# Patient Record
Sex: MALE | Race: WHITE | Employment: FULL TIME | URBAN - METROPOLITAN AREA
[De-identification: names, ages, dates, MRNs, and addresses within clinical notes are randomized per-mention and may not be internally consistent; named-entity substitution may affect disease eponyms.]

---

## 2020-01-01 ENCOUNTER — ONCOLOGY VISIT (OUTPATIENT)
Dept: TRANSPLANT | Facility: CLINIC | Age: 55
End: 2020-01-01
Attending: INTERNAL MEDICINE
Payer: COMMERCIAL

## 2020-01-01 ENCOUNTER — ANESTHESIA (OUTPATIENT)
Dept: SURGERY | Facility: AMBULATORY SURGERY CENTER | Age: 55
End: 2020-01-01

## 2020-01-01 ENCOUNTER — ONCOLOGY VISIT (OUTPATIENT)
Dept: TRANSPLANT | Facility: CLINIC | Age: 55
End: 2020-01-01
Attending: PHYSICIAN ASSISTANT
Payer: COMMERCIAL

## 2020-01-01 ENCOUNTER — ONCOLOGY VISIT (OUTPATIENT)
Dept: TRANSPLANT | Facility: CLINIC | Age: 55
End: 2020-01-01
Attending: NURSE PRACTITIONER
Payer: COMMERCIAL

## 2020-01-01 ENCOUNTER — ANCILLARY PROCEDURE (OUTPATIENT)
Dept: CT IMAGING | Facility: CLINIC | Age: 55
End: 2020-01-01
Attending: PHYSICIAN ASSISTANT
Payer: COMMERCIAL

## 2020-01-01 ENCOUNTER — HOSPITAL ENCOUNTER (OUTPATIENT)
Dept: LAB | Facility: CLINIC | Age: 55
End: 2020-10-27
Payer: COMMERCIAL

## 2020-01-01 ENCOUNTER — HOSPITAL ENCOUNTER (OUTPATIENT)
Dept: LAB | Facility: CLINIC | Age: 55
End: 2020-10-28
Payer: COMMERCIAL

## 2020-01-01 ENCOUNTER — ANESTHESIA EVENT (OUTPATIENT)
Dept: SURGERY | Facility: AMBULATORY SURGERY CENTER | Age: 55
End: 2020-01-01

## 2020-01-01 ENCOUNTER — NURSE TRIAGE (OUTPATIENT)
Dept: NURSING | Facility: CLINIC | Age: 55
End: 2020-01-01

## 2020-01-01 ENCOUNTER — ANCILLARY PROCEDURE (OUTPATIENT)
Dept: ULTRASOUND IMAGING | Facility: CLINIC | Age: 55
End: 2020-01-01
Attending: PHYSICIAN ASSISTANT
Payer: COMMERCIAL

## 2020-01-01 ENCOUNTER — APPOINTMENT (OUTPATIENT)
Dept: OCCUPATIONAL THERAPY | Facility: CLINIC | Age: 55
DRG: 809 | End: 2020-01-01
Payer: COMMERCIAL

## 2020-01-01 ENCOUNTER — DOCUMENTATION ONLY (OUTPATIENT)
Dept: TRANSPLANT | Facility: CLINIC | Age: 55
End: 2020-01-01

## 2020-01-01 ENCOUNTER — ANCILLARY PROCEDURE (OUTPATIENT)
Dept: RADIOLOGY | Facility: AMBULATORY SURGERY CENTER | Age: 55
End: 2020-01-01
Attending: INTERNAL MEDICINE
Payer: COMMERCIAL

## 2020-01-01 ENCOUNTER — HOME INFUSION (PRE-WILLOW HOME INFUSION) (OUTPATIENT)
Dept: PHARMACY | Facility: CLINIC | Age: 55
End: 2020-01-01

## 2020-01-01 ENCOUNTER — APPOINTMENT (OUTPATIENT)
Dept: LAB | Facility: CLINIC | Age: 55
End: 2020-01-01
Attending: INTERNAL MEDICINE
Payer: COMMERCIAL

## 2020-01-01 ENCOUNTER — CARE COORDINATION (OUTPATIENT)
Dept: ONCOLOGY | Facility: CLINIC | Age: 55
End: 2020-01-01

## 2020-01-01 ENCOUNTER — COMMUNICATION - HEALTHEAST (OUTPATIENT)
Dept: SCHEDULING | Facility: CLINIC | Age: 55
End: 2020-01-01

## 2020-01-01 ENCOUNTER — VIRTUAL VISIT (OUTPATIENT)
Dept: ENDOCRINOLOGY | Facility: CLINIC | Age: 55
End: 2020-01-01
Payer: COMMERCIAL

## 2020-01-01 ENCOUNTER — DOCUMENTATION ONLY (OUTPATIENT)
Dept: ONCOLOGY | Facility: CLINIC | Age: 55
End: 2020-01-01

## 2020-01-01 ENCOUNTER — TELEPHONE (OUTPATIENT)
Dept: TRANSPLANT | Facility: CLINIC | Age: 55
End: 2020-01-01

## 2020-01-01 ENCOUNTER — AMBULATORY - HEALTHEAST (OUTPATIENT)
Dept: FAMILY MEDICINE | Facility: CLINIC | Age: 55
End: 2020-01-01

## 2020-01-01 ENCOUNTER — APPOINTMENT (OUTPATIENT)
Dept: OCCUPATIONAL THERAPY | Facility: CLINIC | Age: 55
DRG: 016 | End: 2020-01-01
Attending: INTERNAL MEDICINE
Payer: COMMERCIAL

## 2020-01-01 ENCOUNTER — INFUSION THERAPY VISIT (OUTPATIENT)
Dept: TRANSPLANT | Facility: CLINIC | Age: 55
End: 2020-01-01
Payer: COMMERCIAL

## 2020-01-01 ENCOUNTER — APPOINTMENT (OUTPATIENT)
Dept: LAB | Facility: CLINIC | Age: 55
End: 2020-01-01
Attending: PHYSICIAN ASSISTANT
Payer: COMMERCIAL

## 2020-01-01 ENCOUNTER — INFUSION THERAPY VISIT (OUTPATIENT)
Dept: TRANSPLANT | Facility: CLINIC | Age: 55
DRG: 809 | End: 2020-01-01
Payer: COMMERCIAL

## 2020-01-01 ENCOUNTER — HOSPITAL ENCOUNTER (OUTPATIENT)
Dept: LAB | Facility: CLINIC | Age: 55
End: 2020-10-29
Payer: COMMERCIAL

## 2020-01-01 ENCOUNTER — APPOINTMENT (OUTPATIENT)
Dept: OCCUPATIONAL THERAPY | Facility: CLINIC | Age: 55
DRG: 809 | End: 2020-01-01
Attending: PHYSICIAN ASSISTANT
Payer: COMMERCIAL

## 2020-01-01 ENCOUNTER — PATIENT OUTREACH (OUTPATIENT)
Dept: CARE COORDINATION | Facility: CLINIC | Age: 55
End: 2020-01-01

## 2020-01-01 ENCOUNTER — HOSPITAL ENCOUNTER (INPATIENT)
Facility: CLINIC | Age: 55
LOS: 6 days | Discharge: HOME | DRG: 809 | End: 2020-11-22
Admitting: INTERNAL MEDICINE
Payer: COMMERCIAL

## 2020-01-01 ENCOUNTER — ONCOLOGY VISIT (OUTPATIENT)
Dept: TRANSPLANT | Facility: CLINIC | Age: 55
DRG: 809 | End: 2020-01-01
Attending: INTERNAL MEDICINE
Payer: COMMERCIAL

## 2020-01-01 ENCOUNTER — ONCOLOGY VISIT (OUTPATIENT)
Dept: TRANSPLANT | Facility: CLINIC | Age: 55
End: 2020-01-01
Attending: FAMILY MEDICINE
Payer: COMMERCIAL

## 2020-01-01 ENCOUNTER — APPOINTMENT (OUTPATIENT)
Dept: GENERAL RADIOLOGY | Facility: CLINIC | Age: 55
DRG: 809 | End: 2020-01-01
Attending: PHYSICIAN ASSISTANT
Payer: COMMERCIAL

## 2020-01-01 ENCOUNTER — HOSPITAL ENCOUNTER (OUTPATIENT)
Facility: AMBULATORY SURGERY CENTER | Age: 55
Discharge: HOME | End: 2020-10-26
Attending: RADIOLOGY | Admitting: RADIOLOGY
Payer: COMMERCIAL

## 2020-01-01 ENCOUNTER — TELEPHONE (OUTPATIENT)
Dept: ENDOCRINOLOGY | Facility: CLINIC | Age: 55
End: 2020-01-01

## 2020-01-01 ENCOUNTER — APPOINTMENT (OUTPATIENT)
Dept: OCCUPATIONAL THERAPY | Facility: CLINIC | Age: 55
DRG: 016 | End: 2020-01-01
Attending: PHYSICIAN ASSISTANT
Payer: COMMERCIAL

## 2020-01-01 ENCOUNTER — HOSPITAL ENCOUNTER (INPATIENT)
Facility: CLINIC | Age: 55
LOS: 7 days | Discharge: HOME | DRG: 016 | End: 2020-11-11
Attending: INTERNAL MEDICINE | Admitting: INTERNAL MEDICINE
Payer: COMMERCIAL

## 2020-01-01 ENCOUNTER — APPOINTMENT (OUTPATIENT)
Dept: LAB | Facility: CLINIC | Age: 55
DRG: 809 | End: 2020-01-01
Attending: INTERNAL MEDICINE
Payer: COMMERCIAL

## 2020-01-01 VITALS
TEMPERATURE: 98.6 F | SYSTOLIC BLOOD PRESSURE: 163 MMHG | HEART RATE: 89 BPM | DIASTOLIC BLOOD PRESSURE: 79 MMHG | DIASTOLIC BLOOD PRESSURE: 94 MMHG | HEART RATE: 86 BPM | RESPIRATION RATE: 16 BRPM | WEIGHT: 248.02 LBS | BODY MASS INDEX: 33.64 KG/M2 | TEMPERATURE: 98.1 F | RESPIRATION RATE: 16 BRPM | SYSTOLIC BLOOD PRESSURE: 177 MMHG

## 2020-01-01 VITALS
BODY MASS INDEX: 33.05 KG/M2 | TEMPERATURE: 97.3 F | HEART RATE: 88 BPM | HEIGHT: 72 IN | RESPIRATION RATE: 18 BRPM | SYSTOLIC BLOOD PRESSURE: 131 MMHG | OXYGEN SATURATION: 94 % | WEIGHT: 244 LBS | DIASTOLIC BLOOD PRESSURE: 84 MMHG

## 2020-01-01 VITALS
SYSTOLIC BLOOD PRESSURE: 110 MMHG | OXYGEN SATURATION: 99 % | WEIGHT: 235.1 LBS | TEMPERATURE: 97.5 F | DIASTOLIC BLOOD PRESSURE: 71 MMHG | HEART RATE: 112 BPM | BODY MASS INDEX: 31.89 KG/M2 | RESPIRATION RATE: 18 BRPM

## 2020-01-01 VITALS
OXYGEN SATURATION: 100 % | HEART RATE: 98 BPM | HEIGHT: 73 IN | SYSTOLIC BLOOD PRESSURE: 115 MMHG | BODY MASS INDEX: 33.8 KG/M2 | TEMPERATURE: 98.1 F | WEIGHT: 255 LBS | RESPIRATION RATE: 18 BRPM | DIASTOLIC BLOOD PRESSURE: 68 MMHG

## 2020-01-01 VITALS
DIASTOLIC BLOOD PRESSURE: 90 MMHG | BODY MASS INDEX: 33.81 KG/M2 | OXYGEN SATURATION: 99 % | WEIGHT: 249.6 LBS | SYSTOLIC BLOOD PRESSURE: 137 MMHG | TEMPERATURE: 98.4 F | RESPIRATION RATE: 18 BRPM | HEART RATE: 72 BPM

## 2020-01-01 VITALS
DIASTOLIC BLOOD PRESSURE: 90 MMHG | BODY MASS INDEX: 36.45 KG/M2 | TEMPERATURE: 98.4 F | SYSTOLIC BLOOD PRESSURE: 147 MMHG | OXYGEN SATURATION: 100 % | HEART RATE: 64 BPM | RESPIRATION RATE: 18 BRPM | HEIGHT: 70 IN | WEIGHT: 254.6 LBS

## 2020-01-01 VITALS
DIASTOLIC BLOOD PRESSURE: 66 MMHG | SYSTOLIC BLOOD PRESSURE: 112 MMHG | WEIGHT: 254 LBS | HEART RATE: 110 BPM | OXYGEN SATURATION: 100 % | TEMPERATURE: 98 F | BODY MASS INDEX: 33.85 KG/M2 | RESPIRATION RATE: 18 BRPM

## 2020-01-01 VITALS
TEMPERATURE: 98.8 F | OXYGEN SATURATION: 100 % | SYSTOLIC BLOOD PRESSURE: 110 MMHG | HEART RATE: 123 BPM | RESPIRATION RATE: 18 BRPM | WEIGHT: 250 LBS | BODY MASS INDEX: 33.31 KG/M2 | DIASTOLIC BLOOD PRESSURE: 73 MMHG

## 2020-01-01 VITALS
SYSTOLIC BLOOD PRESSURE: 136 MMHG | TEMPERATURE: 98.1 F | WEIGHT: 248.02 LBS | BODY MASS INDEX: 33.59 KG/M2 | DIASTOLIC BLOOD PRESSURE: 73 MMHG | HEART RATE: 90 BPM | RESPIRATION RATE: 18 BRPM | HEIGHT: 72 IN

## 2020-01-01 VITALS
BODY MASS INDEX: 32.64 KG/M2 | TEMPERATURE: 98.1 F | RESPIRATION RATE: 18 BRPM | HEIGHT: 72 IN | SYSTOLIC BLOOD PRESSURE: 128 MMHG | WEIGHT: 241 LBS | DIASTOLIC BLOOD PRESSURE: 82 MMHG | OXYGEN SATURATION: 98 % | HEART RATE: 102 BPM

## 2020-01-01 VITALS
DIASTOLIC BLOOD PRESSURE: 92 MMHG | HEART RATE: 67 BPM | TEMPERATURE: 98 F | SYSTOLIC BLOOD PRESSURE: 157 MMHG | WEIGHT: 251.6 LBS | RESPIRATION RATE: 20 BRPM | OXYGEN SATURATION: 98 % | BODY MASS INDEX: 34.08 KG/M2

## 2020-01-01 VITALS
TEMPERATURE: 96.8 F | RESPIRATION RATE: 16 BRPM | SYSTOLIC BLOOD PRESSURE: 124 MMHG | HEIGHT: 71 IN | BODY MASS INDEX: 34.51 KG/M2 | OXYGEN SATURATION: 98 % | HEART RATE: 85 BPM | WEIGHT: 246.5 LBS | DIASTOLIC BLOOD PRESSURE: 76 MMHG

## 2020-01-01 VITALS
BODY MASS INDEX: 33.89 KG/M2 | OXYGEN SATURATION: 98 % | SYSTOLIC BLOOD PRESSURE: 146 MMHG | DIASTOLIC BLOOD PRESSURE: 85 MMHG | WEIGHT: 250.2 LBS | RESPIRATION RATE: 18 BRPM | HEIGHT: 72 IN | HEART RATE: 97 BPM | TEMPERATURE: 98.2 F

## 2020-01-01 VITALS
RESPIRATION RATE: 18 BRPM | DIASTOLIC BLOOD PRESSURE: 65 MMHG | TEMPERATURE: 101.4 F | OXYGEN SATURATION: 100 % | HEART RATE: 123 BPM | SYSTOLIC BLOOD PRESSURE: 100 MMHG | BODY MASS INDEX: 33.05 KG/M2 | WEIGHT: 248 LBS

## 2020-01-01 VITALS
SYSTOLIC BLOOD PRESSURE: 143 MMHG | HEART RATE: 79 BPM | DIASTOLIC BLOOD PRESSURE: 88 MMHG | WEIGHT: 244.4 LBS | OXYGEN SATURATION: 99 % | RESPIRATION RATE: 16 BRPM | BODY MASS INDEX: 34.22 KG/M2 | HEIGHT: 71 IN

## 2020-01-01 VITALS
HEART RATE: 89 BPM | OXYGEN SATURATION: 95 % | SYSTOLIC BLOOD PRESSURE: 137 MMHG | WEIGHT: 246.3 LBS | TEMPERATURE: 98.7 F | HEIGHT: 72 IN | RESPIRATION RATE: 16 BRPM | DIASTOLIC BLOOD PRESSURE: 81 MMHG | BODY MASS INDEX: 33.36 KG/M2

## 2020-01-01 VITALS
BODY MASS INDEX: 32.22 KG/M2 | DIASTOLIC BLOOD PRESSURE: 86 MMHG | SYSTOLIC BLOOD PRESSURE: 128 MMHG | HEART RATE: 83 BPM | TEMPERATURE: 98.3 F | OXYGEN SATURATION: 99 % | RESPIRATION RATE: 16 BRPM | WEIGHT: 237.6 LBS

## 2020-01-01 VITALS
OXYGEN SATURATION: 99 % | HEART RATE: 83 BPM | WEIGHT: 237.66 LBS | TEMPERATURE: 98.3 F | RESPIRATION RATE: 16 BRPM | DIASTOLIC BLOOD PRESSURE: 86 MMHG | SYSTOLIC BLOOD PRESSURE: 128 MMHG | BODY MASS INDEX: 32.23 KG/M2

## 2020-01-01 VITALS
DIASTOLIC BLOOD PRESSURE: 74 MMHG | WEIGHT: 237.5 LBS | RESPIRATION RATE: 18 BRPM | TEMPERATURE: 97.9 F | HEIGHT: 72 IN | OXYGEN SATURATION: 99 % | HEART RATE: 99 BPM | SYSTOLIC BLOOD PRESSURE: 108 MMHG | BODY MASS INDEX: 32.17 KG/M2

## 2020-01-01 VITALS
BODY MASS INDEX: 36.79 KG/M2 | TEMPERATURE: 97.5 F | SYSTOLIC BLOOD PRESSURE: 132 MMHG | WEIGHT: 257 LBS | RESPIRATION RATE: 18 BRPM | DIASTOLIC BLOOD PRESSURE: 79 MMHG | HEART RATE: 101 BPM | OXYGEN SATURATION: 100 %

## 2020-01-01 VITALS
SYSTOLIC BLOOD PRESSURE: 152 MMHG | RESPIRATION RATE: 18 BRPM | TEMPERATURE: 98.6 F | DIASTOLIC BLOOD PRESSURE: 84 MMHG | HEART RATE: 77 BPM

## 2020-01-01 DIAGNOSIS — C83.18 MANTLE CELL LYMPHOMA OF LYMPH NODES OF MULTIPLE SITES (H): ICD-10-CM

## 2020-01-01 DIAGNOSIS — C83.18 MANTLE CELL LYMPHOMA OF LYMPH NODES OF MULTIPLE SITES (H): Primary | ICD-10-CM

## 2020-01-01 DIAGNOSIS — Z94.81 S/P BONE MARROW TRANSPLANT (H): ICD-10-CM

## 2020-01-01 DIAGNOSIS — Z94.81 STATUS POST BONE MARROW TRANSPLANT (H): Primary | ICD-10-CM

## 2020-01-01 DIAGNOSIS — C83.18 MANTLE CELL LYMPHOMA OF LYMPH NODES OF MULTIPLE REGIONS (H): ICD-10-CM

## 2020-01-01 DIAGNOSIS — Z94.81 S/P BONE MARROW TRANSPLANT (H): Primary | ICD-10-CM

## 2020-01-01 DIAGNOSIS — Z11.59 ENCOUNTER FOR SCREENING FOR OTHER VIRAL DISEASES: ICD-10-CM

## 2020-01-01 DIAGNOSIS — E11.69 TYPE 2 DIABETES MELLITUS WITH OTHER SPECIFIED COMPLICATION, WITHOUT LONG-TERM CURRENT USE OF INSULIN (H): ICD-10-CM

## 2020-01-01 DIAGNOSIS — C83.18 MANTLE CELL LYMPHOMA OF LYMPH NODES OF MULTIPLE REGIONS (H): Primary | ICD-10-CM

## 2020-01-01 DIAGNOSIS — I82.622 ACUTE DEEP VEIN THROMBOSIS (DVT) OF LEFT UPPER EXTREMITY, UNSPECIFIED VEIN (H): ICD-10-CM

## 2020-01-01 DIAGNOSIS — I82.622 ACUTE DEEP VEIN THROMBOSIS (DVT) OF LEFT UPPER EXTREMITY, UNSPECIFIED VEIN (H): Primary | ICD-10-CM

## 2020-01-01 DIAGNOSIS — Z11.59 ENCOUNTER FOR SCREENING FOR OTHER VIRAL DISEASES: Primary | ICD-10-CM

## 2020-01-01 LAB
ABO + RH BLD: NORMAL
ALBUMIN SERPL-MCNC: 2.6 G/DL (ref 3.4–5)
ALBUMIN SERPL-MCNC: 2.8 G/DL (ref 3.4–5)
ALBUMIN SERPL-MCNC: 2.9 G/DL (ref 3.4–5)
ALBUMIN SERPL-MCNC: 3 G/DL (ref 3.4–5)
ALBUMIN SERPL-MCNC: 3.1 G/DL (ref 3.4–5)
ALBUMIN SERPL-MCNC: 3.2 G/DL (ref 3.4–5)
ALBUMIN UR-MCNC: 10 MG/DL
ALBUMIN UR-MCNC: 30 MG/DL
ALP SERPL-CCNC: 112 U/L (ref 40–150)
ALP SERPL-CCNC: 131 U/L (ref 40–150)
ALP SERPL-CCNC: 79 U/L (ref 40–150)
ALP SERPL-CCNC: 82 U/L (ref 40–150)
ALP SERPL-CCNC: 85 U/L (ref 40–150)
ALP SERPL-CCNC: 92 U/L (ref 40–150)
ALT SERPL W P-5'-P-CCNC: 18 U/L (ref 0–70)
ALT SERPL W P-5'-P-CCNC: 19 U/L (ref 0–70)
ALT SERPL W P-5'-P-CCNC: 26 U/L (ref 0–70)
ALT SERPL W P-5'-P-CCNC: 33 U/L (ref 0–70)
ALT SERPL W P-5'-P-CCNC: 35 U/L (ref 0–70)
ALT SERPL W P-5'-P-CCNC: 58 U/L (ref 0–70)
AMORPH CRY #/AREA URNS HPF: ABNORMAL /HPF
ANION GAP SERPL CALCULATED.3IONS-SCNC: 3 MMOL/L (ref 3–14)
ANION GAP SERPL CALCULATED.3IONS-SCNC: 4 MMOL/L (ref 3–14)
ANION GAP SERPL CALCULATED.3IONS-SCNC: 5 MMOL/L (ref 3–14)
ANION GAP SERPL CALCULATED.3IONS-SCNC: 6 MMOL/L (ref 3–14)
ANION GAP SERPL CALCULATED.3IONS-SCNC: 7 MMOL/L (ref 3–14)
ANION GAP SERPL CALCULATED.3IONS-SCNC: 8 MMOL/L (ref 3–14)
ANION GAP SERPL CALCULATED.3IONS-SCNC: 9 MMOL/L (ref 3–14)
ANION GAP SERPL CALCULATED.3IONS-SCNC: 9 MMOL/L (ref 3–14)
APPEARANCE UR: ABNORMAL
APPEARANCE UR: CLEAR
APTT PPP: 55 SEC (ref 22–37)
AST SERPL W P-5'-P-CCNC: 12 U/L (ref 0–45)
AST SERPL W P-5'-P-CCNC: 20 U/L (ref 0–45)
AST SERPL W P-5'-P-CCNC: 21 U/L (ref 0–45)
AST SERPL W P-5'-P-CCNC: 47 U/L (ref 0–45)
BACTERIA #/AREA URNS HPF: ABNORMAL /HPF
BACTERIA SPEC CULT: NO GROWTH
BACTERIA SPEC CULT: NORMAL
BASOPHILS # BLD AUTO: 0 10E9/L (ref 0–0.2)
BASOPHILS # BLD AUTO: 0.1 10E9/L (ref 0–0.2)
BASOPHILS # BLD AUTO: 0.1 10E9/L (ref 0–0.2)
BASOPHILS # BLD AUTO: 0.2 10E9/L (ref 0–0.2)
BASOPHILS # BLD AUTO: 0.2 10E9/L (ref 0–0.2)
BASOPHILS NFR BLD AUTO: 0 %
BASOPHILS NFR BLD AUTO: 0.1 %
BASOPHILS NFR BLD AUTO: 0.1 %
BASOPHILS NFR BLD AUTO: 0.2 %
BASOPHILS NFR BLD AUTO: 0.5 %
BASOPHILS NFR BLD AUTO: 0.6 %
BASOPHILS NFR BLD AUTO: 0.9 %
BASOPHILS NFR BLD AUTO: 1.1 %
BASOPHILS NFR BLD AUTO: 3.5 %
BILIRUB DIRECT SERPL-MCNC: 0.1 MG/DL (ref 0–0.2)
BILIRUB DIRECT SERPL-MCNC: 0.2 MG/DL (ref 0–0.2)
BILIRUB SERPL-MCNC: 0.2 MG/DL (ref 0.2–1.3)
BILIRUB SERPL-MCNC: 0.3 MG/DL (ref 0.2–1.3)
BILIRUB SERPL-MCNC: 0.4 MG/DL (ref 0.2–1.3)
BILIRUB SERPL-MCNC: 0.6 MG/DL (ref 0.2–1.3)
BILIRUB SERPL-MCNC: 0.7 MG/DL (ref 0.2–1.3)
BILIRUB SERPL-MCNC: 0.7 MG/DL (ref 0.2–1.3)
BILIRUB UR QL STRIP: NEGATIVE
BILIRUB UR QL STRIP: NEGATIVE
BLD GP AB SCN SERPL QL: NORMAL
BLD PROD TYP BPU: NORMAL
BLD UNIT ID BPU: 0
BLOOD BANK CMNT PATIENT-IMP: NORMAL
BLOOD PRODUCT CODE: NORMAL
BPU ID: NORMAL
BUN SERPL-MCNC: 10 MG/DL (ref 7–30)
BUN SERPL-MCNC: 10 MG/DL (ref 7–30)
BUN SERPL-MCNC: 12 MG/DL (ref 7–30)
BUN SERPL-MCNC: 15 MG/DL (ref 7–30)
BUN SERPL-MCNC: 15 MG/DL (ref 7–30)
BUN SERPL-MCNC: 16 MG/DL (ref 7–30)
BUN SERPL-MCNC: 18 MG/DL (ref 7–30)
BUN SERPL-MCNC: 19 MG/DL (ref 7–30)
BUN SERPL-MCNC: 19 MG/DL (ref 7–30)
BUN SERPL-MCNC: 20 MG/DL (ref 7–30)
BUN SERPL-MCNC: 23 MG/DL (ref 7–30)
BUN SERPL-MCNC: 24 MG/DL (ref 7–30)
BUN SERPL-MCNC: 25 MG/DL (ref 7–30)
BUN SERPL-MCNC: 26 MG/DL (ref 7–30)
BUN SERPL-MCNC: 27 MG/DL (ref 7–30)
BUN SERPL-MCNC: 28 MG/DL (ref 7–30)
BUN SERPL-MCNC: 28 MG/DL (ref 7–30)
BUN SERPL-MCNC: 31 MG/DL (ref 7–30)
BUN SERPL-MCNC: 32 MG/DL (ref 7–30)
C COLI+JEJUNI+LARI FUSA STL QL NAA+PROBE: NOT DETECTED
C DIFF TOX B STL QL: NEGATIVE
CALCIUM SERPL-MCNC: 7.8 MG/DL (ref 8.5–10.1)
CALCIUM SERPL-MCNC: 7.9 MG/DL (ref 8.5–10.1)
CALCIUM SERPL-MCNC: 8 MG/DL (ref 8.5–10.1)
CALCIUM SERPL-MCNC: 8 MG/DL (ref 8.5–10.1)
CALCIUM SERPL-MCNC: 8.1 MG/DL (ref 8.5–10.1)
CALCIUM SERPL-MCNC: 8.1 MG/DL (ref 8.5–10.1)
CALCIUM SERPL-MCNC: 8.2 MG/DL (ref 8.5–10.1)
CALCIUM SERPL-MCNC: 8.3 MG/DL (ref 8.5–10.1)
CALCIUM SERPL-MCNC: 8.4 MG/DL (ref 8.5–10.1)
CALCIUM SERPL-MCNC: 8.5 MG/DL (ref 8.5–10.1)
CALCIUM SERPL-MCNC: 8.6 MG/DL (ref 8.5–10.1)
CALCIUM SERPL-MCNC: 8.6 MG/DL (ref 8.5–10.1)
CALCIUM SERPL-MCNC: 8.7 MG/DL (ref 8.5–10.1)
CALCIUM SERPL-MCNC: 8.7 MG/DL (ref 8.5–10.1)
CALCIUM SERPL-MCNC: 8.8 MG/DL (ref 8.5–10.1)
CALCIUM SERPL-MCNC: 8.9 MG/DL (ref 8.5–10.1)
CALCIUM SERPL-MCNC: 9 MG/DL (ref 8.5–10.1)
CALCIUM SERPL-MCNC: 9.1 MG/DL (ref 8.5–10.1)
CALCIUM SERPL-MCNC: 9.1 MG/DL (ref 8.5–10.1)
CD34 CELLS # SPEC: 13 /UL
CD34 CELLS # SPEC: 17 /UL
CD34 CELLS # SPEC: 19 /UL
CD34 CELLS # SPEC: 25 /UL
CD34 CELLS # SPEC: NORMAL /UL
CD34 CELLS NFR SPEC: 0.05 %
CD34 CELLS NFR SPEC: 0.05 %
CD34 CELLS NFR SPEC: 0.09 %
CD34 CELLS NFR SPEC: 0.12 %
CD34 CELLS NFR SPEC: NORMAL %
CELL THERAPY PRODUCT NUMBER: NORMAL
CHLORIDE SERPL-SCNC: 100 MMOL/L (ref 94–109)
CHLORIDE SERPL-SCNC: 101 MMOL/L (ref 94–109)
CHLORIDE SERPL-SCNC: 102 MMOL/L (ref 94–109)
CHLORIDE SERPL-SCNC: 103 MMOL/L (ref 94–109)
CHLORIDE SERPL-SCNC: 103 MMOL/L (ref 94–109)
CHLORIDE SERPL-SCNC: 104 MMOL/L (ref 94–109)
CHLORIDE SERPL-SCNC: 105 MMOL/L (ref 94–109)
CHLORIDE SERPL-SCNC: 106 MMOL/L (ref 94–109)
CHLORIDE SERPL-SCNC: 107 MMOL/L (ref 94–109)
CHLORIDE SERPL-SCNC: 108 MMOL/L (ref 94–109)
CHLORIDE SERPL-SCNC: 110 MMOL/L (ref 94–109)
CHLORIDE SERPL-SCNC: 110 MMOL/L (ref 94–109)
CHLORIDE SERPL-SCNC: 111 MMOL/L (ref 94–109)
CHLORIDE SERPL-SCNC: 97 MMOL/L (ref 94–109)
CMV DNA SPEC NAA+PROBE-ACNC: NORMAL [IU]/ML
CMV DNA SPEC NAA+PROBE-LOG#: NORMAL {LOG_IU}/ML
CO2 SERPL-SCNC: 22 MMOL/L (ref 20–32)
CO2 SERPL-SCNC: 23 MMOL/L (ref 20–32)
CO2 SERPL-SCNC: 24 MMOL/L (ref 20–32)
CO2 SERPL-SCNC: 25 MMOL/L (ref 20–32)
CO2 SERPL-SCNC: 26 MMOL/L (ref 20–32)
CO2 SERPL-SCNC: 27 MMOL/L (ref 20–32)
CO2 SERPL-SCNC: 28 MMOL/L (ref 20–32)
CO2 SERPL-SCNC: 29 MMOL/L (ref 20–32)
CO2 SERPL-SCNC: 29 MMOL/L (ref 20–32)
CO2 SERPL-SCNC: 30 MMOL/L (ref 20–32)
CO2 SERPL-SCNC: 30 MMOL/L (ref 20–32)
COLOR UR AUTO: YELLOW
COLOR UR AUTO: YELLOW
COPATH REPORT: NORMAL
CREAT SERPL-MCNC: 0.84 MG/DL (ref 0.66–1.25)
CREAT SERPL-MCNC: 0.88 MG/DL (ref 0.66–1.25)
CREAT SERPL-MCNC: 0.89 MG/DL (ref 0.66–1.25)
CREAT SERPL-MCNC: 0.89 MG/DL (ref 0.66–1.25)
CREAT SERPL-MCNC: 0.9 MG/DL (ref 0.66–1.25)
CREAT SERPL-MCNC: 0.91 MG/DL (ref 0.66–1.25)
CREAT SERPL-MCNC: 0.92 MG/DL (ref 0.66–1.25)
CREAT SERPL-MCNC: 0.96 MG/DL (ref 0.66–1.25)
CREAT SERPL-MCNC: 0.97 MG/DL (ref 0.66–1.25)
CREAT SERPL-MCNC: 0.97 MG/DL (ref 0.66–1.25)
CREAT SERPL-MCNC: 1 MG/DL (ref 0.66–1.25)
CREAT SERPL-MCNC: 1.01 MG/DL (ref 0.66–1.25)
CREAT SERPL-MCNC: 1.02 MG/DL (ref 0.66–1.25)
CREAT SERPL-MCNC: 1.03 MG/DL (ref 0.66–1.25)
CREAT SERPL-MCNC: 1.05 MG/DL (ref 0.66–1.25)
CREAT SERPL-MCNC: 1.06 MG/DL (ref 0.66–1.25)
CREAT SERPL-MCNC: 1.07 MG/DL (ref 0.66–1.25)
CREAT SERPL-MCNC: 1.08 MG/DL (ref 0.66–1.25)
CREAT SERPL-MCNC: 1.09 MG/DL (ref 0.66–1.25)
CREAT SERPL-MCNC: 1.12 MG/DL (ref 0.66–1.25)
CREAT SERPL-MCNC: 1.12 MG/DL (ref 0.66–1.25)
CREAT SERPL-MCNC: 1.13 MG/DL (ref 0.66–1.25)
CREAT SERPL-MCNC: 1.17 MG/DL (ref 0.66–1.25)
CREAT SERPL-MCNC: 1.23 MG/DL (ref 0.66–1.25)
CREAT SERPL-MCNC: 1.33 MG/DL (ref 0.66–1.25)
DACRYOCYTES BLD QL SMEAR: SLIGHT
DIFFERENTIAL METHOD BLD: ABNORMAL
DONOR CYTOMEGALOVIRUS ABY: POSITIVE
DONOR HEP B CORE ABY: NONREACTIVE
DONOR HEP B SURF AGN: NONREACTIVE
DONOR HEPATITIS C ABY: ABNORMAL
DONOR HTLV 1&2 ANTIBODY: NONREACTIVE
DONOR TREPONEMA PAL ABY: NONREACTIVE
EC STX1 GENE STL QL NAA+PROBE: NOT DETECTED
EC STX2 GENE STL QL NAA+PROBE: NOT DETECTED
ENTERIC PATHOGEN COMMENT: NORMAL
EOSINOPHIL # BLD AUTO: 0 10E9/L (ref 0–0.7)
EOSINOPHIL # BLD AUTO: 0.1 10E9/L (ref 0–0.7)
EOSINOPHIL # BLD AUTO: 0.2 10E9/L (ref 0–0.7)
EOSINOPHIL # BLD AUTO: 0.3 10E9/L (ref 0–0.7)
EOSINOPHIL # BLD AUTO: 0.4 10E9/L (ref 0–0.7)
EOSINOPHIL # BLD AUTO: 0.4 10E9/L (ref 0–0.7)
EOSINOPHIL # BLD AUTO: 0.7 10E9/L (ref 0–0.7)
EOSINOPHIL # BLD AUTO: 0.8 10E9/L (ref 0–0.7)
EOSINOPHIL NFR BLD AUTO: 0 %
EOSINOPHIL NFR BLD AUTO: 0.6 %
EOSINOPHIL NFR BLD AUTO: 0.8 %
EOSINOPHIL NFR BLD AUTO: 1 %
EOSINOPHIL NFR BLD AUTO: 1 %
EOSINOPHIL NFR BLD AUTO: 1.4 %
EOSINOPHIL NFR BLD AUTO: 1.6 %
EOSINOPHIL NFR BLD AUTO: 1.7 %
EOSINOPHIL NFR BLD AUTO: 1.8 %
EOSINOPHIL NFR BLD AUTO: 2.6 %
EOSINOPHIL NFR BLD AUTO: 3.5 %
EOSINOPHIL NFR BLD AUTO: 4.7 %
EOSINOPHIL NFR BLD AUTO: 5.1 %
EOSINOPHIL NFR BLD AUTO: 6.7 %
ERYTHROCYTE [DISTWIDTH] IN BLOOD BY AUTOMATED COUNT: 11.9 % (ref 10–15)
ERYTHROCYTE [DISTWIDTH] IN BLOOD BY AUTOMATED COUNT: 12.6 % (ref 10–15)
ERYTHROCYTE [DISTWIDTH] IN BLOOD BY AUTOMATED COUNT: 12.7 % (ref 10–15)
ERYTHROCYTE [DISTWIDTH] IN BLOOD BY AUTOMATED COUNT: 12.8 % (ref 10–15)
ERYTHROCYTE [DISTWIDTH] IN BLOOD BY AUTOMATED COUNT: 12.9 % (ref 10–15)
ERYTHROCYTE [DISTWIDTH] IN BLOOD BY AUTOMATED COUNT: 12.9 % (ref 10–15)
ERYTHROCYTE [DISTWIDTH] IN BLOOD BY AUTOMATED COUNT: 13 % (ref 10–15)
ERYTHROCYTE [DISTWIDTH] IN BLOOD BY AUTOMATED COUNT: 13.1 % (ref 10–15)
ERYTHROCYTE [DISTWIDTH] IN BLOOD BY AUTOMATED COUNT: 13.1 % (ref 10–15)
ERYTHROCYTE [DISTWIDTH] IN BLOOD BY AUTOMATED COUNT: 13.2 % (ref 10–15)
ERYTHROCYTE [DISTWIDTH] IN BLOOD BY AUTOMATED COUNT: 13.3 % (ref 10–15)
ERYTHROCYTE [DISTWIDTH] IN BLOOD BY AUTOMATED COUNT: 13.4 % (ref 10–15)
ERYTHROCYTE [DISTWIDTH] IN BLOOD BY AUTOMATED COUNT: 13.4 % (ref 10–15)
ERYTHROCYTE [DISTWIDTH] IN BLOOD BY AUTOMATED COUNT: 13.5 % (ref 10–15)
ERYTHROCYTE [DISTWIDTH] IN BLOOD BY AUTOMATED COUNT: 13.6 % (ref 10–15)
ERYTHROCYTE [DISTWIDTH] IN BLOOD BY AUTOMATED COUNT: 13.9 % (ref 10–15)
ERYTHROCYTE [DISTWIDTH] IN BLOOD BY AUTOMATED COUNT: 14.5 % (ref 10–15)
ERYTHROCYTE [DISTWIDTH] IN BLOOD BY AUTOMATED COUNT: 15.9 % (ref 10–15)
ERYTHROCYTE [DISTWIDTH] IN BLOOD BY AUTOMATED COUNT: 15.9 % (ref 10–15)
GFR SERPL CREATININE-BSD FRML MDRD: 59 ML/MIN/{1.73_M2}
GFR SERPL CREATININE-BSD FRML MDRD: 65 ML/MIN/{1.73_M2}
GFR SERPL CREATININE-BSD FRML MDRD: 69 ML/MIN/{1.73_M2}
GFR SERPL CREATININE-BSD FRML MDRD: 72 ML/MIN/{1.73_M2}
GFR SERPL CREATININE-BSD FRML MDRD: 73 ML/MIN/{1.73_M2}
GFR SERPL CREATININE-BSD FRML MDRD: 73 ML/MIN/{1.73_M2}
GFR SERPL CREATININE-BSD FRML MDRD: 75 ML/MIN/{1.73_M2}
GFR SERPL CREATININE-BSD FRML MDRD: 76 ML/MIN/{1.73_M2}
GFR SERPL CREATININE-BSD FRML MDRD: 77 ML/MIN/{1.73_M2}
GFR SERPL CREATININE-BSD FRML MDRD: 78 ML/MIN/{1.73_M2}
GFR SERPL CREATININE-BSD FRML MDRD: 79 ML/MIN/{1.73_M2}
GFR SERPL CREATININE-BSD FRML MDRD: 80 ML/MIN/{1.73_M2}
GFR SERPL CREATININE-BSD FRML MDRD: 81 ML/MIN/{1.73_M2}
GFR SERPL CREATININE-BSD FRML MDRD: 82 ML/MIN/{1.73_M2}
GFR SERPL CREATININE-BSD FRML MDRD: 83 ML/MIN/{1.73_M2}
GFR SERPL CREATININE-BSD FRML MDRD: 84 ML/MIN/{1.73_M2}
GFR SERPL CREATININE-BSD FRML MDRD: 84 ML/MIN/{1.73_M2}
GFR SERPL CREATININE-BSD FRML MDRD: 86 ML/MIN/{1.73_M2}
GFR SERPL CREATININE-BSD FRML MDRD: 87 ML/MIN/{1.73_M2}
GFR SERPL CREATININE-BSD FRML MDRD: 88 ML/MIN/{1.73_M2}
GFR SERPL CREATININE-BSD FRML MDRD: >90 ML/MIN/{1.73_M2}
GLUCOSE BLDC GLUCOMTR-MCNC: 101 MG/DL (ref 70–99)
GLUCOSE BLDC GLUCOMTR-MCNC: 105 MG/DL (ref 70–99)
GLUCOSE BLDC GLUCOMTR-MCNC: 107 MG/DL (ref 70–99)
GLUCOSE BLDC GLUCOMTR-MCNC: 111 MG/DL (ref 70–99)
GLUCOSE BLDC GLUCOMTR-MCNC: 118 MG/DL (ref 70–99)
GLUCOSE BLDC GLUCOMTR-MCNC: 121 MG/DL (ref 70–99)
GLUCOSE BLDC GLUCOMTR-MCNC: 125 MG/DL (ref 70–99)
GLUCOSE BLDC GLUCOMTR-MCNC: 126 MG/DL (ref 70–99)
GLUCOSE BLDC GLUCOMTR-MCNC: 129 MG/DL (ref 70–99)
GLUCOSE BLDC GLUCOMTR-MCNC: 137 MG/DL (ref 70–99)
GLUCOSE BLDC GLUCOMTR-MCNC: 143 MG/DL (ref 70–99)
GLUCOSE BLDC GLUCOMTR-MCNC: 144 MG/DL (ref 70–99)
GLUCOSE BLDC GLUCOMTR-MCNC: 146 MG/DL (ref 70–99)
GLUCOSE BLDC GLUCOMTR-MCNC: 149 MG/DL (ref 70–99)
GLUCOSE BLDC GLUCOMTR-MCNC: 151 MG/DL (ref 70–99)
GLUCOSE BLDC GLUCOMTR-MCNC: 153 MG/DL (ref 70–99)
GLUCOSE BLDC GLUCOMTR-MCNC: 154 MG/DL (ref 70–99)
GLUCOSE BLDC GLUCOMTR-MCNC: 160 MG/DL (ref 70–99)
GLUCOSE BLDC GLUCOMTR-MCNC: 163 MG/DL (ref 70–99)
GLUCOSE BLDC GLUCOMTR-MCNC: 166 MG/DL (ref 70–99)
GLUCOSE BLDC GLUCOMTR-MCNC: 167 MG/DL (ref 70–99)
GLUCOSE BLDC GLUCOMTR-MCNC: 170 MG/DL (ref 70–99)
GLUCOSE BLDC GLUCOMTR-MCNC: 174 MG/DL (ref 70–99)
GLUCOSE BLDC GLUCOMTR-MCNC: 176 MG/DL (ref 70–99)
GLUCOSE BLDC GLUCOMTR-MCNC: 177 MG/DL (ref 70–99)
GLUCOSE BLDC GLUCOMTR-MCNC: 186 MG/DL (ref 70–99)
GLUCOSE BLDC GLUCOMTR-MCNC: 189 MG/DL (ref 70–99)
GLUCOSE BLDC GLUCOMTR-MCNC: 190 MG/DL (ref 70–99)
GLUCOSE BLDC GLUCOMTR-MCNC: 190 MG/DL (ref 70–99)
GLUCOSE BLDC GLUCOMTR-MCNC: 194 MG/DL (ref 70–99)
GLUCOSE BLDC GLUCOMTR-MCNC: 196 MG/DL (ref 70–99)
GLUCOSE BLDC GLUCOMTR-MCNC: 199 MG/DL (ref 70–99)
GLUCOSE BLDC GLUCOMTR-MCNC: 202 MG/DL (ref 70–99)
GLUCOSE BLDC GLUCOMTR-MCNC: 203 MG/DL (ref 70–99)
GLUCOSE BLDC GLUCOMTR-MCNC: 206 MG/DL (ref 70–99)
GLUCOSE BLDC GLUCOMTR-MCNC: 208 MG/DL (ref 70–99)
GLUCOSE BLDC GLUCOMTR-MCNC: 212 MG/DL (ref 70–99)
GLUCOSE BLDC GLUCOMTR-MCNC: 220 MG/DL (ref 70–99)
GLUCOSE BLDC GLUCOMTR-MCNC: 220 MG/DL (ref 70–99)
GLUCOSE BLDC GLUCOMTR-MCNC: 221 MG/DL (ref 70–99)
GLUCOSE BLDC GLUCOMTR-MCNC: 233 MG/DL (ref 70–99)
GLUCOSE BLDC GLUCOMTR-MCNC: 239 MG/DL (ref 70–99)
GLUCOSE BLDC GLUCOMTR-MCNC: 247 MG/DL (ref 70–99)
GLUCOSE BLDC GLUCOMTR-MCNC: 254 MG/DL (ref 70–99)
GLUCOSE BLDC GLUCOMTR-MCNC: 276 MG/DL (ref 70–99)
GLUCOSE BLDC GLUCOMTR-MCNC: 292 MG/DL (ref 70–99)
GLUCOSE BLDC GLUCOMTR-MCNC: 65 MG/DL (ref 70–99)
GLUCOSE BLDC GLUCOMTR-MCNC: 69 MG/DL (ref 70–99)
GLUCOSE BLDC GLUCOMTR-MCNC: 93 MG/DL (ref 70–99)
GLUCOSE SERPL-MCNC: 106 MG/DL (ref 70–99)
GLUCOSE SERPL-MCNC: 108 MG/DL (ref 70–99)
GLUCOSE SERPL-MCNC: 108 MG/DL (ref 70–99)
GLUCOSE SERPL-MCNC: 114 MG/DL (ref 70–99)
GLUCOSE SERPL-MCNC: 116 MG/DL (ref 70–99)
GLUCOSE SERPL-MCNC: 118 MG/DL (ref 70–99)
GLUCOSE SERPL-MCNC: 118 MG/DL (ref 70–99)
GLUCOSE SERPL-MCNC: 124 MG/DL (ref 70–99)
GLUCOSE SERPL-MCNC: 127 MG/DL (ref 70–99)
GLUCOSE SERPL-MCNC: 130 MG/DL (ref 70–99)
GLUCOSE SERPL-MCNC: 130 MG/DL (ref 70–99)
GLUCOSE SERPL-MCNC: 131 MG/DL (ref 70–99)
GLUCOSE SERPL-MCNC: 133 MG/DL (ref 70–99)
GLUCOSE SERPL-MCNC: 141 MG/DL (ref 70–99)
GLUCOSE SERPL-MCNC: 148 MG/DL (ref 70–99)
GLUCOSE SERPL-MCNC: 150 MG/DL (ref 70–99)
GLUCOSE SERPL-MCNC: 161 MG/DL (ref 70–99)
GLUCOSE SERPL-MCNC: 194 MG/DL (ref 70–99)
GLUCOSE SERPL-MCNC: 208 MG/DL (ref 70–99)
GLUCOSE SERPL-MCNC: 215 MG/DL (ref 70–99)
GLUCOSE SERPL-MCNC: 78 MG/DL (ref 70–99)
GLUCOSE SERPL-MCNC: 80 MG/DL (ref 70–99)
GLUCOSE SERPL-MCNC: 83 MG/DL (ref 70–99)
GLUCOSE SERPL-MCNC: 93 MG/DL (ref 70–99)
GLUCOSE SERPL-MCNC: 93 MG/DL (ref 70–99)
GLUCOSE SERPL-MCNC: 94 MG/DL (ref 70–99)
GLUCOSE SERPL-MCNC: 97 MG/DL (ref 70–99)
GLUCOSE UR STRIP-MCNC: 300 MG/DL
GLUCOSE UR STRIP-MCNC: 300 MG/DL
HCT VFR BLD AUTO: 16.7 % (ref 40–53)
HCT VFR BLD AUTO: 19.3 % (ref 40–53)
HCT VFR BLD AUTO: 20.2 % (ref 40–53)
HCT VFR BLD AUTO: 21.5 % (ref 40–53)
HCT VFR BLD AUTO: 22.6 % (ref 40–53)
HCT VFR BLD AUTO: 23.6 % (ref 40–53)
HCT VFR BLD AUTO: 23.7 % (ref 40–53)
HCT VFR BLD AUTO: 24 % (ref 40–53)
HCT VFR BLD AUTO: 25.1 % (ref 40–53)
HCT VFR BLD AUTO: 25.1 % (ref 40–53)
HCT VFR BLD AUTO: 25.3 % (ref 40–53)
HCT VFR BLD AUTO: 26.2 % (ref 40–53)
HCT VFR BLD AUTO: 26.3 % (ref 40–53)
HCT VFR BLD AUTO: 26.3 % (ref 40–53)
HCT VFR BLD AUTO: 26.5 % (ref 40–53)
HCT VFR BLD AUTO: 26.7 % (ref 40–53)
HCT VFR BLD AUTO: 27.7 % (ref 40–53)
HCT VFR BLD AUTO: 27.7 % (ref 40–53)
HCT VFR BLD AUTO: 28.1 % (ref 40–53)
HCT VFR BLD AUTO: 29.6 % (ref 40–53)
HCT VFR BLD AUTO: 30.1 % (ref 40–53)
HCT VFR BLD AUTO: 31.3 % (ref 40–53)
HCT VFR BLD AUTO: 33.5 % (ref 40–53)
HCT VFR BLD AUTO: 33.6 % (ref 40–53)
HCT VFR BLD AUTO: 34.2 % (ref 40–53)
HCT VFR BLD AUTO: 35 % (ref 40–53)
HCT VFR BLD AUTO: 35.5 % (ref 40–53)
HCT VFR BLD AUTO: 36.7 % (ref 40–53)
HCT VFR BLD AUTO: 37.5 % (ref 40–53)
HCT VFR BLD AUTO: 39.6 % (ref 40–53)
HGB BLD-MCNC: 10.2 G/DL (ref 13.3–17.7)
HGB BLD-MCNC: 10.9 G/DL (ref 13.3–17.7)
HGB BLD-MCNC: 11.1 G/DL (ref 13.3–17.7)
HGB BLD-MCNC: 11.5 G/DL (ref 13.3–17.7)
HGB BLD-MCNC: 11.5 G/DL (ref 13.3–17.7)
HGB BLD-MCNC: 11.8 G/DL (ref 13.3–17.7)
HGB BLD-MCNC: 12.1 G/DL (ref 13.3–17.7)
HGB BLD-MCNC: 12.4 G/DL (ref 13.3–17.7)
HGB BLD-MCNC: 13 G/DL (ref 13.3–17.7)
HGB BLD-MCNC: 5.1 G/DL (ref 13.3–17.7)
HGB BLD-MCNC: 6.2 G/DL (ref 13.3–17.7)
HGB BLD-MCNC: 6.7 G/DL (ref 13.3–17.7)
HGB BLD-MCNC: 7.3 G/DL (ref 13.3–17.7)
HGB BLD-MCNC: 7.7 G/DL (ref 13.3–17.7)
HGB BLD-MCNC: 7.9 G/DL (ref 13.3–17.7)
HGB BLD-MCNC: 8 G/DL (ref 13.3–17.7)
HGB BLD-MCNC: 8 G/DL (ref 13.3–17.7)
HGB BLD-MCNC: 8.1 G/DL (ref 13.3–17.7)
HGB BLD-MCNC: 8.3 G/DL (ref 13.3–17.7)
HGB BLD-MCNC: 8.5 G/DL (ref 13.3–17.7)
HGB BLD-MCNC: 8.6 G/DL (ref 13.3–17.7)
HGB BLD-MCNC: 8.6 G/DL (ref 13.3–17.7)
HGB BLD-MCNC: 8.7 G/DL (ref 13.3–17.7)
HGB BLD-MCNC: 8.8 G/DL (ref 13.3–17.7)
HGB BLD-MCNC: 9 G/DL (ref 13.3–17.7)
HGB BLD-MCNC: 9.1 G/DL (ref 13.3–17.7)
HGB BLD-MCNC: 9.2 G/DL (ref 13.3–17.7)
HGB BLD-MCNC: 9.2 G/DL (ref 13.3–17.7)
HGB BLD-MCNC: 9.4 G/DL (ref 13.3–17.7)
HGB BLD-MCNC: 9.8 G/DL (ref 13.3–17.7)
HGB UR QL STRIP: ABNORMAL
HGB UR QL STRIP: ABNORMAL
HIV1+2 AB SERPL QL IA: NONREACTIVE
IFC SPECIMEN: NORMAL
IMM GRANULOCYTES # BLD: 0 10E9/L (ref 0–0.4)
IMM GRANULOCYTES # BLD: 0.1 10E9/L (ref 0–0.4)
IMM GRANULOCYTES # BLD: 0.2 10E9/L (ref 0–0.4)
IMM GRANULOCYTES NFR BLD: 0.5 %
IMM GRANULOCYTES NFR BLD: 1 %
IMM GRANULOCYTES NFR BLD: 1.1 %
IMM GRANULOCYTES NFR BLD: 1.2 %
IMM GRANULOCYTES NFR BLD: 1.2 %
IMM GRANULOCYTES NFR BLD: 1.4 %
IMM GRANULOCYTES NFR BLD: 3.5 %
INR PPP: 1.04 (ref 0.86–1.14)
INR PPP: 1.04 (ref 0.86–1.14)
INTERPRETATION ECG - MUSE: NORMAL
KETONES UR STRIP-MCNC: NEGATIVE MG/DL
KETONES UR STRIP-MCNC: NEGATIVE MG/DL
LACTATE BLD-SCNC: 1.2 MMOL/L (ref 0.7–2)
LACTATE BLD-SCNC: 1.3 MMOL/L (ref 0.7–2)
LACTATE BLD-SCNC: 1.6 MMOL/L (ref 0.7–2)
LACTATE BLD-SCNC: 1.6 MMOL/L (ref 0.7–2)
LDH SERPL L TO P-CCNC: 169 U/L (ref 85–227)
LEUKOCYTE ESTERASE UR QL STRIP: NEGATIVE
LEUKOCYTE ESTERASE UR QL STRIP: NEGATIVE
LYMPHOCYTES # BLD AUTO: 0.1 10E9/L (ref 0.8–5.3)
LYMPHOCYTES # BLD AUTO: 0.2 10E9/L (ref 0.8–5.3)
LYMPHOCYTES # BLD AUTO: 0.3 10E9/L (ref 0.8–5.3)
LYMPHOCYTES # BLD AUTO: 0.4 10E9/L (ref 0.8–5.3)
LYMPHOCYTES # BLD AUTO: 0.4 10E9/L (ref 0.8–5.3)
LYMPHOCYTES # BLD AUTO: 0.5 10E9/L (ref 0.8–5.3)
LYMPHOCYTES # BLD AUTO: 0.6 10E9/L (ref 0.8–5.3)
LYMPHOCYTES # BLD AUTO: 0.7 10E9/L (ref 0.8–5.3)
LYMPHOCYTES # BLD AUTO: 0.8 10E9/L (ref 0.8–5.3)
LYMPHOCYTES # BLD AUTO: 0.8 10E9/L (ref 0.8–5.3)
LYMPHOCYTES # BLD AUTO: 1 10E9/L (ref 0.8–5.3)
LYMPHOCYTES # BLD AUTO: 1.3 10E9/L (ref 0.8–5.3)
LYMPHOCYTES # BLD AUTO: 1.3 10E9/L (ref 0.8–5.3)
LYMPHOCYTES # BLD AUTO: 1.6 10E9/L (ref 0.8–5.3)
LYMPHOCYTES NFR BLD AUTO: 1.8 %
LYMPHOCYTES NFR BLD AUTO: 12.2 %
LYMPHOCYTES NFR BLD AUTO: 13.9 %
LYMPHOCYTES NFR BLD AUTO: 15.4 %
LYMPHOCYTES NFR BLD AUTO: 16.9 %
LYMPHOCYTES NFR BLD AUTO: 19.3 %
LYMPHOCYTES NFR BLD AUTO: 2.1 %
LYMPHOCYTES NFR BLD AUTO: 2.2 %
LYMPHOCYTES NFR BLD AUTO: 2.6 %
LYMPHOCYTES NFR BLD AUTO: 2.9 %
LYMPHOCYTES NFR BLD AUTO: 23.2 %
LYMPHOCYTES NFR BLD AUTO: 25 %
LYMPHOCYTES NFR BLD AUTO: 3 %
LYMPHOCYTES NFR BLD AUTO: 3.5 %
LYMPHOCYTES NFR BLD AUTO: 3.5 %
LYMPHOCYTES NFR BLD AUTO: 3.6 %
LYMPHOCYTES NFR BLD AUTO: 4.2 %
LYMPHOCYTES NFR BLD AUTO: 7.2 %
LYMPHOCYTES NFR BLD AUTO: 7.8 %
Lab: NORMAL
MAGNESIUM SERPL-MCNC: 1.2 MG/DL (ref 1.6–2.3)
MAGNESIUM SERPL-MCNC: 1.4 MG/DL (ref 1.6–2.3)
MAGNESIUM SERPL-MCNC: 1.4 MG/DL (ref 1.6–2.3)
MAGNESIUM SERPL-MCNC: 1.5 MG/DL (ref 1.6–2.3)
MAGNESIUM SERPL-MCNC: 1.5 MG/DL (ref 1.6–2.3)
MAGNESIUM SERPL-MCNC: 1.6 MG/DL (ref 1.6–2.3)
MAGNESIUM SERPL-MCNC: 1.7 MG/DL (ref 1.6–2.3)
MAGNESIUM SERPL-MCNC: 1.7 MG/DL (ref 1.6–2.3)
MAGNESIUM SERPL-MCNC: 1.8 MG/DL (ref 1.6–2.3)
MAGNESIUM SERPL-MCNC: 2 MG/DL (ref 1.6–2.3)
MAGNESIUM SERPL-MCNC: 2 MG/DL (ref 1.6–2.3)
MAGNESIUM SERPL-MCNC: 2.1 MG/DL (ref 1.6–2.3)
MAGNESIUM SERPL-MCNC: 2.2 MG/DL (ref 1.6–2.3)
MAGNESIUM SERPL-MCNC: 2.3 MG/DL (ref 1.6–2.3)
MAGNESIUM SERPL-MCNC: 2.5 MG/DL (ref 1.6–2.3)
MCH RBC QN AUTO: 29.7 PG (ref 26.5–33)
MCH RBC QN AUTO: 29.8 PG (ref 26.5–33)
MCH RBC QN AUTO: 30.1 PG (ref 26.5–33)
MCH RBC QN AUTO: 30.2 PG (ref 26.5–33)
MCH RBC QN AUTO: 30.2 PG (ref 26.5–33)
MCH RBC QN AUTO: 30.3 PG (ref 26.5–33)
MCH RBC QN AUTO: 30.3 PG (ref 26.5–33)
MCH RBC QN AUTO: 30.4 PG (ref 26.5–33)
MCH RBC QN AUTO: 30.5 PG (ref 26.5–33)
MCH RBC QN AUTO: 30.5 PG (ref 26.5–33)
MCH RBC QN AUTO: 30.6 PG (ref 26.5–33)
MCH RBC QN AUTO: 30.7 PG (ref 26.5–33)
MCH RBC QN AUTO: 30.8 PG (ref 26.5–33)
MCH RBC QN AUTO: 31 PG (ref 26.5–33)
MCH RBC QN AUTO: 31.1 PG (ref 26.5–33)
MCH RBC QN AUTO: 31.2 PG (ref 26.5–33)
MCH RBC QN AUTO: 31.3 PG (ref 26.5–33)
MCH RBC QN AUTO: 31.3 PG (ref 26.5–33)
MCH RBC QN AUTO: 31.4 PG (ref 26.5–33)
MCH RBC QN AUTO: 31.8 PG (ref 26.5–33)
MCHC RBC AUTO-ENTMCNC: 30.5 G/DL (ref 31.5–36.5)
MCHC RBC AUTO-ENTMCNC: 31.8 G/DL (ref 31.5–36.5)
MCHC RBC AUTO-ENTMCNC: 31.9 G/DL (ref 31.5–36.5)
MCHC RBC AUTO-ENTMCNC: 32.1 G/DL (ref 31.5–36.5)
MCHC RBC AUTO-ENTMCNC: 32.3 G/DL (ref 31.5–36.5)
MCHC RBC AUTO-ENTMCNC: 32.4 G/DL (ref 31.5–36.5)
MCHC RBC AUTO-ENTMCNC: 32.6 G/DL (ref 31.5–36.5)
MCHC RBC AUTO-ENTMCNC: 32.6 G/DL (ref 31.5–36.5)
MCHC RBC AUTO-ENTMCNC: 32.7 G/DL (ref 31.5–36.5)
MCHC RBC AUTO-ENTMCNC: 32.7 G/DL (ref 31.5–36.5)
MCHC RBC AUTO-ENTMCNC: 32.8 G/DL (ref 31.5–36.5)
MCHC RBC AUTO-ENTMCNC: 32.8 G/DL (ref 31.5–36.5)
MCHC RBC AUTO-ENTMCNC: 32.9 G/DL (ref 31.5–36.5)
MCHC RBC AUTO-ENTMCNC: 32.9 G/DL (ref 31.5–36.5)
MCHC RBC AUTO-ENTMCNC: 33 G/DL (ref 31.5–36.5)
MCHC RBC AUTO-ENTMCNC: 33 G/DL (ref 31.5–36.5)
MCHC RBC AUTO-ENTMCNC: 33.1 G/DL (ref 31.5–36.5)
MCHC RBC AUTO-ENTMCNC: 33.2 G/DL (ref 31.5–36.5)
MCHC RBC AUTO-ENTMCNC: 33.5 G/DL (ref 31.5–36.5)
MCHC RBC AUTO-ENTMCNC: 33.6 G/DL (ref 31.5–36.5)
MCHC RBC AUTO-ENTMCNC: 33.8 G/DL (ref 31.5–36.5)
MCHC RBC AUTO-ENTMCNC: 33.8 G/DL (ref 31.5–36.5)
MCHC RBC AUTO-ENTMCNC: 34 G/DL (ref 31.5–36.5)
MCHC RBC AUTO-ENTMCNC: 34 G/DL (ref 31.5–36.5)
MCHC RBC AUTO-ENTMCNC: 34.1 G/DL (ref 31.5–36.5)
MCHC RBC AUTO-ENTMCNC: 34.4 G/DL (ref 31.5–36.5)
MCV RBC AUTO: 90 FL (ref 78–100)
MCV RBC AUTO: 91 FL (ref 78–100)
MCV RBC AUTO: 92 FL (ref 78–100)
MCV RBC AUTO: 93 FL (ref 78–100)
MCV RBC AUTO: 94 FL (ref 78–100)
MCV RBC AUTO: 95 FL (ref 78–100)
MCV RBC AUTO: 96 FL (ref 78–100)
MCV RBC AUTO: 99 FL (ref 78–100)
METAMYELOCYTES # BLD: 0 10E9/L
METAMYELOCYTES # BLD: 0.2 10E9/L
METAMYELOCYTES # BLD: 0.2 10E9/L
METAMYELOCYTES # BLD: 0.3 10E9/L
METAMYELOCYTES # BLD: 0.4 10E9/L
METAMYELOCYTES # BLD: 0.8 10E9/L
METAMYELOCYTES NFR BLD MANUAL: 0.9 %
METAMYELOCYTES NFR BLD MANUAL: 1.7 %
METAMYELOCYTES NFR BLD MANUAL: 1.8 %
METAMYELOCYTES NFR BLD MANUAL: 1.8 %
METAMYELOCYTES NFR BLD MANUAL: 2.6 %
METAMYELOCYTES NFR BLD MANUAL: 3.4 %
MONOCYTES # BLD AUTO: 0 10E9/L (ref 0–1.3)
MONOCYTES # BLD AUTO: 0.1 10E9/L (ref 0–1.3)
MONOCYTES # BLD AUTO: 0.2 10E9/L (ref 0–1.3)
MONOCYTES # BLD AUTO: 0.2 10E9/L (ref 0–1.3)
MONOCYTES # BLD AUTO: 0.3 10E9/L (ref 0–1.3)
MONOCYTES # BLD AUTO: 0.3 10E9/L (ref 0–1.3)
MONOCYTES # BLD AUTO: 0.5 10E9/L (ref 0–1.3)
MONOCYTES # BLD AUTO: 0.7 10E9/L (ref 0–1.3)
MONOCYTES # BLD AUTO: 0.8 10E9/L (ref 0–1.3)
MONOCYTES # BLD AUTO: 0.9 10E9/L (ref 0–1.3)
MONOCYTES # BLD AUTO: 1 10E9/L (ref 0–1.3)
MONOCYTES # BLD AUTO: 1 10E9/L (ref 0–1.3)
MONOCYTES # BLD AUTO: 1.2 10E9/L (ref 0–1.3)
MONOCYTES # BLD AUTO: 1.4 10E9/L (ref 0–1.3)
MONOCYTES # BLD AUTO: 1.7 10E9/L (ref 0–1.3)
MONOCYTES # BLD AUTO: 2.4 10E9/L (ref 0–1.3)
MONOCYTES # BLD AUTO: 2.4 10E9/L (ref 0–1.3)
MONOCYTES # BLD AUTO: 5.4 10E9/L (ref 0–1.3)
MONOCYTES NFR BLD AUTO: 0 %
MONOCYTES NFR BLD AUTO: 0.2 %
MONOCYTES NFR BLD AUTO: 0.5 %
MONOCYTES NFR BLD AUTO: 1.4 %
MONOCYTES NFR BLD AUTO: 11.3 %
MONOCYTES NFR BLD AUTO: 12.1 %
MONOCYTES NFR BLD AUTO: 12.4 %
MONOCYTES NFR BLD AUTO: 13.2 %
MONOCYTES NFR BLD AUTO: 13.9 %
MONOCYTES NFR BLD AUTO: 14.8 %
MONOCYTES NFR BLD AUTO: 14.8 %
MONOCYTES NFR BLD AUTO: 16.4 %
MONOCYTES NFR BLD AUTO: 18 %
MONOCYTES NFR BLD AUTO: 18.1 %
MONOCYTES NFR BLD AUTO: 2.3 %
MONOCYTES NFR BLD AUTO: 2.5 %
MONOCYTES NFR BLD AUTO: 3.5 %
MONOCYTES NFR BLD AUTO: 36.9 %
MONOCYTES NFR BLD AUTO: 4 %
MONOCYTES NFR BLD AUTO: 6.1 %
MONOCYTES NFR BLD AUTO: 6.6 %
MONOCYTES NFR BLD AUTO: 8.5 %
MONOCYTES NFR BLD AUTO: 8.7 %
MPX SERIES: NONREACTIVE
MUCOUS THREADS #/AREA URNS LPF: PRESENT /LPF
MUCOUS THREADS #/AREA URNS LPF: PRESENT /LPF
MYELOCYTES # BLD: 0.1 10E9/L
MYELOCYTES # BLD: 0.1 10E9/L
MYELOCYTES # BLD: 0.2 10E9/L
MYELOCYTES # BLD: 0.2 10E9/L
MYELOCYTES # BLD: 0.3 10E9/L
MYELOCYTES # BLD: 0.4 10E9/L
MYELOCYTES # BLD: 0.4 10E9/L
MYELOCYTES NFR BLD MANUAL: 0.9 %
MYELOCYTES NFR BLD MANUAL: 1.7 %
MYELOCYTES NFR BLD MANUAL: 1.8 %
MYELOCYTES NFR BLD MANUAL: 1.8 %
NEUTROPHILS # BLD AUTO: 0.3 10E9/L (ref 1.6–8.3)
NEUTROPHILS # BLD AUTO: 0.5 10E9/L (ref 1.6–8.3)
NEUTROPHILS # BLD AUTO: 10.2 10E9/L (ref 1.6–8.3)
NEUTROPHILS # BLD AUTO: 12.5 10E9/L (ref 1.6–8.3)
NEUTROPHILS # BLD AUTO: 16.1 10E9/L (ref 1.6–8.3)
NEUTROPHILS # BLD AUTO: 2.3 10E9/L (ref 1.6–8.3)
NEUTROPHILS # BLD AUTO: 2.7 10E9/L (ref 1.6–8.3)
NEUTROPHILS # BLD AUTO: 2.8 10E9/L (ref 1.6–8.3)
NEUTROPHILS # BLD AUTO: 23.5 10E9/L (ref 1.6–8.3)
NEUTROPHILS # BLD AUTO: 3.3 10E9/L (ref 1.6–8.3)
NEUTROPHILS # BLD AUTO: 3.9 10E9/L (ref 1.6–8.3)
NEUTROPHILS # BLD AUTO: 38.9 10E9/L (ref 1.6–8.3)
NEUTROPHILS # BLD AUTO: 4 10E9/L (ref 1.6–8.3)
NEUTROPHILS # BLD AUTO: 4.7 10E9/L (ref 1.6–8.3)
NEUTROPHILS # BLD AUTO: 5.3 10E9/L (ref 1.6–8.3)
NEUTROPHILS # BLD AUTO: 5.4 10E9/L (ref 1.6–8.3)
NEUTROPHILS # BLD AUTO: 5.5 10E9/L (ref 1.6–8.3)
NEUTROPHILS # BLD AUTO: 5.5 10E9/L (ref 1.6–8.3)
NEUTROPHILS # BLD AUTO: 6 10E9/L (ref 1.6–8.3)
NEUTROPHILS # BLD AUTO: 6.4 10E9/L (ref 1.6–8.3)
NEUTROPHILS # BLD AUTO: 7.1 10E9/L (ref 1.6–8.3)
NEUTROPHILS # BLD AUTO: 7.6 10E9/L (ref 1.6–8.3)
NEUTROPHILS # BLD AUTO: 8.4 10E9/L (ref 1.6–8.3)
NEUTROPHILS NFR BLD AUTO: 33.5 %
NEUTROPHILS NFR BLD AUTO: 51.4 %
NEUTROPHILS NFR BLD AUTO: 62.3 %
NEUTROPHILS NFR BLD AUTO: 64.3 %
NEUTROPHILS NFR BLD AUTO: 65.9 %
NEUTROPHILS NFR BLD AUTO: 66.9 %
NEUTROPHILS NFR BLD AUTO: 73.9 %
NEUTROPHILS NFR BLD AUTO: 74.8 %
NEUTROPHILS NFR BLD AUTO: 77.4 %
NEUTROPHILS NFR BLD AUTO: 79.6 %
NEUTROPHILS NFR BLD AUTO: 80.6 %
NEUTROPHILS NFR BLD AUTO: 80.9 %
NEUTROPHILS NFR BLD AUTO: 81.5 %
NEUTROPHILS NFR BLD AUTO: 85 %
NEUTROPHILS NFR BLD AUTO: 86.1 %
NEUTROPHILS NFR BLD AUTO: 89.3 %
NEUTROPHILS NFR BLD AUTO: 92.2 %
NEUTROPHILS NFR BLD AUTO: 92.3 %
NEUTROPHILS NFR BLD AUTO: 92.7 %
NEUTROPHILS NFR BLD AUTO: 94.5 %
NEUTROPHILS NFR BLD AUTO: 94.7 %
NEUTROPHILS NFR BLD AUTO: 95 %
NEUTROPHILS NFR BLD AUTO: 96.4 %
NITRATE UR QL: NEGATIVE
NITRATE UR QL: NEGATIVE
NOROV GI+II ORF1-ORF2 JNC STL QL NAA+PR: NOT DETECTED
NRBC # BLD AUTO: 0 10*3/UL
NRBC BLD AUTO-RTO: 0 /100
NRBC BLD AUTO-RTO: 1 /100
NUM BPU REQUESTED: 1
NUM BPU REQUESTED: 2
PH UR STRIP: 5.5 PH (ref 5–7)
PH UR STRIP: 5.5 PH (ref 5–7)
PHOSPHATE SERPL-MCNC: 2 MG/DL (ref 2.5–4.5)
PHOSPHATE SERPL-MCNC: 2.4 MG/DL (ref 2.5–4.5)
PHOSPHATE SERPL-MCNC: 2.6 MG/DL (ref 2.5–4.5)
PHOSPHATE SERPL-MCNC: 2.7 MG/DL (ref 2.5–4.5)
PHOSPHATE SERPL-MCNC: 2.9 MG/DL (ref 2.5–4.5)
PHOSPHATE SERPL-MCNC: 3 MG/DL (ref 2.5–4.5)
PHOSPHATE SERPL-MCNC: 3.2 MG/DL (ref 2.5–4.5)
PHOSPHATE SERPL-MCNC: 3.5 MG/DL (ref 2.5–4.5)
PHOSPHATE SERPL-MCNC: 3.9 MG/DL (ref 2.5–4.5)
PHOSPHATE SERPL-MCNC: 4 MG/DL (ref 2.5–4.5)
PHOSPHATE SERPL-MCNC: 4.1 MG/DL (ref 2.5–4.5)
PHOSPHATE SERPL-MCNC: 4.3 MG/DL (ref 2.5–4.5)
PHOSPHATE SERPL-MCNC: 4.5 MG/DL (ref 2.5–4.5)
PLATELET # BLD AUTO: 118 10E9/L (ref 150–450)
PLATELET # BLD AUTO: 12 10E9/L (ref 150–450)
PLATELET # BLD AUTO: 120 10E9/L (ref 150–450)
PLATELET # BLD AUTO: 13 10E9/L (ref 150–450)
PLATELET # BLD AUTO: 137 10E9/L (ref 150–450)
PLATELET # BLD AUTO: 14 10E9/L (ref 150–450)
PLATELET # BLD AUTO: 146 10E9/L (ref 150–450)
PLATELET # BLD AUTO: 15 10E9/L (ref 150–450)
PLATELET # BLD AUTO: 151 10E9/L (ref 150–450)
PLATELET # BLD AUTO: 160 10E9/L (ref 150–450)
PLATELET # BLD AUTO: 170 10E9/L (ref 150–450)
PLATELET # BLD AUTO: 177 10E9/L (ref 150–450)
PLATELET # BLD AUTO: 179 10E9/L (ref 150–450)
PLATELET # BLD AUTO: 185 10E9/L (ref 150–450)
PLATELET # BLD AUTO: 20 10E9/L (ref 150–450)
PLATELET # BLD AUTO: 201 10E9/L (ref 150–450)
PLATELET # BLD AUTO: 206 10E9/L (ref 150–450)
PLATELET # BLD AUTO: 206 10E9/L (ref 150–450)
PLATELET # BLD AUTO: 215 10E9/L (ref 150–450)
PLATELET # BLD AUTO: 228 10E9/L (ref 150–450)
PLATELET # BLD AUTO: 230 10E9/L (ref 150–450)
PLATELET # BLD AUTO: 234 10E9/L (ref 150–450)
PLATELET # BLD AUTO: 241 10E9/L (ref 150–450)
PLATELET # BLD AUTO: 31 10E9/L (ref 150–450)
PLATELET # BLD AUTO: 54 10E9/L (ref 150–450)
PLATELET # BLD AUTO: 57 10E9/L (ref 150–450)
PLATELET # BLD AUTO: 59 10E9/L (ref 150–450)
PLATELET # BLD AUTO: 79 10E9/L (ref 150–450)
PLATELET # BLD AUTO: 8 10E9/L (ref 150–450)
PLATELET # BLD AUTO: 90 10E9/L (ref 150–450)
PLATELET # BLD EST: ABNORMAL 10*3/UL
POIKILOCYTOSIS BLD QL SMEAR: SLIGHT
POLYCHROMASIA BLD QL SMEAR: SLIGHT
POTASSIUM SERPL-SCNC: 3 MMOL/L (ref 3.4–5.3)
POTASSIUM SERPL-SCNC: 3.2 MMOL/L (ref 3.4–5.3)
POTASSIUM SERPL-SCNC: 3.3 MMOL/L (ref 3.4–5.3)
POTASSIUM SERPL-SCNC: 3.4 MMOL/L (ref 3.4–5.3)
POTASSIUM SERPL-SCNC: 3.4 MMOL/L (ref 3.4–5.3)
POTASSIUM SERPL-SCNC: 3.5 MMOL/L (ref 3.4–5.3)
POTASSIUM SERPL-SCNC: 3.5 MMOL/L (ref 3.4–5.3)
POTASSIUM SERPL-SCNC: 3.6 MMOL/L (ref 3.4–5.3)
POTASSIUM SERPL-SCNC: 3.6 MMOL/L (ref 3.4–5.3)
POTASSIUM SERPL-SCNC: 3.7 MMOL/L (ref 3.4–5.3)
POTASSIUM SERPL-SCNC: 3.8 MMOL/L (ref 3.4–5.3)
POTASSIUM SERPL-SCNC: 3.9 MMOL/L (ref 3.4–5.3)
POTASSIUM SERPL-SCNC: 3.9 MMOL/L (ref 3.4–5.3)
POTASSIUM SERPL-SCNC: 4 MMOL/L (ref 3.4–5.3)
POTASSIUM SERPL-SCNC: 4.1 MMOL/L (ref 3.4–5.3)
POTASSIUM SERPL-SCNC: 4.2 MMOL/L (ref 3.4–5.3)
POTASSIUM SERPL-SCNC: 4.3 MMOL/L (ref 3.4–5.3)
POTASSIUM SERPL-SCNC: 4.4 MMOL/L (ref 3.4–5.3)
POTASSIUM SERPL-SCNC: 4.5 MMOL/L (ref 3.4–5.3)
POTASSIUM SERPL-SCNC: 4.5 MMOL/L (ref 3.4–5.3)
PROMYELOCYTES # BLD MANUAL: 0.4 10E9/L
PROMYELOCYTES # BLD MANUAL: 0.5 10E9/L
PROMYELOCYTES NFR BLD MANUAL: 0.9 %
PROMYELOCYTES NFR BLD MANUAL: 2.6 %
PROT SERPL-MCNC: 5.3 G/DL (ref 6.8–8.8)
PROT SERPL-MCNC: 5.6 G/DL (ref 6.8–8.8)
PROT SERPL-MCNC: 5.6 G/DL (ref 6.8–8.8)
PROT SERPL-MCNC: 6.1 G/DL (ref 6.8–8.8)
PROT SERPL-MCNC: 6.4 G/DL (ref 6.8–8.8)
PROT SERPL-MCNC: 6.7 G/DL (ref 6.8–8.8)
RADIOLOGIST FLAGS: ABNORMAL
RADIOLOGIST FLAGS: ABNORMAL
RBC # BLD AUTO: 1.69 10E12/L (ref 4.4–5.9)
RBC # BLD AUTO: 2.06 10E12/L (ref 4.4–5.9)
RBC # BLD AUTO: 2.22 10E12/L (ref 4.4–5.9)
RBC # BLD AUTO: 2.38 10E12/L (ref 4.4–5.9)
RBC # BLD AUTO: 2.48 10E12/L (ref 4.4–5.9)
RBC # BLD AUTO: 2.56 10E12/L (ref 4.4–5.9)
RBC # BLD AUTO: 2.59 10E12/L (ref 4.4–5.9)
RBC # BLD AUTO: 2.61 10E12/L (ref 4.4–5.9)
RBC # BLD AUTO: 2.61 10E12/L (ref 4.4–5.9)
RBC # BLD AUTO: 2.67 10E12/L (ref 4.4–5.9)
RBC # BLD AUTO: 2.76 10E12/L (ref 4.4–5.9)
RBC # BLD AUTO: 2.79 10E12/L (ref 4.4–5.9)
RBC # BLD AUTO: 2.81 10E12/L (ref 4.4–5.9)
RBC # BLD AUTO: 2.81 10E12/L (ref 4.4–5.9)
RBC # BLD AUTO: 2.87 10E12/L (ref 4.4–5.9)
RBC # BLD AUTO: 2.9 10E12/L (ref 4.4–5.9)
RBC # BLD AUTO: 2.94 10E12/L (ref 4.4–5.9)
RBC # BLD AUTO: 2.97 10E12/L (ref 4.4–5.9)
RBC # BLD AUTO: 3 10E12/L (ref 4.4–5.9)
RBC # BLD AUTO: 3.17 10E12/L (ref 4.4–5.9)
RBC # BLD AUTO: 3.23 10E12/L (ref 4.4–5.9)
RBC # BLD AUTO: 3.36 10E12/L (ref 4.4–5.9)
RBC # BLD AUTO: 3.56 10E12/L (ref 4.4–5.9)
RBC # BLD AUTO: 3.58 10E12/L (ref 4.4–5.9)
RBC # BLD AUTO: 3.62 10E12/L (ref 4.4–5.9)
RBC # BLD AUTO: 3.78 10E12/L (ref 4.4–5.9)
RBC # BLD AUTO: 3.84 10E12/L (ref 4.4–5.9)
RBC # BLD AUTO: 3.98 10E12/L (ref 4.4–5.9)
RBC # BLD AUTO: 4.03 10E12/L (ref 4.4–5.9)
RBC # BLD AUTO: 4.36 10E12/L (ref 4.4–5.9)
RBC #/AREA URNS AUTO: 1 /HPF (ref 0–2)
RBC #/AREA URNS AUTO: 2 /HPF (ref 0–2)
RBC MORPH BLD: NORMAL
RVA NSP5 STL QL NAA+PROBE: NOT DETECTED
SALMONELLA SP RPOD STL QL NAA+PROBE: NOT DETECTED
SARS-COV-2 RNA SPEC QL NAA+PROBE: NOT DETECTED
SHIGELLA SP+EIEC IPAH STL QL NAA+PROBE: NOT DETECTED
SODIUM SERPL-SCNC: 129 MMOL/L (ref 133–144)
SODIUM SERPL-SCNC: 133 MMOL/L (ref 133–144)
SODIUM SERPL-SCNC: 134 MMOL/L (ref 133–144)
SODIUM SERPL-SCNC: 135 MMOL/L (ref 133–144)
SODIUM SERPL-SCNC: 136 MMOL/L (ref 133–144)
SODIUM SERPL-SCNC: 137 MMOL/L (ref 133–144)
SODIUM SERPL-SCNC: 137 MMOL/L (ref 133–144)
SODIUM SERPL-SCNC: 138 MMOL/L (ref 133–144)
SODIUM SERPL-SCNC: 139 MMOL/L (ref 133–144)
SODIUM SERPL-SCNC: 140 MMOL/L (ref 133–144)
SODIUM SERPL-SCNC: 141 MMOL/L (ref 133–144)
SODIUM SERPL-SCNC: 141 MMOL/L (ref 133–144)
SODIUM SERPL-SCNC: 142 MMOL/L (ref 133–144)
SOURCE: ABNORMAL
SOURCE: ABNORMAL
SP GR UR STRIP: 1.01 (ref 1–1.03)
SP GR UR STRIP: 1.02 (ref 1–1.03)
SPECIMEN EXP DATE BLD: NORMAL
SPECIMEN SOURCE: NORMAL
T CRUZI AB SER DONR QL: NONREACTIVE
TRANS CELLS #/AREA URNS HPF: <1 /HPF (ref 0–1)
TRANSFUSION STATUS PATIENT QL: NORMAL
URATE SERPL-MCNC: 5.8 MG/DL (ref 3.5–7.2)
UROBILINOGEN UR STRIP-MCNC: NORMAL MG/DL (ref 0–2)
UROBILINOGEN UR STRIP-MCNC: NORMAL MG/DL (ref 0–2)
V CHOL+PARA RFBL+TRKH+TNAA STL QL NAA+PR: NOT DETECTED
VIABLE CD34 CELLS NFR FLD: 96.34 %
VIABLE CD34 CELLS NFR FLD: 97.65 %
VIABLE CD34 CELLS NFR FLD: 97.74 %
VIABLE CD34 CELLS NFR FLD: 98.39 %
VIABLE CD34 CELLS NFR FLD: NORMAL %
WBC # BLD AUTO: 0 10E9/L (ref 4–11)
WBC # BLD AUTO: 0 10E9/L (ref 4–11)
WBC # BLD AUTO: 0.1 10E9/L (ref 4–11)
WBC # BLD AUTO: 0.2 10E9/L (ref 4–11)
WBC # BLD AUTO: 0.2 10E9/L (ref 4–11)
WBC # BLD AUTO: 0.6 10E9/L (ref 4–11)
WBC # BLD AUTO: 0.9 10E9/L (ref 4–11)
WBC # BLD AUTO: 10.2 10E9/L (ref 4–11)
WBC # BLD AUTO: 12.6 10E9/L (ref 4–11)
WBC # BLD AUTO: 16.1 10E9/L (ref 4–11)
WBC # BLD AUTO: 19.8 10E9/L (ref 4–11)
WBC # BLD AUTO: 2.4 10E9/L (ref 4–11)
WBC # BLD AUTO: 27.3 10E9/L (ref 4–11)
WBC # BLD AUTO: 4.3 10E9/L (ref 4–11)
WBC # BLD AUTO: 4.5 10E9/L (ref 4–11)
WBC # BLD AUTO: 48.1 10E9/L (ref 4–11)
WBC # BLD AUTO: 5 10E9/L (ref 4–11)
WBC # BLD AUTO: 5.5 10E9/L (ref 4–11)
WBC # BLD AUTO: 5.7 10E9/L (ref 4–11)
WBC # BLD AUTO: 5.7 10E9/L (ref 4–11)
WBC # BLD AUTO: 5.8 10E9/L (ref 4–11)
WBC # BLD AUTO: 6 10E9/L (ref 4–11)
WBC # BLD AUTO: 6.3 10E9/L (ref 4–11)
WBC # BLD AUTO: 6.5 10E9/L (ref 4–11)
WBC # BLD AUTO: 6.9 10E9/L (ref 4–11)
WBC # BLD AUTO: 8 10E9/L (ref 4–11)
WBC # BLD AUTO: 8.1 10E9/L (ref 4–11)
WBC # BLD AUTO: 9.9 10E9/L (ref 4–11)
WBC #/AREA URNS AUTO: 1 /HPF (ref 0–5)
WBC #/AREA URNS AUTO: 1 /HPF (ref 0–5)
WNV RNA SPEC QL NAA+PROBE: NONREACTIVE
Y ENTERO RECN STL QL NAA+PROBE: NOT DETECTED
YEAST SPEC QL CULT: NO GROWTH

## 2020-01-01 PROCEDURE — 83735 ASSAY OF MAGNESIUM: CPT | Performed by: PHYSICIAN ASSISTANT

## 2020-01-01 PROCEDURE — 80048 BASIC METABOLIC PNL TOTAL CA: CPT | Performed by: PHYSICIAN ASSISTANT

## 2020-01-01 PROCEDURE — 96372 THER/PROPH/DIAG INJ SC/IM: CPT | Performed by: INTERNAL MEDICINE

## 2020-01-01 PROCEDURE — G0463 HOSPITAL OUTPT CLINIC VISIT: HCPCS

## 2020-01-01 PROCEDURE — 258N000003 HC RX IP 258 OP 636: Performed by: INTERNAL MEDICINE

## 2020-01-01 PROCEDURE — 88305 TISSUE EXAM BY PATHOLOGIST: CPT | Mod: TC | Performed by: PHYSICIAN ASSISTANT

## 2020-01-01 PROCEDURE — 250N000011 HC RX IP 250 OP 636: Performed by: PHYSICIAN ASSISTANT

## 2020-01-01 PROCEDURE — 250N000011 HC RX IP 250 OP 636: Mod: JW | Performed by: PHYSICIAN ASSISTANT

## 2020-01-01 PROCEDURE — 250N000013 HC RX MED GY IP 250 OP 250 PS 637: Performed by: PHYSICIAN ASSISTANT

## 2020-01-01 PROCEDURE — 38207 CRYOPRESERVE STEM CELLS: CPT

## 2020-01-01 PROCEDURE — 250N000011 HC RX IP 250 OP 636: Performed by: INTERNAL MEDICINE

## 2020-01-01 PROCEDURE — U0003 INFECTIOUS AGENT DETECTION BY NUCLEIC ACID (DNA OR RNA); SEVERE ACUTE RESPIRATORY SYNDROME CORONAVIRUS 2 (SARS-COV-2) (CORONAVIRUS DISEASE [COVID-19]), AMPLIFIED PROBE TECHNIQUE, MAKING USE OF HIGH THROUGHPUT TECHNOLOGIES AS DESCRIBED BY CMS-2020-01-R: HCPCS | Mod: 90 | Performed by: PATHOLOGY

## 2020-01-01 PROCEDURE — 85025 COMPLETE CBC W/AUTO DIFF WBC: CPT | Performed by: PATHOLOGY

## 2020-01-01 PROCEDURE — 99213 OFFICE O/P EST LOW 20 MIN: CPT

## 2020-01-01 PROCEDURE — 84100 ASSAY OF PHOSPHORUS: CPT | Performed by: PHYSICIAN ASSISTANT

## 2020-01-01 PROCEDURE — 96365 THER/PROPH/DIAG IV INF INIT: CPT

## 2020-01-01 PROCEDURE — 85027 COMPLETE CBC AUTOMATED: CPT

## 2020-01-01 PROCEDURE — 85025 COMPLETE CBC W/AUTO DIFF WBC: CPT | Performed by: NURSE PRACTITIONER

## 2020-01-01 PROCEDURE — 99000 SPECIMEN HANDLING OFFICE-LAB: CPT | Performed by: PATHOLOGY

## 2020-01-01 PROCEDURE — 85097 BONE MARROW INTERPRETATION: CPT | Performed by: PATHOLOGY

## 2020-01-01 PROCEDURE — G0463 HOSPITAL OUTPT CLINIC VISIT: HCPCS | Mod: 25

## 2020-01-01 PROCEDURE — 250N000012 HC RX MED GY IP 250 OP 636 PS 637: Performed by: PHYSICIAN ASSISTANT

## 2020-01-01 PROCEDURE — 85610 PROTHROMBIN TIME: CPT | Performed by: PHYSICIAN ASSISTANT

## 2020-01-01 PROCEDURE — 99214 OFFICE O/P EST MOD 30 MIN: CPT | Mod: 25

## 2020-01-01 PROCEDURE — 38222 DX BONE MARROW BX & ASPIR: CPT | Mod: LT | Performed by: PHYSICIAN ASSISTANT

## 2020-01-01 PROCEDURE — 99207 PR NO CHARGE LOS: CPT

## 2020-01-01 PROCEDURE — 99233 SBSQ HOSP IP/OBS HIGH 50: CPT | Performed by: INTERNAL MEDICINE

## 2020-01-01 PROCEDURE — 250N000009 HC RX 250: Performed by: PATHOLOGY

## 2020-01-01 PROCEDURE — 30243Y0 TRANSFUSION OF AUTOLOGOUS HEMATOPOIETIC STEM CELLS INTO CENTRAL VEIN, PERCUTANEOUS APPROACH: ICD-10-PCS | Performed by: INTERNAL MEDICINE

## 2020-01-01 PROCEDURE — 84132 ASSAY OF SERUM POTASSIUM: CPT | Performed by: INTERNAL MEDICINE

## 2020-01-01 PROCEDURE — 86900 BLOOD TYPING SEROLOGIC ABO: CPT | Performed by: PHYSICIAN ASSISTANT

## 2020-01-01 PROCEDURE — 87081 CULTURE SCREEN ONLY: CPT | Performed by: PHYSICIAN ASSISTANT

## 2020-01-01 PROCEDURE — 86900 BLOOD TYPING SEROLOGIC ABO: CPT | Performed by: INTERNAL MEDICINE

## 2020-01-01 PROCEDURE — 97165 OT EVAL LOW COMPLEX 30 MIN: CPT | Mod: GO

## 2020-01-01 PROCEDURE — 85060 BLOOD SMEAR INTERPRETATION: CPT | Performed by: PATHOLOGY

## 2020-01-01 PROCEDURE — 82248 BILIRUBIN DIRECT: CPT | Performed by: PHYSICIAN ASSISTANT

## 2020-01-01 PROCEDURE — 999N001017 HC STATISTIC GLUCOSE BY METER IP

## 2020-01-01 PROCEDURE — 88185 FLOWCYTOMETRY/TC ADD-ON: CPT | Mod: TC | Performed by: PHYSICIAN ASSISTANT

## 2020-01-01 PROCEDURE — 38222 DX BONE MARROW BX & ASPIR: CPT | Performed by: PHYSICIAN ASSISTANT

## 2020-01-01 PROCEDURE — 38208 THAW PRESERVED STEM CELLS: CPT | Performed by: INTERNAL MEDICINE

## 2020-01-01 PROCEDURE — 38241 TRANSPLT AUTOL HCT/DONOR: CPT | Performed by: INTERNAL MEDICINE

## 2020-01-01 PROCEDURE — 36415 COLL VENOUS BLD VENIPUNCTURE: CPT | Performed by: PATHOLOGY

## 2020-01-01 PROCEDURE — 999N001102 HC STATISTIC DNA PROCESS AND HOLD: Performed by: PHYSICIAN ASSISTANT

## 2020-01-01 PROCEDURE — 250N000009 HC RX 250: Performed by: PHYSICIAN ASSISTANT

## 2020-01-01 PROCEDURE — 250N000012 HC RX MED GY IP 250 OP 636 PS 637: Performed by: STUDENT IN AN ORGANIZED HEALTH CARE EDUCATION/TRAINING PROGRAM

## 2020-01-01 PROCEDURE — 250N000012 HC RX MED GY IP 250 OP 636 PS 637: Performed by: NURSE PRACTITIONER

## 2020-01-01 PROCEDURE — 86850 RBC ANTIBODY SCREEN: CPT | Performed by: PHYSICIAN ASSISTANT

## 2020-01-01 PROCEDURE — 88237 TISSUE CULTURE BONE MARROW: CPT | Performed by: PHYSICIAN ASSISTANT

## 2020-01-01 PROCEDURE — 99207 PR NO CHARGE NURSE ONLY: CPT

## 2020-01-01 PROCEDURE — 36589 REMOVAL TUNNELED CV CATH: CPT | Performed by: RADIOLOGY

## 2020-01-01 PROCEDURE — 83735 ASSAY OF MAGNESIUM: CPT | Performed by: INTERNAL MEDICINE

## 2020-01-01 PROCEDURE — 87103 BLOOD FUNGUS CULTURE: CPT | Performed by: PHYSICIAN ASSISTANT

## 2020-01-01 PROCEDURE — 85025 COMPLETE CBC W/AUTO DIFF WBC: CPT | Performed by: PHYSICIAN ASSISTANT

## 2020-01-01 PROCEDURE — 87040 BLOOD CULTURE FOR BACTERIA: CPT | Performed by: PHYSICIAN ASSISTANT

## 2020-01-01 PROCEDURE — 83605 ASSAY OF LACTIC ACID: CPT | Performed by: INTERNAL MEDICINE

## 2020-01-01 PROCEDURE — 206N000001 HC R&B BMT UMMC

## 2020-01-01 PROCEDURE — 250N000013 HC RX MED GY IP 250 OP 250 PS 637: Performed by: INTERNAL MEDICINE

## 2020-01-01 PROCEDURE — 80053 COMPREHEN METABOLIC PANEL: CPT | Performed by: PHYSICIAN ASSISTANT

## 2020-01-01 PROCEDURE — 71045 X-RAY EXAM CHEST 1 VIEW: CPT

## 2020-01-01 PROCEDURE — 99214 OFFICE O/P EST MOD 30 MIN: CPT

## 2020-01-01 PROCEDURE — 97110 THERAPEUTIC EXERCISES: CPT | Mod: GO | Performed by: OCCUPATIONAL THERAPIST

## 2020-01-01 PROCEDURE — 258N000003 HC RX IP 258 OP 636: Performed by: PHYSICIAN ASSISTANT

## 2020-01-01 PROCEDURE — 86367 STEM CELLS TOTAL COUNT: CPT | Performed by: PHYSICIAN ASSISTANT

## 2020-01-01 PROCEDURE — 99213 OFFICE O/P EST LOW 20 MIN: CPT | Mod: 95 | Performed by: PHYSICIAN ASSISTANT

## 2020-01-01 PROCEDURE — 85025 COMPLETE CBC W/AUTO DIFF WBC: CPT | Performed by: INTERNAL MEDICINE

## 2020-01-01 PROCEDURE — 96374 THER/PROPH/DIAG INJ IV PUSH: CPT

## 2020-01-01 PROCEDURE — 38206 HARVEST AUTO STEM CELLS: CPT

## 2020-01-01 PROCEDURE — 86923 COMPATIBILITY TEST ELECTRIC: CPT | Performed by: INTERNAL MEDICINE

## 2020-01-01 PROCEDURE — 97110 THERAPEUTIC EXERCISES: CPT | Mod: GO

## 2020-01-01 PROCEDURE — 999N000147 HC STATISTIC PT IP EVAL DEFER: Performed by: PHYSICAL THERAPIST

## 2020-01-01 PROCEDURE — 250N000011 HC RX IP 250 OP 636: Performed by: STUDENT IN AN ORGANIZED HEALTH CARE EDUCATION/TRAINING PROGRAM

## 2020-01-01 PROCEDURE — 38222 DX BONE MARROW BX & ASPIR: CPT

## 2020-01-01 PROCEDURE — 36593 DECLOT VASCULAR DEVICE: CPT

## 2020-01-01 PROCEDURE — 88280 CHROMOSOME KARYOTYPE STUDY: CPT | Performed by: PHYSICIAN ASSISTANT

## 2020-01-01 PROCEDURE — 97530 THERAPEUTIC ACTIVITIES: CPT | Mod: GO

## 2020-01-01 PROCEDURE — 81001 URINALYSIS AUTO W/SCOPE: CPT | Performed by: INTERNAL MEDICINE

## 2020-01-01 PROCEDURE — 87086 URINE CULTURE/COLONY COUNT: CPT | Performed by: INTERNAL MEDICINE

## 2020-01-01 PROCEDURE — 84295 ASSAY OF SERUM SODIUM: CPT | Performed by: PHYSICIAN ASSISTANT

## 2020-01-01 PROCEDURE — 36592 COLLECT BLOOD FROM PICC: CPT

## 2020-01-01 PROCEDURE — 38206 HARVEST AUTO STEM CELLS: CPT | Performed by: PATHOLOGY

## 2020-01-01 PROCEDURE — 93010 ELECTROCARDIOGRAM REPORT: CPT | Performed by: INTERNAL MEDICINE

## 2020-01-01 PROCEDURE — 99223 1ST HOSP IP/OBS HIGH 75: CPT | Performed by: INTERNAL MEDICINE

## 2020-01-01 PROCEDURE — 88188 FLOWCYTOMETRY/READ 9-15: CPT | Performed by: PATHOLOGY

## 2020-01-01 PROCEDURE — 999N001022 HC STATISTIC H-SPHEME PROCESS B/S: Performed by: PHYSICIAN ASSISTANT

## 2020-01-01 PROCEDURE — 97535 SELF CARE MNGMENT TRAINING: CPT | Mod: GO

## 2020-01-01 PROCEDURE — 71045 X-RAY EXAM CHEST 1 VIEW: CPT | Mod: 26

## 2020-01-01 PROCEDURE — 80048 BASIC METABOLIC PNL TOTAL CA: CPT | Performed by: INTERNAL MEDICINE

## 2020-01-01 PROCEDURE — 83615 LACTATE (LD) (LDH) ENZYME: CPT | Performed by: PHYSICIAN ASSISTANT

## 2020-01-01 PROCEDURE — 36558 INSERT TUNNELED CV CATH: CPT | Performed by: RADIOLOGY

## 2020-01-01 PROCEDURE — 250N000011 HC RX IP 250 OP 636: Performed by: PATHOLOGY

## 2020-01-01 PROCEDURE — 86901 BLOOD TYPING SEROLOGIC RH(D): CPT | Performed by: PHYSICIAN ASSISTANT

## 2020-01-01 PROCEDURE — 258N000001 HC RX 258: Performed by: PHYSICIAN ASSISTANT

## 2020-01-01 PROCEDURE — 99214 OFFICE O/P EST MOD 30 MIN: CPT | Performed by: INTERNAL MEDICINE

## 2020-01-01 PROCEDURE — 96372 THER/PROPH/DIAG INJ SC/IM: CPT | Performed by: PHYSICIAN ASSISTANT

## 2020-01-01 PROCEDURE — 99231 SBSQ HOSP IP/OBS SF/LOW 25: CPT | Mod: GC | Performed by: INTERNAL MEDICINE

## 2020-01-01 PROCEDURE — 250N000009 HC RX 250: Performed by: INTERNAL MEDICINE

## 2020-01-01 PROCEDURE — 77001 FLUOROGUIDE FOR VEIN DEVICE: CPT | Mod: 26 | Performed by: RADIOLOGY

## 2020-01-01 PROCEDURE — 87493 C DIFF AMPLIFIED PROBE: CPT | Performed by: PHYSICIAN ASSISTANT

## 2020-01-01 PROCEDURE — 81001 URINALYSIS AUTO W/SCOPE: CPT | Performed by: PHYSICIAN ASSISTANT

## 2020-01-01 PROCEDURE — 99232 SBSQ HOSP IP/OBS MODERATE 35: CPT | Mod: 95 | Performed by: PHYSICIAN ASSISTANT

## 2020-01-01 PROCEDURE — 999N001086 HC STATISTIC MORPHOLOGY W/INTERP HEMEPATH TC 85060: Performed by: PHYSICIAN ASSISTANT

## 2020-01-01 PROCEDURE — 74177 CT ABD & PELVIS W/CONTRAST: CPT | Performed by: RADIOLOGY

## 2020-01-01 PROCEDURE — 80048 BASIC METABOLIC PNL TOTAL CA: CPT | Performed by: NURSE PRACTITIONER

## 2020-01-01 PROCEDURE — P9040 RBC LEUKOREDUCED IRRADIATED: HCPCS | Performed by: INTERNAL MEDICINE

## 2020-01-01 PROCEDURE — 80053 COMPREHEN METABOLIC PANEL: CPT | Performed by: NURSE PRACTITIONER

## 2020-01-01 PROCEDURE — 36558 INSERT TUNNELED CV CATH: CPT

## 2020-01-01 PROCEDURE — 88275 CYTOGENETICS 100-300: CPT | Performed by: PHYSICIAN ASSISTANT

## 2020-01-01 PROCEDURE — 86850 RBC ANTIBODY SCREEN: CPT | Performed by: INTERNAL MEDICINE

## 2020-01-01 PROCEDURE — 250N000013 HC RX MED GY IP 250 OP 250 PS 637: Performed by: STUDENT IN AN ORGANIZED HEALTH CARE EDUCATION/TRAINING PROGRAM

## 2020-01-01 PROCEDURE — 93005 ELECTROCARDIOGRAM TRACING: CPT

## 2020-01-01 PROCEDURE — 84132 ASSAY OF SERUM POTASSIUM: CPT | Performed by: PHYSICIAN ASSISTANT

## 2020-01-01 PROCEDURE — 3E04305 INTRODUCTION OF OTHER ANTINEOPLASTIC INTO CENTRAL VEIN, PERCUTANEOUS APPROACH: ICD-10-PCS | Performed by: INTERNAL MEDICINE

## 2020-01-01 PROCEDURE — 87086 URINE CULTURE/COLONY COUNT: CPT | Performed by: PHYSICIAN ASSISTANT

## 2020-01-01 PROCEDURE — 84550 ASSAY OF BLOOD/URIC ACID: CPT | Performed by: PHYSICIAN ASSISTANT

## 2020-01-01 PROCEDURE — 99232 SBSQ HOSP IP/OBS MODERATE 35: CPT | Performed by: CLINICAL NURSE SPECIALIST

## 2020-01-01 PROCEDURE — 86901 BLOOD TYPING SEROLOGIC RH(D): CPT | Performed by: INTERNAL MEDICINE

## 2020-01-01 PROCEDURE — 999N001126 HC STATISTIC BONE MARROW INTERP TC 85097: Performed by: PHYSICIAN ASSISTANT

## 2020-01-01 PROCEDURE — P9037 PLATE PHERES LEUKOREDU IRRAD: HCPCS | Performed by: PHYSICIAN ASSISTANT

## 2020-01-01 PROCEDURE — 88271 CYTOGENETICS DNA PROBE: CPT | Performed by: PHYSICIAN ASSISTANT

## 2020-01-01 PROCEDURE — 99238 HOSP IP/OBS DSCHRG MGMT 30/<: CPT | Performed by: INTERNAL MEDICINE

## 2020-01-01 PROCEDURE — 36591 DRAW BLOOD OFF VENOUS DEVICE: CPT

## 2020-01-01 PROCEDURE — 76937 US GUIDE VASCULAR ACCESS: CPT | Mod: 26 | Performed by: RADIOLOGY

## 2020-01-01 PROCEDURE — U0003 INFECTIOUS AGENT DETECTION BY NUCLEIC ACID (DNA OR RNA); SEVERE ACUTE RESPIRATORY SYNDROME CORONAVIRUS 2 (SARS-COV-2) (CORONAVIRUS DISEASE [COVID-19]), AMPLIFIED PROBE TECHNIQUE, MAKING USE OF HIGH THROUGHPUT TECHNOLOGIES AS DESCRIBED BY CMS-2020-01-R: HCPCS | Performed by: PHYSICIAN ASSISTANT

## 2020-01-01 PROCEDURE — 88264 CHROMOSOME ANALYSIS 20-25: CPT | Performed by: PHYSICIAN ASSISTANT

## 2020-01-01 PROCEDURE — 86367 STEM CELLS TOTAL COUNT: CPT | Performed by: INTERNAL MEDICINE

## 2020-01-01 PROCEDURE — 88161 CYTOPATH SMEAR OTHER SOURCE: CPT | Mod: TC | Performed by: PHYSICIAN ASSISTANT

## 2020-01-01 PROCEDURE — 70491 CT SOFT TISSUE NECK W/DYE: CPT | Mod: GC | Performed by: RADIOLOGY

## 2020-01-01 PROCEDURE — 80053 COMPREHEN METABOLIC PANEL: CPT | Performed by: INTERNAL MEDICINE

## 2020-01-01 PROCEDURE — 96366 THER/PROPH/DIAG IV INF ADDON: CPT

## 2020-01-01 PROCEDURE — 99223 1ST HOSP IP/OBS HIGH 75: CPT | Performed by: NURSE PRACTITIONER

## 2020-01-01 PROCEDURE — 99231 SBSQ HOSP IP/OBS SF/LOW 25: CPT | Mod: 25 | Performed by: INTERNAL MEDICINE

## 2020-01-01 PROCEDURE — 88311 DECALCIFY TISSUE: CPT | Mod: 26 | Performed by: PATHOLOGY

## 2020-01-01 PROCEDURE — 85730 THROMBOPLASTIN TIME PARTIAL: CPT | Performed by: PHYSICIAN ASSISTANT

## 2020-01-01 PROCEDURE — 250N000011 HC RX IP 250 OP 636

## 2020-01-01 PROCEDURE — 99232 SBSQ HOSP IP/OBS MODERATE 35: CPT | Performed by: NURSE PRACTITIONER

## 2020-01-01 PROCEDURE — 80076 HEPATIC FUNCTION PANEL: CPT | Performed by: PHYSICIAN ASSISTANT

## 2020-01-01 PROCEDURE — 88184 FLOWCYTOMETRY/ TC 1 MARKER: CPT | Mod: TC | Performed by: PHYSICIAN ASSISTANT

## 2020-01-01 PROCEDURE — 88311 DECALCIFY TISSUE: CPT | Mod: TC | Performed by: PHYSICIAN ASSISTANT

## 2020-01-01 PROCEDURE — 99207 PR CHGS TRANSFERRED TO HOSPITAL: CPT

## 2020-01-01 PROCEDURE — 258N000003 HC RX IP 258 OP 636

## 2020-01-01 PROCEDURE — 999N000022 HC STATISTIC AUTOLOGOUS BM-INITIAL INFUSION

## 2020-01-01 PROCEDURE — 87506 IADNA-DNA/RNA PROBE TQ 6-11: CPT | Performed by: PHYSICIAN ASSISTANT

## 2020-01-01 PROCEDURE — 99233 SBSQ HOSP IP/OBS HIGH 50: CPT | Performed by: PHYSICIAN ASSISTANT

## 2020-01-01 PROCEDURE — 71260 CT THORAX DX C+: CPT | Performed by: RADIOLOGY

## 2020-01-01 PROCEDURE — 88305 TISSUE EXAM BY PATHOLOGIST: CPT | Mod: 26 | Performed by: PATHOLOGY

## 2020-01-01 PROCEDURE — 999N001136 HC STATISTIC FLOW INT 9-15 ABY TC 88188: Performed by: PHYSICIAN ASSISTANT

## 2020-01-01 PROCEDURE — 36415 COLL VENOUS BLD VENIPUNCTURE: CPT | Performed by: PHYSICIAN ASSISTANT

## 2020-01-01 DEVICE — CATH VA PRECISION CHRONIC PALINDROME 14.5FRX28CM 8888128450P: Type: IMPLANTABLE DEVICE | Site: NECK | Status: FUNCTIONAL

## 2020-01-01 RX ORDER — POTASSIUM CHLORIDE 29.8 MG/ML
20 INJECTION INTRAVENOUS
Status: COMPLETED | OUTPATIENT
Start: 2020-01-01 | End: 2020-01-01

## 2020-01-01 RX ORDER — LIDOCAINE HYDROCHLORIDE 20 MG/ML
INJECTION, SOLUTION INFILTRATION; PERINEURAL PRN
Status: DISCONTINUED | OUTPATIENT
Start: 2020-01-01 | End: 2020-01-01

## 2020-01-01 RX ORDER — CONTAINER,EMPTY
1 EACH MISCELLANEOUS ONCE
Qty: 1 EACH | Refills: 0 | Status: SHIPPED | OUTPATIENT
Start: 2020-01-01 | End: 2020-01-01

## 2020-01-01 RX ORDER — HEPARIN SODIUM (PORCINE) LOCK FLUSH IV SOLN 100 UNIT/ML 100 UNIT/ML
3 SOLUTION INTRAVENOUS ONCE
Status: COMPLETED | OUTPATIENT
Start: 2020-01-01 | End: 2020-01-01

## 2020-01-01 RX ORDER — SODIUM CHLORIDE, SODIUM LACTATE, POTASSIUM CHLORIDE, CALCIUM CHLORIDE 600; 310; 30; 20 MG/100ML; MG/100ML; MG/100ML; MG/100ML
INJECTION, SOLUTION INTRAVENOUS CONTINUOUS
Status: DISCONTINUED | OUTPATIENT
Start: 2020-01-01 | End: 2020-01-01 | Stop reason: HOSPADM

## 2020-01-01 RX ORDER — NICOTINE POLACRILEX 4 MG
15-30 LOZENGE BUCCAL
Status: DISCONTINUED | OUTPATIENT
Start: 2020-01-01 | End: 2020-01-01

## 2020-01-01 RX ORDER — NICOTINE 21 MG/24HR
1 PATCH, TRANSDERMAL 24 HOURS TRANSDERMAL DAILY PRN
Status: DISCONTINUED | OUTPATIENT
Start: 2020-01-01 | End: 2020-01-01

## 2020-01-01 RX ORDER — HEPARIN SODIUM,PORCINE 10 UNIT/ML
5 VIAL (ML) INTRAVENOUS
Status: DISCONTINUED | OUTPATIENT
Start: 2020-01-01 | End: 2020-01-01 | Stop reason: HOSPADM

## 2020-01-01 RX ORDER — ACYCLOVIR 800 MG/1
800 TABLET ORAL 2 TIMES DAILY
Qty: 60 TABLET | Refills: 11 | Status: SHIPPED | OUTPATIENT
Start: 2020-01-01 | End: 2020-01-01

## 2020-01-01 RX ORDER — FENTANYL CITRATE 50 UG/ML
25-50 INJECTION, SOLUTION INTRAMUSCULAR; INTRAVENOUS
Status: DISCONTINUED | OUTPATIENT
Start: 2020-01-01 | End: 2020-01-01 | Stop reason: HOSPADM

## 2020-01-01 RX ORDER — HEPARIN SODIUM,PORCINE 10 UNIT/ML
5 VIAL (ML) INTRAVENOUS ONCE
Status: DISCONTINUED | OUTPATIENT
Start: 2020-01-01 | End: 2020-01-01 | Stop reason: HOSPADM

## 2020-01-01 RX ORDER — HEPARIN SODIUM,PORCINE 10 UNIT/ML
5 VIAL (ML) INTRAVENOUS
Status: CANCELLED | OUTPATIENT
Start: 2020-01-01

## 2020-01-01 RX ORDER — ACETAMINOPHEN 325 MG/1
325-650 TABLET ORAL EVERY 4 HOURS PRN
Status: DISCONTINUED | OUTPATIENT
Start: 2020-01-01 | End: 2020-01-01 | Stop reason: HOSPADM

## 2020-01-01 RX ORDER — HEPARIN SODIUM (PORCINE) LOCK FLUSH IV SOLN 100 UNIT/ML 100 UNIT/ML
5 SOLUTION INTRAVENOUS
Status: CANCELLED | OUTPATIENT
Start: 2020-01-01

## 2020-01-01 RX ORDER — POTASSIUM CHLORIDE 750 MG/1
20 TABLET, EXTENDED RELEASE ORAL ONCE
Status: COMPLETED | OUTPATIENT
Start: 2020-01-01 | End: 2020-01-01

## 2020-01-01 RX ORDER — HEPARIN SODIUM,PORCINE 10 UNIT/ML
5-10 VIAL (ML) INTRAVENOUS
Status: DISCONTINUED | OUTPATIENT
Start: 2020-01-01 | End: 2020-01-01 | Stop reason: HOSPADM

## 2020-01-01 RX ORDER — SULFAMETHOXAZOLE/TRIMETHOPRIM 800-160 MG
1 TABLET ORAL
Qty: 16 TABLET | Refills: 11 | Status: SHIPPED | OUTPATIENT
Start: 2020-01-01 | End: 2021-01-01

## 2020-01-01 RX ORDER — LORAZEPAM 2 MG/ML
1 INJECTION INTRAMUSCULAR ONCE
Status: COMPLETED | OUTPATIENT
Start: 2020-01-01 | End: 2020-01-01

## 2020-01-01 RX ORDER — HEPARIN SODIUM (PORCINE) LOCK FLUSH IV SOLN 100 UNIT/ML 100 UNIT/ML
5 SOLUTION INTRAVENOUS DAILY PRN
Status: DISCONTINUED | OUTPATIENT
Start: 2020-01-01 | End: 2020-01-01 | Stop reason: HOSPADM

## 2020-01-01 RX ORDER — ACYCLOVIR 800 MG/1
800 TABLET ORAL 2 TIMES DAILY
Qty: 60 TABLET | Refills: 11 | Status: SHIPPED | OUTPATIENT
Start: 2020-01-01

## 2020-01-01 RX ORDER — PLERIXAFOR 24 MG/1.2ML
0.24 SOLUTION SUBCUTANEOUS DAILY
Status: CANCELLED
Start: 2020-01-01

## 2020-01-01 RX ORDER — LORAZEPAM 0.5 MG/1
.5-1 TABLET ORAL EVERY 4 HOURS PRN
Status: DISCONTINUED | OUTPATIENT
Start: 2020-01-01 | End: 2020-01-01 | Stop reason: HOSPADM

## 2020-01-01 RX ORDER — PANTOPRAZOLE SODIUM 40 MG/1
40 TABLET, DELAYED RELEASE ORAL DAILY
Status: DISCONTINUED | OUTPATIENT
Start: 2020-01-01 | End: 2020-01-01 | Stop reason: HOSPADM

## 2020-01-01 RX ORDER — HEPARIN SODIUM (PORCINE) LOCK FLUSH IV SOLN 100 UNIT/ML 100 UNIT/ML
3 SOLUTION INTRAVENOUS ONCE
Status: CANCELLED | OUTPATIENT
Start: 2020-01-01 | End: 2020-01-01

## 2020-01-01 RX ORDER — ONDANSETRON 2 MG/ML
4 INJECTION INTRAMUSCULAR; INTRAVENOUS EVERY 30 MIN PRN
Status: DISCONTINUED | OUTPATIENT
Start: 2020-01-01 | End: 2020-01-01 | Stop reason: HOSPADM

## 2020-01-01 RX ORDER — LORAZEPAM 2 MG/ML
.5-1 INJECTION INTRAMUSCULAR EVERY 4 HOURS PRN
Status: DISCONTINUED | OUTPATIENT
Start: 2020-01-01 | End: 2020-01-01 | Stop reason: HOSPADM

## 2020-01-01 RX ORDER — DIPHENHYDRAMINE HCL 25 MG
25 CAPSULE ORAL EVERY 6 HOURS PRN
Status: DISCONTINUED | OUTPATIENT
Start: 2020-01-01 | End: 2020-01-01 | Stop reason: HOSPADM

## 2020-01-01 RX ORDER — MEPERIDINE HYDROCHLORIDE 25 MG/ML
12.5 INJECTION INTRAMUSCULAR; INTRAVENOUS; SUBCUTANEOUS
Status: DISCONTINUED | OUTPATIENT
Start: 2020-01-01 | End: 2020-01-01 | Stop reason: HOSPADM

## 2020-01-01 RX ORDER — METOPROLOL SUCCINATE 25 MG/1
25 TABLET, EXTENDED RELEASE ORAL 2 TIMES DAILY
Status: DISCONTINUED | OUTPATIENT
Start: 2020-01-01 | End: 2020-01-01 | Stop reason: HOSPADM

## 2020-01-01 RX ORDER — POTASSIUM CHLORIDE 750 MG/1
40 TABLET, EXTENDED RELEASE ORAL ONCE
Status: COMPLETED | OUTPATIENT
Start: 2020-01-01 | End: 2020-01-01

## 2020-01-01 RX ORDER — NICOTINE 21 MG/24HR
1 PATCH, TRANSDERMAL 24 HOURS TRANSDERMAL DAILY
Qty: 30 PATCH | Refills: 1 | Status: SHIPPED | OUTPATIENT
Start: 2020-01-01 | End: 2020-01-01

## 2020-01-01 RX ORDER — NICOTINE POLACRILEX 4 MG
15-30 LOZENGE BUCCAL
Status: DISCONTINUED | OUTPATIENT
Start: 2020-01-01 | End: 2020-01-01 | Stop reason: HOSPADM

## 2020-01-01 RX ORDER — OXYCODONE HYDROCHLORIDE 5 MG/1
5-10 TABLET ORAL EVERY 4 HOURS PRN
Status: DISCONTINUED | OUTPATIENT
Start: 2020-01-01 | End: 2020-01-01 | Stop reason: HOSPADM

## 2020-01-01 RX ORDER — PANTOPRAZOLE SODIUM 40 MG/1
40 TABLET, DELAYED RELEASE ORAL DAILY
Qty: 30 TABLET | Refills: 1 | Status: SHIPPED | OUTPATIENT
Start: 2020-01-01 | End: 2020-01-01

## 2020-01-01 RX ORDER — KETOROLAC TROMETHAMINE 30 MG/ML
30 INJECTION, SOLUTION INTRAMUSCULAR; INTRAVENOUS EVERY 6 HOURS PRN
Status: DISCONTINUED | OUTPATIENT
Start: 2020-01-01 | End: 2020-01-01 | Stop reason: HOSPADM

## 2020-01-01 RX ORDER — DIPHENHYDRAMINE HCL 25 MG
25 CAPSULE ORAL ONCE
Status: COMPLETED | OUTPATIENT
Start: 2020-01-01 | End: 2020-01-01

## 2020-01-01 RX ORDER — FUROSEMIDE 10 MG/ML
20 INJECTION INTRAMUSCULAR; INTRAVENOUS ONCE
Status: COMPLETED | OUTPATIENT
Start: 2020-01-01 | End: 2020-01-01

## 2020-01-01 RX ORDER — METOPROLOL SUCCINATE 25 MG/1
25 TABLET, EXTENDED RELEASE ORAL 2 TIMES DAILY
Qty: 60 TABLET | Refills: 1 | Status: SHIPPED | OUTPATIENT
Start: 2020-01-01 | End: 2020-01-01

## 2020-01-01 RX ORDER — ACYCLOVIR 800 MG/1
800 TABLET ORAL 2 TIMES DAILY
Qty: 60 TABLET | Refills: 1 | Status: SHIPPED | OUTPATIENT
Start: 2020-01-01 | End: 2020-01-01

## 2020-01-01 RX ORDER — PROCHLORPERAZINE MALEATE 5 MG
5-10 TABLET ORAL EVERY 6 HOURS PRN
Status: DISCONTINUED | OUTPATIENT
Start: 2020-01-01 | End: 2020-01-01 | Stop reason: HOSPADM

## 2020-01-01 RX ORDER — LOPERAMIDE HCL 2 MG
2 CAPSULE ORAL 4 TIMES DAILY PRN
Status: DISCONTINUED | OUTPATIENT
Start: 2020-01-01 | End: 2020-01-01 | Stop reason: HOSPADM

## 2020-01-01 RX ORDER — MAGNESIUM SULFATE HEPTAHYDRATE 40 MG/ML
4 INJECTION, SOLUTION INTRAVENOUS ONCE
Status: COMPLETED | OUTPATIENT
Start: 2020-01-01 | End: 2020-01-01

## 2020-01-01 RX ORDER — HEPARIN SODIUM (PORCINE) LOCK FLUSH IV SOLN 100 UNIT/ML 100 UNIT/ML
3 SOLUTION INTRAVENOUS EVERY 24 HOURS
Status: DISCONTINUED | OUTPATIENT
Start: 2020-01-01 | End: 2020-01-01 | Stop reason: HOSPADM

## 2020-01-01 RX ORDER — DEXTROSE MONOHYDRATE 50 MG/ML
10-20 INJECTION, SOLUTION INTRAVENOUS
Status: DISCONTINUED | OUTPATIENT
Start: 2020-01-01 | End: 2020-01-01 | Stop reason: HOSPADM

## 2020-01-01 RX ORDER — SODIUM CHLORIDE 9 MG/ML
INJECTION, SOLUTION INTRAVENOUS CONTINUOUS
Status: DISCONTINUED | OUTPATIENT
Start: 2020-01-01 | End: 2020-01-01

## 2020-01-01 RX ORDER — SULFAMETHOXAZOLE/TRIMETHOPRIM 800-160 MG
1 TABLET ORAL
Status: DISCONTINUED | OUTPATIENT
Start: 2020-01-01 | End: 2020-01-01 | Stop reason: HOSPADM

## 2020-01-01 RX ORDER — BLOOD-GLUCOSE METER
1 EACH MISCELLANEOUS ONCE
Qty: 1 KIT | Refills: 0 | Status: SHIPPED | OUTPATIENT
Start: 2020-01-01 | End: 2020-01-01

## 2020-01-01 RX ORDER — HEPARIN SODIUM,PORCINE 10 UNIT/ML
5-10 VIAL (ML) INTRAVENOUS EVERY 24 HOURS
Status: DISCONTINUED | OUTPATIENT
Start: 2020-01-01 | End: 2020-01-01 | Stop reason: HOSPADM

## 2020-01-01 RX ORDER — SULFAMETHOXAZOLE/TRIMETHOPRIM 800-160 MG
1 TABLET ORAL
Qty: 16 TABLET | Refills: 11 | Status: SHIPPED | OUTPATIENT
Start: 2020-01-01 | End: 2020-01-01

## 2020-01-01 RX ORDER — HEPARIN SODIUM,PORCINE 10 UNIT/ML
5 VIAL (ML) INTRAVENOUS ONCE
Status: COMPLETED | OUTPATIENT
Start: 2020-01-01 | End: 2020-01-01

## 2020-01-01 RX ORDER — NICOTINE 21 MG/24HR
1 PATCH, TRANSDERMAL 24 HOURS TRANSDERMAL DAILY
Status: DISCONTINUED | OUTPATIENT
Start: 2020-01-01 | End: 2020-01-01 | Stop reason: HOSPADM

## 2020-01-01 RX ORDER — LIDOCAINE 40 MG/G
CREAM TOPICAL
Status: DISCONTINUED | OUTPATIENT
Start: 2020-01-01 | End: 2020-01-01 | Stop reason: HOSPADM

## 2020-01-01 RX ORDER — MAGNESIUM SULFATE HEPTAHYDRATE 40 MG/ML
2 INJECTION, SOLUTION INTRAVENOUS ONCE
Status: COMPLETED | OUTPATIENT
Start: 2020-01-01 | End: 2020-01-01

## 2020-01-01 RX ORDER — HEPARIN SODIUM (PORCINE) LOCK FLUSH IV SOLN 100 UNIT/ML 100 UNIT/ML
5 SOLUTION INTRAVENOUS ONCE
Status: COMPLETED | OUTPATIENT
Start: 2020-01-01 | End: 2020-01-01

## 2020-01-01 RX ORDER — HEPARIN SODIUM (PORCINE) LOCK FLUSH IV SOLN 100 UNIT/ML 100 UNIT/ML
5 SOLUTION INTRAVENOUS
Status: DISCONTINUED | OUTPATIENT
Start: 2020-01-01 | End: 2020-01-01 | Stop reason: HOSPADM

## 2020-01-01 RX ORDER — ACYCLOVIR 800 MG/1
800 TABLET ORAL 2 TIMES DAILY
Status: DISCONTINUED | OUTPATIENT
Start: 2020-01-01 | End: 2020-01-01 | Stop reason: HOSPADM

## 2020-01-01 RX ORDER — ACETAMINOPHEN 325 MG/1
650 TABLET ORAL ONCE
Status: COMPLETED | OUTPATIENT
Start: 2020-01-01 | End: 2020-01-01

## 2020-01-01 RX ORDER — HYDRALAZINE HYDROCHLORIDE 20 MG/ML
10 INJECTION INTRAMUSCULAR; INTRAVENOUS EVERY 6 HOURS PRN
Status: DISCONTINUED | OUTPATIENT
Start: 2020-01-01 | End: 2020-01-01 | Stop reason: HOSPADM

## 2020-01-01 RX ORDER — HEPARIN SODIUM (PORCINE) LOCK FLUSH IV SOLN 100 UNIT/ML 100 UNIT/ML
500 SOLUTION INTRAVENOUS
Status: DISCONTINUED | OUTPATIENT
Start: 2020-01-01 | End: 2020-01-01 | Stop reason: HOSPADM

## 2020-01-01 RX ORDER — ONDANSETRON 8 MG/1
8 TABLET, FILM COATED ORAL EVERY 8 HOURS
Status: COMPLETED | OUTPATIENT
Start: 2020-01-01 | End: 2020-01-01

## 2020-01-01 RX ORDER — FLUCONAZOLE 200 MG/1
200 TABLET ORAL DAILY
Status: DISCONTINUED | OUTPATIENT
Start: 2020-01-01 | End: 2020-01-01 | Stop reason: HOSPADM

## 2020-01-01 RX ORDER — HYDROMORPHONE HYDROCHLORIDE 1 MG/ML
.3-.5 INJECTION, SOLUTION INTRAMUSCULAR; INTRAVENOUS; SUBCUTANEOUS EVERY 10 MIN PRN
Status: DISCONTINUED | OUTPATIENT
Start: 2020-01-01 | End: 2020-01-01 | Stop reason: HOSPADM

## 2020-01-01 RX ORDER — SULFAMETHOXAZOLE/TRIMETHOPRIM 800-160 MG
1 TABLET ORAL
Qty: 16 TABLET | Refills: 11 | COMMUNITY
Start: 2020-01-01 | End: 2020-01-01

## 2020-01-01 RX ORDER — DEXTROSE MONOHYDRATE 25 G/50ML
25-50 INJECTION, SOLUTION INTRAVENOUS
Status: DISCONTINUED | OUTPATIENT
Start: 2020-01-01 | End: 2020-01-01

## 2020-01-01 RX ORDER — HEPARIN SODIUM,PORCINE 10 UNIT/ML
VIAL (ML) INTRAVENOUS
COMMUNITY
Start: 2020-01-01 | End: 2021-01-01

## 2020-01-01 RX ORDER — POTASSIUM CHLORIDE 1500 MG/1
40 TABLET, EXTENDED RELEASE ORAL ONCE
Status: COMPLETED | OUTPATIENT
Start: 2020-01-01 | End: 2020-01-01

## 2020-01-01 RX ORDER — HEPARIN SODIUM (PORCINE) LOCK FLUSH IV SOLN 100 UNIT/ML 100 UNIT/ML
SOLUTION INTRAVENOUS PRN
Status: DISCONTINUED | OUTPATIENT
Start: 2020-01-01 | End: 2020-01-01 | Stop reason: HOSPADM

## 2020-01-01 RX ORDER — MEPERIDINE HYDROCHLORIDE 25 MG/ML
25-50 INJECTION INTRAMUSCULAR; INTRAVENOUS; SUBCUTANEOUS
Status: DISCONTINUED | OUTPATIENT
Start: 2020-01-01 | End: 2020-01-01 | Stop reason: HOSPADM

## 2020-01-01 RX ORDER — DEXAMETHASONE 4 MG/1
4 TABLET ORAL ONCE
Status: COMPLETED | OUTPATIENT
Start: 2020-01-01 | End: 2020-01-01

## 2020-01-01 RX ORDER — ALBUTEROL SULFATE 0.83 MG/ML
2.5 SOLUTION RESPIRATORY (INHALATION) EVERY 4 HOURS PRN
Status: DISCONTINUED | OUTPATIENT
Start: 2020-01-01 | End: 2020-01-01 | Stop reason: HOSPADM

## 2020-01-01 RX ORDER — HEPARIN SODIUM,PORCINE 10 UNIT/ML
5-10 VIAL (ML) INTRAVENOUS EVERY 24 HOURS
Qty: 60 VIAL | Refills: 1 | Status: SHIPPED | OUTPATIENT
Start: 2020-01-01 | End: 2020-01-01

## 2020-01-01 RX ORDER — FLUCONAZOLE 200 MG/1
200 TABLET ORAL DAILY
Qty: 30 TABLET | Refills: 1 | Status: SHIPPED | OUTPATIENT
Start: 2020-01-01 | End: 2020-01-01

## 2020-01-01 RX ORDER — NALOXONE HYDROCHLORIDE 0.4 MG/ML
.1-.4 INJECTION, SOLUTION INTRAMUSCULAR; INTRAVENOUS; SUBCUTANEOUS
Status: DISCONTINUED | OUTPATIENT
Start: 2020-01-01 | End: 2020-01-01 | Stop reason: HOSPADM

## 2020-01-01 RX ORDER — IOPAMIDOL 755 MG/ML
135 INJECTION, SOLUTION INTRAVASCULAR ONCE
Status: COMPLETED | OUTPATIENT
Start: 2020-01-01 | End: 2020-01-01

## 2020-01-01 RX ORDER — LANCETS
EACH MISCELLANEOUS
Qty: 102 EACH | Refills: 3 | Status: SHIPPED | OUTPATIENT
Start: 2020-01-01 | End: 2020-01-01

## 2020-01-01 RX ORDER — METOPROLOL SUCCINATE 25 MG/1
25 TABLET, EXTENDED RELEASE ORAL 2 TIMES DAILY
Qty: 60 TABLET | Refills: 1 | Status: SHIPPED | OUTPATIENT
Start: 2020-01-01 | End: 2021-01-01

## 2020-01-01 RX ORDER — ONDANSETRON 4 MG/1
4 TABLET, ORALLY DISINTEGRATING ORAL EVERY 30 MIN PRN
Status: DISCONTINUED | OUTPATIENT
Start: 2020-01-01 | End: 2020-01-01 | Stop reason: HOSPADM

## 2020-01-01 RX ORDER — LIDOCAINE HYDROCHLORIDE 10 MG/ML
5 INJECTION, SOLUTION EPIDURAL; INFILTRATION; INTRACAUDAL; PERINEURAL ONCE
Status: COMPLETED | OUTPATIENT
Start: 2020-01-01 | End: 2020-01-01

## 2020-01-01 RX ORDER — LEVOFLOXACIN 250 MG/1
250 TABLET, FILM COATED ORAL
Status: DISCONTINUED | OUTPATIENT
Start: 2020-01-01 | End: 2020-01-01 | Stop reason: HOSPADM

## 2020-01-01 RX ORDER — PROCHLORPERAZINE MALEATE 5 MG
5-10 TABLET ORAL EVERY 6 HOURS PRN
Qty: 20 TABLET | Refills: 0 | Status: SHIPPED | OUTPATIENT
Start: 2020-01-01 | End: 2020-01-01

## 2020-01-01 RX ORDER — SODIUM CHLORIDE AND POTASSIUM CHLORIDE 150; 900 MG/100ML; MG/100ML
INJECTION, SOLUTION INTRAVENOUS CONTINUOUS
Status: DISPENSED | OUTPATIENT
Start: 2020-01-01 | End: 2020-01-01

## 2020-01-01 RX ORDER — HEPARIN SODIUM (PORCINE) LOCK FLUSH IV SOLN 100 UNIT/ML 100 UNIT/ML
3 SOLUTION INTRAVENOUS
Status: DISCONTINUED | OUTPATIENT
Start: 2020-01-01 | End: 2020-01-01 | Stop reason: HOSPADM

## 2020-01-01 RX ORDER — CEFTRIAXONE SODIUM 2 G
2 VIAL (EA) INJECTION EVERY 24 HOURS
Status: DISCONTINUED | OUTPATIENT
Start: 2020-01-01 | End: 2020-01-01 | Stop reason: HOSPADM

## 2020-01-01 RX ORDER — POTASSIUM CHLORIDE 750 MG/1
40 TABLET, EXTENDED RELEASE ORAL ONCE
Status: DISCONTINUED | OUTPATIENT
Start: 2020-01-01 | End: 2020-01-01

## 2020-01-01 RX ORDER — PLERIXAFOR 24 MG/1.2ML
0.24 SOLUTION SUBCUTANEOUS DAILY
Status: DISCONTINUED | OUTPATIENT
Start: 2020-01-01 | End: 2020-01-01 | Stop reason: HOSPADM

## 2020-01-01 RX ORDER — ACETAMINOPHEN 325 MG/1
975 TABLET ORAL ONCE
Status: DISCONTINUED | OUTPATIENT
Start: 2020-01-01 | End: 2020-01-01 | Stop reason: HOSPADM

## 2020-01-01 RX ORDER — DEXTROSE MONOHYDRATE 25 G/50ML
25-50 INJECTION, SOLUTION INTRAVENOUS
Status: DISCONTINUED | OUTPATIENT
Start: 2020-01-01 | End: 2020-01-01 | Stop reason: HOSPADM

## 2020-01-01 RX ORDER — NICOTINE 21 MG/24HR
1 PATCH, TRANSDERMAL 24 HOURS TRANSDERMAL DAILY
Status: DISCONTINUED | OUTPATIENT
Start: 2020-01-01 | End: 2020-01-01

## 2020-01-01 RX ORDER — ONDANSETRON 8 MG/1
8 TABLET, FILM COATED ORAL EVERY 8 HOURS PRN
Qty: 30 TABLET | Refills: 0 | Status: SHIPPED | OUTPATIENT
Start: 2020-01-01 | End: 2020-01-01

## 2020-01-01 RX ORDER — DEXAMETHASONE SODIUM PHOSPHATE 4 MG/ML
4 INJECTION, SOLUTION INTRA-ARTICULAR; INTRALESIONAL; INTRAMUSCULAR; INTRAVENOUS; SOFT TISSUE EVERY 10 MIN PRN
Status: DISCONTINUED | OUTPATIENT
Start: 2020-01-01 | End: 2020-01-01 | Stop reason: HOSPADM

## 2020-01-01 RX ORDER — SODIUM CHLORIDE 9 MG/ML
INJECTION, SOLUTION INTRAVENOUS CONTINUOUS
Status: ACTIVE | OUTPATIENT
Start: 2020-01-01 | End: 2020-01-01

## 2020-01-01 RX ORDER — ALLOPURINOL 300 MG/1
300 TABLET ORAL DAILY
Status: COMPLETED | OUTPATIENT
Start: 2020-01-01 | End: 2020-01-01

## 2020-01-01 RX ORDER — LEVOFLOXACIN 250 MG/1
250 TABLET, FILM COATED ORAL DAILY
Qty: 30 TABLET | Refills: 1 | Status: SHIPPED | OUTPATIENT
Start: 2020-01-01 | End: 2020-01-01

## 2020-01-01 RX ORDER — PROPOFOL 10 MG/ML
INJECTION, EMULSION INTRAVENOUS CONTINUOUS PRN
Status: DISCONTINUED | OUTPATIENT
Start: 2020-01-01 | End: 2020-01-01

## 2020-01-01 RX ORDER — HYDRALAZINE HYDROCHLORIDE 20 MG/ML
5 INJECTION INTRAMUSCULAR; INTRAVENOUS EVERY 6 HOURS PRN
Status: DISCONTINUED | OUTPATIENT
Start: 2020-01-01 | End: 2020-01-01

## 2020-01-01 RX ORDER — DEXAMETHASONE 4 MG/1
8 TABLET ORAL ONCE
Status: COMPLETED | OUTPATIENT
Start: 2020-01-01 | End: 2020-01-01

## 2020-01-01 RX ORDER — HYDRALAZINE HYDROCHLORIDE 20 MG/ML
2 INJECTION INTRAMUSCULAR; INTRAVENOUS EVERY 6 HOURS PRN
Status: DISCONTINUED | OUTPATIENT
Start: 2020-01-01 | End: 2020-01-01

## 2020-01-01 RX ORDER — RIVAROXABAN 15 MG-20MG
KIT ORAL
Qty: 1 EACH | Refills: 0 | Status: SHIPPED | OUTPATIENT
Start: 2020-01-01 | End: 2020-01-01 | Stop reason: DRUGHIGH

## 2020-01-01 RX ORDER — HEPARIN SODIUM (PORCINE) LOCK FLUSH IV SOLN 100 UNIT/ML 100 UNIT/ML
3 SOLUTION INTRAVENOUS DAILY
Qty: 10 SYRINGE | Refills: 3 | Status: ON HOLD | OUTPATIENT
Start: 2020-01-01 | End: 2020-01-01

## 2020-01-01 RX ORDER — BLOOD PRESSURE TEST KIT
1 KIT MISCELLANEOUS
Qty: 120 EACH | Refills: 3 | Status: SHIPPED | OUTPATIENT
Start: 2020-01-01

## 2020-01-01 RX ADMIN — Medication 5 ML: at 08:55

## 2020-01-01 RX ADMIN — ALLOPURINOL 300 MG: 300 TABLET ORAL at 07:54

## 2020-01-01 RX ADMIN — INSULIN ASPART 3 UNITS: 100 INJECTION, SOLUTION INTRAVENOUS; SUBCUTANEOUS at 18:02

## 2020-01-01 RX ADMIN — Medication 5 ML: at 10:25

## 2020-01-01 RX ADMIN — DEXTROSE MONOHYDRATE 10 ML: 50 INJECTION, SOLUTION INTRAVENOUS at 20:36

## 2020-01-01 RX ADMIN — METOPROLOL SUCCINATE 25 MG: 25 TABLET, EXTENDED RELEASE ORAL at 07:55

## 2020-01-01 RX ADMIN — ANTICOAGULANT CITRATE DEXTROSE SOLUTION FORMULA A 1619 ML: 12.25; 11; 3.65 SOLUTION INTRAVENOUS at 08:30

## 2020-01-01 RX ADMIN — ACYCLOVIR 800 MG: 800 TABLET ORAL at 20:10

## 2020-01-01 RX ADMIN — CYTARABINE 240 MG: 100 INJECTION, SOLUTION INTRATHECAL; INTRAVENOUS; SUBCUTANEOUS at 22:09

## 2020-01-01 RX ADMIN — ACYCLOVIR 800 MG: 800 TABLET ORAL at 20:13

## 2020-01-01 RX ADMIN — ACETAMINOPHEN 650 MG: 325 TABLET, FILM COATED ORAL at 11:47

## 2020-01-01 RX ADMIN — CEFEPIME 2 G: 2 INJECTION, POWDER, FOR SOLUTION INTRAVENOUS at 11:31

## 2020-01-01 RX ADMIN — PALIFERMIN 6700 MCG: 6.25 INJECTION, POWDER, LYOPHILIZED, FOR SOLUTION INTRAVENOUS at 11:35

## 2020-01-01 RX ADMIN — METOPROLOL SUCCINATE 25 MG: 25 TABLET, EXTENDED RELEASE ORAL at 20:31

## 2020-01-01 RX ADMIN — FLUCONAZOLE 200 MG: 200 TABLET ORAL at 08:05

## 2020-01-01 RX ADMIN — CYTARABINE 240 MG: 100 INJECTION, SOLUTION INTRATHECAL; INTRAVENOUS; SUBCUTANEOUS at 10:14

## 2020-01-01 RX ADMIN — MAGNESIUM SULFATE HEPTAHYDRATE 3 G: 500 INJECTION, SOLUTION INTRAMUSCULAR; INTRAVENOUS at 12:02

## 2020-01-01 RX ADMIN — ONDANSETRON HYDROCHLORIDE 8 MG: 8 TABLET, FILM COATED ORAL at 23:55

## 2020-01-01 RX ADMIN — VANCOMYCIN HYDROCHLORIDE 1750 MG: 10 INJECTION, POWDER, LYOPHILIZED, FOR SOLUTION INTRAVENOUS at 08:19

## 2020-01-01 RX ADMIN — INSULIN ASPART 1 UNITS: 100 INJECTION, SOLUTION INTRAVENOUS; SUBCUTANEOUS at 17:48

## 2020-01-01 RX ADMIN — ACYCLOVIR 800 MG: 800 TABLET ORAL at 08:24

## 2020-01-01 RX ADMIN — Medication 5 ML: at 23:56

## 2020-01-01 RX ADMIN — PALIFERMIN 6700 MCG: 6.25 INJECTION, POWDER, LYOPHILIZED, FOR SOLUTION INTRAVENOUS at 10:03

## 2020-01-01 RX ADMIN — INSULIN ASPART 2 UNITS: 100 INJECTION, SOLUTION INTRAVENOUS; SUBCUTANEOUS at 17:48

## 2020-01-01 RX ADMIN — ETOPOSIDE 240 MG: 20 INJECTION, SOLUTION, CONCENTRATE INTRAVENOUS at 20:07

## 2020-01-01 RX ADMIN — FLUCONAZOLE 200 MG: 200 TABLET ORAL at 08:18

## 2020-01-01 RX ADMIN — CYTARABINE 240 MG: 100 INJECTION, SOLUTION INTRATHECAL; INTRAVENOUS; SUBCUTANEOUS at 10:02

## 2020-01-01 RX ADMIN — Medication 3 ML: at 13:34

## 2020-01-01 RX ADMIN — ALLOPURINOL 300 MG: 300 TABLET ORAL at 07:46

## 2020-01-01 RX ADMIN — ACYCLOVIR 800 MG: 800 TABLET ORAL at 20:31

## 2020-01-01 RX ADMIN — ONDANSETRON HYDROCHLORIDE 8 MG: 8 TABLET, FILM COATED ORAL at 16:27

## 2020-01-01 RX ADMIN — LOPERAMIDE HYDROCHLORIDE 2 MG: 2 CAPSULE ORAL at 08:09

## 2020-01-01 RX ADMIN — ACYCLOVIR 800 MG: 800 TABLET ORAL at 07:46

## 2020-01-01 RX ADMIN — CYTARABINE 240 MG: 100 INJECTION, SOLUTION INTRATHECAL; INTRAVENOUS; SUBCUTANEOUS at 09:48

## 2020-01-01 RX ADMIN — ONDANSETRON HYDROCHLORIDE 8 MG: 8 TABLET, FILM COATED ORAL at 08:05

## 2020-01-01 RX ADMIN — DEXAMETHASONE 10 MG: 2 TABLET ORAL at 08:03

## 2020-01-01 RX ADMIN — PANTOPRAZOLE SODIUM 40 MG: 40 TABLET, DELAYED RELEASE ORAL at 08:09

## 2020-01-01 RX ADMIN — CEFEPIME 2 G: 2 INJECTION, POWDER, FOR SOLUTION INTRAVENOUS at 12:04

## 2020-01-01 RX ADMIN — FLUCONAZOLE 200 MG: 200 TABLET ORAL at 07:56

## 2020-01-01 RX ADMIN — POTASSIUM CHLORIDE 40 MEQ: 750 TABLET, EXTENDED RELEASE ORAL at 14:39

## 2020-01-01 RX ADMIN — Medication 1 PATCH: at 17:38

## 2020-01-01 RX ADMIN — CYTARABINE 240 MG: 100 INJECTION, SOLUTION INTRATHECAL; INTRAVENOUS; SUBCUTANEOUS at 21:16

## 2020-01-01 RX ADMIN — PANTOPRAZOLE SODIUM 40 MG: 40 TABLET, DELAYED RELEASE ORAL at 08:55

## 2020-01-01 RX ADMIN — Medication 5 ML: at 11:41

## 2020-01-01 RX ADMIN — Medication 3 ML: at 13:45

## 2020-01-01 RX ADMIN — ACYCLOVIR 800 MG: 800 TABLET ORAL at 07:54

## 2020-01-01 RX ADMIN — DEXAMETHASONE 10 MG: 2 TABLET ORAL at 07:46

## 2020-01-01 RX ADMIN — Medication 500 UNITS: at 17:28

## 2020-01-01 RX ADMIN — Medication 1 PATCH: at 17:17

## 2020-01-01 RX ADMIN — ONDANSETRON HYDROCHLORIDE 8 MG: 8 TABLET, FILM COATED ORAL at 00:00

## 2020-01-01 RX ADMIN — Medication 5 ML: at 08:54

## 2020-01-01 RX ADMIN — CEFEPIME 2 G: 2 INJECTION, POWDER, FOR SOLUTION INTRAVENOUS at 19:54

## 2020-01-01 RX ADMIN — ACYCLOVIR 800 MG: 800 TABLET ORAL at 08:18

## 2020-01-01 RX ADMIN — DEXAMETHASONE 6 MG: 2 TABLET ORAL at 07:45

## 2020-01-01 RX ADMIN — SODIUM PHOSPHATE, MONOBASIC, MONOHYDRATE 15 MMOL: 276; 142 INJECTION, SOLUTION INTRAVENOUS at 08:06

## 2020-01-01 RX ADMIN — ACETAMINOPHEN 650 MG: 325 TABLET, FILM COATED ORAL at 16:59

## 2020-01-01 RX ADMIN — Medication 5 ML: at 03:42

## 2020-01-01 RX ADMIN — FILGRASTIM-SNDZ 1200 MCG: 300 INJECTION, SOLUTION INTRAVENOUS; SUBCUTANEOUS at 08:31

## 2020-01-01 RX ADMIN — FILGRASTIM 480 MCG: 480 INJECTION, SOLUTION INTRAVENOUS; SUBCUTANEOUS at 20:28

## 2020-01-01 RX ADMIN — Medication 5 ML: at 11:22

## 2020-01-01 RX ADMIN — SODIUM CHLORIDE, PRESERVATIVE FREE 5 ML: 5 INJECTION INTRAVENOUS at 08:22

## 2020-01-01 RX ADMIN — LEVOFLOXACIN 250 MG: 250 TABLET, FILM COATED ORAL at 11:09

## 2020-01-01 RX ADMIN — PALIFERMIN 6700 MCG: 6.25 INJECTION, POWDER, LYOPHILIZED, FOR SOLUTION INTRAVENOUS at 13:04

## 2020-01-01 RX ADMIN — FILGRASTIM 480 MCG: 480 INJECTION, SOLUTION INTRAVENOUS; SUBCUTANEOUS at 19:54

## 2020-01-01 RX ADMIN — VANCOMYCIN HYDROCHLORIDE 2000 MG: 1 INJECTION, POWDER, LYOPHILIZED, FOR SOLUTION INTRAVENOUS at 00:30

## 2020-01-01 RX ADMIN — METOPROLOL SUCCINATE 25 MG: 25 TABLET, EXTENDED RELEASE ORAL at 20:00

## 2020-01-01 RX ADMIN — FILGRASTIM-SNDZ 1200 MCG: 300 INJECTION, SOLUTION INTRAVENOUS; SUBCUTANEOUS at 08:56

## 2020-01-01 RX ADMIN — MELPHALAN HYDROCHLORIDE 335 MG: KIT INTRAVENOUS at 10:34

## 2020-01-01 RX ADMIN — Medication 5 ML: at 20:55

## 2020-01-01 RX ADMIN — METOPROLOL SUCCINATE 25 MG: 25 TABLET, EXTENDED RELEASE ORAL at 08:05

## 2020-01-01 RX ADMIN — ACYCLOVIR 800 MG: 800 TABLET ORAL at 19:53

## 2020-01-01 RX ADMIN — POTASSIUM CHLORIDE AND SODIUM CHLORIDE: 900; 150 INJECTION, SOLUTION INTRAVENOUS at 08:57

## 2020-01-01 RX ADMIN — ONDANSETRON HYDROCHLORIDE 8 MG: 8 TABLET, FILM COATED ORAL at 23:30

## 2020-01-01 RX ADMIN — METOPROLOL SUCCINATE 25 MG: 25 TABLET, EXTENDED RELEASE ORAL at 19:53

## 2020-01-01 RX ADMIN — FUROSEMIDE 20 MG: 10 INJECTION, SOLUTION INTRAVENOUS at 09:56

## 2020-01-01 RX ADMIN — FLUCONAZOLE 200 MG: 200 TABLET ORAL at 07:46

## 2020-01-01 RX ADMIN — INSULIN HUMAN 22 UNITS: 100 INJECTION, SUSPENSION SUBCUTANEOUS at 08:25

## 2020-01-01 RX ADMIN — ONDANSETRON HYDROCHLORIDE 8 MG: 8 TABLET, FILM COATED ORAL at 02:25

## 2020-01-01 RX ADMIN — MAGNESIUM SULFATE IN WATER 4 G: 40 INJECTION, SOLUTION INTRAVENOUS at 05:28

## 2020-01-01 RX ADMIN — PANTOPRAZOLE SODIUM 40 MG: 40 TABLET, DELAYED RELEASE ORAL at 07:57

## 2020-01-01 RX ADMIN — INSULIN HUMAN 20 UNITS: 100 INJECTION, SUSPENSION SUBCUTANEOUS at 20:13

## 2020-01-01 RX ADMIN — PANTOPRAZOLE SODIUM 40 MG: 40 TABLET, DELAYED RELEASE ORAL at 07:39

## 2020-01-01 RX ADMIN — ONDANSETRON HYDROCHLORIDE 8 MG: 8 TABLET, FILM COATED ORAL at 07:39

## 2020-01-01 RX ADMIN — FILGRASTIM-SNDZ 480 MCG: 480 INJECTION, SOLUTION INTRAVENOUS; SUBCUTANEOUS at 11:36

## 2020-01-01 RX ADMIN — INSULIN ASPART 25 UNITS: 100 INJECTION, SOLUTION INTRAVENOUS; SUBCUTANEOUS at 12:56

## 2020-01-01 RX ADMIN — METOPROLOL SUCCINATE 25 MG: 25 TABLET, EXTENDED RELEASE ORAL at 08:18

## 2020-01-01 RX ADMIN — METOPROLOL SUCCINATE 25 MG: 25 TABLET, EXTENDED RELEASE ORAL at 08:24

## 2020-01-01 RX ADMIN — IOPAMIDOL 135 ML: 755 INJECTION, SOLUTION INTRAVASCULAR at 08:02

## 2020-01-01 RX ADMIN — PANTOPRAZOLE SODIUM 40 MG: 40 TABLET, DELAYED RELEASE ORAL at 11:41

## 2020-01-01 RX ADMIN — CEFEPIME 2 G: 2 INJECTION, POWDER, FOR SOLUTION INTRAVENOUS at 04:12

## 2020-01-01 RX ADMIN — FLUCONAZOLE 200 MG: 200 TABLET ORAL at 14:38

## 2020-01-01 RX ADMIN — Medication 5 ML: at 09:57

## 2020-01-01 RX ADMIN — DEXTROSE MONOHYDRATE 10 ML: 50 INJECTION, SOLUTION INTRAVENOUS at 20:35

## 2020-01-01 RX ADMIN — ONDANSETRON HYDROCHLORIDE 8 MG: 8 TABLET, FILM COATED ORAL at 17:29

## 2020-01-01 RX ADMIN — Medication 5 ML: at 09:37

## 2020-01-01 RX ADMIN — METOPROLOL SUCCINATE 25 MG: 25 TABLET, EXTENDED RELEASE ORAL at 19:54

## 2020-01-01 RX ADMIN — INSULIN HUMAN 20 UNITS: 100 INJECTION, SUSPENSION SUBCUTANEOUS at 20:43

## 2020-01-01 RX ADMIN — ALTEPLASE 2 MG: 2.2 INJECTION, POWDER, LYOPHILIZED, FOR SOLUTION INTRAVENOUS at 08:25

## 2020-01-01 RX ADMIN — CEFEPIME 2 G: 2 INJECTION, POWDER, FOR SOLUTION INTRAVENOUS at 11:34

## 2020-01-01 RX ADMIN — Medication 1 PATCH: at 17:29

## 2020-01-01 RX ADMIN — SODIUM PHOSPHATE, MONOBASIC, MONOHYDRATE 15 MMOL: 276; 142 INJECTION, SOLUTION INTRAVENOUS at 06:26

## 2020-01-01 RX ADMIN — ONDANSETRON HYDROCHLORIDE 8 MG: 8 TABLET, FILM COATED ORAL at 16:08

## 2020-01-01 RX ADMIN — POTASSIUM CHLORIDE 20 MEQ: 29.8 INJECTION, SOLUTION INTRAVENOUS at 06:31

## 2020-01-01 RX ADMIN — METOPROLOL SUCCINATE 25 MG: 25 TABLET, EXTENDED RELEASE ORAL at 08:45

## 2020-01-01 RX ADMIN — ACYCLOVIR 800 MG: 800 TABLET ORAL at 07:55

## 2020-01-01 RX ADMIN — LEVOFLOXACIN 250 MG: 250 TABLET, FILM COATED ORAL at 10:16

## 2020-01-01 RX ADMIN — PANTOPRAZOLE SODIUM 40 MG: 40 TABLET, DELAYED RELEASE ORAL at 08:45

## 2020-01-01 RX ADMIN — Medication 10 ML: at 09:08

## 2020-01-01 RX ADMIN — SODIUM CHLORIDE, PRESERVATIVE FREE 5 ML: 5 INJECTION INTRAVENOUS at 08:07

## 2020-01-01 RX ADMIN — ALLOPURINOL 300 MG: 300 TABLET ORAL at 11:41

## 2020-01-01 RX ADMIN — POTASSIUM CHLORIDE 20 MEQ: 29.8 INJECTION, SOLUTION INTRAVENOUS at 06:52

## 2020-01-01 RX ADMIN — INSULIN ASPART 3 UNITS: 100 INJECTION, SOLUTION INTRAVENOUS; SUBCUTANEOUS at 12:10

## 2020-01-01 RX ADMIN — PANTOPRAZOLE SODIUM 40 MG: 40 TABLET, DELAYED RELEASE ORAL at 08:18

## 2020-01-01 RX ADMIN — ACYCLOVIR 800 MG: 800 TABLET ORAL at 08:06

## 2020-01-01 RX ADMIN — FUROSEMIDE 20 MG: 10 INJECTION, SOLUTION INTRAVENOUS at 10:48

## 2020-01-01 RX ADMIN — CEFTRIAXONE SODIUM 2 G: 2 INJECTION, POWDER, FOR SOLUTION INTRAMUSCULAR; INTRAVENOUS at 11:35

## 2020-01-01 RX ADMIN — ONDANSETRON HYDROCHLORIDE 8 MG: 8 TABLET, FILM COATED ORAL at 00:02

## 2020-01-01 RX ADMIN — DEXTROSE MONOHYDRATE 20 ML: 50 INJECTION, SOLUTION INTRAVENOUS at 19:54

## 2020-01-01 RX ADMIN — CEFEPIME 2 G: 2 INJECTION, POWDER, FOR SOLUTION INTRAVENOUS at 03:50

## 2020-01-01 RX ADMIN — CEFEPIME 2 G: 2 INJECTION, POWDER, FOR SOLUTION INTRAVENOUS at 20:32

## 2020-01-01 RX ADMIN — ACYCLOVIR 800 MG: 800 TABLET ORAL at 20:04

## 2020-01-01 RX ADMIN — ONDANSETRON HYDROCHLORIDE 8 MG: 8 TABLET, FILM COATED ORAL at 17:28

## 2020-01-01 RX ADMIN — PALIFERMIN 6700 MCG: 6.25 INJECTION, POWDER, LYOPHILIZED, FOR SOLUTION INTRAVENOUS at 09:42

## 2020-01-01 RX ADMIN — Medication 5 ML: at 10:09

## 2020-01-01 RX ADMIN — DEXAMETHASONE 8 MG: 4 TABLET ORAL at 07:56

## 2020-01-01 RX ADMIN — CYTARABINE 240 MG: 100 INJECTION, SOLUTION INTRATHECAL; INTRAVENOUS; SUBCUTANEOUS at 21:59

## 2020-01-01 RX ADMIN — FLUCONAZOLE 200 MG: 200 TABLET ORAL at 08:02

## 2020-01-01 RX ADMIN — INSULIN ASPART 24 UNITS: 100 INJECTION, SOLUTION INTRAVENOUS; SUBCUTANEOUS at 12:47

## 2020-01-01 RX ADMIN — Medication 10 ML: at 12:01

## 2020-01-01 RX ADMIN — ANTICOAGULANT CITRATE DEXTROSE SOLUTION FORMULA A 1444 ML: 12.25; 11; 3.65 SOLUTION INTRAVENOUS at 08:50

## 2020-01-01 RX ADMIN — PANTOPRAZOLE SODIUM 40 MG: 40 TABLET, DELAYED RELEASE ORAL at 08:03

## 2020-01-01 RX ADMIN — FILGRASTIM-SNDZ 1200 MCG: 300 INJECTION, SOLUTION INTRAVENOUS; SUBCUTANEOUS at 10:29

## 2020-01-01 RX ADMIN — PANTOPRAZOLE SODIUM 40 MG: 40 TABLET, DELAYED RELEASE ORAL at 08:02

## 2020-01-01 RX ADMIN — MAGNESIUM SULFATE IN WATER 4 G: 40 INJECTION, SOLUTION INTRAVENOUS at 17:28

## 2020-01-01 RX ADMIN — METOPROLOL SUCCINATE 25 MG: 25 TABLET, EXTENDED RELEASE ORAL at 20:13

## 2020-01-01 RX ADMIN — ACYCLOVIR 800 MG: 800 TABLET ORAL at 08:55

## 2020-01-01 RX ADMIN — CEFEPIME 2 G: 2 INJECTION, POWDER, FOR SOLUTION INTRAVENOUS at 04:25

## 2020-01-01 RX ADMIN — POTASSIUM CHLORIDE 40 MEQ: 750 TABLET, EXTENDED RELEASE ORAL at 13:37

## 2020-01-01 RX ADMIN — INSULIN ASPART 3 UNITS: 100 INJECTION, SOLUTION INTRAVENOUS; SUBCUTANEOUS at 11:42

## 2020-01-01 RX ADMIN — POTASSIUM CHLORIDE 20 MEQ: 29.8 INJECTION, SOLUTION INTRAVENOUS at 05:33

## 2020-01-01 RX ADMIN — DIPHENHYDRAMINE HYDROCHLORIDE 25 MG: 25 CAPSULE ORAL at 17:22

## 2020-01-01 RX ADMIN — Medication 5 ML: at 11:51

## 2020-01-01 RX ADMIN — INSULIN ASPART 2 UNITS: 100 INJECTION, SOLUTION INTRAVENOUS; SUBCUTANEOUS at 08:38

## 2020-01-01 RX ADMIN — SODIUM CHLORIDE 10 ML/HR: 900 INJECTION INTRAVENOUS at 09:51

## 2020-01-01 RX ADMIN — Medication 5 ML: at 03:14

## 2020-01-01 RX ADMIN — METOPROLOL SUCCINATE 25 MG: 25 TABLET, EXTENDED RELEASE ORAL at 07:39

## 2020-01-01 RX ADMIN — INSULIN HUMAN 20 UNITS: 100 INJECTION, SUSPENSION SUBCUTANEOUS at 19:54

## 2020-01-01 RX ADMIN — Medication 5 ML: at 10:11

## 2020-01-01 RX ADMIN — MAGNESIUM SULFATE IN WATER 4 G: 40 INJECTION, SOLUTION INTRAVENOUS at 11:30

## 2020-01-01 RX ADMIN — METOPROLOL SUCCINATE 25 MG: 25 TABLET, EXTENDED RELEASE ORAL at 08:02

## 2020-01-01 RX ADMIN — FLUCONAZOLE 200 MG: 200 TABLET ORAL at 08:03

## 2020-01-01 RX ADMIN — ANTICOAGULANT CITRATE DEXTROSE SOLUTION FORMULA A 1354 ML: 12.25; 11; 3.65 SOLUTION INTRAVENOUS at 08:30

## 2020-01-01 RX ADMIN — Medication 5 ML: at 10:07

## 2020-01-01 RX ADMIN — SODIUM CHLORIDE 1000 ML: 9 INJECTION, SOLUTION INTRAVENOUS at 15:01

## 2020-01-01 RX ADMIN — PLERIXAFOR 26.8 MG: 24 SOLUTION SUBCUTANEOUS at 15:55

## 2020-01-01 RX ADMIN — SODIUM CHLORIDE, SODIUM LACTATE, POTASSIUM CHLORIDE, CALCIUM CHLORIDE: 600; 310; 30; 20 INJECTION, SOLUTION INTRAVENOUS at 07:48

## 2020-01-01 RX ADMIN — SODIUM CHLORIDE: 9 INJECTION, SOLUTION INTRAVENOUS at 16:05

## 2020-01-01 RX ADMIN — CYTARABINE 240 MG: 100 INJECTION, SOLUTION INTRATHECAL; INTRAVENOUS; SUBCUTANEOUS at 09:55

## 2020-01-01 RX ADMIN — INSULIN HUMAN 22 UNITS: 100 INJECTION, SUSPENSION SUBCUTANEOUS at 08:06

## 2020-01-01 RX ADMIN — FLUCONAZOLE 200 MG: 200 TABLET ORAL at 08:24

## 2020-01-01 RX ADMIN — LOPERAMIDE HYDROCHLORIDE 2 MG: 2 CAPSULE ORAL at 13:39

## 2020-01-01 RX ADMIN — Medication 1 PATCH: at 17:23

## 2020-01-01 RX ADMIN — ETOPOSIDE 240 MG: 20 INJECTION, SOLUTION, CONCENTRATE INTRAVENOUS at 07:43

## 2020-01-01 RX ADMIN — FILGRASTIM-SNDZ 1200 MCG: 300 INJECTION, SOLUTION INTRAVENOUS; SUBCUTANEOUS at 08:34

## 2020-01-01 RX ADMIN — ONDANSETRON HYDROCHLORIDE 8 MG: 8 TABLET, FILM COATED ORAL at 07:55

## 2020-01-01 RX ADMIN — Medication 5 ML: at 10:08

## 2020-01-01 RX ADMIN — SODIUM CHLORIDE, PRESERVATIVE FREE 5 ML: 5 INJECTION INTRAVENOUS at 09:45

## 2020-01-01 RX ADMIN — ONDANSETRON HYDROCHLORIDE 8 MG: 8 TABLET, FILM COATED ORAL at 17:38

## 2020-01-01 RX ADMIN — PANTOPRAZOLE SODIUM 40 MG: 40 TABLET, DELAYED RELEASE ORAL at 07:54

## 2020-01-01 RX ADMIN — POTASSIUM CHLORIDE 40 MEQ: 750 TABLET, EXTENDED RELEASE ORAL at 08:55

## 2020-01-01 RX ADMIN — INSULIN ASPART 2 UNITS: 100 INJECTION, SOLUTION INTRAVENOUS; SUBCUTANEOUS at 07:54

## 2020-01-01 RX ADMIN — Medication 1 PATCH: at 18:31

## 2020-01-01 RX ADMIN — ACYCLOVIR 800 MG: 800 TABLET ORAL at 20:42

## 2020-01-01 RX ADMIN — SODIUM CHLORIDE, PRESERVATIVE FREE 5 ML: 5 INJECTION INTRAVENOUS at 08:08

## 2020-01-01 RX ADMIN — POTASSIUM CHLORIDE 20 MEQ: 29.8 INJECTION, SOLUTION INTRAVENOUS at 06:27

## 2020-01-01 RX ADMIN — NICOTINE 1 PATCH: 14 PATCH, EXTENDED RELEASE TRANSDERMAL at 18:03

## 2020-01-01 RX ADMIN — FLUCONAZOLE 200 MG: 200 TABLET ORAL at 08:45

## 2020-01-01 RX ADMIN — Medication 5 ML: at 11:28

## 2020-01-01 RX ADMIN — INSULIN ASPART 4 UNITS: 100 INJECTION, SOLUTION INTRAVENOUS; SUBCUTANEOUS at 12:44

## 2020-01-01 RX ADMIN — ETOPOSIDE 240 MG: 20 INJECTION, SOLUTION, CONCENTRATE INTRAVENOUS at 07:39

## 2020-01-01 RX ADMIN — ACYCLOVIR 800 MG: 800 TABLET ORAL at 08:02

## 2020-01-01 RX ADMIN — POTASSIUM CHLORIDE AND SODIUM CHLORIDE: 900; 150 INJECTION, SOLUTION INTRAVENOUS at 13:51

## 2020-01-01 RX ADMIN — Medication 5 ML: at 23:30

## 2020-01-01 RX ADMIN — METOPROLOL SUCCINATE 25 MG: 25 TABLET, EXTENDED RELEASE ORAL at 20:10

## 2020-01-01 RX ADMIN — INSULIN ASPART 1 UNITS: 100 INJECTION, SOLUTION INTRAVENOUS; SUBCUTANEOUS at 17:12

## 2020-01-01 RX ADMIN — Medication 5 ML: at 10:24

## 2020-01-01 RX ADMIN — FLUCONAZOLE 200 MG: 200 TABLET ORAL at 08:06

## 2020-01-01 RX ADMIN — FILGRASTIM-SNDZ 1200 MCG: 300 INJECTION, SOLUTION INTRAVENOUS; SUBCUTANEOUS at 08:25

## 2020-01-01 RX ADMIN — LOPERAMIDE HYDROCHLORIDE 2 MG: 2 CAPSULE ORAL at 10:27

## 2020-01-01 RX ADMIN — PALIFERMIN 6700 MCG: 6.25 INJECTION, POWDER, LYOPHILIZED, FOR SOLUTION INTRAVENOUS at 17:00

## 2020-01-01 RX ADMIN — INSULIN ASPART 7 UNITS: 100 INJECTION, SOLUTION INTRAVENOUS; SUBCUTANEOUS at 11:53

## 2020-01-01 RX ADMIN — METOPROLOL SUCCINATE 25 MG: 25 TABLET, EXTENDED RELEASE ORAL at 08:03

## 2020-01-01 RX ADMIN — LEVOFLOXACIN 250 MG: 250 TABLET, FILM COATED ORAL at 09:52

## 2020-01-01 RX ADMIN — ETOPOSIDE 240 MG: 20 INJECTION, SOLUTION, CONCENTRATE INTRAVENOUS at 07:51

## 2020-01-01 RX ADMIN — Medication 5 ML: at 11:49

## 2020-01-01 RX ADMIN — PROPOFOL 150 MCG/KG/MIN: 10 INJECTION, EMULSION INTRAVENOUS at 07:55

## 2020-01-01 RX ADMIN — METOPROLOL SUCCINATE 25 MG: 25 TABLET, EXTENDED RELEASE ORAL at 20:32

## 2020-01-01 RX ADMIN — LIDOCAINE HYDROCHLORIDE 5 ML: 10 INJECTION, SOLUTION EPIDURAL; INFILTRATION; INTRACAUDAL; PERINEURAL at 14:42

## 2020-01-01 RX ADMIN — LIDOCAINE HYDROCHLORIDE 70 MG: 20 INJECTION, SOLUTION INFILTRATION; PERINEURAL at 08:24

## 2020-01-01 RX ADMIN — INSULIN ASPART 20 UNITS: 100 INJECTION, SOLUTION INTRAVENOUS; SUBCUTANEOUS at 12:41

## 2020-01-01 RX ADMIN — INSULIN HUMAN 22 UNITS: 100 INJECTION, SUSPENSION SUBCUTANEOUS at 08:56

## 2020-01-01 RX ADMIN — ONDANSETRON HYDROCHLORIDE 8 MG: 8 TABLET, FILM COATED ORAL at 07:46

## 2020-01-01 RX ADMIN — ETOPOSIDE 240 MG: 20 INJECTION, SOLUTION, CONCENTRATE INTRAVENOUS at 19:20

## 2020-01-01 RX ADMIN — FLUCONAZOLE 200 MG: 200 TABLET ORAL at 07:39

## 2020-01-01 RX ADMIN — ACYCLOVIR 800 MG: 800 TABLET ORAL at 08:03

## 2020-01-01 RX ADMIN — FILGRASTIM-SNDZ 1200 MCG: 300 INJECTION, SOLUTION INTRAVENOUS; SUBCUTANEOUS at 08:11

## 2020-01-01 RX ADMIN — CEFEPIME 2 G: 2 INJECTION, POWDER, FOR SOLUTION INTRAVENOUS at 11:10

## 2020-01-01 RX ADMIN — FLUCONAZOLE 200 MG: 200 TABLET ORAL at 08:55

## 2020-01-01 RX ADMIN — HEPARIN SODIUM (PORCINE) LOCK FLUSH IV SOLN 100 UNIT/ML 5 ML: 100 SOLUTION at 08:45

## 2020-01-01 RX ADMIN — ONDANSETRON HYDROCHLORIDE 8 MG: 8 TABLET, FILM COATED ORAL at 17:00

## 2020-01-01 RX ADMIN — METOPROLOL SUCCINATE 25 MG: 25 TABLET, EXTENDED RELEASE ORAL at 07:46

## 2020-01-01 RX ADMIN — SODIUM CHLORIDE, PRESERVATIVE FREE 5 ML: 5 INJECTION INTRAVENOUS at 08:41

## 2020-01-01 RX ADMIN — ACYCLOVIR 800 MG: 800 TABLET ORAL at 20:06

## 2020-01-01 RX ADMIN — CEFEPIME 2 G: 2 INJECTION, POWDER, FOR SOLUTION INTRAVENOUS at 05:12

## 2020-01-01 RX ADMIN — CARMUSTINE 717 MG: KIT at 18:03

## 2020-01-01 RX ADMIN — Medication 5 ML: at 15:05

## 2020-01-01 RX ADMIN — ACYCLOVIR 800 MG: 800 TABLET ORAL at 07:39

## 2020-01-01 RX ADMIN — INSULIN ASPART 2 UNITS: 100 INJECTION, SOLUTION INTRAVENOUS; SUBCUTANEOUS at 16:51

## 2020-01-01 RX ADMIN — Medication 5 ML: at 09:52

## 2020-01-01 RX ADMIN — VANCOMYCIN HYDROCHLORIDE 1750 MG: 10 INJECTION, POWDER, LYOPHILIZED, FOR SOLUTION INTRAVENOUS at 20:33

## 2020-01-01 RX ADMIN — DEXTROSE AND SODIUM CHLORIDE: 5; 450 INJECTION, SOLUTION INTRAVENOUS at 13:08

## 2020-01-01 RX ADMIN — FLUCONAZOLE 200 MG: 200 TABLET ORAL at 07:54

## 2020-01-01 RX ADMIN — Medication 5 ML: at 22:22

## 2020-01-01 RX ADMIN — LORAZEPAM 1 MG: 2 INJECTION, SOLUTION INTRAMUSCULAR; INTRAVENOUS at 11:45

## 2020-01-01 RX ADMIN — ETOPOSIDE 240 MG: 20 INJECTION, SOLUTION, CONCENTRATE INTRAVENOUS at 07:52

## 2020-01-01 RX ADMIN — Medication 3 ML: at 14:20

## 2020-01-01 RX ADMIN — DIPHENHYDRAMINE HYDROCHLORIDE 25 MG: 25 CAPSULE ORAL at 11:45

## 2020-01-01 RX ADMIN — INSULIN HUMAN 20 UNITS: 100 INJECTION, SUSPENSION SUBCUTANEOUS at 20:08

## 2020-01-01 RX ADMIN — ACYCLOVIR 800 MG: 800 TABLET ORAL at 08:05

## 2020-01-01 RX ADMIN — DEXAMETHASONE 10 MG: 2 TABLET ORAL at 07:53

## 2020-01-01 RX ADMIN — ALLOPURINOL 300 MG: 300 TABLET ORAL at 07:57

## 2020-01-01 RX ADMIN — Medication 5 ML: at 09:33

## 2020-01-01 RX ADMIN — POTASSIUM CHLORIDE 20 MEQ: 29.8 INJECTION, SOLUTION INTRAVENOUS at 05:32

## 2020-01-01 RX ADMIN — DEXTROSE AND SODIUM CHLORIDE: 5; 450 INJECTION, SOLUTION INTRAVENOUS at 20:42

## 2020-01-01 RX ADMIN — MIDAZOLAM HYDROCHLORIDE 2 MG: 1 INJECTION, SOLUTION INTRAMUSCULAR; INTRAVENOUS at 10:21

## 2020-01-01 RX ADMIN — POTASSIUM CHLORIDE 20 MEQ: 750 TABLET, EXTENDED RELEASE ORAL at 15:39

## 2020-01-01 RX ADMIN — VANCOMYCIN HYDROCHLORIDE 1750 MG: 10 INJECTION, POWDER, LYOPHILIZED, FOR SOLUTION INTRAVENOUS at 08:06

## 2020-01-01 RX ADMIN — FILGRASTIM-SNDZ 480 MCG: 480 INJECTION, SOLUTION INTRAVENOUS; SUBCUTANEOUS at 08:25

## 2020-01-01 RX ADMIN — FILGRASTIM 480 MCG: 480 INJECTION, SOLUTION INTRAVENOUS; SUBCUTANEOUS at 20:36

## 2020-01-01 RX ADMIN — Medication 5 ML: at 03:53

## 2020-01-01 RX ADMIN — ACYCLOVIR 800 MG: 800 TABLET ORAL at 20:32

## 2020-01-01 RX ADMIN — METOPROLOL SUCCINATE 25 MG: 25 TABLET, EXTENDED RELEASE ORAL at 20:04

## 2020-01-01 RX ADMIN — POTASSIUM CHLORIDE 40 MEQ: 1500 TABLET, EXTENDED RELEASE ORAL at 11:30

## 2020-01-01 RX ADMIN — ETOPOSIDE 240 MG: 20 INJECTION, SOLUTION, CONCENTRATE INTRAVENOUS at 19:59

## 2020-01-01 RX ADMIN — INSULIN ASPART 3 UNITS: 100 INJECTION, SOLUTION INTRAVENOUS; SUBCUTANEOUS at 13:19

## 2020-01-01 RX ADMIN — Medication 3 ML: at 14:09

## 2020-01-01 RX ADMIN — METOPROLOL SUCCINATE 25 MG: 25 TABLET, EXTENDED RELEASE ORAL at 08:55

## 2020-01-01 RX ADMIN — DEXAMETHASONE 4 MG: 4 TABLET ORAL at 07:39

## 2020-01-01 RX ADMIN — ALLOPURINOL 300 MG: 300 TABLET ORAL at 08:02

## 2020-01-01 RX ADMIN — PANTOPRAZOLE SODIUM 40 MG: 40 TABLET, DELAYED RELEASE ORAL at 08:06

## 2020-01-01 RX ADMIN — Medication 1 PATCH: at 17:00

## 2020-01-01 RX ADMIN — ONDANSETRON HYDROCHLORIDE 8 MG: 8 TABLET, FILM COATED ORAL at 07:56

## 2020-01-01 RX ADMIN — SODIUM CHLORIDE, PRESERVATIVE FREE 5 ML: 5 INJECTION INTRAVENOUS at 08:42

## 2020-01-01 RX ADMIN — Medication 5 ML: at 10:12

## 2020-01-01 RX ADMIN — VANCOMYCIN HYDROCHLORIDE 2000 MG: 1 INJECTION, POWDER, LYOPHILIZED, FOR SOLUTION INTRAVENOUS at 11:35

## 2020-01-01 RX ADMIN — CALCIUM GLUCONATE 2250 MG/HR: 98 INJECTION, SOLUTION INTRAVENOUS at 08:43

## 2020-01-01 RX ADMIN — PANTOPRAZOLE SODIUM 40 MG: 40 TABLET, DELAYED RELEASE ORAL at 07:46

## 2020-01-01 RX ADMIN — INSULIN ASPART 1 UNITS: 100 INJECTION, SOLUTION INTRAVENOUS; SUBCUTANEOUS at 17:28

## 2020-01-01 RX ADMIN — ACETAMINOPHEN 650 MG: 325 TABLET, FILM COATED ORAL at 23:53

## 2020-01-01 RX ADMIN — Medication 5 ML: at 09:58

## 2020-01-01 RX ADMIN — INSULIN HUMAN 22 UNITS: 100 INJECTION, SUSPENSION SUBCUTANEOUS at 08:45

## 2020-01-01 RX ADMIN — DEXTROSE AND SODIUM CHLORIDE: 5; 450 INJECTION, SOLUTION INTRAVENOUS at 06:02

## 2020-01-01 RX ADMIN — ONDANSETRON HYDROCHLORIDE 8 MG: 8 TABLET, FILM COATED ORAL at 00:44

## 2020-01-01 RX ADMIN — Medication 5 ML: at 03:04

## 2020-01-01 RX ADMIN — Medication 5 ML: at 09:15

## 2020-01-01 RX ADMIN — ACYCLOVIR 800 MG: 800 TABLET ORAL at 19:54

## 2020-01-01 RX ADMIN — Medication 5 ML: at 11:20

## 2020-01-01 RX ADMIN — PANTOPRAZOLE SODIUM 40 MG: 40 TABLET, DELAYED RELEASE ORAL at 08:24

## 2020-01-01 RX ADMIN — METOPROLOL SUCCINATE 25 MG: 25 TABLET, EXTENDED RELEASE ORAL at 07:57

## 2020-01-01 RX ADMIN — CALCIUM GLUCONATE 2214 MG/HR: 94 INJECTION, SOLUTION INTRAVENOUS at 08:50

## 2020-01-01 RX ADMIN — POTASSIUM CHLORIDE 40 MEQ: 750 TABLET, EXTENDED RELEASE ORAL at 13:16

## 2020-01-01 RX ADMIN — INSULIN HUMAN 22 UNITS: 100 INJECTION, SUSPENSION SUBCUTANEOUS at 08:34

## 2020-01-01 RX ADMIN — ACYCLOVIR 800 MG: 800 TABLET ORAL at 19:50

## 2020-01-01 RX ADMIN — ONDANSETRON HYDROCHLORIDE 8 MG: 8 TABLET, FILM COATED ORAL at 00:16

## 2020-01-01 RX ADMIN — ACYCLOVIR 800 MG: 800 TABLET ORAL at 08:45

## 2020-01-01 RX ADMIN — METOPROLOL SUCCINATE 25 MG: 25 TABLET, EXTENDED RELEASE ORAL at 19:50

## 2020-01-01 RX ADMIN — INSULIN ASPART 4 UNITS: 100 INJECTION, SOLUTION INTRAVENOUS; SUBCUTANEOUS at 12:54

## 2020-01-01 RX ADMIN — POTASSIUM CHLORIDE 20 MEQ: 29.8 INJECTION, SOLUTION INTRAVENOUS at 05:16

## 2020-01-01 RX ADMIN — INSULIN ASPART 3 UNITS: 100 INJECTION, SOLUTION INTRAVENOUS; SUBCUTANEOUS at 17:33

## 2020-01-01 RX ADMIN — INSULIN ASPART 1 UNITS: 100 INJECTION, SOLUTION INTRAVENOUS; SUBCUTANEOUS at 08:56

## 2020-01-01 RX ADMIN — METOPROLOL SUCCINATE 25 MG: 25 TABLET, EXTENDED RELEASE ORAL at 20:42

## 2020-01-01 RX ADMIN — ONDANSETRON HYDROCHLORIDE 8 MG: 8 TABLET, FILM COATED ORAL at 08:04

## 2020-01-01 RX ADMIN — DEXAMETHASONE 10 MG: 2 TABLET ORAL at 17:28

## 2020-01-01 RX ADMIN — CYTARABINE 240 MG: 100 INJECTION, SOLUTION INTRATHECAL; INTRAVENOUS; SUBCUTANEOUS at 22:04

## 2020-01-01 RX ADMIN — VANCOMYCIN HYDROCHLORIDE 1750 MG: 10 INJECTION, POWDER, LYOPHILIZED, FOR SOLUTION INTRAVENOUS at 20:36

## 2020-01-01 RX ADMIN — INSULIN ASPART 4 UNITS: 100 INJECTION, SOLUTION INTRAVENOUS; SUBCUTANEOUS at 12:46

## 2020-01-01 RX ADMIN — SODIUM CHLORIDE, PRESERVATIVE FREE 5 ML: 5 INJECTION INTRAVENOUS at 09:44

## 2020-01-01 RX ADMIN — INSULIN HUMAN 20 UNITS: 100 INJECTION, SUSPENSION SUBCUTANEOUS at 20:12

## 2020-01-01 RX ADMIN — INSULIN ASPART 1 UNITS: 100 INJECTION, SOLUTION INTRAVENOUS; SUBCUTANEOUS at 16:27

## 2020-01-01 RX ADMIN — DEXTROSE MONOHYDRATE 10 ML: 50 INJECTION, SOLUTION INTRAVENOUS at 20:34

## 2020-01-01 RX ADMIN — CALCIUM GLUCONATE 2250 MG/HR: 94 INJECTION, SOLUTION INTRAVENOUS at 08:30

## 2020-01-01 RX ADMIN — Medication 5 ML: at 20:41

## 2020-01-01 RX ADMIN — ETOPOSIDE 240 MG: 20 INJECTION, SOLUTION, CONCENTRATE INTRAVENOUS at 19:47

## 2020-01-01 RX ADMIN — Medication 5 ML: at 11:50

## 2020-01-01 RX ADMIN — Medication 5 ML: at 19:06

## 2020-01-01 RX ADMIN — ACETAMINOPHEN 650 MG: 325 TABLET, FILM COATED ORAL at 17:22

## 2020-01-01 RX ADMIN — CEFEPIME 2 G: 2 INJECTION, POWDER, FOR SOLUTION INTRAVENOUS at 04:11

## 2020-01-01 RX ADMIN — Medication 5 ML: at 09:38

## 2020-01-01 RX ADMIN — CEFEPIME 2 G: 2 INJECTION, POWDER, FOR SOLUTION INTRAVENOUS at 20:30

## 2020-01-01 RX ADMIN — DEXTROSE MONOHYDRATE 10 ML: 50 INJECTION, SOLUTION INTRAVENOUS at 20:10

## 2020-01-01 RX ADMIN — FUROSEMIDE 20 MG: 10 INJECTION, SOLUTION INTRAVENOUS at 11:09

## 2020-01-01 RX ADMIN — INSULIN ASPART 1 UNITS: 100 INJECTION, SOLUTION INTRAVENOUS; SUBCUTANEOUS at 12:41

## 2020-01-01 RX ADMIN — Medication 5 ML: at 04:10

## 2020-01-01 RX ADMIN — SODIUM CHLORIDE 1000 ML: 9 INJECTION, SOLUTION INTRAVENOUS at 11:35

## 2020-01-01 RX ADMIN — FILGRASTIM 480 MCG: 480 INJECTION, SOLUTION INTRAVENOUS; SUBCUTANEOUS at 20:10

## 2020-01-01 RX ADMIN — FILGRASTIM-SNDZ 1200 MCG: 300 INJECTION, SOLUTION INTRAVENOUS; SUBCUTANEOUS at 08:35

## 2020-01-01 RX ADMIN — INSULIN ASPART 5 UNITS: 100 INJECTION, SOLUTION INTRAVENOUS; SUBCUTANEOUS at 17:37

## 2020-01-01 RX ADMIN — MAGNESIUM SULFATE IN WATER 2 G: 40 INJECTION, SOLUTION INTRAVENOUS at 06:22

## 2020-01-01 RX ADMIN — INSULIN ASPART 1 UNITS: 100 INJECTION, SOLUTION INTRAVENOUS; SUBCUTANEOUS at 09:45

## 2020-01-01 RX ADMIN — DEXTROSE MONOHYDRATE 10 ML: 50 INJECTION, SOLUTION INTRAVENOUS at 20:12

## 2020-01-01 RX ADMIN — LOPERAMIDE HYDROCHLORIDE 2 MG: 2 CAPSULE ORAL at 13:18

## 2020-01-01 RX ADMIN — ONDANSETRON HYDROCHLORIDE 8 MG: 8 TABLET, FILM COATED ORAL at 15:34

## 2020-01-01 RX ADMIN — CEFEPIME 2 G: 2 INJECTION, POWDER, FOR SOLUTION INTRAVENOUS at 20:10

## 2020-01-01 RX ADMIN — METOPROLOL SUCCINATE 25 MG: 25 TABLET, EXTENDED RELEASE ORAL at 19:47

## 2020-01-01 RX ADMIN — DEXTROSE MONOHYDRATE 10 ML: 50 INJECTION, SOLUTION INTRAVENOUS at 20:28

## 2020-01-01 ASSESSMENT — PAIN SCALES - GENERAL
PAINLEVEL: NO PAIN (0)
PAINLEVEL: MODERATE PAIN (4)
PAINLEVEL: NO PAIN (0)

## 2020-01-01 ASSESSMENT — ACTIVITIES OF DAILY LIVING (ADL)
ADLS_ACUITY_SCORE: 15
IADL_COMMENTS: IND AT BASELINE
ADLS_ACUITY_SCORE: 15
PREVIOUS_RESPONSIBILITIES: MEAL PREP;HOUSEKEEPING;LAUNDRY;SHOPPING;MEDICATION MANAGEMENT;FINANCES;DRIVING;WORK;CHILD CARE
ADLS_ACUITY_SCORE: 15

## 2020-01-01 ASSESSMENT — LIFESTYLE VARIABLES: TOBACCO_USE: 1

## 2020-01-01 ASSESSMENT — MIFFLIN-ST. JEOR
SCORE: 1960.4
SCORE: 1945.29
SCORE: 2011.41
SCORE: 2025
SCORE: 2002.9
SCORE: 1974.78
SCORE: 1969.93
SCORE: 2034.8
SCORE: 1995.64
SCORE: 1977.39
SCORE: 1991
SCORE: 2009.7
SCORE: 1980.11
SCORE: 1992.36
SCORE: 2014.13
SCORE: 1985.21
SCORE: 2001.54
SCORE: 2019.68
SCORE: 1993.75
SCORE: 1961.17
SCORE: 2012.77

## 2020-07-08 ENCOUNTER — TRANSCRIBE ORDERS (OUTPATIENT)
Dept: OTHER | Age: 55
End: 2020-07-08

## 2020-07-08 DIAGNOSIS — C83.10 MANTLE CELL LYMPHOMA, UNSPECIFIED BODY REGION (H): Primary | ICD-10-CM

## 2020-08-09 NOTE — PROGRESS NOTES
BMT Consult Note   08/10/2020    Patient ID:  Theo Roblero is a 55 year old male with high risk (IPI 6.5 and Ki67 of 20%), stage IV Mantle cell lymphoma in CR1 s/p 8 cycles of alternating 6 cycles of Maxi-R- CHOP alternating with high-dose LITZY-C. Last cycle was given on 6/11/2020. His therapy was complicated by a kidney stone and sepsis in 5/2020, prior to cycle 5. Upon diagnosis, a CT was performed to evaluate pain concerning for an aortic dissection and showed LAD above and below the diaphragm, with large portacaval nodes measure in excess of 5cm. No thoracoabdominal aortic dissection or aneurysm was found.The findings of LAD were confirmed by a PET/CT. Lymph node biopsy on 2/7/2020 showed MCL, as described as CD20 positive B cells with positive staining for CD5 and cyclin D1.  The Ki-67 proliferation index was 20%. A bone marrow biopsy on 2/18/2020 was positive for intramedullary MCL (<5%). Early in his disease course, he reportedly developed a sepsis syndrome with leukocytosis (17.8) a creatinine 2.1 and was admitted on 2/20/2020. Kidney function has since improved.    He went on to receive Maxi-R- CHOP alternating with high-dose LITZY-C from 2-6/2020. Follow up imaging post therapy on 7/6/2020 showed a CR by PET CT, with resolution of avid lymph nodes. A persistent pleural effusion was observed and not PET avid.       He present today for consideration of ASCT consolidation.    PMHx:  1. MCL  2. DM  3. HTN  4. Kidney stone    SHx: Currently chews tobacco, does not smoke.  Occasional ETOH.  No recreational drug use.    FMHx:  Mother, lymphoma and non-melanoma skin cancer.    Review of Systems: 10 point ROS negative except as noted above.  # Pain Assessment:   Theo s pain level was assessed and he currently denies pain.      Scheduled Medications    Current Outpatient Medications   Medication     acetaminophen (TYLENOL) 500 MG tablet     glimepiride (AMARYL) 4 MG tablet     metFORMIN (GLUCOPHAGE) 500 MG  "tablet     metoprolol succinate ER (TOPROL-XL) 25 MG 24 hr tablet     No current facility-administered medications for this visit.        PHYSICAL EXAM     Weight In/Out     Wt Readings from Last 3 Encounters:   08/10/20 108.4 kg (238 lb 14.4 oz)      [unfilled]       KPS:  90    BP (!) 145/92 (BP Location: Right arm, Patient Position: Sitting, Cuff Size: Adult Regular)   Pulse 79   Temp 98  F (36.7  C) (Oral)   Resp 16   Ht 1.812 m (5' 11.34\")   Wt 108.4 kg (238 lb 14.4 oz)   SpO2 98%   BMI 33.00 kg/m         General: NAD   Eyes: : MARYAN, sclera anicteric   Nose/Mouth/Throat: OP clear, buccal mucosa moist, no ulcerations   Lungs: CTA bilaterally  Cardiovascular: RRR, no M/R/G   Abdominal/Rectal: +BS, soft, NT, ND, No HSM   Lymphatics: no edema  Skin: no rashes or petechaie  Neuro: A&O     LABS AND IMAGING - PAST 24 HOURS   Labs and imaging reviewed in Care Everywhere    ASSESSMENT BY SYSTEMS   Theo CARCAMO Osbaldo is a 55 year old male with high risk, stage IV Mantle cell lymphoma in CR1. He received LITZY-C as part of his initial therapy. We recommend an autologous ASCT in CR1 followed by maintenance rituximab for up to 3 years post transplant, but at least 1 year (N Engl J Med 2017; 377:0669-5041). Given that he is in a CR, there is not indication for CAR T therapy at this time, but could be considered in the case of relapse in the future. All questions were answered in full. We will arrange for stem cell mobilization and collection, followed by ASCT in the near future. The [facility] BMT office will coordinate transplant work up and scheduling.      Sam Zavala"

## 2020-08-10 ENCOUNTER — MEDICAL CORRESPONDENCE (OUTPATIENT)
Dept: TRANSPLANT | Facility: CLINIC | Age: 55
End: 2020-08-10

## 2020-08-10 ENCOUNTER — VIRTUAL VISIT (OUTPATIENT)
Dept: TRANSPLANT | Facility: CLINIC | Age: 55
End: 2020-08-10
Attending: INTERNAL MEDICINE
Payer: COMMERCIAL

## 2020-08-10 VITALS
OXYGEN SATURATION: 98 % | DIASTOLIC BLOOD PRESSURE: 92 MMHG | HEIGHT: 71 IN | SYSTOLIC BLOOD PRESSURE: 145 MMHG | RESPIRATION RATE: 16 BRPM | WEIGHT: 238.9 LBS | TEMPERATURE: 98 F | HEART RATE: 79 BPM | BODY MASS INDEX: 33.44 KG/M2

## 2020-08-10 DIAGNOSIS — Z71.9 ENCOUNTER FOR COUNSELING: Primary | ICD-10-CM

## 2020-08-10 DIAGNOSIS — C83.18 MANTLE CELL LYMPHOMA OF LYMPH NODES OF MULTIPLE SITES (H): ICD-10-CM

## 2020-08-10 DIAGNOSIS — C83.18 MANTLE CELL LYMPHOMA OF LYMPH NODES OF MULTIPLE SITES (H): Primary | ICD-10-CM

## 2020-08-10 PROCEDURE — G0463 HOSPITAL OUTPT CLINIC VISIT: HCPCS | Mod: ZF

## 2020-08-10 RX ORDER — GLIMEPIRIDE 4 MG/1
4 TABLET ORAL
Status: ON HOLD | COMMUNITY
Start: 2020-06-26 | End: 2020-01-01

## 2020-08-10 RX ORDER — METOPROLOL SUCCINATE 25 MG/1
TABLET, EXTENDED RELEASE ORAL
Status: ON HOLD | COMMUNITY
Start: 2020-08-01 | End: 2020-01-01

## 2020-08-10 RX ORDER — ACETAMINOPHEN 500 MG
500-1000 TABLET ORAL
Status: ON HOLD | COMMUNITY
End: 2020-01-01

## 2020-08-10 ASSESSMENT — MIFFLIN-ST. JEOR: SCORE: 1941.15

## 2020-08-10 ASSESSMENT — PAIN SCALES - GENERAL: PAINLEVEL: NO PAIN (0)

## 2020-08-10 NOTE — PROGRESS NOTES
I met with pt in the clinic today after his consult with Dr Zavala. His fiance was present as well. I reviewed the role of the nurse coordinator and gave him contact information to the bmt office . He knows to call if any further questions come up.

## 2020-08-10 NOTE — LETTER
8/10/2020         RE: Theo Roblero  [address]  [address]  [place] [address]        Dear Colleague,    Thank you for referring your patient, Theo Roblero, to the [facility] BLOOD AND MARROW TRANSPLANT. Please see a copy of my visit note below.    BMT Consult Note   08/10/2020    Patient ID:  Theo Roblero is a 55 year old male with high risk (IPI 6.5 and Ki67 of 20%), stage IV Mantle cell lymphoma in CR1 s/p 8 cycles of alternating 6 cycles of Maxi-R- CHOP alternating with high-dose LITZY-C. Last cycle was given on 6/11/2020. His therapy was complicated by a kidney stone and sepsis in 5/2020, prior to cycle 5. Upon diagnosis, a CT was performed to evaluate pain concerning for an aortic dissection and showed LAD above and below the diaphragm, with large portacaval nodes measure in excess of 5cm. No thoracoabdominal aortic dissection or aneurysm was found.The findings of LAD were confirmed by a PET/CT. Lymph node biopsy on 2/7/2020 showed MCL, as described as CD20 positive B cells with positive staining for CD5 and cyclin D1.  The Ki-67 proliferation index was 20%. A bone marrow biopsy on 2/18/2020 was positive for intramedullary MCL (<5%). Early in his disease course, he reportedly developed a sepsis syndrome with leukocytosis (17.8) a creatinine 2.1 and was admitted on 2/20/2020. Kidney function has since improved.    He went on to receive Maxi-R- CHOP alternating with high-dose LITZY-C from 2-6/2020. Follow up imaging post therapy on 7/6/2020 showed a CR by PET CT, with resolution of avid lymph nodes. A persistent pleural effusion was observed and not PET avid.       He present today for consideration of ASCT consolidation.    PMHx:  1. MCL  2. DM  3. HTN  4. Kidney stone    SHx: Currently chews tobacco, does not smoke.  Occasional ETOH.  No recreational drug use.    FMHx:  Mother, lymphoma and non-melanoma skin cancer.    Review of Systems: 10 point ROS negative except as noted above.  # Pain  "Assessment:   Theo s pain level was assessed and he currently denies pain.      Scheduled Medications    Current Outpatient Medications   Medication     acetaminophen (TYLENOL) 500 MG tablet     glimepiride (AMARYL) 4 MG tablet     metFORMIN (GLUCOPHAGE) 500 MG tablet     metoprolol succinate ER (TOPROL-XL) 25 MG 24 hr tablet     No current facility-administered medications for this visit.        PHYSICAL EXAM     Weight In/Out     Wt Readings from Last 3 Encounters:   08/10/20 108.4 kg (238 lb 14.4 oz)      [unfilled]       KPS:  90    BP (!) 145/92 (BP Location: Right arm, Patient Position: Sitting, Cuff Size: Adult Regular)   Pulse 79   Temp 98  F (36.7  C) (Oral)   Resp 16   Ht 1.812 m (5' 11.34\")   Wt 108.4 kg (238 lb 14.4 oz)   SpO2 98%   BMI 33.00 kg/m         General: NAD   Eyes: : MARYAN, sclera anicteric   Nose/Mouth/Throat: OP clear, buccal mucosa moist, no ulcerations   Lungs: CTA bilaterally  Cardiovascular: RRR, no M/R/G   Abdominal/Rectal: +BS, soft, NT, ND, No HSM   Lymphatics: no edema  Skin: no rashes or petechaie  Neuro: A&O     LABS AND IMAGING - PAST 24 HOURS   Labs and imaging reviewed in Care Everywhere    ASSESSMENT BY SYSTEMS   Theo Roblero is a 55 year old male with high risk, stage IV Mantle cell lymphoma in CR1. He received LITZY-C as part of his initial therapy. We recommend an autologous ASCT in CR1 followed by maintenance rituximab for up to 3 years post transplant, but at least 1 year (N Engl J Med 2017; 377:3817-6616). Given that he is in a CR, there is not indication for CAR T therapy at this time, but could be considered in the case of relapse in the future. All questions were answered in full. We will arrange for stem cell mobilization and collection, followed by ASCT in the near future. The [facility] BMT office will coordinate transplant work up and scheduling.      Sam Zavala      Oncology Rooming Note    August 10, 2020 7:51 AM   Theo Roblero is a 55 year old " "male who presents for:    Chief Complaint   Patient presents with     Oncology Clinic Visit     Mantle Cell Lymphoma     Initial Vitals: BP (!) 145/92 (BP Location: Right arm, Patient Position: Sitting, Cuff Size: Adult Regular)   Pulse 79   Temp 98  F (36.7  C) (Oral)   Resp 16   Ht 1.812 m (5' 11.34\")   Wt 108.4 kg (238 lb 14.4 oz)   SpO2 98%   BMI 33.00 kg/m   Estimated body mass index is 33 kg/m  as calculated from the following:    Height as of this encounter: 1.812 m (5' 11.34\").    Weight as of this encounter: 108.4 kg (238 lb 14.4 oz). Body surface area is 2.34 meters squared.  No Pain (0) Comment: Data Unavailable   No LMP for male patient.  Allergies reviewed: Yes  Medications reviewed: Yes    Medications: Medication refills not needed today.  Pharmacy name entered into [employer]: [id number] [place], [place] - [address] AT [facility] [address]    Clinical concerns: No new concerns.        Shakira Berman CMA                  Again, thank you for allowing me to participate in the care of your patient.        Sincerely,        Sam Zavala MD    "

## 2020-08-10 NOTE — PROGRESS NOTES
"Oncology Rooming Note    August 10, 2020 7:51 AM   Theo Roblero is a 55 year old male who presents for:    Chief Complaint   Patient presents with     Oncology Clinic Visit     Mantle Cell Lymphoma     Initial Vitals: BP (!) 145/92 (BP Location: Right arm, Patient Position: Sitting, Cuff Size: Adult Regular)   Pulse 79   Temp 98  F (36.7  C) (Oral)   Resp 16   Ht 1.812 m (5' 11.34\")   Wt 108.4 kg (238 lb 14.4 oz)   SpO2 98%   BMI 33.00 kg/m   Estimated body mass index is 33 kg/m  as calculated from the following:    Height as of this encounter: 1.812 m (5' 11.34\").    Weight as of this encounter: 108.4 kg (238 lb 14.4 oz). Body surface area is 2.34 meters squared.  No Pain (0) Comment: Data Unavailable   No LMP for male patient.  Allergies reviewed: Yes  Medications reviewed: Yes    Medications: Medication refills not needed today.  Pharmacy name entered into [employer]: [facility] #[id number] - [place], [place] - [address] AT [facility] [address]    Clinical concerns: No new concerns.        Shakira Berman CMA                "

## 2020-08-10 NOTE — LETTER
8/10/2020         RE: Theo Roblero  [address] [place] [address]        Dear Colleague,    Thank you for referring your patient, Theo Roblero, to the [facility] BLOOD AND MARROW TRANSPLANT. Please see a copy of my visit note below.    I met with pt in the clinic today after his consult with Dr Zavala. His fiance was present as well. I reviewed the role of the nurse coordinator and gave him contact information to the bmt office . He knows to call if any further questions come up.     Again, thank you for allowing me to participate in the care of your patient.        Sincerely,        BMT Nurse Coordinator

## 2020-08-10 NOTE — LETTER
8/10/2020         RE: Theo Roblero  [address]  [address]  [place] [address]        Dear Colleague,    Thank you for referring your patient, Theo Roblero, to the [facility] BLOOD AND MARROW TRANSPLANT. Please see a copy of my visit note below.    Blood and Marrow Transplant   New Transplant Visit with   Clinical     Assessment completed on 8/10/2020 via phone as part of the COVID 19 protocol. Assessment of living situation, support system, financial status, functional status, coping, stressors, need for resources and social work intervention provided as needed. Information for this assessment was provided by pt's report in addition to medical chart review and consultation with medical team.     Present:  Patient: Theo Roblero  : DIMITRI Dobbins, [facility]    Medical Team   Nurse Coordinator: Marissa Whaley RN  BMT Physician: Sam Zavala MD    Presenting Information:  Pt is a 55 year old male diagnosed with Mantle Cell Lymphoma. Pt was diagnosed on 2/7/2020. Pt presents for autologous stem cell transplant discussion.    Contact Information:  Cell Phone: [phone]    Special Needs:   No needs identified at this time.     Relocation Requirement:    Pt lives in [place], [place] (28 mins[place]25[place]6 [place] [facility] which is within the required distance of the hospital. Pt does not need to relocate.     Living Situation:   Pt lives at home with his daughter (16 yrs old).     Relationship Status: Pt is .    Family Information:   Parents: Mother (Lives in [place], [place])  Siblings: 1 Sister and 1 Brother  Children: Son (26 - Lives in [place], [place]) and Daughter (16 - Lives with pt)    Education/Employment:  Currently employed: Yes; however, pt is currently on furlough.  Employer: [employer]    Insurance:   [employer]. No insurance concerns identified at this time. SW provided information regarding the insurance authorization process and the role of the BMT  Financial . ERICH provided contact info for the BMT Financial  and referred pt to them for future insurance questions.     Finances:   Unemployment due to being on furlough. No financial concerns identified at this time. However, pt said he is currently paying $211/month to keep his health insurance. ERICH mailed pt Lymphoma Leukemia Society Co-Pay assistance brochure and encouraged pt to apply as kana is open for Mantle Cell Lymphoma right now and pt could be reimbursed for his insurance premiums. ERICH discussed other kana options and asked pt to let ERICH know if they would like to apply in the future.     Caregiver:   ERICH discussed with the pt the caregiver role and expectation at length. Pt is agreeable to having a full time caregiver for the minimum of 30 days until cleared by the BMT Physician. Pt's identified caregivers will be between his family and his ex-wife, per pt. Caregiver education and information provided. No caregiver concerns identified.     Healthcare Directive:  No healthcare directive on file. Healthcare directive blank form mailed to pt.    Resources Provided:  -BMT Information Book  -BMT Resources Packet  -Healthcare Directive  -Honoring Choices - Your Rights: Making Your Own Health Care Treatment Decisions  -Caregiver Contract/Description  -Transplant Unit Description and Information   -S Co-Pay Assistance Kana Brochure    Identified Concerns:  No concerns identified at this time.     Summary:  Pt presents to [facility] regarding an autologous stem cell transplant. Pt asked good/appropriate questions regarding psychosocial factors related to BMT; all questions were addressed. Pt presented as pleasant and engaged. Pt does not need to relocate. No caregiver concerns.    Plan:   ERICH provided contact information and encouraged pt to contact ERICH with any additional questions, concerns, resources and/or for support. ERICH will continue to follow pt to  provide support and guidance with resources as needed.     DIMITRI Dobbins, [facility]  Phone: [phone]  Pager: [phone]        Again, thank you for allowing me to participate in the care of your patient.        Sincerely,        DIMITRI Dobbins

## 2020-08-13 NOTE — PROGRESS NOTES
Blood and Marrow Transplant   New Transplant Visit with   Clinical     Assessment completed on 8/10/2020 via phone as part of the COVID 19 protocol. Assessment of living situation, support system, financial status, functional status, coping, stressors, need for resources and social work intervention provided as needed. Information for this assessment was provided by pt's report in addition to medical chart review and consultation with medical team.     Present:  Patient: Theo Roblero  : DIMITRI Dobbins, [facility]    Medical Team   Nurse Coordinator: Marissa Whaley RN  BMT Physician: Sam Zavala MD    Presenting Information:  Pt is a 55 year old male diagnosed with Mantle Cell Lymphoma. Pt was diagnosed on 2/7/2020. Pt presents for autologous stem cell transplant discussion.    Contact Information:  Cell Phone: [phone]    Special Needs:   No needs identified at this time.     Relocation Requirement:    Pt lives in [place], [place] (28 mins/25[place]6 miles [place] from [facility] which is within the required distance of the hospital. Pt does not need to relocate.     Living Situation:   Pt lives at home with his daughter (16 yrs old).     Relationship Status: Pt is .    Family Information:   Parents: Mother (Lives in [place], [place])  Siblings: 1 Sister and 1 Brother  Children: Son (26 - Lives in [place], [place]) and Daughter (16 - Lives with pt)    Education/Employment:  Currently employed: Yes; however, pt is currently on furlough.  Employer: [employer]    Insurance:   [employer]. No insurance concerns identified at this time. SW provided information regarding the insurance authorization process and the role of the BMT Financial . ERICH provided contact info for the BMT Financial  and referred pt to them for future insurance questions.     Finances:   Unemployment due to being on furlough. No financial concerns identified at this time. However, pt said he is  currently paying $211/month to keep his health insurance. ERICH mailed pt Lymphoma Leukemia Society Co-Pay assistance brochure and encouraged pt to apply as kana is open for Mantle Cell Lymphoma right now and pt could be reimbursed for his insurance premiums. SW discussed other kana options and asked pt to let SW know if they would like to apply in the future.     Caregiver:   ERICH discussed with the pt the caregiver role and expectation at length. Pt is agreeable to having a full time caregiver for the minimum of 30 days until cleared by the BMT Physician. Pt's identified caregivers will be between his family and his ex-wife, per pt. Caregiver education and information provided. No caregiver concerns identified.     Healthcare Directive:  No healthcare directive on file. Healthcare directive blank form mailed to pt.    Resources Provided:  -BMT Information Book  -BMT Resources Packet  -Healthcare Directive  -Honoring Choices - Your Rights: Making Your Own Health Care Treatment Decisions  -Caregiver Contract/Description  -Transplant Unit Description and Information   -LLS Co-Pay Assistance Kana Brochure    Identified Concerns:  No concerns identified at this time.     Summary:  Pt presents to [facility] regarding an autologous stem cell transplant. Pt asked good/appropriate questions regarding psychosocial factors related to BMT; all questions were addressed. Pt presented as pleasant and engaged. Pt does not need to relocate. No caregiver concerns.    Plan:   ERICH provided contact information and encouraged pt to contact ERICH with any additional questions, concerns, resources and/or for support. ERICH will continue to follow pt to provide support and guidance with resources as needed.     DIMITRI Dobbins, [facility]  Phone: [phone]  Pager: [phone]

## 2020-08-17 ENCOUNTER — DOCUMENTATION ONLY (OUTPATIENT)
Dept: TRANSPLANT | Facility: CLINIC | Age: 55
End: 2020-08-17

## 2020-08-17 NOTE — PROGRESS NOTES
Theo Roblero's medical conditions were reviewed at the BMT Protocol Review Committee meeting on August 17, 2020    Considerations from the Committee included the following:  MCL in CR1 MAXI R-CHOP alt HiDAC    BMT Primary Protocol: Auto NHL with maintenance rituximab    BMT Ancillary Protocols:     N/A    Patient Active Problem List   Diagnosis     Mantle cell lymphoma of lymph nodes of multiple sites (H)       Patient Care Team       Relationship Specialty Notifications Start End    Mike Price PCP - General [facility]  7/8/20     Phone: [phone] Fax: [phone]         [address] [address]    Martin Mae MD Referring Physician Hematology & Oncology  7/8/20     Phone: [phone] Fax: [phone]         [address] [address]

## 2020-08-18 DIAGNOSIS — Z86.2 PERSONAL HISTORY OF DISEASES OF BLOOD AND BLOOD-FORMING ORGANS: ICD-10-CM

## 2020-08-18 DIAGNOSIS — C83.18 MANTLE CELL LYMPHOMA OF LYMPH NODES OF MULTIPLE REGIONS (H): ICD-10-CM

## 2020-08-24 DIAGNOSIS — C83.18 MANTLE CELL LYMPHOMA OF LYMPH NODES OF MULTIPLE REGIONS (H): Primary | ICD-10-CM

## 2020-08-24 NOTE — TELEPHONE ENCOUNTER
DIAGNOSIS: AUTO DOUBLE LUMEN LARGE BORE TUNNELED PHERESIS CATH TELEPHONE VISIT   DATE RECEIVED: 8.31.20     NOTES STATUS DETAILS   OFFICE NOTE from referring provider Internal 8.18.20, 8.10.20  Dr. Sam Zavala  [facility] BMT   OFFICE NOTE from other specialist CE 7.7.20, 6.25.20, 6.22.20 + more with  Dr. Martin Mae  [facility] Oncology   DISCHARGE SUMMARY from [facility] CE 6.12-6.14.20  Dr. Martin Mae  [facility]   DISCHARGE REPORT from the ER N/A    OPERATIVE REPORT N/A    MEDICATION LIST Internal    XRAYS (IMAGES & REPORTS) Pacs 8.27.20  PET    7.6.20, 4.22.20, 2.17.20  PET- requested    5.14.20, 2.20.20  CT Abd/Pelvis- requested    2.6.20  CTA Chest/Abd/Pelvis-requested   PATHOLOGY  Slides & report N/A      Action 8.24.20 JM   Action [phone].

## 2020-08-25 DIAGNOSIS — C83.18 MANTLE CELL LYMPHOMA OF LYMPH NODES OF MULTIPLE REGIONS (H): Primary | ICD-10-CM

## 2020-08-26 ENCOUNTER — OFFICE VISIT (OUTPATIENT)
Dept: TRANSPLANT | Facility: CLINIC | Age: 55
End: 2020-08-26
Attending: INTERNAL MEDICINE
Payer: COMMERCIAL

## 2020-08-26 ENCOUNTER — MEDICAL CORRESPONDENCE (OUTPATIENT)
Dept: TRANSPLANT | Facility: CLINIC | Age: 55
End: 2020-08-26

## 2020-08-26 ENCOUNTER — APPOINTMENT (OUTPATIENT)
Dept: EDUCATION SERVICES | Facility: CLINIC | Age: 55
End: 2020-08-26
Attending: INTERNAL MEDICINE
Payer: COMMERCIAL

## 2020-08-26 ENCOUNTER — ONCOLOGY VISIT (OUTPATIENT)
Dept: TRANSPLANT | Facility: CLINIC | Age: 55
End: 2020-08-26
Attending: NURSE PRACTITIONER
Payer: COMMERCIAL

## 2020-08-26 ENCOUNTER — ANCILLARY PROCEDURE (OUTPATIENT)
Dept: GENERAL RADIOLOGY | Facility: CLINIC | Age: 55
End: 2020-08-26
Attending: INTERNAL MEDICINE
Payer: COMMERCIAL

## 2020-08-26 ENCOUNTER — HOSPITAL ENCOUNTER (OUTPATIENT)
Dept: LAB | Facility: CLINIC | Age: 55
End: 2020-08-26
Attending: INTERNAL MEDICINE
Payer: COMMERCIAL

## 2020-08-26 VITALS
TEMPERATURE: 97.9 F | HEIGHT: 71 IN | RESPIRATION RATE: 18 BRPM | DIASTOLIC BLOOD PRESSURE: 85 MMHG | HEART RATE: 75 BPM | WEIGHT: 236.99 LBS | SYSTOLIC BLOOD PRESSURE: 152 MMHG | BODY MASS INDEX: 33.18 KG/M2

## 2020-08-26 VITALS
TEMPERATURE: 97.9 F | RESPIRATION RATE: 18 BRPM | OXYGEN SATURATION: 99 % | WEIGHT: 237 LBS | DIASTOLIC BLOOD PRESSURE: 85 MMHG | BODY MASS INDEX: 33.18 KG/M2 | HEIGHT: 71 IN | SYSTOLIC BLOOD PRESSURE: 152 MMHG | HEART RATE: 75 BPM

## 2020-08-26 DIAGNOSIS — C83.18 LYMPHOMA, MANTLE CELL, MULTIPLE SITES (H): Primary | ICD-10-CM

## 2020-08-26 DIAGNOSIS — Z86.2 PERSONAL HISTORY OF DISEASES OF BLOOD AND BLOOD-FORMING ORGANS: ICD-10-CM

## 2020-08-26 DIAGNOSIS — C83.18 MANTLE CELL LYMPHOMA OF LYMPH NODES OF MULTIPLE REGIONS (H): ICD-10-CM

## 2020-08-26 LAB
ABO + RH BLD: NORMAL
ABO + RH BLD: NORMAL
ALBUMIN SERPL-MCNC: 3.5 G/DL (ref 3.4–5)
ALBUMIN UR-MCNC: NEGATIVE MG/DL
ALP SERPL-CCNC: 81 U/L (ref 40–150)
ALT SERPL W P-5'-P-CCNC: 23 U/L (ref 0–70)
ANION GAP SERPL CALCULATED.3IONS-SCNC: 10 MMOL/L (ref 3–14)
APPEARANCE UR: CLEAR
APTT PPP: 39 SEC (ref 22–37)
AST SERPL W P-5'-P-CCNC: 18 U/L (ref 0–45)
B2 MICROGLOB SERPL-MCNC: 2.3 MG/L
BASOPHILS # BLD AUTO: 0 10E9/L (ref 0–0.2)
BASOPHILS NFR BLD AUTO: 0.6 %
BILIRUB SERPL-MCNC: 0.2 MG/DL (ref 0.2–1.3)
BILIRUB UR QL STRIP: NEGATIVE
BLD GP AB SCN SERPL QL: NORMAL
BLOOD BANK CMNT PATIENT-IMP: NORMAL
BUN SERPL-MCNC: 15 MG/DL (ref 7–30)
CALCIUM SERPL-MCNC: 8.7 MG/DL (ref 8.5–10.1)
CHLORIDE SERPL-SCNC: 108 MMOL/L (ref 94–109)
CO2 SERPL-SCNC: 20 MMOL/L (ref 20–32)
COLOR UR AUTO: YELLOW
CREAT SERPL-MCNC: 0.87 MG/DL (ref 0.66–1.25)
DIFFERENTIAL METHOD BLD: ABNORMAL
EBV VCA IGG SER QL IA: >8 AI (ref 0–0.8)
EOSINOPHIL # BLD AUTO: 0.2 10E9/L (ref 0–0.7)
EOSINOPHIL NFR BLD AUTO: 3.1 %
ERYTHROCYTE [DISTWIDTH] IN BLOOD BY AUTOMATED COUNT: 12.7 % (ref 10–15)
GFR SERPL CREATININE-BSD FRML MDRD: >90 ML/MIN/{1.73_M2}
GLUCOSE SERPL-MCNC: 146 MG/DL (ref 70–99)
GLUCOSE UR STRIP-MCNC: 50 MG/DL
HCT VFR BLD AUTO: 38.2 % (ref 40–53)
HGB BLD-MCNC: 12.4 G/DL (ref 13.3–17.7)
HGB UR QL STRIP: NEGATIVE
HSV1 IGG SERPL QL IA: 0.2 AI (ref 0–0.8)
HSV2 IGG SERPL QL IA: <0.2 AI (ref 0–0.8)
IGA SERPL-MCNC: 109 MG/DL (ref 84–499)
IGG SERPL-MCNC: 907 MG/DL (ref 610–1616)
IGM SERPL-MCNC: 42 MG/DL (ref 35–242)
IMM GRANULOCYTES # BLD: 0 10E9/L (ref 0–0.4)
IMM GRANULOCYTES NFR BLD: 0.6 %
INR PPP: 1.04 (ref 0.86–1.14)
KETONES UR STRIP-MCNC: NEGATIVE MG/DL
LDH SERPL L TO P-CCNC: 150 U/L (ref 85–227)
LEUKOCYTE ESTERASE UR QL STRIP: NEGATIVE
LYMPHOCYTES # BLD AUTO: 0.7 10E9/L (ref 0.8–5.3)
LYMPHOCYTES NFR BLD AUTO: 9.2 %
MAGNESIUM SERPL-MCNC: 1.8 MG/DL (ref 1.6–2.3)
MCH RBC QN AUTO: 31.9 PG (ref 26.5–33)
MCHC RBC AUTO-ENTMCNC: 32.5 G/DL (ref 31.5–36.5)
MCV RBC AUTO: 98 FL (ref 78–100)
MONOCYTES # BLD AUTO: 0.6 10E9/L (ref 0–1.3)
MONOCYTES NFR BLD AUTO: 8.6 %
MUCOUS THREADS #/AREA URNS LPF: PRESENT /LPF
NEUTROPHILS # BLD AUTO: 5.5 10E9/L (ref 1.6–8.3)
NEUTROPHILS NFR BLD AUTO: 77.9 %
NITRATE UR QL: NEGATIVE
NRBC # BLD AUTO: 0 10*3/UL
NRBC BLD AUTO-RTO: 0 /100
PH UR STRIP: 5 PH (ref 5–7)
PHOSPHATE SERPL-MCNC: 3.1 MG/DL (ref 2.5–4.5)
PLATELET # BLD AUTO: 225 10E9/L (ref 150–450)
POTASSIUM SERPL-SCNC: 4 MMOL/L (ref 3.4–5.3)
PROT SERPL-MCNC: 6.9 G/DL (ref 6.8–8.8)
RBC # BLD AUTO: 3.89 10E12/L (ref 4.4–5.9)
RBC #/AREA URNS AUTO: 2 /HPF (ref 0–2)
SODIUM SERPL-SCNC: 138 MMOL/L (ref 133–144)
SOURCE: ABNORMAL
SP GR UR STRIP: 1.02 (ref 1–1.03)
SPECIMEN EXP DATE BLD: NORMAL
T3FREE SERPL-MCNC: 3.2 PG/ML (ref 2.3–4.2)
T4 FREE SERPL-MCNC: 1.12 NG/DL (ref 0.76–1.46)
TSH SERPL DL<=0.005 MIU/L-ACNC: 2.17 MU/L (ref 0.4–4)
URATE SERPL-MCNC: 5.8 MG/DL (ref 3.5–7.2)
UROBILINOGEN UR STRIP-MCNC: 0 MG/DL (ref 0–2)
VZV IGG SER QL IA: 6 AI (ref 0–0.8)
WBC # BLD AUTO: 7.1 10E9/L (ref 4–11)
WBC #/AREA URNS AUTO: 1 /HPF (ref 0–5)

## 2020-08-26 PROCEDURE — 87535 HIV-1 PROBE&REVERSE TRNSCRPJ: CPT | Performed by: INTERNAL MEDICINE

## 2020-08-26 PROCEDURE — 86695 HERPES SIMPLEX TYPE 1 TEST: CPT | Performed by: INTERNAL MEDICINE

## 2020-08-26 PROCEDURE — 93010 ELECTROCARDIOGRAM REPORT: CPT | Mod: ZP | Performed by: INTERNAL MEDICINE

## 2020-08-26 PROCEDURE — G0463 HOSPITAL OUTPT CLINIC VISIT: HCPCS | Mod: 25,ZF

## 2020-08-26 PROCEDURE — 86687 HTLV-I ANTIBODY: CPT | Performed by: INTERNAL MEDICINE

## 2020-08-26 PROCEDURE — 83021 HEMOGLOBIN CHROMOTOGRAPHY: CPT | Performed by: INTERNAL MEDICINE

## 2020-08-26 PROCEDURE — 84481 FREE ASSAY (FT-3): CPT | Performed by: INTERNAL MEDICINE

## 2020-08-26 PROCEDURE — 86803 HEPATITIS C AB TEST: CPT | Performed by: INTERNAL MEDICINE

## 2020-08-26 PROCEDURE — 82784 ASSAY IGA/IGD/IGG/IGM EACH: CPT | Performed by: INTERNAL MEDICINE

## 2020-08-26 PROCEDURE — 86780 TREPONEMA PALLIDUM: CPT | Performed by: INTERNAL MEDICINE

## 2020-08-26 PROCEDURE — 86787 VARICELLA-ZOSTER ANTIBODY: CPT | Performed by: INTERNAL MEDICINE

## 2020-08-26 PROCEDURE — 84439 ASSAY OF FREE THYROXINE: CPT | Performed by: INTERNAL MEDICINE

## 2020-08-26 PROCEDURE — 86703 HIV-1/HIV-2 1 RESULT ANTBDY: CPT | Performed by: INTERNAL MEDICINE

## 2020-08-26 PROCEDURE — 84165 PROTEIN E-PHORESIS SERUM: CPT | Performed by: INTERNAL MEDICINE

## 2020-08-26 PROCEDURE — 87516 HEPATITIS B DNA AMP PROBE: CPT | Performed by: INTERNAL MEDICINE

## 2020-08-26 PROCEDURE — 86704 HEP B CORE ANTIBODY TOTAL: CPT | Performed by: INTERNAL MEDICINE

## 2020-08-26 PROCEDURE — 87798 DETECT AGENT NOS DNA AMP: CPT | Performed by: INTERNAL MEDICINE

## 2020-08-26 PROCEDURE — 84550 ASSAY OF BLOOD/URIC ACID: CPT | Performed by: INTERNAL MEDICINE

## 2020-08-26 PROCEDURE — 85730 THROMBOPLASTIN TIME PARTIAL: CPT | Performed by: INTERNAL MEDICINE

## 2020-08-26 PROCEDURE — 84100 ASSAY OF PHOSPHORUS: CPT | Performed by: INTERNAL MEDICINE

## 2020-08-26 PROCEDURE — 82232 ASSAY OF BETA-2 PROTEIN: CPT | Performed by: INTERNAL MEDICINE

## 2020-08-26 PROCEDURE — 86753 PROTOZOA ANTIBODY NOS: CPT | Performed by: INTERNAL MEDICINE

## 2020-08-26 PROCEDURE — 80053 COMPREHEN METABOLIC PANEL: CPT | Performed by: INTERNAL MEDICINE

## 2020-08-26 PROCEDURE — 86901 BLOOD TYPING SEROLOGIC RH(D): CPT | Performed by: INTERNAL MEDICINE

## 2020-08-26 PROCEDURE — 87340 HEPATITIS B SURFACE AG IA: CPT | Performed by: INTERNAL MEDICINE

## 2020-08-26 PROCEDURE — 00000402 ZZHCL STATISTIC TOTAL PROTEIN: Performed by: INTERNAL MEDICINE

## 2020-08-26 PROCEDURE — 86850 RBC ANTIBODY SCREEN: CPT | Performed by: INTERNAL MEDICINE

## 2020-08-26 PROCEDURE — 83615 LACTATE (LD) (LDH) ENZYME: CPT | Performed by: INTERNAL MEDICINE

## 2020-08-26 PROCEDURE — 83735 ASSAY OF MAGNESIUM: CPT | Performed by: INTERNAL MEDICINE

## 2020-08-26 PROCEDURE — 86665 EPSTEIN-BARR CAPSID VCA: CPT | Performed by: INTERNAL MEDICINE

## 2020-08-26 PROCEDURE — 84443 ASSAY THYROID STIM HORMONE: CPT | Performed by: INTERNAL MEDICINE

## 2020-08-26 PROCEDURE — 85025 COMPLETE CBC W/AUTO DIFF WBC: CPT | Performed by: INTERNAL MEDICINE

## 2020-08-26 PROCEDURE — 86900 BLOOD TYPING SEROLOGIC ABO: CPT | Performed by: INTERNAL MEDICINE

## 2020-08-26 PROCEDURE — G0463 HOSPITAL OUTPT CLINIC VISIT: HCPCS | Mod: ZF

## 2020-08-26 PROCEDURE — 87521 HEPATITIS C PROBE&RVRS TRNSC: CPT | Performed by: INTERNAL MEDICINE

## 2020-08-26 PROCEDURE — 86644 CMV ANTIBODY: CPT | Performed by: INTERNAL MEDICINE

## 2020-08-26 PROCEDURE — 86696 HERPES SIMPLEX TYPE 2 TEST: CPT | Performed by: INTERNAL MEDICINE

## 2020-08-26 PROCEDURE — 81001 URINALYSIS AUTO W/SCOPE: CPT | Performed by: INTERNAL MEDICINE

## 2020-08-26 PROCEDURE — 85610 PROTHROMBIN TIME: CPT | Performed by: INTERNAL MEDICINE

## 2020-08-26 PROCEDURE — G0463 HOSPITAL OUTPT CLINIC VISIT: HCPCS | Mod: 27

## 2020-08-26 ASSESSMENT — MIFFLIN-ST. JEOR
SCORE: 1927.15
SCORE: 1926.87

## 2020-08-26 ASSESSMENT — PAIN SCALES - GENERAL: PAINLEVEL: NO PAIN (0)

## 2020-08-26 NOTE — LETTER
8/26/2020         RE: Theo Roblero  [address] [place] [id number]        Dear Colleague,    Thank you for referring your patient, Theo Roblero, to the [facility] BLOOD AND MARROW TRANSPLANT. Please see a copy of my visit note below.    See nursing note    ANTONIA Lui      Again, thank you for allowing me to participate in the care of your patient.        Sincerely,        BMT Nurse

## 2020-08-26 NOTE — NURSING NOTE
Frailty assessment was completed today in clinic, patient tolerated well with no complications.     ANTONIA Lui

## 2020-08-26 NOTE — NURSING NOTE
"Oncology Rooming Note    August 26, 2020 10:04 AM   Theo Roblero is a 55 year old male who presents for:    Chief Complaint   Patient presents with     RECHECK     bmt work up for lymphoma      Initial Vitals: BP (!) 152/85   Pulse 75   Temp 97.9  F (36.6  C)   Resp 18   Ht 1.803 m (5' 11\")   Wt 107.5 kg (237 lb)   SpO2 99%   BMI 33.05 kg/m   Estimated body mass index is 33.05 kg/m  as calculated from the following:    Height as of this encounter: 1.803 m (5' 11\").    Weight as of this encounter: 107.5 kg (237 lb). Body surface area is 2.32 meters squared.  No Pain (0) Comment: Data Unavailable   No LMP for male patient.  Allergies reviewed: Yes  Medications reviewed: Yes    Medications: Medication refills not needed today.  Pharmacy name entered into [employer]: [facility] #[id number] - [place], [place] - [address] AT [facility] [address]    Clinical concerns: none, see work up teaching note       Mony Velásquez RN              "

## 2020-08-26 NOTE — LETTER
8/26/2020         RE: Theo Roblero  [address] [facility]  [address] [place], [place] [id number]        Dear Colleague,    Thank you for referring your patient, Theo Roblero, to the [facility] BLOOD AND MARROW TRANSPLANT. Please see a copy of my visit note below.    BMT Teaching Flowsheet    Theo Roblero is a 55 year old male  Diagnoses of Mantle cell lymphoma of lymph nodes of multiple regions (H) and Personal history of diseases of blood and blood-forming organs were pertinent to this visit.    Teaching Topic: bmt work up    Person(s) involved in teaching: Patient and Other  Motivation Level  Asks Questions: Yes  Eager to Learn: Yes  Cooperative: Yes  Receptive (willing/able to accept information): Yes  Any cultural factors/Holiness beliefs that may influence understanding or compliance? No    Patient and Family demonstrates understanding of the following:  - Reason for the appointment, diagnosis and treatment plan: Yes  - Knowledge of proper use of medications and conditions for which they are ordered (with special attention to potential side effects or drug interactions): Yes  - Which situations necessitate calling provider and whom to contact: Yes    Teaching concerns addressed: signed consents, reviewed and updated med list allerrgy assessment, smoking assessment, reviewed bmt work up calendar including discussion of all tests and procedures associated with work up,labs drawn by RN from Port, ua collected, frailty assessment completed    Proper use and care of (medical equipment, care aids, etc.) Yes  Pain management techniques: Yes  Patient instructed on hand hygiene: Yes  How and/when to access community resources: Yes    Infection Control:  Patient and Family demonstrates understanding of the following:  Surgical procedure site care taught Yes  Signs and symptoms of infection taught Yes  Wound care taught Yes  Central venous catheter care taught Yes    Instructional Materials Used/Given:   bmt work up  calendar and map    Time spent with patient: 45 minutes.    Specific Concerns: NA    Again, thank you for allowing me to participate in the care of your patient.        Sincerely,        BMT Nurse

## 2020-08-26 NOTE — PROGRESS NOTES
BMT Teaching Flowsheet    Theo Roblero is a 55 year old male  Diagnoses of Mantle cell lymphoma of lymph nodes of multiple regions (H) and Personal history of diseases of blood and blood-forming organs were pertinent to this visit.    Teaching Topic: bmt work up    Person(s) involved in teaching: Patient and Other  Motivation Level  Asks Questions: Yes  Eager to Learn: Yes  Cooperative: Yes  Receptive (willing/able to accept information): Yes  Any cultural factors/Orthodox beliefs that may influence understanding or compliance? No    Patient and Family demonstrates understanding of the following:  - Reason for the appointment, diagnosis and treatment plan: Yes  - Knowledge of proper use of medications and conditions for which they are ordered (with special attention to potential side effects or drug interactions): Yes  - Which situations necessitate calling provider and whom to contact: Yes    Teaching concerns addressed: signed consents, reviewed and updated med list allerrgy assessment, smoking assessment, reviewed bmt work up calendar including discussion of all tests and procedures associated with work up,labs drawn by RN from [facility], ua collected, frailty assessment completed    Proper use and care of (medical equipment, care aids, etc.) Yes  Pain management techniques: Yes  Patient instructed on hand hygiene: Yes  How and/when to access community resources: Yes    Infection Control:  Patient and Family demonstrates understanding of the following:  Surgical procedure site care taught Yes  Signs and symptoms of infection taught Yes  Wound care taught Yes  Central venous catheter care taught Yes    Instructional Materials Used/Given:   bmt work up calendar and map    Time spent with patient: 45 minutes.    Specific Concerns: NA

## 2020-08-26 NOTE — CONSULTS
"APHERESIS INITIAL CONSULT CHECKLIST    Current Encounter Information  Current Encounter Information: Reason for Visit, Allergies and Current Meds  Procedure Requested: MNC/PBSC Collection  History of: (Reason for Apheresis): NHL    Access Assessment  Access Assessment  Vein Assessment:  Veins are adequate: N/A  Needs a catheter placed for Apheresis?: Yes, transfusion medicine physician informed.    Vital Signs  Vital Signs  BP: (!) 152/85  Pulse: 75  Temp: 97.9  F (36.6  C)  Temp src: Oral  Resp: 18  Height: 180.3 cm (5' 10.98\")  Weight: 107.5 kg (236 lb 15.9 oz)    Reviewed   Review With Patient  Have you read the brochure Getting ready for Apheresis?: Yes  Have you had any invasive procedures, surgery, biopsy, bleeding in the last month?: No  Review medications and allergies: Yes  Have you ever been transfused?: Yes  Do you require pre-medication for blood products?: No  Patient given tour of the unit: Yes  Photophoresis: sun precautions reviewed with patient: N/A    Additional Information  Notes, needs and time spent with patient  Explain procedure, side effects or reactions, instructions: Yes  Patient has special need?: No  Time spent: 20 min face to face reviewing donor history evaluation, discussing importance of adequate hydration and low fat diet, and discussing day of collection routines.        "

## 2020-08-26 NOTE — LETTER
8/26/2020         RE: Theo Roblero  [address] [place] [address]  [address] [place] [address]        Dear Colleague,    Thank you for referring your patient, Theo Roblero, to the [facility] BLOOD AND MARROW TRANSPLANT. Please see a copy of my visit note below.    [facility]  BMTCT OPEN VISIT    August 26, 2020        Theo Roblero is a 55 year old male undergoing evaluation prior to hematopoietic cell transplant or immune effector cell therapy.    Reason for BMTCT: Mantle cell lymphoma     Recent chemotherapy: July 2020    Recent infections: no     Blood thinner use? If yes, why? No    Treatment for diabetes? Yes          Today, the patient notes the following symptoms:  Review Of Systems  Skin: negative  Eyes: received injection the other day, sees ophthalmology regularly   Ears/Nose/Throat: negative  Respiratory: No shortness of breath, dyspnea on exertion, cough, or hemoptysis  Cardiovascular: stable exertional dyspnea, otherwise negative  Gastrointestinal: negative  Genitourinary: negative  Musculoskeletal: negative  Neurologic: negative  Psychiatric: negative  Hematologic/Lymphatic/Immunologic: negative (aside from weight loss over the last 6 mo, none recently)   Endocrine: negative and diabetes      Theo Roblero's History  PMHx:  1. MCL  2. DM  3. HTN  4. Kidney stone    Pins in ankle, knee surgery x2     SHx: Currently chews tobacco, does not smoke.  Occasional ETOH. No recreational drug use.     FMHx:  Mother, lymphoma and non-melanoma skin cancer.    Social History     Tobacco Use     Smoking status: Never Smoker     Smokeless tobacco: Current User     Types: Chew     Tobacco comment: trying to quit   Substance Use Topics     Alcohol use: Not Currently           Theo Robertss Medications and Allergies    Current Outpatient Medications   Medication     acetaminophen (TYLENOL) 500 MG tablet     glimepiride (AMARYL) 4 MG tablet     metFORMIN (GLUCOPHAGE) 500 MG tablet     metoprolol  succinate ER (TOPROL-XL) 25 MG 24 hr tablet     No current facility-administered medications for this visit.         No Known Allergies        Physical Examination    VSS, mild htn to 152/85 no intervention   Exam:  Constitutional: healthy, alert and no distress  Head: Normocephalic. No masses, lesions, tenderness or abnormalities  ENT: ENT exam normal, no neck nodes or sinus tenderness  Cardiovascular: negative, PMI normal. No lifts, heaves, or thrills. RRR. No murmurs, clicks gallops or rub  Respiratory: negative, Good diaphragmatic excursion. Lungs clear  Gastrointestinal: Abdomen soft, non-tender. BS normal. No masses, organomegaly  : Deferred  Musculoskeletal: extremities normal- no gross deformities noted, gait normal and normal muscle tone  Skin: no suspicious lesions or rashes  Neurologic: Gait normal. Reflexes normal and symmetric. Sensation grossly WNL.  Psychiatric: mentation appears normal and affect normal/bright  Hematologic/Lymphatic/Immunologic: Normal cervical lymph nodes        Frailty Screening  1. Weight loss: Have you lost >10 pounds (or >5% body weight) unintentionally over the last year? Yes      2. Exhaustion: How often in the past week did you feel that:  o I feel that everything I do is an effort : .Exhaustion: 0 = rarely or none of the time (<1 day)  o I feel I cannot get going: Exhaustion: 0 = rarely or none of the time (<1 day)    3. Weakness:  Hand  strength (measured by MA; calculate average): 25.5     Male BMI Frailty Criteria for  Male  Strength Female BMI Frailty Criteria for  Female  Strength   ?24 ?29 ?23 ?17   24.1 - 26 ?30 23.1 - 26 ?17.3   26.1 - 28 ?30 26.1 - 29 ?18   >28 ?32 >29 ?21     4. Slowness:  15 foot walk time (measured by MA):  7    Height Frailty Criteria for  15 Foot Walk Time   Men   ?173 cm ? 7 seconds    >173 cm  ? 6 seconds   Women   ?159 cm ? 7 seconds   >159 cm  ? 6 seconds     5. Physical activity:     *Please complete 2 calculations for kcal  (see frailty worksheet for equations)     Energy expenditure for frailty: 535 kcal expended per week     Gender Frailty Criteria for Low Physical Activity   Male <383 kcal/week   Female <270 kcal/weel     IPAQ score:  300MET minutes per week       Theo Roblero met the following criteria for prefrailty (score 1-2) or frailty (score 3+): Frailty: Weight Loss and Weakness  Frailty Score is: 2      Additional assessments not to be used in frailty calculation:   What types of physical activity can you tolerate? Lifting with house chores, walking    Sit to stand test (time to complete 5 reps): 15 seconds    Standing balance in 10 seconds:  Record the Total number of seconds(0-30)--add a+b+c ( take best attempt for each)    First attempt: 10 seconds    Second attempt: 10 seconds       Overall Assessment    I have reviewed the diagnostic data, medications, frailty screening, and general processes prior to BMTCT.  I have notified the Primary BMT Physician/and or Attending Physician in the clinic of any issues. We also discussed in detail the database and biorepository research for which Theo Roblero is eligible. We discussed the potential risks and potential benefits of each of these protocols individually. We explained potential alternatives to the protocols discussed. We explained to the patient that participation is voluntary and that consent may be withdrawn at any time.       Consents Signed:    Blood transfusion consent form    Ethnicity form    [facility]    [facility] biorepository    [facility] BMTCT Database    Present during the discussion were Theo and his brother Roe. Copies of the signed consent forms will be provided to the patient on admission. No procedures specific to any studies were performed prior to the patient signing the consent form.    Theo had the opportunity to ask questions, and I answered all of the questions to the best of my ability.      Annabel Holm PA-C      Again, thank you for  allowing me to participate in the care of your patient.        Sincerely,        BMT Advanced Practice Provider

## 2020-08-26 NOTE — NURSING NOTE
EKG done, patient tolerated it well. Results transmitted to JONN Kitchen [facility]

## 2020-08-26 NOTE — PROGRESS NOTES
[facility]  BMTCT OPEN VISIT    August 26, 2020        Theo Roblero is a 55 year old male undergoing evaluation prior to hematopoietic cell transplant or immune effector cell therapy.    Reason for BMTCT: Mantle cell lymphoma     Recent chemotherapy: July 2020    Recent infections: no     Blood thinner use? If yes, why? No    Treatment for diabetes? Yes          Today, the patient notes the following symptoms:  Review Of Systems  Skin: negative  Eyes: received injection the other day, sees ophthalmology regularly   Ears/Nose/Throat: negative  Respiratory: No shortness of breath, dyspnea on exertion, cough, or hemoptysis  Cardiovascular: stable exertional dyspnea, otherwise negative  Gastrointestinal: negative  Genitourinary: negative  Musculoskeletal: negative  Neurologic: negative  Psychiatric: negative  Hematologic/Lymphatic/Immunologic: negative (aside from weight loss over the last 6 mo, none recently)   Endocrine: negative and diabetes      Theo Roblero's History  PMHx:  1. MCL  2. DM  3. HTN  4. Kidney stone    Pins in ankle, knee surgery x2     SHx: Currently chews tobacco, does not smoke.  Occasional ETOH. No recreational drug use.     FMHx:  Mother, lymphoma and non-melanoma skin cancer.    Social History     Tobacco Use     Smoking status: Never Smoker     Smokeless tobacco: Current User     Types: Chew     Tobacco comment: trying to quit   Substance Use Topics     Alcohol use: Not Currently           Theo Barboza Medications and Allergies    Current Outpatient Medications   Medication     acetaminophen (TYLENOL) 500 MG tablet     glimepiride (AMARYL) 4 MG tablet     metFORMIN (GLUCOPHAGE) 500 MG tablet     metoprolol succinate ER (TOPROL-XL) 25 MG 24 hr tablet     No current facility-administered medications for this visit.         No Known Allergies        Physical Examination    VSS, mild htn to 152/85 no intervention   Exam:  Constitutional: healthy, alert and no  distress  Head: Normocephalic. No masses, lesions, tenderness or abnormalities  ENT: ENT exam normal, no neck nodes or sinus tenderness  Cardiovascular: negative, PMI normal. No lifts, heaves, or thrills. RRR. No murmurs, clicks gallops or rub  Respiratory: negative, Good diaphragmatic excursion. Lungs clear  Gastrointestinal: Abdomen soft, non-tender. BS normal. No masses, organomegaly  : Deferred  Musculoskeletal: extremities normal- no gross deformities noted, gait normal and normal muscle tone  Skin: no suspicious lesions or rashes  Neurologic: Gait normal. Reflexes normal and symmetric. Sensation grossly WNL.  Psychiatric: mentation appears normal and affect normal/bright  Hematologic/Lymphatic/Immunologic: Normal cervical lymph nodes        Frailty Screening  1. Weight loss: Have you lost >10 pounds (or >5% body weight) unintentionally over the last year? Yes      2. Exhaustion: How often in the past week did you feel that:  o I feel that everything I do is an effort : .Exhaustion: 0 = rarely or none of the time (<1 day)  o I feel I cannot get going: Exhaustion: 0 = rarely or none of the time (<1 day)    3. Weakness:  Hand  strength (measured by MA; calculate average): 25.5     Male BMI Frailty Criteria for  Male  Strength Female BMI Frailty Criteria for  Female  Strength   ?24 ?29 ?23 ?17   24.1 - 26 ?30 23.1 - 26 ?17.3   26.1 - 28 ?30 26.1 - 29 ?18   >28 ?32 >29 ?21     4. Slowness:  15 foot walk time (measured by MA):  7    Height Frailty Criteria for  15 Foot Walk Time   Men   ?173 cm ? 7 seconds    >173 cm  ? 6 seconds   Women   ?159 cm ? 7 seconds   >159 cm  ? 6 seconds     5. Physical activity:     *Please complete 2 calculations for kcal (see frailty worksheet for equations)     Energy expenditure for frailty: 535 kcal expended per week     Gender Frailty Criteria for Low Physical Activity   Male <383 kcal/week   Female <270 kcal/rajiel     IPAQ score:  300MET minutes per week       Theo  DONA Roblero met the following criteria for prefrailty (score 1-2) or frailty (score 3+): Frailty: Weight Loss and Weakness  Frailty Score is: 2      Additional assessments not to be used in frailty calculation:   What types of physical activity can you tolerate? Lifting with house chores, walking    Sit to stand test (time to complete 5 reps): 15 seconds    Standing balance in 10 seconds:  Record the Total number of seconds(0-30)--add a+b+c ( take best attempt for each)    First attempt: 10 seconds    Second attempt: 10 seconds       Overall Assessment    I have reviewed the diagnostic data, medications, frailty screening, and general processes prior to BMTCT.  I have notified the Primary BMT Physician/and or Attending Physician in the clinic of any issues. We also discussed in detail the database and biorepository research for which Theo Roblero is eligible. We discussed the potential risks and potential benefits of each of these protocols individually. We explained potential alternatives to the protocols discussed. We explained to the patient that participation is voluntary and that consent may be withdrawn at any time.       Consents Signed:    Blood transfusion consent form    Ethnicity form    [facility] database    [facility] biorepository    [facility] BMTCT Database    Present during the discussion were Theo and his brother Roe. Copies of the signed consent forms will be provided to the patient on admission. No procedures specific to any studies were performed prior to the patient signing the consent form.    Theo had the opportunity to ask questions, and I answered all of the questions to the best of my ability.      Ananbel Holm PA-C

## 2020-08-27 ENCOUNTER — HOSPITAL ENCOUNTER (OUTPATIENT)
Dept: CARDIOLOGY | Facility: CLINIC | Age: 55
End: 2020-08-27
Attending: INTERNAL MEDICINE
Payer: COMMERCIAL

## 2020-08-27 ENCOUNTER — OFFICE VISIT (OUTPATIENT)
Dept: TRANSPLANT | Facility: CLINIC | Age: 55
End: 2020-08-27
Attending: NURSE PRACTITIONER
Payer: COMMERCIAL

## 2020-08-27 ENCOUNTER — APPOINTMENT (OUTPATIENT)
Dept: LAB | Facility: CLINIC | Age: 55
End: 2020-08-27
Attending: INTERNAL MEDICINE
Payer: COMMERCIAL

## 2020-08-27 ENCOUNTER — HOSPITAL ENCOUNTER (OUTPATIENT)
Dept: PET IMAGING | Facility: CLINIC | Age: 55
Discharge: HOME | End: 2020-08-27
Attending: INTERNAL MEDICINE | Admitting: INTERNAL MEDICINE
Payer: COMMERCIAL

## 2020-08-27 VITALS
DIASTOLIC BLOOD PRESSURE: 91 MMHG | BODY MASS INDEX: 33.81 KG/M2 | WEIGHT: 242.3 LBS | HEART RATE: 87 BPM | TEMPERATURE: 98.2 F | OXYGEN SATURATION: 100 % | SYSTOLIC BLOOD PRESSURE: 147 MMHG | RESPIRATION RATE: 20 BRPM

## 2020-08-27 DIAGNOSIS — C83.18 MANTLE CELL LYMPHOMA OF LYMPH NODES OF MULTIPLE REGIONS (H): Primary | ICD-10-CM

## 2020-08-27 DIAGNOSIS — C83.18 MANTLE CELL LYMPHOMA OF LYMPH NODES OF MULTIPLE REGIONS (H): ICD-10-CM

## 2020-08-27 DIAGNOSIS — Z86.2 PERSONAL HISTORY OF DISEASES OF BLOOD AND BLOOD-FORMING ORGANS: ICD-10-CM

## 2020-08-27 LAB
ALBUMIN SERPL ELPH-MCNC: 4 G/DL (ref 3.7–5.1)
ALPHA1 GLOB SERPL ELPH-MCNC: 0.3 G/DL (ref 0.2–0.4)
ALPHA2 GLOB SERPL ELPH-MCNC: 0.8 G/DL (ref 0.5–0.9)
AUTO BMR FREEZE: NORMAL
B-GLOBULIN SERPL ELPH-MCNC: 0.7 G/DL (ref 0.6–1)
BASOPHILS # BLD AUTO: 0 10E9/L (ref 0–0.2)
BASOPHILS NFR BLD AUTO: 0.3 %
DIFFERENTIAL METHOD BLD: ABNORMAL
EOSINOPHIL # BLD AUTO: 0.1 10E9/L (ref 0–0.7)
EOSINOPHIL NFR BLD AUTO: 1.3 %
ERYTHROCYTE [DISTWIDTH] IN BLOOD BY AUTOMATED COUNT: 12.7 % (ref 10–15)
GAMMA GLOB SERPL ELPH-MCNC: 0.8 G/DL (ref 0.7–1.6)
HCT VFR BLD AUTO: 35.4 % (ref 40–53)
HGB BLD-MCNC: 11.7 G/DL (ref 13.3–17.7)
IMM GRANULOCYTES # BLD: 0 10E9/L (ref 0–0.4)
IMM GRANULOCYTES NFR BLD: 0.5 %
LAB SCANNED RESULT: NORMAL
LYMPHOCYTES # BLD AUTO: 0.8 10E9/L (ref 0.8–5.3)
LYMPHOCYTES NFR BLD AUTO: 12 %
M PROTEIN SERPL ELPH-MCNC: 0 G/DL
MCH RBC QN AUTO: 31.8 PG (ref 26.5–33)
MCHC RBC AUTO-ENTMCNC: 33.1 G/DL (ref 31.5–36.5)
MCV RBC AUTO: 96 FL (ref 78–100)
MONOCYTES # BLD AUTO: 0.5 10E9/L (ref 0–1.3)
MONOCYTES NFR BLD AUTO: 7.3 %
NEUTROPHILS # BLD AUTO: 5 10E9/L (ref 1.6–8.3)
NEUTROPHILS NFR BLD AUTO: 78.6 %
NRBC # BLD AUTO: 0 10*3/UL
NRBC BLD AUTO-RTO: 0 /100
PLATELET # BLD AUTO: 211 10E9/L (ref 150–450)
PROT PATTERN SERPL ELPH-IMP: NORMAL
RBC # BLD AUTO: 3.68 10E12/L (ref 4.4–5.9)
WBC # BLD AUTO: 6.4 10E9/L (ref 4–11)

## 2020-08-27 PROCEDURE — 88271 CYTOGENETICS DNA PROBE: CPT | Performed by: INTERNAL MEDICINE

## 2020-08-27 PROCEDURE — 25000128 H RX IP 250 OP 636: Performed by: INTERNAL MEDICINE

## 2020-08-27 PROCEDURE — 40000951 ZZHCL STATISTIC BONE MARROW INTERP TC 85097: Performed by: INTERNAL MEDICINE

## 2020-08-27 PROCEDURE — 88311 DECALCIFY TISSUE: CPT | Performed by: INTERNAL MEDICINE

## 2020-08-27 PROCEDURE — A9552 F18 FDG: HCPCS | Performed by: INTERNAL MEDICINE

## 2020-08-27 PROCEDURE — 88264 CHROMOSOME ANALYSIS 20-25: CPT | Performed by: INTERNAL MEDICINE

## 2020-08-27 PROCEDURE — 88237 TISSUE CULTURE BONE MARROW: CPT | Performed by: INTERNAL MEDICINE

## 2020-08-27 PROCEDURE — 34300033 ZZH RX 343: Performed by: INTERNAL MEDICINE

## 2020-08-27 PROCEDURE — 25500064 ZZH RX 255 OP 636: Performed by: INTERNAL MEDICINE

## 2020-08-27 PROCEDURE — 25000128 H RX IP 250 OP 636: Mod: ZF | Performed by: NURSE PRACTITIONER

## 2020-08-27 PROCEDURE — 40000611 ZZHCL STATISTIC MORPHOLOGY W/INTERP HEMEPATH TC 85060: Performed by: INTERNAL MEDICINE

## 2020-08-27 PROCEDURE — 93306 TTE W/DOPPLER COMPLETE: CPT | Mod: 26 | Performed by: STUDENT IN AN ORGANIZED HEALTH CARE EDUCATION/TRAINING PROGRAM

## 2020-08-27 PROCEDURE — 85025 COMPLETE CBC W/AUTO DIFF WBC: CPT | Performed by: NURSE PRACTITIONER

## 2020-08-27 PROCEDURE — 88275 CYTOGENETICS 100-300: CPT | Performed by: INTERNAL MEDICINE

## 2020-08-27 PROCEDURE — 40000803 ZZHCL STATISTIC DNA ISOL HIGH PURITY: Performed by: INTERNAL MEDICINE

## 2020-08-27 PROCEDURE — 88305 TISSUE EXAM BY PATHOLOGIST: CPT | Performed by: INTERNAL MEDICINE

## 2020-08-27 PROCEDURE — 88184 FLOWCYTOMETRY/ TC 1 MARKER: CPT | Performed by: INTERNAL MEDICINE

## 2020-08-27 PROCEDURE — 38222 DX BONE MARROW BX & ASPIR: CPT | Mod: ZF

## 2020-08-27 PROCEDURE — 74177 CT ABD & PELVIS W/CONTRAST: CPT

## 2020-08-27 PROCEDURE — 88185 FLOWCYTOMETRY/TC ADD-ON: CPT | Performed by: INTERNAL MEDICINE

## 2020-08-27 PROCEDURE — 88161 CYTOPATH SMEAR OTHER SOURCE: CPT | Performed by: INTERNAL MEDICINE

## 2020-08-27 PROCEDURE — 40001004 ZZHCL STATISTIC FLOW INT 9-15 ABY TC 88188: Performed by: INTERNAL MEDICINE

## 2020-08-27 PROCEDURE — 88313 SPECIAL STAINS GROUP 2: CPT | Performed by: INTERNAL MEDICINE

## 2020-08-27 PROCEDURE — 00000161 ZZHCL STATISTIC H-SPHEME PROCESS B/S: Performed by: INTERNAL MEDICINE

## 2020-08-27 PROCEDURE — 81261 IGH GENE REARRANGE AMP METH: CPT | Performed by: INTERNAL MEDICINE

## 2020-08-27 PROCEDURE — 88280 CHROMOSOME KARYOTYPE STUDY: CPT | Performed by: INTERNAL MEDICINE

## 2020-08-27 RX ORDER — HEPARIN SODIUM (PORCINE) LOCK FLUSH IV SOLN 100 UNIT/ML 100 UNIT/ML
5 SOLUTION INTRAVENOUS ONCE
Status: COMPLETED | OUTPATIENT
Start: 2020-08-27 | End: 2020-08-27

## 2020-08-27 RX ORDER — HEPARIN SODIUM (PORCINE) LOCK FLUSH IV SOLN 100 UNIT/ML 100 UNIT/ML
5 SOLUTION INTRAVENOUS EVERY 8 HOURS
Status: DISCONTINUED | OUTPATIENT
Start: 2020-08-27 | End: 2020-08-27 | Stop reason: HOSPADM

## 2020-08-27 RX ORDER — IOPAMIDOL 755 MG/ML
5-140 INJECTION, SOLUTION INTRAVASCULAR ONCE
Status: COMPLETED | OUTPATIENT
Start: 2020-08-27 | End: 2020-08-27

## 2020-08-27 RX ADMIN — MIDAZOLAM 2 MG: 1 INJECTION INTRAMUSCULAR; INTRAVENOUS at 14:08

## 2020-08-27 RX ADMIN — HEPARIN SODIUM (PORCINE) LOCK FLUSH IV SOLN 100 UNIT/ML 5 ML: 100 SOLUTION at 14:09

## 2020-08-27 RX ADMIN — FLUDEOXYGLUCOSE F-18 14.17 MCI.: 500 INJECTION, SOLUTION INTRAVENOUS at 08:31

## 2020-08-27 RX ADMIN — Medication 5 ML: at 09:59

## 2020-08-27 RX ADMIN — IOPAMIDOL 135 ML: 755 INJECTION, SOLUTION INTRAVENOUS at 09:34

## 2020-08-27 RX ADMIN — HUMAN ALBUMIN MICROSPHERES AND PERFLUTREN 6 ML: 10; .22 INJECTION, SOLUTION INTRAVENOUS at 11:00

## 2020-08-27 ASSESSMENT — PAIN SCALES - GENERAL: PAINLEVEL: NO PAIN (0)

## 2020-08-27 NOTE — NURSING NOTE
BMT Teaching Flowsheet   Teaching Topic: post biopsy instructions    Person(s) involved in teaching: Patient  Motivation Level  Asks Questions: Yes  Eager to Learn: Yes  Cooperative: Yes  Receptive (willing/able to accept information): Yes    Patient demonstrates understanding of the following:   - Reason for the appointment, diagnosis and treatment plan: Yes  - Knowledge of proper use of medications and conditions for which they are ordered (with special attention to potential side effects or drug interactions): Yes  - Which situations necessitate calling provider and whom to contact: Yes    Teaching concerns addressed: reviewed activity restrictions if received premeds, potential for bleeding and actions to take if develops any of the issues below    Proper use and care of (medical equipment, care aids, etc.) Yes  Pain management techniques: Yes  Patient instructed on hand hygiene: Yes  How and/when to access community resources: Yes    Infection Control:  Patient demonstrates understanding of the following:   Surgical procedure site care taught NA  Signs and symptoms of infection taught Yes  Wound care taught Yes  Central venous catheter care taught NA    Teaching concerns addressed: Bone marrow biopsy and infection prevention.      Instructional Materials Used/Given: Pt instructed to keep bmbx site clean and dry for 24hrs. Pt educated to monitor site for signs of infection such as redness, rash, oozing, puss, bleeding, pain, and elevated temp. Pt instructed to go to ER if any signs of infection should occur. Pt educated to not operate machinery due to receiving versed. Pt and   SO verbalize understanding.     Post procedure: Pt vss, ambulating, site is c,d,i. Port d/c'd. Pt d/c'd with SO as .      Time spent with patient: 0 minutes.

## 2020-08-27 NOTE — NURSING NOTE
"Oncology Rooming Note    August 27, 2020 2:13 PM   Theo Roblero is a 55 year old male who presents for:    Chief Complaint   Patient presents with     RECHECK     Mantle cell lymphoma here for bmbx     Initial Vitals: BP (!) 147/91 (BP Location: Right arm, Patient Position: Sitting)   Pulse 87   Temp 98.2  F (36.8  C) (Oral)   Resp 20   Wt 109.9 kg (242 lb 4.8 oz)   SpO2 100%   BMI 33.81 kg/m   Estimated body mass index is 33.81 kg/m  as calculated from the following:    Height as of 8/26/20: 1.803 m (5' 10.98\").    Weight as of this encounter: 109.9 kg (242 lb 4.8 oz). Body surface area is 2.35 meters squared.  No Pain (0) Comment: Data Unavailable   No LMP for male patient.  Allergies reviewed: Yes  Medications reviewed: Yes    Medications: Medication refills not needed today.  Pharmacy name entered into [employer]: [id number] - [place], [place] - [address] [facility] [address]    Clinical concerns: None      Rafita Dimas RN              "

## 2020-08-27 NOTE — CONSULTS
Transfusion Medicine Consultation    Theo Roblero MRN# [id number]   YOB: 1965 Age: 55 year old   Date of consult: 8/26/2020     Reason for consult: Autologous peripheral blood stem cell (PBSC) collection           Assessment and Plan:   The patient is a 55 year old male with a history of NHL who presents for consultation for autologous PBSC collection.  The plan is to collect for 1 to 3 days until the target goal is met.  The patient will undergo line placement prior to the collections.     Attestation: I discussed the PBSC collection procedure, including risks and benefits, and I answered the patient's questions to the best of my ability.  He agreed to proceed with the plan, signing the consent forms.  We will proceed with the collections as per BMT.      Gigi Sanchez M.D.  Professor, Transfusion Medicine  Laboratory Medicine & Pathology  Pager: [phone]         Chief Complaint:   Transfusion medicine consultation.         History of Present Illness:   55 year old male presents for consultation for autologous PBSC collection.  He is currently well.  The patient denies any back pain that would prevent him from tolerating the procedure, and no allergies to latex or any other drug allergies.   The travel history is negative.  The patient has no identifiable risk factors for infectious disease.  The procedure, risks/benefits were discussed with the patient and his questions were answered prior to his signing the consent.             Past Medical History:   No past medical history on file.  Hypertension, DM II, nephrolithiasis          Past Surgical History:   No past surgical history on file.  Knee surgery x 2, ankle surgery (distant past)           Social History:   Single, chew tobacco, occasional EtOH          Family History:   Mother with h/o lymphoma, non-melanoma skin cancer          Immunizations:     There is no immunization history on file for this patient.         Allergies:    No  "Known Allergies         Medications:     Current Outpatient Medications   Medication     glimepiride (AMARYL) 4 MG tablet     metFORMIN (GLUCOPHAGE) 500 MG tablet     metoprolol succinate ER (TOPROL-XL) 25 MG 24 hr tablet     acetaminophen (TYLENOL) 500 MG tablet     No current facility-administered medications for this encounter.           Review of Systems:   Feels well today.  No recent infections.           Vital Signs:     Vitals:    08/26/20 1500   BP: (!) 152/85   Pulse: 75   Resp: 18   Temp: 97.9  F (36.6  C)   TempSrc: Oral   Weight: 107.5 kg (236 lb 15.9 oz)   Height: 1.803 m (5' 10.98\")                 Data:      Blood type Rh(D)   AB RH(D)   Date Value Ref Range Status   08/26/2020 Neg  Final         Last CBC:  Lab Results   Component Value Date    WBC 7.1 08/26/2020    HGB 12.4 (L) 08/26/2020    HCT 38.2 (L) 08/26/2020    MCV 98 08/26/2020     08/26/2020       Gigi Sanchez M.D.  Professor, Transfusion Medicine  Laboratory Medicine & Pathology  Pager: [phone]    "

## 2020-08-27 NOTE — PROGRESS NOTES
BMT ONC Adult Bone Marrow Biopsy Procedure Note  August 27, 2020  BP (!) 147/91 (BP Location: Right arm, Patient Position: Sitting)   Pulse 87   Temp 98.2  F (36.8  C) (Oral)   Resp 20   Wt 109.9 kg (242 lb 4.8 oz)   SpO2 100%   BMI 33.81 kg/m       Learning needs assessment complete within 12 months? YES    DIAGNOSIS: MCL     PROCEDURE: Unilateral Bone Marrow Biopsy and Unilateral Aspirate    [place] [facility] [place] [facility]    Patient s identification was positively verified by verbal identification and invasive procedure safety checklist was completed. Informed consent was obtained. Following the administration of Midazolam 2mg as pre-medication, patient was placed in the prone position and prepped and draped in a sterile manner. Approximately 10 cc of 1% Lidocaine was used over the left posterior iliac spine. Following this a 3 mm incision was made. Trephine bone marrow core(s) was (were) obtained from the [facility]. Bone marrow aspirates were obtained from the [facility]. Aspirates were sent for morphology, immunophenotyping, cytogenetics and molecular diagnostics . A total of approximately 20 ml of marrow was aspirated. Following this procedure a sterile dressing was applied to the bone marrow biopsy site(s). The patient was placed in the supine position to maintain pressure on the biopsy site. Post-procedure wound care instructions were given.     Complications: NO    Pre-procedural pain: 0 out of 10 on the numeric pain rating scale.     Procedural pain: 2 out of 10 on the numeric pain rating scale.     Post-procedural pain assessment: 0 out of 10 on the numeric pain rating scale.     Interventions: NO    Length of procedure:20 minutes or less      Procedure performed by: Erlinda Dupont

## 2020-08-27 NOTE — LETTER
8/27/2020         RE: Theo Roblero  [address] [place] [address]  [address]  [facility] [address]        Dear Colleague,    Thank you for referring your patient, Theo Roblero, to the [facility] BLOOD AND MARROW TRANSPLANT. Please see a copy of my visit note below.    BMT ONC Adult Bone Marrow Biopsy Procedure Note  August 27, 2020  BP (!) 147/91 (BP Location: Right arm, Patient Position: Sitting)   Pulse 87   Temp 98.2  F (36.8  C) (Oral)   Resp 20   Wt 109.9 kg (242 lb 4.8 oz)   SpO2 100%   BMI 33.81 kg/m       Learning needs assessment complete within 12 months? YES    DIAGNOSIS: MCL     PROCEDURE: Unilateral Bone Marrow Biopsy and Unilateral Aspirate    LOCATION: [facility] [place] 2nd [facility]    Patient s identification was positively verified by verbal identification and invasive procedure safety checklist was completed. Informed consent was obtained. Following the administration of Midazolam 2mg as pre-medication, patient was placed in the prone position and prepped and draped in a sterile manner. Approximately 10 cc of 1% Lidocaine was used over the left posterior iliac spine. Following this a 3 mm incision was made. Trephine bone marrow core(s) was (were) obtained from the [facility]. Bone marrow aspirates were obtained from the IC. Aspirates were sent for morphology, immunophenotyping, cytogenetics and molecular diagnostics . A total of approximately 20 ml of marrow was aspirated. Following this procedure a sterile dressing was applied to the bone marrow biopsy site(s). The patient was placed in the supine position to maintain pressure on the biopsy site. Post-procedure wound care instructions were given.     Complications: NO    Pre-procedural pain: 0 out of 10 on the numeric pain rating scale.     Procedural pain: 2 out of 10 on the numeric pain rating scale.     Post-procedural pain assessment: 0 out of 10 on the numeric pain rating scale.     Interventions: NO    Length of procedure:20 minutes or less      Procedure  performed by: Erlinda Dupont      Again, thank you for allowing me to participate in the care of your patient.        Sincerely,        [facility] BONE MARROW BIOPSY

## 2020-08-28 ENCOUNTER — VIRTUAL VISIT (OUTPATIENT)
Dept: TRANSPLANT | Facility: CLINIC | Age: 55
End: 2020-08-28
Attending: INTERNAL MEDICINE
Payer: COMMERCIAL

## 2020-08-28 DIAGNOSIS — C83.18 MANTLE CELL LYMPHOMA OF LYMPH NODES OF MULTIPLE REGIONS (H): Primary | ICD-10-CM

## 2020-08-28 DIAGNOSIS — Z71.9 ENCOUNTER FOR COUNSELING: Primary | ICD-10-CM

## 2020-08-28 LAB
COPATH REPORT: NORMAL
COPATH REPORT: NORMAL
INTERPRETATION ECG - MUSE: NORMAL

## 2020-08-28 ASSESSMENT — ANXIETY QUESTIONNAIRES
1. FEELING NERVOUS, ANXIOUS, OR ON EDGE: NOT AT ALL
5. BEING SO RESTLESS THAT IT IS HARD TO SIT STILL: NOT AT ALL
3. WORRYING TOO MUCH ABOUT DIFFERENT THINGS: NOT AT ALL
7. FEELING AFRAID AS IF SOMETHING AWFUL MIGHT HAPPEN: NOT AT ALL
2. NOT BEING ABLE TO STOP OR CONTROL WORRYING: NOT AT ALL
6. BECOMING EASILY ANNOYED OR IRRITABLE: NOT AT ALL
GAD7 TOTAL SCORE: 0

## 2020-08-28 ASSESSMENT — PATIENT HEALTH QUESTIONNAIRE - PHQ9
SUM OF ALL RESPONSES TO PHQ QUESTIONS 1-9: 0
5. POOR APPETITE OR OVEREATING: NOT AT ALL

## 2020-08-28 NOTE — LETTER
8/28/2020         RE: Theo Roblero  [address] [facility]  [address] [place], [place] [id number]        Dear Colleague,    Thank you for referring your patient, Theo Roblero, to the [facility] BLOOD AND MARROW TRANSPLANT. Please see a copy of my visit note below.    Pharmacy Assessment - Pre-Stem Cell Transplant    Assessments & Recommendations:  1) Palifermin 3 doses pre and post BMT  2) Endocrine consult on admission for steroid induced hyperglycemia prevention as Type 2 DM   3) Hold Metformin and glimepiride during the acute phase of transplant   4) Smokeless tobacco dependence , suggest nicotine patch if needed.   5) Ice cryotherapy 2-4 hours around melphalan     History of Present Illness:  Theo Roblero is a 55 year old year old male diagnosed with mantle cell lymphoma.  He has been treated with Max-R-CHOP x 8 cycles with alt with 6 cycles HD LITZY C .  He is now being work up for autologous Stem Cell Transplant on protocol [id number], which utilizes BEAM as a conditioning regimen.    Pertinent labs/tests:  Viral Testing:  CMV(Pending) / HSV(-/-) / EBV(+) / VZV (+)  Ejection Fraction: 50% (8/27)  QTc: 450msec (8/26)    Weights:   Wt Readings from Last 3 Encounters:   08/27/20 109.9 kg (242 lb 4.8 oz)   08/26/20 107.5 kg (236 lb 15.9 oz)   08/26/20 107.5 kg (237 lb)   Ideal body weight: 75.3 kg (166 lb 0.1 oz)  Adjusted ideal body weight: 89.1 kg (196 lb 8.4 oz)  % IBW:  143  There is no height or weight on file to calculate BMI.    Primary BMT Physician:   BMT RN Coordinator:  Jessika Marroquin    Past Medical History:  No past medical history on file.    Medication Allergies:  No Known Allergies    Current Medications (pre-admit):  Current Outpatient Medications   Medication Sig Dispense Refill     acetaminophen (TYLENOL) 500 MG tablet Take 500-1,000 mg by mouth       glimepiride (AMARYL) 4 MG tablet        metFORMIN (GLUCOPHAGE) 500 MG tablet 2 times daily (with meals)        metoprolol succinate ER  (TOPROL-XL) 25 MG 24 hr tablet States he takes metoprolol BID         Herbal Medication/Nutritional Supplements:  No issues    Smoking/Past Drug Use:  Smokeless tobacco use     Nausea/Vomiting, Pain, or other issues:  No issues     Summary:  I met with Theo Roblero via teleconference for approximately 45 minutes.  We discussed priming options, conditioning chemotherapy, antiemetics, prophylactic antibiotics and miscellaneous medications(Palifermin and vaccinations) .    Again, thank you for allowing me to participate in the care of your patient.        Sincerely,        BMT Pharm D, RP

## 2020-08-28 NOTE — PROGRESS NOTES
Blood and Marrow Transplant   Psychosocial Assessment with   Clinical     Assessment completed on 8/28/2020 via phone as part of the COVID 19 protocol. Assessment of living situation, support system, financial status, functional status, coping, stressors, need for resources and social work intervention provided as needed. Information for this assessment was provided by pt's report in addition to medical chart review and consultation with medical team.     Present at Assessment:   Patient: Theo Roblero  : DIMITRI Dobbins, TAMMY    Diagnosis: Mantle Cell Lymphoma     Date of Diagnosis: 2/7/2020    Transplant type: Autologous    Donor: Autologous     Physician: Sam Zavala MD    Nurse Coordinator: Marissa Whaley RN    Social Workers: DIMITRI Dobbins, TAMMY     Permanent Address:   [address] [place] [address]    Living Situation: Pt lives at home with his daughter (16 yrs old).     Contact Information:  Pt's Cell Phone: [phone]  Son-Juan Antonio Roblero's Phone: [phone]    Presenting Information:  Theo is a 55 year old male diagnosed with Mantle Cell Lymphoma who presents for evaluation for autologous transplant at the [facility] ([facility]).      Decision Making: Self    Health Care Directive:   No. SW provided education and forms. SW encouraged pt to have discussions with their family regarding their health care wishes. SW explained that since he does not have a healthcare directive, legally his son-Juan Antonio Roblero would make decisions on his behalf, if he did not have capacity to make his own medical decisions. Tanner is legally  and his daughter is only 16 years old; therefore, his son would be the legal decision maker. Pt understood and would like to complete a healthcare directive. Pt is agreeable to having his son listed as an emergency contact during this time.    Relationship Status: . Pt is now engaged to is significant  other-Hayde. Pt described relationship as stable and supportive.     Family/Support System: Pt endorsed a good support system including family and close friends who will be available to support pt throughout transplant process.     Mitrakarthik Lock (She does not live with pt)  Children: Son-Juan Antonio Roblero (26-Lives in [place], [place]) and Daughter-Leelee Roblero (16-Lives with pt)  Parents: Mother (Lives in [place], [place])  Siblings: 1 Sister and 1 Brother-Carroll Roblero (Both live in [place], [place])    Caregiver: ERICH discussed with pt the caregiver role and expectation at length. Pt is agreeable to having a full time caregiver for a minimum of 30 days until cleared by the BMT physician. Pt confirmed understanding of the caregiver requirement. Pt's primary caregiver will be his brother-Carroll with his Melissa as a secondary. Pt's brother will be coming to stay with pt in his home in [place], [place]. Pt reviewed and signed the caregiver contract which will be scanned into the EMR. Caregiver education and resources provided. No caregiver concerns identified.     Caregiver Contact Information:  Brother-Carroll Roblero (Primary)  Melissa (Secondary)    Transportation Mode: Private Vehicle. SW provided information on parking info and monthly parking pass options.     Insurance: Pt has [employer] health insurance. Pt is on furlough right now and has to pay his insurance premiums each month which is $211. ERICH talked with pt about LLS Co-Pay Assistance as funding is open for Mantle Cell Lymphoma. Pt is interested in applying but has not done so yet. ERICH reached out to the Clinic Pharmacy Liaison to see if they can help pt apply.     Pt denied any other specific insurance concerns at this time. ERICH reiterated information about the [employer]  should specific insurance questions arise as pt moves through transplant process. Future Insurance questions referred to [employer] -Fabiola Stewart (P:  "[phone]) and/or Paige Maldonado (P: [phone]).    Sources of Income: Pt is supported by unemployment. Pt denied anticipation of financial hardship related to BMT at this time. SW provided information on charmaine options and encouraged pt to contact this SW for support should financial situation change.     Employment: [employer]. Pt is currently on furlough.      Mental Health: Pt denied a history of mental health concerns, specific diagnoses or medications at this time. SW explained that it's not uncommon for patients going through transplant to experience symptoms of depression/anxiety.     PHQ-9:  Pt scored a 0 which indicates no sign of depression on the depression severity scale. Pt endorses this is an accurate reflection of his emotional state.    GAD7:  Pt scored a 0 which indicates no sign of anxiety on the Generalized Anxiety Disorder Questionnaire. Pt endorses this is an accurate reflection of his emotional state.    Chemical Use:   Tobacco: Pt reports he using chewing tobacco. Pt would like gum or the patch when admitted to the hospital.  Alcohol: Occasional use; haven't consumed alcohol really since he started treatment.  Marijuana: No hx  Other Drugs: No hx  Based on the information provided, there appear to be no specific risks or concerns identified at this time.     Trauma/Loss/Abuse History: Multiple losses associated with cancer diagnosis and treatment, including health, employment, changes to physical appearance, etc.     Spirituality: SW explained that there are Chaplains on the unit and pt can request to meet with a  at anytime.    Coping: Pt noted that he is currently feeling \"pretty good\". Pt shared that his main coping mechanisms are talking with family/friends and exercise. SW and pt discussed additional positive coping mechanisms that pt can utilize while in the hospital.     Education Provided: Transplant process expectations, Caregiver requirements, Caregiver " self-care, Financial issues related to transplant, Financial resources/grants available, Common psychosocial stressors pre/post transplant, Support group(s) available, Hospital resources available, Web site information, Social Work role and Resources for children.    Interventions Provided: Psychosocial Support and Education     Assessment and Recommendations for Team:  Pt is a 55 year old male diagnosed with Mantle Cell Lymphoma who is here undergoing preparation for a planned autologous transplant.     Pt is pleasant, calm and able to articulate concerns/coping mechanisms in an appropriate manner. During our meeting pt was alert, he was interactive, affect was full, he displayed appropriate memory and thought processes. Pt feels comfortable communicating with the medical team. Pt has a strong supportive network of family and friends who are involved. Pt and pt's family will benefit from ongoing psychosocial support in regards to coping with the adjustment to the BMT process.     Pt has a good support system and a well-developed caregiver plan. Pt verbalizes understanding of the transplant process and wanting to proceed. SW provided contact information and encouraged pt to contact SW with questions, concerns, resources and for support.    Per this assessment, SW did not identify any barriers to this patient moving forward with transplant.    Important Information:   -Pt reports he using chewing tobacco. Pt would like gum or the patch when admitted to the hospital.    Follow up Planned:   -Follow-up on LLS Co-Pay to see if he applied.  -Psychosocial support  -Healthcare Directive - Check in to see if pt wants to complete while inpatient.    Mesha MOSLEY, [facility]  Phone: [phone]  Pager: [phone]

## 2020-08-28 NOTE — PROGRESS NOTES
"BMT Teaching Flowsheet    Theo Roblero is a 55 year old male  Non Hodgkin's Lymphoma    Teaching Topic: MT 2016 w/ BEAM Conditioning    Person(s) involved in teaching: {BMT TEACHIN}  Motivation Level  Asks Questions: {NA YES NO, EXPLAIN:089780::\"Yes\"}  Eager to Learn: {NA YES NO, EXPLAIN:538354::\"Yes\"}  Cooperative: {NA YES NO, EXPLAIN:552476::\"Yes\"}  Receptive (willing/able to accept information): {NA YES NO, EXPLAIN:526043::\"Yes\"}  Any cultural factors/Orthodox beliefs that may influence understanding or compliance? {NA NO YES, EXPLAIN:300560::\"No\"}    {PATIENT, FAMILY, CAREGIVER:079911::\"Patient\"} demonstrates understanding of the following:  - Reason for the appointment, diagnosis and treatment plan: {NA YES NO, EXPLAIN:424439::\"Yes\"}  - Knowledge of proper use of medications and conditions for which they are ordered (with special attention to potential side effects or drug interactions): {NA YES NO, EXPLAIN:579455::\"Yes\"}  - Which situations necessitate calling provider and whom to contact: {NA YES NO, EXPLAIN:143825::\"Yes\"}    Teaching concerns addressed: ***    Proper use and care of (medical equipment, care aids, etc.) {NA YES NO, EXPLAIN:756848::\"Yes\"}  Pain management techniques: {NA YES NO, EXPLAIN:887649::\"Yes\"}  Patient instructed on hand hygiene: {NA YES NO, EXPLAIN:718992::\"Yes\"}  How and/when to access community resources: {NA YES NO, EXPLAIN:778388::\"Yes\"}    Infection Control:  {PATIENT, FAMILY, CAREGIVER:149400::\"Patient\"} demonstrates understanding of the following:  Surgical procedure site care taught {NA YES NO, EXPLAIN:185217::\"Yes\"}  Signs and symptoms of infection taught {NA YES NO, EXPLAIN:986144::\"Yes\"}  Wound care taught {NA YES NO, EXPLAIN:431860::\"Yes\"}  Central venous catheter care taught {NA YES NO, EXPLAIN:679733::\"Yes\"}    Instructional Materials Used/Given: Autologous Blood and Marrow Transplant Patient Binder with protocol specific materials    Research " "participant Theo Roblero was provided the information on the Research Participant Information Sheet by Jessika Marroquin RN on August 28, 2020. This document contains information regarding the risks of participating in a research study during the COVID-19 pandemic. Receipt of the information sheet was confirmed by study personnel prior to the visit.    Time spent with patient: {MINUTES:572041} minutes.    Specific Concerns: {NA YES NO, EXPLAIN:542134::\"Yes\"}  "

## 2020-08-28 NOTE — LETTER
8/28/2020         RE: Theo Roblero  [address]  [address]  [place] [id number]        Dear Colleague,    Thank you for referring your patient, Theo Roblero, to the [facility] BLOOD AND MARROW TRANSPLANT. Please see a copy of my visit note below.    Blood and Marrow Transplant   Psychosocial Assessment with   Clinical     Assessment completed on 8/28/2020 via phone as part of the COVID 19 protocol. Assessment of living situation, support system, financial status, functional status, coping, stressors, need for resources and social work intervention provided as needed. Information for this assessment was provided by pt's report in addition to medical chart review and consultation with medical team.     Present at Assessment:   Patient: Theo Roblero  : DIMITRI Dobbins LICSW    Diagnosis: Mantle Cell Lymphoma     Date of Diagnosis: 2/7/2020    Transplant type: Autologous    Donor: Autologous     Physician: Sam Zavala MD    Nurse Coordinator: Marissa Whaley RN    Social Workers: DIMITRI Dobbins LICSW     Permanent Address:   [address] [place] [address]    Living Situation: Pt lives at home with his daughter (16 yrs old).     Contact Information:  Pt's Cell Phone: [phone]  SonSheri Roblero's Phone: [phone]    Presenting Information:  Theo is a 55 year old male diagnosed with Mantle Cell Lymphoma who presents for evaluation for autologous transplant at the [facility] ([facility]).      Decision Making: Self    Health Care Directive:   No. SW provided education and forms. SW encouraged pt to have discussions with their family regarding their health care wishes. SW explained that since he does not have a healthcare directive, legally his son-Juan Antonio Roblero would make decisions on his behalf, if he did not have capacity to make his own medical decisions. Pt is legally  and his daughter is only 16 years old; therefore,  his son would be the legal decision maker. Pt understood and would like to complete a healthcare directive. Pt is agreeable to having his son listed as an emergency contact during this time.    Relationship Status: . Pt is now engaged to is significant other-Hayde. Pt described relationship as stable and supportive.     Family/Support System: Pt endorsed a good support system including family and close friends who will be available to support pt throughout transplant process.     Fiance: Hayde (She does not live with pt)  Children: Son-Juan Antonio Roblero (26-Lives in [place], [place]) and Daughter-Leelee Roblero (16-Lives with pt)  Parents: Mother (Lives in [place], [place])  Siblings: 1 Sister and 1 Brother-Carroll Roblero (Both live in [place], [place])    Caregiver: ERICH discussed with pt the caregiver role and expectation at length. Pt is agreeable to having a full time caregiver for a minimum of 30 days until cleared by the BMT physician. Pt confirmed understanding of the caregiver requirement. Pt's primary caregiver will be his brother-Carroll with his fiPaul as a secondary. Pt's brother will be coming to stay with pt in his home in [place], [place]. Pt reviewed and signed the caregiver contract which will be scanned into the EMR. Caregiver education and resources provided. No caregiver concerns identified.     Caregiver Contact Information:  Brother-Carroll Roblero (Primary)  Melissa (Secondary)    Transportation Mode: Private Vehicle. SW provided information on parking info and monthly parking pass options.     Insurance: Pt has [employer] health insurance. Pt is on furlough right now and has to pay his insurance premiums each month which is $211. ERICH talked with pt about LLS Co-Pay Assistance as funding is open for Mantle Cell Lymphoma. Pt is interested in applying but has not done so yet. ERICH reached out to the Clinic Pharmacy Liaison to see if they can help pt apply.     Pt denied any other specific  "insurance concerns at this time. SW reiterated information about the [employer]  should specific insurance questions arise as pt moves through transplant process. Future Insurance questions referred to [employer] -Fabiola Stewart (P: [phone]) and/or Paige Maldonado (P: [phone]).    Sources of Income: Pt is supported by unemployment. Pt denied anticipation of financial hardship related to BMT at this time. SW provided information on charmaine options and encouraged pt to contact this SW for support should financial situation change.     Employment: [employer]. Pt is currently on [employer].      Mental Health: Pt denied a history of mental health concerns, specific diagnoses or medications at this time. SW explained that it's not uncommon for patients going through transplant to experience symptoms of depression/anxiety.     PHQ-9:  Pt scored a 0 which indicates no sign of depression on the depression severity scale. Pt endorses this is an accurate reflection of his emotional state.    GAD7:  Pt scored a 0 which indicates no sign of anxiety on the Generalized Anxiety Disorder Questionnaire. Pt endorses this is an accurate reflection of his emotional state.    Chemical Use:   Tobacco: Pt reports he using chewing tobacco. Pt would like gum or the patch when admitted to the hospital.  Alcohol: Occasional use; haven't consumed alcohol really since he started treatment.  Marijuana: No hx  Other Drugs: No hx  Based on the information provided, there appear to be no specific risks or concerns identified at this time.     Trauma/Loss/Abuse History: Multiple losses associated with cancer diagnosis and treatment, including health, employment, changes to physical appearance, etc.     Spirituality: SW explained that there are Chaplains on the unit and pt can request to meet with a  at anytime.    Coping: Pt noted that he is currently feeling \"pretty good\". Pt shared that his main " coping mechanisms are talking with family/friends and exercise. SW and pt discussed additional positive coping mechanisms that pt can utilize while in the hospital.     Education Provided: Transplant process expectations, Caregiver requirements, Caregiver self-care, Financial issues related to transplant, Financial resources/grants available, Common psychosocial stressors pre/post transplant, Support group(s) available, Hospital resources available, Web site information, Social Work role and Resources for children.    Interventions Provided: Psychosocial Support and Education     Assessment and Recommendations for Team:  Pt is a 55 year old male diagnosed with Mantle Cell Lymphoma who is here undergoing preparation for a planned autologous transplant.     Pt is pleasant, calm and able to articulate concerns/coping mechanisms in an appropriate manner. During our meeting pt was alert, he was interactive, affect was full, he displayed appropriate memory and thought processes. Pt feels comfortable communicating with the medical team. Pt has a strong supportive network of family and friends who are involved. Pt and pt's family will benefit from ongoing psychosocial support in regards to coping with the adjustment to the BMT process.     Pt has a good support system and a well-developed caregiver plan. Pt verbalizes understanding of the transplant process and wanting to proceed. SW provided contact information and encouraged pt to contact SW with questions, concerns, resources and for support.    Per this assessment, SW did not identify any barriers to this patient moving forward with transplant.    Important Information:   -Pt reports he using chewing tobacco. Pt would like gum or the patch when admitted to the hospital.    Follow up Planned:   -Follow-up on LLS Co-Pay to see if he applied.  -Psychosocial support  -Healthcare Directive - Check in to see if pt wants to complete while inpatient.    Mesha Marcelino MSW,  [facility]  Phone: [phone]  Pager: [phone]    Again, thank you for allowing me to participate in the care of your patient.        Sincerely,        DIMITRI Dobbins

## 2020-08-28 NOTE — PROGRESS NOTES
Pharmacy Assessment - Pre-Stem Cell Transplant    Assessments & Recommendations:  1) Palifermin 3 doses pre and post BMT  2) Endocrine consult on admission for steroid induced hyperglycemia prevention as Type 2 DM   3) Hold Metformin and glimepiride during the acute phase of transplant   4) Smokeless tobacco dependence , suggest nicotine patch if needed.   5) Ice cryotherapy 2-4 hours around melphalan     History of Present Illness:  Theo Roblero is a 55 year old year old male diagnosed with mantle cell lymphoma.  He has been treated with Max-R-CHOP x 8 cycles with alt with 6 cycles HD LITZY C .  He is now being work up for autologous Stem Cell Transplant on protocol [id number], which utilizes BEAM as a conditioning regimen.    Pertinent labs/tests:  Viral Testing:  CMV(Pending) / HSV(-/-) / EBV(+) / VZV (+)  Ejection Fraction: 50% (8/27)  QTc: 450msec (8/26)    Weights:   Wt Readings from Last 3 Encounters:   08/27/20 109.9 kg (242 lb 4.8 oz)   08/26/20 107.5 kg (236 lb 15.9 oz)   08/26/20 107.5 kg (237 lb)   Ideal body weight: 75.3 kg (166 lb 0.1 oz)  Adjusted ideal body weight: 89.1 kg (196 lb 8.4 oz)  % IBW:  143  There is no height or weight on file to calculate BMI.    Primary BMT Physician:   BMT RN Coordinator:  Jessika Marroquin    Past Medical History:  No past medical history on file.    Medication Allergies:  No Known Allergies    Current Medications (pre-admit):  Current Outpatient Medications   Medication Sig Dispense Refill     acetaminophen (TYLENOL) 500 MG tablet Take 500-1,000 mg by mouth       glimepiride (AMARYL) 4 MG tablet        metFORMIN (GLUCOPHAGE) 500 MG tablet 2 times daily (with meals)        metoprolol succinate ER (TOPROL-XL) 25 MG 24 hr tablet States he takes metoprolol BID         Herbal Medication/Nutritional Supplements:  No issues    Smoking/Past Drug Use:  Smokeless tobacco use     Nausea/Vomiting, Pain, or other issues:  No issues     Summary:  I met with Theo CARCAMO  Osbaldo via teleconference for approximately 45 minutes.  We discussed priming options, conditioning chemotherapy, antiemetics, prophylactic antibiotics and miscellaneous medications(Palifermin and vaccinations) .

## 2020-08-29 ASSESSMENT — ANXIETY QUESTIONNAIRES: GAD7 TOTAL SCORE: 0

## 2020-08-30 NOTE — PROGRESS NOTES
BMT History & Physical     Name: Theo Roblero  Date:  8/30/2020  Service: BMT   Resuscitation Status: Full Code    Patient ID:  Theo Roblero is a 55 year old male with high risk (IPI 6.5 and Ki67 of 20%), stage IV Mantle cell lymphoma in CR1 s/p 8 cycles of alternating 6 cycles of Maxi-R- CHOP alternating with high-dose LITZY-C. Last cycle was given on 6/11/2020. His therapy was complicated by a kidney stone and sepsis in 5/2020, prior to cycle 5. Upon diagnosis, a CT was performed to evaluate pain concerning for an aortic dissection and showed LAD above and below the diaphragm, with large portacaval nodes measure in excess of 5cm. No thoracoabdominal aortic dissection or aneurysm was found.The findings of LAD were confirmed by a PET/CT. Lymph node biopsy on 2/7/2020 showed MCL, as described as CD20 positive B cells with positive staining for CD5 and cyclin D1.  The Ki-67 proliferation index was 20%. A bone marrow biopsy on 2/18/2020 was positive for intramedullary MCL (<5%). Early in his disease course, he reportedly developed a sepsis syndrome with leukocytosis (17.8) a creatinine 2.1 and was admitted on 2/20/2020. Kidney function has since improved.     He went on to receive Maxi-R- CHOP alternating with high-dose LITZY-C from 2-6/2020. Follow up imaging post therapy on 7/6/2020 showed a CR by PET CT, with resolution of avid lymph nodes. A persistent pleural effusion was observed and not PET avid.       Pre-transplant restaging PET CT confirms a stable CR post initial therapy. Bone marrow shows a normocellular marrow with trilineage hematopoiesis (crush artifact noted). Flow negative. LDH normal at 150. Mild anemia, but no other cytopenia. Intact IgG. EBV+, VZV+, HSV-, CMV PENDING. HBV HCV HIV WNV CLEMENTE PENDING.    The open visit identified some degree of frailty at a score of 2. Thus, will consider pepper-transplant PT/OT.    PFTs within standard limits. ECHO shows an EF of 50% with early diastolic  dysfunction. EKG shows NSR.    Colonoscopy in 2018, no indication to repeat at this time.    Awaiting local path review.     He present today for stem cell mobilization and ASCT planning.     PMHx:  1. MCL  2. DM  3. HTN  4. Kidney stone     SHx: Currently chews tobacco, does not smoke.  Occasional ETOH.  No recreational drug use.     FMHx:  Mother, lymphoma and non-melanoma skin cancer.      Bone Marrow Workup Results:  Blood Counts Recent Labs   Lab Test 08/27/20  1349   WBC 6.4   ANEU 5.0   ALYM 0.8   CLAUDIA 0.5   AEOS 0.1   HGB 11.7*   HCT 35.4*         Bone Marrow No evidence of disease, trilineage hematopoiesis.   Blood Type Recent Labs   Lab Test 08/26/20  0955   ABO AB      Chemistries Recent Labs   Lab Test 08/26/20  0955      POTASSIUM 4.0   CHLORIDE 108   CO2 20   BUN 15   CR 0.87      Liver Tests Recent Labs   Lab Test 08/26/20  0955   BILITOTAL 0.2   ALKPHOS 81   AST 18   ALT 23      PET/CT: IMPRESSION: In this patient with history of non-Hodgkin's lymphoma  status post chemotherapy:  1. Findings are compatible with complete metabolic response to  treatment by PET lugano criteria. No FDG avid lymph nodes are  identified.  2. Small left pleural effusion with associated compressive atelectasis  of the dependent portions of the left lung. The lungs are otherwise  clear.   Chest X-Ray: 1. Small left pleural effusion.  2. Bibasilar linear opacities, which may represent atelectasis, edema  and/or infection..     PFTs FVC%  No results for input(s): 94164 in the last 23588 hours.    FEV1%  No results for input(s): 20016 in the last 60159 hours.    DLCO%  No results for input(s): 20143 in the last 22753 hours.   ECHO or MUGA: ECHO:  50%   EKG NSR   Serologies: EBV+, VZV+, HSV-, CMV PENDING     Vitamin D No results for input(s): VITDT in the last 94627 hours.     Family History:   Family History   Problem Relation Age of Onset     Heart Disease Mother      Lymphoma Mother      Heart Disease Father       Heart Disease Sister        Social History:   Social History     Socioeconomic History     Marital status: Single     Spouse name: Not on file     Number of children: Not on file     Years of education: Not on file     Highest education level: Not on file   Occupational History     Not on file   Social Needs     Financial resource strain: Not on file     Food insecurity     Worry: Not on file     Inability: Not on file     Transportation needs     Medical: Not on file     Non-medical: Not on file   Tobacco Use     Smoking status: Never Smoker     Smokeless tobacco: Current User     Types: Chew     Tobacco comment: trying to quit   Substance and Sexual Activity     Alcohol use: Not Currently     Drug use: Never     Sexual activity: Not on file   Lifestyle     Physical activity     Days per week: Not on file     Minutes per session: Not on file     Stress: Not on file   Relationships     Social connections     Talks on phone: Not on file     Gets together: Not on file     Attends [employer]ism service: Not on file     Active member of club or organization: Not on file     Attends meetings of clubs or organizations: Not on file     Relationship status: Not on file     Intimate partner violence     Fear of current or ex partner: Not on file     Emotionally abused: Not on file     Physically abused: Not on file     Forced sexual activity: Not on file   Other Topics Concern     Not on file   Social History Narrative     Not on file     Allergies: No Known Allergies    Home Medications      Prior to Admission medications    Medication Sig Start Date End Date Taking? Authorizing Provider   acetaminophen (TYLENOL) 500 MG tablet Take 500-1,000 mg by mouth    Reported, Patient   glimepiride (AMARYL) 4 MG tablet  6/26/20   Reported, Patient   metFORMIN (GLUCOPHAGE) 500 MG tablet 2 times daily (with meals)  6/26/20   Reported, Patient   metoprolol succinate ER (TOPROL-XL) 25 MG 24 hr tablet States he takes metoprolol BID 8/1/20    Reported, Patient       Review of Systems    Review of Systems:  CONSTITUTIONAL: NEGATIVE for fever, chills, change in weight  INTEGUMENTARY/SKIN: NEGATIVE for worrisome rashes, moles or lesions  EYES: NEGATIVE for vision changes or irritation  ENT/MOUTH: NEGATIVE for ear, mouth and throat problems  RESP: NEGATIVE for significant cough or SOB  BREAST: NEGATIVE for masses, tenderness or discharge  CV: NEGATIVE for chest pain, palpitations or peripheral edema  GI: NEGATIVE for nausea, abdominal pain, heartburn, or change in bowel habits  : NEGATIVE for frequency, dysuria, or hematuria  MUSCULOSKELETAL: NEGATIVE for significant arthralgias or myalgia  NEURO: NEGATIVE for weakness, dizziness or paresthesias  ENDOCRINE: NEGATIVE for temperature intolerance, skin/hair changes  HEME/ALLERGY: NEGATIVE for bleeding problems  PSYCHIATRIC: NEGATIVE for changes in mood or affect    PHYSICAL EXAM      Weight     Wt Readings from Last 3 Encounters:   08/31/20 109.6 kg (241 lb 9.6 oz)   08/27/20 109.9 kg (242 lb 4.8 oz)   08/26/20 107.5 kg (236 lb 15.9 oz)          BP (!) 159/98   Pulse 89   Temp 98.1  F (36.7  C)   Wt 109.6 kg (241 lb 9.6 oz)   SpO2 99%   BMI 33.71 kg/m       General: NAD   Eyes: MARYAN, sclera anicteric   Nose/Mouth/Throat: OP clear, buccal mucosa moist, no ulcerations   Lungs: CTA bilaterally  Cardiovascular: RRR, no M/R/G   Abdominal/Rectal: +BS, soft, NT, ND, No HSM   Lymphatics: No edema  Skin: No rashes or petechaie  Neuro: A&O   Additional Findings: Forte site NT, no drainage.    ASSESSMENT BY SYSTEMS   Theo Roblero is a 55 year old male with high risk (IPI 6.5 and Ki67 of 20%), stage IV Mantle cell lymphoma in CR1. We recommend proceeding with GCSF stem mobilization and collection, followed by a BEAM condition autologous HCT. KGF can be used to reduce mucositis. We will likely pursue post-transplant maintenance rituximab for 1-2 years total.    1.  BMT: Prep as above. Allopurinol through day  0. Flush and pre-meds prior to transplant. GCSF starts d+5 and cont to until ANC>1500 x3 consecutive days. Re-stage per protocol.    2.  HEME: Keep Hgb>8 and plts>10K. No pre-meds.                            3.  ID: Afebrile. Cont Levo, Fluc, and ACV (CMV+, HSV-, EBV+) prophy. Bactrim or appropriate PCP therapy to start d+28.                                             4.  GI: Zofran and dexamethasone prior to chemo to prevent N/V. Ativan and compazine available PRN break-through symptoms. Protonix for GI prophy. Ursodiol for VOD prophy.    5.  FEN/Renal: Monitor creat and lytes. Replete lytes PRN per SS. Monitor weight, I+O, lytes per protocol with IVF flush.    BMT and Cell Therapy Informed Consent Discussion  In today's visit, we discussed in detail the research for which Theo Roblero is eligible. We discussed the potential risks and potential benefits of each protocol individually. We explained potential alternatives to the protocols discussed. We explained to the patient that participation is voluntary and that consent may be withdrawn at any time.     The patient completed the last round of treatment on 7/2020.    HCT-CI score: 0 We counseled the patient about the impact of this on the risk of treatment related and overall mortality. The score fit within treatment protocol eligibility criteria.    Karnofsky performance score: 90       Active infections: None, history of pleural effusion since diagnosis. Not PET avid.    Reproductive status: What methods of birth control does the patient plan to use during the treatment period beginning with conditioning and ending with the discontinuation of immune suppression (indicate with an X all that apply):  __ The patient is confirmed to be sterile or post-menopausal  __ Sexual abstinence  _X_ Condoms  __ Implants  __ Injectables  __ Oral contraceptives  __ Intrauterine devices (IUD)  __ Other (describe)    The patient received appropriate reproductive counseling  and agreed with the need for effective contraception during the treatment procedures.      Dental health suitable to proceed: He will be scheduled to see a Dentist.    After our detailed discussion above, the patient signed the following consents for treatment and protocols:    [id number]     The patient will receive a signed copy of the consents. The patient had opportunity to ask questions that were answered to the best of my ability and to the patient's apparent satisfaction.    Sam Zavala MD

## 2020-08-31 ENCOUNTER — PRE VISIT (OUTPATIENT)
Dept: INTERVENTIONAL RADIOLOGY/VASCULAR | Facility: CLINIC | Age: 55
End: 2020-08-31

## 2020-08-31 ENCOUNTER — VIRTUAL VISIT (OUTPATIENT)
Dept: INTERVENTIONAL RADIOLOGY/VASCULAR | Facility: CLINIC | Age: 55
End: 2020-08-31
Attending: INTERNAL MEDICINE
Payer: COMMERCIAL

## 2020-08-31 ENCOUNTER — OFFICE VISIT (OUTPATIENT)
Dept: TRANSPLANT | Facility: CLINIC | Age: 55
End: 2020-08-31
Attending: INTERNAL MEDICINE
Payer: COMMERCIAL

## 2020-08-31 VITALS
TEMPERATURE: 98.1 F | BODY MASS INDEX: 33.71 KG/M2 | OXYGEN SATURATION: 99 % | SYSTOLIC BLOOD PRESSURE: 159 MMHG | WEIGHT: 241.6 LBS | HEART RATE: 89 BPM | DIASTOLIC BLOOD PRESSURE: 98 MMHG

## 2020-08-31 DIAGNOSIS — C83.18 MANTLE CELL LYMPHOMA OF LYMPH NODES OF MULTIPLE REGIONS (H): Primary | ICD-10-CM

## 2020-08-31 DIAGNOSIS — C83.18 MANTLE CELL LYMPHOMA OF LYMPH NODES OF MULTIPLE REGIONS (H): ICD-10-CM

## 2020-08-31 DIAGNOSIS — Z86.2 PERSONAL HISTORY OF DISEASES OF BLOOD AND BLOOD-FORMING ORGANS: ICD-10-CM

## 2020-08-31 DIAGNOSIS — C83.18 MANTLE CELL LYMPHOMA OF LYMPH NODES OF MULTIPLE SITES (H): Primary | ICD-10-CM

## 2020-08-31 PROBLEM — K57.30 DIVERTICULOSIS OF SIGMOID COLON: Status: ACTIVE | Noted: 2020-06-14

## 2020-08-31 PROBLEM — Z72.0 TOBACCO USER: Status: ACTIVE | Noted: 2020-06-14

## 2020-08-31 PROBLEM — E78.5 HLD (HYPERLIPIDEMIA): Status: ACTIVE | Noted: 2020-02-06

## 2020-08-31 PROBLEM — E66.9 OBESITY: Status: ACTIVE | Noted: 2020-06-14

## 2020-08-31 PROBLEM — N52.9 IMPOTENCE OF ORGANIC ORIGIN: Status: ACTIVE | Noted: 2020-06-14

## 2020-08-31 PROBLEM — E11.9 TYPE 2 DIABETES MELLITUS, WITHOUT LONG-TERM CURRENT USE OF INSULIN (H): Status: ACTIVE | Noted: 2020-02-06

## 2020-08-31 PROBLEM — H25.10 NUCLEAR SCLEROSIS: Status: ACTIVE | Noted: 2020-06-14

## 2020-08-31 PROBLEM — E11.311 DIABETIC MACULAR EDEMA (H): Status: ACTIVE | Noted: 2020-06-14

## 2020-08-31 PROBLEM — I10 HTN (HYPERTENSION): Status: ACTIVE | Noted: 2020-02-06

## 2020-08-31 PROCEDURE — G0463 HOSPITAL OUTPT CLINIC VISIT: HCPCS | Mod: ZF

## 2020-08-31 ASSESSMENT — PAIN SCALES - GENERAL: PAINLEVEL: NO PAIN (0)

## 2020-08-31 NOTE — PROGRESS NOTES
Theo is a 56 yo male who is being evaluated via a billable telephone visit.       Please call patient on Home phone.      The patient has been notified of following:      This telephone visit will be conducted via a call between you and your physician/provider. We have found that certain health care needs can be provided without the need for a physical exam.  This service lets us provide the care you need with a short phone conversation.  If a prescription is necessary we can send it directly to your pharmacy.  If lab work is needed we can place an order for that and you can then stop by our lab to have the test done at a later time.     Telephone visits are billed at different rates depending on your insurance coverage. During this emergency period, for some insurers they may be billed the same as an in-person visit.  Please reach out to your insurance provider with any questions.     If during the course of the call the physician/provider feels a telephone visit is not appropriate, you will not be charged for this service.     Physician has received verbal consent for a Telephone Visit from the patient? Yes     How would you like to obtain your AVS? My chart    Call started at  9:20 AM  Call ended at 9:40 AM  ___________________________________________________  Interventional Radiology Consult    First Name: Theo  Age: 55 year old   Referring Physician: Dr. Mae   REASON FOR REFERRAL: Education and evaluation for tunneled catheter placement  Patient is undergoing w/u for autologous BMT, using his own stem cells as the donor     HPI:  This is a patient with high risk (IPI 6.5 and Ki67 of 20%), stage IV Mantle cell lymphoma in CR1 s/p 8 cycles of alternating 6 cycles of Maxi-R- CHOP alternating with high-dose LITZY-C. Last cycle was given on 6/11/2020. His therapy was complicated by a kidney stone and sepsis in 5/2020, prior to cycle 5. Upon diagnosis, a CT was performed to evaluate pain concerning for an  aortic dissection and showed LAD above and below the diaphragm, with large portacaval nodes measure in excess of 5cm. No thoracoabdominal aortic dissection or aneurysm was found.The findings of LAD were confirmed by a PET/CT. Lymph node biopsy on 2/7/2020 showed MCL, as described as CD20 positive B cells with positive staining for CD5 and cyclin D1.  The Ki-67 proliferation index was 20%. A bone marrow biopsy on 2/18/2020 was positive for intramedullary MCL (<5%). Early in his disease course, he reportedly developed a sepsis syndrome with leukocytosis (17.8) a creatinine 2.1 and was admitted on 2/20/2020. Kidney function has since improved.   Follow up imaging post therapy on 7/6/2020 showed a CR by PET CT, with resolution of avid lymph nodes. A persistent pleural effusion was observed and not PET avid.   He is now undergoing work-up for autologous BMT.     LINE HX:  1. 1. 2/18/2020:  Right internal jugular vein single lumen port a cath.  Catheter tip at the superior cavoatrial junction.  No problems with the  Functioning of the port.    PAST MEDICAL HISTORY:   Past Medical History:   Diagnosis Date     Kidney stones 06/2020    left     Sepsis without septic shock (H) 05/12/2020    seconary tp pyelonephritis from obstruction left ureteroliethasis     PAST SURGICAL HISTORY:   Past Surgical History:   Procedure Laterality Date     cystoscopy, left pyelogram, ureteral stent placement  05/12/2020    stone was blocking the ureter     cystoscopy, retrograde pyelogram, uretroscopy. laser lithotripsy Left 06/04/2020    treat kidney stones     port a cath placement Right 02/18/2020    internal jugular vein     FAMILY HISTORY:   Family History   Problem Relation Age of Onset     Heart Disease Mother      Lymphoma Mother      Heart Disease Father      Heart Disease Sister      SOCIAL HISTORY:   Social History     Tobacco Use     Smoking status: Never Smoker     Smokeless tobacco: Current User     Types: Chew     Tobacco  comment: trying to quit   Substance Use Topics     Alcohol use: Not Currently     PROBLEM LIST:   Patient Active Problem List    Diagnosis Date Noted     Mantle cell lymphoma of lymph nodes of multiple sites (H) 08/10/2020     Priority: Medium     Tobacco user 2020     Priority: Medium     Obesity 2020     Priority: Medium     Nuclear sclerosis 2020     Priority: Medium     Impotence of organic origin 2020     Priority: Medium     Diverticulosis of sigmoid colon 2020     Priority: Medium     Diabetic macular edema (H) 2020     Priority: Medium     HTN (hypertension) 2020     Priority: Medium     HLD (hyperlipidemia) 2020     Priority: Medium     Type 2 diabetes mellitus, without long-term current use of insulin (H) 2020     Priority: Medium     MEDICATIONS:   Prescription Medications as of 2020       Rx Number Disp Refills Start End Last Dispensed Date Next Fill Date Owning Pharmacy    acetaminophen (TYLENOL) 500 MG tablet            Sig: Take 500-1,000 mg by mouth    Class: Historical    Route: Oral    glimepiride (AMARYL) 4 MG tablet    2020        Sig: Take 4 mg by mouth every morning (before breakfast)     Class: Historical    Route: Oral    metFORMIN (GLUCOPHAGE) 500 MG tablet    2020        Si times daily (with meals)     Class: Historical    metoprolol succinate ER (TOPROL-XL) 25 MG 24 hr tablet    2020        Sig: States he takes metoprolol BID    Class: Historical        ALLERGIES:  No Known Allergies  VITALS: There were no vitals taken for this visit.    ROS:  Skin: negative  Musculoskeletal: negative  Neurologic: negative  Psychiatric: negative    Physical Examination: virtual visit  Pleasant, oriented      RESULTS:  BMP RESULTS:  Lab Results   Component Value Date     2020    POTASSIUM 4.0 2020    CHLORIDE 108 2020    CO2 20 2020    ANIONGAP 10 2020     (H) 2020    BUN 15  08/26/2020    CR 0.87 08/26/2020    GFRESTIMATED >90 08/26/2020    GFRESTBLACK >90 08/26/2020    BELA 8.7 08/26/2020        CBC RESULTS:  Lab Results   Component Value Date    WBC 6.4 08/27/2020    RBC 3.68 (L) 08/27/2020    HGB 11.7 (L) 08/27/2020    HCT 35.4 (L) 08/27/2020    MCV 96 08/27/2020    MCH 31.8 08/27/2020    MCHC 33.1 08/27/2020    RDW 12.7 08/27/2020     08/27/2020       INR/PTT:  Lab Results   Component Value Date    INR 1.04 08/26/2020    PTT 39 (H) 08/26/2020     NOTE:  Hold Metformin and Glimepiride the morning of the procedure    ASSESSMENT/PLAN:  Type of catheter: Large bore double lumen tunneled apheresis capable catheter     Preferred Location: Left  Internal Jugular Vein     Platelet count is   Lab Results   Component Value Date     08/27/2020    , and coagulation factors are   Lab Results   Component Value Date    INR 1.04 08/26/2020    ,  Lab Results   Component Value Date    PTT 39 08/26/2020   . The patient is at low risk for bleeding during the procedure.    PROVIDER NOTE:  The tunneled catheter placement procedure and its risks including but not limited to bleeding, infection, fibrin sheath formation and blood clots was explained to Theo.    CONSENT: Affirmation of informed written consent was not obtained.     INSTRUCTIONS/GUIDELINES:   Eating and drinking restriction guidelines were reviewed., Anti-bacterial scrub was given and instructions for its use were reviewed to decrease the risk of infection on the day of the procedure., I explained the expected length of time the tunneled catheter could remain in place., I explained that when they went [facility] they would be taught about exit site dressing care, flushing of the lumens and how to care for the catheter when bathing., I explained that when the port a cath is now in use that it will need to be flushed once a month., The role of Interventional Radiology was reviewed. and The principles of  infection control were reviewed.     Clari Pulido MS, APRN, CNS, CRN  Clinical Nurse Specialist  Interventional Radiology  [phone] (voice mail)  [phone] (pager)    CC  Patient Care Team:  Mike Price as PCP - General (Family Practice)  Martin Connell MD as Referring Physician (Hematology & Oncology)  Mesha Marcelino, MSW as  (BMT - Adult)  MARTIN CONNELL

## 2020-08-31 NOTE — LETTER
8/31/2020         RE: Theo Roblero  [address]  [address]  [place] [address]        Dear Colleague,    Thank you for referring your patient, Theo Roblero, to the [facility]. Please see a copy of my visit note below.      BMT History & Physical     Name: Theo Roblero  Date:  8/30/2020  Service: BMT   Resuscitation Status: Full Code    Patient ID:  Theo Roblero is a 55 year old male with high risk (IPI 6.5 and Ki67 of 20%), stage IV Mantle cell lymphoma in CR1 s/p 8 cycles of alternating 6 cycles of Maxi-R- CHOP alternating with high-dose LITZY-C. Last cycle was given on 6/11/2020. His therapy was complicated by a kidney stone and sepsis in 5/2020, prior to cycle 5. Upon diagnosis, a CT was performed to evaluate pain concerning for an aortic dissection and showed LAD above and below the diaphragm, with large portacaval nodes measure in excess of 5cm. No thoracoabdominal aortic dissection or aneurysm was found.The findings of LAD were confirmed by a PET/CT. Lymph node biopsy on 2/7/2020 showed MCL, as described as CD20 positive B cells with positive staining for CD5 and cyclin D1.  The Ki-67 proliferation index was 20%. A bone marrow biopsy on 2/18/2020 was positive for intramedullary MCL (<5%). Early in his disease course, he reportedly developed a sepsis syndrome with leukocytosis (17.8) a creatinine 2.1 and was admitted on 2/20/2020. Kidney function has since improved.     He went on to receive Maxi-R- CHOP alternating with high-dose LITZY-C from 2-6/2020. Follow up imaging post therapy on 7/6/2020 showed a CR by PET CT, with resolution of avid lymph nodes. A persistent pleural effusion was observed and not PET avid.       Pre-transplant restaging PET CT confirms a stable CR post initial therapy. Bone marrow shows a normocellular marrow with trilineage hematopoiesis (crush artifact noted). Flow negative. LDH normal at 150. Mild anemia, but no other cytopenia. Intact  IgG. EBV+, VZV+, HSV-, CMV PENDING. HBV HCV HIV WNV CLEMENTE PENDING.    The open visit identified some degree of frailty at a score of 2. Thus, will consider pepper-transplant PT/OT.    PFTs within standard limits. ECHO shows an EF of 50% with early diastolic dysfunction. EKG shows NSR.    Colonoscopy in 2018, no indication to repeat at this time.    Awaiting local path review.     He present today for stem cell mobilization and ASCT planning.     PMHx:  1. MCL  2. DM  3. HTN  4. Kidney stone     SHx: Currently chews tobacco, does not smoke.  Occasional ETOH.  No recreational drug use.     FMHx:  Mother, lymphoma and non-melanoma skin cancer.      Bone Marrow Workup Results:  Blood Counts Recent Labs   Lab Test 08/27/20  1349   WBC 6.4   ANEU 5.0   ALYM 0.8   CLAUDIA 0.5   AEOS 0.1   HGB 11.7*   HCT 35.4*         Bone Marrow No evidence of disease, trilineage hematopoiesis.   Blood Type Recent Labs   Lab Test 08/26/20  0955   ABO AB      Chemistries Recent Labs   Lab Test 08/26/20  0955      POTASSIUM 4.0   CHLORIDE 108   CO2 20   BUN 15   CR 0.87      Liver Tests Recent Labs   Lab Test 08/26/20  0955   BILITOTAL 0.2   ALKPHOS 81   AST 18   ALT 23      PET/CT: IMPRESSION: In this patient with history of non-Hodgkin's lymphoma  status post chemotherapy:  1. Findings are compatible with complete metabolic response to  treatment by PET lugano criteria. No FDG avid lymph nodes are  identified.  2. Small left pleural effusion with associated compressive atelectasis  of the dependent portions of the left lung. The lungs are otherwise  clear.   Chest X-Ray: 1. Small left pleural effusion.  2. Bibasilar linear opacities, which may represent atelectasis, edema  and/or infection..     PFTs FVC%  No results for input(s): 20003 in the last 03065 hours.    FEV1%  No results for input(s): 20016 in the last 98744 hours.    DLCO%  No results for input(s): 20143 in the last 60883 hours.   ECHO or MUGA: ECHO:  50%   EKG NSR    Serologies: EBV+, VZV+, HSV-, CMV PENDING     Vitamin D No results for input(s): VITDT in the last 22521 hours.     Family History:   Family History   Problem Relation Age of Onset     Heart Disease Mother      Lymphoma Mother      Heart Disease Father      Heart Disease Sister        Social History:   Social History     Socioeconomic History     Marital status: Single     Spouse name: Not on file     Number of children: Not on file     Years of education: Not on file     Highest education level: Not on file   Occupational History     Not on file   Social Needs     Financial resource strain: Not on file     Food insecurity     Worry: Not on file     Inability: Not on file     Transportation needs     Medical: Not on file     Non-medical: Not on file   Tobacco Use     Smoking status: Never Smoker     Smokeless tobacco: Current User     Types: Chew     Tobacco comment: trying to quit   Substance and Sexual Activity     Alcohol use: Not Currently     Drug use: Never     Sexual activity: Not on file   Lifestyle     Physical activity     Days per week: Not on file     Minutes per session: Not on file     Stress: Not on file   Relationships     Social connections     Talks on phone: Not on file     Gets together: Not on file     Attends Nondenominational service: Not on file     Active member of club or organization: Not on file     Attends meetings of clubs or organizations: Not on file     Relationship status: Not on file     Intimate partner violence     Fear of current or ex partner: Not on file     Emotionally abused: Not on file     Physically abused: Not on file     Forced sexual activity: Not on file   Other Topics Concern     Not on file   Social History Narrative     Not on file     Allergies: No Known Allergies    Home Medications      Prior to Admission medications    Medication Sig Start Date End Date Taking? Authorizing Provider   acetaminophen (TYLENOL) 500 MG tablet Take 500-1,000 mg by mouth    Reported,  Patient   glimepiride (AMARYL) 4 MG tablet  6/26/20   Reported, Patient   metFORMIN (GLUCOPHAGE) 500 MG tablet 2 times daily (with meals)  6/26/20   Reported, Patient   metoprolol succinate ER (TOPROL-XL) 25 MG 24 hr tablet States he takes metoprolol BID 8/1/20   Reported, Patient       Review of Systems    Review of Systems:  CONSTITUTIONAL: NEGATIVE for fever, chills, change in weight  INTEGUMENTARY/SKIN: NEGATIVE for worrisome rashes, moles or lesions  EYES: NEGATIVE for vision changes or irritation  ENT/MOUTH: NEGATIVE for ear, mouth and throat problems  RESP: NEGATIVE for significant cough or SOB  BREAST: NEGATIVE for masses, tenderness or discharge  CV: NEGATIVE for chest pain, palpitations or peripheral edema  GI: NEGATIVE for nausea, abdominal pain, heartburn, or change in bowel habits  : NEGATIVE for frequency, dysuria, or hematuria  MUSCULOSKELETAL: NEGATIVE for significant arthralgias or myalgia  NEURO: NEGATIVE for weakness, dizziness or paresthesias  ENDOCRINE: NEGATIVE for temperature intolerance, skin/hair changes  HEME/ALLERGY: NEGATIVE for bleeding problems  PSYCHIATRIC: NEGATIVE for changes in mood or affect    PHYSICAL EXAM      Weight     Wt Readings from Last 3 Encounters:   08/31/20 109.6 kg (241 lb 9.6 oz)   08/27/20 109.9 kg (242 lb 4.8 oz)   08/26/20 107.5 kg (236 lb 15.9 oz)          BP (!) 159/98   Pulse 89   Temp 98.1  F (36.7  C)   Wt 109.6 kg (241 lb 9.6 oz)   SpO2 99%   BMI 33.71 kg/m       General: NAD   Eyes: MARYAN, sclera anicteric   Nose/Mouth/Throat: OP clear, buccal mucosa moist, no ulcerations   Lungs: CTA bilaterally  Cardiovascular: RRR, no M/R/G   Abdominal/Rectal: +BS, soft, NT, ND, No HSM   Lymphatics: No edema  Skin: No rashes or petechaie  Neuro: A&O   Additional Findings: Forte site NT, no drainage.    ASSESSMENT BY SYSTEMS   Theo Roblero is a 55 year old male with high risk (IPI 6.5 and Ki67 of 20%), stage IV Mantle cell lymphoma in CR1. We recommend  proceeding with GCSF stem mobilization and collection, followed by a BEAM condition autologous HCT. KGF can be used to reduce mucositis. We will likely pursue post-transplant maintenance rituximab for 1-2 years total.    1.  BMT: Prep as above. Allopurinol through day 0. Flush and pre-meds prior to transplant. GCSF starts d+5 and cont to until ANC>1500 x3 consecutive days. Re-stage per protocol.    2.  HEME: Keep Hgb>8 and plts>10K. No pre-meds.                            3.  ID: Afebrile. Cont Levo, Fluc, and ACV (CMV+, HSV-, EBV+) prophy. Bactrim or appropriate PCP therapy to start d+28.                                             4.  GI: Zofran and dexamethasone prior to chemo to prevent N/V. Ativan and compazine available PRN break-through symptoms. Protonix for GI prophy. Ursodiol for VOD prophy.    5.  FEN/Renal: Monitor creat and lytes. Replete lytes PRN per SS. Monitor weight, I+O, lytes per protocol with IVF flush.    BMT and Cell Therapy Informed Consent Discussion  In today's visit, we discussed in detail the research for which Theo Roblero is eligible. We discussed the potential risks and potential benefits of each protocol individually. We explained potential alternatives to the protocols discussed. We explained to the patient that participation is voluntary and that consent may be withdrawn at any time.     The patient completed the last round of treatment on 7/2020.    HCT-CI score: 0 We counseled the patient about the impact of this on the risk of treatment related and overall mortality. The score fit within treatment protocol eligibility criteria.    Karnofsky performance score: 90       Active infections: None, history of pleural effusion since diagnosis. Not PET avid.    Reproductive status: What methods of birth control does the patient plan to use during the treatment period beginning with conditioning and ending with the discontinuation of immune suppression (indicate with an X all that  apply):  __ The patient is confirmed to be sterile or post-menopausal  __ Sexual abstinence  _X_ Condoms  __ Implants  __ Injectables  __ Oral contraceptives  __ Intrauterine devices (IUD)  __ Other (describe)    The patient received appropriate reproductive counseling and agreed with the need for effective contraception during the treatment procedures.      Dental health suitable to proceed: He will be scheduled to see a Dentist.    After our detailed discussion above, the patient signed the following consents for treatment and protocols:    [id number]     The patient will receive a signed copy of the consents. The patient had opportunity to ask questions that were answered to the best of my ability and to the patient's apparent satisfaction.    Sam Zavala MD

## 2020-08-31 NOTE — PATIENT INSTRUCTIONS
Tunneled Catheter Placement Instructions    1. The BMT clinic will let you know the day of the procedure, where to report to and the time to arrive.    2. No solid foods or milk products for 6 hours prior to the procedure    3. You may have clear liquids up to 2 hours prior to the procedure (water, apple juice, broth, coffee or tea without milk or sugar, jell-o, white grape juice)    4. Anti-bacterial scrub  -Two times the day before the procedure, and once the day of the  procedure  -Think of a big square:  mid chest to ear lobes, both sides of the chest and neck area  -Use a clean washcloth each time.  Wet the washcloth.  Place a capful of the scrub on the wet washcloth and rub it all in, wash the area and leave it on for 5 minutes    -After 5 minutes, rinse off the washcloth, then rinse off the skin and  pat the skin dry with a clean towel  -Or if you prefer, you may wash the scrub off in the shower  -No lotion or powders on the neck or chest area the day before or the day of the procedure.  But you may use deodorant.    5. Hold Metformin and Glimepiride the morning of the procedure    Clari Pulido, CNS  Interventional Radiology

## 2020-08-31 NOTE — NURSING NOTE
"Oncology Rooming Note    August 31, 2020 11:45 AM   Theo Roblero is a 55 year old male who presents for:    Chief Complaint   Patient presents with     Oncology Clinic Visit     Mantle cell lymphoma of lymph nodes of multiple sites (H)     Initial Vitals: BP (!) 159/98   Pulse 89   Temp 98.1  F (36.7  C)   Wt 109.6 kg (241 lb 9.6 oz)   SpO2 99%   BMI 33.71 kg/m   Estimated body mass index is 33.71 kg/m  as calculated from the following:    Height as of 8/26/20: 1.803 m (5' 10.98\").    Weight as of this encounter: 109.6 kg (241 lb 9.6 oz). Body surface area is 2.34 meters squared.  No Pain (0) Comment: Data Unavailable   No LMP for male patient.  Allergies reviewed: Yes  Medications reviewed: Yes    Medications: Medication refills not needed today.  Pharmacy name entered into [facility]: [id number] - [place], [place] - [address] AT [facility] OF [address]    Clinical concerns: No new concerns today. Dr. Zavala was notified.      Sherman Perez MA            "

## 2020-08-31 NOTE — LETTER
8/31/2020     RE: Theo Roblero  [address] [place] [address]  [address] [place] [address]     Dear Colleague,    Thank you for referring your patient, Theo Roblero, to the [facility] INTERVENTIONAL RADIOLOGY at [facility]. Please see a copy of my visit note below.    Theo is a 54 yo male who is being evaluated via a billable telephone visit.       Please call patient on Home phone.      The patient has been notified of following:      This telephone visit will be conducted via a call between you and your physician/provider. We have found that certain health care needs can be provided without the need for a physical exam.  This service lets us provide the care you need with a short phone conversation.  If a prescription is necessary we can send it directly to your pharmacy.  If lab work is needed we can place an order for that and you can then stop by our lab to have the test done at a later time.     Telephone visits are billed at different rates depending on your insurance coverage. During this emergency period, for some insurers they may be billed the same as an in-person visit.  Please reach out to your insurance provider with any questions.     If during the course of the call the physician/provider feels a telephone visit is not appropriate, you will not be charged for this service.     Physician has received verbal consent for a Telephone Visit from the patient? Yes     How would you like to obtain your AVS? My chart    Call started at  9:20 AM  Call ended at 9:40 AM  ___________________________________________________  Interventional Radiology Consult    First Name: Theo  Age: 55 year old   Referring Physician: Dr. Mae   REASON FOR REFERRAL: Education and evaluation for tunneled catheter placement  Patient is undergoing w/u for autologous BMT, using his own stem cells as the donor     HPI:  This is a patient with high risk (IPI 6.5 and Ki67 of 20%), stage IV Mantle cell  lymphoma in CR1 s/p 8 cycles of alternating 6 cycles of Maxi-R- CHOP alternating with high-dose LITZY-C. Last cycle was given on 6/11/2020. His therapy was complicated by a kidney stone and sepsis in 5/2020, prior to cycle 5. Upon diagnosis, a CT was performed to evaluate pain concerning for an aortic dissection and showed LAD above and below the diaphragm, with large portacaval nodes measure in excess of 5cm. No thoracoabdominal aortic dissection or aneurysm was found.The findings of LAD were confirmed by a PET/CT. Lymph node biopsy on 2/7/2020 showed MCL, as described as CD20 positive B cells with positive staining for CD5 and cyclin D1.  The Ki-67 proliferation index was 20%. A bone marrow biopsy on 2/18/2020 was positive for intramedullary MCL (<5%). Early in his disease course, he reportedly developed a sepsis syndrome with leukocytosis (17.8) a creatinine 2.1 and was admitted on 2/20/2020. Kidney function has since improved.   Follow up imaging post therapy on 7/6/2020 showed a CR by PET CT, with resolution of avid lymph nodes. A persistent pleural effusion was observed and not PET avid.   He is now undergoing work-up for autologous BMT.     LINE HX:  1. 1. 2/18/2020:  Right internal jugular vein single lumen port a cath.  Catheter tip at the superior cavoatrial junction.  No problems with the  Functioning of the port.    PAST MEDICAL HISTORY:   Past Medical History:   Diagnosis Date     Kidney stones 06/2020    left     Sepsis without septic shock (H) 05/12/2020    seconary tp pyelonephritis from obstruction left ureteroliethasis     PAST SURGICAL HISTORY:   Past Surgical History:   Procedure Laterality Date     cystoscopy, left pyelogram, ureteral stent placement  05/12/2020    stone was blocking the ureter     cystoscopy, retrograde pyelogram, uretroscopy. laser lithotripsy Left 06/04/2020    treat kidney stones     port a cath placement Right 02/18/2020    internal jugular vein     FAMILY HISTORY:   Family  History   Problem Relation Age of Onset     Heart Disease Mother      Lymphoma Mother      Heart Disease Father      Heart Disease Sister      SOCIAL HISTORY:   Social History     Tobacco Use     Smoking status: Never Smoker     Smokeless tobacco: Current User     Types: Chew     Tobacco comment: trying to quit   Substance Use Topics     Alcohol use: Not Currently     PROBLEM LIST:   Patient Active Problem List    Diagnosis Date Noted     Mantle cell lymphoma of lymph nodes of multiple sites (H) 08/10/2020     Priority: Medium     Tobacco user 2020     Priority: Medium     Obesity 2020     Priority: Medium     Nuclear sclerosis 2020     Priority: Medium     Impotence of organic origin 2020     Priority: Medium     Diverticulosis of sigmoid colon 2020     Priority: Medium     Diabetic macular edema (H) 2020     Priority: Medium     HTN (hypertension) 2020     Priority: Medium     HLD (hyperlipidemia) 2020     Priority: Medium     Type 2 diabetes mellitus, without long-term current use of insulin (H) 2020     Priority: Medium     MEDICATIONS:   Prescription Medications as of 2020       Rx Number Disp Refills Start End Last Dispensed Date Next Fill Date Owning Pharmacy    acetaminophen (TYLENOL) 500 MG tablet            Sig: Take 500-1,000 mg by mouth    Class: Historical    Route: Oral    glimepiride (AMARYL) 4 MG tablet    2020        Sig: Take 4 mg by mouth every morning (before breakfast)     Class: Historical    Route: Oral    metFORMIN (GLUCOPHAGE) 500 MG tablet    2020        Si times daily (with meals)     Class: Historical    metoprolol succinate ER (TOPROL-XL) 25 MG 24 hr tablet    2020        Sig: States he takes metoprolol BID    Class: Historical        ALLERGIES:  No Known Allergies  VITALS: There were no vitals taken for this visit.    ROS:  Skin: negative  Musculoskeletal: negative  Neurologic: negative  Psychiatric:  negative    Physical Examination: virtual visit  Pleasant, oriented      RESULTS:  BMP RESULTS:  Lab Results   Component Value Date     08/26/2020    POTASSIUM 4.0 08/26/2020    CHLORIDE 108 08/26/2020    CO2 20 08/26/2020    ANIONGAP 10 08/26/2020     (H) 08/26/2020    BUN 15 08/26/2020    CR 0.87 08/26/2020    GFRESTIMATED >90 08/26/2020    GFRESTBLACK >90 08/26/2020    BELA 8.7 08/26/2020        CBC RESULTS:  Lab Results   Component Value Date    WBC 6.4 08/27/2020    RBC 3.68 (L) 08/27/2020    HGB 11.7 (L) 08/27/2020    HCT 35.4 (L) 08/27/2020    MCV 96 08/27/2020    MCH 31.8 08/27/2020    MCHC 33.1 08/27/2020    RDW 12.7 08/27/2020     08/27/2020       INR/PTT:  Lab Results   Component Value Date    INR 1.04 08/26/2020    PTT 39 (H) 08/26/2020     NOTE:  Hold Metformin and Glimepiride the morning of the procedure    ASSESSMENT/PLAN:  Type of catheter: Large bore double lumen tunneled apheresis capable catheter     Preferred Location: Left  Internal Jugular Vein     Platelet count is   Lab Results   Component Value Date     08/27/2020    , and coagulation factors are   Lab Results   Component Value Date    INR 1.04 08/26/2020    ,  Lab Results   Component Value Date    PTT 39 08/26/2020   . The patient is at low risk for bleeding during the procedure.    PROVIDER NOTE:  The tunneled catheter placement procedure and its risks including but not limited to bleeding, infection, fibrin sheath formation and blood clots was explained to Theo.    CONSENT: Affirmation of informed written consent was not obtained.     INSTRUCTIONS/GUIDELINES:   Eating and drinking restriction guidelines were reviewed., Anti-bacterial scrub was given and instructions for its use were reviewed to decrease the risk of infection on the day of the procedure., I explained the expected length of time the tunneled catheter could remain in place., I explained that when they went to [facility] they would  be taught about exit site dressing care, flushing of the lumens and how to care for the catheter when bathing., I explained that when the port a cath is now in use that it will need to be flushed once a month., The role of Interventional Radiology was reviewed. and The principles of infection control were reviewed.     Clari Pulido MS, APRN, CNS, CRN  Clinical Nurse Specialist  Interventional Radiology  [phone] (voice mail)  [phone] (pager)    CC  Patient Care Team:  Mike Price as PCP - General (Family Practice)  Martin Connell MD as Referring Physician (Hematology & Oncology)  Mesha Marcelino, MSW as  (BMT - Adult)  MARTIN CONNELL

## 2020-09-01 ENCOUNTER — CARE COORDINATION (OUTPATIENT)
Dept: TRANSPLANT | Facility: CLINIC | Age: 55
End: 2020-09-01

## 2020-09-01 LAB
DLCOCOR-%PRED-PRE: 108 %
DLCOCOR-PRE: 31.81 ML/MIN/MMHG
DLCOUNC-%PRED-PRE: 98 %
DLCOUNC-PRE: 28.86 ML/MIN/MMHG
DLCOUNC-PRED: 29.21 ML/MIN/MMHG
ERV-%PRED-PRE: 126 %
ERV-PRE: 1.28 L
ERV-PRED: 1.01 L
EXPTIME-PRE: 6.32 SEC
FEF2575-%PRED-PRE: 143 %
FEF2575-PRE: 4.75 L/SEC
FEF2575-PRED: 3.31 L/SEC
FEFMAX-%PRED-PRE: 109 %
FEFMAX-PRE: 10.64 L/SEC
FEFMAX-PRED: 9.72 L/SEC
FEV1-%PRED-PRE: 103 %
FEV1-PRE: 4 L
FEV1FEV6-PRE: 85 %
FEV1FEV6-PRED: 80 %
FEV1FVC-PRE: 85 %
FEV1FVC-PRED: 78 %
FEV1SVC-PRE: 85 %
FEV1SVC-PRED: 73 %
FIFMAX-PRE: 7.59 L/SEC
FRCPLETH-%PRED-PRE: 109 %
FRCPLETH-PRE: 3.96 L
FRCPLETH-PRED: 3.63 L
FVC-%PRED-PRE: 94 %
FVC-PRE: 4.69 L
FVC-PRED: 4.96 L
IC-%PRED-PRE: 80 %
IC-PRE: 3.41 L
IC-PRED: 4.25 L
RVPLETH-%PRED-PRE: 113 %
RVPLETH-PRE: 2.68 L
RVPLETH-PRED: 2.36 L
TLCPLETH-%PRED-PRE: 100 %
TLCPLETH-PRE: 7.37 L
TLCPLETH-PRED: 7.33 L
VA-%PRED-PRE: 100 %
VA-PRE: 6.84 L
VC-%PRED-PRE: 89 %
VC-PRE: 4.69 L
VC-PRED: 5.26 L

## 2020-09-01 NOTE — PROGRESS NOTES
[facility] has no openings until October 2020. To avoid delay idenitifed local dental provider near patient, taking new patients: [facility] [phone]. They are holding appointment for patient Sept 10th, 2020 11:30am-1:00pm. Patient will need Dental clearance to be sent to BMT office to submit to insurance for Transplant PA. Fax number provided. Patient states he will call and touch base to finalize referral.

## 2020-09-02 LAB
DONOR CYTOMEGALOVIRUS ABY: POSITIVE
DONOR HEP B CORE ABY: NONREACTIVE
DONOR HEP B SURF AGN: NONREACTIVE
DONOR HEPATITIS C ABY: ABNORMAL
DONOR HTLV 1&2 ANTIBODY: NONREACTIVE
DONOR TREPONEMA PAL ABY: NONREACTIVE
HIV1+2 AB SERPL QL IA: NONREACTIVE
MPX SERIES: NONREACTIVE
T CRUZI AB SER DONR QL: NONREACTIVE
WNV RNA SPEC QL NAA+PROBE: NONREACTIVE

## 2020-09-04 ENCOUNTER — MEDICAL CORRESPONDENCE (OUTPATIENT)
Dept: TRANSPLANT | Facility: CLINIC | Age: 55
End: 2020-09-04

## 2020-09-04 LAB — COPATH REPORT: NORMAL

## 2020-09-08 PROCEDURE — 00000345 ZZHCL STATISTIC REV BONE MARROW OUTSIDE SLIDES TC 88321: Performed by: INTERNAL MEDICINE

## 2020-09-09 ENCOUNTER — TELEPHONE (OUTPATIENT)
Dept: TRANSPLANT | Facility: CLINIC | Age: 55
End: 2020-09-09

## 2020-09-09 DIAGNOSIS — C83.18 MANTLE CELL LYMPHOMA OF LYMPH NODES OF MULTIPLE REGIONS (H): Primary | ICD-10-CM

## 2020-09-09 NOTE — TELEPHONE ENCOUNTER
Spoke to patient to arrange for scheduling lab appt. His Hep C Joan for BMT work up came back inconclusive, will need repeat testing completed. He is fine with 9/9 or 9/11 per availability of appointment.

## 2020-09-11 DIAGNOSIS — C83.18 MANTLE CELL LYMPHOMA OF LYMPH NODES OF MULTIPLE REGIONS (H): ICD-10-CM

## 2020-09-11 LAB
COPATH REPORT: NORMAL
HCV AB SERPL QL IA: NONREACTIVE

## 2020-09-11 PROCEDURE — 86803 HEPATITIS C AB TEST: CPT | Performed by: INTERNAL MEDICINE

## 2020-09-11 NOTE — NURSING NOTE
"Chief Complaint   Patient presents with     Port Draw     port accessed and labs drawn by rn in lab.  lab only appointment.     Port accessed with 20g 3/4\" gripper needle by RN in lab.  Port flushed with saline and heparin.  Port de-accessed.    Leola Hines RN      "

## 2020-09-14 LAB — COPATH REPORT: NORMAL

## 2020-09-17 LAB — COPATH REPORT: NORMAL

## 2020-09-18 RX ORDER — AMOXICILLIN 500 MG/1
2000 CAPSULE ORAL ONCE
Qty: 4 CAPSULE | Refills: 0 | Status: SHIPPED | OUTPATIENT
Start: 2020-09-18 | End: 2020-09-18

## 2020-10-05 ENCOUNTER — TELEPHONE (OUTPATIENT)
Dept: TRANSPLANT | Facility: CLINIC | Age: 55
End: 2020-10-05

## 2020-10-05 NOTE — TELEPHONE ENCOUNTER
Patient had multiple teeth extracted on Thursday, October 1st, 2020. He states that he is feeling well and has not had any pain over the weekend following this procedure. He received rituxan on approx 9/28, d/t delay for dental evaluation and extraction.   He will need two weeks recovery prior to transplant. Notifying transplant intake to schedule for needed repeat testing.

## 2020-10-06 DIAGNOSIS — C83.18 MANTLE CELL LYMPHOMA OF LYMPH NODES OF MULTIPLE REGIONS (H): Primary | ICD-10-CM

## 2020-10-06 DIAGNOSIS — Z86.2 PERSONAL HISTORY OF DISEASES OF BLOOD AND BLOOD-FORMING ORGANS: ICD-10-CM

## 2020-10-13 ENCOUNTER — OFFICE VISIT (OUTPATIENT)
Dept: TRANSPLANT | Facility: CLINIC | Age: 55
End: 2020-10-13
Attending: PHYSICIAN ASSISTANT
Payer: COMMERCIAL

## 2020-10-13 ENCOUNTER — ANCILLARY PROCEDURE (OUTPATIENT)
Dept: CT IMAGING | Facility: CLINIC | Age: 55
End: 2020-10-13
Attending: INTERNAL MEDICINE
Payer: COMMERCIAL

## 2020-10-13 VITALS
TEMPERATURE: 98.3 F | OXYGEN SATURATION: 96 % | SYSTOLIC BLOOD PRESSURE: 144 MMHG | HEART RATE: 78 BPM | BODY MASS INDEX: 33.59 KG/M2 | WEIGHT: 240.7 LBS | RESPIRATION RATE: 14 BRPM | DIASTOLIC BLOOD PRESSURE: 85 MMHG

## 2020-10-13 DIAGNOSIS — C83.18 MANTLE CELL LYMPHOMA OF LYMPH NODES OF MULTIPLE REGIONS (H): ICD-10-CM

## 2020-10-13 DIAGNOSIS — Z86.2 PERSONAL HISTORY OF DISEASES OF BLOOD AND BLOOD-FORMING ORGANS: ICD-10-CM

## 2020-10-13 LAB
ABO + RH BLD: NORMAL
ABO + RH BLD: NORMAL
ALBUMIN SERPL-MCNC: 3.6 G/DL (ref 3.4–5)
ALP SERPL-CCNC: 87 U/L (ref 40–150)
ALT SERPL W P-5'-P-CCNC: 22 U/L (ref 0–70)
ANION GAP SERPL CALCULATED.3IONS-SCNC: 9 MMOL/L (ref 3–14)
APTT PPP: 31 SEC (ref 22–37)
AST SERPL W P-5'-P-CCNC: 18 U/L (ref 0–45)
BASOPHILS # BLD AUTO: 0 10E9/L (ref 0–0.2)
BASOPHILS NFR BLD AUTO: 0.3 %
BILIRUB SERPL-MCNC: 0.4 MG/DL (ref 0.2–1.3)
BLD GP AB SCN SERPL QL: NORMAL
BLOOD BANK CMNT PATIENT-IMP: NORMAL
BUN SERPL-MCNC: 20 MG/DL (ref 7–30)
CALCIUM SERPL-MCNC: 9.2 MG/DL (ref 8.5–10.1)
CHLORIDE SERPL-SCNC: 106 MMOL/L (ref 94–109)
CO2 SERPL-SCNC: 24 MMOL/L (ref 20–32)
CREAT SERPL-MCNC: 1 MG/DL (ref 0.66–1.25)
DIFFERENTIAL METHOD BLD: ABNORMAL
EOSINOPHIL # BLD AUTO: 0.1 10E9/L (ref 0–0.7)
EOSINOPHIL NFR BLD AUTO: 1.7 %
ERYTHROCYTE [DISTWIDTH] IN BLOOD BY AUTOMATED COUNT: 12 % (ref 10–15)
GFR SERPL CREATININE-BSD FRML MDRD: 84 ML/MIN/{1.73_M2}
GLUCOSE SERPL-MCNC: 154 MG/DL (ref 70–99)
HCT VFR BLD AUTO: 40.1 % (ref 40–53)
HGB BLD-MCNC: 13.2 G/DL (ref 13.3–17.7)
IMM GRANULOCYTES # BLD: 0.1 10E9/L (ref 0–0.4)
IMM GRANULOCYTES NFR BLD: 2 %
INR PPP: 1.05 (ref 0.86–1.14)
LYMPHOCYTES # BLD AUTO: 0.5 10E9/L (ref 0.8–5.3)
LYMPHOCYTES NFR BLD AUTO: 7.7 %
MCH RBC QN AUTO: 30.9 PG (ref 26.5–33)
MCHC RBC AUTO-ENTMCNC: 32.9 G/DL (ref 31.5–36.5)
MCV RBC AUTO: 94 FL (ref 78–100)
MONOCYTES # BLD AUTO: 0.5 10E9/L (ref 0–1.3)
MONOCYTES NFR BLD AUTO: 9 %
NEUTROPHILS # BLD AUTO: 4.8 10E9/L (ref 1.6–8.3)
NEUTROPHILS NFR BLD AUTO: 79.3 %
NRBC # BLD AUTO: 0 10*3/UL
NRBC BLD AUTO-RTO: 0 /100
PLATELET # BLD AUTO: 228 10E9/L (ref 150–450)
POTASSIUM SERPL-SCNC: 4 MMOL/L (ref 3.4–5.3)
PROT SERPL-MCNC: 7.1 G/DL (ref 6.8–8.8)
RBC # BLD AUTO: 4.27 10E12/L (ref 4.4–5.9)
SODIUM SERPL-SCNC: 139 MMOL/L (ref 133–144)
SPECIMEN EXP DATE BLD: NORMAL
WBC # BLD AUTO: 6 10E9/L (ref 4–11)

## 2020-10-13 PROCEDURE — 88184 FLOWCYTOMETRY/ TC 1 MARKER: CPT | Mod: TC | Performed by: INTERNAL MEDICINE

## 2020-10-13 PROCEDURE — 85610 PROTHROMBIN TIME: CPT | Performed by: PATHOLOGY

## 2020-10-13 PROCEDURE — 999N001022 HC STATISTIC H-SPHEME PROCESS B/S: Performed by: INTERNAL MEDICINE

## 2020-10-13 PROCEDURE — 99207 PR NO CHARGE LOS: CPT

## 2020-10-13 PROCEDURE — 86704 HEP B CORE ANTIBODY TOTAL: CPT | Performed by: INTERNAL MEDICINE

## 2020-10-13 PROCEDURE — 86753 PROTOZOA ANTIBODY NOS: CPT | Performed by: INTERNAL MEDICINE

## 2020-10-13 PROCEDURE — 999N001102 HC STATISTIC DNA PROCESS AND HOLD: Performed by: INTERNAL MEDICINE

## 2020-10-13 PROCEDURE — 88185 FLOWCYTOMETRY/TC ADD-ON: CPT | Mod: TC | Performed by: INTERNAL MEDICINE

## 2020-10-13 PROCEDURE — 88275 CYTOGENETICS 100-300: CPT | Mod: TC | Performed by: INTERNAL MEDICINE

## 2020-10-13 PROCEDURE — 86687 HTLV-I ANTIBODY: CPT | Performed by: INTERNAL MEDICINE

## 2020-10-13 PROCEDURE — 38222 DX BONE MARROW BX & ASPIR: CPT

## 2020-10-13 PROCEDURE — 71260 CT THORAX DX C+: CPT | Performed by: STUDENT IN AN ORGANIZED HEALTH CARE EDUCATION/TRAINING PROGRAM

## 2020-10-13 PROCEDURE — 999N001136 HC STATISTIC FLOW INT 9-15 ABY TC 88188: Performed by: INTERNAL MEDICINE

## 2020-10-13 PROCEDURE — 88341 IMHCHEM/IMCYTCHM EA ADD ANTB: CPT | Mod: TC | Performed by: INTERNAL MEDICINE

## 2020-10-13 PROCEDURE — 85730 THROMBOPLASTIN TIME PARTIAL: CPT | Performed by: PATHOLOGY

## 2020-10-13 PROCEDURE — 86695 HERPES SIMPLEX TYPE 1 TEST: CPT | Performed by: INTERNAL MEDICINE

## 2020-10-13 PROCEDURE — 86803 HEPATITIS C AB TEST: CPT | Performed by: INTERNAL MEDICINE

## 2020-10-13 PROCEDURE — 87521 HEPATITIS C PROBE&RVRS TRNSC: CPT | Performed by: INTERNAL MEDICINE

## 2020-10-13 PROCEDURE — 88342 IMHCHEM/IMCYTCHM 1ST ANTB: CPT | Mod: TC | Performed by: INTERNAL MEDICINE

## 2020-10-13 PROCEDURE — 86850 RBC ANTIBODY SCREEN: CPT | Performed by: PATHOLOGY

## 2020-10-13 PROCEDURE — 86644 CMV ANTIBODY: CPT | Performed by: INTERNAL MEDICINE

## 2020-10-13 PROCEDURE — 250N000011 HC RX IP 250 OP 636: Performed by: PHYSICIAN ASSISTANT

## 2020-10-13 PROCEDURE — 85097 BONE MARROW INTERPRETATION: CPT | Mod: GC | Performed by: PATHOLOGY

## 2020-10-13 PROCEDURE — 86703 HIV-1/HIV-2 1 RESULT ANTBDY: CPT | Performed by: INTERNAL MEDICINE

## 2020-10-13 PROCEDURE — 88305 TISSUE EXAM BY PATHOLOGIST: CPT | Mod: 26 | Performed by: PATHOLOGY

## 2020-10-13 PROCEDURE — 87798 DETECT AGENT NOS DNA AMP: CPT | Performed by: INTERNAL MEDICINE

## 2020-10-13 PROCEDURE — 87535 HIV-1 PROBE&REVERSE TRNSCRPJ: CPT | Performed by: INTERNAL MEDICINE

## 2020-10-13 PROCEDURE — 88237 TISSUE CULTURE BONE MARROW: CPT | Mod: TC | Performed by: INTERNAL MEDICINE

## 2020-10-13 PROCEDURE — 38222 DX BONE MARROW BX & ASPIR: CPT | Mod: LT

## 2020-10-13 PROCEDURE — 999N001086 HC STATISTIC MORPHOLOGY W/INTERP HEMEPATH TC 85060: Performed by: INTERNAL MEDICINE

## 2020-10-13 PROCEDURE — 88341 IMHCHEM/IMCYTCHM EA ADD ANTB: CPT | Mod: 26 | Performed by: PATHOLOGY

## 2020-10-13 PROCEDURE — 88161 CYTOPATH SMEAR OTHER SOURCE: CPT | Mod: TC | Performed by: INTERNAL MEDICINE

## 2020-10-13 PROCEDURE — 88342 IMHCHEM/IMCYTCHM 1ST ANTB: CPT | Mod: 26 | Performed by: PATHOLOGY

## 2020-10-13 PROCEDURE — 88271 CYTOGENETICS DNA PROBE: CPT | Mod: TC | Performed by: INTERNAL MEDICINE

## 2020-10-13 PROCEDURE — 88311 DECALCIFY TISSUE: CPT | Mod: TC | Performed by: INTERNAL MEDICINE

## 2020-10-13 PROCEDURE — 87516 HEPATITIS B DNA AMP PROBE: CPT | Performed by: INTERNAL MEDICINE

## 2020-10-13 PROCEDURE — 80053 COMPREHEN METABOLIC PANEL: CPT | Performed by: PATHOLOGY

## 2020-10-13 PROCEDURE — 999N001126 HC STATISTIC BONE MARROW INTERP TC 85097: Performed by: INTERNAL MEDICINE

## 2020-10-13 PROCEDURE — 85060 BLOOD SMEAR INTERPRETATION: CPT | Mod: GC | Performed by: PATHOLOGY

## 2020-10-13 PROCEDURE — 88188 FLOWCYTOMETRY/READ 9-15: CPT | Performed by: PATHOLOGY

## 2020-10-13 PROCEDURE — 74177 CT ABD & PELVIS W/CONTRAST: CPT | Performed by: STUDENT IN AN ORGANIZED HEALTH CARE EDUCATION/TRAINING PROGRAM

## 2020-10-13 PROCEDURE — 87340 HEPATITIS B SURFACE AG IA: CPT | Performed by: INTERNAL MEDICINE

## 2020-10-13 PROCEDURE — 88264 CHROMOSOME ANALYSIS 20-25: CPT | Mod: TC | Performed by: INTERNAL MEDICINE

## 2020-10-13 PROCEDURE — 88305 TISSUE EXAM BY PATHOLOGIST: CPT | Mod: TC | Performed by: INTERNAL MEDICINE

## 2020-10-13 PROCEDURE — 86696 HERPES SIMPLEX TYPE 2 TEST: CPT | Performed by: INTERNAL MEDICINE

## 2020-10-13 PROCEDURE — 86901 BLOOD TYPING SEROLOGIC RH(D): CPT | Performed by: PATHOLOGY

## 2020-10-13 PROCEDURE — 88311 DECALCIFY TISSUE: CPT | Mod: 26 | Performed by: PATHOLOGY

## 2020-10-13 PROCEDURE — 86780 TREPONEMA PALLIDUM: CPT | Performed by: INTERNAL MEDICINE

## 2020-10-13 PROCEDURE — 85025 COMPLETE CBC W/AUTO DIFF WBC: CPT | Performed by: PATHOLOGY

## 2020-10-13 PROCEDURE — 86900 BLOOD TYPING SEROLOGIC ABO: CPT | Performed by: PATHOLOGY

## 2020-10-13 PROCEDURE — 88280 CHROMOSOME KARYOTYPE STUDY: CPT | Mod: TC | Performed by: INTERNAL MEDICINE

## 2020-10-13 PROCEDURE — 86665 EPSTEIN-BARR CAPSID VCA: CPT | Performed by: INTERNAL MEDICINE

## 2020-10-13 RX ORDER — HEPARIN SODIUM (PORCINE) LOCK FLUSH IV SOLN 100 UNIT/ML 100 UNIT/ML
500 SOLUTION INTRAVENOUS ONCE
Status: COMPLETED | OUTPATIENT
Start: 2020-10-13 | End: 2020-10-13

## 2020-10-13 RX ORDER — IOPAMIDOL 755 MG/ML
135 INJECTION, SOLUTION INTRAVASCULAR ONCE
Status: COMPLETED | OUTPATIENT
Start: 2020-10-13 | End: 2020-10-13

## 2020-10-13 RX ORDER — HEPARIN SODIUM (PORCINE) LOCK FLUSH IV SOLN 100 UNIT/ML 100 UNIT/ML
5 SOLUTION INTRAVENOUS EVERY 8 HOURS
Status: DISCONTINUED | OUTPATIENT
Start: 2020-10-13 | End: 2020-10-13 | Stop reason: HOSPADM

## 2020-10-13 RX ADMIN — IOPAMIDOL 135 ML: 755 INJECTION, SOLUTION INTRAVASCULAR at 15:03

## 2020-10-13 RX ADMIN — HEPARIN SODIUM (PORCINE) LOCK FLUSH IV SOLN 100 UNIT/ML 500 UNITS: 100 SOLUTION at 15:23

## 2020-10-13 RX ADMIN — Medication 5 ML: at 14:05

## 2020-10-13 ASSESSMENT — PAIN SCALES - GENERAL
PAINLEVEL: NO PAIN (0)
PAINLEVEL: NO PAIN (0)

## 2020-10-13 NOTE — NURSING NOTE
"Oncology Rooming Note    October 13, 2020 1:34 PM   Theo Roblero is a 55 year old male who presents for:    Chief Complaint   Patient presents with     Bone Marrow Biopsy     here for BMBX procedure HX: MCL     Initial Vitals: BP (!) 144/81   Pulse 95   Temp 98.3  F (36.8  C)   Resp 16   Wt 109.2 kg (240 lb 11.2 oz)   SpO2 100%   BMI 33.59 kg/m   Estimated body mass index is 33.59 kg/m  as calculated from the following:    Height as of 8/26/20: 1.803 m (5' 10.98\").    Weight as of this encounter: 109.2 kg (240 lb 11.2 oz). Body surface area is 2.34 meters squared.  No Pain (0) Comment: Data Unavailable   No LMP for male patient.  Allergies reviewed: Yes  Medications reviewed: Yes    Medications: Medication refills not needed today.  Pharmacy name entered into [employer]: [facility] #[id number] [address] AT [facility] [address]    Clinical concerns: PT here for BMBX procedure.  PT drove himself to this appointment, and has a CT of chest/pelvis following this appt.  Pt denies current pain or other complaints at this visit.       Natasha Allen RN              "

## 2020-10-13 NOTE — NURSING NOTE
Pt remained supine for >30 min following completion of BMBX.  Pt awake and alert upon this RN entering the room.  Pt denies pain or nausea at this time.  Pt's left illiac crest site assessed.  Dressing CDI.  PT given verbal teaching re: care of this dressing, when to next shower, prn pain medication, s/s of bleeding/infection, and when/why to call MD with clinical concerns.  PT scheduled to return to BMT clinic next Monday to see MD Zavala.  Pt denies dizziness upon standing.  Right chest port remains accessed, heplocked.  PT to have CT of chest upon completion of this visit.

## 2020-10-13 NOTE — LETTER
10/13/2020         RE: Theo Roblero  [address] [place] [address]  [address]  [facility] [address]        Dear Colleague,    Thank you for referring your patient, Theo Roblero, to the [facility] BLOOD AND MARROW TRANSPLANT PROGRAM [place]. Please see a copy of my visit note below.    BMT ONC Adult Bone Marrow Biopsy Procedure Note  October 13, 2020  BP (!) 144/81   Pulse 95   Temp 98.3  F (36.8  C)   Resp 16   Wt 109.2 kg (240 lb 11.2 oz)   SpO2 100%   BMI 33.59 kg/m       Learning needs assessment complete within 12 months? YES    DIAGNOSIS: MCL     PROCEDURE: Unilateral Bone Marrow Biopsy and Unilateral Aspirate    LOCATION: [facility]    Patient s identification was positively verified by verbal identification and invasive procedure safety checklist was completed. Informed consent was obtained. Following the administration of no premedication--no . as pre-medication, patient was placed in the prone position and prepped and draped in a sterile manner. Approximately 20 cc of 1% Lidocaine was used over the left posterior iliac spine. Following this a 3 mm incision was made. Trephine bone marrow core(s) was (were) obtained from the [facility]. Bone marrow aspirates were obtained from the [facility]. Aspirates were sent for morphology, immunophenotyping, cytogenetics and molecular diagnostics gene rearrangement. A total of approximately 20 ml of marrow was aspirated. Following this procedure a sterile dressing was applied to the bone marrow biopsy site(s). The patient was placed in the supine position to maintain pressure on the biopsy site. Post-procedure wound care instructions were given.     Complications: NO    Pre-procedural pain: 0 out of 10 on the numeric pain rating scale.     Procedural pain: 4 out of 10 on the numeric pain rating scale.     Post-procedural pain assessment: 0 out of 10 on the numeric pain rating scale.     Interventions: NO    Length of procedure:20 minutes or less      Procedure  performed by: Diane Morgan PA-C #[id number]        Again, thank you for allowing me to participate in the care of your patient.        Sincerely,        [facility]

## 2020-10-13 NOTE — NURSING NOTE
"Oncology Rooming Note    October 13, 2020 1:07 PM   Theo Roblero is a 55 year old male who presents for:    No chief complaint on file.    Initial Vitals: BP (!) 144/81   Pulse 95   Temp 98.3  F (36.8  C)   Resp 16   Wt 109.2 kg (240 lb 11.2 oz)   SpO2 100%   BMI 33.59 kg/m   Estimated body mass index is 33.59 kg/m  as calculated from the following:    Height as of 8/26/20: 1.803 m (5' 10.98\").    Weight as of this encounter: 109.2 kg (240 lb 11.2 oz). Body surface area is 2.34 meters squared.  No Pain (0) Comment: Data Unavailable   No LMP for male patient.  Allergies reviewed: Yes  Medications reviewed: Yes    Medications: Medication refills not needed today.  Pharmacy name entered into [employer]: [facility] #[id number] - [place], [place] - [address] AT [facility] [address]    Clinical concerns: PT here for BMBX procedure.  PT denies complaints at this time.  Pt drove himself to this appointment.        Natasha Allen RN                "

## 2020-10-14 LAB
COPATH REPORT: NORMAL
EBV VCA IGG SER QL IA: >8 AI (ref 0–0.8)
HSV1 IGG SERPL QL IA: 0.2 AI (ref 0–0.8)
HSV2 IGG SERPL QL IA: <0.2 AI (ref 0–0.8)

## 2020-10-15 DIAGNOSIS — C83.18 MANTLE CELL LYMPHOMA OF LYMPH NODES OF MULTIPLE SITES (H): Primary | ICD-10-CM

## 2020-10-15 RX ORDER — HEPARIN SODIUM,PORCINE 10 UNIT/ML
5 VIAL (ML) INTRAVENOUS
Status: CANCELLED | OUTPATIENT
Start: 2020-01-01

## 2020-10-15 RX ORDER — HEPARIN SODIUM (PORCINE) LOCK FLUSH IV SOLN 100 UNIT/ML 100 UNIT/ML
5 SOLUTION INTRAVENOUS
Status: CANCELLED | OUTPATIENT
Start: 2020-01-01

## 2020-10-16 DIAGNOSIS — C83.18 MANTLE CELL LYMPHOMA OF LYMPH NODES OF MULTIPLE REGIONS (H): Primary | ICD-10-CM

## 2020-10-19 NOTE — NURSING NOTE
"Oncology Rooming Note    October 19, 2020 10:55 AM   Theo Roblero is a 55 year old male who presents for:    Chief Complaint   Patient presents with     Oncology Clinic Visit     MANTLE CELL LYMPHOMA      Initial Vitals: BP (!) 143/88   Pulse 79   Resp 16   Ht 1.803 m (5' 10.98\")   Wt 110.9 kg (244 lb 6.4 oz)   SpO2 99%   BMI 34.11 kg/m   Estimated body mass index is 34.11 kg/m  as calculated from the following:    Height as of this encounter: 1.803 m (5' 10.98\").    Weight as of this encounter: 110.9 kg (244 lb 6.4 oz). Body surface area is 2.36 meters squared.  No Pain (0) Comment: Data Unavailable   No LMP for male patient.  Allergies reviewed: Yes  Medications reviewed: Yes    Medications: Medication refills not needed today.  Pharmacy name entered into [employer]: [facility] #[id number] - [place], [place] - [address] AT [facility] [address]    Clinical concerns: No new concerns today  Dr Zavala was notified.      Мария Mcmahan            "

## 2020-10-19 NOTE — LETTER
10/19/2020         RE: Theo Roblero  [address] [facility]  [address] [place] [id number]        Dear Colleague,    Thank you for referring your patient, Theo Roblero, to the [facility] BLOOD AND MARROW TRANSPLANT PROGRAM [place]. Please see a copy of my visit note below.    BMT History & Physical     Admission  Name: Theo Roblero  Date:  10/16/2020  Service: BMT   Chief Complaint:     Informant:  Patient and Chart  Resuscitation Status: Full Code    Patient ID:  Theo Roblero is a 55 year old male with high risk (IPI 6.5 and Ki67 of 20%), stage IV Mantle cell lymphoma in CR1 s/p 8 cycles of alternating 6 cycles of Maxi-R- CHOP alternating with high-dose LITZY-C. Last cycle was given on 6/11/2020. His therapy was complicated by a kidney stone and sepsis in 5/2020, prior to cycle 5. Upon diagnosis, a CT was performed to evaluate pain concerning for an aortic dissection and showed LAD above and below the diaphragm, with large portacaval nodes measure in excess of 5cm. No thoracoabdominal aortic dissection or aneurysm was found.The findings of LAD were confirmed by a PET/CT. Lymph node biopsy on 2/7/2020 showed MCL, as described as CD20 positive B cells with positive staining for CD5 and cyclin D1.  The Ki-67 proliferation index was 20%. A bone marrow biopsy on 2/18/2020 was positive for intramedullary MCL (<5%). Early in his disease course, he reportedly developed a sepsis syndrome with leukocytosis (17.8) a creatinine 2.1 and was admitted on 2/20/2020. Kidney function has since improved.     He went on to receive Maxi-R- CHOP alternating with high-dose LITZY-C from 2-6/2020. Follow up imaging post therapy on 7/6/2020 showed a CR by PET CT, with resolution of avid lymph nodes. A persistent pleural effusion was observed and not PET avid.        Pre-transplant restaging PET CT confirms a stable CR post initial therapy. Bone marrow shows a normocellular marrow with trilineage hematopoiesis (crush  artifact noted). Flow negative. LDH normal at 150. Mild anemia, but no other cytopenia. Intact IgG. EBV+, VZV+, HSV-, CMV+. HBV HCV HIV WNV CLEMENTE Negative. Hep C Ab nonreactive.     The open visit identified some degree of frailty at a score of 2. Thus, will consider pepper-transplant PT/OT.     PFTs within standard limits. ECHO shows an EF of 50% with early diastolic dysfunction. EKG shows NSR.     Colonoscopy in 2018, no indication to repeat at this time.     Path confirmed locally.    Dental cleared.     He present today for stem cell mobilization and ASCT planning.          Bone Marrow Workup Results:  Blood Counts Recent Labs   Lab Test 10/13/20  1323   WBC 6.0   ANEU 4.8   ALYM 0.5*   CLAUDIA 0.5   AEOS 0.1   HGB 13.2*   HCT 40.1         Bone Marrow No evidence of disease, trilineage hematopoiesis.   Blood Type Recent Labs   Lab Test 10/13/20  1323   ABO AB      Chemistries Recent Labs   Lab Test 10/13/20  1323      POTASSIUM 4.0   CHLORIDE 106   CO2 24   BUN 20   CR 1.00      Liver Tests Recent Labs   Lab Test 10/13/20  1323   BILITOTAL 0.4   ALKPHOS 87   AST 18   ALT 22      Chest CT Remains in CR, compared to recent PET/CT showing the same   PFTs FVC%  Recent Labs   Lab Test 08/31/20  1031   00368 94       FEV1%  Recent Labs   Lab Test 08/31/20  1031   14206 103       DLCO%  Recent Labs   Lab Test 08/31/20  1031   [id number] 108      ECHO or MUGA: EECHO:  50%     EKG NSR   Serologies: EBV+, VZV+, HSV-, CMV +       Family History:   Family History   Problem Relation Age of Onset     Heart Disease Mother      Lymphoma Mother      Heart Disease Father      Heart Disease Sister        Social History:   Social History     Socioeconomic History     Marital status: Single     Spouse name: Not on file     Number of children: Not on file     Years of education: Not on file     Highest education level: Not on file   Occupational History     Not on file   Social Needs     Financial resource strain: Not on file      Food insecurity     Worry: Not on file     Inability: Not on file     Transportation needs     Medical: Not on file     Non-medical: Not on file   Tobacco Use     Smoking status: Never Smoker     Smokeless tobacco: Current User     Types: Chew     Tobacco comment: trying to quit   Substance and Sexual Activity     Alcohol use: Not Currently     Drug use: Never     Sexual activity: Not on file   Lifestyle     Physical activity     Days per week: Not on file     Minutes per session: Not on file     Stress: Not on file   Relationships     Social connections     Talks on phone: Not on file     Gets together: Not on file     Attends Orthodoxy service: Not on file     Active member of club or organization: Not on file     Attends meetings of clubs or organizations: Not on file     Relationship status: Not on file     Intimate partner violence     Fear of current or ex partner: Not on file     Emotionally abused: Not on file     Physically abused: Not on file     Forced sexual activity: Not on file   Other Topics Concern     Not on file   Social History Narrative     Not on file       Past Medical History:   Past Medical History:   Diagnosis Date     Kidney stones 06/2020    left     Sepsis without septic shock (H) 05/12/2020    seconary tp 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 laser lithotripsy Left 06/04/2020    treat kidney stones     port a cath placement Right 02/18/2020    internal jugular vein       Allergies: No Known Allergies    Home Medications      Prior to Admission medications    Medication Sig Start Date End Date Taking? Authorizing Provider   acetaminophen (TYLENOL) 500 MG tablet Take 500-1,000 mg by mouth    Reported, Patient   glimepiride (AMARYL) 4 MG  tablet Take 4 mg by mouth every morning (before breakfast)  6/26/20   Reported, Patient   metFORMIN (GLUCOPHAGE) 500 MG tablet 2 times daily (with meals)  6/26/20   Reported, Patient   metoprolol succinate ER (TOPROL-XL) 25 MG 24 hr tablet States he takes metoprolol BID 8/1/20   Reported, Patient       Review of Systems    Review of Systems:  CONSTITUTIONAL: NEGATIVE for fever, chills, change in weight  INTEGUMENTARY/SKIN: NEGATIVE for worrisome rashes, moles or lesions  EYES: NEGATIVE for vision changes or irritation  ENT/MOUTH: NEGATIVE for ear, mouth and throat problems  RESP: NEGATIVE for significant cough or SOB  BREAST: NEGATIVE for masses, tenderness or discharge  CV: NEGATIVE for chest pain, palpitations or peripheral edema  GI: NEGATIVE for nausea, abdominal pain, heartburn, or change in bowel habits  : NEGATIVE for frequency, dysuria, or hematuria  MUSCULOSKELETAL: NEGATIVE for significant arthralgias or myalgia  NEURO: NEGATIVE for weakness, dizziness or paresthesias  ENDOCRINE: NEGATIVE for temperature intolerance, skin/hair changes  HEME/ALLERGY: NEGATIVE for bleeding problems  PSYCHIATRIC: NEGATIVE for changes in mood or affect    PHYSICAL EXAM      Weight     Wt Readings from Last 3 Encounters:   10/13/20 109.2 kg (240 lb 11.2 oz)   08/31/20 109.6 kg (241 lb 9.6 oz)   08/27/20 109.9 kg (242 lb 4.8 oz)          There were no vitals taken for this visit.     General: NAD   Eyes: MARYAN, sclera anicteric   Nose/Mouth/Throat: OP clear, buccal mucosa moist, no ulcerations   Lungs: CTA bilaterally  Cardiovascular: RRR, no M/R/G   Abdominal/Rectal: +BS, soft, NT, ND, No HSM   Lymphatics: No edema  Skin: No rashes or petechaie  Neuro: A&O   Additional Findings: Forte site NT, no drainage.    ASSESSMENT BY SYSTEMS   Theo DONA Roblero is a 55 year old male with high risk (IPI 6.5 and Ki67 of 20%), stage IV Mantle cell lymphoma in CR1. We recommend proceeding with GCSF stem mobilization and collection,  followed by a BEAM condition autologous HCT. KGF can be used to reduce mucositis. We recommend post-transplant maintenance rituximab for 1-2 years total. Clear to start G-mobilization this week.     1.  BMT: Prep as above. Allopurinol through day 0. Flush and pre-meds prior to transplant. GCSF starts d+5 and cont to until ANC>1500 x3 consecutive days. Re-stage per protocol.     2.  HEME: Keep Hgb>8 and plts>10K. No pre-meds.                            3.  ID: Afebrile. Cont Levo, Fluc, and ACV (CMV+, HSV-, EBV+) prophy. Bactrim or appropriate PCP therapy to start d+28.                                             4.  GI: Zofran and dexamethasone prior to chemo to prevent N/V. Ativan and compazine available PRN break-through symptoms. Protonix for GI prophy. Ursodiol for VOD prophy.     5.  FEN/Renal: Monitor creat and lytes. Replete lytes PRN per SS. Monitor weight, I+O, lytes per protocol with IVF flush.     BMT and Cell Therapy Informed Consent Discussion  In today's visit, we discussed in detail the research for which Theo Roblero is eligible. We discussed the potential risks and potential benefits of each protocol individually. We explained potential alternatives to the protocols discussed. We explained to the patient that participation is voluntary and that consent may be withdrawn at any time.      The patient completed the last round of treatment on 7/2020.     HCT-CI score: 0 We counseled the patient about the impact of this on the risk of treatment related and overall mortality. The score fit within treatment protocol eligibility criteria.     Karnofsky performance score: 90        Active infections: None, history of pleural effusion since diagnosis. Not PET avid.     Reproductive status: What methods of birth control does the patient plan to use during the treatment period beginning with conditioning and ending with the discontinuation of immune suppression (indicate with an X all that apply):  __ The  patient is confirmed to be sterile or post-menopausal  __ Sexual abstinence  _X_ Condoms  __ Implants  __ Injectables  __ Oral contraceptives  __ Intrauterine devices (IUD)  __ Other (describe)    The patient received appropriate reproductive counseling and agreed with the need for effective contraception during the treatment procedures.       Dental health suitable to proceed: He will be scheduled to see a Dentist.     After our detailed discussion above, the patient signed the following consents for treatment and protocols:    [id number]     The patient will receive a signed copy of the consents. The patient had opportunity to ask questions that were answered to the best of my ability and to the patient's apparent satisfaction.     Sam Zavala MD

## 2020-10-20 NOTE — TELEPHONE ENCOUNTER
Writer called patient to inform of upcoming (beginning 10/23- Friday) G-CSF schedule, line placement and stem cell collections.  Patient was provided pre-op instructions for line placement as per recommendations written via IR consult.  Schedule was emailed to patient per request to [email or web address].  Patient verbalized understanding of instructions via teach-back and no further questions at this time.    Appointment note to provide patient with hibiclens updated for 10/23

## 2020-10-23 NOTE — LETTER
"    10/23/2020         RE: Theo Roblero  [address] [facility]  [address]  [place] [facility] [address]        Dear Colleague,    Thank you for referring your patient, Theo Roblero, to the [facility] BLOOD AND MARROW TRANSPLANT PROGRAM [place]. Please see a copy of my visit note below.    BMT Daily Progress Note   10/23/2020    Patient ID:  Theo Roblero is a 55 year old male hx of mantle cell lymphoma in CR1- undergoing gcsf only priming prior to stem cell collection for planned Beam Auto.      Diagnosis [facility] Non-Hodgkin lymphoma, Mantle cell/intermediate differentiation  HCT Type Autologous    GVHD Prophylaxis N/A  Primary BMT MD Dr Zavala     INTERVAL  HISTORY     Here to start Gcsf. Feels well. Sick of getting Covid swabs - nose is sore but nothing else going on. No infectious symptoms.     Review of Systems: 10 point ROS negative except as noted above.    # Pain Assessment:   Theo s pain level was assessed and he currently denies pain.    Scheduled Medications    filgrastim-sndz (ZARXIO) subcutaneous  1,200 mcg Subcutaneous Daily     Current Outpatient Medications   Medication     acetaminophen (TYLENOL) 500 MG tablet     glimepiride (AMARYL) 4 MG tablet     metFORMIN (GLUCOPHAGE) 500 MG tablet     metoprolol succinate ER (TOPROL-XL) 25 MG 24 hr tablet     Current Facility-Administered Medications   Medication     filgrastim-sndz (ZARXIO) injection syringe 1,200 mcg     heparin 100 UNIT/ML injection 5 mL     heparin lock flush 10 UNIT/ML injection 5 mL     sodium chloride (PF) 0.9% PF flush 3-20 mL       PHYSICAL EXAM     Weight In/Out     Wt Readings from Last 3 Encounters:   10/19/20 110.9 kg (244 lb 6.4 oz)   10/13/20 109.2 kg (240 lb 11.2 oz)   08/31/20 109.6 kg (241 lb 9.6 oz)      [unfilled]       KPS:  80    /76 (BP Location: Right arm, Patient Position: Sitting, Cuff Size: Adult Large)   Pulse 85   Temp 96.8  F (36  C) (Tympanic)   Resp 16   Ht 1.803 m (5' 10.98\")   Wt 111.8 kg " (246 lb 8 oz)   SpO2 98%   BMI 34.40 kg/m     General: NAD   Eyes: : MARYAN, sclera anicteric   Nose/Mouth/Throat: OP clear, buccal mucosa moist, no ulcerations   Lungs: CTA bilaterally  Cardiovascular: RRR, no M/R/G   Abdominal/Rectal: +BS, soft, NT, ND, No HSM   Lymphatics: no edema  Skin: no rashes or petechaie  Neuro: A&O   Additional Findings: Forte site NT, no drainage.    LABS AND IMAGING      I have assessed all abnormal lab values for their clinical significance and any values considered clinically significant have been addressed in the assessment and plan      Lab Results   Component Value Date    WBC 6.0 10/13/2020    ANEU 4.8 10/13/2020    HGB 13.2 (L) 10/13/2020    HCT 40.1 10/13/2020     10/13/2020     10/13/2020    POTASSIUM 4.0 10/13/2020    CHLORIDE 106 10/13/2020    CO2 24 10/13/2020     (H) 10/13/2020    BUN 20 10/13/2020    CR 1.00 10/13/2020    MAG 1.8 08/26/2020    INR 1.05 10/13/2020         OVERALL PLAN   Theo Roblero is a 55 year old male hx of Mantle cell lymphoma in CR1. Undergoing gcsf priming prior to Autologous pbsct for MM     1.  BMT: Mantle cell lymphoma. Goal for 5x10^6 cd34/kg  Day 1 gcsf today 10/23. RTC daily for injection  Day +4: 10/26- will get CVC catheter and see if ready to start stem cell collections.      2.  HEME: Keep Hgb>8 and plts>10K. No pre-meds.                            3.  ID: Afebrile.    Known issues that I take into account for medical decisions, with salient changes to the plan considering these complexities noted above.  Patient Active Problem List    Diagnosis Date Noted     Mantle cell lymphoma of lymph nodes of multiple sites (H) 08/10/2020     Priority: Medium     Tobacco user 06/14/2020     Priority: Medium     Obesity 06/14/2020     Priority: Medium     Nuclear sclerosis 06/14/2020     Priority: Medium     Impotence of organic origin 06/14/2020     Priority: Medium     Diverticulosis of sigmoid colon 06/14/2020      Priority: Medium     Diabetic macular edema (H) 06/14/2020     Priority: Medium     HTN (hypertension) 02/06/2020     Priority: Medium     HLD (hyperlipidemia) 02/06/2020     Priority: Medium     Type 2 diabetes mellitus, without long-term current use of insulin (H) 02/06/2020     Priority: Medium         Nancy Galvan        Again, thank you for allowing me to participate in the care of your patient.        Sincerely,        BMT Advanced Practice Provider

## 2020-10-23 NOTE — LETTER
10/23/2020         RE: Theo Roblero  [address] [place] [address]  [address] [place] [phone]        Dear Colleague,    Thank you for referring your patient, Theo Roblero, to the [facility] BLOOD AND MARROW TRANSPLANT PROGRAM [place]. Please see a copy of my visit note below.    See nursing note.  Katrin Nix MA        Again, thank you for allowing me to participate in the care of your patient.        Sincerely,        BMT Nurse

## 2020-10-23 NOTE — PROGRESS NOTES
"BMT Daily Progress Note   10/23/2020    Patient ID:  Theo Roblero is a 55 year old male hx of mantle cell lymphoma in CR1- undergoing gcsf only priming prior to stem cell collection for planned Beam Auto.      Diagnosis [facility] Non-Hodgkin lymphoma, Mantle cell/intermediate differentiation  HCT Type Autologous    GVHD Prophylaxis N/A  Primary BMT MD Dr Zavala     INTERVAL  HISTORY     Here to start Gcsf. Feels well. Sick of getting Covid swabs - nose is sore but nothing else going on. No infectious symptoms.     Review of Systems: 10 point ROS negative except as noted above.    # Pain Assessment:   Theo lazcano pain level was assessed and he currently denies pain.    Scheduled Medications    filgrastim-sndz (ZARXIO) subcutaneous  1,200 mcg Subcutaneous Daily     Current Outpatient Medications   Medication     acetaminophen (TYLENOL) 500 MG tablet     glimepiride (AMARYL) 4 MG tablet     metFORMIN (GLUCOPHAGE) 500 MG tablet     metoprolol succinate ER (TOPROL-XL) 25 MG 24 hr tablet     Current Facility-Administered Medications   Medication     filgrastim-sndz (ZARXIO) injection syringe 1,200 mcg     heparin 100 UNIT/ML injection 5 mL     heparin lock flush 10 UNIT/ML injection 5 mL     sodium chloride (PF) 0.9% PF flush 3-20 mL       PHYSICAL EXAM     Weight In/Out     Wt Readings from Last 3 Encounters:   10/19/20 110.9 kg (244 lb 6.4 oz)   10/13/20 109.2 kg (240 lb 11.2 oz)   08/31/20 109.6 kg (241 lb 9.6 oz)      [unfilled]       KPS:  80    /76 (BP Location: Right arm, Patient Position: Sitting, Cuff Size: Adult Large)   Pulse 85   Temp 96.8  F (36  C) (Tympanic)   Resp 16   Ht 1.803 m (5' 10.98\")   Wt 111.8 kg (246 lb 8 oz)   SpO2 98%   BMI 34.40 kg/m     General: NAD   Eyes: : MARYAN, sclera anicteric   Nose/Mouth/Throat: OP clear, buccal mucosa moist, no ulcerations   Lungs: CTA bilaterally  Cardiovascular: RRR, no M/R/G   Abdominal/Rectal: +BS, soft, NT, ND, No HSM   Lymphatics: no edema  Skin: " no rashes or petechaie  Neuro: A&O   Additional Findings: Forte site NT, no drainage.    LABS AND IMAGING      I have assessed all abnormal lab values for their clinical significance and any values considered clinically significant have been addressed in the assessment and plan      Lab Results   Component Value Date    WBC 6.0 10/13/2020    ANEU 4.8 10/13/2020    HGB 13.2 (L) 10/13/2020    HCT 40.1 10/13/2020     10/13/2020     10/13/2020    POTASSIUM 4.0 10/13/2020    CHLORIDE 106 10/13/2020    CO2 24 10/13/2020     (H) 10/13/2020    BUN 20 10/13/2020    CR 1.00 10/13/2020    MAG 1.8 08/26/2020    INR 1.05 10/13/2020         OVERALL PLAN   Theo Roblero is a 55 year old male hx of Mantle cell lymphoma in CR1. Undergoing gcsf priming prior to Autologous pbsct for MM     1.  BMT: Mantle cell lymphoma. Goal for 5x10^6 cd34/kg  Day 1 gcsf today 10/23. RTC daily for injection  Day +4: 10/26- will get CVC catheter and see if ready to start stem cell collections.      2.  HEME: Keep Hgb>8 and plts>10K. No pre-meds.                            3.  ID: Afebrile.    Known issues that I take into account for medical decisions, with salient changes to the plan considering these complexities noted above.  Patient Active Problem List    Diagnosis Date Noted     Mantle cell lymphoma of lymph nodes of multiple sites (H) 08/10/2020     Priority: Medium     Tobacco user 06/14/2020     Priority: Medium     Obesity 06/14/2020     Priority: Medium     Nuclear sclerosis 06/14/2020     Priority: Medium     Impotence of organic origin 06/14/2020     Priority: Medium     Diverticulosis of sigmoid colon 06/14/2020     Priority: Medium     Diabetic macular edema (H) 06/14/2020     Priority: Medium     HTN (hypertension) 02/06/2020     Priority: Medium     HLD (hyperlipidemia) 02/06/2020     Priority: Medium     Type 2 diabetes mellitus, without long-term current use of insulin (H) 02/06/2020     Priority: Medium          Nancy Galvan

## 2020-10-23 NOTE — NURSING NOTE
Administered Zarxio 1,200mcg into the right arm, see MAR.    Due to injection administration, patient instructed to remain in clinic for 15 minutes  afterwards, and to report any adverse reaction to me immediately.    Katrin Nix MA

## 2020-10-23 NOTE — NURSING NOTE
"Oncology Rooming Note    October 23, 2020 8:06 AM   Theo Roblero is a 55 year old male who presents for:    Chief Complaint   Patient presents with     RECHECK     Return: Mantle cell lymphoma of lymph nodes of multiple sites     Initial Vitals: /76 (BP Location: Right arm, Patient Position: Sitting, Cuff Size: Adult Large)   Pulse 85   Temp 96.8  F (36  C) (Tympanic)   Resp 16   Ht 1.803 m (5' 10.98\")   Wt 111.8 kg (246 lb 8 oz)   SpO2 98%   BMI 34.40 kg/m   Estimated body mass index is 34.4 kg/m  as calculated from the following:    Height as of this encounter: 1.803 m (5' 10.98\").    Weight as of this encounter: 111.8 kg (246 lb 8 oz). Body surface area is 2.37 meters squared.  No Pain (0) Comment: Data Unavailable   No LMP for male patient.  Allergies reviewed: Yes  Medications reviewed: Yes    Medications: Medication refills not needed today.  Pharmacy name entered into [employer]: [facility] #[id number] - [place], [place] - [address] AT [facility] [address]    Clinical concerns: N/A        Katrin Nix CMA              "

## 2020-10-24 NOTE — PROGRESS NOTES
Infusion Nursing Note:  Theo CARCAMO Osbaldo presents today for zarxio inj.    Patient seen by provider today: No   present during visit today: Not Applicable.    Note: pre collection for auto prbc for lymphoma here for zarxio inj.    Intravenous Access:  No Intravenous access/labs at this visit.    Treatment Conditions:  Not Applicable.      Post Infusion Assessment:  Patient tolerated injection without incident.       Discharge Plan:   Patient and/or family verbalized understanding of discharge instructions and all questions answered.aware of future visits    Mony Velásquez RN

## 2020-10-24 NOTE — LETTER
10/24/2020         RE: Theo Roblero  [address] [place] [address]  [address] [place] [phone]        Dear Colleague,    Thank you for referring your patient, Theo Roblero, to the [facility] BLOOD AND MARROW TRANSPLANT PROGRAM [place]. Please see a copy of my visit note below.    Infusion Nursing Note:  Theo Roblero presents today for zarxio inj.    Patient seen by provider today: No   present during visit today: Not Applicable.    Note: pre collection for auto prbc for lymphoma here for zarxio inj.    Intravenous Access:  No Intravenous access/labs at this visit.    Treatment Conditions:  Not Applicable.      Post Infusion Assessment:  Patient tolerated injection without incident.       Discharge Plan:   Patient and/or family verbalized understanding of discharge instructions and all questions answered.aware of future visits    Mony Velásquez RN                            Again, thank you for allowing me to participate in the care of your patient.        Sincerely,        BMT Nurse

## 2020-10-25 NOTE — PROGRESS NOTES
"Oncology Rooming Note    October 25, 2020 8:36 AM   Theo Roblero is a 55 year old male who presents for:    Chief Complaint   Patient presents with     RECHECK     pre bmt for lymphoma here for zarxio inj     Initial Vitals: There were no vitals taken for this visit. Estimated body mass index is 34.4 kg/m  as calculated from the following:    Height as of 10/23/20: 1.803 m (5' 10.98\").    Weight as of 10/23/20: 111.8 kg (246 lb 8 oz). There is no height or weight on file to calculate BSA.  Data Unavailable Comment: Data Unavailable   No LMP for male patient.  Allergies reviewed: Yes  Medications reviewed: Yes    Medications: Medication refills not needed today.  Pharmacy name entered into [employer]: [facility] #[id number] - [place], [place] - [address] AT [facility] [address]    Clinical concerns: none       Mony Velásquez RN              "

## 2020-10-25 NOTE — LETTER
"    10/25/2020         RE: Theo Roblero  [address]  [address]  [place] [address]        Dear Colleague,    Thank you for referring your patient, Theo Roblero, to the [facility] BLOOD AND MARROW TRANSPLANT PROGRAM [place]. Please see a copy of my visit note below.    Oncology Rooming Note    October 25, 2020 8:36 AM   Theo Roblero is a 55 year old male who presents for:    Chief Complaint   Patient presents with     RECHECK     pre bmt for lymphoma here for zarxio inj     Initial Vitals: There were no vitals taken for this visit. Estimated body mass index is 34.4 kg/m  as calculated from the following:    Height as of 10/23/20: 1.803 m (5' 10.98\").    Weight as of 10/23/20: 111.8 kg (246 lb 8 oz). There is no height or weight on file to calculate BSA.  Data Unavailable Comment: Data Unavailable   No LMP for male patient.  Allergies reviewed: Yes  Medications reviewed: Yes    Medications: Medication refills not needed today.  Pharmacy name entered into [employer]: [id number] - [place], [place] - [address] [facility] [address]    Clinical concerns: none       Mony Velásquez RN                  Again, thank you for allowing me to participate in the care of your patient.        Sincerely,        BMT Nurse    "

## 2020-10-25 NOTE — NURSING NOTE
Chief Complaint   Patient presents with     RECHECK     pre bmt for lymphoma here for zarxio inj     Tolerating inj well, aware of future appointments

## 2020-10-26 NOTE — PROGRESS NOTES
BMT Daily Progress Note   10/26/2020    Patient ID:  Theo Roblero is a 55 year old male hx of mantle cell lymphoma in CR1- undergoing gcsf only priming prior to stem cell collection for planned Beam Auto.      Diagnosis [facility] Non-Hodgkin lymphoma, Mantle cell/intermediate differentiation  HCT Type Autologous    GVHD Prophylaxis N/A  Primary BMT MD Dr Zavala     INTERVAL  HISTORY     Doing well- tolerated line placement without incident. Denies pain currently. Aware to use ice/tylenol for discomfort. He did have some mild right shoulder pain with gcsf- claritin not helping much that he can tell but he is managing alright. No infectious symptoms.     Review of Systems: 10 point ROS negative except as noted above.    # Pain Assessment:  Current Pain Score 10/26/2020   Patient currently in pain? jethro Venegas s pain level was assessed and he currently denies pain.    Scheduled Medications    Current Outpatient Medications   Medication     acetaminophen (TYLENOL) 500 MG tablet     glimepiride (AMARYL) 4 MG tablet     metFORMIN (GLUCOPHAGE) 500 MG tablet     metoprolol succinate ER (TOPROL-XL) 25 MG 24 hr tablet     No current facility-administered medications for this visit.      Facility-Administered Medications Ordered in Other Visits   Medication     acetaminophen (TYLENOL) tablet 975 mg     albuterol (PROVENTIL) neb solution 2.5 mg     dexamethasone (DECADRON) injection 4 mg     glucose gel 15-30 g    Or     dextrose 50 % injection 25-50 mL    Or     Glucagon HCl injection 1 mg     fentaNYL (PF) (SUBLIMAZE) injection 25-50 mcg     fentaNYL (PF) (SUBLIMAZE) injection 25-50 mcg     heparin 100 UNIT/ML injection 3 mL     heparin 100 UNIT/ML injection 3 mL     heparin 100 UNIT/ML injection 5 mL     HYDROmorphone (PF) (DILAUDID) injection 0.3-0.5 mg     ketorolac (TORADOL) injection 30 mg     lactated ringers infusion     lactated ringers infusion     lidocaine (LMX4) kit     lidocaine 1 % 0.1-1 mL     meperidine  (DEMEROL) injection 12.5 mg     naloxone (NARCAN) injection 0.1-0.4 mg     ondansetron (ZOFRAN-ODT) ODT tab 4 mg    Or     ondansetron (ZOFRAN) injection 4 mg     oxyCODONE (ROXICODONE) tablet 5-10 mg     prochlorperazine (COMPAZINE) injection 10 mg     sodium chloride (PF) 0.9% PF flush 10 mL     sodium chloride (PF) 0.9% PF flush 10 mL     sodium chloride (PF) 0.9% PF flush 10-20 mL     sodium chloride (PF) 0.9% PF flush 10-20 mL       PHYSICAL EXAM     Weight In/Out     Wt Readings from Last 3 Encounters:   10/26/20 110.7 kg (244 lb)   10/23/20 111.8 kg (246 lb 8 oz)   10/19/20 110.9 kg (244 lb 6.4 oz)      [unfilled]       KPS:  80    There were no vitals taken for this visit.  BP:131/84, HR88; R:18, 94% RA  General: NAD   Eyes: : MARYAN, sclera anicteric   Nose/Mouth/Throat: OP clear, buccal mucosa moist, no ulcerations   Lungs: CTA bilaterally  Cardiovascular: RRR, no M/R/G   Abdominal/Rectal: +BS, soft, NT, ND, No HSM   Lymphatics: no edema  Skin: no rashes or petechaie  Neuro: A&O   Additional Findings: duvol- just placed left chest- not bleeding. Mild swelling at tunneling site.     LABS AND IMAGING      I have assessed all abnormal lab values for their clinical significance and any values considered clinically significant have been addressed in the assessment and plan      Lab Results   Component Value Date    WBC 4.3 10/23/2020    ANEU 2.7 10/23/2020    HGB 13.0 (L) 10/23/2020    HCT 39.6 (L) 10/23/2020     10/23/2020     10/13/2020    POTASSIUM 4.0 10/13/2020    CHLORIDE 106 10/13/2020    CO2 24 10/13/2020     (H) 10/13/2020    BUN 20 10/13/2020    CR 1.00 10/13/2020    MAG 1.8 08/26/2020    INR 1.05 10/13/2020         OVERALL PLAN   Theo Roblero is a 55 year old male hx of Mantle cell lymphoma in CR1. Undergoing gcsf priming prior to Autologous pbsct for MM     1.  BMT: Mantle cell lymphoma- planned beam auto. Goal for 5x10^6 cd34/kg  Day 1 gcsf today 10/23. RTC daily for  injection  Day +4: 10/26- duvol placed today.   10/26 CD34: 19- Aphresis updated. Patient will start collections 10/27 8am. Aphresis will give gcsf and draw labs     2.  HEME: Keep Hgb>8 and plts>10K. No pre-meds.  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  ID: Afebrile.    Known issues that I take into account for medical decisions, with salient changes to the plan considering these complexities noted above.  Patient Active Problem List    Diagnosis Date Noted     Mantle cell lymphoma of lymph nodes of multiple sites (H) 08/10/2020     Priority: Medium     Tobacco user 06/14/2020     Priority: Medium     Obesity 06/14/2020     Priority: Medium     Nuclear sclerosis 06/14/2020     Priority: Medium     Impotence of organic origin 06/14/2020     Priority: Medium     Diverticulosis of sigmoid colon 06/14/2020     Priority: Medium     Diabetic macular edema (H) 06/14/2020     Priority: Medium     HTN (hypertension) 02/06/2020     Priority: Medium     HLD (hyperlipidemia) 02/06/2020     Priority: Medium     Type 2 diabetes mellitus, without long-term current use of insulin (H) 02/06/2020     Priority: Medium         Nancy Galvan

## 2020-10-26 NOTE — LETTER
10/26/2020         RE: Theo Roblero  [address] [place] [address]  [address] [place] [id number]        Dear Colleague,    Thank you for referring your patient, Theo Roblero, to the [facility] BLOOD AND MARROW TRANSPLANT PROGRAM [place]. Please see a copy of my visit note below.    See nursing note.  Katrin Nix MA        Again, thank you for allowing me to participate in the care of your patient.        Sincerely,        BMT Nurse

## 2020-10-26 NOTE — DISCHARGE INSTRUCTIONS
A collaboration between [facility] and [facility]  Experts in minimally invasive, targeted treatments performed using imaging guidance    Venous Access Device,  Port Catheter or Tunneled or Non-Tunneled Central Line Placement    Today you had a procedure today to install a venous access device; either a tunneled central vein catheter or a subcutaneous port catheter.    After you go home:  - Drink plenty of fluids.  Generally 6-8 (8 ounce) glasses a day is recommended.  - Resume your regular diet unless otherwise ordered by a medical provider.  - Keep any applied tape/gauze dressings clean and dry.  Change tape/gauze dressings if they get wet or soiled.  - You may shower the following day after procedure, however cover and protect from moisture any tape/gauze dressings.  You may let water hit and run over dried skin glue, but do not scrub.  Pat the area dry after showering.  - Port placement incisions are closed with absorbable suture, meaning they do not need to be removed at a later date, and a topical skin adhesive (skin glue).  This glue will wear off in 7-14 days.  Do not remove before this time.  If 14 days have passed and residual glue is present, you may gently remove it.  - Do not apply gels, lotions, or ointments to the glue site for the first 10 days as this may cause the glue to prematurely soften and fail.  - Do not perform strenuous activities or lift greater than 10 pounds for the next three days.  - If there is bleeding or oozing from the procedure site, apply firm pressure to the area for 5-10 minutes.  If the bleeding continues seek medical advice at the numbers below.  - Mild procedure site discomfort can be treated with an ice pack and over-the-counter pain relievers.        For 24 hours after any sedation used:  - Relax and take it easy.  No strenuous activities.  - Do not drive or operate machines at home or at work.  - No alcohol  consumption.  - Do not make any important or legal decisions.    Call our Interventional Radiology (IR) service if:  - If you start bleeding from the procedure site.  If you do start to bleed from the site, lie down and hold some pressure on the site.  Our radiology provider can help you decide if you need to return to the hospital.  - If you have new or worsening pain related to the procedure.  - If you have concerning swelling at the procedure site.  - If you develop persistent nausea or vomiting.  - If you develop hives or a rash or any unexplained itching.  - If you have a fever of greater than 100.5  F and chills in the first 5 days after procedure.  - Any other concerns related to your procedure.      [facility]  Interventional Radiology (IR)  [address]  [address] [facility] [place] [address]    Contact Number:  [phone]  (IR control desk)  - Monday - Friday 8:00 am - 4:30 pm    After hours for urgent concerns:  [phone]  - After 4:30 pm Monday - Friday, Weekends and Holidays.   - Ask for Interventional Radiology on-call.  Someone is available 24 hours a day.  - [facility] toll free number:  [phone]

## 2020-10-26 NOTE — ANESTHESIA PREPROCEDURE EVALUATION
Anesthesia Pre-Procedure Evaluation    Patient: Theo Roblero   MRN:     [id number] Gender:   male   Age:    55 year old :      1965        Preoperative Diagnosis: Mantle cell lymphoma of lymph nodes of multiple sites (H) [C83.18]   Procedure(s):  dual lumen non valved tunneled line vascular placement @0800     LABS:  CBC:   Lab Results   Component Value Date    WBC 4.3 10/23/2020    WBC 6.0 10/13/2020    HGB 13.0 (L) 10/23/2020    HGB 13.2 (L) 10/13/2020    HCT 39.6 (L) 10/23/2020    HCT 40.1 10/13/2020     10/23/2020     10/13/2020     BMP:   Lab Results   Component Value Date     10/13/2020     2020    POTASSIUM 4.0 10/13/2020    POTASSIUM 4.0 2020    CHLORIDE 106 10/13/2020    CHLORIDE 108 2020    CO2 24 10/13/2020    CO2 20 2020    BUN 20 10/13/2020    BUN 15 2020    CR 1.00 10/13/2020    CR 0.87 2020     (H) 10/13/2020     (H) 2020     COAGS:   Lab Results   Component Value Date    PTT 31 10/13/2020    INR 1.05 10/13/2020     POC:   Lab Results   Component Value Date     (H) 10/26/2020     OTHER:   Lab Results   Component Value Date    BELA 9.2 10/13/2020    PHOS 3.1 2020    MAG 1.8 2020    ALBUMIN 3.6 10/13/2020    PROTTOTAL 7.1 10/13/2020    ALT 22 10/13/2020    AST 18 10/13/2020    ALKPHOS 87 10/13/2020    BILITOTAL 0.4 10/13/2020    TSH 2.17 2020    T4 1.12 2020        Preop Vitals    BP Readings from Last 3 Encounters:   10/26/20 138/87   10/23/20 124/76   10/19/20 (!) 143/88    Pulse Readings from Last 3 Encounters:   10/26/20 102   10/23/20 85   10/19/20 79      Resp Readings from Last 3 Encounters:   10/26/20 18   10/23/20 16   10/19/20 16    SpO2 Readings from Last 3 Encounters:   10/26/20 97%   10/23/20 98%   10/19/20 99%      Temp Readings from Last 1 Encounters:   10/26/20 36.6  C (97.8  F) (Oral)    Ht Readings from Last 1 Encounters:   10/26/20 1.829 m (6')      Wt Readings  from Last 1 Encounters:   10/26/20 110.7 kg (244 lb)    Estimated body mass index is 33.09 kg/m  as calculated from the following:    Height as of this encounter: 1.829 m (6').    Weight as of this encounter: 110.7 kg (244 lb).     LDA:  Port A Cath Single 03/01/20 Right Chest wall (Active)   Access Date 10/26/20 10/26/20 0714   Access Attempts 1 10/26/20 0714   Gauge 20 gauge;3/4 inch 10/26/20 0714   Site Assessment WDL 10/26/20 0714   Line Status Blood return noted 10/26/20 0714   Extravasation? No 08/26/20 1000   Dressing Intervention Transparent 10/26/20 0714   De-Access Date 10/13/20 10/13/20 1522   Date to be Reflushed 10/11/20 09/11/20 1100   Number of days: 239        Past Medical History:   Diagnosis Date     Kidney stones 06/2020    left     Sepsis without septic shock (H) 05/12/2020    seconary tp pyelonephritis from obstruction left ureteroliethasis      Past Surgical History:   Procedure Laterality Date     cystoscopy, left pyelogram, ureteral stent placement  05/12/2020    stone was blocking the ureter     cystoscopy, retrograde pyelogram, uretroscopy. laser lithotripsy Left 06/04/2020    treat kidney stones     port a cath placement Right 02/18/2020    internal jugular vein      No Known Allergies     Anesthesia Evaluation     . Pt has had prior anesthetic.     No history of anesthetic complications          ROS/MED HX    ENT/Pulmonary:     (+)tobacco use, Past use , . .    Neurologic:       Cardiovascular:     (+) Dyslipidemia, hypertension----. : . . . :. .       METS/Exercise Tolerance:     Hematologic:         Musculoskeletal:         GI/Hepatic:         Renal/Genitourinary:         Endo:     (+) type II DM Obesity, .      Psychiatric:         Infectious Disease:         Malignancy:   (+) Malignancy History of Lymphoma/Leukemia          Other:                         PHYSICAL EXAM:   Mental Status/Neuro: A/A/O   Airway: Facies: Feasible   Respiratory:    CV:    Comments:                       Assessment:   ASA SCORE: 3    H&P: History and physical reviewed and following examination; no interval change.   Smoking Status:  Non-Smoker/Unknown   NPO Status: NPO Appropriate     Plan:   Anes. Type:  MAC   Pre-Medication: None   Induction:  IV (Standard)   Airway: Native Airway   Access/Monitoring: PIV   Maintenance: Propofol Sedation     Postop Plan:   Postop Pain: None  Postop Sedation/Airway: Not planned  Disposition: Outpatient     PONV Management:   Adult Risk Factors:, Non-Smoker   Prevention:, Propofol     CONSENT: Direct conversation   Plan and risks discussed with: Patient   Blood Products: Consent Deferred (Minimal Blood Loss)                   Cehvy Wall MD

## 2020-10-26 NOTE — BRIEF OP NOTE
[facility] [place]    Brief Operative Note    Pre-operative diagnosis: Mantle cell lymphoma of lymph nodes of multiple sites (H) [C83.18]  Post-operative diagnosis Same as pre-operative diagnosis    Procedure: Procedure(s):  dual lumen non valved tunneled line vascular placement  Surgeon: Surgeon(s) and Role:     * Alessandro Steven MD - Primary  Anesthesia: Monitor Anesthesia Care   Estimated blood loss: Less than 10 ml  Drains: None  Specimens: * No specimens in log *  Findings:   14.5F x 28 cm tip to cuff left IJV tunneled CVC with tip in RA. Flushed with 1:100 heparin, ready for use..  Complications: None.  Implants:   Implant Name Type Inv. Item Serial No.  Lot No. LRB No. Used Action   CATH [employer]RONIC PALINDROME [id number] Catheter CATH VA PRECISION CHRONIC PALINDROME [id number]  [facility] [id number] Left 1 Used as a Supply

## 2020-10-26 NOTE — ANESTHESIA POSTPROCEDURE EVALUATION
Anesthesia POST Procedure Evaluation    Patient: Theo Roblero   MRN:     [id number] Gender:   male   Age:    55 year old :      1965        Preoperative Diagnosis: Mantle cell lymphoma of lymph nodes of multiple sites (H) [C83.18]   Procedure(s):  dual lumen non valved tunneled line vascular placement   Postop Comments: No value filed.     Anesthesia Type: MAC       Disposition: Outpatient   Postop Pain Control: Uneventful            Sign Out: Well controlled pain   PONV: No   Neuro/Psych: Uneventful            Sign Out: Acceptable/Baseline neuro status   Airway/Respiratory: Uneventful            Sign Out: Acceptable/Baseline resp. status   CV/Hemodynamics: Uneventful            Sign Out: Acceptable CV status   Other NRE: NONE   DID A NON-ROUTINE EVENT OCCUR? No         Last Anesthesia Record Vitals:  CRNA VITALS  10/26/2020 0804 - 10/26/2020 0904      10/26/2020             Resp Rate (set):  10          Last PACU Vitals:  Vitals Value Taken Time   /84 10/26/20 0836   Temp 36.3  C (97.3  F) 10/26/20 0836   Pulse 88 10/26/20 0836   Resp 18 10/26/20 0836   SpO2 94 % 10/26/20 0836   Temp src     NIBP     Pulse     SpO2     Resp     Temp     Ht Rate     Temp 2           Electronically Signed By: Chevy Wall MD, 2020, 10:41 AM

## 2020-10-26 NOTE — LETTER
10/26/2020         RE: Theo Roblero  [address]  [address]  [place] [address]        Dear Colleague,    Thank you for referring your patient, Theo Roblero, to the [facility] BLOOD AND MARROW TRANSPLANT PROGRAM [place]. Please see a copy of my visit note below.    BMT Daily Progress Note   10/26/2020    Patient ID:  Theo Roblero is a 55 year old male hx of mantle cell lymphoma in CR1- undergoing gcsf only priming prior to stem cell collection for planned Beam Auto.      Diagnosis [facility] Non-Hodgkin lymphoma, Mantle cell/intermediate differentiation  HCT Type Autologous    GVHD Prophylaxis N/A  Primary BMT MD Dr Zavala     INTERVAL  HISTORY     Doing well- tolerated line placement without incident. Denies pain currently. Aware to use ice/tylenol for discomfort. He did have some mild right shoulder pain with gcsf- claritin not helping much that he can tell but he is managing alright. No infectious symptoms.     Review of Systems: 10 point ROS negative except as noted above.    # Pain Assessment:  Current Pain Score 10/26/2020   Patient currently in pain? jethro Venegas s pain level was assessed and he currently denies pain.    Scheduled Medications    Current Outpatient Medications   Medication     acetaminophen (TYLENOL) 500 MG tablet     glimepiride (AMARYL) 4 MG tablet     metFORMIN (GLUCOPHAGE) 500 MG tablet     metoprolol succinate ER (TOPROL-XL) 25 MG 24 hr tablet     No current facility-administered medications for this visit.      Facility-Administered Medications Ordered in Other Visits   Medication     acetaminophen (TYLENOL) tablet 975 mg     albuterol (PROVENTIL) neb solution 2.5 mg     dexamethasone (DECADRON) injection 4 mg     glucose gel 15-30 g    Or     dextrose 50 % injection 25-50 mL    Or     Glucagon HCl injection 1 mg     fentaNYL (PF) (SUBLIMAZE) injection 25-50 mcg     fentaNYL (PF) (SUBLIMAZE) injection 25-50 mcg     heparin 100 UNIT/ML injection 3 mL      heparin 100 UNIT/ML injection 3 mL     heparin 100 UNIT/ML injection 5 mL     HYDROmorphone (PF) (DILAUDID) injection 0.3-0.5 mg     ketorolac (TORADOL) injection 30 mg     lactated ringers infusion     lactated ringers infusion     lidocaine (LMX4) kit     lidocaine 1 % 0.1-1 mL     meperidine (DEMEROL) injection 12.5 mg     naloxone (NARCAN) injection 0.1-0.4 mg     ondansetron (ZOFRAN-ODT) ODT tab 4 mg    Or     ondansetron (ZOFRAN) injection 4 mg     oxyCODONE (ROXICODONE) tablet 5-10 mg     prochlorperazine (COMPAZINE) injection 10 mg     sodium chloride (PF) 0.9% PF flush 10 mL     sodium chloride (PF) 0.9% PF flush 10 mL     sodium chloride (PF) 0.9% PF flush 10-20 mL     sodium chloride (PF) 0.9% PF flush 10-20 mL       PHYSICAL EXAM     Weight In/Out     Wt Readings from Last 3 Encounters:   10/26/20 110.7 kg (244 lb)   10/23/20 111.8 kg (246 lb 8 oz)   10/19/20 110.9 kg (244 lb 6.4 oz)      [unfilled]       KPS:  80    There were no vitals taken for this visit.  BP:131/84, HR88; R:18, 94% RA  General: NAD   Eyes: : MARYAN, sclera anicteric   Nose/Mouth/Throat: OP clear, buccal mucosa moist, no ulcerations   Lungs: CTA bilaterally  Cardiovascular: RRR, no M/R/G   Abdominal/Rectal: +BS, soft, NT, ND, No HSM   Lymphatics: no edema  Skin: no rashes or petechaie  Neuro: A&O   Additional Findings: duvol- just placed left chest- not bleeding. Mild swelling at tunneling site.     LABS AND IMAGING      I have assessed all abnormal lab values for their clinical significance and any values considered clinically significant have been addressed in the assessment and plan      Lab Results   Component Value Date    WBC 4.3 10/23/2020    ANEU 2.7 10/23/2020    HGB 13.0 (L) 10/23/2020    HCT 39.6 (L) 10/23/2020     10/23/2020     10/13/2020    POTASSIUM 4.0 10/13/2020    CHLORIDE 106 10/13/2020    CO2 24 10/13/2020     (H) 10/13/2020    BUN 20 10/13/2020    CR 1.00 10/13/2020    MAG 1.8 08/26/2020     INR 1.05 10/13/2020         OVERALL PLAN   Theo Roblero is a 55 year old male hx of Mantle cell lymphoma in CR1. Undergoing gcsf priming prior to Autologous pbsct for MM     1.  BMT: Mantle cell lymphoma- planned beam auto. Goal for 5x10^6 cd34/kg  Day 1 gcsf today 10/23. RTC daily for injection  Day +4: 10/26- duvol placed today.   10/26 CD34: 19- Aphresis updated. Patient will start collections 10/27 8am. Aphresis will give gcsf and draw labs     2.  HEME: Keep Hgb>8 and plts>10K. No pre-meds.  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  ID: Afebrile.    Known issues that I take into account for medical decisions, with salient changes to the plan considering these complexities noted above.  Patient Active Problem List    Diagnosis Date Noted     Mantle cell lymphoma of lymph nodes of multiple sites (H) 08/10/2020     Priority: Medium     Tobacco user 06/14/2020     Priority: Medium     Obesity 06/14/2020     Priority: Medium     Nuclear sclerosis 06/14/2020     Priority: Medium     Impotence of organic origin 06/14/2020     Priority: Medium     Diverticulosis of sigmoid colon 06/14/2020     Priority: Medium     Diabetic macular edema (H) 06/14/2020     Priority: Medium     HTN (hypertension) 02/06/2020     Priority: Medium     HLD (hyperlipidemia) 02/06/2020     Priority: Medium     Type 2 diabetes mellitus, without long-term current use of insulin (H) 02/06/2020     Priority: Medium         Nancy Galvan        Again, thank you for allowing me to participate in the care of your patient.        Sincerely,        BMT Advanced Practice Provider

## 2020-10-26 NOTE — NURSING NOTE
Chief Complaint   Patient presents with     Blood Draw     Labs drawn via CVC by RN. VS taken early for CVC placement.     Labs drawn from CVC by rn.  Good blood return noted in both lumens.  Both lumens flushed with NS and heparin.  Pt tolerated well.  VS taken earlier for CVC placement.  Pt checked in for next appt.    Shadi Cotton RN

## 2020-10-27 NOTE — LETTER
10/27/2020         RE: Theo Roblero  [address]  [address]  [place] [address]        Dear Colleague,    Thank you for referring your patient, Theo Roblero, to the [facility] BLOOD AND MARROW TRANSPLANT PROGRAM [place]. Please see a copy of my visit note below.    BMT Daily Progress Note   10/27/2020    Patient ID:  Theo Roblero is a 55 year old male hx of mantle cell lymphoma in CR1- undergoing gcsf only priming prior to stem cell collection for planned Beam Auto.      Diagnosis [facility] Non-Hodgkin lymphoma, Mantle cell/intermediate differentiation  HCT Type Autologous    GVHD Prophylaxis N/A  Primary BMT MD Dr Zavala     INTERVAL  HISTORY     Yanick feels fine.  He is day 1 of stem cell collections.  No numbness/tingling/cramping with apheresis.  No bone pain.  Slight headache off and on since starting GCSF, but tylenol takes care of it.      Review of Systems: 10 point ROS negative except as noted above.    # Pain Assessment:  Current Pain Score 10/26/2020   Patient currently in pain? denies   Theo s pain level was assessed and he currently denies pain.    Scheduled Medications    Current Outpatient Medications   Medication     acetaminophen (TYLENOL) 500 MG tablet     glimepiride (AMARYL) 4 MG tablet     heparin 100 UNIT/ML SOLN injection     metFORMIN (GLUCOPHAGE) 500 MG tablet     metoprolol succinate ER (TOPROL-XL) 25 MG 24 hr tablet     No current facility-administered medications for this visit.      Facility-Administered Medications Ordered in Other Visits   Medication     filgrastim-sndz (ZARXIO) injection syringe 1,200 mcg       PHYSICAL EXAM     Weight In/Out     Wt Readings from Last 3 Encounters:   10/26/20 110.7 kg (244 lb)   10/23/20 111.8 kg (246 lb 8 oz)   10/19/20 110.9 kg (244 lb 6.4 oz)      [unfilled]       S:  80    Vitals reviewed in apheresis:  HTN on arrival (168/86); improved to 148/83 after about 20 minutes; patient reports some of it likely related to  stress of being at the apheresis center  General: NAD   Eyes: : MARYAN, sclera anicteric   Nose/Mouth/Throat: OP clear, buccal mucosa moist, no ulcerations  Lungs: CTA bilaterally  Cardiovascular: RRR, no M/R/G   Abdominal/Rectal: +BS, soft, NT, ND, No HSM   Lymphatics: no edema  Skin: no rashes or petechaie  Neuro: A&O   Additional Findings: duvol- just placed left chest- not bleeding. Slightly tender.     LABS AND IMAGING      I have assessed all abnormal lab values for their clinical significance and any values considered clinically significant have been addressed in the assessment and plan      Lab Results   Component Value Date    WBC 19.8 (H) 10/27/2020    ANEU 16.1 (H) 10/27/2020    HGB 12.1 (L) 10/27/2020    HCT 36.7 (L) 10/27/2020     10/27/2020     10/27/2020    POTASSIUM 3.8 10/27/2020    CHLORIDE 106 10/27/2020    CO2 25 10/27/2020     (H) 10/27/2020    BUN 16 10/27/2020    CR 1.00 10/27/2020    MAG 1.4 (L) 10/27/2020    INR 1.05 10/13/2020         OVERALL PLAN   Theo Roblero is a 55 year old male hx of Mantle cell lymphoma in CR1. Undergoing gcsf priming prior to Autologous pbsct for MM     1.  BMT: Mantle cell lymphoma- planned beam auto. Goal for 5x10^6 cd34/kg  Day 1 gcsf today 10/23. RTC daily for injection  Day +4: 10/26- duvol placed  Day +5 GCSF; Day 1 of collections 10/27; tolerating well.  RTC early tomorrow for review of collections and decision making about further collections.      2.  HEME: Keep Hgb>8 and plts>10K. No pre-meds.  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  ID: Afebrile.    4. CV:  # HTN: continue metoprolol.  Suspect he has some white coat hypertension in addition.   # HLD: no current monitoring in our system.  Patient is not on medication for this, as he said a statin was attempted, but caused muscle pain and was thus discontinued.    5. Endo:  # T2DM: continue glimepiride and metformin.  Blood sugars checked once daily, usually around  120s (morning).     6. Tobacco use: he chews tobacco and would like to quit when admitted for transplant; interested in trying nicotine patch when he is admitted.     Known issues that I take into account for medical decisions, with salient changes to the plan considering these complexities noted above:  Mantle cell lymphoma of lymph nodes of multiple sites  Hypertension  Type 2 diabetes mellitus without long-term current use of insulin  Tobacco user    RTC 10/28 for review/planning.    Camila Shah  10/27/2020       Again, thank you for allowing me to participate in the care of your patient.        Sincerely,        BMT Advanced Practice Provider

## 2020-10-27 NOTE — PROGRESS NOTES
BMT Daily Progress Note   10/27/2020    Patient ID:  Theo Roblero is a 55 year old male hx of mantle cell lymphoma in CR1- undergoing gcsf only priming prior to stem cell collection for planned Beam Auto.      Diagnosis [facility] Non-Hodgkin lymphoma, Mantle cell/intermediate differentiation  HCT Type Autologous    GVHD Prophylaxis N/A  Primary BMT MD Dr Zavala     INTERVAL  HISTORY     Don feels fine.  He is day 1 of stem cell collections.  No numbness/tingling/cramping with apheresis.  No bone pain.  Slight headache off and on since starting GCSF, but tylenol takes care of it.      Review of Systems: 10 point ROS negative except as noted above.    # Pain Assessment:  Current Pain Score 10/26/2020   Patient currently in pain? denies   Theo s pain level was assessed and he currently denies pain.    Scheduled Medications    Current Outpatient Medications   Medication     acetaminophen (TYLENOL) 500 MG tablet     glimepiride (AMARYL) 4 MG tablet     heparin 100 UNIT/ML SOLN injection     metFORMIN (GLUCOPHAGE) 500 MG tablet     metoprolol succinate ER (TOPROL-XL) 25 MG 24 hr tablet     No current facility-administered medications for this visit.      Facility-Administered Medications Ordered in Other Visits   Medication     filgrastim-sndz (ZARXIO) injection syringe 1,200 mcg       PHYSICAL EXAM     Weight In/Out     Wt Readings from Last 3 Encounters:   10/26/20 110.7 kg (244 lb)   10/23/20 111.8 kg (246 lb 8 oz)   10/19/20 110.9 kg (244 lb 6.4 oz)      [unfilled]       KPS:  80    Vitals reviewed in apheresis:  HTN on arrival (168/86); improved to 148/83 after about 20 minutes; patient reports some of it likely related to stress of being at the apheresis center  General: NAD   Eyes: : MARYAN, sclera anicteric   Nose/Mouth/Throat: OP clear, buccal mucosa moist, no ulcerations  Lungs: CTA bilaterally  Cardiovascular: RRR, no M/R/G   Abdominal/Rectal: +BS, soft, NT, ND, No HSM   Lymphatics: no edema  Skin: no  rashes or petechaie  Neuro: A&O   Additional Findings: duvol- just placed left chest- not bleeding. Slightly tender.     LABS AND IMAGING      I have assessed all abnormal lab values for their clinical significance and any values considered clinically significant have been addressed in the assessment and plan      Lab Results   Component Value Date    WBC 19.8 (H) 10/27/2020    ANEU 16.1 (H) 10/27/2020    HGB 12.1 (L) 10/27/2020    HCT 36.7 (L) 10/27/2020     10/27/2020     10/27/2020    POTASSIUM 3.8 10/27/2020    CHLORIDE 106 10/27/2020    CO2 25 10/27/2020     (H) 10/27/2020    BUN 16 10/27/2020    CR 1.00 10/27/2020    MAG 1.4 (L) 10/27/2020    INR 1.05 10/13/2020         OVERALL PLAN   Theo Roblero is a 55 year old male hx of Mantle cell lymphoma in CR1. Undergoing gcsf priming prior to Autologous pbsct for MM     1.  BMT: Mantle cell lymphoma- planned beam auto. Goal for 5x10^6 cd34/kg  Day 1 gcsf today 10/23. RTC daily for injection  Day +4: 10/26- duvol placed  Day +5 GCSF; Day 1 of collections 10/27; tolerating well.  RTC early tomorrow for review of collections and decision making about further collections.      2.  HEME: Keep Hgb>8 and plts>10K. No pre-meds.  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  ID: Afebrile.    4. CV:  # HTN: continue metoprolol.  Suspect he has some white coat hypertension in addition.   # HLD: no current monitoring in our system.  Patient is not on medication for this, as he said a statin was attempted, but caused muscle pain and was thus discontinued.    5. Endo:  # T2DM: continue glimepiride and metformin.  Blood sugars checked once daily, usually around 120s (morning).     6. Tobacco use: he chews tobacco and would like to quit when admitted for transplant; interested in trying nicotine patch when he is admitted.     Known issues that I take into account for medical decisions, with salient changes to the plan considering these  complexities noted above:  Mantle cell lymphoma of lymph nodes of multiple sites  Hypertension  Type 2 diabetes mellitus without long-term current use of insulin  Tobacco user    RTC 10/28 for review/planning.    Camila Shah  10/27/2020

## 2020-10-27 NOTE — PROCEDURES
Transfusion Medicine  Apheresis Procedure Note    Theo Roblero MRN# [id number]   YOB: 1965 Age: 55 year old     Procedure: Autologous peripheral blood stem cell (PBSC) collection for NHL           Assessment and Plan:   The patient is a 55 year old male with a history of NHL (mantle cell) who presents for autologous PBSC collection.  The plan is to collect for 1 to 3 days until the target goal is met.  The patient's CVC functioned well.  Some bone pain in shoulders, managed with tylenol and claritin.  Peripheral blood CD34+ cell enumeration was 17/microliter today.  Anticipate a solid collection.    Continue with plan as per BMT.         Chief Complaint:   Shoulder pain.         History of Present Illness:   55 year old male presents for autologous PBSC collection for treatment of his NHL.  He feels well aside from some GCSF-related bone pain at his shoulders.              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 laser lithotripsy Left 06/04/2020    treat kidney stones     INSERT CATHETER VASCULAR ACCESS Left 10/26/2020    Procedure: dual lumen non valved tunneled line vascular placement;  Surgeon: Alessandro Steven MD;  Location: [facility] OR     IR CVC TUNNEL PLACEMENT > 5 YRS OF AGE  10/26/2020     port a cath placement Right 02/18/2020    internal jugular vein     Knee surgery x 2, ankle surgery (distant past)           Social History:   Single, chew tobacco, occasional EtOH          Family History:   Mother with h/o lymphoma, non-melanoma skin  cancer          Immunizations:     There is no immunization history on file for this patient.         Allergies:    No Known Allergies         Medications:     Current Outpatient Medications   Medication     acetaminophen (TYLENOL) 500 MG tablet     glimepiride (AMARYL) 4 MG tablet     heparin 100 UNIT/ML SOLN injection     metFORMIN (GLUCOPHAGE) 500 MG tablet     metoprolol succinate ER (TOPROL-XL) 25 MG 24 hr tablet     Current Facility-Administered Medications   Medication     filgrastim-sndz (ZARXIO) injection syringe 1,200 mcg          Review of Systems:   Feels well today aside from shoulder pain.           Vital Signs:     Vitals:    10/27/20 0830 10/27/20 1418   BP: (!) 142/84 (!) 177/94   Pulse: 81 86   Resp: 16 16   Temp: 97.4  F (36.3  C) 98.1  F (36.7  C)   TempSrc: Oral Oral   Weight: 112.5 kg (248 lb 0.3 oz)                  Data:      Blood type Rh(D)   AB RH(D)   Date Value Ref Range Status   10/13/2020 Neg  Final         Last CBC:  Lab Results   Component Value Date    WBC 19.8 (H) 10/27/2020    HGB 12.1 (L) 10/27/2020    HCT 36.7 (L) 10/27/2020    MCV 92 10/27/2020     10/27/2020   CD34+ cell enumeration (peripheral blood) - 17/microliter (10/27/20)  CD34+ cell enumeration (peripheral blood) - 19/microliter (10/26/20)    Attestation:  During the procedure, I, Gigi Sanchez, personally saw and evaluated the patient.    Gigi Sanchez M.D.  Professor, Transfusion Medicine  Laboratory Medicine & Pathology  Pager: [phone]

## 2020-10-28 NOTE — PROGRESS NOTES
BMT Daily Progress Note   10/28/2020    Patient ID:  Theo Roblero is a 55 year old male hx of mantle cell lymphoma in CR1- undergoing gcsf only priming prior to stem cell collection for planned Beam Auto.      Diagnosis [facility] Non-Hodgkin lymphoma, Mantle cell/intermediate differentiation  HCT Type Autologous    GVHD Prophylaxis N/A  Primary BMT MD Dr Zavala     INTERVAL  HISTORY     Don feels fine.  He is day 2 of stem cell collections.  No numbness/tingling/cramping with apheresis.  No bone pain. He is agreeable to evenign mozobil- we discussed potential for diarrhea side effect. Plan for day 3 collections tomorrow.     Review of Systems: 10 point ROS negative except as noted above.    # Pain Assessment:  Current Pain Score 10/26/2020   Patient currently in pain? denies   Theo s pain level was assessed and he currently denies pain.    Scheduled Medications    Current Outpatient Medications   Medication     acetaminophen (TYLENOL) 500 MG tablet     glimepiride (AMARYL) 4 MG tablet     heparin 100 UNIT/ML SOLN injection     metFORMIN (GLUCOPHAGE) 500 MG tablet     metoprolol succinate ER (TOPROL-XL) 25 MG 24 hr tablet     No current facility-administered medications for this visit.      Facility-Administered Medications Ordered in Other Visits   Medication     filgrastim-sndz (ZARXIO) injection syringe 1,200 mcg       PHYSICAL EXAM     Weight In/Out     Wt Readings from Last 3 Encounters:   10/27/20 112.5 kg (248 lb 0.3 oz)   10/26/20 110.7 kg (244 lb)   10/23/20 111.8 kg (246 lb 8 oz)      [unfilled]       KPS:  80    Vitals reviewed in apheresis:  HTN on arrival (168/86); improved to 148/83 after about 20 minutes; patient reports some of it likely related to stress of being at the apheresis center  General: NAD   Eyes: : MARYAN, sclera anicteric   Nose/Mouth/Throat: OP clear, buccal mucosa moist, no ulcerations  Lungs: CTA bilaterally  Cardiovascular: RRR, no M/R/G   Abdominal/Rectal: +BS, soft, NT, ND,  No HSM   Lymphatics: no edema  Skin: no rashes or petechaie  Neuro: A&O   Additional Findings: duvol- just placed left chest- not bleeding. Slightly tender. Improved per patient.     LABS AND IMAGING      I have assessed all abnormal lab values for their clinical significance and any values considered clinically significant have been addressed in the assessment and plan      Lab Results   Component Value Date    WBC 27.3 (H) 10/28/2020    ANEU 23.5 (H) 10/28/2020    HGB 11.8 (L) 10/28/2020    HCT 35.5 (L) 10/28/2020     (L) 10/28/2020     10/28/2020    POTASSIUM 3.4 10/28/2020    CHLORIDE 102 10/28/2020    CO2 29 10/28/2020     (H) 10/28/2020    BUN 12 10/28/2020    CR 1.00 10/28/2020    MAG 1.4 (L) 10/28/2020    INR 1.05 10/13/2020         OVERALL PLAN   Theo Roblero is a 55 year old male hx of Mantle cell lymphoma in CR1. Undergoing gcsf priming prior to Autologous pbsct for MM. Currently day+6 of gcsf priming only.     1.  BMT: Mantle cell lymphoma- planned beam auto. Goal for 5x10^6 cd34/kg  Day 1 gcsf today 10/23. RTC daily for injection  Day +4: 10/26- duvol placed  Day 1 of collections 10/27 - 1.38x10^6 cd34/kg  10/28- collect for 2nd time today. CD 34 down 13 (17). Given that I anticipate Yanick will collect less today than he did yesterday I favor giving dose of mozobil to hopefully complete collections tomorrow or Friday.   - 10/28 Day 1 Mozobil (discussed with Dr Corea and insurance did approve). Plan for 3rd day collections tomorrow 10/29.     2.  HEME: Keep Hgb>8 and plts>10K. No pre-meds.  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                             3.  ID: Afebrile.    4. CV:  # HTN: continue metoprolol.  Suspect he has some white coat hypertension in addition.   # HLD: no current monitoring in our system.  Patient is not on medication for this, as he said a statin was attempted, but caused muscle pain and was thus discontinued.    5. Endo:  # T2DM: continue glimepiride and  metformin.  Blood sugars checked once daily, usually around 120s (morning).     6. Tobacco use: he chews tobacco and would like to quit when admitted for transplant; interested in trying nicotine patch when he is admitted.     Known issues that I take into account for medical decisions, with salient changes to the plan considering these complexities noted above:  Mantle cell lymphoma of lymph nodes of multiple sites  Hypertension  Type 2 diabetes mellitus without long-term current use of insulin  Tobacco user    First doze mozobil in infusion today 3:30.   RTC 10/29 for review/planning.    Nancy Galvan  10/27/2020

## 2020-10-28 NOTE — PROGRESS NOTES
Infusion Nursing Note:  Theo CARCAMO Osbaldo presents today for Mozobil inj.    Patient seen by provider today: Yes: Clemencia Galvan   present during visit today: Not Applicable.    Note: Labs monitored. Pt educated reason for mozobil and side effects. First dose of Mozobil administered in left arm. Pt tolerated without side effects.     Intravenous Access:  No Intravenous access/labs at this visit.    Treatment Conditions:  Results reviewed, labs MET treatment parameters, ok to proceed with treatment.      Post Infusion Assessment:  Patient tolerated injection without incident.       Discharge Plan:   [facility]  Patient will return tomorrow for next appointment.   Patient discharged in stable condition accompanied by: self.  Departure Mode: Ambulatory.    Rafita Dimas RN

## 2020-10-28 NOTE — LETTER
10/28/2020         RE: Theo Roblero  [address] [facility]  [address] [place] [id number]        Dear Colleague,    Thank you for referring your patient, Theo Roblero, to the [facility] BLOOD AND MARROW TRANSPLANT PROGRAM [place]. Please see a copy of my visit note below.    Infusion Nursing Note:  Theo Roblero presents today for Mozobil inj.    Patient seen by provider today: Yes: Clemencia Galvan   present during visit today: Not Applicable.    Note: Labs monitored. Pt educated reason for mozobil and side effects. First dose of Mozobil administered in left arm. Pt tolerated without side effects.     Intravenous Access:  No Intravenous access/labs at this visit.    Treatment Conditions:  Results reviewed, labs MET treatment parameters, ok to proceed with treatment.      Post Infusion Assessment:  Patient tolerated injection without incident.       Discharge Plan:   AVS to patient via [facility].  Patient will return tomorrow for next appointment.   Patient discharged in stable condition accompanied by: self.  Departure Mode: Ambulatory.    Rafita Dimas RN                            Again, thank you for allowing me to participate in the care of your patient.        Sincerely,        [facility]

## 2020-10-28 NOTE — PROCEDURES
Transfusion Medicine  Apheresis Procedure Note    Theo Roblero MRN# [id number]   YOB: 1965 Age: 55 year old     Procedure: Autologous peripheral blood stem cell (PBSC) collection for NHL           Assessment and Plan:   The patient is a 55 year old male with a history of NHL (mantle cell) who presents for autologous PBSC collection.  The patient's CVC continues to function well.  Peripheral blood CD34+ cell enumeration was 13/microliter today, down slightly from yesterday.  Day #1 yield was 1.38 million CD34+ cells/kg.  Mozobil added this evening.  Continue with plan as per BMT.         Chief Complaint:   Shoulder pain.         History of Present Illness:   55 year old male presents for autologous PBSC collection for treatment of his NHL.  He feels well aside from some GCSF-related bone pain at his shoulders.              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 laser lithotripsy Left 06/04/2020    treat kidney stones     INSERT CATHETER VASCULAR ACCESS Left 10/26/2020    Procedure: dual lumen non valved tunneled line vascular placement;  Surgeon: Alessandro Steven MD;  Location: [facility] OR     IR CVC TUNNEL PLACEMENT > 5 YRS OF AGE  10/26/2020     port a cath placement Right 02/18/2020    internal jugular vein     Knee surgery x 2, ankle surgery (distant past)           Social History:   Single, chew tobacco, occasional EtOH          Family History:   Mother with h/o lymphoma, non-melanoma skin cancer          Immunizations:     There is no  immunization history on file for this patient.         Allergies:    No Known Allergies         Medications:     Current Outpatient Medications   Medication     acetaminophen (TYLENOL) 500 MG tablet     glimepiride (AMARYL) 4 MG tablet     heparin 100 UNIT/ML SOLN injection     metFORMIN (GLUCOPHAGE) 500 MG tablet     metoprolol succinate ER (TOPROL-XL) 25 MG 24 hr tablet     Current Facility-Administered Medications   Medication     filgrastim-sndz (ZARXIO) injection syringe 1,200 mcg     Facility-Administered Medications Ordered in Other Encounters   Medication     plerixafor (MOZOBIL) injection SOLN 26.8 mg          Review of Systems:   Feels well today aside from shoulder pain.           Vital Signs:     Vitals:    10/28/20 0815 10/28/20 1340   BP: (!) 158/83 (!) 163/79   Pulse: 92 89   Resp: 16 16   Temp: 98.1  F (36.7  C) 98.6  F (37  C)   TempSrc: Oral Oral                 Data:      Blood type Rh(D)   AB RH(D)   Date Value Ref Range Status   10/13/2020 Neg  Final         Last CBC:  Lab Results   Component Value Date    WBC 27.3 (H) 10/28/2020    HGB 11.8 (L) 10/28/2020    HCT 35.5 (L) 10/28/2020    MCV 92 10/28/2020     (L) 10/28/2020     CD34+ cell enumeration (peripheral blood) - 13/microliter (10/28/20)  CD34+ cell enumeration (peripheral blood) - 17/microliter (10/27/20)   CD34+ cell enumeration (peripheral blood) - 19/microliter (10/26/20)    Attestation:  I, Gigi Sanchez M.D., was immediately available and present on-site during the entirety of the procedure.  I reviewed the patient's chart and was in direct communication with apheresis nursing staff during the collection.  I did not visit in-person to conserve PPE/limit contacts due to covid19.    Gigi Sanchez M.D.  Professor, Transfusion Medicine  Laboratory Medicine & Pathology  Pager: [phone]

## 2020-10-28 NOTE — PROGRESS NOTES
This is a recent snapshot of the patient's [facility] medical record.  For current drug dose and complete information and questions, call [phone] or In Basket pool, [facility] home infusion ([id number])  CSN Number:  [id number]

## 2020-10-28 NOTE — LETTER
10/28/2020         RE: Theo Roblero  [address]  [address]  [place] [facility] [place] [address]        Dear Colleague,    Thank you for referring your patient, Theo Roblero, to the [facility] BLOOD AND MARROW TRANSPLANT PROGRAM [place]. Please see a copy of my visit note below.    BMT Daily Progress Note   10/28/2020    Patient ID:  Theo Roblero is a 55 year old male hx of mantle cell lymphoma in CR1- undergoing gcsf only priming prior to stem cell collection for planned Beam Auto.      Diagnosis [facility] Non-Hodgkin lymphoma, Mantle cell/intermediate differentiation  HCT Type Autologous    GVHD Prophylaxis N/A  Primary BMT MD Dr Zavala     INTERVAL  HISTORY     Don feels fine.  He is day 2 of stem cell collections.  No numbness/tingling/cramping with apheresis.  No bone pain. He is agreeable to evenign mozobil- we discussed potential for diarrhea side effect. Plan for day 3 collections tomorrow.     Review of Systems: 10 point ROS negative except as noted above.    # Pain Assessment:  Current Pain Score 10/26/2020   Patient currently in pain? dencristy Venegas s pain level was assessed and he currently denies pain.    Scheduled Medications    Current Outpatient Medications   Medication     acetaminophen (TYLENOL) 500 MG tablet     glimepiride (AMARYL) 4 MG tablet     heparin 100 UNIT/ML SOLN injection     metFORMIN (GLUCOPHAGE) 500 MG tablet     metoprolol succinate ER (TOPROL-XL) 25 MG 24 hr tablet     No current facility-administered medications for this visit.      Facility-Administered Medications Ordered in Other Visits   Medication     filgrastim-sndz (ZARXIO) injection syringe 1,200 mcg       PHYSICAL EXAM     Weight In/Out     Wt Readings from Last 3 Encounters:   10/27/20 112.5 kg (248 lb 0.3 oz)   10/26/20 110.7 kg (244 lb)   10/23/20 111.8 kg (246 lb 8 oz)      [unfilled]       KPS:  80    Vitals reviewed in apheresis:  HTN on arrival (168/86); improved to 148/83 after about 20 minutes;  patient reports some of it likely related to stress of being at the apheresis center  General: NAD   Eyes: : MARYAN, sclera anicteric   Nose/Mouth/Throat: OP clear, buccal mucosa moist, no ulcerations  Lungs: CTA bilaterally  Cardiovascular: RRR, no M/R/G   Abdominal/Rectal: +BS, soft, NT, ND, No HSM   Lymphatics: no edema  Skin: no rashes or petechaie  Neuro: A&O   Additional Findings: duvol- just placed left chest- not bleeding. Slightly tender. Improved per patient.     LABS AND IMAGING      I have assessed all abnormal lab values for their clinical significance and any values considered clinically significant have been addressed in the assessment and plan      Lab Results   Component Value Date    WBC 27.3 (H) 10/28/2020    ANEU 23.5 (H) 10/28/2020    HGB 11.8 (L) 10/28/2020    HCT 35.5 (L) 10/28/2020     (L) 10/28/2020     10/28/2020    POTASSIUM 3.4 10/28/2020    CHLORIDE 102 10/28/2020    CO2 29 10/28/2020     (H) 10/28/2020    BUN 12 10/28/2020    CR 1.00 10/28/2020    MAG 1.4 (L) 10/28/2020    INR 1.05 10/13/2020         OVERALL PLAN   Theo Roblero is a 55 year old male hx of Mantle cell lymphoma in CR1. Undergoing gcsf priming prior to Autologous pbsct for MM. Currently day+6 of gcsf priming only.     1.  BMT: Mantle cell lymphoma- planned beam auto. Goal for 5x10^6 cd34/kg  Day 1 gcsf today 10/23. RTC daily for injection  Day +4: 10/26- duvol placed  Day 1 of collections 10/27 - 1.38x10^6 cd34/kg  10/28- collect for 2nd time today. CD 34 down 13 (17). Given that I anticipate Don will collect less today than he did yesterday I favor giving dose of mozobil to hopefully complete collections tomorrow or Friday.   - 10/28 Day 1 Mozobil (discussed with Dr Corea and insurance did approve). Plan for 3rd day collections tomorrow 10/29.     2.  HEME: Keep Hgb>8 and plts>10K. No pre-meds.  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                             3.  ID: Afebrile.    4. CV:  # HTN: continue  metoprolol.  Suspect he has some white coat hypertension in addition.   # HLD: no current monitoring in our system.  Patient is not on medication for this, as he said a statin was attempted, but caused muscle pain and was thus discontinued.    5. Endo:  # T2DM: continue glimepiride and metformin.  Blood sugars checked once daily, usually around 120s (morning).     6. Tobacco use: he chews tobacco and would like to quit when admitted for transplant; interested in trying nicotine patch when he is admitted.     Known issues that I take into account for medical decisions, with salient changes to the plan considering these complexities noted above:  Mantle cell lymphoma of lymph nodes of multiple sites  Hypertension  Type 2 diabetes mellitus without long-term current use of insulin  Tobacco user    First doze mozobil in infusion today 3:30.   RTC 10/29 for review/planning.    Nancy Galvan  10/27/2020       Again, thank you for allowing me to participate in the care of your patient.        Sincerely,        BMT Advanced Practice Provider

## 2020-10-29 NOTE — LETTER
10/29/2020         RE: Theo Roblero  [address]  [address]  [place] [address]        Dear Colleague,    Thank you for referring your patient, Theo Roblero, to the [facility] BLOOD AND MARROW TRANSPLANT PROGRAM [place]. Please see a copy of my visit note below.    BMT Daily Progress Note   10/29/2020    Patient ID:  Theo Roblero is a 55 year old male hx of mantle cell lymphoma in CR1- undergoing gcsf only priming prior to stem cell collection for planned Beam Auto.      Diagnosis [facility] Non-Hodgkin lymphoma, Mantle cell/intermediate differentiation  HCT Type Autologous    GVHD Prophylaxis N/A  Primary BMT MD Dr Zavala     INTERVAL  HISTORY     Don feels fine.  He did get diarrhea x1 after mozobil last night - no further issues over night nor this morning. Still no bone pain. He agreeable to hosptial admission whenever available. Central line is no longer sore and no other complaints.     Review of Systems: 10 point ROS negative except as noted above.    # Pain Assessment:  Current Pain Score 10/28/2020   Patient currently in pain? denies   Theo s pain level was assessed and he currently denies pain.    Scheduled Medications    Current Outpatient Medications   Medication     acetaminophen (TYLENOL) 500 MG tablet     glimepiride (AMARYL) 4 MG tablet     heparin 100 UNIT/ML SOLN injection     metFORMIN (GLUCOPHAGE) 500 MG tablet     metoprolol succinate ER (TOPROL-XL) 25 MG 24 hr tablet     No current facility-administered medications for this visit.        PHYSICAL EXAM     Weight In/Out     Wt Readings from Last 3 Encounters:   10/27/20 112.5 kg (248 lb 0.3 oz)   10/26/20 110.7 kg (244 lb)   10/23/20 111.8 kg (246 lb 8 oz)      [unfilled]       KPS:  80    There were no vitals taken for this visit. BP:129/55, HR:86, R:18 T98.3  General: NAD   Eyes: MARYAN, sclera anicteric   Nose/Mouth/Throat: OP clear, buccal mucosa moist, no ulcerations  Lungs: CTA bilaterally  Cardiovascular:  RRR, no M/R/G   Abdominal/Rectal: +BS, soft, NT, ND  Lymphatics: no edema  Skin: no rashes or petechaie  Neuro: A&O   Additional Findings: duvol- just placed left chest- C/D/I    LABS AND IMAGING      I have assessed all abnormal lab values for their clinical significance and any values considered clinically significant have been addressed in the assessment and plan      Lab Results   Component Value Date    WBC 48.1 (H) 10/29/2020    ANEU 38.9 (H) 10/29/2020    HGB 11.5 (L) 10/29/2020    HCT 35.0 (L) 10/29/2020    PLT 79 (L) 10/29/2020     10/29/2020    POTASSIUM 3.2 (L) 10/29/2020    CHLORIDE 101 10/29/2020    CO2 30 10/29/2020     (H) 10/29/2020    BUN 10 10/29/2020    CR 1.05 10/29/2020    MAG 1.2 (L) 10/29/2020    INR 1.05 10/13/2020     OVERALL PLAN   Theo Roblero is a 55 year old male hx of Mantle cell lymphoma in CR1. Undergoing gcsf priming prior to Autologous pbsct for MM. Currently day+7 of gcsf priming only.     1.  BMT: Mantle cell lymphoma- planned beam auto. Goal for 5x10^6 cd34/kg  Day 1 gcsf today 10/23. RTC daily for injection  Day +4: 10/26- duvol placed  Day 1 of collections 10/27 - 1.38x10^6 cd34/kg  After 2 days- colleciton total: 2.77x10^6  - 10/28 Given  Mozobil (discussed with Dr Corea and insurance did approve).   -10/29 Collect for 3rd and final time today. Anticipate will reach goal of ~5.0x10^6.   Need to schedule Palifermin. Awaiting admission plan from BMT office.     2.  HEME: Keep Hgb>8 and plts>10K. No pre-meds.  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                             3.  ID: Afebrile.    4. CV:  # HTN: continue metoprolol.  Suspect he has some white coat hypertension in addition.   # HLD: no current monitoring in our system.  Patient is not on medication for this, as he said a statin was attempted, but caused muscle pain and was thus discontinued.    5. Endo:  # T2DM: continue glimepiride and metformin.  Blood sugars checked once daily, usually around 120s  (morning).     6. Tobacco use: he chews tobacco and would like to quit when admitted for transplant; interested in trying nicotine patch when he is admitted.     7. FEN: K 3.2 today - given 40meq oral K  Mag 1.2- give 4g IV magnesium.     Known issues that I take into account for medical decisions, with salient changes to the plan considering these complexities noted above:  Mantle cell lymphoma of lymph nodes of multiple sites  Hypertension  Type 2 diabetes mellitus without long-term current use of insulin  Tobacco user    RTC Sunday, Monday, Tuesday  for KGF  Plan for hospital admission Wednesday     Nancy Galvan  10/27/2020       Again, thank you for allowing me to participate in the care of your patient.        Sincerely,        BMT Advanced Practice Provider

## 2020-10-29 NOTE — TELEPHONE ENCOUNTER
Per Clemencia (LITO) patient will be done with collections following today's session. LITO will request clinic visits for palifermin (KGF) infusion 11/1, 11/2, 11/3 and if patient agreeable will plan for admit to  for transplant between 9-10 am on Wed 11/4.   Patient was called and author provided this information in  with request that patient return call to finalize admission.

## 2020-10-29 NOTE — PROCEDURES
Transfusion Medicine  Apheresis Procedure Note    Theo Roblero MRN# [id number]   YOB: 1965 Age: 55 year old     Procedure: Autologous peripheral blood stem cell (PBSC) collection for NHL           Assessment and Plan:   The patient is a 55 year old male with a history of NHL (mantle cell) who presents for autologous PBSC collection.  The patient's CVC continues to function well.  Peripheral blood CD34+ cell enumeration was 25/microliter this AM, up from yesterday.  Day #1 + #2 cumulative yield is 2.77 million CD34+ cells/kg.  Mozobil added to regimen.  This is first day of collections with Mozobil.  Continue with plan as per BMT.         Chief Complaint:   GCSF-related pain.         History of Present Illness:   55 year old male presents for autologous PBSC collection for treatment of his NHL.  He feels well aside from some GCSF-related bone pain.              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 laser lithotripsy Left 06/04/2020    treat kidney stones     INSERT CATHETER VASCULAR ACCESS Left 10/26/2020    Procedure: dual lumen non valved tunneled line vascular placement;  Surgeon: Alessandro Steven MD;  Location: [facility] OR     IR CVC TUNNEL PLACEMENT > 5 YRS OF AGE  10/26/2020     port a cath placement Right 02/18/2020    internal jugular vein     Knee surgery x 2, ankle surgery (distant past)           Social History:   Single, chew tobacco, occasional EtOH          Family History:   Mother with h/o lymphoma, non-melanoma skin cancer           "Immunizations:     There is no immunization history on file for this patient.         Allergies:    No Known Allergies         Medications:     Current Outpatient Medications   Medication     acetaminophen (TYLENOL) 500 MG tablet     glimepiride (AMARYL) 4 MG tablet     heparin 100 UNIT/ML SOLN injection     metFORMIN (GLUCOPHAGE) 500 MG tablet     metoprolol succinate ER (TOPROL-XL) 25 MG 24 hr tablet     Current Facility-Administered Medications   Medication     filgrastim-sndz (ZARXIO) injection syringe 1,200 mcg     magnesium sulfate 4 g in 100 mL sterile water (premade)          Review of Systems:   Feels well today aside from shoulder pain.           Vital Signs:     Vitals:    10/29/20 0825   BP: 129/55   Pulse: 86   Resp: 18   Temp: 98.3  F (36.8  C)   TempSrc: Oral   Weight: 112.5 kg (248 lb 0.3 oz)   Height: 1.83 m (6' 0.05\")                 Data:      Blood type Rh(D)   AB RH(D)   Date Value Ref Range Status   10/13/2020 Neg  Final         Last CBC:  Lab Results   Component Value Date    WBC 48.1 (H) 10/29/2020    HGB 11.5 (L) 10/29/2020    HCT 35.0 (L) 10/29/2020    MCV 93 10/29/2020    PLT 79 (L) 10/29/2020     CD34+ cell enumeration (peripheral blood) - 25/microliter (10/29/20)  CD34+ cell enumeration (peripheral blood) - 13/microliter (10/28/20)  CD34+ cell enumeration (peripheral blood) - 17/microliter (10/27/20)   CD34+ cell enumeration (peripheral blood) - 19/microliter (10/26/20)    Attestation:  I, Gigi Sanchez M.D., was immediately available and present on-site during the entirety of the procedure.  I reviewed the patient's chart and was in direct communication with apheresis nursing staff during the collection.  I did not visit in-person to conserve PPE/limit contacts due to covid19.    Gigi Sanchez M.D.  Professor, Transfusion Medicine  Laboratory Medicine & Pathology  Pager: [phone]    "

## 2020-10-29 NOTE — PROGRESS NOTES
BMT Daily Progress Note   10/29/2020    Patient ID:  Theo Roblero is a 55 year old male hx of mantle cell lymphoma in CR1- undergoing gcsf only priming prior to stem cell collection for planned Beam Auto.      Diagnosis [facility] Non-Hodgkin lymphoma, Mantle cell/intermediate differentiation  HCT Type Autologous    GVHD Prophylaxis N/A  Primary BMT MD Dr Zavala     INTERVAL  HISTORY     Don feels fine.  He did get diarrhea x1 after mozobil last night - no further issues over night nor this morning. Still no bone pain. He agreeable to hosptial admission whenever available. Central line is no longer sore and no other complaints.     Review of Systems: 10 point ROS negative except as noted above.    # Pain Assessment:  Current Pain Score 10/28/2020   Patient currently in pain? denies   Theo s pain level was assessed and he currently denies pain.    Scheduled Medications    Current Outpatient Medications   Medication     acetaminophen (TYLENOL) 500 MG tablet     glimepiride (AMARYL) 4 MG tablet     heparin 100 UNIT/ML SOLN injection     metFORMIN (GLUCOPHAGE) 500 MG tablet     metoprolol succinate ER (TOPROL-XL) 25 MG 24 hr tablet     No current facility-administered medications for this visit.        PHYSICAL EXAM     Weight In/Out     Wt Readings from Last 3 Encounters:   10/27/20 112.5 kg (248 lb 0.3 oz)   10/26/20 110.7 kg (244 lb)   10/23/20 111.8 kg (246 lb 8 oz)      [unfilled]       KPS:  80    There were no vitals taken for this visit. BP:129/55, HR:86, R:18 T98.3  General: NAD   Eyes: MARYAN, sclera anicteric   Nose/Mouth/Throat: OP clear, buccal mucosa moist, no ulcerations  Lungs: CTA bilaterally  Cardiovascular: RRR, no M/R/G   Abdominal/Rectal: +BS, soft, NT, ND  Lymphatics: no edema  Skin: no rashes or petechaie  Neuro: A&O   Additional Findings: duvol- just placed left chest- C/D/I    LABS AND IMAGING      I have assessed all abnormal lab values for their clinical significance and any values  considered clinically significant have been addressed in the assessment and plan      Lab Results   Component Value Date    WBC 48.1 (H) 10/29/2020    ANEU 38.9 (H) 10/29/2020    HGB 11.5 (L) 10/29/2020    HCT 35.0 (L) 10/29/2020    PLT 79 (L) 10/29/2020     10/29/2020    POTASSIUM 3.2 (L) 10/29/2020    CHLORIDE 101 10/29/2020    CO2 30 10/29/2020     (H) 10/29/2020    BUN 10 10/29/2020    CR 1.05 10/29/2020    MAG 1.2 (L) 10/29/2020    INR 1.05 10/13/2020     OVERALL PLAN   Theo Roblero is a 55 year old male hx of Mantle cell lymphoma in CR1. Undergoing gcsf priming prior to Autologous pbsct for MM. Currently day+7 of gcsf priming only.     1.  BMT: Mantle cell lymphoma- planned beam auto. Goal for 5x10^6 cd34/kg  Day 1 gcsf today 10/23. RTC daily for injection  Day +4: 10/26- duvol placed  Day 1 of collections 10/27 - 1.38x10^6 cd34/kg  After 2 days- colleciton total: 2.77x10^6  - 10/28 Given  Mozobil (discussed with Dr Corea and insurance did approve).   -10/29 Collect for 3rd and final time today. Anticipate will reach goal of ~5.0x10^6.   Need to schedule Palifermin. Awaiting admission plan from BMT office.     2.  HEME: Keep Hgb>8 and plts>10K. No pre-meds.  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                             3.  ID: Afebrile.    4. CV:  # HTN: continue metoprolol.  Suspect he has some white coat hypertension in addition.   # HLD: no current monitoring in our system.  Patient is not on medication for this, as he said a statin was attempted, but caused muscle pain and was thus discontinued.    5. Endo:  # T2DM: continue glimepiride and metformin.  Blood sugars checked once daily, usually around 120s (morning).     6. Tobacco use: he chews tobacco and would like to quit when admitted for transplant; interested in trying nicotine patch when he is admitted.     7. FEN: K 3.2 today - given 40meq oral K  Mag 1.2- give 4g IV magnesium.     Known issues that I take into account for medical  decisions, with salient changes to the plan considering these complexities noted above:  Mantle cell lymphoma of lymph nodes of multiple sites  Hypertension  Type 2 diabetes mellitus without long-term current use of insulin  Tobacco user    RTC Sunday, Monday, Tuesday  for KGF  Plan for hospital admission Wednesday     Nancy Galvan  10/27/2020

## 2020-10-29 NOTE — TELEPHONE ENCOUNTER
Notified patient of admission to hospital on Wed 11/4 for planned autologous stem cell transplant.  He understands to check-in at hospital Admissions between 9-10am.   Don verbalized that he has been feeling well, no symptoms of concern at this time.  Patient has no questions or concerns.    COVID test scheduled in [facility] 1st floor lab for Monday @ 9:15am.

## 2020-11-01 NOTE — PROGRESS NOTES
Infusion Nursing Note:  Theo Roblero presents today for pre bmt for lymphoma here for KGF inj.    Patient seen by provider today: No   present during visit today: Not Applicable.    Note: tolerated inj well, observed x 15 min post inj, aware of future visits.    Intravenous Access:  Forte.    Treatment Conditions:  Not Applicable.      Post Infusion Assessment:  Patient tolerated infusion  without incident.       Discharge Plan:   Patient and/or family verbalized understanding of discharge instructions and all questions answered.    Mnoy Velásquez RN

## 2020-11-01 NOTE — LETTER
11/1/2020         RE: Theo Roblero  [address]  [address]  [place] [address]        Dear Colleague,    Thank you for referring your patient, Theo Roblero, to the [facility] BLOOD AND MARROW TRANSPLANT PROGRAM [place]. Please see a copy of my visit note below.    Infusion Nursing Note:  Theo Roblero presents today for pre bmt for lymphoma here for KGF inj.    Patient seen by provider today: No   present during visit today: Not Applicable.    Note: tolerated inj well, observed x 15 min post inj, aware of future visits.    Intravenous Access:  Forte.    Treatment Conditions:  Not Applicable.      Post Infusion Assessment:  Patient tolerated infusion  without incident.       Discharge Plan:   Patient and/or family verbalized understanding of discharge instructions and all questions answered.    Mony Velásquez RN                            Again, thank you for allowing me to participate in the care of your patient.        Sincerely,        [facility]

## 2020-11-02 NOTE — PROGRESS NOTES
Infusion Nursing Note:  Theo Roblero presents today for palifermin.    Patient seen by provider today: No   present during visit today: Not Applicable.    Note: Patient arrives for palifermin infusion, administered IVP and patient tolerated well.  No further replacement needs or concerns today.    Intravenous Access:  Forte.    Treatment Conditions:    Not applicable.      Post Infusion Assessment:  Patient tolerated infusion without incident.       Discharge Plan:   Patient discharged in stable condition accompanied by: self.  Departure Mode: Ambulatory.    Ashlyn Padilla RN

## 2020-11-02 NOTE — LETTER
11/2/2020         RE: Theo Roblero  [address]  [address] [place] [id number]        Dear Colleague,    Thank you for referring your patient, Theo Roblero, to the [facility] BLOOD AND MARROW TRANSPLANT PROGRAM [place]. Please see a copy of my visit note below.    Infusion Nursing Note:  Theo Roblero presents today for palifermin.    Patient seen by provider today: No   present during visit today: Not Applicable.    Note: Patient arrives for palifermin infusion, administered IVP and patient tolerated well.  No further replacement needs or concerns today.    Intravenous Access:  Forte.    Treatment Conditions:    Not applicable.      Post Infusion Assessment:  Patient tolerated infusion without incident.       Discharge Plan:   Patient discharged in stable condition accompanied by: self.  Departure Mode: Ambulatory.    Ashlyn Padilla RN                            Again, thank you for allowing me to participate in the care of your patient.        Sincerely,        [facility]

## 2020-11-02 NOTE — NURSING NOTE
Chief Complaint   Patient presents with     Infusion     Palifermin infusion, hx mantle cell lymphoma pre transplant.

## 2020-11-03 NOTE — PROGRESS NOTES
Infusion Nursing Note:  Theo Roblero presents today for Palifermin jnj.    Patient seen by provider today: No   present during visit today: Not Applicable.    Note: Palifermin administered in central line followed by NS flush. Pt tolerated inj.     Intravenous Access:  Forte.    Treatment Conditions:  Not Applicable.      Post Infusion Assessment:  Blood return noted pre and post infusion.       Discharge Plan:   Patient discharged in stable condition accompanied by: self.  Departure Mode: Ambulatory.    Rafita Dimas RN

## 2020-11-03 NOTE — LETTER
11/3/2020         RE: Theo Roblero  [address]  [address] [place] [id number]        Dear Colleague,    Thank you for referring your patient, Theo Roblero, to the [facility] BLOOD AND MARROW TRANSPLANT PROGRAM [place]. Please see a copy of my visit note below.    Infusion Nursing Note:  Theo Roblero presents today for Palifermin jnj.    Patient seen by provider today: No   present during visit today: Not Applicable.    Note: Palifermin administered in central line followed by NS flush. Pt tolerated inj.     Intravenous Access:  Forte.    Treatment Conditions:  Not Applicable.      Post Infusion Assessment:  Blood return noted pre and post infusion.       Discharge Plan:   Patient discharged in stable condition accompanied by: self.  Departure Mode: Ambulatory.    Rafita Dimas RN                        Again, thank you for allowing me to participate in the care of your patient.        Sincerely,        [facility]

## 2020-11-04 PROBLEM — C85.90 NHL (NON-HODGKIN'S LYMPHOMA) (H): Status: ACTIVE | Noted: 2020-01-01

## 2020-11-04 NOTE — CONSULTS
"Diabetes/Hyperglycemia Management Consult    Chief Complaint New BMT transplant with diabetes  Consult requested by: Alexa Medrano PA-C  History of Present Illness Theo \" Don\" Osbaldo is a 55 year old male with history of type 2 diabetes, hypertension, hyperlipidemia, stage IV mantle cell lymphoma admitted (11/4/2020) for autologous hematopoietic cell transplant.    Information was obtained from review of medical records and interview with patient.  Mr. Roblero was dx with type 2 diabetes \"years\" ago. PTA medications as listed below.  He tests his blood sugars up to twice daily. Am glucose 110-130 and will test a second time of the day if he is feeling this blood sugar is low or high. Endorses no recent low blood sugars. Has been experiencing higher blood sugars in the 200's since being on chemotherapy.   States he did take his morning metformin and glimepiride this am. Did not remember to tet his blood sugar before taking is medications.    Mr. Roblero, reports he has received  insulin while admitted for chemotherapy at [facility]. Review of blood sugars during his admission were in the high 100's to  200's. It looks like he was on glipizide X( switched from glimepiride due to formulary), metformin, and Humalog sliding scale during the admission to help manage his blood sugars. It is unclear if he received steroids during his rounds of chemotherapy. Mr. Roblero states he was not discharged on insulin.    Glucose on presentation 78--> 69  Plan to start dexamethasone with first dose at ~ 1800 today  Dexamethasone 10 mg daily x 4 doses ( first dose 11/4)  Dexamethasone 8 mg daily ( 11/8)  Dexamethasone 6 mg ( 11/9)  Dexamethasone 4 mg ( 11/10)      Currently, denies fever, chills, chest pain shortness of breath, abdominal pain, nausea and or vomiting, bowel or bladder issues.    Recent Labs   Lab 10/29/20  0825   *         Diabetes Type: type 2 diabetes  Diabetes Duration: years  Usual Diabetes " Regimen:   Metformin 500 mg twice daily( per patient)   Metformin 1000 mg twice daily ( per chart review)  Glimepiride 4 mg daily am  Ability to [place] Prescribed Regimen: yes    Diabetes Control: No results found for: A1C    Diabetes Complications: none    Able to Detect Hypoglycemia: yes  Usual Diabetes Care Provider: Dr. Mustafa PCP in [place]  Factors Impacting Glucose Control: steroids      Review of Systems  10 point ROS completed with pertinent positives and negatives noted in the HPI    Past medical, family and social histories are reviewed and updated.    Past Medical History  Past Medical History:   Diagnosis Date     Kidney stones 06/2020    left     Sepsis without septic shock (H) 05/12/2020    seconary tp pyelonephritis from obstruction left ureteroliethasis       Family History  No family history specific for diabetes  Family History   Problem Relation Age of Onset     Heart Disease Mother      Lymphoma Mother      Heart Disease Father      Heart Disease Sister        Social History  Social History     Socioeconomic History     Marital status: Single     Spouse name: Not on file     Number of children: Not on file     Years of education: Not on file     Highest education level: Not on file   Occupational History     Not on file   Social Needs     Financial resource strain: Not on file     Food insecurity     Worry: Not on file     Inability: Not on file     Transportation needs     Medical: Not on file     Non-medical: Not on file   Tobacco Use     Smoking status: Never Smoker     Smokeless tobacco: Current User     Types: Chew     Tobacco comment: trying to quit   Substance and Sexual Activity     Alcohol use: Not Currently     Drug use: Never     Sexual activity: Not on file   Lifestyle     Physical activity     Days per week: Not on file     Minutes per session: Not on file     Stress: Not on file   Relationships     Social connections     Talks on phone: Not on file     Gets together: Not on file     " Attends Christian service: Not on file     Active member of club or organization: Not on file     Attends meetings of clubs or organizations: Not on file     Relationship status: Not on file     Intimate partner violence     Fear of current or ex partner: Not on file     Emotionally abused: Not on file     Physically abused: Not on file     Forced sexual activity: Not on file   Other Topics Concern     Not on file   Social History Narrative     Not on file         Physical Exam  /82   Pulse 66   Temp 97.9  F (36.6  C) (Oral)   Resp 18   Ht 1.78 m (5' 10.08\")   Wt 114 kg (251 lb 4.8 oz)   SpO2 99%   BMI 35.98 kg/m      General:  Pleasant, NAD, sitting in chair,   HEENT:  PER and anicteric, non-injected, oral mucous membranes moist.   Lungs:  Non-labored, no cough  ABD: soft  Skin: warm and dry   MSK:  Moving all extremities  Mental status:  alert, oriented x3, communicating clearly  Psych:  calm, even mood    Laboratory  Recent Labs   Lab Test 10/29/20  0825 10/28/20  0830    138   POTASSIUM 3.2* 3.4   CHLORIDE 101 102   CO2 30 29   ANIONGAP 8 7   * 108*   BUN 10 12   CR 1.05 1.00   BELA 9.0 9.1     CBC RESULTS:   Recent Labs   Lab Test 10/29/20  0825   WBC 48.1*   RBC 3.78*   HGB 11.5*   HCT 35.0*   MCV 93   MCH 30.4   MCHC 32.9   RDW 12.8   PLT 79*       Liver Function Studies -   Recent Labs   Lab Test 10/13/20  1323   PROTTOTAL 7.1   ALBUMIN 3.6   BILITOTAL 0.4   ALKPHOS 87   AST 18   ALT 22       Active Medications  Current Facility-Administered Medications   Medication     acetaminophen (TYLENOL) tablet 325-650 mg     acetaminophen (TYLENOL) tablet 325-650 mg     [START ON 11/10/2020] acetaminophen (TYLENOL) tablet 650 mg     allopurinol (ZYLOPRIM) tablet 300 mg     ceFEPIme (MAXIPIME) 2 g vial to attach to  ml bag for ADULTS or 50 ml bag for PEDS     dexamethasone (DECADRON) tablet 10 mg     [START ON 11/10/2020] dexamethasone (DECADRON) tablet 4 mg     [START ON 11/9/2020] " "dexamethasone (DECADRON) tablet 6 mg     [START ON 11/8/2020] dexamethasone (DECADRON) tablet 8 mg     [START ON 11/15/2020] dextrose 5 % flush PRE/POST medication     [START ON 11/15/2020] dextrose 5 % flush PRE/POST medication     [START ON 11/9/2020] dextrose 5% and 0.45% NaCl infusion     glucose gel 15-30 g    Or     dextrose 50 % injection 25-50 mL    Or     glucagon injection 1 mg     [START ON 11/10/2020] diphenhydrAMINE (BENADRYL) capsule 25 mg     [START ON 11/15/2020] filgrastim (NEUPOGEN) 480 mcg in D5W 25 mL infusion     heparin lock flush 10 UNIT/ML injection 5-10 mL     heparin lock flush 10 UNIT/ML injection 5-10 mL     insulin aspart (NovoLOG) injection (RAPID ACTING)     lidocaine (LMX4) cream     lidocaine 1 % 0.1-1 mL     LORazepam (ATIVAN) injection 0.5-1 mg    Or     LORazepam (ATIVAN) tablet 0.5-1 mg     [START ON 11/10/2020] meperidine (DEMEROL) injection 25-50 mg     metoprolol succinate ER (TOPROL-XL) 24 hr tablet 25 mg     nicotine (NICODERM CQ) 14 MG/24HR 24 hr patch 1 patch     nicotine Patch in Place     ondansetron (ZOFRAN) tablet 8 mg     pantoprazole (PROTONIX) EC tablet 40 mg     prochlorperazine (COMPAZINE) injection 5-10 mg    Or     prochlorperazine (COMPAZINE) tablet 5-10 mg     sodium chloride (PF) 0.9% PF flush 10-20 mL     [START ON 11/9/2020] sodium chloride 0.9% infusion     No current outpatient medications on file.       Current Diet  Orders Placed This Encounter      High Kcal/High Protein Diet, ADULT        Assessment:  Theo \" Don\" Osbaldo is a 55 year old male with history of type 2 diabetes, hypertension, hyperlipidemia, stage IV mantle cell lymphoma admitted (11/4/2020) for autologous hematopoietic cell transplant.    Type 2 diabetic: PTA on oral medications.  - will initial BMT protocol  Weight 114 kg on admission  GFR 88  Dexamethasone 10 mg daily x 4 doses ( first dose 11/4)  Dexamethasone 8 mg daily ( 11/8)  Dexamethasone 6 mg ( 11/9)  Dexamethasone 4 mg ( " 11/10)    -will start with NPH with dexamethasone, but hold the first dose of NPH tonight due taking  is oral anti-hyperglycemic medications this am and a glucose is 69     Plan  - hold oral anti- hypoglycemic medications   - hold NPH dose for today  - NPH dose  in am TBD  -discontinued novolog medium intensity sliding scale every 4 hours  -changed to novolgo high correction before meals and at bedtime  Glucose to be tested before meals, HS and 0200  -hypoglycemia protocol  -will continue to follow       To contact Endocrine Diabetes service:   From 8AM-4PM: page inpatient diabetes provider that is following the patient  For questions or updates from 4PM-8AM: page the diabetes job code for on call fellow: [phone]      Sara Meléndez -[phone]  Diabetes Management job code [id number]    I spent a total of 80 minutes bedside and on the inpatient unit managing the glycemic care of Theo DONA Roblero. Over 50% of my time on the unit was spent counseling the patient  and/or coordinating care regarding .  See note for details.

## 2020-11-04 NOTE — H&P
BMT History & Physical     Admission  Name: Theo Roblero  Date:  11/4/2020  Service: BMT   Chief Complaint:  Mantle cell lymphoma  Informant:  Patient and Chart  Resuscitation Status: Full Code    Patient ID:  Theo Roblero is a 55 year old male, currently day -6 of his autologous hematopoietic cell transplant.    Transplant Essential Data:  Diagnosis [facility] Non-Hodgkin lymphoma, Mantle cell/intermediate differentiation  HCT Type Autologous    Prep Regimen BCNU  Agueda-C  Etoposide  Melphalan   Donor Source No data was found    GVHD Prophylaxis No  Clinical Trials none     Oncology Treatment History:  Theo Roblero is a 55 year old male with high risk (IPI 6.5 and Ki67 of 20%), stage IV Mantle cell lymphoma in CR1 s/p 8 cycles of alternating 6 cycles of Maxi-R- CHOP alternating with high-dose AGUEDA-C. Last cycle was given on 6/11/2020. His therapy was complicated by a kidney stone and sepsis in 5/2020, prior to cycle 5. Upon diagnosis, a CT was performed to evaluate pain concerning for an aortic dissection and showed LAD above and below the diaphragm, with large portacaval nodes measure in excess of 5cm. No thoracoabdominal aortic dissection or aneurysm was found.The findings of LAD were confirmed by a PET/CT. Lymph node biopsy on 2/7/2020 showed MCL, as described as CD20 positive B cells with positive staining for CD5 and cyclin D1.  The Ki-67 proliferation index was 20%. A bone marrow biopsy on 2/18/2020 was positive for intramedullary MCL (<5%). Early in his disease course, he reportedly developed a sepsis syndrome with leukocytosis (17.8) a creatinine 2.1 and was admitted on 2/20/2020. Kidney function has since improved.     He went on to receive Maxi-R- CHOP alternating with high-dose AGUEDA-C from 2-6/2020. Follow up imaging post therapy on 7/6/2020 showed a CR by PET CT, with resolution of avid lymph nodes. A persistent pleural effusion was observed and not PET avid.       Admitted for transplant  today. Received palifermin D-9 through D-7. Feels a thick sensation in his mouth, but no rash. Went to the dentist and had 7 teeth extracted about four weeks ago. Healing well. No residual pain. No recent fevers, cough or cold symptoms. Ready to start transplant process today    Treatment/Chemotherapy Number of Cycles Date Range Outcomes & Complications   [place] Regimen (Maxi-R-CHOP alternating high-dose Agueda-C)  - Cycle #1 CHOP 02/21/2020, presenting as TLS, received Rasburicase x 1  - Cycle #2 R+Agueda-c 03/12/2020  - Cycle #3 R-CHOP 04/02/2020  - Cycle #4 R+Agueda-C 04/23/2020  - Cycle #5 R-CHOP 05/20/2020, delayed due to urosepsis  - Cycle #6 R+Agueda-c 06/11/2020 February through June 2020 CR   Maintenance rituxan given due to delay in transplant 1 9/30/20        Bone Marrow Workup Results:  Blood Counts Recent Labs   Lab Test 10/29/20  0825   WBC 48.1*   ANEU 38.9*   ALYM 1.3   CLAUDIA 5.4*   AEOS 0.8*   HGB 11.5*   HCT 35.0*   PLT 79*      Bone Marrow 10/13/20    Hypocellular bone marrow (10-20% cellular) with trilineage hematopoietic   maturation, and no morphologic or immunophenotypic evidence of B cell lymphoma.    Flow: There is no immunophenotypic evidence of B cell non-Hodgkin lymphoma. Final interpretation requires correlation with results of other ancillary studies, morphologic and clinical features.     FISH:  RESULTS:   NORMAL   - No evidence of IGH-CCND1 fusion      Blood Type Recent Labs   Lab Test 10/13/20  1323   ABO AB      Chemistries Recent Labs   Lab Test 10/29/20  0825      POTASSIUM 3.2*   CHLORIDE 101   CO2 30   BUN 10   CR 1.05      Liver Tests Recent Labs   Lab Test 10/13/20  1323   BILITOTAL 0.4   ALKPHOS 87   AST 18   ALT 22      PET/CT: 8/27/20  IMPRESSION: In this patient with history of non-Hodgkin's lymphoma  status post chemotherapy:  1. Findings are compatible with complete metabolic response to  treatment by PET lugano criteria. No FDG avid lymph nodes are  identified.  2. Small  left pleural effusion with associated compressive atelectasis  of the dependent portions of the left lung. The lungs are otherwise  clear.     Chest X-Ray: 8/26/20    IMPRESSION:   1. Small left pleural effusion.  2. Bibasilar linear opacities, which may represent atelectasis, edema  and/or infection.     Chest/abdomen/pelvis CT 10/13/20    IMPRESSION:   1. Complete response by CT Lugano criteria.   2. No enlarged lymph nodes in the chest, abdomen, or pelvis. No  interval change in size of the spleen.  3. Continued decrease in size of the subcapsular splenic hematoma, and  unchanged small splenic infarcts.  4. Decrease small left pleural effusion.   PFTs FVC%  Recent Labs   Lab Test 08/31/20  1031 20003 94       FEV1%  Recent Labs   Lab Test 08/31/20  1031 20016 103       DLCO%  Recent Labs   Lab Test 08/31/20  1031 20143 108      ECHO or MUGA: ECHO: 8/2720    Interpretation Summary  Mildly (EF 45-50%) reduced left ventricular function is present. LVEF 50%  based on biplane 2D tracing. Grade I or early diastolic dysfunction.  The right ventricle is normal size. Global right ventricular function is  normal.  No significant valvular abnormalities.  There is no prior study for direct comparison.     EKG 8/26/20  Sinus rhythm   Serologies: CMV +, EBV +, HSV+     Vitamin D No results for input(s): VITDT in the last 49227 hours.       I have assessed all abnormal lab values for their clinical significance and any values considered clinically significant have been addressed in the assessment and plan    Family History:   Family History   Problem Relation Age of Onset     Heart Disease Mother      Lymphoma Mother      Heart Disease Father      Heart Disease Sister        Social History:   Social History     Socioeconomic History     Marital status: Single     Spouse name: Not on file     Number of children: Not on file     Years of education: Not on file     Highest education level: Not on file   Occupational History      Not on file   Social Needs     Financial resource strain: Not on file     Food insecurity     Worry: Not on file     Inability: Not on file     Transportation needs     Medical: Not on file     Non-medical: Not on file   Tobacco Use     Smoking status: Never Smoker     Smokeless tobacco: Current User     Types: Chew     Tobacco comment: trying to quit   Substance and Sexual Activity     Alcohol use: Not Currently     Drug use: Never     Sexual activity: Not on file   Lifestyle     Physical activity     Days per week: Not on file     Minutes per session: Not on file     Stress: Not on file   Relationships     Social connections     Talks on phone: Not on file     Gets together: Not on file     Attends Advent service: Not on file     Active member of club or organization: Not on file     Attends meetings of clubs or organizations: Not on file     Relationship status: Not on file     Intimate partner violence     Fear of current or ex partner: Not on file     Emotionally abused: Not on file     Physically abused: Not on file     Forced sexual activity: Not on file   Other Topics Concern     Not on file   Social History Narrative     Not on file       Past Medical History:   Past Medical History:   Diagnosis Date     Kidney stones 06/2020    left     Sepsis without septic shock (H) 05/12/2020    seconary tp 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 laser lithotripsy Left 06/04/2020    treat kidney stones     INSERT CATHETER VASCULAR ACCESS Left 10/26/2020    Procedure: dual lumen non valved tunneled line vascular placement;  Surgeon: Alessandro Steven MD;  Location: [facility] OR     IR CVC TUNNEL PLACEMENT > 5 YRS OF AGE  10/26/2020     port a cath placement Right  02/18/2020    internal jugular vein       Allergies: No Known Allergies    Home Medications      Prior to Admission medications    Medication Sig Start Date End Date Taking? Authorizing Provider   acetaminophen (TYLENOL) 500 MG tablet Take 500-1,000 mg by mouth    Reported, Patient   glimepiride (AMARYL) 4 MG tablet Take 4 mg by mouth every morning (before breakfast)  6/26/20   Reported, Patient   heparin 100 UNIT/ML SOLN injection 3 mLs by Intracatheter route daily Inject 3 mL by intracatheter route to each lumen (end) of apheresis catheter once daily 10/27/20   Sam Zavala MD   metFORMIN (GLUCOPHAGE) 500 MG tablet 2 times daily (with meals)  6/26/20   Reported, Patient   metoprolol succinate ER (TOPROL-XL) 25 MG 24 hr tablet States he takes metoprolol BID 8/1/20   Reported, Patient       Review of Systems    Review of Systems:  CONSTITUTIONAL: NEGATIVE for fever, chills, change in weight  INTEGUMENTARY/SKIN: NEGATIVE for worrisome rashes, moles or lesions  EYES: NEGATIVE for vision changes or irritation  ENT/MOUTH: NEGATIVE for ear, mouth and throat problems  RESP: NEGATIVE for significant cough or SOB  BREAST: NEGATIVE for masses, tenderness or discharge  CV: NEGATIVE for chest pain, palpitations or peripheral edema  GI: NEGATIVE for nausea, abdominal pain, heartburn, or change in bowel habits  : NEGATIVE for frequency, dysuria, or hematuria  MUSCULOSKELETAL: NEGATIVE for significant arthralgias or myalgia  NEURO: NEGATIVE for weakness, dizziness or paresthesias  ENDOCRINE: NEGATIVE for temperature intolerance, skin/hair changes  HEME/ALLERGY: NEGATIVE for bleeding problems  PSYCHIATRIC: NEGATIVE for changes in mood or affect    PHYSICAL EXAM      Weight     Wt Readings from Last 3 Encounters:   11/03/20 114.1 kg (251 lb 9.6 oz)   11/02/20 113.2 kg (249 lb 9.6 oz)   10/29/20 112.5 kg (248 lb 0.3 oz)        KPS: 100    /82   Pulse 66   Temp 97.9  F (36.6  C) (Oral)   Resp 18   SpO2 99%      General:  NAD   Eyes: sclera anicteric   Nose/Mouth/Throat: OP clear, buccal mucosa moist, no ulcerations. S/p several recent molar extractions, all well healing  Lungs: CTA bilaterally  Cardiovascular: RRR, no M/R/G   Abdominal/Rectal: +BS, soft, NT, ND, No HSM   Lymphatics: No edema  Skin: No rashes or petechaie  Neuro: A&O   Additional Findings: Forte site NT, no drainage.    Labs  Lab Results   Component Value Date    WBC 9.9 11/04/2020    ANEU 8.4 (H) 11/04/2020    HGB 10.2 (L) 11/04/2020    HCT 31.3 (L) 11/04/2020     11/04/2020     11/04/2020    POTASSIUM 3.3 (L) 11/04/2020    CHLORIDE 105 11/04/2020    CO2 24 11/04/2020    GLC 78 11/04/2020    BUN 15 11/04/2020    CR 0.96 11/04/2020    MAG 1.7 11/04/2020    INR 1.04 11/04/2020    BILITOTAL 0.6 11/04/2020    AST 12 11/04/2020    ALT 19 11/04/2020    ALKPHOS 131 11/04/2020    PROTTOTAL 6.4 (L) 11/04/2020    ALBUMIN 3.1 (L) 11/04/2020     COVID negative 11/2/20  ASSESSMENT BY SYSTEMS   ID: Theo Roblero is a 55 year old male with mantle cell lymphoma, admitted for auto BMT with BEAM prep, today is D-6    D-6 BCNU  D-5 though D-2 Agueda-C and -16 x 2  D-1 melphalan  D0 = transplant ( from melphalan by 24 hours)  Palifermin D0, D+1 and D+2    1.  BMT: Prep as above. Allopurinol through day 0.   - Flush and pre-meds prior to transplant. Cell dose 4.3 million, collected with GCSf and mozobil  - GCSF starts d+5 and cont to until ANC>2500 x2 consecutive days. Re-stage per protocol.    2.  HEME: Keep Hgb>7 and plts>10K. Premeds for blood products (no hx of reaction, but has received premeds in the past)                            3.  ID:   - prophy Levo, Fluc, and HD ACV (CMV+, HSV+, EBV+) prophy.   - Bactrim or appropriate PCP therapy to start d+28.                                             4.  GI:   - Zofran and dexamethasone prior to chemo to prevent N/V. Ativan and compazine available PRN break-through symptoms.   - Protonix for GI  prophy.    5.  FEN/Renal:   - Monitor creat and lytes. Replete lytes PRN per SS.   - Monitor weight, I+O, lytes per protocol with IVF flush.    6. Endo  - Hx DM type II. On metformin and glimepiride on admission. Hold both due to potential for n/v with prep; also glimepiride can inhibit engraftment  - BG checks QID with meals and bedtime. Medium sliding scale insulin  - Endo/diabetes consult for recs since dex will exacerbate his BG readings    7. CV  - on metoprolol BID     8. Hx tobacco abuse, previously used chewing tobacco  - ordered prn nicotine patch    Theo Roblero received signed copies of his consent forms in printed format.    Alexa Cruz Marcela     Patient has been seen and evaluated by me. I have reviewed today's vital signs, medications, labs and imaging results independently. I have discussed the plan with the team and agree with the findings and plan in this note.      57 years old male, day -6 of his autologous HSCT for mantle cell lymphoma.     Feels ok. No major complaints  On exam:  HEENT: PERRL, EOMI, MMM  Chest: CTAB  CVS: S1 S2 RRR  PA: soft, non tender  CNS: non focal    A/P:    57 years old day-6 for autologous HSCT for mantle cell lympoma  BEAM conditioning  Starts BCNU today.  Anti-emetics and supportive care as above    Gracia Martinez MD

## 2020-11-04 NOTE — PROGRESS NOTES
"   11/04/20 1500   Quick Adds   Type of Visit Initial Occupational Therapy Evaluation   Living Environment   People in home alone;child(radhames), dependent  (16 yr old daughter, won't be home 30 days once discharged ho)   Current Living Arrangements apartment  (2nd floor)   Home Accessibility stairs to enter home   Number of Stairs, Main Entrance other (see comments)  (20 stairs, 20 stairs downstairs to basement for laundry/mail)   Stair Railings, Main Entrance railings on both sides of stairs   Transportation Anticipated family or friend will provide   Living Environment Comments Pt lives alone on 2nd floor apartment, daughter occasionally stays with. 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   Self-Care   Usual Activity Tolerance good   Current Activity Tolerance good   Regular Exercise No   Equipment Currently Used at Home none   Activity/Exercise/Self-Care Comment IND at baseline    Disability/Function   Hearing Difficulty or Deaf no   Wear Glasses or Blind yes   Vision Management glasses   Concentrating, Remembering or Making Decisions Difficulty no   Difficulty Communicating no   Difficulty Eating/Swallowing no   Walking or Climbing Stairs Difficulty no   Dressing/Bathing Difficulty no   Toileting no   Doing Errands Independently Difficulty (such as shopping) no   Fall history within last six months no   Change in Functional Status Since Onset of Current Illness/Injury no   General Information   Onset of Illness/Injury or Date of Surgery 11/04/20   Referring Physician Gracia Martinez MD   Patient/Family Therapy Goal Statement (OT) \"To get healthy\"   Additional Occupational Profile Info/Pertinent History of Current Problem Per chart: Theo Roblero is a 55 year old male, currently day -6 of his autologous hematopoietic cell transplant   Existing Precautions/Restrictions immunosuppressed   Left Upper Extremity (Weight-bearing Status) full weight-bearing (FWB)   Right Upper Extremity (Weight-bearing Status) full " weight-bearing (FWB)   Left Lower Extremity (Weight-bearing Status) full weight-bearing (FWB)   General Observations and Info Pt pleasant and agreeable to OT session    Cognitive Status Examination   Orientation Status orientation to person, place and time   Visual Perception   Visual Impairment/Limitations WFL   Sensory   Sensory Quick Adds No deficits were identified   Pain Assessment   Patient Currently in Pain No   Integumentary/Edema   Integumentary/Edema no deficits were identifed   Posture   Posture not impaired   Range of Motion Comprehensive   General Range of Motion no range of motion deficits identified   Strength Comprehensive (MMT)   General Manual Muscle Testing (MMT) Assessment no strength deficits identified   Balance   Balance Comments no LOB noted    Instrumental Activities of Daily Living (IADL)   Previous Responsibilities meal prep;housekeeping;laundry;shopping;medication management;finances;driving;work;   IADL Comments IND at baseline    Clinical Impression   Criteria for Skilled Therapeutic Interventions Met (OT) yes   OT Diagnosis at risk for deconditioning    OT Problem List-Impairments impacting ADL strength  (immunosuppressed )   Assessment of Occupational Performance 1-3 Performance Deficits   Identified Performance Deficits functional endurance    Planned Therapy Interventions (OT) strengthening;progressive activity/exercise;risk factor education   Clinical Decision Making Complexity (OT) low complexity   Therapy Frequency (OT) 3x/week   Predicted Duration of Therapy 1 week   Anticipated Equipment Needs Upon Discharge (OT)   (TBD)   Risks and Benefits of Treatment have been explained. Yes   Patient, Family & other staff in agreement with plan of care Yes   Comment-Clinical Impression Pt to benefit from skilled OT to increase functional endurance in prep for ADLs/IADLs    OT Discharge Planning    OT Discharge Recommendation (DC Rec) Home with assist   OT Rationale for DC Rec Pt  reports brother will be available to assist once discharged, 16 yr old daughter won't be staying w/pt for 30 days once discharged home    OT Brief overview of current status  IND    Total Evaluation Time (Minutes)   Total Evaluation Time (Minutes) 5

## 2020-11-04 NOTE — PROGRESS NOTES
Patient admitted to:   Admitted from: home  Arrived by: car  Reason for admission: transplant  Patient accompanied by: belongings  Belongings: kept with patient  Teaching: started  Skin double check completed by: GEORGIA Tobias

## 2020-11-04 NOTE — CONSULTS
Assessment completed in BMT clinic on 8/28/20, please see information below for inpatient review.     Blood and Marrow Transplant   Psychosocial Assessment with   Clinical      Assessment completed on 8/28/2020 via phone as part of the COVID 19 protocol. Assessment of living situation, support system, financial status, functional status, coping, stressors, need for resources and social work intervention provided as needed. Information for this assessment was provided by pt's report in addition to medical chart review and consultation with medical team.      Present at Assessment:   Patient: Theo Roblero  : DIMITRI Dobbins, TAMMY     Diagnosis: Mantle Cell Lymphoma      Date of Diagnosis: 2/7/2020     Transplant type: Autologous     Donor: Autologous      Physician: Sam Zavala MD     Nurse Coordinator: Marissa Whaley RN     Social Workers: DIMITRI Dobbins LICSW      Permanent Address:   [address] [place] [address]     Living Situation: Pt lives at home with his daughter (16 yrs old).      Contact Information:  Pt's Cell Phone: [phone]  Son-Juan Antonio Roblero's Phone: [phone]     Presenting Information:  Theo is a 55 year old male diagnosed with Mantle Cell Lymphoma who presents for evaluation for autologous transplant at the [facility] ([facility]).       Decision Making: Self     Health Care Directive:   No. SW provided education and forms. SW encouraged pt to have discussions with their family regarding their health care wishes. ERICH explained that since he does not have a healthcare directive, legally his sonSheri Roblero would make decisions on his behalf, if he did not have capacity to make his own medical decisions. Tanner is legally  and his daughter is only 16 years old; therefore, his son would be the legal decision maker. Pt understood and would like to complete a healthcare directive. Pt is agreeable to having his son  listed as an emergency contact during this time.     Relationship Status: . Pt is now engaged to is significant other-Hayde. Pt described relationship as stable and supportive.      Family/Support System: Pt endorsed a good support system including family and close friends who will be available to support pt throughout transplant process.      Zulay: Hayde (She does not live with pt)  Children: Son-Juan Antonio Roblero (26-Lives in [place], [place]) and Daughter-Leelee Roblero (16-Lives with pt)  Parents: Mother (Lives in [place], [place])  Siblings: 1 Sister and 1 Brother-Carroll Roblero (Both live in [place], [place])     Caregiver: ERICH discussed with pt the caregiver role and expectation at length. Pt is agreeable to having a full time caregiver for a minimum of 30 days until cleared by the BMT physician. Pt confirmed understanding of the caregiver requirement. Pt's primary caregiver will be his brother-Carroll with his fiPaul as a secondary. Pt's brother will be coming to stay with pt in his home in [place], [place]. Pt reviewed and signed the caregiver contract which will be scanned into the EMR. Caregiver education and resources provided. No caregiver concerns identified.      Caregiver Contact Information:  BrotherTita Roblero (Primary)  Melissa (Secondary)     Transportation Mode: Private Vehicle. ERICH provided information on parking info and monthly parking pass options.      Insurance: Pt has [employer] health insurance. Pt is on furlough right now and has to pay his insurance premiums each month which is $211. ERICH talked with pt about LLS Co-Pay Assistance as funding is open for Mantle Cell Lymphoma. Pt is interested in applying but has not done so yet. ERICH reached out to the Clinic Pharmacy Liaison to see if they can help pt apply.      Pt denied any other specific insurance concerns at this time. ERICH reiterated information about the [facility] [employer]  should specific insurance questions arise  "as pt moves through transplant process. Future Insurance questions referred to BMT [employer] -Fabiola Stewart (P: [phone]) and/or Paige Maldonado (P: [phone]).     Sources of Income: Pt is supported by unemployment. Pt denied anticipation of financial hardship related to BMT at this time. SW provided information on charmaine options and encouraged pt to contact this SW for support should financial situation change.      Employment: [employer]. Pt is currently on [employer].       Mental Health: Pt denied a history of mental health concerns, specific diagnoses or medications at this time. SW explained that it's not uncommon for patients going through transplant to experience symptoms of depression/anxiety.      PHQ-9:  Pt scored a 0 which indicates no sign of depression on the depression severity scale. Pt endorses this is an accurate reflection of his emotional state.     GAD7:  Pt scored a 0 which indicates no sign of anxiety on the Generalized Anxiety Disorder Questionnaire. Pt endorses this is an accurate reflection of his emotional state.     Chemical Use:   Tobacco: Pt reports he using chewing tobacco. Pt would like gum or the patch when admitted to the hospital.  Alcohol: Occasional use; haven't consumed alcohol really since he started treatment.  Marijuana: No hx  Other Drugs: No hx  Based on the information provided, there appear to be no specific risks or concerns identified at this time.      Trauma/Loss/Abuse History: Multiple losses associated with cancer diagnosis and treatment, including health, employment, changes to physical appearance, etc.      Spirituality: SW explained that there are Chaplains on the unit and pt can request to meet with a  at anytime.     Coping: Pt noted that he is currently feeling \"pretty good\". Pt shared that his main coping mechanisms are talking with family/friends and exercise. SW and pt discussed additional positive coping mechanisms that pt " can utilize while in the hospital.      Education Provided: Transplant process expectations, Caregiver requirements, Caregiver self-care, Financial issues related to transplant, Financial resources/grants available, Common psychosocial stressors pre/post transplant, Support group(s) available, Hospital resources available, Web site information, Social Work role and Resources for children.     Interventions Provided: Psychosocial Support and Education      Assessment and Recommendations for Team:  Pt is a 55 year old male diagnosed with Mantle Cell Lymphoma who is here undergoing preparation for a planned autologous transplant.      Pt is pleasant, calm and able to articulate concerns/coping mechanisms in an appropriate manner. During our meeting pt was alert, he was interactive, affect was full, he displayed appropriate memory and thought processes. Pt feels comfortable communicating with the medical team. Pt has a strong supportive network of family and friends who are involved. Pt and pt's family will benefit from ongoing psychosocial support in regards to coping with the adjustment to the BMT process.      Pt has a good support system and a well-developed caregiver plan. Pt verbalizes understanding of the transplant process and wanting to proceed. SW provided contact information and encouraged pt to contact SW with questions, concerns, resources and for support.     Per this assessment, SW did not identify any barriers to this patient moving forward with transplant.     Important Information:   -Pt reports he using chewing tobacco. Pt would like gum or the patch when admitted to the hospital.     Follow up Planned:   -Follow-up on LLS Co-Pay to see if he applied.  -Psychosocial support  -Healthcare Directive - Check in to see if pt wants to complete while inpatient.     Mesha MOSLEY, [facility]  Phone: [phone]  Pager: [phone]

## 2020-11-05 NOTE — PLAN OF CARE
Discharge Planner PT   Patient plan for discharge: home   Current status: DEFER- Orders received for PT evaluation. Per discussion with OT, pt is up independent with stairs/ambulation, anticipating short hospital LOS for autologous BMT. No IP PT needs at this time, IP PT deferred, orders completed.   Barriers to return to prior living situation: medical status  Recommendations for discharge: home with assist  Rationale for recommendations: defer to OT for additional discharge recommendations.

## 2020-11-05 NOTE — PROGRESS NOTES
Chemo drug: Etoposide and Cytarabine  Tolerated: well  Intervention: na  Plan:  Continue to monitor

## 2020-11-05 NOTE — PLAN OF CARE
Pt admitted today.  He denies pain/nausea/discomfort.  Chemotherapy started this evening at 1800.    He did have 2 blood sugars below 70 today; each time he was given apple juice and his blood sugar came up.  He denied feeling symptomatic.    VSS    Problem: Adult Inpatient Plan of Care  Goal: Plan of Care Review  Flowsheets (Taken 11/4/2020 9187)  Plan of Care Reviewed With: patient

## 2020-11-05 NOTE — PROVIDER NOTIFICATION
The following page was sent at 0458 to provider regarding blood sugar:     Pt in room 5429 on station 5C ( )has a blood sugar of 208 with AM lab results this AM. Thank you.     Provider responded quickly and stated no intervention was necessary at this time.

## 2020-11-05 NOTE — PROGRESS NOTES
"                     Diabetes Consult Daily  Progress Note          Assessment/Plan:      Theo \" Don\" Osbaldo is a 55 year old male with history of type 2 diabetes, hypertension, hyperlipidemia, stage IV mantle cell lymphoma admitted (11/4/2020) for autologous hematopoietic cell transplant.     Type 2 diabetic: PTA on oral medications.  - will initial BMT protocol  Weight 114 kg on admission  GFR 88  Dexamethasone 10 mg daily x 4 doses ( first dose 11/4)  Dexamethasone 8 mg daily ( 11/8)  Dexamethasone 6 mg ( 11/9)  Dexamethasone 4 mg ( 11/10)              Plan  - NPH AM 34 units  - NPH PM 20 units  - added meal insulin 1 unit per 5 grams of CHO for meals/snacks/supplements  - novolgo high correction before meals and at bedtime  - Glucose to be tested before meals, HS and 0200  -prn IV insulin   -hypoglycemia protocol  -will continue to follow                     Outpatient diabetes follow up: TBd  Plan discussed with patient and primary team via this note.           Interval History:   The last 24 hours progress and nursing notes reviewed.    Started on the BMT QI project  Did not start the NPH last evening 2/2 to taking PTA glimepiride and initial glucose 65.  Glucose increased after taking the dexamethasone 10 mg last night, glucose 105--> 276--> 208--> 176 this am  Pre-meal glucose 292, added I:C ratio  Up independently in room    Appetite is reported as good, denies nausea and or vomiting      PTA:  Metformin 500 mg tablets, 1000 mg bid  Glimepiride 4 mg daily am    Recent Labs   Lab 11/05/20  0411 11/04/20  2136 11/04/20  1813 11/04/20  1730 11/04/20  1142 11/04/20  1026 10/29/20  0825   *  --   --   --   --  78 118*   BGM  --  276* 105* 65* 69*  --   --                Review of Systems:   See interval hx          Medications:     Orders Placed This Encounter      High Kcal/High Protein Diet, ADULT       Physical Exam:  Gen: Alert, resting in bed, in NAD   HEENT: NC/AT, mucous membranes are " "moist  Resp: Unlabored  Ext: No lower extremity edema   Neuro:oriented x3, communicating clearly  BP (!) 150/88 (BP Location: Left arm)   Pulse 74   Temp 97.6  F (36.4  C) (Oral)   Resp 14   Ht 1.78 m (5' 10.08\")   Wt 114 kg (251 lb 4.8 oz)   SpO2 95%   BMI 35.98 kg/m             Data:   No results found for: A1C           CBC RESULTS:   Recent Labs   Lab Test 11/05/20 0411   WBC 6.0   RBC 3.17*   HGB 9.4*   HCT 29.6*   MCV 93   MCH 29.7   MCHC 31.8   RDW 13.3        Recent Labs   Lab Test 11/05/20  0411 11/04/20  1358 11/04/20  1026     --  137   POTASSIUM 4.2 3.3* 3.3*   CHLORIDE 106  --  105   CO2 24  --  24   ANIONGAP 7  --  9   *  --  78   BUN 16  --  15   CR 1.06  --  0.96   BELA 8.7  --  8.0*     Liver Function Studies -   Recent Labs   Lab Test 11/04/20  1026   PROTTOTAL 6.4*   ALBUMIN 3.1*   BILITOTAL 0.6   ALKPHOS 131   AST 12   ALT 19     Lab Results   Component Value Date    INR 1.04 11/04/2020    INR 1.05 10/13/2020    INR 1.04 08/26/2020         I spent a total of 25 minutes bedside and on the inpatient unit managing the glycemic care of Theo Roblero. Over 50% of my time on the unit was spent counseling the patient  and/or coordinating care regarding .  See note for details.      To contact Endocrine Diabetes service:   From 8AM-4PM: page inpatient diabetes provider that is following the patient  For questions or updates from 4PM-8AM: page the diabetes job code for on call fellow: Silvia Meléndez [phone]  Diabetes Management job code [id number]            "

## 2020-11-05 NOTE — PLAN OF CARE
VSS (slightly tachy at 2000, returned to WNL after PM metoprolol). Pt blood sugar 276 at night- given 4 units of insulin. No patient complaints. Will continue to monitor.       Problem: Adult Inpatient Plan of Care  Goal: Plan of Care Review  Outcome: No Change  Goal: Patient-Specific Goal (Individualized)  Outcome: No Change  Goal: Absence of Hospital-Acquired Illness or Injury  Outcome: No Change  Intervention: Identify and Manage Fall Risk  Recent Flowsheet Documentation  Taken 11/4/2020 2000 by Hayde Da Silva RN  Safety Promotion/Fall Prevention:   clutter free environment maintained   nonskid shoes/slippers when out of bed   safety round/check completed  Intervention: Prevent Skin Injury  Recent Flowsheet Documentation  Taken 11/4/2020 2000 by Hayde Da Silva RN  Body Position: position changed independently  Goal: Optimal Comfort and Wellbeing  Outcome: No Change  Goal: Readiness for Transition of Care  Outcome: No Change     Problem: Discharge Planning  Goal: Discharge Planning (Adult, OB, Behavioral, Peds)  Outcome: No Change     Problem: Adjustment to Transplant (Stem Cell/Bone Marrow Transplant)  Goal: Optimal Coping with Transplant  Outcome: No Change     Problem: Bladder Irritation (Stem Cell/Bone Marrow Transplant)  Goal: Symptom-Free Urinary Elimination  Outcome: No Change  Intervention: Monitor and Manage Bladder Irritation  Recent Flowsheet Documentation  Taken 11/4/2020 2000 by Hayde Da Silva RN  Hyperhydration Management: fluids provided     Problem: Diarrhea (Stem Cell/Bone Marrow Transplant)  Goal: Diarrhea Symptom Control  Outcome: No Change  Intervention: Manage Diarrhea  Recent Flowsheet Documentation  Taken 11/4/2020 2000 by Hayde Da Silva, RN  Skin Protection: adhesive use limited  Fluid/Electrolyte Management: fluids provided     Problem: Fatigue (Stem Cell/Bone Marrow Transplant)  Goal: Energy Level Supports Daily Activity  Outcome: No Change  Intervention: Manage Fatigue  Recent  Flowsheet Documentation  Taken 11/4/2020 2000 by Hayde Da Silva, RN  Sleep/Rest Enhancement:   awakenings minimized   consistent schedule promoted   natural light exposure provided   regular sleep/rest pattern promoted     Problem: Hematologic Alteration (Stem Cell/Bone Marrow Transplant)  Goal: Blood Counts Within Acceptable Range  Outcome: No Change  Intervention: Monitor and Manage Hematologic Symptoms  Recent Flowsheet Documentation  Taken 11/4/2020 2000 by Hayde Da Silva RN  Bleeding Precautions:   blood pressure closely monitored   monitored for signs of bleeding     Problem: Hypersensitivity Reaction (Stem Cell/Bone Marrow Transplant)  Goal: Absence of Hypersensitivity Reaction  Outcome: No Change     Problem: Infection Risk (Stem Cell/Bone Marrow Transplant)  Goal: Absence of Infection  Outcome: No Change     Problem: Mucositis (Stem Cell/Bone Marrow Transplant)  Goal: Mucous Membrane Health and Integrity  Outcome: No Change     Problem: Nausea and Vomiting (Stem Cell/Bone Marrow Transplant)  Goal: Nausea and Vomiting Symptom Relief  Outcome: No Change     Problem: Nutrition Intake Altered (Stem Cell/Bone Marrow Transplant)  Goal: Optimal Nutrition Intake  Outcome: No Change

## 2020-11-05 NOTE — PROGRESS NOTES
"BMT Progress Note    ID: Theo Roblero is a 55 year old male, currently day -5 of his autologous hematopoietic cell transplant.    HPI: Tolerated BCNU well last night. No adverse reactions. Feeing well this morning. No new questions.    ROS: negative except as stated above in HPI    Physical Exam  BP (!) 154/89   Pulse 76   Temp 96.6  F (35.9  C) (Axillary)   Resp 16   Ht 1.78 m (5' 10.08\")   Wt 114 kg (251 lb 4.8 oz)   SpO2 97%   BMI 35.98 kg/m    General: NAD   Eyes: sclera anicteric   Nose/Mouth/Throat: OP clear, buccal mucosa moist, no ulcerations. S/p several recent molar extractions, all well healing  Lungs: CTA bilaterally  Cardiovascular: RRR, no M/R/G   Abdominal/Rectal: +BS, soft, NT, ND, No HSM   Lymphatics: No edema  Skin: No rashes or petechaie  Neuro: A&O   Additional Findings: Forte site NT left chest wall    Lab  Lab Results   Component Value Date    WBC 6.0 11/05/2020    ANEU 5.5 11/05/2020    HGB 9.4 (L) 11/05/2020    HCT 29.6 (L) 11/05/2020     11/05/2020     11/05/2020    POTASSIUM 4.2 11/05/2020    CHLORIDE 106 11/05/2020    CO2 24 11/05/2020     (H) 11/05/2020    BUN 16 11/05/2020    CR 1.06 11/05/2020    MAG 1.8 11/04/2020    INR 1.04 11/04/2020    BILITOTAL 0.6 11/04/2020    AST 12 11/04/2020    ALT 19 11/04/2020    ALKPHOS 131 11/04/2020    PROTTOTAL 6.4 (L) 11/04/2020    ALBUMIN 3.1 (L) 11/04/2020     A/P:  ID: Theo Roblero is a 55 year old male with mantle cell lymphoma, admitted for auto BMT with BEAM prep, today is D-5     D-6 BCNU  D-5 though D-2 Agueda-C and -16 x 2  D-1 melphalan  D0 = transplant ( from melphalan by 24 hours)  Palifermin D0, D+1 and D+2     1.  BMT: Prep as above. Allopurinol through day 0.   - Flush and pre-meds prior to transplant. Cell dose 4.3 million, collected with GCSf and mozobil  - GCSF starts d+5 and cont to until ANC>2500 x2 consecutive days. Re-stage per protocol.     2.  HEME: Keep Hgb>7 and plts>10K. " Premeds for blood products (no hx of reaction, but has received premeds in the past)                            3.  ID:   - prophy Levo, Fluc, and HD ACV (CMV+, HSV+, EBV+) prophy.   - Bactrim or appropriate PCP therapy to start d+28.                                             4.  GI:   - Zofran and dexamethasone prior to chemo to prevent N/V. Ativan and compazine available PRN break-through symptoms.   - Protonix for GI prophy.     5.  FEN/Renal:   - Monitor creat and lytes. Replete lytes PRN per SS.   - Monitor weight, I+O, lytes per protocol with IVF flush.     6. Endo  - Hx DM type II. On metformin and glimepiride on admission. Hold both due to potential for n/v with prep; also glimepiride can inhibit engraftment  - BG checks QID with meals and bedtime. Medium sliding scale insulin  - Endo/diabetes consult for recs since dex will exacerbate his BG readings - ran higher overnight, no lantus ordered since he took his glimepiride yesterday morning. Will adjust today per endo recs     7. CV  - on metoprolol BID     8. Hx tobacco abuse, previously used chewing tobacco  - ordered prn nicotine patch       Alexa GARCIA-C  [phone]       Patient has been seen and evaluated by me. I have reviewed today's vital signs, medications, labs and imaging results independently. I have discussed the plan with the team and agree with the findings and plan in this note.       57 years old male, day -5 of his autologous HSCT for mantle cell lymphoma.      Feels ok. No major complaints  On exam:  HEENT: PERRL, EOMI, MMM  Chest: CTAB  CVS: S1 S2 RRR  PA: soft, non tender  CNS: non focal     A/P:     57 years old day-5 for autologous HSCT for mantle cell lympoma  BEAM conditioning  Etoposide and Agueda-C today  Anti-emetics and supportive care as above    Gracia Martinez

## 2020-11-06 NOTE — PROGRESS NOTES
"BMT Progress Note    ID: Theo Roblero is a 55 year old male, currently day -4 of his autologous hematopoietic cell transplant.    HPI: Doing well. No adverse reactions reported with chemotherapy. Vitals stable. No n/v/d/c. BG better controlled with assistance of endocrine team.    ROS: negative except as stated above in HPI    Physical Exam  BP (!) 166/89 (BP Location: Left arm)   Pulse 64   Temp 97.8  F (36.6  C) (Oral)   Resp 16   Ht 1.78 m (5' 10.08\")   Wt 114.3 kg (251 lb 14.4 oz)   SpO2 98%   BMI 36.06 kg/m    General: NAD   Eyes: sclera anicteric   Nose/Mouth/Throat: OP clear, buccal mucosa moist, no ulcerations. S/p several recent molar extractions, all well healing (didn't examine 11/6)  Lungs: CTA bilaterally  Cardiovascular: RRR, no M/R/G   Abdominal/Rectal: +BS, soft, NT, ND, No HSM   Lymphatics: No edema  Skin: No rash on exposed skin  Neuro: A&O   Additional Findings: Forte site NT left chest wall    Lab  Lab Results   Component Value Date    WBC 8.0 11/06/2020    ANEU 7.1 11/06/2020    HGB 9.2 (L) 11/06/2020    HCT 28.1 (L) 11/06/2020     11/06/2020     11/06/2020    POTASSIUM 4.0 11/06/2020    CHLORIDE 110 (H) 11/06/2020    CO2 23 11/06/2020     (H) 11/06/2020    BUN 19 11/06/2020    CR 1.13 11/06/2020    MAG 2.1 11/06/2020    INR 1.04 11/04/2020    BILITOTAL 0.6 11/04/2020    AST 12 11/04/2020    ALT 19 11/04/2020    ALKPHOS 131 11/04/2020    PROTTOTAL 6.4 (L) 11/04/2020    ALBUMIN 3.1 (L) 11/04/2020     A/P:  ID: Theo Roblero is a 55 year old male with mantle cell lymphoma, admitted for auto BMT with BEAM prep, today is D-4     D-6 BCNU  D-5 though D-2 Agueda-C and -16 x 2  D-1 melphalan  D0 = transplant ( from melphalan by 24 hours)  Palifermin D0, D+1 and D+2     1.  BMT: Prep as above. Allopurinol through day 0.   - Flush and pre-meds prior to transplant. Cell dose 4.3 million, collected with GCSf and mozobil  - GCSF starts d+5 and cont to until " ANC>2500 x2 consecutive days. Re-stage per protocol.     2.  HEME: Keep Hgb>7 and plts>10K. Premeds for blood products (no hx of reaction, but has received premeds in the past)                            3.  ID:   - prophy Levo, Fluc, and HD ACV (CMV+, HSV+, EBV+) prophy.   - Bactrim or appropriate PCP therapy to start d+28.                                             4.  GI:   - Zofran and dexamethasone prior to chemo to prevent N/V. Ativan and compazine available PRN break-through symptoms.   - Protonix for GI prophy.     5.  FEN/Renal:   - Monitor creat and lytes. Replete lytes PRN per SS.   - Monitor weight, I+O, lytes per protocol with IVF flush.     6. Endo  - Hx DM type II. On metformin and glimepiride on admission. Hold both due to potential for n/v with prep; also glimepiride can inhibit engraftment  - BG checks QID with meals and bedtime. Medium sliding scale insulin  - Endo/diabetes consult, appreciate recs. On NPH qam and pm, high sliding scale qith meals and bedtime, and carb coverage     7. CV  - HTN: on metoprolol BID     8. Hx tobacco abuse, previously used chewing tobacco  - ordered prn nicotine patch       Alexa [facility]  [phone]       Patient has been seen and evaluated by me. I have reviewed today's vital signs, medications, labs and imaging results independently. I have discussed the plan with the team and agree with the findings and plan in this note.       57 years old male, day -4 of his autologous HSCT for mantle cell lymphoma.      Feels ok. No major complaints  On exam:  HEENT: PERRL, EOMI, MMM  Chest: CTAB  CVS: S1 S2 RRR  PA: soft, non tender  CNS: non focal     A/P:     57 years old day-4 for autologous HSCT for mantle cell lympoma  BEAM conditioning  Etoposide and Agueda-C today  Anti-emetics and supportive care as above    Gracia Martinez

## 2020-11-06 NOTE — PLAN OF CARE
"Vital signs:  Temp: 98.3  F (36.8  C) Temp src: Oral BP: (!) 166/89 Pulse: 64   Resp: 16 SpO2: 98 % O2 Device: None (Room air)   Height: 178 cm (5' 10.08\") Weight: 114.3 kg (251 lb 14.4 oz)  Estimated body mass index is 36.06 kg/m  as calculated from the following:    Height as of this encounter: 1.78 m (5' 10.08\").    Weight as of this encounter: 114.3 kg (251 lb 14.4 oz).     Stop time on MAR & chart I & O  Chemo drug - Etoposide and Cytarabine  Tolerated - well with no side effects  Intervention - positive blood return noted  Response  Plan - To receive additional chemotherapy this evening    Don has offered no complaints.  Blood sugars have been 189 and 220 - each covered per standing sliding scale.      Problem: Adult Inpatient Plan of Care  Goal: Plan of Care Review  Flowsheets (Taken 11/6/2020 1222)  Plan of Care Reviewed With: patient  Progress: no change     Problem: Fatigue (Stem Cell/Bone Marrow Transplant)  Goal: Energy Level Supports Daily Activity  Intervention: Manage Fatigue  Recent Flowsheet Documentation  Taken 11/6/2020 0944 by Amber Wilder RN  Fatigue Management: frequent rest breaks encouraged     Problem: Diabetes Comorbidity  Goal: Blood Glucose Level Within Targeted Range  Intervention: Monitor and Manage Glycemia  Recent Flowsheet Documentation  Taken 11/6/2020 0944 by Amber Wilder, RN  Glycemic Management: blood glucose monitored            "

## 2020-11-06 NOTE — PROGRESS NOTES
"IP Diabetes Management  Daily Note           Assessment and Plan:   HPI: Theo \" Don\" Osbaldo is a 55 year old male with history of type 2 diabetes, hypertension, hyperlipidemia, stage IV mantle cell lymphoma admitted (11/4/2020) for autologous hematopoietic cell transplant.      Assessment:   1)Type II Diabetes Mellitus, with steroid induced hyperglycemia    - will initial BMT protocol, deviated from protocol with need for addition of mealtime insulin.    Weight 114 kg on admission, GFR 88    Plan:    -continue BID NPH: 34 units AM and 20 units PM    -increase aspart from 1:5 to 1:4g CHO coverage with meals and snacks/supplements   -continue aspart high intensity sliding scale TID AC and HS   -BG monitoring TID AC, HS, 0200   -PRN IV insulin ordered for BG >300 x2 consecutive checks.   -for discharge will need to discuss Metformin resumption with BMT; do not want Glimepiride resumed as it can interfere with transplant.   -hypoglycemia protocol   -carb counting protocol   -anticipate diabetes education will be needed for insulin teaching on discharge (temporary until PO meds can be resumed following transplant)    Outpatient follow up: with [facility], if discharging on insulin.   Plan discussed with patient, and primary team via this note    Interval History and Assessment: interval glucose trend reviewed:  BG improved following addition of mealtime aspart with lunch yesterday.     177 by HS, and 150 this morning, did trend again >200 pre-lunch.  Will increase mealtime aspart again today beginning with dinner, and will plan to increase NPH subsequently if needed.     Dexamethasone will taper to 8mg daily on 11/8; anticipate can begin reducing NPH and mealtime aspart that evening, versus AM on 11/9.    Current nutritional intake and type: Orders Placed This Encounter      High Kcal/High Protein Diet, ADULT    Steroid planning:   Dexamethasone 10 mg daily x 4 doses ( first dose 11/4)  Dexamethasone 8 mg " daily ( 11/8)  Dexamethasone 6 mg ( 11/9)  Dexamethasone 4 mg ( 11/10)    D5W-containing solutions:   -Carmustine x1 on 11/4  -Cytarabine IV q12    PTA Diabetes Regimen:   Metformin 500 mg tablets, 1000 mg bid  Glimepiride 4 mg daily am     Discharge Planning: likely home 1-2 days following auto tsplt (mid-next week)           Diabetes History:   Type of Diabetes: Type 2 Diabetes Mellitus, Steroid Induced Diabetes Mellitus  No results found for: A1C           Review of Systems:     The Review of Systems is negative other than noted in the Interval History.           Medications:     Current Facility-Administered Medications   Medication     acetaminophen (TYLENOL) tablet 325-650 mg     acetaminophen (TYLENOL) tablet 325-650 mg     acetaminophen (TYLENOL) tablet 325-650 mg     [START ON 11/10/2020] acetaminophen (TYLENOL) tablet 650 mg     acyclovir (ZOVIRAX) tablet 800 mg     allopurinol (ZYLOPRIM) tablet 300 mg     ceFEPIme (MAXIPIME) 2 g vial to attach to  ml bag for ADULTS or 50 ml bag for PEDS     cytarabine (PF) (CYTOSAR) 240 mg in D5W 112 mL infusion     dexamethasone (DECADRON) tablet 10 mg     [START ON 11/10/2020] dexamethasone (DECADRON) tablet 4 mg     [START ON 11/9/2020] dexamethasone (DECADRON) tablet 6 mg     [START ON 11/8/2020] dexamethasone (DECADRON) tablet 8 mg     [START ON 11/15/2020] dextrose 5 % flush PRE/POST medication     [START ON 11/15/2020] dextrose 5 % flush PRE/POST medication     [START ON 11/9/2020] dextrose 5% and 0.45% NaCl infusion     glucose gel 15-30 g    Or     dextrose 50 % injection 25-50 mL    Or     glucagon injection 1 mg     [START ON 11/10/2020] diphenhydrAMINE (BENADRYL) capsule 25 mg     diphenhydrAMINE (BENADRYL) capsule 25 mg     etoposide (TOPOSAR) 240 mg in sodium chloride 0.9 % 1,062 mL infusion     [START ON 11/15/2020] filgrastim (NEUPOGEN) 480 mcg in D5W 25 mL infusion     fluconazole (DIFLUCAN) tablet 200 mg     heparin 100 UNIT/ML injection 500 Units  "    heparin lock flush 10 UNIT/ML injection 5-10 mL     heparin lock flush 10 UNIT/ML injection 5-10 mL     insulin 1 unit/mL in saline (NovoLIN, HumuLIN Regular) drip - ADULT IV Infusion     insulin aspart (NovoLOG) injection (RAPID ACTING)     insulin aspart (NovoLOG) injection (RAPID ACTING)     insulin aspart (NovoLOG) injection (RAPID ACTING)     insulin aspart (NovoLOG) injection (RAPID ACTING)     insulin isophane human (HumuLIN N PEN) injection 34 Units    And     insulin isophane human (HumuLIN N PEN) injection 20 Units     [START ON 11/9/2020] levofloxacin (LEVAQUIN) tablet 250 mg     lidocaine (LMX4) cream     lidocaine 1 % 0.1-1 mL     LORazepam (ATIVAN) injection 0.5-1 mg    Or     LORazepam (ATIVAN) tablet 0.5-1 mg     [START ON 11/9/2020] MELPHalan (ALKERAN) 335 mg in sodium chloride 0.9 % 168 mL infusion     [START ON 11/10/2020] meperidine (DEMEROL) injection 25-50 mg     metoprolol succinate ER (TOPROL-XL) 24 hr tablet 25 mg     nicotine (NICODERM CQ) 21 MG/24HR 24 hr patch 1 patch     nicotine Patch in Place     ondansetron (ZOFRAN) tablet 8 mg     pantoprazole (PROTONIX) EC tablet 40 mg     prochlorperazine (COMPAZINE) injection 5-10 mg    Or     prochlorperazine (COMPAZINE) tablet 5-10 mg     sodium chloride (PF) 0.9% PF flush 10-20 mL     [START ON 11/9/2020] sodium chloride 0.9% infusion     [START ON 12/8/2020] sulfamethoxazole-trimethoprim (BACTRIM DS) 800-160 MG per tablet 1 tablet            Physical Exam:    BP (!) 166/89 (BP Location: Left arm)   Pulse 64   Temp 98.3  F (36.8  C) (Oral)   Resp 16   Ht 1.78 m (5' 10.08\")   Wt 114.3 kg (251 lb 14.4 oz)   SpO2 98%   BMI 36.06 kg/m    General: pleasant, in no distress, sitting up in recliner.  HEENT: normocephalic, atraumatic. Oral mucous membranes moist.   Lungs: unlabored respiration, no cough  ABD: rounded, soft, no lipodystrophy noted  Skin: warm and dry, no obvious lesions  MSK:  moves all extremities  Lymp:  no LE edema   Mental " status:  alert, oriented to self, place, time  Psych:  affect, calm and appropriate interaction             Data:     Recent Labs   Lab 11/06/20  1242 11/06/20  0800 11/06/20  0222 11/05/20  2207 11/05/20  1733 11/05/20  1142 11/05/20  0754 11/05/20  0411 11/04/20  1026 11/04/20  1026   GLC  --   --  150*  --   --   --   --  208*  --  78   * 189*  --  177* 199* 292* 176*  --    < >  --     < > = values in this interval not displayed.     Lab Results   Component Value Date    WBC 6.0 11/05/2020    WBC 9.9 11/04/2020    WBC 48.1 (H) 10/29/2020    HGB 9.4 (L) 11/05/2020    HGB 10.2 (L) 11/04/2020    HGB 11.5 (L) 10/29/2020    HCT 29.6 (L) 11/05/2020    HCT 31.3 (L) 11/04/2020    HCT 35.0 (L) 10/29/2020    MCV 93 11/05/2020    MCV 93 11/04/2020    MCV 93 10/29/2020     11/05/2020     11/04/2020    PLT 79 (L) 10/29/2020     Lab Results   Component Value Date     11/05/2020     11/04/2020     10/29/2020    POTASSIUM 4.2 11/05/2020    POTASSIUM 3.3 (L) 11/04/2020    POTASSIUM 3.3 (L) 11/04/2020    CHLORIDE 106 11/05/2020    CHLORIDE 105 11/04/2020    CHLORIDE 101 10/29/2020    CO2 24 11/05/2020    CO2 24 11/04/2020    CO2 30 10/29/2020     (H) 11/05/2020    GLC 78 11/04/2020     (H) 10/29/2020     Lab Results   Component Value Date    BUN 16 11/05/2020    BUN 15 11/04/2020    BUN 10 10/29/2020     Lab Results   Component Value Date    TSH 2.17 08/26/2020     Lab Results   Component Value Date    AST 12 11/04/2020    AST 18 10/13/2020    AST 18 08/26/2020    ALT 19 11/04/2020    ALT 22 10/13/2020    ALT 23 08/26/2020    ALKPHOS 131 11/04/2020    ALKPHOS 87 10/13/2020    ALKPHOS 81 08/26/2020       I spent a total of 35 minutes bedside and on the inpatient unit managing glycemic care. Over 50% of my time on the unit was spent counseling the patient and/or coordinating care regarding acute hyperglycemia management.  See note for details.    To contact Endocrine Diabetes  service:   From 8AM-4PM: page inpatient diabetes provider that is following the patient  For questions or updates from 4PM-8AM: page the diabetes job code for on call fellow: [phone]    Prisca Corley PA-C  Inpatient Diabetes Management Service  Pager [phone]

## 2020-11-06 NOTE — PLAN OF CARE
Afebrile. BP elevated throughout the night. OVSS on RA. Pt offers no complaints of pain/nausea. Etoposide + cytarabine infused in the evening, pt tolerated both well. Voiding spontaneously. BG levels 177 and 150 overnight. Continue plan of care.     Problem: Adult Inpatient Plan of Care  Goal: Optimal Comfort and Wellbeing  Outcome: No Change     Problem: Fatigue (Stem Cell/Bone Marrow Transplant)  Goal: Energy Level Supports Daily Activity  Outcome: No Change     Problem: Nausea and Vomiting (Stem Cell/Bone Marrow Transplant)  Goal: Nausea and Vomiting Symptom Relief  Outcome: No Change

## 2020-11-06 NOTE — PROGRESS NOTES
BMT CLINICAL SOCIAL WORK NOTE:    Focus: Supportive Counseling/Resources/Discharge Planning    Data: Theo Roblero is a 55 year old male, currently day -4 of his autologous hematopoietic cell transplant.    Interventions: Clinical  (CSW) met with Pt to assess coping, provide supportive counseling and assist with resources as needed. Pt shared that he is doing well and denied questions/concerns. CSW provided empathic listening, validation of concerns, and encouragement. CSW encouraged Pt to contact CSW for support, questions and/or resources.     Assessment: Pt presented as pleasant and engaged.  Pt appears to be coping appropriately.    Plan: CSW will continue to provide supportive counseling and assistance with resources as needed. CSW will continue to collaborate with multidisciplinary team regarding Pt's plan of care.     DIMITRI Linn, [facility]  Adult Blood & Marrow Transplant   Phone: [phone]  Pager: [phone]

## 2020-11-06 NOTE — PLAN OF CARE
Pt had a quiet day with stable vital signs.   He tolerated chemo well this morning and is eating well.  He denies pain/nausea.    Problem: Adult Inpatient Plan of Care  Goal: Plan of Care Review  Outcome: No Change  Flowsheets (Taken 11/5/2020 1062)  Plan of Care Reviewed With: patient

## 2020-11-06 NOTE — PROGRESS NOTES
Chemo drug: Etoposide + Cytarabine  Tolerated: Well  Intervention: Blood return check  Response: Positive blood return noted before/after each infusion  Plan: Continue to monitor

## 2020-11-07 NOTE — PLAN OF CARE
"Vital signs:  Temp: 97.3  F (36.3  C) Temp src: Oral BP: 134/78 Pulse: 55   Resp: 16 SpO2: 96 % O2 Device: None (Room air)   Height: 178 cm (5' 10.08\") Weight: 117.4 kg (258 lb 12.8 oz)  Estimated body mass index is 37.05 kg/m  as calculated from the following:    Height as of this encounter: 1.78 m (5' 10.08\").    Weight as of this encounter: 117.4 kg (258 lb 12.8 oz).     Stop time on MAR & chart I & O  Chemo drug - Etoposide and Cytarabine (5th dose of each)  Tolerated - without complication  Intervention - positive blood return noted  Response  Plan - additional chemotherapies due this evening    Don is doing well.  He has been up and independent in his room.  Showered on his own this afternoon.  Appetite remains strong and blood sugars/carbs replaced as per standing orders.  Mood is positive and engaging.       Problem: Fatigue (Stem Cell/Bone Marrow Transplant)  Goal: Energy Level Supports Daily Activity  Outcome: No Change     Problem: Infection Risk (Stem Cell/Bone Marrow Transplant)  Goal: Absence of Infection  Outcome: No Change     Problem: Adult Inpatient Plan of Care  Goal: Plan of Care Review  Flowsheets (Taken 11/7/2020 1305)  Plan of Care Reviewed With: patient  Progress: no change     Problem: Diabetes Comorbidity  Goal: Blood Glucose Level Within Targeted Range  Intervention: Monitor and Manage Glycemia  Recent Flowsheet Documentation  Taken 11/7/2020 6334 by Amber Wilder RN  Glycemic Management: blood glucose monitored       "

## 2020-11-07 NOTE — PLAN OF CARE
Pt vitals stable. Up Ad regan in room. Independent with cares. Good PO intake. 's covered with sliding scale insulin.  Tolerating Chemo treatments this evening shift with no reactions or complications. Voiding good amounts. Denies pain, nausea and SOB. Makes needs known. Will continue to monitor status post chemo infusions.

## 2020-11-07 NOTE — PLAN OF CARE
AVSS. Pt denied pain and nausea.  overnight. Slept well between cares. Good urine output. No replacements needed this morning. Continue to monitor & follow plan of care.     Problem: Adult Inpatient Plan of Care  Goal: Plan of Care Review  Outcome: No Change     Problem: Adult Inpatient Plan of Care  Goal: Patient-Specific Goal (Individualized)  Outcome: No Change     Problem: Adult Inpatient Plan of Care  Goal: Readiness for Transition of Care  Outcome: No Change     Problem: Adjustment to Transplant (Stem Cell/Bone Marrow Transplant)  Goal: Optimal Coping with Transplant  Outcome: No Change     Problem: Fatigue (Stem Cell/Bone Marrow Transplant)  Goal: Energy Level Supports Daily Activity  Intervention: Manage Fatigue  Recent Flowsheet Documentation  Taken 11/7/2020 0000 by Kimberley Montalvo, RN  Fatigue Management: frequent rest breaks encouraged  Sleep/Rest Enhancement: awakenings minimized     Problem: Infection Risk (Stem Cell/Bone Marrow Transplant)  Goal: Absence of Infection  Intervention: Prevent and Manage Infection  Recent Flowsheet Documentation  Taken 11/7/2020 0000 by Kimberley Montalvo, RN  Infection Prevention:   rest/sleep promoted   single patient room provided   visitors restricted/screened  Infection Management: aseptic technique maintained  Isolation Precautions:   cytotoxic precautions maintained   protective environment maintained

## 2020-11-07 NOTE — PROGRESS NOTES
"IP Diabetes Management  Daily Note           Assessment and Plan:   HPI: Theo \" Don\" Osbaldo is a 55 year old male with history of type 2 diabetes, hypertension, hyperlipidemia, stage IV mantle cell lymphoma admitted (11/4/2020) for autologous hematopoietic cell transplant.      Assessment:   1)Type II Diabetes Mellitus, with steroid induced hyperglycemia    - will initial BMT protocol, deviated from protocol with need for addition of mealtime insulin.    Weight 114 kg on admission, GFR 88    Plan:    -continue BID NPH: 34 units AM and 20 units PM    -increase aspart to 1: 3.5 g CHO coverage with lunch   -Continue aspart 1: 4 g CHO coverage with breakfast, dinner and snacks/supplements   -continue aspart high intensity sliding scale TID AC and HS   -BG monitoring TID AC, HS, 0200   -PRN IV insulin ordered for BG >300 x2 consecutive checks.   -for discharge will need to discuss Metformin resumption with BMT; do not want Glimepiride resumed as it can interfere with transplant.   -hypoglycemia protocol   -carb counting protocol   -anticipate diabetes education will be needed for insulin teaching on discharge (temporary until PO meds can be resumed following transplant)    Outpatient follow up: with [facility], if discharging on insulin.     The case was discussed with my attending, Dr. Wise.    Ana Antonio MD  PGY-4 Endocrine Fellow  Pager # [phone]     Interval History and Assessment: interval glucose trend reviewed:   this am. Bedtime glucose was 206. Noted to have prandial hyperglycemia, with pre-dinner BG ar 254    Patient feels fine and denies any complaints.    Dexamethasone will taper to 8mg daily on 11/8; anticipate can begin reducing NPH and mealtime aspart that evening, versus AM on 11/9.    Current nutritional intake and type: Orders Placed This Encounter      High Kcal/High Protein Diet, ADULT    Steroid planning:   Dexamethasone 10 mg daily x 4 doses ( first dose " 11/4)  Dexamethasone 8 mg daily ( 11/8)  Dexamethasone 6 mg ( 11/9)  Dexamethasone 4 mg ( 11/10)    D5W-containing solutions:   -Carmustine x1 on 11/4  -Cytarabine IV q12    PTA Diabetes Regimen:   Metformin 500 mg tablets, 1000 mg bid  Glimepiride 4 mg daily am     Discharge Planning: likely home 1-2 days following auto tsplt (mid-next week)           Diabetes History:   Type of Diabetes: Type 2 Diabetes Mellitus, Steroid Induced Diabetes Mellitus  No results found for: A1C           Review of Systems:     The Review of Systems is negative other than noted in the Interval History.           Medications:     Current Facility-Administered Medications   Medication     acetaminophen (TYLENOL) tablet 325-650 mg     acetaminophen (TYLENOL) tablet 325-650 mg     acetaminophen (TYLENOL) tablet 325-650 mg     [START ON 11/10/2020] acetaminophen (TYLENOL) tablet 650 mg     acyclovir (ZOVIRAX) tablet 800 mg     allopurinol (ZYLOPRIM) tablet 300 mg     ceFEPIme (MAXIPIME) 2 g vial to attach to  ml bag for ADULTS or 50 ml bag for PEDS     cytarabine (PF) (CYTOSAR) 240 mg in D5W 112 mL infusion     [START ON 11/10/2020] dexamethasone (DECADRON) tablet 4 mg     [START ON 11/9/2020] dexamethasone (DECADRON) tablet 6 mg     [START ON 11/8/2020] dexamethasone (DECADRON) tablet 8 mg     [START ON 11/15/2020] dextrose 5 % flush PRE/POST medication     [START ON 11/15/2020] dextrose 5 % flush PRE/POST medication     [START ON 11/9/2020] dextrose 5% and 0.45% NaCl infusion     glucose gel 15-30 g    Or     dextrose 50 % injection 25-50 mL    Or     glucagon injection 1 mg     [START ON 11/10/2020] diphenhydrAMINE (BENADRYL) capsule 25 mg     diphenhydrAMINE (BENADRYL) capsule 25 mg     etoposide (TOPOSAR) 240 mg in sodium chloride 0.9 % 1,062 mL infusion     [START ON 11/15/2020] filgrastim (NEUPOGEN) 480 mcg in D5W 25 mL infusion     fluconazole (DIFLUCAN) tablet 200 mg     heparin 100 UNIT/ML injection 500 Units     heparin lock  "flush 10 UNIT/ML injection 5-10 mL     heparin lock flush 10 UNIT/ML injection 5-10 mL     insulin 1 unit/mL in saline (NovoLIN, HumuLIN Regular) drip - ADULT IV Infusion     [START ON 11/8/2020] insulin aspart (NovoLOG) injection (RAPID ACTING)     insulin aspart (NovoLOG) injection (RAPID ACTING)     insulin aspart (NovoLOG) injection (RAPID ACTING)     insulin aspart (NovoLOG) injection (RAPID ACTING)     insulin aspart (NovoLOG) injection (RAPID ACTING)     insulin aspart (NovoLOG) injection (RAPID ACTING)     insulin isophane human (HumuLIN N PEN) injection 34 Units    And     insulin isophane human (HumuLIN N PEN) injection 20 Units     [START ON 11/9/2020] levofloxacin (LEVAQUIN) tablet 250 mg     lidocaine (LMX4) cream     lidocaine 1 % 0.1-1 mL     LORazepam (ATIVAN) injection 0.5-1 mg    Or     LORazepam (ATIVAN) tablet 0.5-1 mg     [START ON 11/9/2020] MELPHalan (ALKERAN) 335 mg in sodium chloride 0.9 % 168 mL infusion     [START ON 11/10/2020] meperidine (DEMEROL) injection 25-50 mg     metoprolol succinate ER (TOPROL-XL) 24 hr tablet 25 mg     nicotine (NICODERM CQ) 21 MG/24HR 24 hr patch 1 patch     nicotine Patch in Place     ondansetron (ZOFRAN) tablet 8 mg     pantoprazole (PROTONIX) EC tablet 40 mg     prochlorperazine (COMPAZINE) injection 5-10 mg    Or     prochlorperazine (COMPAZINE) tablet 5-10 mg     sodium chloride (PF) 0.9% PF flush 10-20 mL     [START ON 11/9/2020] sodium chloride 0.9% infusion     [START ON 12/8/2020] sulfamethoxazole-trimethoprim (BACTRIM DS) 800-160 MG per tablet 1 tablet            Physical Exam:    /78 (BP Location: Left arm)   Pulse 55   Temp 97.3  F (36.3  C) (Oral)   Resp 16   Ht 1.78 m (5' 10.08\")   Wt 117.4 kg (258 lb 12.8 oz)   SpO2 96%   BMI 37.05 kg/m    General: pleasant, in no distress  Lungs: unlabored respiration, no cough  Mental status:  alert, oriented to self, place, time  Psych:  affect, calm and appropriate interaction   Remainder of the " physical exam could not be performed due to COVID-19 precautions            Data:     Recent Labs   Lab 11/07/20  1238 11/07/20  0816 11/07/20  0310 11/06/20  2111 11/06/20  1732 11/06/20  1242 11/06/20  0800 11/06/20  0222 11/05/20  0411 11/05/20  0411 11/04/20  1026 11/04/20  1026   GLC  --   --  116*  --   --   --   --  150*  --  208*  --  78   * 137*  --  206* 254* 220* 189*  --    < >  --    < >  --     < > = values in this interval not displayed.     Lab Results   Component Value Date    WBC 6.5 11/07/2020    WBC 8.0 11/06/2020    WBC 6.0 11/05/2020    HGB 8.5 (L) 11/07/2020    HGB 9.2 (L) 11/06/2020    HGB 9.4 (L) 11/05/2020    HCT 26.3 (L) 11/07/2020    HCT 28.1 (L) 11/06/2020    HCT 29.6 (L) 11/05/2020    MCV 94 11/07/2020    MCV 94 11/06/2020    MCV 93 11/05/2020     11/07/2020     11/06/2020     11/05/2020     Lab Results   Component Value Date     11/07/2020     11/06/2020     11/05/2020    POTASSIUM 4.4 11/07/2020    POTASSIUM 4.0 11/06/2020    POTASSIUM 4.2 11/05/2020    CHLORIDE 111 (H) 11/07/2020    CHLORIDE 110 (H) 11/06/2020    CHLORIDE 106 11/05/2020    CO2 22 11/07/2020    CO2 23 11/06/2020    CO2 24 11/05/2020     (H) 11/07/2020     (H) 11/06/2020     (H) 11/05/2020     Lab Results   Component Value Date    BUN 27 11/07/2020    BUN 19 11/06/2020    BUN 16 11/05/2020     Lab Results   Component Value Date    TSH 2.17 08/26/2020     Lab Results   Component Value Date    AST 12 11/04/2020    AST 18 10/13/2020    AST 18 08/26/2020    ALT 19 11/04/2020    ALT 22 10/13/2020    ALT 23 08/26/2020    ALKPHOS 131 11/04/2020    ALKPHOS 87 10/13/2020    ALKPHOS 81 08/26/2020

## 2020-11-07 NOTE — PROGRESS NOTES
"BMT Progress Note    ID: Theo Roblero is a 55 year old male, currently day -3 of his autologous hematopoietic cell transplant.    HPI: Doing well. No n,v,d.  No cough or sob.  Afebrile.  No bleeding.  No rash.  Eating and drinking.  No issues with chemotherapy.  LITZY-C and -16 again today    ROS: negative except as stated above in HPI    Physical Exam  BP (!) 141/83 (BP Location: Left arm)   Pulse 51   Temp 97.4  F (36.3  C) (Oral)   Resp 16   Ht 1.78 m (5' 10.08\")   Wt 117.4 kg (258 lb 12.8 oz)   SpO2 99%   BMI 37.05 kg/m    General: NAD   Eyes: sclera anicteric   Nose/Mouth/Throat: OP clear, buccal mucosa moist, no ulcerations. S/p several recent molar extractions, all well healing   Lungs: CTA bilaterally  Cardiovascular: RRR, no M/R/G   Abdominal/Rectal: +BS, soft, NT, ND, No HSM   Lymphatics: No edema  Skin: No rash on exposed skin  Neuro: A&O   Additional Findings: Forte site NT left chest wall    Lab  Lab Results   Component Value Date    WBC 6.5 11/07/2020    ANEU 6.0 11/07/2020    HGB 8.5 (L) 11/07/2020    HCT 26.3 (L) 11/07/2020     11/07/2020     11/07/2020    POTASSIUM 4.4 11/07/2020    CHLORIDE 111 (H) 11/07/2020    CO2 22 11/07/2020     (H) 11/07/2020    BUN 27 11/07/2020    CR 1.12 11/07/2020    MAG 2.3 11/07/2020    INR 1.04 11/04/2020    BILITOTAL 0.6 11/04/2020    AST 12 11/04/2020    ALT 19 11/04/2020    ALKPHOS 131 11/04/2020    PROTTOTAL 6.4 (L) 11/04/2020    ALBUMIN 3.1 (L) 11/04/2020     A/P:  ID: Theo Roblero is a 55 year old male with mantle cell lymphoma, admitted for auto BMT with BEAM prep, today is D-3     D-6 BCNU  D-5 though D-2 Litzy-C and -16 x 2  D-1 melphalan  D0 = transplant ( from melphalan by 24 hours)  Palifermin D0, D+1 and D+2     1.  BMT: Prep as above, Litzy-C and -16 x 2 today. Allopurinol through day 0.   - Flush and pre-meds prior to transplant. Cell dose 4.3 million, collected with GCSf and mozobil  - GCSF starts d+5 " and cont to until ANC>2500 x2 consecutive days. Re-stage per protocol.     2.  HEME: Keep Hgb>7 and plts>10K. Premeds for blood products (no hx of reaction, but has received premeds in the past)          plts and WBC in normal range.  Has anemia.                  3.  ID: afebrile.  - prophy Levo starts on day-1,remains on Fluc, and HD ACV (CMV+, HSV+, EBV+) prophy.   - Bactrim or appropriate PCP therapy to start d+28.                                             4.  GI:   - Zofran and dexamethasone prior to chemo to prevent N/V. Ativan and compazine available PRN break-through symptoms.   - Protonix for GI prophy.     5.  FEN/Renal:   - Monitor creat and lytes. Replete lytes PRN per SS.   - Monitor weight, I+O, lytes per protocol.  Weight is up, will give some lasix today.     6. Endo  - Hx DM type II. On metformin and glimepiride on admission. Held both due to potential for n/v with prep; also glimepiride can inhibit engraftment  - BG checks QID with meals and bedtime.   - Endo/diabetes consult, appreciate recs. continue BID NPH: 34 units AM and 20 units PM                  -increased aspart from 1:5 to 1:4g CHO coverage with meals and snacks/supplements                 -continue aspart high intensity sliding scale TID AC and HS                 -BG monitoring TID AC, HS, 0200                 -PRN IV insulin ordered for BG >300 x2 consecutive checks.                 -for discharge will need to discuss Metformin resumption with BMT; do not want Glimepiride resumed   7. CV  - HTN: on metoprolol BID     8. Hx tobacco abuse, previously used chewing tobacco  - ordered prn nicotine patch       Marcelina Hernandez PA-C  [phone]       Patient has been seen and evaluated by me. I have reviewed today's vital signs, medications, labs and imaging results independently. I have discussed the plan with the team and agree with the findings and plan in this note.       57 years old male, day -3 of his autologous HSCT for mantle cell lymphoma.       Feels ok. No major complaints  On exam:  HEENT: PERRL, EOMI, MMM  Chest: CTAB  CVS: S1 S2 RRR  PA: soft, non tender  CNS: non focal     A/P:     57 years old day-3 for autologous HSCT for mantle cell lympoma  BEAM conditioning  Etoposide and Agueda-C today  Anti-emetics and supportive care as above    Gracia Martinez

## 2020-11-07 NOTE — PROGRESS NOTES
Stop time on MAR & chart I & O    Chemo drug Etoposide crosschecked with Ashlyn NOGUEIRA RN. Rate check by Ezequiel CASILLAS RN.  Cytarabine cross check and rate check by Ying RHODES  Tolerated- no s/s of reaction or subjective changes. vss  Intervention-vitals q30min -bld rtn check   Response-stable-positive bld rtn  Plan-continue with induction chemo administration.

## 2020-11-08 NOTE — PLAN OF CARE
"Vital signs:  Temp: 97.3  F (36.3  C) Temp src: Oral BP: (!) 155/85 Pulse: 54   Resp: 16 SpO2: 100 % O2 Device: None (Room air)   Height: 178 cm (5' 10.08\") Weight: 117.7 kg (259 lb 6.4 oz)  Estimated body mass index is 37.14 kg/m  as calculated from the following:    Height as of this encounter: 1.78 m (5' 10.08\").    Weight as of this encounter: 117.7 kg (259 lb 6.4 oz).     Yanick received his fourth day dose(s) of Etoposide and Cytarabine this morning without issue.  His appetite remains strong and his blood sugars were  121 and 220.  Scheduled long-acting insulin given along with his short-acting sliding scale and carb counting.  His mood is stable and he has been up and independent in his room.  Currently, he is heparin-locked.  Weight remains elevated and lasix repeated today.  To continue his plan of care.       Problem: Fatigue (Stem Cell/Bone Marrow Transplant)  Goal: Energy Level Supports Daily Activity  Outcome: No Change     Problem: Infection Risk (Stem Cell/Bone Marrow Transplant)  Goal: Absence of Infection  Outcome: No Change     Problem: Nutrition Intake Altered (Stem Cell/Bone Marrow Transplant)  Goal: Optimal Nutrition Intake  Outcome: No Change     Problem: Adult Inpatient Plan of Care  Goal: Plan of Care Review  Flowsheets (Taken 11/8/2020 1313)  Plan of Care Reviewed With: patient  Progress: no change     "

## 2020-11-08 NOTE — PLAN OF CARE
BP elevated to 140-150's/80-90's. OVSS. Pt denied pain and nausea during shift. Sleeping well. No replacements needed this morning. Continue to monitor & follow plan of care.    Problem: Adult Inpatient Plan of Care  Goal: Plan of Care Review  Outcome: No Change     Problem: Adult Inpatient Plan of Care  Goal: Patient-Specific Goal (Individualized)  Outcome: No Change     Problem: Adult Inpatient Plan of Care  Goal: Readiness for Transition of Care  Outcome: No Change     Problem: Fatigue (Stem Cell/Bone Marrow Transplant)  Goal: Energy Level Supports Daily Activity  Intervention: Manage Fatigue  Recent Flowsheet Documentation  Taken 11/8/2020 0000 by Kimberley Montalvo, RN  Fatigue Management: fatigue-related activity identified  Sleep/Rest Enhancement: awakenings minimized

## 2020-11-08 NOTE — PROGRESS NOTES
"IP Diabetes Management  Daily Note           Assessment and Plan:   HPI: Theo \" Don\" Osbaldo is a 55 year old male with history of type 2 diabetes, hypertension, hyperlipidemia, stage IV mantle cell lymphoma admitted (11/4/2020) for autologous hematopoietic cell transplant.      Assessment:   1)Type II Diabetes Mellitus, with steroid induced hyperglycemia    - will initial BMT protocol, deviated from protocol with need for addition of mealtime insulin.    Weight 114 kg on admission, GFR 88    Plan:    -continue BID NPH: 34 units AM and 20 units PM . Dexamethasone tapered to 8mg on 11/8 with plan for further taper; anticipate reducing NPH AM on 11/9.   -Continue aspart to 1: 3.5 g CHO coverage with lunch   -Continue aspart 1: 4 g CHO coverage with breakfast, dinner and snacks/supplements   -continue aspart high intensity sliding scale TID AC and HS   -BG monitoring TID AC, HS, 0200   -PRN IV insulin ordered for BG >300 x2 consecutive checks.   -for discharge will need to discuss Metformin resumption with BMT; do not want Glimepiride resumed as it can interfere with transplant.   -hypoglycemia protocol   -carb counting protocol   -anticipate diabetes education will be needed for insulin teaching on discharge (temporary until PO meds can be resumed following transplant)    Outpatient follow up: with [facility], if discharging on insulin.     The case was discussed with my attending, Dr. Wise.    Ana Antonio MD  PGY-4 Endocrine Fellow  Pager # [phone]     Interval History and Assessment: interval glucose trend reviewed:  BGs adequately controlled, ranging between 130- 160.    Patient feels well and has no questions or concerns.      Current nutritional intake and type: Orders Placed This Encounter      High Kcal/High Protein Diet, ADULT    Steroid planning:   Dexamethasone 10 mg daily x 4 doses ( first dose 11/4)  Dexamethasone 8 mg daily ( 11/8)  Dexamethasone 6 mg ( 11/9)  Dexamethasone 4 mg ( " 11/10)    D5W-containing solutions:   -Carmustine x1 on 11/4  -Cytarabine IV q12    PTA Diabetes Regimen:   Metformin 500 mg tablets, 1000 mg bid  Glimepiride 4 mg daily am     Discharge Planning: likely home 1-2 days following auto tsplt (mid-next week)           Diabetes History:   Type of Diabetes: Type 2 Diabetes Mellitus, Steroid Induced Diabetes Mellitus  No results found for: A1C           Review of Systems:     The Review of Systems is negative other than noted in the Interval History.           Medications:     Current Facility-Administered Medications   Medication     acetaminophen (TYLENOL) tablet 325-650 mg     acetaminophen (TYLENOL) tablet 325-650 mg     acetaminophen (TYLENOL) tablet 325-650 mg     [START ON 11/10/2020] acetaminophen (TYLENOL) tablet 650 mg     acyclovir (ZOVIRAX) tablet 800 mg     allopurinol (ZYLOPRIM) tablet 300 mg     ceFEPIme (MAXIPIME) 2 g vial to attach to  ml bag for ADULTS or 50 ml bag for PEDS     cytarabine (PF) (CYTOSAR) 240 mg in D5W 112 mL infusion     [START ON 11/10/2020] dexamethasone (DECADRON) tablet 4 mg     [START ON 11/9/2020] dexamethasone (DECADRON) tablet 6 mg     dexamethasone (DECADRON) tablet 8 mg     [START ON 11/15/2020] dextrose 5 % flush PRE/POST medication     [START ON 11/15/2020] dextrose 5 % flush PRE/POST medication     [START ON 11/9/2020] dextrose 5% and 0.45% NaCl infusion     glucose gel 15-30 g    Or     dextrose 50 % injection 25-50 mL    Or     glucagon injection 1 mg     [START ON 11/10/2020] diphenhydrAMINE (BENADRYL) capsule 25 mg     diphenhydrAMINE (BENADRYL) capsule 25 mg     etoposide (TOPOSAR) 240 mg in sodium chloride 0.9 % 1,062 mL infusion     [START ON 11/15/2020] filgrastim (NEUPOGEN) 480 mcg in D5W 25 mL infusion     fluconazole (DIFLUCAN) tablet 200 mg     heparin 100 UNIT/ML injection 500 Units     heparin lock flush 10 UNIT/ML injection 5-10 mL     heparin lock flush 10 UNIT/ML injection 5-10 mL     insulin 1 unit/mL  "in saline (NovoLIN, HumuLIN Regular) drip - ADULT IV Infusion     insulin aspart (NovoLOG) injection (RAPID ACTING)     insulin aspart (NovoLOG) injection (RAPID ACTING)     insulin aspart (NovoLOG) injection (RAPID ACTING)     insulin aspart (NovoLOG) injection (RAPID ACTING)     insulin aspart (NovoLOG) injection (RAPID ACTING)     insulin aspart (NovoLOG) injection (RAPID ACTING)     insulin isophane human (HumuLIN N PEN) injection 34 Units    And     insulin isophane human (HumuLIN N PEN) injection 20 Units     [START ON 11/9/2020] levofloxacin (LEVAQUIN) tablet 250 mg     lidocaine (LMX4) cream     lidocaine 1 % 0.1-1 mL     LORazepam (ATIVAN) injection 0.5-1 mg    Or     LORazepam (ATIVAN) tablet 0.5-1 mg     [START ON 11/9/2020] MELPHalan (ALKERAN) 335 mg in sodium chloride 0.9 % 168 mL infusion     [START ON 11/10/2020] meperidine (DEMEROL) injection 25-50 mg     metoprolol succinate ER (TOPROL-XL) 24 hr tablet 25 mg     nicotine (NICODERM CQ) 21 MG/24HR 24 hr patch 1 patch     nicotine Patch in Place     ondansetron (ZOFRAN) tablet 8 mg     pantoprazole (PROTONIX) EC tablet 40 mg     prochlorperazine (COMPAZINE) injection 5-10 mg    Or     prochlorperazine (COMPAZINE) tablet 5-10 mg     sodium chloride (PF) 0.9% PF flush 10-20 mL     [START ON 11/9/2020] sodium chloride 0.9% infusion     [START ON 12/8/2020] sulfamethoxazole-trimethoprim (BACTRIM DS) 800-160 MG per tablet 1 tablet            Physical Exam:    BP (!) 154/96 (BP Location: Left arm)   Pulse 67   Temp 97.2  F (36.2  C) (Oral)   Resp 16   Ht 1.78 m (5' 10.08\")   Wt 117.4 kg (258 lb 12.8 oz)   SpO2 100%   BMI 37.05 kg/m    General: pleasant, in no distress  Lungs: unlabored respiration, no cough  Mental status:  alert, oriented to self, place, time  Psych:  affect, calm and appropriate interaction   Remainder of the physical exam could not be performed due to COVID-19 precautions            Data:     Recent Labs   Lab 11/08/20  0342 " 11/07/20  2150 11/07/20  1613 11/07/20  1238 11/07/20  0816 11/07/20  0310 11/06/20  2111 11/06/20  1732 11/06/20  0222 11/06/20  0222 11/05/20  0411 11/05/20  0411 11/04/20  1026 11/04/20  1026   *  --   --   --   --  116*  --   --   --  150*  --  208*  --  78   BGM  --  166* 153* 154* 137*  --  206* 254*   < >  --    < >  --    < >  --     < > = values in this interval not displayed.     Lab Results   Component Value Date    WBC 5.7 11/08/2020    WBC 6.5 11/07/2020    WBC 8.0 11/06/2020    HGB 8.6 (L) 11/08/2020    HGB 8.5 (L) 11/07/2020    HGB 9.2 (L) 11/06/2020    HCT 26.3 (L) 11/08/2020    HCT 26.3 (L) 11/07/2020    HCT 28.1 (L) 11/06/2020    MCV 94 11/08/2020    MCV 94 11/07/2020    MCV 94 11/06/2020     11/08/2020     11/07/2020     11/06/2020     Lab Results   Component Value Date     11/08/2020     11/07/2020     11/06/2020    POTASSIUM 4.5 11/08/2020    POTASSIUM 4.4 11/07/2020    POTASSIUM 4.0 11/06/2020    CHLORIDE 110 (H) 11/08/2020    CHLORIDE 111 (H) 11/07/2020    CHLORIDE 110 (H) 11/06/2020    CO2 23 11/08/2020    CO2 22 11/07/2020    CO2 23 11/06/2020     (H) 11/08/2020     (H) 11/07/2020     (H) 11/06/2020     Lab Results   Component Value Date    BUN 31 (H) 11/08/2020    BUN 27 11/07/2020    BUN 19 11/06/2020     Lab Results   Component Value Date    TSH 2.17 08/26/2020     Lab Results   Component Value Date    AST 12 11/04/2020    AST 18 10/13/2020    AST 18 08/26/2020    ALT 19 11/04/2020    ALT 22 10/13/2020    ALT 23 08/26/2020    ALKPHOS 131 11/04/2020    ALKPHOS 87 10/13/2020    ALKPHOS 81 08/26/2020

## 2020-11-08 NOTE — PLAN OF CARE
Pt is afebrile, vital signs are stable; receiving his Etoposide and Cytarabine tonight; Bg was 153 and he got 1 unit Reg; Humulin 20 unit given; lungs clear; pt is independent. Monitor pt closely and continue plan of care.    Problem: Adult Inpatient Plan of Care  Goal: Plan of Care Review  Outcome: No Change  Flowsheets (Taken 11/7/2020 2116)  Plan of Care Reviewed With: patient     Problem: Adult Inpatient Plan of Care  Goal: Patient-Specific Goal (Individualized)  Outcome: No Change     Problem: Adult Inpatient Plan of Care  Goal: Absence of Hospital-Acquired Illness or Injury  Outcome: No Change     Problem: Infection Risk (Stem Cell/Bone Marrow Transplant)  Goal: Absence of Infection  Intervention: Prevent and Manage Infection  Recent Flowsheet Documentation  Taken 11/7/2020 1600 by Toya Becerra, RN  Infection Prevention: rest/sleep promoted  Infection Management: aseptic technique maintained  Isolation Precautions: cytotoxic precautions maintained

## 2020-11-08 NOTE — PROGRESS NOTES
"BMT Progress Note    ID: Theo Roblero is a 55 year old male, currently day -2 of his autologous hematopoietic cell transplant.    HPI: Doing well. Urinated well after lasix yesteray. No n,v,d.  No cough or sob.  Afebrile.  No bleeding.  No rash.  Eating and drinking.  No issues with chemotherapy.  LITZY-C and -16 again today- last day.  Discussed plan with Melphalan tomorrow.    ROS: negative except as stated above in HPI    Physical Exam  BP (!) 155/85 (BP Location: Left arm)   Pulse 54   Temp 97.3  F (36.3  C) (Oral)   Resp 16   Ht 1.78 m (5' 10.08\")   Wt 117.7 kg (259 lb 6.4 oz)   SpO2 100%   BMI 37.14 kg/m    General: NAD   Eyes: sclera anicteric   Nose/Mouth/Throat: OP clear, buccal mucosa moist, no ulcerations. S/p several recent molar extractions, all well healing   Lungs: CTA bilaterally  Cardiovascular: RRR, no M/R/G   Abdominal/Rectal: +BS, soft, NT, ND, No HSM   Lymphatics: No edema  Skin: No rash on exposed skin  Neuro: A&O   Additional Findings: Forte site NT left chest wall    Lab  Lab Results   Component Value Date    WBC 5.7 11/08/2020    ANEU 5.4 11/08/2020    HGB 8.6 (L) 11/08/2020    HCT 26.3 (L) 11/08/2020     11/08/2020     11/08/2020    POTASSIUM 4.5 11/08/2020    CHLORIDE 110 (H) 11/08/2020    CO2 23 11/08/2020     (H) 11/08/2020    BUN 31 (H) 11/08/2020    CR 1.17 11/08/2020    MAG 2.2 11/08/2020    INR 1.04 11/04/2020    BILITOTAL 0.6 11/04/2020    AST 12 11/04/2020    ALT 19 11/04/2020    ALKPHOS 131 11/04/2020    PROTTOTAL 6.4 (L) 11/04/2020    ALBUMIN 3.1 (L) 11/04/2020     A/P:  ID: Theo Roblero is a 55 year old male with mantle cell lymphoma, admitted for auto BMT with BEAM prep, today is D-2     D-6 BCNU  D-5 though D-2 Litzy-C and -16 x 2  D-1 melphalan  D0 = transplant ( from melphalan by 24 hours)  Palifermin D0, D+1 and D+2     1.  BMT: Prep as above, Litzy-C and -16 x 2 last day today. Allopurinol through day 0.   - Flush and " pre-meds prior to transplant. Cell dose 4.3 million, collected with GCSf and mozobil  - GCSF starts d+5 and cont to until ANC>2500 x2 consecutive days. Re-stage per protocol.     2.  HEME: Keep Hgb>7 and plts>10K. Premeds for blood products (no hx of reaction, but has received premeds in the past)          plts and WBC in normal range.  Has anemia.                  3.  ID: afebrile.  - prophy Levo starts on day-1,remains on Fluc, and HD ACV (CMV+, HSV+, EBV+) prophy.   - Bactrim or appropriate PCP therapy to start d+28.                                             4.  GI:   - Zofran and dexamethasone prior to chemo to prevent N/V. Ativan and compazine available PRN break-through symptoms.   - Protonix for GI prophy.     5.  FEN/Renal:   - Monitor creat and lytes. Replete lytes PRN per SS.   - Monitor weight, I+O, lytes per protocol.  Weight is  Still up, will give some lasix again today.     6. Endo  - Hx DM type II. On metformin and glimepiride on admission. Held both due to potential for n/v with prep; also glimepiride can inhibit engraftment  - BG checks QID with meals and bedtime.   - Endo/diabetes consult, appreciate recs. continue BID NPH: 34 units AM and 20 units PM                  -increased aspart from  1:4g to 1.35 CHO coverage with meals and snacks/supplements                 -continue aspart high intensity sliding scale TID AC and HS                 -BG monitoring TID AC, HS, 0200                 -PRN IV insulin ordered for BG >300 x2 consecutive checks.                 -for discharge will need to discuss Metformin resumption with BMT; do not want Glimepiride resumed   7. CV  - HTN: on metoprolol BID     8. Hx tobacco abuse, previously used chewing tobacco  - ordered prn nicotine patch       Marcelina Hernandez PA-C  [phone]       Patient has been seen and evaluated by me. I have reviewed today's vital signs, medications, labs and imaging results independently. I have discussed the plan with the team and agree  with the findings and plan in this note.       57 years old male, day -2 of his autologous HSCT for mantle cell lymphoma.      Feels ok. No major complaints  On exam:  HEENT: PERRL, EOMI, MMM  Chest: CTAB  CVS: S1 S2 RRR  PA: soft, non tender  CNS: non focal     A/P:     57 years old day-2 for autologous HSCT for mantle cell lympoma  BEAM conditioning  Etoposide and Agueda-C today  Anti-emetics and supportive care as above    Gracia Martinez

## 2020-11-09 NOTE — PROGRESS NOTES
"IP Diabetes Management  Daily Note           Assessment and Plan:   HPI: Theo \" Don\" Osbaldo is a 55 year old male with history of type 2 diabetes, hypertension, hyperlipidemia, stage IV mantle cell lymphoma admitted (11/4/2020) for autologous hematopoietic cell transplant.      Assessment:   1)Type II Diabetes Mellitus, with steroid induced hyperglycemia    - initiated BMT protocol (Weight 114 kg on admission, GFR 88), then deviated from protocol with need for addition of mealtime insulin.    Fair glucose control    Plan:    -continue BID NPH: 34 units AM and 20 units PM . Dexamethasone tapered to 6mg on 11/9 with plan for further taper; anticipate reducing NPH AM on 11/10   -Continue aspart to 1: 3.5 g CHO coverage with lunch   -Continue aspart 1: 4 g CHO coverage with breakfast, dinner and snacks/supplements   -aspart high intensity sliding scale TID AC and HS-->increase to 2 per 30 correction TID AC, while getting D5W flush (150 ml/h)   -BG monitoring TID AC, HS, 0200   -PRN IV insulin ordered for BG >300 x2 consecutive checks.   -for discharge will need to discuss Metformin resumption with BMT; do not want Glimepiride resumed as it can interfere with transplant.   -hypoglycemia protocol   -carb counting protocol   -anticipate diabetes education will be needed for insulin teaching on discharge (temporary until PO meds can be resumed following transplant)    Outpatient follow up: with [facility], if discharging on insulin.       Interval History and Assessment: interval glucose trend reviewed:  BGs intermittently low 200s after eating    Patient feels well, has a good appetite.  Transplant tomorrow.  Started D51/2NS at 150 ml/h 0600, pepper-Melphalan flush. Runs until ~2100  Also started levaquin prophy today      Current nutritional intake and type: Orders Placed This Encounter      High Kcal/High Protein Diet, ADULT    Steroid planning:   Dexamethasone 10 mg daily x 4 doses ( first dose " 11/4)  Dexamethasone 8 mg daily ( 11/8)  Dexamethasone 6 mg ( 11/9)  Dexamethasone 4 mg ( 11/10)    D5W-containing solutions:   -Carmustine x1 on 11/4  -Cytarabine IV q12    PTA Diabetes Regimen:   Metformin 500 mg tablets, 1000 mg bid  Glimepiride 4 mg daily am     Discharge Planning: likely home 1-2 days following auto tsplt            Diabetes History:   Type of Diabetes: Type 2 Diabetes Mellitus, Steroid Induced Diabetes Mellitus  No results found for: A1C           Review of Systems:     The Review of Systems is negative other than noted in the Interval History.           Medications:     Current Facility-Administered Medications   Medication     acetaminophen (TYLENOL) tablet 325-650 mg     acetaminophen (TYLENOL) tablet 325-650 mg     acetaminophen (TYLENOL) tablet 325-650 mg     [START ON 11/10/2020] acetaminophen (TYLENOL) tablet 650 mg     acyclovir (ZOVIRAX) tablet 800 mg     ceFEPIme (MAXIPIME) 2 g vial to attach to  ml bag for ADULTS or 50 ml bag for PEDS     [START ON 11/10/2020] dexamethasone (DECADRON) tablet 4 mg     [START ON 11/15/2020] dextrose 5 % flush PRE/POST medication     [START ON 11/15/2020] dextrose 5 % flush PRE/POST medication     dextrose 5% and 0.45% NaCl infusion     glucose gel 15-30 g    Or     dextrose 50 % injection 25-50 mL    Or     glucagon injection 1 mg     [START ON 11/10/2020] diphenhydrAMINE (BENADRYL) capsule 25 mg     diphenhydrAMINE (BENADRYL) capsule 25 mg     [START ON 11/15/2020] filgrastim (NEUPOGEN) 480 mcg in D5W 25 mL infusion     fluconazole (DIFLUCAN) tablet 200 mg     heparin 100 UNIT/ML injection 500 Units     heparin lock flush 10 UNIT/ML injection 5-10 mL     heparin lock flush 10 UNIT/ML injection 5-10 mL     insulin 1 unit/mL in saline (NovoLIN, HumuLIN Regular) drip - ADULT IV Infusion     insulin aspart (NovoLOG) injection (RAPID ACTING)     insulin aspart (NovoLOG) injection (RAPID ACTING)     insulin aspart (NovoLOG) injection (RAPID ACTING)  "    insulin aspart (NovoLOG) injection (RAPID ACTING)     insulin aspart (NovoLOG) injection (RAPID ACTING)     insulin aspart (NovoLOG) injection (RAPID ACTING)     insulin isophane human (HumuLIN N PEN) injection 34 Units    And     insulin isophane human (HumuLIN N PEN) injection 20 Units     levofloxacin (LEVAQUIN) tablet 250 mg     lidocaine (LMX4) cream     lidocaine 1 % 0.1-1 mL     LORazepam (ATIVAN) injection 0.5-1 mg    Or     LORazepam (ATIVAN) tablet 0.5-1 mg     [START ON 11/10/2020] meperidine (DEMEROL) injection 25-50 mg     metoprolol succinate ER (TOPROL-XL) 24 hr tablet 25 mg     nicotine (NICODERM CQ) 21 MG/24HR 24 hr patch 1 patch     nicotine Patch in Place     ondansetron (ZOFRAN) tablet 8 mg     pantoprazole (PROTONIX) EC tablet 40 mg     prochlorperazine (COMPAZINE) injection 5-10 mg    Or     prochlorperazine (COMPAZINE) tablet 5-10 mg     sodium chloride (PF) 0.9% PF flush 10-20 mL     [START ON 12/8/2020] sulfamethoxazole-trimethoprim (BACTRIM DS) 800-160 MG per tablet 1 tablet            Physical Exam:    BP (!) 147/81 (BP Location: Left arm)   Pulse 68   Temp 98.8  F (37.1  C) (Axillary)   Resp 18   Ht 1.78 m (5' 10.08\")   Wt 117.5 kg (259 lb 1.6 oz)   SpO2 97%   BMI 37.09 kg/m    General: pleasant, in no distress, up in chair  Lungs: unlabored respiration, no cough  Mental status:  alert, oriented to self, place, time  Psych:  affect, calm and appropriate interaction               Data:     Recent Labs   Lab 11/09/20  1236 11/09/20  0744 11/09/20  0348 11/08/20  1955 11/08/20  1706 11/08/20  1149 11/08/20  0855 11/08/20  0342 11/07/20  0310 11/07/20  0310 11/06/20  0222 11/06/20  0222 11/05/20  0411 11/05/20  0411 11/04/20  1026 11/04/20  1026   GLC  --   --  108*  --   --   --   --  130*  --  116*  --  150*  --  208*  --  78   * 149*  --  212* 146* 220* 121*  --    < >  --    < >  --    < >  --    < >  --     < > = values in this interval not displayed.     Lab Results "   Component Value Date    WBC 5.7 11/09/2020    WBC 5.7 11/08/2020    WBC 6.5 11/07/2020    HGB 9.0 (L) 11/09/2020    HGB 8.6 (L) 11/08/2020    HGB 8.5 (L) 11/07/2020    HCT 26.2 (L) 11/09/2020    HCT 26.3 (L) 11/08/2020    HCT 26.3 (L) 11/07/2020    MCV 91 11/09/2020    MCV 94 11/08/2020    MCV 94 11/07/2020     11/09/2020     11/08/2020     11/07/2020     Lab Results   Component Value Date     11/09/2020     11/08/2020     11/07/2020    POTASSIUM 4.3 11/09/2020    POTASSIUM 4.5 11/08/2020    POTASSIUM 4.4 11/07/2020    CHLORIDE 108 11/09/2020    CHLORIDE 110 (H) 11/08/2020    CHLORIDE 111 (H) 11/07/2020    CO2 24 11/09/2020    CO2 23 11/08/2020    CO2 22 11/07/2020     (H) 11/09/2020     (H) 11/08/2020     (H) 11/07/2020     Lab Results   Component Value Date    BUN 31 (H) 11/09/2020    BUN 31 (H) 11/08/2020    BUN 27 11/07/2020     Lab Results   Component Value Date    TSH 2.17 08/26/2020     Lab Results   Component Value Date    AST 20 11/09/2020    AST 12 11/04/2020    AST 18 10/13/2020    ALT 33 11/09/2020    ALT 19 11/04/2020    ALT 22 10/13/2020    ALKPHOS 82 11/09/2020    ALKPHOS 131 11/04/2020    ALKPHOS 87 10/13/2020     Cyndi May APRN -[phone]  To contact Endocrine Diabetes service:   From 8AM-4PM: page inpatient diabetes provider that is following the patient  For questions or updates from 4PM-8AM: page the diabetes job code for on call fellow: [phone]

## 2020-11-09 NOTE — PROGRESS NOTES
"BMT Progress Note    ID: Theo oRblero is a 55 year old male, currently day -1 of his autologous hematopoietic cell transplant.    HPI: Yanick is sitting up in his chair this morning. States he is feeling well without n/v/d. No swelling. Good appetite.    ROS: 8 point review with negative except as stated above in HPI    Physical Exam  BP (!) 143/85   Pulse 57   Temp 97.7  F (36.5  C) (Oral)   Resp 20   Ht 1.78 m (5' 10.08\")   Wt 117.7 kg (259 lb 6.4 oz)   SpO2 95%   BMI 37.14 kg/m    General: NAD   Eyes: sclera anicteric   Nose/Mouth/Throat: OP clear, buccal mucosa moist, no ulcerations. S/p several recent molar extractions, all well healing   Lungs: CTA bilaterally  Cardiovascular: RRR, no M/R/G   Abdominal/Rectal: +BS, soft, NT, ND, No HSM   Lymphatics: No edema  Skin: No rash on exposed skin  Neuro: A&O   Additional Findings: Forte site NT left chest wall    Lab  Lab Results   Component Value Date    WBC 5.7 11/09/2020    ANEU 5.5 11/09/2020    HGB 9.0 (L) 11/09/2020    HCT 26.2 (L) 11/09/2020     11/09/2020     11/09/2020    POTASSIUM 4.3 11/09/2020    CHLORIDE 108 11/09/2020    CO2 24 11/09/2020     (H) 11/09/2020    BUN 31 (H) 11/09/2020    CR 1.08 11/09/2020    MAG 2.2 11/09/2020    INR 1.04 11/09/2020    BILITOTAL 0.7 11/09/2020    AST 20 11/09/2020    ALT 33 11/09/2020    ALKPHOS 82 11/09/2020    PROTTOTAL 5.6 (L) 11/09/2020    ALBUMIN 2.9 (L) 11/09/2020     A/P:  ID: Theo Roblero is a 55 year old male with mantle cell lymphoma, admitted for auto BMT with BEAM prep, today is D-1.     D-6 BCNU  D-5 though D-2 Agueda-C and -16 x 2  D-1 melphalan  D0 = transplant ( from melphalan by 24 hours)  Palifermin D0, D+1 and D+2     1.  BMT: Prep as above, Agueda-C and -16 x 2. Melphalan today. Allopurinol through day 0.   - Flush and pre-meds prior to transplant. Cell dose 4.3 million, collected with GCSf and mozobil  - GCSF starts d+5 and cont to until ANC>2500 x2 " consecutive days. Re-stage per protocol.     2.  HEME: Keep Hgb>7 and plts>10K. Premeds for blood products (no hx of reaction, but has received premeds in the past)  - plts and WBC in normal range.  Has anemia secondary to chemotherapy               3.  ID: afebrile.  - prophy Levo, remains on Fluc, and HD ACV (CMV+, HSV+, EBV+) prophy.   - Bactrim or appropriate PCP therapy to start d+28.                                             4.  GI:   - Zofran and dexamethasone prior to chemo to prevent N/V. Ativan and compazine available PRN break-through symptoms.   - Protonix for GI prophy.     5.  FEN/Renal:   - Monitor creat and lytes. Replete lytes PRN per SS.   - Monitor weight, I+O, lytes per protocol. Excellent urine output after lasix 11/8. Watch for weight and BP today, then would consider hydralazine vs lasix tomorrow with cells.     6. Endo  - Hx DM type II. On metformin and glimepiride on admission. Held both due to potential for n/v with prep; also glimepiride can inhibit engraftment  - BG checks QID with meals and bedtime.   - Endo/diabetes consult, appreciate recs. continue BID NPH: 34 units AM and 20 units PM                  -increased aspart from  1:4g to 1.35 CHO coverage with meals and snacks/supplements                 -continue aspart high intensity sliding scale TID AC and HS                 -BG monitoring TID AC, HS, 0200                 -PRN IV insulin ordered for BG >300 x2 consecutive checks.                 -for discharge will need to discuss Metformin resumption with BMT; do not want Glimepiride resumed     7. CV  - HTN: on metoprolol BID     8. Hx tobacco abuse, previously used chewing tobacco  - ordered prn nicotine patch       Annabel Holm PAC  [phone]       Patient has been seen and evaluated by me. I have reviewed today's vital signs, medications, labs and imaging results independently. I have discussed the plan with the team and agree with the findings and plan in this note.       57 years  old male, day -1 of his autologous HSCT for mantle cell lymphoma.      Feels ok. No major complaints  On exam:  HEENT: PERRL, EOMI, MMM  Chest: CTAB  CVS: S1 S2 RRR  PA: soft, non tender  CNS: non focal     A/P:     57 years old day-1 for autologous HSCT for mantle cell lympoma  BEAM conditioning  Melphalan today  Anti-emetics and supportive care as above    Gracia Martinez

## 2020-11-09 NOTE — PROGRESS NOTES
BMT Teaching Flowsheet    Theo Roblero is a 55 year old male  The encounter diagnosis was Mantle cell lymphoma of lymph nodes of multiple sites (H).    Teaching Topic: Autologous stem cell transplant discharge teaching     Person(s) involved in teaching: Patient  Motivation Level  Asks Questions: Yes  Eager to Learn: Yes  Cooperative: Yes  Receptive (willing/able to accept information): Yes  Any cultural factors/Buddhism beliefs that may influence understanding or compliance? No    Patient demonstrates understanding of the following:  - Reason for the appointment, diagnosis and treatment plan: Yes  - Knowledge of proper use of medications and conditions for which they are ordered (with special attention to potential side effects or drug interactions): No, explain: Bedside RN to complete medication teaching with patient and caregiver prior to discharge.   - Which situations necessitate calling provider and whom to contact: Yes    Teaching concerns addressed: None identified    Proper use and care of (medical equipment, care aids, etc.) Yes  Pain management techniques: Yes  Patient instructed on hand hygiene: Yes  How and/when to access community resources: Yes    Infection Control:  Patient demonstrates understanding of the following:  Surgical procedure site care taught NA  Signs and symptoms of infection taught Yes  Wound care taught NA  Central venous catheter care taught No, explain: Patient and caregiver previously received teaching, and decline further need     Instructional Materials Used/Given:   Discharge teaching completed with patient and family. Written guidelines provided including clinic follow up, signs and symptoms to report, infection prevention, and contact list. Reviewed potential side effects s/p transplant and what to expect during recovery period. Discussed clinic routines. Discussed activities to avoid to prevent infections and mask guidelines. [facility] for line care  supplies. Pt and family verbalize understanding of material via teach back and all questions have been answered.    Time spent with patient: 20 minutes.    Specific Concerns: NA

## 2020-11-09 NOTE — PROGRESS NOTES
Chemo drug: Melphalan  Tolerated:well  Intervention: cryotherapy  Plan:continue to monitor  Flush: stop at 2100 11/9

## 2020-11-09 NOTE — PLAN OF CARE
OT/5C: Cancel. Pt declined any OOR exercise this date and reported he will use the treadmill in his room sometime today. Will reduce OT freq and check in with pt as needed for HEP and to ensure pt remains IND with in room exercise equipment.

## 2020-11-09 NOTE — PLAN OF CARE
Care Coordination  Discharge Planning     Line Company:  [facility] [phone] for line care supplies   Referral made for line care:  Yes   IV medications needed at discharge:  None   Pharmacy concerns:  None. (Of note, [facility] requires prior auth)     PT/OT recommended:  No  Referral made for PT/OT:  NA    D/c location:  [facility]  Has placement need been communicated to Social Work?  NA    NC Teaching time arranged with family:  Completed 11/9, via phone call with pt  Notify nurse to schedule line care class/ DM teaching, prior to d/c:  Patient and caregiver previously received teaching, and decline further need     Caregiver:  Carroll Parhamjayshreeseema (brother) - Primary    Hayde (pt's fiance) - Secondary      Em Raymond, RN, BSN  RN Care Coordinator - Inpatient BMT, Unit 5C  Ph [phone]   [phone]

## 2020-11-09 NOTE — PLAN OF CARE
BP remains elevated. OVSS. Pt has no complaints. D5 1/2 NS running at 150 ml/hr prior to chemo. No concerns, continue plan of care.     Problem: Adult Inpatient Plan of Care  Goal: Plan of Care Review  Outcome: No Change     Problem: Adult Inpatient Plan of Care  Goal: Patient-Specific Goal (Individualized)  Outcome: No Change     Problem: Adult Inpatient Plan of Care  Goal: Readiness for Transition of Care  Outcome: No Change     Problem: Adjustment to Transplant (Stem Cell/Bone Marrow Transplant)  Goal: Optimal Coping with Transplant  Intervention: Optimize Patient/Family Adjustment Response to Transplant  Recent Flowsheet Documentation  Taken 11/9/2020 0000 by Kimberley Montalvo, RN  Supportive Measures: active listening utilized     Problem: Adult Inpatient Plan of Care  Goal: Absence of Hospital-Acquired Illness or Injury  Intervention: Identify and Manage Fall Risk  Recent Flowsheet Documentation  Taken 11/9/2020 0000 by Kimberley Montalvo, RN  Safety Promotion/Fall Prevention:   assistive device/personal items within reach   fall prevention program maintained     Problem: Adult Inpatient Plan of Care  Goal: Absence of Hospital-Acquired Illness or Injury  Intervention: Prevent Skin Injury  Recent Flowsheet Documentation  Taken 11/9/2020 0000 by Kimberley Montalvo, RN  Body Position: position changed independently

## 2020-11-09 NOTE — PLAN OF CARE
Pt is afebrile, vital sign are stable; got his Etoposide and Cytarabine is running; putting out large volume of urine; denied any nausea and pain; monitor closely and continue  plan of care.    Problem: Adult Inpatient Plan of Care  Goal: Plan of Care Review  Outcome: No Change  Flowsheets (Taken 11/8/2020 2301)  Plan of Care Reviewed With: patient     Problem: Adult Inpatient Plan of Care  Goal: Absence of Hospital-Acquired Illness or Injury  Outcome: No Change     Problem: Adult Inpatient Plan of Care  Goal: Absence of Hospital-Acquired Illness or Injury  Intervention: Identify and Manage Fall Risk  Recent Flowsheet Documentation  Taken 11/8/2020 1600 by Toya Becerra, RN  Safety Promotion/Fall Prevention: activity supervised

## 2020-11-10 NOTE — PROGRESS NOTES
"SPIRITUAL HEALTH SERVICES  [facility]  REFERRAL SOURCE:  initiated     Introduced spiritual health services on pt's transplant day. Pt stated \"I just want to get this started,\" as he continued to watch TV. Pt expressed no spiritual health needs at this time.     PLAN:  remains available per pt/family/staff request.     Rev. Marcelina Sullivan MDiv, [facility]  Staff    Pager [phone]  * [facility] remains available 24/7 for emergent requests/referrals, either by having the switchboard page the on-call  or by entering an ASAP/STAT consult in Epic (this will also page the on-call ).*   "

## 2020-11-10 NOTE — PLAN OF CARE
Pt had a quiet day with stable vital signs.  He tolerated chemo well.  His blood sugars were elevated this evening due to missing insulin pen mid day.  He continues to have a good appetite and denies pain/nausea.    VSS    Problem: Adult Inpatient Plan of Care  Goal: Plan of Care Review  Outcome: No Change

## 2020-11-10 NOTE — PROGRESS NOTES
Prior Authorization Approval    ondansetron HCl 8mg tabs  Date Initiated: 11/10/2020  Date Completed: 11/10/2020  Prior Auth Type: Quantity Limit                Status: Approved    Effective Date: 10/11/2020 - 05/09/2021  Copay: 0.00     Filling Pharmacy: [facility] - [place], [place] - [address] [place] [address]    Insurance: [employer] - Phone [phone] Fax [phone]  ID: [id number]  Case Number: [id number]   Submitted Via: Kyle Braun  [facility] Liaison  Ph: [phone] Page: [phone]

## 2020-11-10 NOTE — SUMMARY OF CARE
BMT Hospital Summary of Care   & Survivorship Care Plan    Treatment Team:  Patient Care Team:  Mike Price as PCP - General (Family Practice)  Martin Mae MD as Referring Physician (Hematology & Oncology)  Jessika Marroquin RN as Specialty Care Coordinator (BMT - Adult)  Sam Zavala MD as Assigned Cancer Care Provider  Fabiola Fernández MSW as   Discharge Diagnosis: S/P autologous BMT 11/10/2020     Transplant Essential Data:  Diagnosis [facility] Non-Hodgkin lymphoma, Mantle cell/intermediate differentiation  HCT Type Autologous    Prep Regimen BCNU  Agueda-C  Etoposide  Melphalan   Donor Source No data was found    GVHD Prophylaxis No  Clinical Trials None       Hospital Discharge on 11/11/20  Discharge Location Home   Activity at Discharge Pancytopenia limitations    Nutrition Regular diet as tolerated     Blood Transfusion Parameters Transfuse if Hemoglobin < or equal 7 mg/dL  Transfuse if Platelet count < or equal 10,000 uL  Transfusion Pre-meds:  None     IV Medications Outpatient Pharmacy G-CSF to be given in clinic: (dose) 480mcg/daily starting day +5 (11/15/2020)     Home Infusion  IV Medications through home infusion: None   Line Care Care: Forte - Flush each line with 5mL of 10 unit/mL heparin every 24 hours  Supplies Through: [facility]  Fax: [phone]  Ph: [phone]      Physical Therapy & Support Services None     Laboratory Tests at Next Clinic Visit Hemogram (CBC) differential, platelet count  Basic Metabolic Panel  Comprehensive Metabolic Panel     Restrictions Immediately Post-Transplant   Always wear your mask in public.    No driving until cleared by your physician    Continue dietary precautions as discussed at your pre-BMT teaching session   When to Call  (Refer to Discharge Teaching Materials)   Fever > 100.5 F    Bleeding that does not stop after a few minutes    Severe nausea, vomiting, or diarrhea     New fall or injury    Change in designated  caregiver    Medication question    Any other urgent concern   Follow Up Visits Clinic Appointment Date/Time:   BMT Clinic (date, time, provider):   1. Thursday, 11/12 check in at 11:45am for labs and 12:30 provider visit and pharmacist appt for medication review, palifermin dose    Survivorship Visits:     You will have a 60-minute provider visit dedicated to cancer Survivorship one week before your scheduled anniversary visit on day + 100, 1 year, and 2 years.     After 2 years, or if you received an allogeneic transplant and you develop a condition called chronic GVHD, you can continue to receive comprehensive Survivorship care in our Transplant Late-Effects Clinic (TLC) annually by calling [phone] to schedule an appointment    Your labs will be drawn after your survivorship appointments to allow your provider to order additional tests as needed.     BMT Contact Information  For issues including fevers 100.5 or more:  Please call during the week: Monday through Friday between hours of 8:00 am and 4:30 pm- Call BMT office- [phone]  After hours/Weekends: Please call [facility]  and ask for BMT Physician on call and the  will have the BMT Physician call you back: [phone]    Regarding COVID-19 Pandemic  Advice for Patients:  a.       Avoid contact with individuals:   i.     Who are sick or have recently been sick  ii.      Have traveled to high risk areas (per CDC guidelines) or have been on a cruise in the last 14 days  iii.      Who were or could have been exposed to COVID-19   b.       If experiencing symptoms such as: Fever, cough or shortness of breath contact BMT at [phone] Mon-Fri 8am-4:30pm or After Hours at [phone] (ask to speak to a BMT Fellow) for guidance on need for clinical assessment  c.      Avoid all non- essential travel at this time; if traveling is necessary use mask (N-95)   d.    Wear a mask when in public areas  d.        Avoid crowded places, if possible  f.        Follow CDC advice https://www.cdc.gov/coronavirus/2019-ncov/index.html and travel guidelines https://www.cdc.gov/coronavirus/2019-ncov/travelers/index.html      Oncology Treatment History:     Diagnosis [facility] Non-Hodgkin lymphoma, Mantle cell/intermediate differentiation  HCT Type Autologous    Prep Regimen BCNU  Agueda-C  Etoposide  Melphalan   Donor Source No data was found    GVHD Prophylaxis No  Clinical Trials none      Oncology Treatment History:  Theo Roblero is a 55 year old male with high risk (IPI 6.5 and Ki67 of 20%), stage IV Mantle cell lymphoma in CR1 s/p 8 cycles of alternating 6 cycles of Maxi-R- CHOP alternating with high-dose AGUEDA-C. Last cycle was given on 6/11/2020. His therapy was complicated by a kidney stone and sepsis in 5/2020, prior to cycle 5. Upon diagnosis, a CT was performed to evaluate pain concerning for an aortic dissection and showed LAD above and below the diaphragm, with large portacaval nodes measure in excess of 5cm. No thoracoabdominal aortic dissection or aneurysm was found.The findings of LAD were confirmed by a PET/CT. Lymph node biopsy on 2/7/2020 showed MCL, as described as CD20 positive B cells with positive staining for CD5 and cyclin D1.  The Ki-67 proliferation index was 20%. A bone marrow biopsy on 2/18/2020 was positive for intramedullary MCL (<5%). Early in his disease course, he reportedly developed a sepsis syndrome with leukocytosis (17.8) a creatinine 2.1 and was admitted on 2/20/2020. Kidney function has since improved.     He went on to receive Maxi-R- CHOP alternating with high-dose AGUEDA-C from 2-6/2020. Follow up imaging post therapy on 7/6/2020 showed a CR by PET CT, with resolution of avid lymph nodes. A persistent pleural effusion was observed and not PET avid.        Admitted for transplant today. Received palifermin D-9 through D-7. Feels a thick sensation in his mouth, but no rash. Went to the dentist and  had 7 teeth extracted about four weeks ago. Healing well. No residual pain. No recent fevers, cough or cold symptoms. Ready to start transplant process today    Treatment/Chemotherapy Number of Cycles Date Range Outcomes & Complications   [place] Regimen (Maxi-R-CHOP alternating high-dose Agueda-C)  - Cycle #1 CHOP 02/21/2020, presenting as TLS, received Rasburicase x 1  - Cycle #2 R+Agueda-c 03/12/2020  - Cycle #3 R-CHOP 04/02/2020  - Cycle #4 R+Agueda-C 04/23/2020  - Cycle #5 R-CHOP 05/20/2020, delayed due to urosepsis  - Cycle #6 R+Agueda-c 06/11/2020 February through June 2020 CR   Maintenance rituxan given due to delay in transplant 1 9/30/20           Bone Marrow Workup Results:  Blood Counts     Recent Labs   Lab Test 10/29/20  0825   WBC 48.1*   ANEU 38.9*   ALYM 1.3   CLAUDIA 5.4*   AEOS 0.8*   HGB 11.5*   HCT 35.0*   PLT 79*      Bone Marrow 10/13/20     Hypocellular bone marrow (10-20% cellular) with trilineage hematopoietic   maturation, and no morphologic or immunophenotypic evidence of B cell lymphoma.     Flow: There is no immunophenotypic evidence of B cell non-Hodgkin lymphoma. Final interpretation requires correlation with results of other ancillary studies, morphologic and clinical features.      FISH:  RESULTS:   NORMAL   - No evidence of IGH-CCND1 fusion       Blood Type     Recent Labs   Lab Test 10/13/20  1323   ABO AB      Chemistries     Recent Labs   Lab Test 10/29/20  0825      POTASSIUM 3.2*   CHLORIDE 101   CO2 30   BUN 10   CR 1.05      Liver Tests     Recent Labs   Lab Test 10/13/20  1323   BILITOTAL 0.4   ALKPHOS 87   AST 18   ALT 22      PET/CT: 8/27/20  IMPRESSION: In this patient with history of non-Hodgkin's lymphoma  status post chemotherapy:  1. Findings are compatible with complete metabolic response to  treatment by PET lugano criteria. No FDG avid lymph nodes are  identified.  2. Small left pleural effusion with associated compressive atelectasis  of the dependent portions of the  left lung. The lungs are otherwise  clear.      Chest X-Ray: 8/26/20     IMPRESSION:   1. Small left pleural effusion.  2. Bibasilar linear opacities, which may represent atelectasis, edema  and/or infection.      Chest/abdomen/pelvis CT 10/13/20     IMPRESSION:   1. Complete response by CT Lugano criteria.   2. No enlarged lymph nodes in the chest, abdomen, or pelvis. No  interval change in size of the spleen.  3. Continued decrease in size of the subcapsular splenic hematoma, and  unchanged small splenic infarcts.  4. Decrease small left pleural effusion.   PFTs FVC%      Recent Labs   Lab Test 08/31/20  1031   20003 94         FEV1%      Recent Labs   Lab Test 08/31/20  1031   20016 103         DLCO%      Recent Labs   Lab Test 08/31/20  1031   54264 108      ECHO or MUGA: ECHO: 8/2720     Interpretation Summary  Mildly (EF 45-50%) reduced left ventricular function is present. LVEF 50%  based on biplane 2D tracing. Grade I or early diastolic dysfunction.  The right ventricle is normal size. Global right ventricular function is  normal.  No significant valvular abnormalities.  There is no prior study for direct comparison.      EKG 8/26/20  Sinus rhythm   Serologies: CMV +, EBV +, HSV+      Vitamin D No results for input(s): VITDT in the last 49363 hours.        Annabel Holm

## 2020-11-10 NOTE — SUMMARY OF CARE
Discharge Medications     Yanick Roblero   Home Medication Instructions NICK:[id number]    Printed on:11/11/20 2852   Medication Information                      acyclovir (ZOVIRAX) 800 MG tablet  Take 1 tablet (800 mg) by mouth 2 times daily             Alcohol Swabs PADS  To clean skin before blood sugar checks and insulin injections             blood glucose (NO BRAND SPECIFIED) test strip  Use to test blood sugar 4 times daily before meals and bedtime             fluconazole (DIFLUCAN) 200 MG tablet  Take 1 tablet (200 mg) by mouth daily             heparin lock flush 10 UNIT/ML SOLN injection  5-10 mLs by Intracatheter route every 24 hours             insulin aspart (NOVOLOG PEN) 100 UNIT/ML pen  Inject four times daily with meals and at bedtime based on blood sugar readings as listed below:    Mealtime Scale  BG less than 140, none   - 164 = 1 unit.    - 189 = 2    - 214 = 3    - 239 = 4    - 264 = 5    - 289 = 6    - 314 = 7    - 339 = 8   BG over 340 = 9    Bedtime Scale  BG less than 200, none   - 224 = 1 unit.    - 249 = 2    - 274 = 3    - 299 = 4    - 324 = 5    - 349 = 6   BG over 350 = 7              insulin aspart (NOVOLOG PEN) 100 UNIT/ML pen  Inject 8 units with meals three times daily             insulin isophane human (HUMULIN N PEN) 100 UNIT/ML injection  Inject 22 units in the morning and 8 units in the evening             insulin pen needle (32G X 4 MM) 32G X 4 MM miscellaneous  Use 4 pen needles daily or as directed.             levofloxacin (LEVAQUIN) 250 MG tablet  Take 1 tablet (250 mg) by mouth daily             metoprolol succinate ER (TOPROL-XL) 25 MG 24 hr tablet  Take 1 tablet (25 mg) by mouth 2 times daily             nicotine (NICODERM CQ) 21 MG/24HR 24 hr patch  Place 1 patch onto the skin daily             ondansetron (ZOFRAN) 8 MG tablet  Take 1 tablet (8 mg) by mouth every 8 hours as needed for  nausea             pantoprazole (PROTONIX) 40 MG EC tablet  Take 1 tablet (40 mg) by mouth daily             prochlorperazine (COMPAZINE) 5 MG tablet  Take 1-2 tablets (5-10 mg) by mouth every 6 hours as needed for nausea or vomiting             Sharps Container MISC  1 Container once for used needles and syringes             sulfamethoxazole-trimethoprim (BACTRIM DS) 800-160 MG tablet  Take 1 tablet by mouth Every Mon, Tues two times daily   *DO NOT START until directed by your provider

## 2020-11-10 NOTE — PROGRESS NOTES
"BMT Progress Note    ID: Theo Roblero is a 55 year old male, currently day 0 of his autologous hematopoietic cell transplant.    HPI: Yanick is sitting up in his chair this morning. States he is feeling well without n/v/d. No swelling. Good appetite.    ROS: 8 point review with negative except as stated above in HPI    Physical Exam  /86   Pulse 67   Temp 97.8  F (36.6  C) (Oral)   Resp 20   Ht 1.78 m (5' 10.08\")   Wt 115.3 kg (254 lb 4.8 oz)   SpO2 97%   BMI 36.41 kg/m    General: NAD   Eyes: sclera anicteric   Nose/Mouth/Throat: OP clear, buccal mucosa moist, no ulcerations. S/p several recent molar extractions, all well healing (not examined 11/10 while he was eating), last examined by author 11/9.  Lungs: CTA bilaterally  Cardiovascular: RRR, no M/R/G   Abdominal/Rectal: +BS, soft, NT, ND, No HSM   Lymphatics: No edema  Skin: No rash on exposed skin  Neuro: A&O   Additional Findings: Forte site NT left chest wall    Lab  Lab Results   Component Value Date    WBC 5.0 11/10/2020    ANEU 4.7 11/10/2020    HGB 8.8 (L) 11/10/2020    HCT 26.7 (L) 11/10/2020     11/10/2020     11/10/2020    POTASSIUM 4.2 11/10/2020    CHLORIDE 108 11/10/2020    CO2 25 11/10/2020    GLC 83 11/10/2020    BUN 31 (H) 11/10/2020    CR 1.12 11/10/2020    MAG 2.0 11/10/2020    INR 1.04 11/09/2020    BILITOTAL 0.7 11/09/2020    AST 20 11/09/2020    ALT 33 11/09/2020    ALKPHOS 82 11/09/2020    PROTTOTAL 5.6 (L) 11/09/2020    ALBUMIN 2.9 (L) 11/09/2020     A/P:  ID: Theo Roblero is a 55 year old male with mantle cell lymphoma, admitted for auto BMT with BEAM prep, today is D0.     D-6 BCNU  D-5 though D-2 Agueda-C and -16 x 2  D-1 melphalan  D0 = transplant ( from melphalan by 24 hours)  Palifermin D0, D+1 and D+2     1.  BMT: Prep as above, Agueda-C and -16 x 2 and melphalan, allopurinol.   - Flush and pre-meds prior to transplant. Cell dose 4.3 million, collected with GCSf and mozobil  - GCSF starts " d+5 and cont to until ANC>2500 x2 consecutive days. Re-stage per protocol.     2.  HEME: Keep Hgb>7 and plts>10K. Premeds for blood products (no hx of reaction, but has received premeds in the past)  - plts and WBC in normal range.  Has anemia secondary to chemotherapy               3.  ID: afebrile.  - prophy Levo, remains on Fluc, and HD ACV (CMV+, HSV+, EBV+) prophy.   - Bactrim or appropriate PCP therapy to start d+28.                                             4.  GI:   - Zofran and dexamethasone prior to chemo to prevent N/V. Ativan and compazine available PRN break-through symptoms.   - Protonix for GI prophy.     5.  FEN/Renal:   - Monitor creat and lytes. Replete lytes PRN per SS.   - Monitor weight, I+O, lytes per protocol. Lasix as he has responded well (last 11/8) and BP slightly up, anticipating fluid with cells today     6. Endo  - Hx DM type II. On metformin and glimepiride on admission. Held both due to potential for n/v with prep; also glimepiride can inhibit engraftment  - BG checks QID with meals and bedtime.   - Endo/diabetes consult, appreciate recs. continue BID NPH: 34 units AM and 20 units PM                  -increased aspart from  1:4g to 1.35 CHO coverage with meals and snacks/supplements                 -continue aspart high intensity sliding scale TID AC and HS                 -BG monitoring TID AC, HS, 0200                 -PRN IV insulin ordered for BG >300 x2 consecutive checks.                 -for discharge will need to discuss Metformin resumption with BMT; do not want Glimepiride resumed     7. CV  - HTN: on metoprolol BID     8. Hx tobacco abuse, previously used chewing tobacco  - ordered prn nicotine patch; pt requests this for discharge as well.       Annabel Holm PAC  [phone]       Patient has been seen and evaluated by me. I have reviewed today's vital signs, medications, labs and imaging results independently. I have discussed the plan with the team and agree with the  findings and plan in this note.       57 years old male, day -0 of his autologous HSCT for mantle cell lymphoma.      Feels ok. No major complaints  On exam:  HEENT: PERRL, EOMI, MMM  Chest: CTAB  CVS: S1 S2 RRR  PA: soft, non tender  CNS: non focal     A/P:     57 years old day-0  for autologous HSCT for mantle cell lympoma  BEAM conditioning  HSCT today  Anti-emetics and supportive care as above    Transplant note: I was present during the beginning of the infusion and supervised the procedure - the patient tolerated the procedure well without complications    Gracia Martinez

## 2020-11-10 NOTE — PROCEDURES
BMT/Cellular Autologous Product Infusion         Patient Vitals for the past 24 hrs:   Temp Temp src Pulse Resp BP   11/09/20 1147 97  F (36.1  C) Axillary 65 18 (!) 142/76   11/09/20 1526 98.8  F (37.1  C) Axillary 68 18 (!) 147/81   11/09/20 2040 97.9  F (36.6  C) Oral 71 16 (!) 153/90   11/09/20 2358 97.7  F (36.5  C) Oral 69 16 (!) 147/83   11/10/20 0331 97.9  F (36.6  C) Oral 67 18 (!) 146/86   11/10/20 0737 97.8  F (36.6  C) Oral 67 20 138/86         BMT INFUSION DOCUMENTATION (last 48 hours)      BMT/Cellular Product Infusion     Row Name                  Product 11/10/20 0816 [facility]PC, Apheresis    Product Details Product Release Date: 11/10/20  -NB Product Release Time: 0816 -NB Product Type: HPC, Apheresis  -NB DIN: [id number]  -NB Product Description Code: [id number]  -NB Volume Dispensed (mL): 140 mL  -NB    Checked by (Patient RN)  --       Checked by (Witness)  --       Product Volume Infused (mL)  --       Flush Volume (mL)  --       Volume Dispensed (mL)  --          Product 11/10/20 0816 [facility]PC, Apheresis    Product Details Product Release Date: 11/10/20  -NB Product Release Time: 0816 -NB Product Type: HPC, Apheresis  -NB DIN: [id number]  -NB Product Description Code: [id number]  -NB Volume Dispensed (mL): 140 mL  -NB    Checked by (Patient RN)  --       Checked by (Witness)  --       Product Volume Infused (mL)  --       Flush Volume (mL)  --       Volume Dispensed (mL)  --          Product 11/10/20 0816 [facility], Apheresis    Product Details Product Release Date: 11/10/20  -NB Product Release Time: 0816 -NB Product Type: HPC, Apheresis  -NB DIN: [id number]  -NB Product Description Code: [id number]  -NB Volume Dispensed (mL): 72 mL  -NB    Checked by (Patient RN)  --       Checked by (Witness)  --       Product Volume Infused (mL)  --       Flush Volume (mL)  --       Volume Dispensed (mL)  --          Product 11/10/20 0816 [facility], Apheresis    Product Details Product Release Date: 11/10/20  -NB  Product Release Time: 0816 -NB Product Type: HPC, Apheresis  -NB DIN: [id number]  -NB Product Description Code: [id number]  -NB Volume Dispensed (mL): 72 mL  -NB    Checked by (Patient RN)  --       Checked by (Witness)  --       Product Volume Infused (mL)  --       Flush Volume (mL)  --       Volume Dispensed (mL)  --          Product 11/10/20 0816 [facility], Apheresis    Product Details Product Release Date: 11/10/20  -NB Product Release Time: 0816 -NB Product Type: HPC, Apheresis  -NB DIN: [id number]  -NB Product Description Code: [id number]  -NB Volume Dispensed (mL): 152 mL  -NB    Checked by (Patient RN)  --       Checked by (Witness)  --       Product Volume Infused (mL)  --       Flush Volume (mL)  --       Volume Dispensed (mL)  --         User Key  (r) = Recorded By, (t) = Taken By, (c) = Cosigned By    Initials Name Effective Dates    NB Maricarmen Perea ADARSH 04/29/14 -         Allogeneic Donor Eligibility Determination and Summary of Records: N/A - Autologous        Type of Infusion: Autologous      Baseline Pre-Infusion Evaluation (to be completed by Provider):   Dyspnea: Grade 0 - none  Hypoxia: Grade 0 - not present  Fever: Grade 0 - afebrile  Chills: Grade 0 - none  Febrile Neutropenia: Grade 0 - not present  Sinus Bradycardia: Grade 0 - none  Hypertension: Grade 2 - stage 1 hypertension (systolic -159 mm Hg or diastolic BP 90-99 mm Hg); medical intervention indicated; recurrent or persistent (>/ 24 hours); symptomatic increase by >/ 20 mm Hg (diastolic) or to > 140/90 mm Hg if previously WNL; monotherapy indicated  Hypotension: Grade 0 - none  Chest Pain: Grade 0 - none  Bronchospasm: Grade 0 - none  Pain: Grade 0 - none  Rash: Grade 0 - None  Neurologic Specify: none    If adverse reactions, events or complications occur (fever greater than 2 degrees fahrenheit increase, and severe reactions of the following types: chills, dyspnea, bronchospasm, hyper/hypotension, hypoxia, bradycardia, chest  pain, back/flank pain, hypoxia, and any other reaction deemed severe or life threatening; any instance of product bag breakage or unusual product appearance)    Any other events that are >= grade 3, then immediately contact the BMT Attending physician, the Cell Therapy Laboratory Medical Director (pager [phone]) and the Cell Therapy Laboratory ([phone]).  After midnight, holidays & weekends contact the [facility] on the appropriate campus ([facility] [place]: [phone]; [facility] [place]: [phone]).    Annabel Holm PA-C

## 2020-11-10 NOTE — DISCHARGE SUMMARY
[facility] Discharge Summary   Theo Roblero MRN# [id number]   Age: 55 year old  YOB: 1965   Date of Admission: 11/4/2020  Date of Discharge:  11/11/2020  Admitting Physician: Gracia Martinez MD  Discharge Physician: Gracia Martinez MD    Discharge Diagnoses:    1. S/p autologous transplant for Mantle Cell Lymphoma   2. Type II DM  3. Nicotine dependence  4. Hypertension  Discharge Medications:       Yanick Roblero   Home Medication Instructions NICK:[id number]    Printed on:11/11/20 1113   Medication Information                      acyclovir (ZOVIRAX) 800 MG tablet  Take 1 tablet (800 mg) by mouth 2 times daily             Alcohol Swabs PADS  1 pad 4 times daily (before meals and nightly)             blood glucose (NO BRAND SPECIFIED) test strip  Use to test blood sugar 4 times daily or as directed.             fluconazole (DIFLUCAN) 200 MG tablet  Take 1 tablet (200 mg) by mouth daily             heparin lock flush 10 UNIT/ML SOLN injection  5-10 mLs by Intracatheter route every 24 hours             insulin aspart (NOVOLOG PEN) 100 UNIT/ML pen  Inject 8 units with meals three times daily, as well as sliding scale four times daily with meals and at bedtime             insulin isophane human (HUMULIN N PEN) 100 UNIT/ML injection  Inject 22 units in the morning and 8 units in the evening             insulin pen needle (32G X 4 MM) 32G X 4 MM miscellaneous  Use 4 pen needles daily or as directed.             levofloxacin (LEVAQUIN) 250 MG tablet  Take 1 tablet (250 mg) by mouth daily             metoprolol succinate ER (TOPROL-XL) 25 MG 24 hr tablet  Take 1 tablet (25 mg) by mouth 2 times daily             nicotine (NICODERM CQ) 21 MG/24HR 24 hr patch  Place 1 patch onto the skin daily             ondansetron (ZOFRAN) 8 MG tablet  Take 1 tablet (8 mg) by mouth every 8 hours as needed for nausea             pantoprazole (PROTONIX) 40 MG EC tablet  Take 1 tablet (40 mg) by mouth daily              prochlorperazine (COMPAZINE) 5 MG tablet  Take 1-2 tablets (5-10 mg) by mouth every 6 hours as needed for nausea or vomiting             Sharps Container MISC  1 Container once for 1 dose             sulfamethoxazole-trimethoprim (BACTRIM DS) 800-160 MG tablet  Take 1 tablet by mouth Every Mon, Tues two times daily   - DO NOT START until directed by your provider                 Brief History of Illness:    Oncology Treatment History:  Theo Roblero is a 55 year old male with high risk (IPI 6.5 and Ki67 of 20%), stage IV Mantle cell lymphoma in CR1 s/p 8 cycles of alternating 6 cycles of Maxi-R- CHOP alternating with high-dose LITZY-C. Last cycle was given on 6/11/2020. His therapy was complicated by a kidney stone and sepsis in 5/2020, prior to cycle 5. Upon diagnosis, a CT was performed to evaluate pain concerning for an aortic dissection and showed LAD above and below the diaphragm, with large portacaval nodes measure in excess of 5cm. No thoracoabdominal aortic dissection or aneurysm was found.The findings of LAD were confirmed by a PET/CT. Lymph node biopsy on 2/7/2020 showed MCL, as described as CD20 positive B cells with positive staining for CD5 and cyclin D1.  The Ki-67 proliferation index was 20%. A bone marrow biopsy on 2/18/2020 was positive for intramedullary MCL (<5%). Early in his disease course, he reportedly developed a sepsis syndrome with leukocytosis (17.8) a creatinine 2.1 and was admitted on 2/20/2020. Kidney function has since improved.     He went on to receive Maxi-R- CHOP alternating with high-dose LITZY-C from 2-6/2020. Follow up imaging post therapy on 7/6/2020 showed a CR by PET CT, with resolution of avid lymph nodes. A persistent pleural effusion was observed and not PET avid.        Admitted for transplant today. Received palifermin D-9 through D-7. Feels a thick sensation in his mouth, but no rash. Went to the dentist and had 7 teeth extracted about four weeks ago. Healing well.  No residual pain. No recent fevers, cough or cold symptoms. Ready to start transplant process today      Hospital Course:    Yanick was admitted on 11/4/2020 for his autologous transplant for Mantle Cell Lymphoma. He was given a regimen of BEAM prep (BCNU, Agueda-C and -16x2). His blood sugars were not adequately managed as he was taken off of his oral metformin and glipizide medications. He is now well controlled on insulin and managed by endocrinology. He will discharge with new diabetic and insulin teaching. He has a history of HTN, he continues on metoprolol. He received two doses lasix during his stay which he tolerated well.  His transplant 11/10/2020 went without complications, final post transfusion toxicity evaluation to be done 11/11/2020.     CODE STATUS: full code  Discharge Instructions and Follow-Up:    Discharge diet: Regular diet as tolerated  Discharge activity: Activity as tolerated   Discharge follow-up:   1. Thursday, 11/12 check in at 11:45am for labs and 12:30 provider visit and pharmacist appt for medication review, palifermin dose    Discharge Disposition:    Discharged to home.    Annabel Holm PAC  [phone]

## 2020-11-10 NOTE — PROGRESS NOTES
"IP Diabetes Management  Daily Note           Assessment and Plan:   HPI: Theo \" Don\" Osbaldo is a 55 year old male with history of type 2 diabetes, hypertension, hyperlipidemia, stage IV mantle cell lymphoma admitted (11/4/2020) for autologous hematopoietic cell transplant.    Assessment:   1)Type II Diabetes Mellitus, with steroid induced hyperglycemia    - initiated BMT protocol (Weight 114 kg on admission, GFR 88), then deviated from protocol with need for addition of mealtime insulin.     Plan:    -reduce AM NPH from 34 to 28 units daily AM   -reduce PM NPH from 20 to 12 units daily PM   -Continue aspart 1: 3.5 g CHO coverage with lunch   -Continue aspart 1: 4 g CHO coverage with breakfast, dinner and snacks/supplements   -aspart custom  2 per 30 correction TID AC   -BG monitoring TID AC, HS, 0200   -hypoglycemia protocol   -carb counting protocol   -anticipate diabetes education will be needed for insulin teaching on discharge (temporary until PO meds can be resumed following transplant)    Recommendation for Discharge: tentative, doses TBD tomorrow    -holding PTA Metformin and Glimepiride post transplant.   -NPH qAM (tentatively 25 units)   -NPH qPM (tentatively 10 units)   -Novolog fixed dose with each meal (tentatively 8 units)   -Novolog high sliding scale insulin AC/HS (lower scale than we are using here for safety)    Outpatient follow up: with [facility], will send appointment request to follow up with me next week Monday for further review of BG.     Interval History and Assessment: interval glucose trend reviewed:  BG remain >200 during the day yesterday, partially related to addition of Melphalan with D5 flush.     Improving to low end of target overnight, with discontinuation of dextrose fluids at 2100 and tapering steroids.   Prandial needs remaining high despite tapering of steroids.     Transplant today.  Remains on Levaquin prophylaxis.     Don feels well so far after transplant. "   Eating a little more here than he normally would at home  Aware for tentative insulin plan on discharge; CDE/PLC planning to perform education tomorrow AM.    Current nutritional intake and type: Orders Placed This Encounter      High Kcal/High Protein Diet, ADULT    Steroid planning:   Dexamethasone 10 mg daily x 4 doses ( first dose 11/4)  Dexamethasone 8 mg daily ( 11/8)  Dexamethasone 6 mg ( 11/9)  Dexamethasone 4 mg ( 11/10).     D5W-containing solutions:   -Carmustine x1 on 11/4  -Cytarabine IV q12    PTA Diabetes Regimen:   Metformin 500 mg tablets, 1000 mg bid  Glimepiride 4 mg daily AM.      Discharge Planning: home tomorrow AM, 1100           Diabetes History:   Type of Diabetes: Type 2 Diabetes Mellitus, Steroid Induced Diabetes Mellitus  No results found for: A1C           Review of Systems:     The Review of Systems is negative other than noted in the Interval History.           Medications:     Current Facility-Administered Medications   Medication     0.9% sodium chloride + KCl 20 mEq/L infusion     acetaminophen (TYLENOL) tablet 325-650 mg     acetaminophen (TYLENOL) tablet 325-650 mg     acetaminophen (TYLENOL) tablet 325-650 mg     acetaminophen (TYLENOL) tablet 650 mg     acyclovir (ZOVIRAX) tablet 800 mg     ceFEPIme (MAXIPIME) 2 g vial to attach to  ml bag for ADULTS or 50 ml bag for PEDS     [START ON 11/15/2020] dextrose 5 % flush PRE/POST medication     [START ON 11/15/2020] dextrose 5 % flush PRE/POST medication     glucose gel 15-30 g    Or     dextrose 50 % injection 25-50 mL    Or     glucagon injection 1 mg     diphenhydrAMINE (BENADRYL) capsule 25 mg     diphenhydrAMINE (BENADRYL) capsule 25 mg     [START ON 11/15/2020] filgrastim (NEUPOGEN) 480 mcg in D5W 25 mL infusion     fluconazole (DIFLUCAN) tablet 200 mg     heparin 100 UNIT/ML injection 500 Units     heparin lock flush 10 UNIT/ML injection 5-10 mL     heparin lock flush 10 UNIT/ML injection 5-10 mL     insulin 1 unit/mL  "in saline (NovoLIN, HumuLIN Regular) drip - ADULT IV Infusion     insulin aspart (NovoLOG) injection (RAPID ACTING)     insulin aspart (NovoLOG) injection (RAPID ACTING)     insulin aspart (NovoLOG) injection (RAPID ACTING)     insulin aspart (NovoLOG) injection (RAPID ACTING)     insulin aspart (NovoLOG) injection (RAPID ACTING)     insulin aspart (NovoLOG) injection (RAPID ACTING)     insulin isophane human (HumuLIN N PEN) injection 15 Units     insulin isophane human (HumuLIN N PEN) injection 28 Units     levofloxacin (LEVAQUIN) tablet 250 mg     lidocaine (LMX4) cream     lidocaine 1 % 0.1-1 mL     LORazepam (ATIVAN) injection 0.5-1 mg    Or     LORazepam (ATIVAN) tablet 0.5-1 mg     meperidine (DEMEROL) injection 25-50 mg     metoprolol succinate ER (TOPROL-XL) 24 hr tablet 25 mg     nicotine (NICODERM CQ) 21 MG/24HR 24 hr patch 1 patch     nicotine Patch in Place     ondansetron (ZOFRAN) tablet 8 mg     pantoprazole (PROTONIX) EC tablet 40 mg     prochlorperazine (COMPAZINE) injection 5-10 mg    Or     prochlorperazine (COMPAZINE) tablet 5-10 mg     sodium chloride (PF) 0.9% PF flush 10-20 mL     [START ON 12/8/2020] sulfamethoxazole-trimethoprim (BACTRIM DS) 800-160 MG per tablet 1 tablet            Physical Exam:    /86   Pulse 67   Temp 97.8  F (36.6  C) (Oral)   Resp 20   Ht 1.78 m (5' 10.08\")   Wt 115.3 kg (254 lb 4.8 oz)   SpO2 97%   BMI 36.41 kg/m    General: pleasant, in no distress over the phone.   Lungs: unlabored respiration, no cough  Mental status:  alert, oriented to self, place, time  Psych:  affect, calm and appropriate interaction   Remainder of physical exam not able to be completed 2/2 COVID precaution, social distancing           Data:     Recent Labs   Lab 11/10/20  1154 11/10/20  0745 11/10/20  0335 11/09/20  2200 11/09/20  1822 11/09/20  1236 11/09/20  0744 11/09/20  0348 11/08/20  0342 11/08/20  0342 11/07/20  0310 11/07/20  0310 11/06/20  0222 11/06/20  0222 " 11/05/20  0411 11/05/20  0411   GLC  --   --  83  --   --   --   --  108*  --  130*  --  116*  --  150*  --  208*   * 107*  --  143* 247* 239* 149*  --    < >  --    < >  --    < >  --    < >  --     < > = values in this interval not displayed.     Lab Results   Component Value Date    WBC 5.0 11/10/2020    WBC 5.7 11/09/2020    WBC 5.7 11/08/2020    HGB 8.8 (L) 11/10/2020    HGB 9.0 (L) 11/09/2020    HGB 8.6 (L) 11/08/2020    HCT 26.7 (L) 11/10/2020    HCT 26.2 (L) 11/09/2020    HCT 26.3 (L) 11/08/2020    MCV 92 11/10/2020    MCV 91 11/09/2020    MCV 94 11/08/2020     11/10/2020     11/09/2020     11/08/2020     Lab Results   Component Value Date     11/10/2020     11/09/2020     11/08/2020    POTASSIUM 4.2 11/10/2020    POTASSIUM 4.3 11/09/2020    POTASSIUM 4.5 11/08/2020    CHLORIDE 108 11/10/2020    CHLORIDE 108 11/09/2020    CHLORIDE 110 (H) 11/08/2020    CO2 25 11/10/2020    CO2 24 11/09/2020    CO2 23 11/08/2020    GLC 83 11/10/2020     (H) 11/09/2020     (H) 11/08/2020     Lab Results   Component Value Date    BUN 31 (H) 11/10/2020    BUN 31 (H) 11/09/2020    BUN 31 (H) 11/08/2020     Lab Results   Component Value Date    TSH 2.17 08/26/2020     Lab Results   Component Value Date    AST 20 11/09/2020    AST 12 11/04/2020    AST 18 10/13/2020    ALT 33 11/09/2020    ALT 19 11/04/2020    ALT 22 10/13/2020    ALKPHOS 82 11/09/2020    ALKPHOS 131 11/04/2020    ALKPHOS 87 10/13/2020     Follow up was done today via phone encounter, due to high risk patients implementing social distancing due to COVID 19.     I spent a total of 10 minutes via telephone with patient, and  and 20 minutes chart review/coordination with bedside nurse and primary team managing glycemic care. Over 50% of my time was spent counseling the patient and/or coordinating care regarding acute hyperglycemia management.  See note for details.    Prisca Corley PA-C  Diabetes  Management Service  Pager [phone]  Job code pager [phone]

## 2020-11-10 NOTE — PLAN OF CARE
Afebrile, VSS. Denies pain and N/V/D. Pt sleeping between cares, 2g Mg replaced this morning. Plan for transplant at 1230, 5 bags.       Problem: Adult Inpatient Plan of Care  Goal: Plan of Care Review  Outcome: No Change     Problem: Adult Inpatient Plan of Care  Goal: Patient-Specific Goal (Individualized)  Outcome: No Change     Problem: Adult Inpatient Plan of Care  Goal: Absence of Hospital-Acquired Illness or Injury  Outcome: No Change     Problem: Adult Inpatient Plan of Care  Goal: Absence of Hospital-Acquired Illness or Injury  Intervention: Identify and Manage Fall Risk  Recent Flowsheet Documentation  Taken 11/9/2020 2100 by Мария Pennington, RN  Safety Promotion/Fall Prevention: assistive device/personal items within reach     Problem: Adult Inpatient Plan of Care  Goal: Absence of Hospital-Acquired Illness or Injury  Intervention: Prevent Skin Injury  Recent Flowsheet Documentation  Taken 11/9/2020 2100 by Мария Pennington, RN  Body Position: position changed independently

## 2020-11-11 PROBLEM — Z94.81 S/P BONE MARROW TRANSPLANT (H): Status: ACTIVE | Noted: 2020-01-01

## 2020-11-11 NOTE — PROGRESS NOTES
Care Coordination - Discharge Note     Line Company:  [facility] [phone] for line care supplies   Referral made for line care:  Yes   IV medications needed at discharge:  None   Pharmacy concerns:  None. (Of note, [facility] requires prior auth)     PT/OT recommended:  No  Referral made for PT/OT:  NA    D/c location:  Home [facility]  Has placement need been communicated to Social Work?  NA    NC Teaching time arranged with family:  Completed 11/9, via phone call with pt  Notify nurse to schedule line care class/ DM teaching, prior to d/c:  Patient and caregiver previously received teaching, and decline further need     Caregiver:  Carroll Roblero (brother) - Primary    Hayde (pt's fiance) - Secondary    Outpatient Nurse Coordinator notified of patient discharge.       Luciana Andrade, RN, BSN  RN Care Coordinator - Inpatient BMT, Unit [address]  Ph [phone]   [phone]

## 2020-11-11 NOTE — PROGRESS NOTES
"IP Diabetes Management  Daily Note           Assessment and Plan:   HPI: Theo \" Don\" Osbaldo is a 55 year old male with history of type 2 diabetes, hypertension, hyperlipidemia, stage IV mantle cell lymphoma admitted (11/4/2020) for autologous hematopoietic cell transplant.    Assessment:   1)Type II Diabetes Mellitus, with steroid induced hyperglycemia    - initiated BMT protocol (Weight 114 kg on admission, GFR 88), then deviated from protocol with need for addition of mealtime insulin.     Recommendation for Discharge: placed in discharge AVS, reviewed with patient.   -holding PTA Metformin and Glimepiride post transplant.   -NPH 22 units qAM    -NPH 8 units qPM   -Novolog fixed dose of 8 units with each meal   -Novolog high intensity sliding scale insulin AC/HS (lower scale than we are using here for safety):    Mealtime Scale, to be given based on blood sugar obtained prior to a meal  For Pre-Meal BG less than 140, no additional insulin needed  For Pre-Meal  - 164 give 1 unit.   For Pre-Meal  - 189 give 2 units.   For Pre-Meal  - 214 give 3 units.   For Pre-Meal  - 239 give 4 units.   For Pre-Meal  - 264 give 5 units.   For Pre-Meal  - 289 give 6 units.   For Pre-Meal  - 314 give 7 units.   For Pre-Meal  - 339 give 8 units.   For Pre-Meal BG greater than 340 give 9 units.     Bedtime Scale, to be given based on pre-bedtime blood sugar  For Bedtime BG less than 200, no additional insulin needed  For  - 224 give 1 unit.   For  - 249 give 2 units.   For  - 274 give 3 units.   For  - 299 give 4 units.   For  - 324 give 5 units.   For  - 349 give 6 units.   For BG greater than 350 give 7 units.       Outpatient follow up: with [facility], will send appointment request to follow up virtually this Friday or Monday.     Interval History and Assessment: interval glucose trend reviewed:  Fasting BG remaining excellently " controlled with steroid effect waning.   Yanick is happy to be discharging today. He received insulin teaching and has no questions.   Discussed dose reductions for safety, with waning steroid effect on discharge. Yanick confirmed he will be eating much less at home, so mealtime aspart was conservatively dosed as well.     He is aware of endo on call number, in case there are questions. He is agreeable to follow up virtually within 1 week.     Current nutritional intake and type: Orders Placed This Encounter      High Kcal/High Protein Diet, ADULT    Steroid planning:   Dexamethasone 10 mg daily x 4 doses ( first dose 11/4)  Dexamethasone 8 mg daily ( 11/8)  Dexamethasone 6 mg ( 11/9)  Dexamethasone 4 mg ( 11/10).     D5W-containing solutions:   -Carmustine x1 on 11/4  -Cytarabine IV q12    PTA Diabetes Regimen:   Metformin 500 mg tablets, 1000 mg bid  Glimepiride 4 mg daily AM.      Discharge Planning: home today.           Diabetes History:   Type of Diabetes: Type 2 Diabetes Mellitus, Steroid Induced Diabetes Mellitus  No results found for: A1C           Review of Systems:     The Review of Systems is negative other than noted in the Interval History.           Medications:     Current Facility-Administered Medications   Medication     acetaminophen (TYLENOL) tablet 325-650 mg     acetaminophen (TYLENOL) tablet 325-650 mg     acetaminophen (TYLENOL) tablet 325-650 mg     acyclovir (ZOVIRAX) tablet 800 mg     ceFEPIme (MAXIPIME) 2 g vial to attach to  ml bag for ADULTS or 50 ml bag for PEDS     [START ON 11/15/2020] dextrose 5 % flush PRE/POST medication     [START ON 11/15/2020] dextrose 5 % flush PRE/POST medication     glucose gel 15-30 g    Or     dextrose 50 % injection 25-50 mL    Or     glucagon injection 1 mg     diphenhydrAMINE (BENADRYL) capsule 25 mg     [START ON 11/15/2020] filgrastim (NEUPOGEN) 480 mcg in D5W 25 mL infusion     fluconazole (DIFLUCAN) tablet 200 mg     heparin 100 UNIT/ML injection 5  "mL     heparin 100 UNIT/ML injection 500 Units     heparin lock flush 10 UNIT/ML injection 5 mL     heparin lock flush 10 UNIT/ML injection 5-10 mL     heparin lock flush 10 UNIT/ML injection 5-10 mL     insulin 1 unit/mL in saline (NovoLIN, HumuLIN Regular) drip - ADULT IV Infusion     insulin aspart (NovoLOG) injection (RAPID ACTING)     insulin aspart (NovoLOG) injection (RAPID ACTING)     insulin aspart (NovoLOG) injection (RAPID ACTING)     insulin aspart (NovoLOG) injection (RAPID ACTING)     insulin aspart (NovoLOG) injection (RAPID ACTING)     insulin aspart (NovoLOG) injection (RAPID ACTING)     insulin isophane human (HumuLIN N PEN) injection 12 Units     insulin isophane human (HumuLIN N PEN) injection 28 Units     levofloxacin (LEVAQUIN) tablet 250 mg     lidocaine (LMX4) cream     lidocaine 1 % 0.1-1 mL     LORazepam (ATIVAN) injection 0.5-1 mg    Or     LORazepam (ATIVAN) tablet 0.5-1 mg     meperidine (DEMEROL) injection 25-50 mg     metoprolol succinate ER (TOPROL-XL) 24 hr tablet 25 mg     nicotine (NICODERM CQ) 21 MG/24HR 24 hr patch 1 patch     nicotine Patch in Place     palifermin (KEPIVANCE) injection 6,700 mcg     pantoprazole (PROTONIX) EC tablet 40 mg     prochlorperazine (COMPAZINE) injection 5-10 mg    Or     prochlorperazine (COMPAZINE) tablet 5-10 mg     sodium chloride (PF) 0.9% PF flush 10-20 mL     sodium chloride (PF) 0.9% PF flush 3-20 mL     [START ON 12/8/2020] sulfamethoxazole-trimethoprim (BACTRIM DS) 800-160 MG per tablet 1 tablet            Physical Exam:    /81 (BP Location: Left arm)   Pulse 68   Temp 97.9  F (36.6  C) (Oral)   Resp 18   Ht 1.78 m (5' 10.08\")   Wt 115.3 kg (254 lb 4.8 oz)   SpO2 99%   BMI 36.41 kg/m    General: pleasant, in no distress. Sitting up in chair, dressed for discharge    HEENT: normocephalic, atraumatic. Oral mucous membranes moist.   Lungs: unlabored respiration, no cough  ABD: rounded, soft, no lipodystrophy noted  Skin: warm and " dry, no obvious lesions  MSK:  moves all extremities  Lymp:  no LE edema   Mental status:  alert, oriented to self, place, time  Psych:  affect, calm and appropriate interaction           Data:     Recent Labs   Lab 11/11/20  0411 11/10/20  2120 11/10/20  1746 11/10/20  1154 11/10/20  0745 11/10/20  0335 11/09/20  2200 11/09/20  1822 11/09/20  0348 11/09/20  0348 11/08/20  0342 11/08/20  0342 11/07/20  0310 11/07/20  0310 11/06/20  0222 11/06/20  0222   GLC 93  --   --   --   --  83  --   --   --  108*  --  130*  --  116*  --  150*   BGM  --  190* 163* 221* 107*  --  143* 247*   < >  --    < >  --    < >  --    < >  --     < > = values in this interval not displayed.     Lab Results   Component Value Date    WBC 6.9 11/11/2020    WBC 5.0 11/10/2020    WBC 5.7 11/09/2020    HGB 8.6 (L) 11/11/2020    HGB 8.8 (L) 11/10/2020    HGB 9.0 (L) 11/09/2020    HCT 25.3 (L) 11/11/2020    HCT 26.7 (L) 11/10/2020    HCT 26.2 (L) 11/09/2020    MCV 92 11/11/2020    MCV 92 11/10/2020    MCV 91 11/09/2020     11/11/2020     11/10/2020     11/09/2020     Lab Results   Component Value Date     11/11/2020     11/10/2020     11/09/2020    POTASSIUM 4.4 11/11/2020    POTASSIUM 4.2 11/10/2020    POTASSIUM 4.3 11/09/2020    CHLORIDE 107 11/11/2020    CHLORIDE 108 11/10/2020    CHLORIDE 108 11/09/2020    CO2 25 11/11/2020    CO2 25 11/10/2020    CO2 24 11/09/2020    GLC 93 11/11/2020    GLC 83 11/10/2020     (H) 11/09/2020     Lab Results   Component Value Date    BUN 32 (H) 11/11/2020    BUN 31 (H) 11/10/2020    BUN 31 (H) 11/09/2020     Lab Results   Component Value Date    TSH 2.17 08/26/2020     Lab Results   Component Value Date    AST 47 (H) 11/11/2020    AST 20 11/09/2020    AST 12 11/04/2020    ALT 58 11/11/2020    ALT 33 11/09/2020    ALT 19 11/04/2020    ALKPHOS 85 11/11/2020    ALKPHOS 82 11/09/2020    ALKPHOS 131 11/04/2020       To contact Endocrine Diabetes service:   From 8AM-4PM:  page inpatient diabetes provider that is following the patient  For questions or updates from 4PM-8AM: page the diabetes job code for on call fellow: [phone]    I spent a total of 35 minutes bedside and on the inpatient unit managing glycemic care. Over 50% of my time on the unit was spent counseling the patient and/or coordinating care regarding acute hyperglycemia management.  See note for details.    Prisca Corley PA-C  Inpatient Diabetes Management Service  Pager [phone]

## 2020-11-11 NOTE — PROGRESS NOTES
"BMT Progress Note    ID: Theo Roblero is a 55 year old male, currently day +1 of his autologous hematopoietic cell transplant.    HPI: Transplant went well yesterday. He has no medical concerns today. Good urine output yesterday. Plan to discharge to home with his brother, will be staying in [place]. Has a home glucometer, but hasn't done insulin before.     ROS: 8 point review with negative except as stated above in HPI    Physical Exam  BP (!) 147/90 (BP Location: Left arm)   Pulse 64   Temp 98.4  F (36.9  C) (Oral)   Resp 18   Ht 1.78 m (5' 10.08\")   Wt 115.5 kg (254 lb 9.6 oz)   SpO2 100%   BMI 36.45 kg/m    General: NAD   Eyes: sclera anicteric   Nose/Mouth/Throat: OP clear, buccal mucosa moist, no ulcerations. S/p several recent molar extractions, all well healing   Lungs: CTA bilaterally  Cardiovascular: RRR, no M/R/G   Abdominal/Rectal: +BS, soft, NT, ND, No HSM   Lymphatics: No edema  Skin: No rash on exposed skin  Neuro: A&O   Additional Findings: Forte site NT left chest wall    Lab  Lab Results   Component Value Date    WBC 6.9 11/11/2020    ANEU 6.4 11/11/2020    HGB 8.6 (L) 11/11/2020    HCT 25.3 (L) 11/11/2020     11/11/2020     11/11/2020    POTASSIUM 4.4 11/11/2020    CHLORIDE 107 11/11/2020    CO2 25 11/11/2020    GLC 93 11/11/2020    BUN 32 (H) 11/11/2020    CR 1.03 11/11/2020    MAG 2.2 11/11/2020    INR 1.04 11/09/2020    BILITOTAL 0.4 11/11/2020    AST 47 (H) 11/11/2020    ALT 58 11/11/2020    ALKPHOS 85 11/11/2020    PROTTOTAL 5.3 (L) 11/11/2020    ALBUMIN 2.8 (L) 11/11/2020     A/P:  ID: Theo Roblero is a 55 year old male with mantle cell lymphoma, admitted for auto BMT with BEAM prep, today is D+1.     1.  BMT:   - BEAM prep, tolerated well  - Palifermin D0, D+1 and D+2 - give palifermin again today before discharge, then tomorrow last dose in clinic  - Flush and pre-meds prior to transplant. Cell dose 4.3 million, collected with GCSf and mozobil  - GCSF " starts d+5 (11/15) and cont to until ANC>2500 x2 consecutive days. Re-stage per protocol.     2.  HEME: Keep Hgb>7 and plts>10K. Premeds for blood products (no hx of reaction, but has received premeds in the past)  - pancytopenia due to chemotherapy               3.  ID: afebrile.  - prophy Levo, Fluc, and HD ACV (CMV+, HSV+, EBV+) prophy.   - Bactrim or appropriate PCP therapy to start d+28.                                             4.  GI:   - Zofran and dexamethasone prior to chemo to prevent N/V. Ativan and compazine available PRN break-through symptoms. Sent home with prn zofran and compazine  - Protonix for GI prophy.     5.  FEN/Renal:   - Cr and lytes stable  - weight is near admit     6. Endo  - Hx DM type II. On metformin and glimepiride on admission. Held both due to potential for n/v with prep; also glimepiride can inhibit engraftment  - BG checks QID with meals and bedtime.   - Endocrine following. Teaching complete. Has home glucometer. Refilled test strips/syringes/alcohol pads/sharps container at discharge  - NPH 22 units qam and 8 units qpm  - fixed mealtime novolog 8 units  - high sliding scale with meals and bedtime  - after engraftment, can transition back to home regimen    7. CV  - HTN: on metoprolol BID     8. Hx tobacco abuse, previously used chewing tobacco  - ordered prn nicotine patch; pt requests this for discharge as well.     Dispo: discharge to [place] today with his brother. Clinic follow up tomorrow with labs, 60 min provider visit, pharmD med review and last dose jeremy Medrano PA-C  [phone]         Patient has been seen and evaluated by me. I have reviewed today's vital signs, medications, labs and imaging results independently. I have discussed the plan with the team and agree with the findings and plan in this note.       57 years old male, day +1 of his autologous HSCT for mantle cell lymphoma.      Feels ok. No major complaints  On exam:  HEENT: PERRL, EOMI,  MMM  Chest: CTAB  CVS: S1 S2 RRR  PA: soft, non tender  CNS: non focal     A/P:     57 years old day +1  for autologous HSCT for mantle cell lympoma  Doing Ok- will be discharged today      Gracia Martinez

## 2020-11-11 NOTE — PROGRESS NOTES
"Discharge SBAR:    Situation:  Theo Roblero is a 55 year old being discharged to: Home  Admission reason: Scheduled Admission  Is this a readmission? No    Background:  Primary diagnosis: Mantle Cell Lymphona  BP (!) 147/90 (BP Location: Left arm)   Pulse 64   Temp 98.4  F (36.9  C) (Oral)   Resp 18   Ht 1.78 m (5' 10.08\")   Wt 115.5 kg (254 lb 9.6 oz)   SpO2 100%   BMI 36.45 kg/m    Type of donor: Autologous  Type of stem cells: PBSC  Relapsed? No  Falls Precautions? No  Isolation? No  DNR? No  DNI? No  Confidential Patient? No  Positive blood cultures? No    Assessment:  Discharge teaching    Review discharge medications and schedule: Yes    Set up pill box: No    Discharge instructions reviewed: Yes    Special considerations (think about previous reactions, issues with flushing CVC, premedication needs, etc): patient is new to insulin     Patient Concerns: No    Recommendations:  Anticipated needs:    Daily infusions: No    Daily transfusions: No    G-CSF: No    Other: Not Applicable  Verbal report called to clinic: No      "

## 2020-11-11 NOTE — PROGRESS NOTES
Type of transplant: Donor: Autologous  Product:      BMT INFUSION DOCUMENTATION (last 48 hours)      BMT/Cellular Product Infusion     Row Name 11/10/20 0600                [REMOVED] Product 11/10/20 0816 HPC, Apheresis    Product Details Product Release Date: 11/10/20  -NB Product Release Time: 0816 -NB Product Type: HPC, Apheresis  -NB DIN: [id number]  -NB Product Description Code: [id number]  -NB Volume Dispensed (mL): 140 mL  -NB Completion Date (RN to complete): 11/10/20  -AK Completion Time (RN to complete): 1334  -AK    Checked by (Patient RN)  RUBÉN Fabian RN  -AK       Checked by (Witness)  JESSENIA/DANIEL BOWMAN       Product Volume Infused (mL)  140 mL  -AK       Flush Volume (mL)  50 mL  -AK       Volume Dispensed (mL)  --          [REMOVED] Product 11/10/20 0816 HPC, Apheresis    Product Details Product Release Date: 11/10/20  -NB Product Release Time: 0816 -NB Product Type: HPC, Apheresis  -NB DIN: [id number]  -NB Product Description Code: [id number]  -NB Volume Dispensed (mL): 140 mL  -NB Completion Date (RN to complete): 11/10/20  -AK Completion Time (RN to complete): 1349  -AK    Checked by (Patient RN)  RUBÉN Fabian RN  -AK       Checked by (Witness)  JESSENIA/DANIEL BOWMAN       Product Volume Infused (mL)  140 mL  -AK       Flush Volume (mL)  50 mL  -AK       Volume Dispensed (mL)  --          [REMOVED] Product 11/10/20 0816 HPC, Apheresis    Product Details Product Release Date: 11/10/20  -NB Product Release Time: 0816 -NB Product Type: HPC, Apheresis  -NB DIN: [id number]  -NB Product Description Code: [id number]  -NB Volume Dispensed (mL): 72 mL  -NB Completion Date (RN to complete): 11/10/20  -AK Completion Time (RN to complete): 1254  -AK    Checked by (Patient RN)  RUBÉN Fabian RN  -AK       Checked by (Witness)  JESSENIA/DANIEL BOWMAN       Product Volume Infused (mL)  72 mL  -AK       Flush Volume (mL)  50 mL  -AK       Volume Dispensed (mL)  --          [REMOVED] Product 11/10/20 0882 HPC, Apheresis    Product Details  Product Release Date: 11/10/20  -NB Product Release Time: 0816 -NB Product Type: HPC, Apheresis  -NB DIN: [id number]  -NB Product Description Code: [id number]  -NB Volume Dispensed (mL): 72 mL  -NB Completion Date (RN to complete): 11/10/20  -AK Completion Time (RN to complete): 1313  -AK    Checked by (Patient RN)  RUBÉN Fabian RN  -AK       Checked by (Witness)  DEEP BOWMAN       Product Volume Infused (mL)  72 mL  -AK       Flush Volume (mL)  60 mL  -AK       Volume Dispensed (mL)  --          [REMOVED] Product 11/10/20 0816 HPC, Apheresis    Product Details Product Release Date: 11/10/20  -NB Product Release Time: 0816 -NB Product Type: HPC, Apheresis  -NB DIN: [id number]  -NB Product Description Code: [id number]  -NB Volume Dispensed (mL): 152 mL  -NB Completion Date (RN to complete): 11/10/20  -AK Completion Time (RN to complete): 1405  -AK    Checked by (Patient RN)  RUBÉN Fabian RN  -AK       Checked by (Witness)  DEEP BOWMAN       Product Volume Infused (mL)  152 mL  -AK       Flush Volume (mL)  50 mL  -AK       Volume Dispensed (mL)  --         User Key  (r) = Recorded By, (t) = Taken By, (c) = Cosigned By    Initials Name Effective Dates    Gabby Lutz RN 05/05/15 -     Arina Mcdaniels 04/29/14 -     Mar[place]armen Hill 04/29/14 -         Preparation: RN Documentation  Patient was premedicated as ordered: yes  Line Type: central line, left  Patient Stable Prior to Infusion: yes  Time Infusion Started: 1247  Teaching: side effects/monitoring  Tolerated/Reaction: well  Immediate suspected transfusion reaction to the product: none  Did patient have prior history of similar signs/symptoms during this hospitalization?: NA  Did the patient tolerate the infusion well: yes  Flush until: 1800 11/10  Plan: continue to monitor

## 2020-11-11 NOTE — PROGRESS NOTES
"Discharge insulin reccs:  Interval reduction for home, now off steroids    -NPH: 22 units in the morning (8AM) and 8 units in the evening (8PM)  -Novolog fixed dose of 8 units per meal  -Novolog high intensity sliding scale before meals and at bedtime:    Mealtime Scale  BG less than 140, none   - 164 = 1 unit.    - 189 = 2    - 214 = 3    - 239 = 4    - 264 = 5    - 289 = 6    - 314 = 7    - 339 = 8   BG over 340 = 9    Bedtime Scale  BG less than 200, none   - 224 = 1 unit.    - 249 = 2    - 274 = 3    - 299 = 4    - 324 = 5    - 349 = 6   BG over 350 = 7         Primary team to please order   -NPH (Humulin or Novolin- per insurance) Kwikpens   -Aspart (Humalog or Novalog- per insurance) Flexpens  -\"BD\" (32G x 4mm) insulin pen needles  -refill of Shanita test strips   -sharps container  -alcohol swabs    Prisca Corley PA-C  Diabetes Management Service  Pager [phone]     "

## 2020-11-11 NOTE — CONSULTS
Diabetes Education  Received consult request to see this 55 year old male.  He was admitted for an autologous hematopoietic cell transplant for stage IV mantle cell lymphoma. He has a history of type 2 diabetes (on oral agents PTA), hypertension, and hyperlipidemia.    Patient initiated on BMT protocol due to steroid-induced hyperglycemia. Patient will be discharged today, and will temporarily require insulin therapy.    Please refer to details of insulin plan for discharge in inpatient diabetes management team [facility] note of today.    Discussed types of insulin and action times.  Demonstrated that NPH insulin is cloudy, and how to re-suspend solution before administering.  Demonstrated insulin pen and injection technique.  Patient watched closely, stated understanding and declined need to give a return demonstration.    Discussed insulin pen storage and disposal.  Verified with patient that he has a blood glucose monitor and supplies at home.  Reviewed hypoglycemia signs/symptoms and treatment with patient.  He reports he has experienced hypoglycemia in the past.    Provided with handouts:  1.  B-D tear sheet on insulin pen technique and injection site selection and rotation  2.  Know the Signs and Symptoms of High and Low Blood Glucose  3.  Insulin plan for discharge (copied and pasted from [facility] note).    Norma Carter MS, APRN, CNS, CDE, CDTC  [phone]

## 2020-11-11 NOTE — TELEPHONE ENCOUNTER
Please schedule post hospital follow up with myself, or with Mirlande Poon PA-C  Recent admission for auto-BMT. Holding oral antihyperglycemics post transplant. New to insulin. Schedule late this week (Friday) or early next week (Monday) if possible.     Prisca Corley PA-C  Diabetes Management Service  Pager [phone]

## 2020-11-11 NOTE — PROGRESS NOTES
BMT SOCIAL WORK DISCHARGE NOTE:    Focus: Discharge Plan    Data: Theo Roblero is a 55 year old male, currently day +1 of his autologous hematopoietic cell transplant.    Interventions: Per Med Team, pt is medically stable for discharge today. SW met with pt to assess coping, provide psychosocial support and provide discharge packet with post-transplant resources for patient and caregiver. Pt reports he feels comfortable discharging home today. He continues to plan to return home ([place], [place]) where his brother Carroll will stay in his home and be his primary caregiver. Pt's daughter is staying at her mother's home during those 30 days as well. Pt shared that he is concerned about not returning to work for 100 days vs the expected 30 days. CSW reviewed information re: applying for social security disability. Pt requested assistance checking on [employer] Co-pay assistance application. Per [employer] rep, clinical verification by primary BMT MD needed and will be refaxed to BMT fax today. CSW will continue to follow-up. SW provided empathetic listening, validation of concerns, and encouragement. SW encouraged pt to contact SW for support, questions and/or resources.     Education Provided: Discharge Resource Packet, Caregiver Self-Care Education, and Expected Emotional Responses to Transition Home.    Assessment: Pt presented as pleasant and engaged. Pt appears to be coping appropriately. Pt continues to be supported by his brother Carroll.    Plan: Pt to discharge home ([place], [place]) with brother Carroll as primary caregiver. SW will continue to provide psychosocial support and assistance with resources as needed. ERICH will continue to collaborate with multidisciplinary team regarding pt's plan of care.     DIMITRI Linn, [facility]  Adult Blood & Marrow Transplant   Phone: [phone]  Pager: [phone]

## 2020-11-11 NOTE — PLAN OF CARE
Pt denied pain, denied nausea.  Ate well and gave him 38 units of insulin for his supper.  Despite that, his HS blood sugar was 190.  On humulin insulin also.  No side effects from his transplant today.  Will discharge tomorrow.   Problem: Adult Inpatient Plan of Care  Goal: Plan of Care Review  Outcome: No Change

## 2020-11-11 NOTE — PROCEDURES
BMT Post Infusion Documentation    Data   Patient Vitals for the past 72 hrs:   Temp Temp src Pulse Resp BP   11/08/20 1139 97.3  F (36.3  C) Oral 54 16 (!) 155/85   11/08/20 1600 97.9  F (36.6  C) Oral 57 16 132/73   11/08/20 1950 97.7  F (36.5  C) Oral 68 16 (!) 142/78   11/08/20 2358 97.3  F (36.3  C) Oral 59 16 (!) 157/96   11/09/20 0349 97.9  F (36.6  C) Oral 63 18 (!) 145/85   11/09/20 0738 97.7  F (36.5  C) Oral 57 20 (!) 143/85   11/09/20 1147 97  F (36.1  C) Axillary 65 18 (!) 142/76   11/09/20 1526 98.8  F (37.1  C) Axillary 68 18 (!) 147/81   11/09/20 2040 97.9  F (36.6  C) Oral 71 16 (!) 153/90   11/09/20 2358 97.7  F (36.5  C) Oral 69 16 (!) 147/83   11/10/20 0331 97.9  F (36.6  C) Oral 67 18 (!) 146/86   11/10/20 0737 97.8  F (36.6  C) Oral 67 20 138/86   11/10/20 1157 97.6  F (36.4  C) Oral 61 16 139/77   11/10/20 1302 96.9  F (36.1  C) Axillary 61 18 (!) 143/78   11/10/20 1320 97.6  F (36.4  C) Oral 62 16 (!) 142/75   11/10/20 1340 97.6  F (36.4  C) Oral 68 16 (!) 141/73   11/10/20 1349 97.6  F (36.4  C) Oral 71 18 (!) 145/79   11/10/20 1405 97.6  F (36.4  C) Oral 88 16 (!) 146/79   11/10/20 1551 98  F (36.7  C) Oral 82 16 (!) 155/90   11/10/20 1936 96.8  F (36  C) Axillary 73 16 (!) 147/85   11/11/20 0016 97.9  F (36.6  C) Oral 74 16 (!) 141/73   11/11/20 0018 -- -- 69 -- 128/72   11/11/20 0400 97.9  F (36.6  C) Oral 68 18 139/81   11/11/20 0821 98.4  F (36.9  C) Oral 64 18 (!) 147/90        BMT INFUSION DOCUMENTATION (last 24 hours)      BMT/Cellular Product Infusion     Row Name                  Cell Therapy Documentation    Product Release Date  --       Recipient Study ID  --       Donor  --       Donor MRN/ID  --       Donor ABO/Rh  --       Allogeneic Donor Eligibility Determination and Summary of Records  --       Type of Infusion  --       Total Volume Dispensed (mL)  --       Total NC Dose  --       Total CD34 Dose  --       Total CD3 dose  --       Total NC Dose Left in Storage  --        Comments for Product Issues  --       Donor ABO/Rh  --       Product Types  --       Product Numbers  --       Product Types and Numbers  --       Volume  --       ABO Mismatch  --       ZZTotal NC Dose  --       ZZTotal CD34 Dose  --       ZZTotal NC Dose Left in Storage  --          [REMOVED] Product 11/10/20 0816 HPC, Apheresis    Product Details Product Release Date: 11/10/20  -NB Product Release Time: 0816 -NB Product Type: HPC, Apheresis  -NB DIN: [id number]  -NB Product Description Code: [id number]  -NB Volume Dispensed (mL): 140 mL  -NB Completion Date (RN to complete): 11/10/20  -AK Completion Time (RN to complete): 1334  -AK    Checked by (Patient RN)  --       Checked by (Witness)  --       Product Volume Infused (mL)  --       Flush Volume (mL)  --       Volume Dispensed (mL)  --          [REMOVED] Product 11/10/20 0816 HPC, Apheresis    Product Details Product Release Date: 11/10/20  -NB Product Release Time: 0816 -NB Product Type: HPC, Apheresis  -NB DIN: [id number]  -NB Product Description Code: [id number]  -NB Volume Dispensed (mL): 140 mL  -NB Completion Date (RN to complete): 11/10/20  -AK Completion Time (RN to complete): 1349  -AK    Checked by (Patient RN)  --       Checked by (Witness)  --       Product Volume Infused (mL)  --       Flush Volume (mL)  --       Volume Dispensed (mL)  --          [REMOVED] Product 11/10/20 0816 HPC, Apheresis    Product Details Product Release Date: 11/10/20  -NB Product Release Time: 0816 -NB Product Type: HPC, Apheresis  -NB DIN: [id number]  -NB Product Description Code: [id number]  -NB Volume Dispensed (mL): 72 mL  -NB Completion Date (RN to complete): 11/10/20  -AK Completion Time (RN to complete): 1254  -AK    Checked by (Patient RN)  --       Checked by (Witness)  --       Product Volume Infused (mL)  --       Flush Volume (mL)  --       Volume Dispensed (mL)  --          [REMOVED] Product 11/10/20 0816 HPC, Apheresis    Product Details Product  Release Date: 11/10/20  -NB Product Release Time: 0816 -NB Product Type: HPC, Apheresis  -NB DIN: [id number]  -NB Product Description Code: [id number]  -NB Volume Dispensed (mL): 72 mL  -NB Completion Date (RN to complete): 11/10/20  -AK Completion Time (RN to complete): 1313  -AK    Checked by (Patient RN)  --       Checked by (Witness)  --       Product Volume Infused (mL)  --       Flush Volume (mL)  --       Volume Dispensed (mL)  --          [REMOVED] Product 11/10/20 0816 HPC, Apheresis    Product Details Product Release Date: 11/10/20  -NB Product Release Time: 0816 -NB Product Type: HPC, Apheresis  -NB DIN: [id number]  -NB Product Description Code: [id number]  -NB Volume Dispensed (mL): 152 mL  -NB Completion Date (RN to complete): 11/10/20  -AK Completion Time (RN to complete): 1405  -AK    Checked by (Patient RN)  --       Checked by (Witness)  --       Product Volume Infused (mL)  --       Flush Volume (mL)  --       Volume Dispensed (mL)  --          RN Documentation    Patient was premedicated as ordered  --       Line Type  --       Patient Stable Prior to Infusion  --       Time Infusion Started  --       Checked by (Patient RN)  --       Checked by (RN 2)  --       Broken Bag?  --       Immediate suspected transfusion reaction to the product  --       Time Infusion Stopped  --       Total Flush Volume (mL)  --       Checked by (Witness)  --       Date Infusion Started  --       Date Infusion Stopped  --       Volume Infused (mL)  --       Total Volume Infused (cc)  --          Patient tolerance of product infusion    Immediate suspected transfusion reaction to the product  --       Did patient have prior history of similar signs/symptoms during this hospitalization?  --       Symptoms during/after infusion  --       Did the patient tolerate the infusion well  --       Medications and treatment for symptoms  --       Did the symptoms resolve?  --       Enter comments if clots, leaks, broken  bag, infusion delays, other issues with bag/infusion  --       Describe symptoms  --         User Key  (r) = Recorded By, (t) = Taken By, (c) = Cosigned By    Initials Name Effective Dates    Gabby Lutz RN 05/05/15 -     [place]en Hill 04/29/14 -             Post-Infusion Evaluation:   Infusion Related Reaction: Grade 0 - none  Dyspnea: Grade 0 - none  Hypoxia: Grade 0 - not present  Fever: Grade 0 - afebrile  Chills: Grade 0 - none  Febrile Neutropenia: Grade 0 - not present  Sinus Bradycardia: Grade 0 - none  Hypertension: Grade 2 - stage 1 hypertension (systolic -159 mm Hg or diastolic BP 90-99 mm Hg); medical intervention indicated; recurrent or persistent (>/ 24 hours); symptomatic increase by >/ 20 mm Hg (diastolic) or to > 140/90 mm Hg if previously WNL; monotherapy indicated  Hypotension: Grade 0 - none  Chest Pain: Grade 0 - none  Bronchospasm: Grade 0 - none  Pain: Grade 0 - none  Rash: Grade 0 - None  Neurologic Specify: none    If this was a cord blood transplant, was more than one cord blood unit infused? N/A    Alexa Medrano PA-C

## 2020-11-11 NOTE — PLAN OF CARE
"/81 (BP Location: Left arm)   Pulse 68   Temp 97.9  F (36.6  C) (Oral)   Resp 18   Ht 1.78 m (5' 10.08\")   Wt 115.3 kg (254 lb 4.8 oz)   SpO2 99%   BMI 36.41 kg/m    Pt's AVSS, no c/o nausea or pain. Pt slept through the night with no complains. Frote intact and hep locked. Lab stable with Hgb 8.6, Plt 185, WBC 6.9 and K+ 4.4. Pt is possible discharge today. Keep monitoring pt as ordered and notify MD with any new changes.   Problem: Adult Inpatient Plan of Care  Goal: Plan of Care Review  Outcome: No Change  Goal: Patient-Specific Goal (Individualized)  Outcome: No Change  Goal: Absence of Hospital-Acquired Illness or Injury  Outcome: No Change  Goal: Optimal Comfort and Wellbeing  Outcome: No Change  Goal: Readiness for Transition of Care  Outcome: No Change     "

## 2020-11-11 NOTE — DISCHARGE INSTRUCTIONS
IP Diabetes Management Team Discharge Instructions    Glucose Control Regimen:   1) NPH insulin (Humulin or Novolin N): 22 units in the morning at 8AM, and 8 units in the evening at 8PM (this is essentially replacing your oral antihyperglycemic medications following transplant).   2) aspart insulin (Humalog or Novolog): 8 units with each meal. If you have any pre-meal blood sugars less than or equal to 110, please discontinue this insulin. Please aim for less than 80 grams of carb intake with each meal.   3) aspart insulin (Humalog or Novolog): sliding scale to be given before meals and at bedtime, using the scales below. Add this to the fixed meal insulin dose above, and give in a single injection at the start of the meal.      High Intensity  Sliding Scale (1:25)  Mealtime Scale, to be given based on blood sugar obtained prior to a meal  For Pre-Meal BG less than 140, no additional insulin needed  For Pre-Meal  - 164 give 1 unit.   For Pre-Meal  - 189 give 2 units.   For Pre-Meal  - 214 give 3 units.   For Pre-Meal  - 239 give 4 units.   For Pre-Meal  - 264 give 5 units.   For Pre-Meal  - 289 give 6 units.   For Pre-Meal  - 314 give 7 units.   For Pre-Meal  - 339 give 8 units.   For Pre-Meal BG greater than 340 give 9 units.     Bedtime Scale, to be given based on pre-bedtime blood sugar  For Bedtime BG less than 200, no additional insulin needed  For  - 224 give 1 unit.   For  - 249 give 2 units.   For  - 274 give 3 units.   For  - 299 give 4 units.   For  - 324 give 5 units.   For  - 349 give 6 units.   For BG greater than 350 give 7 units.         Blood Glucose Checks: three times daily before meals, and at bedtime.    Endocrinology Outpatient follow up: An appointment request was sent to the [facility] coordinator to schedule your outpatient diabetes appointment 1-2 weeks from discharge. Please call the clinic  at [phone] if you do not have an appointment scheduled on discharge, or if you have non-urgent questions regarding your blood sugars or insulin. When safe from a bone marrow transplant perspective, we will assist with discontinuing insulin and resuming your oral antihyperglycemics.     If you have urgent questions or concerns regarding your blood sugars or insulin, you may contact [phone] (the main hospital ). Ask to speak with the endocrinologist on call.    Thank you for letting the Diabetes Management Team be involved in your care!

## 2020-11-11 NOTE — PLAN OF CARE
Pt tolerated his transplant well this morning.  Transplant flush completed at 1800.      He continues to eat well and denied pain/nausea.    VSS    Problem: Adult Inpatient Plan of Care  Goal: Plan of Care Review  11/10/2020 1824 by Gabby Fabian, RN  Outcome: No Change  Flowsheets (Taken 11/10/2020 1824)  Plan of Care Reviewed With: patient

## 2020-11-12 NOTE — PROGRESS NOTES
"BMT Clinic Note     ID: Theo Roblero is a 55 year old male, currently day +2 of his autologous hematopoietic cell transplant.     HPI: Feeling quite good.  Eating/drinking and eliminating without issues.  No covid symptoms such as fever, cough, cold, SOB.  No bleeding.  No mouth sores, just has a leathery feeling in his mouth from KGF.  No rashes. Slight swelling in his ankles, but that is getting better now he is out of hospital.  Glucoses running low 100s to mid 100s.  No issues with insulin, although they hadn't understood until getting home that they are to use the sliding scale at bedtime as well, if needed.      ROS: 8 point review with negative except as stated above in HPI     Physical Exam  BP (!) 147/90 (BP Location: Left arm)   Pulse 64   Temp 98.4  F (36.9  C) (Oral)   Resp 18   Ht 1.78 m (5' 10.08\")   Wt 115.5 kg (254 lb 9.6 oz)   SpO2 100%   BMI 36.45 kg/m    General: NAD   Eyes: sclera anicteric   Nose/Mouth/Throat: OP clear, buccal mucosa moist, no ulcerations. White/thickened areas consistent with KGF.   Lungs: CTA bilaterally  Cardiovascular: RRR, no M/R/G   Abdominal/Rectal: +BS, soft, NT, ND, No HSM   Lymphatics: No edema  Skin: No rash on exposed skin  Neuro: A&O   Additional Findings: Forte site NT left chest wall     Lab  Lab Results   Component Value Date    WBC 2.4 (L) 11/12/2020    ANEU 2.3 11/12/2020    HGB 9.2 (L) 11/12/2020    HCT 27.7 (L) 11/12/2020     (L) 11/12/2020     11/12/2020    POTASSIUM 4.3 11/12/2020    CHLORIDE 104 11/12/2020    CO2 24 11/12/2020     (H) 11/12/2020    BUN 28 11/12/2020    CR 0.92 11/12/2020    MAG 2.2 11/11/2020    INR 1.04 11/09/2020    BILITOTAL 0.7 11/12/2020    AST 12 11/12/2020    ALT 35 11/12/2020    ALKPHOS 79 11/12/2020    PROTTOTAL 5.6 (L) 11/12/2020    ALBUMIN 3.0 (L) 11/12/2020       I have assessed all abnormal lab values for their clinical significance and any values considered clinically significant have been " addressed in the assessment and plan.       A/P:  ID: Theo Roblero is a 55 year old male with mantle cell lymphoma, admitted for auto BMT with BEAM prep, today is D+2.     1.  BMT:   - BEAM prep, tolerated well  - Palifermin D0, D+1 and D+2  - Flush and pre-meds prior to transplant. Cell dose 4.3 million, collected with GCSf and mozobil  - GCSF starts d+5 (11/15) and cont to until ANC>2500 x2 consecutive days. Re-stage per protocol.     2.  HEME: Keep Hgb>7 and plts>10K. Premeds for blood products (no hx of reaction, but has received premeds in the past)  - pancytopenia due to chemotherapy               3.  ID: afebrile.  - prophy Levo, Fluc, and HD ACV (CMV+, HSV+, EBV+) prophy.   - Bactrim or appropriate PCP therapy to start d+28.                                             4.  GI:   -  prn zofran and compazine  - Protonix for GI prophy.     5.  FEN/Renal:   - Cr and lytes stable     6. Endo  - Hx DM type II. On metformin and glimepiride on admission. Held both due to potential for n/v with prep; also glimepiride can inhibit engraftment  - BG checks QID with meals and bedtime.   - NPH 22 units qam and 8 units qpm  - fixed mealtime novolog 8 units  - high sliding scale with meals and bedtime  - after engraftment, can transition back to home regimen     7. CV  - HTN: on metoprolol BID     8. Hx tobacco abuse, previously used chewing tobacco.  Continue nicotine patch.      RTC: daily for monitoring.  Starts GCSF 11/15.   Standing BMP, CBC ordered x 10        Camila Shah PA-C  11/12/2020

## 2020-11-12 NOTE — PROGRESS NOTES
This is a recent snapshot of the patient's [facility] medical record.  For current drug dose and complete information and questions, call [phone] or In Basket pool, [facility] home infusion ([id number])  CSN Number:  [id number]

## 2020-11-12 NOTE — LETTER
"    11/12/2020         RE: Theo Roblero  [address]  [address]  [place] [address]        Dear Colleague,    Thank you for referring your patient, Theo Roblero, to the [facility] BLOOD AND MARROW TRANSPLANT PROGRAM [place]. Please see a copy of my visit note below.    BMT Clinic Note     ID: Theo Roblero is a 55 year old male, currently day +2 of his autologous hematopoietic cell transplant.     HPI: Feeling quite good.  Eating/drinking and eliminating without issues.  No covid symptoms such as fever, cough, cold, SOB.  No bleeding.  No mouth sores, just has a leathery feeling in his mouth from KGF.  No rashes. Slight swelling in his ankles, but that is getting better now he is out of hospital.  Glucoses running low 100s to mid 100s.  No issues with insulin, although they hadn't understood until getting home that they are to use the sliding scale at bedtime as well, if needed.      ROS: 8 point review with negative except as stated above in HPI     Physical Exam  BP (!) 147/90 (BP Location: Left arm)   Pulse 64   Temp 98.4  F (36.9  C) (Oral)   Resp 18   Ht 1.78 m (5' 10.08\")   Wt 115.5 kg (254 lb 9.6 oz)   SpO2 100%   BMI 36.45 kg/m    General: NAD   Eyes: sclera anicteric   Nose/Mouth/Throat: OP clear, buccal mucosa moist, no ulcerations. White/thickened areas consistent with KGF.   Lungs: CTA bilaterally  Cardiovascular: RRR, no M/R/G   Abdominal/Rectal: +BS, soft, NT, ND, No HSM   Lymphatics: No edema  Skin: No rash on exposed skin  Neuro: A&O   Additional Findings: Forte site NT left chest wall     Lab  Lab Results   Component Value Date    WBC 2.4 (L) 11/12/2020    ANEU 2.3 11/12/2020    HGB 9.2 (L) 11/12/2020    HCT 27.7 (L) 11/12/2020     (L) 11/12/2020     11/12/2020    POTASSIUM 4.3 11/12/2020    CHLORIDE 104 11/12/2020    CO2 24 11/12/2020     (H) 11/12/2020    BUN 28 11/12/2020    CR 0.92 11/12/2020    MAG 2.2 11/11/2020    INR 1.04 11/09/2020    " BILITOTAL 0.7 11/12/2020    AST 12 11/12/2020    ALT 35 11/12/2020    ALKPHOS 79 11/12/2020    PROTTOTAL 5.6 (L) 11/12/2020    ALBUMIN 3.0 (L) 11/12/2020       I have assessed all abnormal lab values for their clinical significance and any values considered clinically significant have been addressed in the assessment and plan.       A/P:  ID: Theo Roblero is a 55 year old male with mantle cell lymphoma, admitted for auto BMT with BEAM prep, today is D+2.     1.  BMT:   - BEAM prep, tolerated well  - Palifermin D0, D+1 and D+2  - Flush and pre-meds prior to transplant. Cell dose 4.3 million, collected with GCSf and mozobil  - GCSF starts d+5 (11/15) and cont to until ANC>2500 x2 consecutive days. Re-stage per protocol.     2.  HEME: Keep Hgb>7 and plts>10K. Premeds for blood products (no hx of reaction, but has received premeds in the past)  - pancytopenia due to chemotherapy               3.  ID: afebrile.  - prophy Levo, Fluc, and HD ACV (CMV+, HSV+, EBV+) prophy.   - Bactrim or appropriate PCP therapy to start d+28.                                             4.  GI:   -  prn zofran and compazine  - Protonix for GI prophy.     5.  FEN/Renal:   - Cr and lytes stable     6. Endo  - Hx DM type II. On metformin and glimepiride on admission. Held both due to potential for n/v with prep; also glimepiride can inhibit engraftment  - BG checks QID with meals and bedtime.   - NPH 22 units qam and 8 units qpm  - fixed mealtime novolog 8 units  - high sliding scale with meals and bedtime  - after engraftment, can transition back to home regimen     7. CV  - HTN: on metoprolol BID     8. Hx tobacco abuse, previously used chewing tobacco.  Continue nicotine patch.      RTC: daily for monitoring.  Starts GCSF 11/15.   Standing BMP, CBC ordered x 10        Camila Shah PA-C  11/12/2020        Again, thank you for allowing me to participate in the care of your patient.        Sincerely,        BMT Advanced Practice  Provider

## 2020-11-12 NOTE — LETTER
11/12/2020         RE: Theo Roblero  [address] [place] [address]  [address] [place] [id number]        Dear Colleague,    Thank you for referring your patient, Theo Roblero, to the [facility] BLOOD AND MARROW TRANSPLANT PROGRAM [place]. Please see a copy of my visit note below.    I met with Theo Roblero to review his medication list after hospital discharge post-transplant. Theo Roblero and his caregiver had the opportunity to ask questions regarding his therapy which were answered to their satisfaction. We specifically discussed the following items:    1. BMT: Day +2 s/p auto PBSCT with BEAM prep.  2. ID: Continues acyclovir, fluconazole, and levofloxacin. Bactrim to begin d+28.  3: GI: No issues reported. Discussed anti-emetic use.  5: Other: Patient asked about using essential oils via a room diffuser, I instructed that this should be fine to do.    Changes made to scheduled medication list by today's provider include:  None    I reviewed Yanick's med box today and noted no errors. I did not review insulin use with Yanick today, but provider was going to review and notify me if he had any questions/issues that my benefit from another pharmacy check in. Yanick reported feeling well so far and that he had no questions regarding his medications.      Current Outpatient Medications   Medication Sig Dispense Refill     acyclovir (ZOVIRAX) 800 MG tablet Take 1 tablet (800 mg) by mouth 2 times daily 60 tablet 1     Alcohol Swabs PADS 1 pad 4 times daily (before meals and nightly) 120 each 3     blood glucose (NO BRAND SPECIFIED) test strip Use to test blood sugar 4 times daily or as directed. 120 strip 3     blood glucose monitoring (ACCU-CHEK FASTCLIX) lancets Use to test blood sugar 4 times daily or as directed. 102 each 3     fluconazole (DIFLUCAN) 200 MG tablet Take 1 tablet (200 mg) by mouth daily 30 tablet 1     heparin lock flush 10 UNIT/ML SOLN injection 5-10 mLs by Intracatheter route every 24  hours 60 vial 1     insulin aspart (NOVOLOG PEN) 100 UNIT/ML pen Inject 8 units with meals three times daily, as well as sliding scale four times daily with meals and at bedtime 3 mL 1     insulin isophane human (HUMULIN N PEN) 100 UNIT/ML injection Inject 22 units in the morning and 8 units in the evening 3 mL 3     insulin pen needle (32G X 4 MM) 32G X 4 MM miscellaneous Use 4 pen needles daily or as directed. 110 each 3     levofloxacin (LEVAQUIN) 250 MG tablet Take 1 tablet (250 mg) by mouth daily 30 tablet 1     metoprolol succinate ER (TOPROL-XL) 25 MG 24 hr tablet Take 1 tablet (25 mg) by mouth 2 times daily 60 tablet 1     nicotine (NICODERM CQ) 21 MG/24HR 24 hr patch Place 1 patch onto the skin daily 30 patch 1     ondansetron (ZOFRAN) 8 MG tablet Take 1 tablet (8 mg) by mouth every 8 hours as needed for nausea 30 tablet 0     pantoprazole (PROTONIX) 40 MG EC tablet Take 1 tablet (40 mg) by mouth daily 30 tablet 1     prochlorperazine (COMPAZINE) 5 MG tablet Take 1-2 tablets (5-10 mg) by mouth every 6 hours as needed for nausea or vomiting 20 tablet 0     [START ON 12/8/2020] sulfamethoxazole-trimethoprim (BACTRIM DS) 800-160 MG tablet Take 1 tablet by mouth Every Mon, Tues two times daily DO NOT START until directed by your provider (Patient not taking: Reported on 11/12/2020) 16 tablet 11        Pertinent labs considered today:  Lab Results   Component Value Date     11/12/2020                 normal sodium 136-148  Lab Results   Component Value Date    POTASSIUM 4.3 11/12/2020       normal potassium 3.5-5.2   Lab Results   Component Value Date    CHLORIDE 104 11/12/2020           normal chloride    Lab Results   Component Value Date    CO2 24 11/12/2020                normal CO2 20-32  Lab Results   Component Value Date    BUN 28 11/12/2020                 normal BUN 5-24  Lab Results   Component Value Date     11/12/2020                 normal glucose   Lab Results   Component  Value Date    CR 0.92 11/12/2020                 normal cr 0.8-1.5  Lab Results   Component Value Date    BELA 7.9 11/12/2020               normal calcium  8.5-10.4  Lab Results   Component Value Date    BILITOTAL 0.7 11/12/2020          normal bilirubin 0.2-1.3  Lab Results   Component Value Date    PROTTOTAL 5.6 11/12/2020          normal total protein 6.0-8.2  Lab Results   Component Value Date    ALBUMIN 3.0 11/12/2020             normal albumin 3.2-4.5  Lab Results   Component Value Date    ALKPHOS 79 11/12/2020            normal alkphos   Lab Results   Component Value Date    ALT 35 11/12/2020         normal ALT 0-65  Lab Results   Component Value Date    AST 12 11/12/2020         normal AST 0-37  Lab Results   Component Value Date    HGB 9.2 11/12/2020     Lab Results   Component Value Date    WBC 2.4 11/12/2020      Lab Results   Component Value Date     11/12/2020     Estimated Creatinine Clearance: 114.9 mL/min (based on SCr of 0.92 mg/dL).      Cecelia Hodges, PharmD        Again, thank you for allowing me to participate in the care of your patient.        Sincerely,        BMT Pharm D, [facility]

## 2020-11-12 NOTE — PROGRESS NOTES
I met with Theo Roblero to review his medication list after hospital discharge post-transplant. Theo Roblero and his caregiver had the opportunity to ask questions regarding his therapy which were answered to their satisfaction. We specifically discussed the following items:    1. BMT: Day +2 s/p auto PBSCT with BEAM prep.  2. ID: Continues acyclovir, fluconazole, and levofloxacin. Bactrim to begin d+28.  3: GI: No issues reported. Discussed anti-emetic use.  5: Other: Patient asked about using essential oils via a room diffuser, I instructed that this should be fine to do.    Changes made to scheduled medication list by today's provider include:  None    I reviewed Yanick's med box today and noted no errors. I did not review insulin use with Yanick today, but provider was going to review and notify me if he had any questions/issues that my benefit from another pharmacy check in. Yanick reported feeling well so far and that he had no questions regarding his medications.      Current Outpatient Medications   Medication Sig Dispense Refill     acyclovir (ZOVIRAX) 800 MG tablet Take 1 tablet (800 mg) by mouth 2 times daily 60 tablet 1     Alcohol Swabs PADS 1 pad 4 times daily (before meals and nightly) 120 each 3     blood glucose (NO BRAND SPECIFIED) test strip Use to test blood sugar 4 times daily or as directed. 120 strip 3     blood glucose monitoring (ACCU-CHEK FASTCLIX) lancets Use to test blood sugar 4 times daily or as directed. 102 each 3     fluconazole (DIFLUCAN) 200 MG tablet Take 1 tablet (200 mg) by mouth daily 30 tablet 1     heparin lock flush 10 UNIT/ML SOLN injection 5-10 mLs by Intracatheter route every 24 hours 60 vial 1     insulin aspart (NOVOLOG PEN) 100 UNIT/ML pen Inject 8 units with meals three times daily, as well as sliding scale four times daily with meals and at bedtime 3 mL 1     insulin isophane human (HUMULIN N PEN) 100 UNIT/ML injection Inject 22 units in the morning and 8 units in  the evening 3 mL 3     insulin pen needle (32G X 4 MM) 32G X 4 MM miscellaneous Use 4 pen needles daily or as directed. 110 each 3     levofloxacin (LEVAQUIN) 250 MG tablet Take 1 tablet (250 mg) by mouth daily 30 tablet 1     metoprolol succinate ER (TOPROL-XL) 25 MG 24 hr tablet Take 1 tablet (25 mg) by mouth 2 times daily 60 tablet 1     nicotine (NICODERM CQ) 21 MG/24HR 24 hr patch Place 1 patch onto the skin daily 30 patch 1     ondansetron (ZOFRAN) 8 MG tablet Take 1 tablet (8 mg) by mouth every 8 hours as needed for nausea 30 tablet 0     pantoprazole (PROTONIX) 40 MG EC tablet Take 1 tablet (40 mg) by mouth daily 30 tablet 1     prochlorperazine (COMPAZINE) 5 MG tablet Take 1-2 tablets (5-10 mg) by mouth every 6 hours as needed for nausea or vomiting 20 tablet 0     [START ON 12/8/2020] sulfamethoxazole-trimethoprim (BACTRIM DS) 800-160 MG tablet Take 1 tablet by mouth Every Mon, Tues two times daily DO NOT START until directed by your provider (Patient not taking: Reported on 11/12/2020) 16 tablet 11        Pertinent labs considered today:  Lab Results   Component Value Date     11/12/2020                 normal sodium 136-148  Lab Results   Component Value Date    POTASSIUM 4.3 11/12/2020       normal potassium 3.5-5.2   Lab Results   Component Value Date    CHLORIDE 104 11/12/2020           normal chloride    Lab Results   Component Value Date    CO2 24 11/12/2020                normal CO2 20-32  Lab Results   Component Value Date    BUN 28 11/12/2020                 normal BUN 5-24  Lab Results   Component Value Date     11/12/2020                 normal glucose   Lab Results   Component Value Date    CR 0.92 11/12/2020                 normal cr 0.8-1.5  Lab Results   Component Value Date    BELA 7.9 11/12/2020               normal calcium  8.5-10.4  Lab Results   Component Value Date    BILITOTAL 0.7 11/12/2020          normal bilirubin 0.2-1.3  Lab Results   Component Value Date     PROTTOTAL 5.6 11/12/2020          normal total protein 6.0-8.2  Lab Results   Component Value Date    ALBUMIN 3.0 11/12/2020             normal albumin 3.2-4.5  Lab Results   Component Value Date    ALKPHOS 79 11/12/2020            normal alkphos   Lab Results   Component Value Date    ALT 35 11/12/2020         normal ALT 0-65  Lab Results   Component Value Date    AST 12 11/12/2020         normal AST 0-37  Lab Results   Component Value Date    HGB 9.2 11/12/2020     Lab Results   Component Value Date    WBC 2.4 11/12/2020      Lab Results   Component Value Date     11/12/2020     Estimated Creatinine Clearance: 114.9 mL/min (based on SCr of 0.92 mg/dL).      Cecelia Hodges, PharmD

## 2020-11-12 NOTE — PROGRESS NOTES
Infusion Nursing Note:  Theo Roblero presents today for palifermin.    Patient seen by provider today: Yes: Camila Carrier   present during visit today: Not Applicable.    Note: Patient arrives for last dose palifermin, administered IVP over 30 seconds, tolerated without incident.  No further replacement needs today.    Intravenous Access:  Forte.    Treatment Conditions:  Results reviewed, labs MET treatment parameters, ok to proceed with treatment.      Post Infusion Assessment:  Patient tolerated infusion without incident.  No evidence of extravasations.  Access discontinued per protocol.       Discharge Plan:   Patient discharged in stable condition accompanied by: brother.  Departure Mode: Ambulatory.    Ashlyn Padilla RN

## 2020-11-12 NOTE — NURSING NOTE
Chief Complaint   Patient presents with     Lab Only     cvc, heparin locked, vitals checked     Mony Velásquez RN on 11/12/2020 at 11:51 AM

## 2020-11-12 NOTE — PLAN OF CARE
Occupational Therapy Discharge Summary    Reason for therapy discharge:    Discharged to home.    Progress towards therapy goal(s). See goals on Care Plan in [facility] electronic health record for goal details.  Goals partially met.  Barriers to achieving goals:   discharge from facility.    Therapy recommendation(s):    No further therapy is recommended.

## 2020-11-12 NOTE — NURSING NOTE
"Oncology Rooming Note    November 12, 2020 12:13 PM   Theo Roblero is a 55 year old male who presents for:    Chief Complaint   Patient presents with     Lab Only     cvc, heparin locked, vitals checked     RECHECK     Provider visit, hx mantle cell lymphoma s/p transplant.     Initial Vitals: /79   Pulse 101   Temp 97.5  F (36.4  C)   Resp 18   Wt 116.6 kg (257 lb)   SpO2 100%   BMI 36.79 kg/m   Estimated body mass index is 36.79 kg/m  as calculated from the following:    Height as of 11/4/20: 1.78 m (5' 10.08\").    Weight as of this encounter: 116.6 kg (257 lb). Body surface area is 2.4 meters squared.  No Pain (0) Comment: Data Unavailable   No LMP for male patient.  Allergies reviewed: Yes  Medications reviewed: Yes    Medications: Medication refills not needed today.  Pharmacy name entered into [employer]:    [id number] - [place], [place] - [address] AT [facility] [address]  [facility]    Clinical concerns: None      Ashlyn Padilla RN              "

## 2020-11-12 NOTE — LETTER
11/12/2020         RE: Theo Roblero  [address]  [address] [place] [id number]        Dear Colleague,    Thank you for referring your patient, Theo Roblero, to the [facility] BLOOD AND MARROW TRANSPLANT PROGRAM [place]. Please see a copy of my visit note below.    Infusion Nursing Note:  Theo Roblero presents today for palifermin.    Patient seen by provider today: Yes: Camila Shah   present during visit today: Not Applicable.    Note: Patient arrives for last dose palifermin, administered IVP over 30 seconds, tolerated without incident.  No further replacement needs today.    Intravenous Access:  Forte.    Treatment Conditions:  Results reviewed, labs MET treatment parameters, ok to proceed with treatment.      Post Infusion Assessment:  Patient tolerated infusion without incident.  No evidence of extravasations.  Access discontinued per protocol.       Discharge Plan:   Patient discharged in stable condition accompanied by: brother.  Departure Mode: Ambulatory.    Ashlyn Padilla RN                            Again, thank you for allowing me to participate in the care of your patient.        Sincerely,        [facility]

## 2020-11-12 NOTE — NURSING NOTE
Chief Complaint   Patient presents with     Infusion     Palifermin infusion, hx mantle cell lymphoma.

## 2020-11-13 NOTE — PROGRESS NOTES
BMT Clinic Note     ID: Theo Roblero is a 55 year old male, currently day +3 of his autologous hematopoietic cell transplant.     HPI: Don returns for follow up. Eating well, no n/v/d. BG up to 244 after dairy queen treat yesterday but adjusted appropriately with insulin, bg 103 when he woke up this morning. Arm pits have irritated feeling after kgf. Not feeling feverish, no cough or SOB. Not using nicotine patch since discharge as he denies any cravings, not using tobacco products.     ROS: 8 point review with negative except as stated above in HPI     Physical Exam  /68   Pulse 98   Temp 98.1  F (36.7  C)   Resp 18   Wt 115.7 kg (255 lb)   SpO2 100%   BMI 36.51 kg/m      General: NAD   Eyes: sclera anicteric   Nose/Mouth/Throat: OP clear, buccal mucosa moist, no ulcerations. White/thickened areas consistent with KGF.   Lungs: CTA bilaterally  Cardiovascular: RRR, no M/R/G   Abdominal/Rectal: +BS, soft, NT, ND, No HSM   Lymphatics: No edema  Skin: Skin to chest patchy erythema, axilla skin appears leathery, +darkened erythema, neck hyperpigmented erythematous as well  Neuro: A&O   Additional Findings: Forte site NT left chest wall     Lab  Lab Results   Component Value Date    WBC 0.6 (LL) 11/13/2020    ANEU 2.3 11/12/2020    HGB 8.7 (L) 11/13/2020    HCT 26.5 (L) 11/13/2020     (L) 11/13/2020     11/13/2020    POTASSIUM 4.5 11/13/2020    CHLORIDE 105 11/13/2020    CO2 27 11/13/2020     (H) 11/13/2020    BUN 28 11/13/2020    CR 1.02 11/13/2020    MAG 2.2 11/11/2020    INR 1.04 11/09/2020    BILITOTAL 0.7 11/12/2020    AST 12 11/12/2020    ALT 35 11/12/2020    ALKPHOS 79 11/12/2020    PROTTOTAL 5.6 (L) 11/12/2020    ALBUMIN 3.0 (L) 11/12/2020       I have assessed all abnormal lab values for their clinical significance and any values considered clinically significant have been addressed in the assessment and plan.       A/P:  ID: Theo CARCAMO Osbaldo is a 55 year old male  with mantle cell lymphoma, admitted for auto BMT with BEAM prep, today is D+3.     1.  BMT:   - BEAM prep, tolerated well  - Palifermin D0, D+1 and D+2  - Flush and pre-meds prior to transplant. Cell dose 4.3 million, collected with GCSf and mozobil  - GCSF starts d+5 (11/15) and cont to until ANC>2500 x2 consecutive days. Re-stage per protocol.     2.  HEME: Keep Hgb>7 and plts>10K. Premeds for blood products (no hx of reaction, but has received premeds in the past)  - pancytopenia due to chemotherapy               3.  ID: afebrile.  - prophy Levo, Fluc, and HD ACV (CMV+, HSV+, EBV+) prophy.   - Bactrim or appropriate PCP therapy to start d+28.                                             4.  GI: no complaints  - prn zofran and compazine  - Protonix for GI prophy.     5.  FEN/Renal:   - Cr and lytes stable     6. Endo  - Hx DM type II. On metformin and glimepiride on admission. Held both due to potential for n/v with prep; also glimepiride can inhibit engraftment  - BG checks QID with meals and bedtime.   - NPH 22 units qam and 8 units qpm  - fixed mealtime novolog 8 units  - high sliding scale with meals and bedtime  - after engraftment, can transition back to home regimen     7. CV  - HTN: on metoprolol BID     8. Hx tobacco abuse, previously used chewing tobacco. Continue nicotine patch.      RTC: daily for monitoring.  Starts GCSF 11/15        Annabel Holm PAC  [phone]

## 2020-11-13 NOTE — LETTER
11/13/2020         RE: Theo Roblero  [address]  [address]  [place] [address]        Dear Colleague,    Thank you for referring your patient, Theo Roblero, to the [facility] BLOOD AND MARROW TRANSPLANT PROGRAM [place]. Please see a copy of my visit note below.    BMT Clinic Note     ID: Theo Roblero is a 55 year old male, currently day +3 of his autologous hematopoietic cell transplant.     HPI: Don returns for follow up. Eating well, no n/v/d. BG up to 244 after dairy queen treat yesterday but adjusted appropriately with insulin, bg 103 when he woke up this morning. Arm pits have irritated feeling after kgf. Not feeling feverish, no cough or SOB. Not using nicotine patch since discharge as he denies any cravings, not using tobacco products.     ROS: 8 point review with negative except as stated above in HPI     Physical Exam  /68   Pulse 98   Temp 98.1  F (36.7  C)   Resp 18   Wt 115.7 kg (255 lb)   SpO2 100%   BMI 36.51 kg/m      General: NAD   Eyes: sclera anicteric   Nose/Mouth/Throat: OP clear, buccal mucosa moist, no ulcerations. White/thickened areas consistent with KGF.   Lungs: CTA bilaterally  Cardiovascular: RRR, no M/R/G   Abdominal/Rectal: +BS, soft, NT, ND, No HSM   Lymphatics: No edema  Skin: Skin to chest patchy erythema, axilla skin appears leathery, +darkened erythema, neck hyperpigmented erythematous as well  Neuro: A&O   Additional Findings: Forte site NT left chest wall     Lab  Lab Results   Component Value Date    WBC 0.6 (LL) 11/13/2020    ANEU 2.3 11/12/2020    HGB 8.7 (L) 11/13/2020    HCT 26.5 (L) 11/13/2020     (L) 11/13/2020     11/13/2020    POTASSIUM 4.5 11/13/2020    CHLORIDE 105 11/13/2020    CO2 27 11/13/2020     (H) 11/13/2020    BUN 28 11/13/2020    CR 1.02 11/13/2020    MAG 2.2 11/11/2020    INR 1.04 11/09/2020    BILITOTAL 0.7 11/12/2020    AST 12 11/12/2020    ALT 35 11/12/2020    ALKPHOS 79 11/12/2020     PROTTOTAL 5.6 (L) 11/12/2020    ALBUMIN 3.0 (L) 11/12/2020       I have assessed all abnormal lab values for their clinical significance and any values considered clinically significant have been addressed in the assessment and plan.       A/P:  ID: Theo Roblero is a 55 year old male with mantle cell lymphoma, admitted for auto BMT with BEAM prep, today is D+3.     1.  BMT:   - BEAM prep, tolerated well  - Palifermin D0, D+1 and D+2  - Flush and pre-meds prior to transplant. Cell dose 4.3 million, collected with GCSf and mozobil  - GCSF starts d+5 (11/15) and cont to until ANC>2500 x2 consecutive days. Re-stage per protocol.     2.  HEME: Keep Hgb>7 and plts>10K. Premeds for blood products (no hx of reaction, but has received premeds in the past)  - pancytopenia due to chemotherapy               3.  ID: afebrile.  - prophy Levo, Fluc, and HD ACV (CMV+, HSV+, EBV+) prophy.   - Bactrim or appropriate PCP therapy to start d+28.                                             4.  GI: no complaints  - prn zofran and compazine  - Protonix for GI prophy.     5.  FEN/Renal:   - Cr and lytes stable     6. Endo  - Hx DM type II. On metformin and glimepiride on admission. Held both due to potential for n/v with prep; also glimepiride can inhibit engraftment  - BG checks QID with meals and bedtime.   - NPH 22 units qam and 8 units qpm  - fixed mealtime novolog 8 units  - high sliding scale with meals and bedtime  - after engraftment, can transition back to home regimen     7. CV  - HTN: on metoprolol BID     8. Hx tobacco abuse, previously used chewing tobacco. Continue nicotine patch.      RTC: daily for monitoring.  Starts GCSF 11/15        Annabel Holm PAC  [phone]      Again, thank you for allowing me to participate in the care of your patient.        Sincerely,        BMT Advanced Practice Provider

## 2020-11-13 NOTE — NURSING NOTE
Chief Complaint   Patient presents with     Lab Only     cvc, heparin locked, vitals checked     Mony Velásquez RN on 11/13/2020 at 10:28 AM

## 2020-11-13 NOTE — NURSING NOTE
"Oncology Rooming Note    November 13, 2020 10:49 AM   Theo Roblero is a 55 year old male who presents for:    Chief Complaint   Patient presents with     Lab Only     cvc, heparin locked, vitals checked     Oncology Clinic Visit     Mantle cell lymphoma of lymph nodes of multiple sites     Initial Vitals: /68   Pulse 98   Temp 98.1  F (36.7  C)   Resp 18   Wt 115.7 kg (255 lb)   SpO2 100%   BMI 36.51 kg/m   Estimated body mass index is 36.51 kg/m  as calculated from the following:    Height as of 11/4/20: 1.78 m (5' 10.08\").    Weight as of this encounter: 115.7 kg (255 lb). Body surface area is 2.39 meters squared.  No Pain (0) Comment: Data Unavailable   No LMP for male patient.  Allergies reviewed: Yes  Medications reviewed: Yes    Medications: Medication refills not needed today.  Pharmacy name entered into Qoof:    [id number] - [place], [place] - [address] AT [facility] [address]  [facility]    Clinical concerns: No new concerns today.       Cyndi Lynn MA            "

## 2020-11-14 NOTE — PROGRESS NOTES
BMT Clinic Note  Nov 14, 2020       ID: Theo Mcknight is a 55 year old male, currently day +4 of his autologous hematopoietic cell transplant.     HPI: Libra returns for follow up. Today he notes that he is feeling ok. No fevers or chills. No chest pain or SOB, no nausea or vomiting, no bleeding or bruising. Bowels ok. No diarrhea. Feeling ok.    ROS: 8 point review with negative except as stated above in HPI     Physical Exam  /66   Pulse 110   Temp 98  F (36.7  C)   Resp 18   Wt 115.2 kg (254 lb)   SpO2 100%   BMI 33.85 kg/m      General: NAD   Eyes: sclera anicteric   Lungs: CTA bilaterally  Cardiovascular: RRR, no M/R/G   Abdominal/Rectal: +BS, soft, NT, ND, No HSM   Lymphatics: No edema  Skin: no rash   Neuro: A&O   Additional Findings: Forte site NT left chest wall     Lab  Results for LIBRA MCKNIGHT (MRN [id number]) as of 11/14/2020 08:48   Ref. Range 11/14/2020 08:06   Sodium Latest Ref Range: 133 - 144 mmol/L 138   Potassium Latest Ref Range: 3.4 - 5.3 mmol/L 4.4   Chloride Latest Ref Range: 94 - 109 mmol/L 106   Carbon Dioxide Latest Ref Range: 20 - 32 mmol/L 27   Urea Nitrogen Latest Ref Range: 7 - 30 mg/dL 26   Creatinine Latest Ref Range: 0.66 - 1.25 mg/dL 1.01   GFR Estimate Latest Ref Range: >60 mL/min/1.73_m2 82   GFR Estimate If Black Latest Ref Range: >60 mL/min/1.73_m2 >90   Calcium Latest Ref Range: 8.5 - 10.1 mg/dL 8.2 (L)   Anion Gap Latest Ref Range: 3 - 14 mmol/L 4     Results for LIBRA MCKNIGHT (MRN [id number]) as of 11/14/2020 08:48   Ref. Range 11/13/2020 10:18 11/14/2020 08:06 11/14/2020 08:29   WBC Latest Ref Range: 4.0 - 11.0 10e9/L 0.6 (LL) 0.1 (LL) 0.2 (LL)   Hemoglobin Latest Ref Range: 13.3 - 17.7 g/dL 8.7 (L) 5.1 (LL) 8.0 (L)   Hematocrit Latest Ref Range: 40.0 - 53.0 % 26.5 (L) 16.7 (L) 25.1 (L)   Platelet Count Latest Ref Range: 150 - 450 10e9/L 118 (L) 57 (L) 90 (L)   RBC Count Latest Ref Range: 4.4 - 5.9 10e12/L 2.81 (L) 1.69 (L) 2.61 (L)   MCV Latest Ref  Range: 78 - 100 fl 94 99 96   MCH Latest Ref Range: 26.5 - 33.0 pg 31.0 30.2 30.7   MCHC Latest Ref Range: 31.5 - 36.5 g/dL 32.8 30.5 (L) 31.9   RDW Latest Ref Range: 10.0 - 15.0 % 13.3 13.3 13.2     A/P:  ID: Theo Roblero is a 55 year old male with mantle cell lymphoma, admitted for auto BMT with BEAM prep, today is D+4.     1.  BMT:   - BEAM prep, tolerated well  - Palifermin D0, D+1 and D+2  - Flush and pre-meds prior to transplant. Cell dose 4.3 million, collected with GCSf and mozobil  - GCSF starts d+5 (11/15) and cont to until ANC>2500 x2 consecutive days. Re-stage per protocol.     2.  HEME: initial hemoglobin came back at 5.1 which seems a little off given that it was 8.7 yesterday so we are redrawing and it came back at 8  -- Keep Hgb>7 and plts>10K. Premeds for blood products (no hx of reaction, but has received premeds in the past)  - pancytopenia due to chemotherapy               3.  ID: afebrile.  - prophy Levo, Fluc, and HD ACV (CMV+, HSV+, EBV+) prophy.   - Bactrim or appropriate PCP therapy to start d+28.                                             4.  GI: no complaints  - prn zofran and compazine  - Protonix for GI prophy.     5.  FEN/Renal: no issues  - Cr and lytes stable     6. Endo  - Hx DM type II. On metformin and glimepiride on admission. Held both due to potential for n/v with prep; also glimepiride can inhibit engraftment  - BG checks QID with meals and bedtime.   - NPH 22 units qam and 8 units qpm  - fixed mealtime novolog 8 units  - high sliding scale with meals and bedtime  - after engraftment, can transition back to home regimen     7. CV  - HTN: on metoprolol BID     8. Hx tobacco abuse, previously used chewing tobacco. Continue nicotine patch.      Final Plan:  - Daily visits  - G-CSF starts tomorrow    Ceci Fowler MD

## 2020-11-14 NOTE — LETTER
11/14/2020         RE: Theo Mcknight  [address]  [address] [place] [id number]        Dear Colleague,    Thank you for referring your patient, Theo Mcknight, to the [facility] BLOOD AND MARROW TRANSPLANT PROGRAM [place]. Please see a copy of my visit note below.    BMT Clinic Note  Nov 14, 2020       ID: Theo Mcknight is a 55 year old male, currently day +4 of his autologous hematopoietic cell transplant.     HPI: Libra returns for follow up. Today he notes that he is feeling ok. No fevers or chills. No chest pain or SOB, no nausea or vomiting, no bleeding or bruising. Bowels ok. No diarrhea. Feeling ok.    ROS: 8 point review with negative except as stated above in HPI     Physical Exam  /66   Pulse 110   Temp 98  F (36.7  C)   Resp 18   Wt 115.2 kg (254 lb)   SpO2 100%   BMI 33.85 kg/m      General: NAD   Eyes: sclera anicteric   Lungs: CTA bilaterally  Cardiovascular: RRR, no M/R/G   Abdominal/Rectal: +BS, soft, NT, ND, No HSM   Lymphatics: No edema  Skin: no rash   Neuro: A&O   Additional Findings: Forte site NT left chest wall     Lab  Results for LIBRA MCKNIGHT (MRN [id number]) as of 11/14/2020 08:48   Ref. Range 11/14/2020 08:06   Sodium Latest Ref Range: 133 - 144 mmol/L 138   Potassium Latest Ref Range: 3.4 - 5.3 mmol/L 4.4   Chloride Latest Ref Range: 94 - 109 mmol/L 106   Carbon Dioxide Latest Ref Range: 20 - 32 mmol/L 27   Urea Nitrogen Latest Ref Range: 7 - 30 mg/dL 26   Creatinine Latest Ref Range: 0.66 - 1.25 mg/dL 1.01   GFR Estimate Latest Ref Range: >60 mL/min/1.73_m2 82   GFR Estimate If Black Latest Ref Range: >60 mL/min/1.73_m2 >90   Calcium Latest Ref Range: 8.5 - 10.1 mg/dL 8.2 (L)   Anion Gap Latest Ref Range: 3 - 14 mmol/L 4     Results for LIBRA MCKNIGHT (MRN [id number]) as of 11/14/2020 08:48   Ref. Range 11/13/2020 10:18 11/14/2020 08:06 11/14/2020 08:29   WBC Latest Ref Range: 4.0 - 11.0 10e9/L 0.6 (LL) 0.1 (LL) 0.2 (LL)   Hemoglobin Latest  Ref Range: 13.3 - 17.7 g/dL 8.7 (L) 5.1 (LL) 8.0 (L)   Hematocrit Latest Ref Range: 40.0 - 53.0 % 26.5 (L) 16.7 (L) 25.1 (L)   Platelet Count Latest Ref Range: 150 - 450 10e9/L 118 (L) 57 (L) 90 (L)   RBC Count Latest Ref Range: 4.4 - 5.9 10e12/L 2.81 (L) 1.69 (L) 2.61 (L)   MCV Latest Ref Range: 78 - 100 fl 94 99 96   MCH Latest Ref Range: 26.5 - 33.0 pg 31.0 30.2 30.7   MCHC Latest Ref Range: 31.5 - 36.5 g/dL 32.8 30.5 (L) 31.9   RDW Latest Ref Range: 10.0 - 15.0 % 13.3 13.3 13.2     A/P:  ID: Theo Roblero is a 55 year old male with mantle cell lymphoma, admitted for auto BMT with BEAM prep, today is D+4.     1.  BMT:   - BEAM prep, tolerated well  - Palifermin D0, D+1 and D+2  - Flush and pre-meds prior to transplant. Cell dose 4.3 million, collected with GCSf and mozobil  - GCSF starts d+5 (11/15) and cont to until ANC>2500 x2 consecutive days. Re-stage per protocol.     2.  HEME: initial hemoglobin came back at 5.1 which seems a little off given that it was 8.7 yesterday so we are redrawing and it came back at 8  -- Keep Hgb>7 and plts>10K. Premeds for blood products (no hx of reaction, but has received premeds in the past)  - pancytopenia due to chemotherapy               3.  ID: afebrile.  - prophy Levo, Fluc, and HD ACV (CMV+, HSV+, EBV+) prophy.   - Bactrim or appropriate PCP therapy to start d+28.                                             4.  GI: no complaints  - prn zofran and compazine  - Protonix for GI prophy.     5.  FEN/Renal: no issues  - Cr and lytes stable     6. Endo  - Hx DM type II. On metformin and glimepiride on admission. Held both due to potential for n/v with prep; also glimepiride can inhibit engraftment  - BG checks QID with meals and bedtime.   - NPH 22 units qam and 8 units qpm  - fixed mealtime novolog 8 units  - high sliding scale with meals and bedtime  - after engraftment, can transition back to home regimen     7. CV  - HTN: on metoprolol BID     8. Hx tobacco abuse,  previously used chewing tobacco. Continue nicotine patch.      Final Plan:  - Daily visits  - G-CSF starts tomorrow    Ceci Fowler MD        Again, thank you for allowing me to participate in the care of your patient.        Sincerely,        BMT DOM

## 2020-11-14 NOTE — NURSING NOTE
"Oncology Rooming Note    November 14, 2020 8:32 AM   Theo Roblero is a 55 year old male who presents for:    Chief Complaint   Patient presents with     RECHECK     post bmt for lymphoma here for labs and md visit     Initial Vitals: /66   Pulse 110   Temp 98  F (36.7  C)   Resp 18   Wt 115.2 kg (254 lb)   SpO2 100%   BMI 33.85 kg/m   Estimated body mass index is 33.85 kg/m  as calculated from the following:    Height as of 11/13/20: 1.845 m (6' 0.64\").    Weight as of this encounter: 115.2 kg (254 lb). Body surface area is 2.43 meters squared.  No Pain (0) Comment: Data Unavailable   No LMP for male patient.  Allergies reviewed: Yes  Medications reviewed: Yes    Medications: Medication refills not needed today.  Pharmacy name entered into Sur[employer]:    [id number] - [place], [place] - [address] AT [facility] [address]  [facility]    Clinical concerns: none       Mony Velásquez RN              "

## 2020-11-15 NOTE — LETTER
11/15/2020         RE: Theo Mcknight  [address]  [address]  [place] [address]        Dear Colleague,    Thank you for referring your patient, Theo Mcknight, to the [facility] BLOOD AND MARROW TRANSPLANT PROGRAM [place]. Please see a copy of my visit note below.    BMT Clinic Note  Nov 15, 2020       ID: Theo Mcknight is a 55 year old male, currently day +5 of his autologous hematopoietic cell transplant.     HPI: Libra returns for follow up. Notes that the stools are starting to get soft. No abdominal cramping, no mouth sores. Still able to eat and drink ok. No fevers, no bleeding, no new cough or infectious symptoms.    ROS: 8 point review with negative except as stated above in HPI     Physical Exam  /73   Pulse 123   Temp 98.8  F (37.1  C)   Resp 18   Wt 113.4 kg (250 lb)   SpO2 100%   BMI 33.31 kg/m      General: NAD   Eyes: sclera anicteric   Lungs: CTA bilaterally  Cardiovascular: RRR, no M/R/G   Lymphatics: No edema  Skin: no rash   Neuro: A&O   Additional Findings: Forte site NT left chest wall     Lab  Results for LIBRA MCKNIGHT (MRN [id number]) as of 11/15/2020 08:21   Ref. Range 11/15/2020 07:58   Sodium Latest Ref Range: 133 - 144 mmol/L 134   Potassium Latest Ref Range: 3.4 - 5.3 mmol/L 4.2   Chloride Latest Ref Range: 94 - 109 mmol/L 104   Carbon Dioxide Latest Ref Range: 20 - 32 mmol/L 23   Urea Nitrogen Latest Ref Range: 7 - 30 mg/dL 25   Creatinine Latest Ref Range: 0.66 - 1.25 mg/dL 0.91   GFR Estimate Latest Ref Range: >60 mL/min/1.73_m2 >90   GFR Estimate If Black Latest Ref Range: >60 mL/min/1.73_m2 >90   Calcium Latest Ref Range: 8.5 - 10.1 mg/dL 8.3 (L)   Anion Gap Latest Ref Range: 3 - 14 mmol/L 6   Magnesium Latest Ref Range: 1.6 - 2.3 mg/dL 1.8   Glucose Latest Ref Range: 70 - 99 mg/dL 94   WBC Latest Ref Range: 4.0 - 11.0 10e9/L 0.1 (LL)   Hemoglobin Latest Ref Range: 13.3 - 17.7 g/dL 8.3 (L)   Hematocrit Latest Ref Range: 40.0 - 53.0 % 25.1 (L)    Platelet Count Latest Ref Range: 150 - 450 10e9/L 54 (L)   RBC Count Latest Ref Range: 4.4 - 5.9 10e12/L 2.67 (L)   MCV Latest Ref Range: 78 - 100 fl 94   MCH Latest Ref Range: 26.5 - 33.0 pg 31.1   MCHC Latest Ref Range: 31.5 - 36.5 g/dL 33.1   RDW Latest Ref Range: 10.0 - 15.0 % 13.0   Diff Method Unknown WBC <0.5, Diff not done     A/P:  ID: Theo Roblero is a 55 year old male with mantle cell lymphoma, admitted for auto BMT with BEAM prep, today is D+5.     1.  BMT:   - BEAM prep, tolerated well  - Palifermin D0, D+1 and D+2  - Flush and pre-meds prior to transplant. Cell dose 4.3 million, collected with GCSf and mozobil  - GCSF starts d+5 (11/15) and cont to until ANC>2500 x2 consecutive days. Re-stage per protocol.     2.  HEME: counts trending down as expected  -- Keep Hgb>7 and plts>10K. Premeds for blood products (no hx of reaction, but has received premeds in the past)  - pancytopenia due to chemotherapy               3.  ID: afebrile. No current issues. Confirms taking all prophy abx  - prophy Levo, Fluc, and HD ACV (CMV+, HSV+, EBV+) prophy.   - Bactrim or appropriate PCP therapy to start d+28.                                             4.  GI: start of soft stools.   - prn zofran and compazine  - Protonix for GI prophy.     5.  FEN/Renal: no issues and still eating ok  - Cr and lytes stable: no replacement needed     6. Endo: blood sugar good today  - Hx DM type II. On metformin and glimepiride on admission. Held both due to potential for n/v with prep; also glimepiride can inhibit engraftment  - BG checks QID with meals and bedtime.   - NPH 22 units qam and 8 units qpm  - fixed mealtime novolog 8 units  - high sliding scale with meals and bedtime  - after engraftment, can transition back to home regimen     7. CV: BP good  - HTN: on metoprolol BID     8. Hx tobacco abuse, previously used chewing tobacco. Continue nicotine patch.      Final Plan:  - Daily visits  - G-CSF starts tomorrow    Ceci  Sofía Fowler MD        Again, thank you for allowing me to participate in the care of your patient.        Sincerely,        BMT DOM

## 2020-11-15 NOTE — PROGRESS NOTES
BMT Clinic Note  Nov 15, 2020       ID: Theo Mcknight is a 55 year old male, currently day +5 of his autologous hematopoietic cell transplant.     HPI: Libra returns for follow up. Notes that the stools are starting to get soft. No abdominal cramping, no mouth sores. Still able to eat and drink ok. No fevers, no bleeding, no new cough or infectious symptoms.    ROS: 8 point review with negative except as stated above in HPI     Physical Exam  /73   Pulse 123   Temp 98.8  F (37.1  C)   Resp 18   Wt 113.4 kg (250 lb)   SpO2 100%   BMI 33.31 kg/m      General: NAD   Eyes: sclera anicteric   Lungs: CTA bilaterally  Cardiovascular: RRR, no M/R/G   Lymphatics: No edema  Skin: no rash   Neuro: A&O   Additional Findings: Forte site NT left chest wall     Lab  Results for LIBRA MCKNIGHT (MRN [id number]) as of 11/15/2020 08:21   Ref. Range 11/15/2020 07:58   Sodium Latest Ref Range: 133 - 144 mmol/L 134   Potassium Latest Ref Range: 3.4 - 5.3 mmol/L 4.2   Chloride Latest Ref Range: 94 - 109 mmol/L 104   Carbon Dioxide Latest Ref Range: 20 - 32 mmol/L 23   Urea Nitrogen Latest Ref Range: 7 - 30 mg/dL 25   Creatinine Latest Ref Range: 0.66 - 1.25 mg/dL 0.91   GFR Estimate Latest Ref Range: >60 mL/min/1.73_m2 >90   GFR Estimate If Black Latest Ref Range: >60 mL/min/1.73_m2 >90   Calcium Latest Ref Range: 8.5 - 10.1 mg/dL 8.3 (L)   Anion Gap Latest Ref Range: 3 - 14 mmol/L 6   Magnesium Latest Ref Range: 1.6 - 2.3 mg/dL 1.8   Glucose Latest Ref Range: 70 - 99 mg/dL 94   WBC Latest Ref Range: 4.0 - 11.0 10e9/L 0.1 (LL)   Hemoglobin Latest Ref Range: 13.3 - 17.7 g/dL 8.3 (L)   Hematocrit Latest Ref Range: 40.0 - 53.0 % 25.1 (L)   Platelet Count Latest Ref Range: 150 - 450 10e9/L 54 (L)   RBC Count Latest Ref Range: 4.4 - 5.9 10e12/L 2.67 (L)   MCV Latest Ref Range: 78 - 100 fl 94   MCH Latest Ref Range: 26.5 - 33.0 pg 31.1   MCHC Latest Ref Range: 31.5 - 36.5 g/dL 33.1   RDW Latest Ref Range: 10.0 - 15.0 % 13.0    Diff Method Unknown WBC <0.5, Diff not done     A/P:  ID: Theo Roblero is a 55 year old male with mantle cell lymphoma, admitted for auto BMT with BEAM prep, today is D+5.     1.  BMT:   - BEAM prep, tolerated well  - Palifermin D0, D+1 and D+2  - Flush and pre-meds prior to transplant. Cell dose 4.3 million, collected with GCSf and mozobil  - GCSF starts d+5 (11/15) and cont to until ANC>2500 x2 consecutive days. Re-stage per protocol.     2.  HEME: counts trending down as expected  -- Keep Hgb>7 and plts>10K. Premeds for blood products (no hx of reaction, but has received premeds in the past)  - pancytopenia due to chemotherapy               3.  ID: afebrile. No current issues. Confirms taking all prophy abx  - prophy Levo, Fluc, and HD ACV (CMV+, HSV+, EBV+) prophy.   - Bactrim or appropriate PCP therapy to start d+28.                                             4.  GI: start of soft stools.   - prn zofran and compazine  - Protonix for GI prophy.     5.  FEN/Renal: no issues and still eating ok  - Cr and lytes stable: no replacement needed     6. Endo: blood sugar good today  - Hx DM type II. On metformin and glimepiride on admission. Held both due to potential for n/v with prep; also glimepiride can inhibit engraftment  - BG checks QID with meals and bedtime.   - NPH 22 units qam and 8 units qpm  - fixed mealtime novolog 8 units  - high sliding scale with meals and bedtime  - after engraftment, can transition back to home regimen     7. CV: BP good  - HTN: on metoprolol BID     8. Hx tobacco abuse, previously used chewing tobacco. Continue nicotine patch.      Final Plan:  - Daily visits  - G-CSF starts tomorrow    Ceci Fowler MD

## 2020-11-15 NOTE — NURSING NOTE
"Oncology Rooming Note    November 15, 2020 8:03 AM   Theo Roblero is a 55 year old male who presents for:    Chief Complaint   Patient presents with     RECHECK     post bmt for lymphoma here for labs and md visit with GCSF inj     Initial Vitals: /73   Pulse 123   Temp 98.8  F (37.1  C)   Resp 18   Wt 113.4 kg (250 lb)   SpO2 100%   BMI 33.31 kg/m   Estimated body mass index is 33.31 kg/m  as calculated from the following:    Height as of 11/13/20: 1.845 m (6' 0.64\").    Weight as of this encounter: 113.4 kg (250 lb). Body surface area is 2.41 meters squared.  No Pain (0) Comment: Data Unavailable   No LMP for male patient.  Allergies reviewed: Yes  Medications reviewed: Yes    Medications: Medication refills not needed today.  Pharmacy name entered into [employer]:    [id number] - [place], [place] - [address] AT [facility] [address]  [facility]    Clinical concerns: none     RAC flushes well but is very positional and sluggish at drawing back blood, TPA ordered    TPA instilled x 30 min  Blue port flushes and draws back well, brown port flushes well but still does not draw back  Both lines heparin locked  Mony Velásquez RN              "

## 2020-11-16 PROBLEM — R50.81 NEUTROPENIC FEVER (H): Status: ACTIVE | Noted: 2020-01-01

## 2020-11-16 PROBLEM — D70.9 NEUTROPENIC FEVER (H): Status: ACTIVE | Noted: 2020-01-01

## 2020-11-16 NOTE — LETTER
11/16/2020         RE: Theo Mcknight  [address]  [address]  [place] [address]        Dear Colleague,    Thank you for referring your patient, Theo Mcknight, to the [facility] [place]. Please see a copy of my visit note below.    BMT Clinic Note  Nov 16, 2020       ID: Theo Mcknight is a 55 year old male, currently day +6 of his autologous hematopoietic cell transplant.     HPI: Libra returns for follow up. Notes that he feels awful. Really got worse last night and much more so this morning. Increased diarrhea yesterday, newly nauseated this morning. Able to eat and drink and keeps pills down but certainly having an upset stomach now but no emesis. Also ongoing diarrhea several times this morning. No bleeding. No cough, no SOB, no cold symptoms. Denies chills. Didn't know he had a fever - but admits to not feeling well.      ROS: 8 point review with negative except as stated above in HPI     Physical Exam  /65   Pulse 123   Temp 101.4  F (38.6  C) (Tympanic)   Resp 18   Wt 112.5 kg (248 lb)   SpO2 100%   BMI 33.05 kg/m      General: NAD   Eyes: sclera anicteric   Lungs: CTA bilaterally  Cardiovascular: RRR, no M/R/G   Lymphatics: No edema  Skin: no rash   Neuro: A&O   Additional Findings: Forte site NT left chest wall     Lab  Results for LIBRA MCKNIGHT (MRN [id number]) as of 11/15/2020 08:21    A/P:  ID: Theo Mcknight is a 55 year old male with mantle cell lymphoma, admitted for auto BMT with BEAM prep, today is D+6.     1.  BMT:   - BEAM prep, tolerated well  - Palifermin D0, D+1 and D+2  - Flush and pre-meds prior to transplant. Cell dose 4.3 million, collected with GCSf and mozobil  - GCSF starts d+5 (11/15) and cont to until ANC>2500 x2 consecutive days. Re-stage per protocol.  - Given GCSF in clinic today prior to hospital admission.      2.  HEME: counts trending down as expected  -- Keep Hgb>7 and plts>10K. Premeds for blood  products (no hx of reaction, but has received premeds in the past)  - pancytopenia due to chemotherapy  - Plts 14k - no bleeding. LIkely give plts today given drop from 54k.                3.  ID: Tmax 101.4 - GI symptoms (n/d) no other focal symptoms;  - BP softer but not dizzy.   - Blood culture x2; Give Vanco/Rocephin in BMT clinic.   - Will need COVID rule out at hospital given fever though my suspicion is low.   - prophy Levo, Fluc, and HD ACV (CMV+, HSV+, EBV+) prophy.   - Bactrim or appropriate PCP therapy to start d+28.                                             4.  GI: New diarrhea last 24 hrs + newly nauseated. No abdominal pain.   - Given 1mg IV ativan in clinic. Likely benefit from scheduled antiemetics.   - prn zofran and compazine  - Protonix for GI prophy.     5.  FEN/Renal: no issues and still eating ok  - Cr okay 1.09. Monitor closely with vanco x1  - Give 1L NS bolus given GI losses     6. Endo: blood sugar good today  - Hx DM type II. On metformin and glimepiride on admission. Held both due to potential for n/v with prep; also glimepiride can inhibit engraftment  - BG checks QID with meals and bedtime.   - NPH 22 units qam and 8 units qpm  - fixed mealtime novolog 8 units  - high sliding scale with meals and bedtime  - after engraftment, can transition back to home regimen     7. CV: BP good  - HTN: on metoprolol BID- /65, HR:      8. Hx tobacco abuse, previously used chewing tobacco. Continue nicotine patch.      Final Plan:  - Admit to  for neutropenic fever      RADHA Duarte-C  [phone]

## 2020-11-16 NOTE — H&P
BMT History & Physical     Admission  Name: Theo Roblero  Date:  11/16/2020  Service: BMT   Chief Complaint:  Neutropenic Fever   Informant:  Patient and Chart  Resuscitation Status: Full Code    Patient ID:  Theo Roblero is a 55 year old male, currently day +6 of his BEAM autologous hematopoietic cell transplant.    Yanick returned to clinic for follow up today and noted that he felt awful. Really got worse last night and much more so this morning. Increased diarrhea yesterday, newly nauseated this morning. Able to eat and drink and keeps pills down but certainly having an upset stomach now but no emesis. Also ongoing diarrhea several times this morning. No bleeding. No cough, no SOB, no cold symptoms. Denies chills. Didn't know he had a fever - but admits to not feeling well. Now being admitted to  for neutropenic fever.     I have assessed all abnormal lab values for their clinical significance and any values considered clinically significant have been addressed in the assessment and plan    Family History:   Family History   Problem Relation Age of Onset     Heart Disease Mother      Lymphoma Mother      Heart Disease Father      Heart Disease Sister        Social History:   Social History     Socioeconomic History     Marital status: Single     Spouse name: Not on file     Number of children: Not on file     Years of education: Not on file     Highest education level: Not on file   Occupational History     Not on file   Social Needs     Financial resource strain: Not on file     Food insecurity     Worry: Not on file     Inability: Not on file     Transportation needs     Medical: Not on file     Non-medical: Not on file   Tobacco Use     Smoking status: Never Smoker     Smokeless tobacco: Current User     Types: Chew     Tobacco comment: trying to quit   Substance and Sexual Activity     Alcohol use: Not Currently     Drug use: Never     Sexual activity: Not on file   Lifestyle     Physical  activity     Days per week: Not on file     Minutes per session: Not on file     Stress: Not on file   Relationships     Social connections     Talks on phone: Not on file     Gets together: Not on file     Attends Christianity service: Not on file     Active member of club or organization: Not on file     Attends meetings of clubs or organizations: Not on file     Relationship status: Not on file     Intimate partner violence     Fear of current or ex partner: Not on file     Emotionally abused: Not on file     Physically abused: Not on file     Forced sexual activity: Not on file   Other Topics Concern     Not on file   Social History Narrative     Not on file       Past Medical History:   Past Medical History:   Diagnosis Date     Kidney stones 06/2020    left     Sepsis without septic shock (H) 05/12/2020    seconary tp 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 laser lithotripsy Left 06/04/2020    treat kidney stones     INSERT CATHETER VASCULAR ACCESS Left 10/26/2020    Procedure: dual lumen non valved tunneled line vascular placement;  Surgeon: Alessandro Steven MD;  Location: [facility] OR     IR CVC TUNNEL PLACEMENT > 5 YRS OF AGE  10/26/2020     port a cath placement Right 02/18/2020    internal jugular vein       Allergies: No Known Allergies    Home Medications      Prior to Admission medications    Medication Sig Start Date End Date Taking? Authorizing Provider   acyclovir (ZOVIRAX) 800 MG tablet Take 1 tablet (800 mg) by mouth 2 times daily 11/10/20   Annabel Holm PA-C   Alcohol Swabs PADS 1 pad 4 times daily (before meals and nightly) 11/11/20   Alexa Medrano PA-C   blood glucose (NO BRAND SPECIFIED) test strip Use to test blood sugar 4 times daily or as  directed. 11/11/20   Alexa Medrano PA-C   blood glucose monitoring (ACCU-CHEK FASTCLIX) lancets Use to test blood sugar 4 times daily or as directed. 11/11/20   Alexa Medrano PA-C   fluconazole (DIFLUCAN) 200 MG tablet Take 1 tablet (200 mg) by mouth daily 11/11/20   Annabel Holm PA-C   heparin lock flush 10 UNIT/ML SOLN injection 5-10 mLs by Intracatheter route every 24 hours 11/11/20   Annabel Holm PA-C   insulin aspart (NOVOLOG PEN) 100 UNIT/ML pen Inject 8 units with meals three times daily, as well as sliding scale four times daily with meals and at bedtime 11/11/20   Alexa Medrano PA-C   insulin isophane human (HUMULIN N PEN) 100 UNIT/ML injection Inject 22 units in the morning and 8 units in the evening 11/11/20   Alexa Medrano PA-C   insulin pen needle (32G X 4 MM) 32G X 4 MM miscellaneous Use 4 pen needles daily or as directed. 11/11/20   Alexa Medrano PA-C   levofloxacin (LEVAQUIN) 250 MG tablet Take 1 tablet (250 mg) by mouth daily 11/10/20   Annabel Holm PA-C   metoprolol succinate ER (TOPROL-XL) 25 MG 24 hr tablet Take 1 tablet (25 mg) by mouth 2 times daily 11/10/20   Annabel Holm PA-C   nicotine (NICODERM CQ) 21 MG/24HR 24 hr patch Place 1 patch onto the skin daily 11/10/20   Annabel Holm PA-C   ondansetron (ZOFRAN) 8 MG tablet Take 1 tablet (8 mg) by mouth every 8 hours as needed for nausea  Patient not taking: Reported on 11/13/2020 11/10/20   Annabel Holm PA-C   pantoprazole (PROTONIX) 40 MG EC tablet Take 1 tablet (40 mg) by mouth daily 11/11/20   Annabel Holm PA-C   prochlorperazine (COMPAZINE) 5 MG tablet Take 1-2 tablets (5-10 mg) by mouth every 6 hours as needed for nausea or vomiting  Patient not taking: Reported on 11/13/2020 11/10/20   Annabel Holm PA-C   sulfamethoxazole-trimethoprim (BACTRIM DS) 800-160 MG tablet Take 1 tablet by mouth Every Mon, Tues two times daily DO NOT START until directed by your provider  Patient not  taking: Reported on 11/12/2020 12/8/20   Annabel Holm, AVEL       Review of Systems    Per HPI above otherwise remainder of 10 point ROS negative.     PHYSICAL EXAM      Weight     Wt Readings from Last 3 Encounters:   11/16/20 112.5 kg (248 lb)   11/15/20 113.4 kg (250 lb)   11/14/20 115.2 kg (254 lb)        KPS: 70    There were no vitals taken for this visit.     General: NAD   Eyes: MARYAN, sclera anicteric   Nose/Mouth/Throat: OP clear, buccal mucosa moist, no ulcerations   Lungs: CTA bilaterally  Cardiovascular: RRR, no M/R/G   Abdominal/Rectal: +BS, soft, NT, ND, No HSM   Lymphatics: No edema  Skin: No rashes or petechaie  Neuro: A&O   Additional Findings: Forte site NT, no drainage. Port-a-cath not accessed.     ASSESSMENT BY SYSTEMS   ID: Theo Roblero is a 55 year old male with mantle cell lymphoma, recently admitted for auto BMT with BEAM prep, today is D+6. Re-admitted with neutropenic fever.      1.  BMT:   - BEAM prep, tolerated well  - Palifermin complete.  - Cell dose 4.3 million.  - GCSF started d+5 and cont to until ANC>2500 x2 consecutive days.   - Given GCSF in clinic today prior to hospital admission.   - Re-stage per protocol.    2.  HEME:   -- Keep Hgb>7 and plts>10K. Premeds for blood products (no hx of reaction, but has received premeds in the past)  -- pancytopenia due to chemotherapy  -- plts today with drop from 54 to 14 and high fevers which will increase consumption.           3.  ID:    SEPSIS: Febrile and tachy 130-160.  - Tmax 101.4 - GI symptoms (n/d) no other focal symptoms.  - Blood culture x2; Given Vanco/Rocephin in BMT clinic. Start cefepime on admit will also continue vanco with his vitals currently.   - Will need COVID rule out at hospital given fever though my suspicion is low.   - prophy Fluc, and HD ACV (CMV+, HSV+, EBV+) prophy.   - Bactrim or appropriate PCP therapy to start d+28.                                             4.  GI:   - New diarrhea last 24 hrs +  newly nauseated. No abdominal pain. Will check C.diff and enteric on admit.  - prn zofran and compazine  - Protonix for GI prophy.     5.  FEN/Renal:   - Monitor Cr/lytes.   - Hyponatremia: 127. Given a liter of 0.9% in clinic. Will continue at 75/hr. Repeat Na at 1600.     6. Endo: blood sugar good today  - Hx DM type II. On metformin and glimepiride on admission. Held both due to potential for n/v with prep; also glimepiride can inhibit engraftment  - BG checks QID with meals and bedtime.   - NPH 22 units qam and 8 units qpm  - fixed mealtime novolog 8 units. Didn't order this on admit.  - high sliding scale with meals and bedtime  - after engraftment, can transition back to home regimen     7. CV:   - HTN: on metoprolol BID ordered hold parameters.  - Tachy on arrival: sepsis? EKG sinus tach. Asymptomatic. On tele. Fluid resuscitation. BP okay.     8. Hx tobacco abuse, previously used chewing tobacco. Continue nicotine patch.     Diane Morgan     Patient has been seen and evaluated by me. I have reviewed today's vital signs, medications, labs and imaging results independently. I have discussed the plan with the team and agree with the findings and plan in this note.      55 years old male, day +6 s/p autologous HSCT for mantle cell lymphoma. Admitted with neutropenic fever with sepsis, mucositis and diarrhea.    Labs: significant for Na 127    A/p Day + 6 s/p autologous HSCT for mantle cell lymphoma.  Neutropenic fever: pancultured, on Vanco and cefipime  Diarrhea: send stool for C diff  Supportive care as above    Gracia Martinez MD

## 2020-11-16 NOTE — PHARMACY-VANCOMYCIN DOSING SERVICE
Pharmacy Vancomycin Initial Note  Date of Service 2020  Patient's  1965  55 year old, male  Actual Body Weight: 114 kg     Indication: Febrile Neutropenia    Current estimated CrCl = Estimated Creatinine Clearance: 98.7 mL/min (based on SCr of 1.09 mg/dL).    Creatinine for last 3 days  2020:  8:06 AM Creatinine 1.01 mg/dL  11/15/2020:  7:58 AM Creatinine 0.91 mg/dL  2020: 10:05 AM Creatinine 1.09 mg/dL    Recent Vancomycin Level(s) for last 3 days  No results found for requested labs within last 72 hours.      Vancomycin IV Administrations (past 72 hours)                   vancomycin (VANCOCIN) 2,000 mg in sodium chloride 0.9 % 500 mL intermittent infusion (mg) 2,000 mg New Bag 20 1135                Nephrotoxins and other renal medications (From now, onward)    Start     Dose/Rate Route Frequency Ordered Stop    20  acyclovir (ZOVIRAX) tablet 800 mg      800 mg Oral 2 TIMES DAILY 20 1403            Contrast Orders - past 72 hours (72h ago, onward)    None              Plan:  1.  Start vancomycin  1750 mg IV q12h.   2.  Goal Trough Level: 10-15 mg/L   3.  Pharmacy will check trough levels as appropriate in 1-3 Days.    4.  Serum creatinine levels will be ordered daily for the first week of therapy and at least twice weekly for subsequent weeks.      Rona Grimaldo, PharmD  [phone] (text capable)

## 2020-11-16 NOTE — NURSING NOTE
Chief Complaint   Patient presents with     Lab Only     cvc, heparin locked, vitals checked     Mony Velásquez RN on 11/16/2020 at 10:14 AM

## 2020-11-16 NOTE — PROGRESS NOTES
BMT Clinic Note  Nov 16, 2020       ID: Theo Mcknight is a 55 year old male, currently day +6 of his autologous hematopoietic cell transplant.     HPI: Libra returns for follow up. Notes that he feels awful. Really got worse last night and much more so this morning. Increased diarrhea yesterday, newly nauseated this morning. Able to eat and drink and keeps pills down but certainly having an upset stomach now but no emesis. Also ongoing diarrhea several times this morning. No bleeding. No cough, no SOB, no cold symptoms. Denies chills. Didn't know he had a fever - but admits to not feeling well.      ROS: 8 point review with negative except as stated above in HPI     Physical Exam  /65   Pulse 123   Temp 101.4  F (38.6  C) (Tympanic)   Resp 18   Wt 112.5 kg (248 lb)   SpO2 100%   BMI 33.05 kg/m      General: NAD   Eyes: sclera anicteric   Lungs: CTA bilaterally  Cardiovascular: RRR, no M/R/G   Lymphatics: No edema  Skin: no rash   Neuro: A&O   Additional Findings: Forte site NT left chest wall     Lab  Results for LIBRA MCKNIGHT (MRN [id number]) as of 11/15/2020 08:21    A/P:  ID: Theo Mcknight is a 55 year old male with mantle cell lymphoma, admitted for auto BMT with BEAM prep, today is D+6.     1.  BMT:   - BEAM prep, tolerated well  - Palifermin D0, D+1 and D+2  - Flush and pre-meds prior to transplant. Cell dose 4.3 million, collected with GCSf and mozobil  - GCSF starts d+5 (11/15) and cont to until ANC>2500 x2 consecutive days. Re-stage per protocol.  - Given GCSF in clinic today prior to hospital admission.      2.  HEME: counts trending down as expected  -- Keep Hgb>7 and plts>10K. Premeds for blood products (no hx of reaction, but has received premeds in the past)  - pancytopenia due to chemotherapy  - Plts 14k - no bleeding. LIkely give plts today given drop from 54k.                3.  ID: Tmax 101.4 - GI symptoms (n/d) no other focal symptoms;  - BP softer but not dizzy.   - Blood  culture x2; Give Vanco/Rocephin in BMT clinic.   - Will need COVID rule out at hospital given fever though my suspicion is low.   - prophy Levo, Fluc, and HD ACV (CMV+, HSV+, EBV+) prophy.   - Bactrim or appropriate PCP therapy to start d+28.                                             4.  GI: New diarrhea last 24 hrs + newly nauseated. No abdominal pain.   - Given 1mg IV ativan in clinic. Likely benefit from scheduled antiemetics.   - prn zofran and compazine  - Protonix for GI prophy.     5.  FEN/Renal: no issues and still eating ok  - Cr okay 1.09. Monitor closely with vanco x1  - Give 1L NS bolus given GI losses     6. Endo: blood sugar good today  - Hx DM type II. On metformin and glimepiride on admission. Held both due to potential for n/v with prep; also glimepiride can inhibit engraftment  - BG checks QID with meals and bedtime.   - NPH 22 units qam and 8 units qpm  - fixed mealtime novolog 8 units  - high sliding scale with meals and bedtime  - after engraftment, can transition back to home regimen     7. CV: BP good  - HTN: on metoprolol BID- /65, HR:      8. Hx tobacco abuse, previously used chewing tobacco. Continue nicotine patch.      Final Plan:  - Admit to 5C for neutropenic fever      RADHA Duarte-C  [phone]

## 2020-11-16 NOTE — PROGRESS NOTES
Infusion Nursing Note:  Theo Roblero presents today for antibiotics.    Patient seen by provider today: Yes: Clemencia   present during visit today: Not Applicable.    Note: Patient received Rocephin, Vanco, Zarxio, 1L NS, Ativan.  Patient has neutropenic fever and will be admitted.    Intravenous Access:  Forte.    Treatment Conditions:  Lab Results   Component Value Date    HGB 8.0 11/16/2020     Lab Results   Component Value Date    WBC 0.0 11/16/2020      Lab Results   Component Value Date    ANEU 0.5 11/13/2020     Lab Results   Component Value Date    PLT 14 11/16/2020      Lab Results   Component Value Date     11/16/2020                   Lab Results   Component Value Date    POTASSIUM 4.0 11/16/2020           Lab Results   Component Value Date    MAG 1.8 11/16/2020            Lab Results   Component Value Date    CR 1.09 11/16/2020                   Lab Results   Component Value Date    BELA 8.4 11/16/2020                Lab Results   Component Value Date    BILITOTAL 0.7 11/12/2020           Lab Results   Component Value Date    ALBUMIN 3.0 11/12/2020                    Lab Results   Component Value Date    ALT 35 11/12/2020           Lab Results   Component Value Date    AST 12 11/12/2020       Results reviewed, labs MET treatment parameters, ok to proceed with treatment.      Post Infusion Assessment:  Patient tolerated infusion without incident.  Patient tolerated injection without incident.       Discharge Plan:   Discharge instructions reviewed with: Patient.  Patient and/or family verbalized understanding of transfer instructions and all questions answered.  Patient transferred to  in stable condition accompanied by: [facility].  Departure Mode: Cart  Brother is caretaker and is aware of the admission.    Skye Arreguin RN

## 2020-11-16 NOTE — PROGRESS NOTES
Admission SBAR:    Situation:  Why is the patient being admitted?  Fever    Background:  There were no vitals taken for this visit.  Major diagnosis: NHL  Type of donor: Autologous  Type of stem cells: PBSC  Relapsed? No  Summary of Interventions (if medications were administered, please specify time and color of lumen if applicable):    Antimicrobials? Yes: Vanco, Rocephin    Transfusions? No    Fluids? Yes: 1L NS    Neupogen? Yes: Zarxio    Other Medications? Yes: Ativan    Electrolytes? No    Blood cultures? Yes-2 Site(s): Small Bore CVC (e.g., Forte)    UA? No    UC? No    Stool culture? No    Respiratory cultures? No    Current type and cross? Yes      Assessment:  Potential Falls risk? Yes: weak  Accompanied by: brother is primary caretaker  Other Assessment: Patient here for regular check up and is febrile, patient reports not knowing he was febrile.  BP soft, patient reports havine increased amounts of nausea, diarrhea    Recommendations: Admit for further evaluation  Report called to Unit: 5C  Report called to Nurse: Mirian  Transported by: Transport services  Via: [facility]

## 2020-11-16 NOTE — LETTER
11/16/2020         RE: Theo Roblero  [address]  [address]  [place] [address]        Dear Colleague,    Thank you for referring your patient, Theo Roblero, to the [facility] BLO[facility] TRANSPL[facility] [place]. Please see a copy of my visit note below.    Infusion Nursing Note:  Theo Roblero presents today for antibiotics.    Patient seen by provider today: Yes: Clemencia   present during visit today: Not Applicable.    Note: Patient received Rocephin, Vanco, Zarxio, 1L NS, Ativan.  Patient has neutropenic fever and will be admitted.    Intravenous Access:  Forte.    Treatment Conditions:  Lab Results   Component Value Date    HGB 8.0 11/16/2020     Lab Results   Component Value Date    WBC 0.0 11/16/2020      Lab Results   Component Value Date    ANEU 0.5 11/13/2020     Lab Results   Component Value Date    PLT 14 11/16/2020      Lab Results   Component Value Date     11/16/2020                   Lab Results   Component Value Date    POTASSIUM 4.0 11/16/2020           Lab Results   Component Value Date    MAG 1.8 11/16/2020            Lab Results   Component Value Date    CR 1.09 11/16/2020                   Lab Results   Component Value Date    BELA 8.4 11/16/2020                Lab Results   Component Value Date    BILITOTAL 0.7 11/12/2020           Lab Results   Component Value Date    ALBUMIN 3.0 11/12/2020                    Lab Results   Component Value Date    ALT 35 11/12/2020           Lab Results   Component Value Date    AST 12 11/12/2020       Results reviewed, labs MET treatment parameters, ok to proceed with treatment.      Post Infusion Assessment:  Patient tolerated infusion without incident.  Patient tolerated injection without incident.       Discharge Plan:   Discharge instructions reviewed with: Patient.  Patient and/or family verbalized understanding of transfer instructions and all questions answered.  Patient transferred to  in stable  condition accompanied by: [facility].  Departure Mode: Cart  Brother is caretaker and is aware of the admission.    Skye Arreguin, RN                          Admission SBAR:    Situation:  Why is the patient being admitted?  Fever    Background:  There were no vitals taken for this visit.  Major diagnosis: NHL  Type of donor: Autologous  Type of stem cells: PBSC  Relapsed? No  Summary of Interventions (if medications were administered, please specify time and color of lumen if applicable):    Antimicrobials? Yes: Vanco, Rocephin    Transfusions? No    Fluids? Yes: 1L NS    Neupogen? Yes: Zarxio    Other Medications? Yes: Ativan    Electrolytes? No    Blood cultures? Yes-2 Site(s): Small Bore CVC (e.g., Forte)    UA? No    UC? No    Stool culture? No    Respiratory cultures? No    Current type and cross? Yes      Assessment:  Potential Falls risk? Yes: weak  Accompanied by: brother is primary caretaker  Other Assessment: Patient here for regular check up and is febrile, patient reports not knowing he was febrile.  BP soft, patient reports havine increased amounts of nausea, diarrhea    Recommendations: Admit for further evaluation  Report called to Unit: 5C  Report called to Nurse: Mirian  Transported by: Transport services  Via: [facility] Ambulance      Again, thank you for allowing me to participate in the care of your patient.        Sincerely,        [facility]

## 2020-11-16 NOTE — NURSING NOTE
"Oncology Rooming Note    November 16, 2020 10:48 AM   Theo Roblero is a 55 year old male who presents for:    Chief Complaint   Patient presents with     Lab Only     cvc, heparin locked, vitals checked     Initial Vitals: /65   Pulse 123   Temp 101.4  F (38.6  C) (Tympanic)   Resp 18   Wt 112.5 kg (248 lb)   SpO2 100%   BMI 33.05 kg/m   Estimated body mass index is 33.05 kg/m  as calculated from the following:    Height as of 11/13/20: 1.845 m (6' 0.64\").    Weight as of this encounter: 112.5 kg (248 lb). Body surface area is 2.4 meters squared.  Moderate Pain (4) Comment: Data Unavailable   No LMP for male patient.  Allergies reviewed: Yes  Medications reviewed: Yes    Medications: Medication refills not needed today.  Pharmacy name entered into [employer]:    [id number] - [place], [place] - [address] AT [facility] [address]  [facility]    Clinical concerns: No new concerns today.       Cyndi Lynn MA            "

## 2020-11-17 NOTE — PROGRESS NOTES
Patient admitted to:  Room 5476  Admitted from: clinic  Arrived by: litter  Reason for admission: neutropenic fever  Patient accompanied by: brother  Belongings: with pt  Teaching: completed  Skin double check completed by: Maricarmen Larose RN    No skin issues of note.

## 2020-11-17 NOTE — CONSULTS
Care Management Initial Consult    General Information  Assessment completed with: Yanick Porter  Type of CM/SW Visit: Initial Assessment  Primary Care Provider verified and updated as needed: (N/A)   Readmission within the last 30 days: other (see comments)(BMT complication)   Return Category: Post-op/Post-procedure complication  Reason for Consult: other (see comments)(BMT psychosocial assessment)  Advance Care Planning: Advance Care Planning Reviewed: education/resources on health care directives provided  provided [place] HCD - pt plans to f/u OP  General Information Comments: BMT    Communication Assessment  Patient's communication style: spoken language (English or Bilingual)    Hearing Difficulty or Deaf: no   Wear Glasses or Blind: yes    Cognitive  Cognitive/Neuro/Behavioral: WDL                      Living Environment:   People in home: parent(s)  Brother Carroll is staying as caregiver  Current living Arrangements: apartment      Able to return to prior arrangements: yes  Living Arrangement Comments: Brother is caregiver & dtr is staying with her mother (outside of the home)    Family/Social Support:  Care provided by: self  Provides care for: no one  Marital Status:   Sibling(s)          Description of Support System: Supportive    Support Assessment: Adequate family and caregiver support    Employment/Financial:  Employment Status: employed full-time     Employment/ Comments: currently on leave  Financial Concerns: other (see comments)(Co-Pay concerns - CSW assisting w/ grants)      Finance Comments: [employer] [place] applied    Lifestyle & Psychosocial Needs:        Socioeconomic History     Marital status: Single     Spouse name: Not on file     Number of children: Not on file     Years of education: Not on file     Highest education level: Not on file     Tobacco Use     Smoking status: Never Smoker     Smokeless tobacco: Current User     Types: Chew     Tobacco comment: trying to quit    Substance and Sexual Activity     Alcohol use: Not Currently     Drug use: Never       Functional Status:  Prior to admission patient needed assistance:   Dependent ADLs:: (Assist w/ IADLs post transplant)  Dependent IADLs:: Medication Management;Meal Preparation;Transportation;Laundry;Cooking;Cleaning  Assesssment of Functional Status: At functional baseline    Mental Health Status:  Mental Health Status: (No concerns)       Chemical Dependency Status:  Chemical Dependency Status: (No concerns)             Values/Beliefs:  Spiritual, Cultural Beliefs, Anglican Practices, Values that affect care: no               Additional Information:  BMT psychosocial assessment completed in clinic on 8/28/20, please review chart for inpatient review.    DIMITRI Shelby MSW, [facility]  Adult Blood & Marrow Transplant   Phone: [phone]  Pager: [phone]

## 2020-11-17 NOTE — PLAN OF CARE
PT 5C: PT order for evaluation and treatment acknowledged. Reviewed pt status with OT following OT evaluation. Anticipate only one therapy will need to follow pt during this hospital stay. PT on hold and will reassess status 11/19/20 to determine PT needs.

## 2020-11-17 NOTE — PLAN OF CARE
AOX4.  Tmax 100.5 MD notified.  On tele sinus Tach. OVSS. Denies N/V/D. No complaints of pain. Given prn x1 tylenol for temperature. Unit of RBC infusing now. Pt needs Stool sample. Had 1 BM and couldn't make to the toilet. Commode at bedside. Continue with POC.   Problem: Adult Inpatient Plan of Care  Goal: Plan of Care Review  Outcome: No Change  Goal: Patient-Specific Goal (Individualized)  Outcome: No Change  Goal: Absence of Hospital-Acquired Illness or Injury  Outcome: No Change  Intervention: Identify and Manage Fall Risk  Recent Flowsheet Documentation  Taken 11/16/2020 2200 by Alexa Nielsen RN  Safety Promotion/Fall Prevention:    clutter free environment maintained    fall prevention program maintained    nonskid shoes/slippers when out of bed  Intervention: Prevent Skin Injury  Recent Flowsheet Documentation  Taken 11/16/2020 2200 by Alexa Nielsen RN  Body Position: position changed independently  Intervention: Prevent Infection  Recent Flowsheet Documentation  Taken 11/16/2020 2200 by Alexa Nielsen RN  Infection Prevention:    equipment surfaces disinfected    rest/sleep promoted    single patient room provided  Goal: Optimal Comfort and Wellbeing  Outcome: No Change  Goal: Readiness for Transition of Care  Outcome: No Change     Problem: Fever (Fever with Neutropenia)  Goal: Baseline Body Temperature  Outcome: No Change     Problem: Infection Risk (Fever with Neutropenia)  Goal: Absence of Infection  Outcome: No Change  Intervention: Prevent Infection and Maximize Resistance  Recent Flowsheet Documentation  Taken 11/16/2020 2200 by Alexa Nielsen RN  Infection Prevention:    equipment surfaces disinfected    rest/sleep promoted    single patient room provided     Problem: Diarrhea  Goal: Fluid and Electrolyte Balance  Outcome: No Change  Intervention: Manage Diarrhea  Recent Flowsheet Documentation  Taken 11/16/2020 2200 by Alexa Nielsen RN  Fluid/Electrolyte Management: fluids  provided  Isolation Precautions: protective environment maintained

## 2020-11-17 NOTE — PROGRESS NOTES
"   11/17/20 1600   Quick Adds   Type of Visit Initial Occupational Therapy Evaluation   Living Environment   People in home alone;child(radhames), dependent  (16 year old daughter, not staying with patient. )   Current Living Arrangements apartment  (2nd floor)   Home Accessibility stairs to enter home   Number of Stairs, Main Entrance other (see comments)  (20 stairs, 20 stairs downstairs to basement for laundry/mail)   Stair Railings, Main Entrance railings on both sides of stairs   Transportation Anticipated family or friend will provide   Living Environment Comments Patient lives alone in 2nd floor apartment, daughter occasionally stays with patient but not during recovery period. Patient's brother is serving as his caretaker. 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    Self-Care   Usual Activity Tolerance good   Current Activity Tolerance moderate   Regular Exercise Yes   Activity/Exercise Type   (Completing HEP from previous discharge)   Equipment Currently Used at Home none   Activity/Exercise/Self-Care Comment IND at baseline. Was completing HEP given by OT during previous admission \"some\".    Disability/Function   Hearing Difficulty or Deaf no   Wear Glasses or Blind yes   Vision Management Glasses   Concentrating, Remembering or Making Decisions Difficulty no   Difficulty Communicating no   Difficulty Eating/Swallowing no   Walking or Climbing Stairs Difficulty no   Dressing/Bathing Difficulty no   Toileting no   Doing Errands Independently Difficulty (such as shopping) no   Fall history within last six months no   Change in Functional Status Since Onset of Current Illness/Injury yes  (Decreased activity tolerance)   General Information   Onset of Illness/Injury or Date of Surgery 11/16/20   Referring Physician Diane Morgan PA-C   Patient/Family Therapy Goal Statement (OT) To improve fatigue.    Additional Occupational Profile Info/Pertinent History of Current Problem Per chart: \"Theo A " "Osbaldo is a 55 year old male, currently day +6 of his BEAM autologous hematopoietic cell transplant. Yanick returned to clinic for follow up today and noted that he felt awful. Really got worse last night and much more so this morning. Increased diarrhea yesterday, newly nauseated this morning. Able to eat and drink and keeps pills down but certainly having an upset stomach now but no emesis. Also ongoing diarrhea several times this morning. No bleeding. No cough, no SOB, no cold symptoms. Denies chills. Didn't know he had a fever - but admits to not feeling well. Now being admitted to  for neutropenic fever.\"    Performance Patterns (Routines, Roles, Habits) Patient is father to 16 year old daughter.    Existing Precautions/Restrictions immunosuppressed   Left Upper Extremity (Weight-bearing Status) full weight-bearing (FWB)   Right Upper Extremity (Weight-bearing Status) full weight-bearing (FWB)   Left Lower Extremity (Weight-bearing Status) full weight-bearing (FWB)   Right Lower Extremity (Weight-bearing Status) full weight-bearing (FWB)   General Observations and Info Activity orders: Ambulate BMT Auto   Cognitive Status Examination   Orientation Status orientation to person, place and time   Cognitive Status Comments Cognition appears intact.    Visual Perception   Visual Impairment/Limitations WFL   Pain Assessment   Patient Currently in Pain No   Range of Motion Comprehensive   Comment, General Range of Motion UE grossly WFL   Strength Comprehensive (MMT)   Comment, General Manual Muscle Testing (MMT) Assessment WFL for ADL participation.   Bed Mobility   Comment (Bed Mobility) IND   Transfers   Transfer Comments IND   Balance   Balance Comments SBA for ambulation in room. Small self-correct LOB when engaged in dynamic balance activity (head nods up/down). Will continue to assess/screen for PT needs.    Instrumental Activities of Daily Living (IADL)   Previous Responsibilities meal " prep;housekeeping;laundry;shopping;yardwork;medication management;work;finances;driving;   IADL Comments Patient IND at baseline. Patient's brother assisting with IADLs during recovery post-transplant.   Clinical Impression   Criteria for Skilled Therapeutic Interventions Met (OT) yes   OT Diagnosis Decreased ADL/IADL/Mobility tolerance with risk of deconditioning   OT Problem List-Impairments impacting ADL problems related to;activity tolerance impaired;other (see comments);strength  (Medical Status/Immunosuppressed)   Assessment of Occupational Performance 1-3 Performance Deficits   Identified Performance Deficits Home Management, Functional Mobility Tolerance   Planned Therapy Interventions (OT) strengthening;home program guidelines;progressive activity/exercise;risk factor education   Clinical Decision Making Complexity (OT) low complexity   Therapy Frequency (OT) Other (see comments)  (3-4x/week)   Predicted Duration of Therapy 1-2 weeks   Anticipated Equipment Needs Upon Discharge (OT) other (see comments)  (TBD)   Risks and Benefits of Treatment have been explained. Yes   Patient, Family & other staff in agreement with plan of care Yes   Comment-Clinical Impression Patient would benefit from skilled OT to increase activity tolerance/strength and promote use of energy conservation/fatigue management strategies to promote participation/return to ADL/IADL.    OT Discharge Planning    OT Discharge Recommendation (DC Rec) Home with assist   OT Rationale for DC Rec Anticipate patient will be safe to return home with continued caregiver assist from his brother for heavy ADL/IADL (pending lab values) and HEP.    OT Brief overview of current status  IND for basic ADL. SBA for dynamic balance challenges. Endorses fatigue.    Total Evaluation Time (Minutes)   Total Evaluation Time (Minutes) 5

## 2020-11-17 NOTE — PROGRESS NOTES
BMT Daily Progress Note   11/17/2020    Patient ID:  Theo Roblero is a 55 year old male, currently day +7 of his HCT. Re-admitted on 11/16 with concern for sepsis.      Diagnosis [facility] Non-Hodgkin lymphoma, Mantle cell/intermediate differentiation  HCT Type Autologous    Prep Regimen BCNU  Agueda-C  Etoposide  Melphalan   Donor Source Autologous PBSC    GVHD Prophylaxis No  Primary BMT MD      INTERVAL  HISTORY     Feeling okay this am. Diarrhea better has only had 2 BM's and more form to them. No N/V. No acute infectious symptoms to explain fever. Heart rate calmed down post IV hydration.     Review of Systems: 10 point ROS negative except as noted above.    PHYSICAL EXAM     Weight In/Out     Wt Readings from Last 3 Encounters:   11/16/20 114.2 kg (251 lb 11.2 oz)   11/16/20 112.5 kg (248 lb)   11/15/20 113.4 kg (250 lb)      I/O last 3 completed shifts:  In: 3386 [P.O.:240; I.V.:1908; IV Piggyback:1000]  Out: 350 [Urine:350]       KPS:  80    BP (!) 146/85   Pulse 101   Temp 100.4  F (38  C) (Axillary)   Resp 16   Ht 1.829 m (6')   Wt 114.2 kg (251 lb 11.2 oz)   SpO2 95%   BMI 34.14 kg/m       General: NAD   Eyes: MARYAN, sclera anicteric   Nose/Mouth/Throat: OP clear, buccal mucosa moist, no ulcerations   Lungs: CTA bilaterally  Cardiovascular: RRR, no M/R/G   Abdominal/Rectal: +BS, soft, NT, ND  Lymphatics: no edema  Skin: no rashes or petechaie  Neuro: A&O   Additional Findings: Forte site NT, no drainage. R Port-a-cath not accessed   LABS AND IMAGING      I have assessed all abnormal lab values for their clinical significance and any values considered clinically significant have been addressed in the assessment and plan      Lab Results   Component Value Date    WBC 0.0 (LL) 11/17/2020    ANEU 0.5 (L) 11/13/2020    HGB 6.2 (LL) 11/17/2020    HCT 19.3 (L) 11/17/2020    PLT 12 (LL) 11/17/2020     11/17/2020    POTASSIUM 4.0 11/17/2020    CHLORIDE 107 11/17/2020    CO2 26 11/17/2020      (H) 11/17/2020    BUN 24 11/17/2020    CR 1.07 11/17/2020    MAG 2.5 (H) 11/17/2020    INR 1.04 11/09/2020     OVERALL PLAN   ID: Theo Roblero is a 55 year old male with mantle cell lymphoma, recently admitted for auto BMT with BEAM prep. Re-admitted with concern for sepsis.      1.  BMT: Day +7 today.   - BEAM prep, tolerated well  - Palifermin complete.  - Cell dose 4.3 million.  - GCSF started d+5 and cont to until ANC>2500 x2 consecutive days.   - Re-stage per protocol.     2.  HEME:   -- Keep Hgb>7 and plts>10K. Premeds for blood products (no hx of reaction, but has received premeds in the past)  -- pancytopenia due to chemotherapy  -- 1UPRBC today.            3.  ID:    SEPSIS: 11/16 Febrile and tachy 130-160. Fever curve down and tachycardia resolved with hydration.   - Tmax 101.7 - no focal symptoms.  - Vanco + Cefepime  - BC's NGTD, UA neg, Urine Cx pending. CXR neg. Lactic neg.   - COVID pending  - prophy Fluc, and HD ACV (CMV+, HSV+, EBV+) prophy.   - Bactrim or appropriate PCP therapy to start d+28.                                             4.  GI:   - Diarrhea: C.diff and Enteric pending.   - prn zofran and compazine  - Protonix for GI prophy.     5.  FEN/Renal:   - Monitor Cr/lytes.      6. Endo:   - Hx DM type II. On metformin and glimepiride on admission. Held both due to potential for n/v with prep; also glimepiride can inhibit engraftment  - BG checks QID with meals and bedtime.   - NPH 22 units qam and 8 units qpm  - fixed mealtime novolog 8 units. Didn't order this on admit.  - high sliding scale with meals and bedtime  - after engraftment, can transition back to home regimen     7. CV:   - HTN: on metoprolol BID ordered hold parameters.  - Tachy on arrival: sepsis? EKG sinus tach. Asymptomatic. No events on tele. Resolved with fluid resuscitation.      Diane Morgan     Patient has been seen and evaluated by me. I have reviewed today's vital signs, medications, labs and imaging  results independently. I have discussed the plan with the team and agree with the findings and plan in this note.       55 years old male, day +7 s/p autologous HSCT for mantle cell lymphoma. Admitted with neutropenic fever with sepsis, mucositis and diarrhea.     On exam:  HEENT: PERRL EOMI MMM  Chest CTAB  CVS: S1 S2 RRR, no murmurs or gallops     A/p Day +7  s/p autologous HSCT for mantle cell lymphoma.  Neutropenic fever: pancultured, on Vanco and cefipime  Diarrhea: send stool for C diff  Supportive care as above     Gracia Martinez MD     Known issues that I take into account for medical decisions, with salient changes to the plan considering these complexities noted above.  Patient Active Problem List    Diagnosis Date Noted     Neutropenic fever (H) 11/16/2020     Priority: Medium     S/P bone marrow transplant (H) 11/11/2020     Priority: Medium     NHL (non-Hodgkin's lymphoma) (H) 11/04/2020     Priority: Medium     Mantle cell lymphoma of lymph nodes of multiple sites (H) 08/10/2020     Priority: Medium     Tobacco user 06/14/2020     Priority: Medium     Obesity 06/14/2020     Priority: Medium     Nuclear sclerosis 06/14/2020     Priority: Medium     Impotence of organic origin 06/14/2020     Priority: Medium     Diverticulosis of sigmoid colon 06/14/2020     Priority: Medium     Diabetic macular edema (H) 06/14/2020     Priority: Medium     HTN (hypertension) 02/06/2020     Priority: Medium     HLD (hyperlipidemia) 02/06/2020     Priority: Medium     Type 2 diabetes mellitus, without long-term current use of insulin (H) 02/06/2020     Priority: Medium

## 2020-11-17 NOTE — PLAN OF CARE
Patient had temp high of 100.4 Ax, rechecked in two hours and was 100.0 Ax. Trigger the septic alert and lactic acid was 1.2. No complains of pain or nausea, eating and drinking well. States he will take a shower after his lunch, late at about 1430. Encourage out of bed activity.

## 2020-11-17 NOTE — PLAN OF CARE
Tmax 101.7. Blood cultures drawn at clinic. HR in 130s upon admission. Pt placed on tele and EKG obtained. Both show sinus tach. BPs elevated to 140s/80s. On RA. Lactic triggered and resulted 1.6. Pt PUI. Covid swab sent. Awaiting results. One liter NS bolus given. Now on maintenance fluids at 125ml/hr. 4g of magnesium replaced. Will need recheck around 10pm. Chest xray complete. Urine sample sent. Awaiting stool to send cdiff, enteric panel and VRE. Pt ate a flatbread for dinner. . One unit of platelets given. Continue to monitor.    Problem: Adult Inpatient Plan of Care  Goal: Plan of Care Review  Outcome: No Change  Goal: Optimal Comfort and Wellbeing  Outcome: No Change     Problem: Fever (Fever with Neutropenia)  Goal: Baseline Body Temperature  Outcome: No Change     Problem: Infection Risk (Fever with Neutropenia)  Goal: Absence of Infection  Outcome: No Change     Problem: Diarrhea  Goal: Fluid and Electrolyte Balance  Outcome: No Change

## 2020-11-18 NOTE — PROGRESS NOTES
BMT Daily Progress Note   11/18/2020    Patient ID:  Theo Roblero is a 55 year old male, currently day +8 of his HCT. Re-admitted on 11/16 with concern for SIRS.      Diagnosis [facility] Non-Hodgkin lymphoma, Mantle cell/intermediate differentiation  HCT Type Autologous    Prep Regimen BCNU  Agueda-C  Etoposide  Melphalan   Donor Source Autologous PBSC    GVHD Prophylaxis No  Primary BMT MD      INTERVAL  HISTORY     Feeling okay this am. Some diarrhea. Has imodium now if needed. Will pull some things back today and look for discharge Fri/Sat. He is eating okay. No new localizing signs/symptoms of infection.     Review of Systems: 10 point ROS negative except as noted above.    PHYSICAL EXAM     Weight In/Out     Wt Readings from Last 3 Encounters:   11/18/20 115.2 kg (253 lb 14.4 oz)   11/16/20 112.5 kg (248 lb)   11/15/20 113.4 kg (250 lb)      I/O last 3 completed shifts:  In: 5077 [P.O.:1700; I.V.:2365]  Out: 900 [Urine:850; Stool:50]       KPS:  80    BP (!) 159/86   Pulse 91   Temp 99.3  F (37.4  C)   Resp 16   Ht 1.829 m (6')   Wt 115.2 kg (253 lb 14.4 oz)   SpO2 99%   BMI 34.44 kg/m       General: NAD   Eyes: MARYAN, sclera anicteric   Nose/Mouth/Throat: OP clear, buccal mucosa moist, no ulcerations   Lungs: CTA bilaterally  Cardiovascular: RRR, no M/R/G   Abdominal/Rectal: +BS, soft, NT, ND  Lymphatics: no edema  Skin: no rashes or petechaie  Neuro: A&O   Additional Findings: Forte site NT, no drainage. R Port-a-cath not accessed   LABS AND IMAGING      I have assessed all abnormal lab values for their clinical significance and any values considered clinically significant have been addressed in the assessment and plan      Lab Results   Component Value Date    WBC 0.1 (LL) 11/18/2020    ANEU 0.5 (L) 11/13/2020    HGB 6.7 (LL) 11/18/2020    HCT 20.2 (L) 11/18/2020    PLT 8 (LL) 11/18/2020     11/18/2020    POTASSIUM 3.2 (L) 11/18/2020    CHLORIDE 108 11/18/2020    CO2 25 11/18/2020    GLC 80  11/18/2020    BUN 18 11/18/2020    CR 0.89 11/18/2020    MAG 2.0 11/18/2020    INR 1.04 11/09/2020     OVERALL PLAN   ID: Theo Roblero is a 55 year old male with mantle cell lymphoma, recently admitted for auto BMT with BEAM prep. Re-admitted with concern for SIRS.      1.  BMT: Day +8 today.   - BEAM prep, tolerated well  - Palifermin complete.  - Cell dose 4.3 million.  - GCSF started d+5 and cont to until ANC>2500 x2 consecutive days.   - Re-stage per protocol.     2.  HEME:   -- Keep Hgb>7 and plts>10K. Premeds for blood products (no hx of reaction, but has received premeds in the past)  -- pancytopenia due to chemotherapy  -- plts and blood today.           3.  ID:    SIRS: 11/16 Febrile and tachy 130-160. Fever curve down and tachycardia resolved with hydration.   - Tmax 100.9 - no focal symptoms.  - Vanco + Cefepime  - BC's NGTD, UA neg, Urine Cx pending. CXR neg. Lactic neg.   - COVID neg.   - prophy Fluc, and HD ACV (CMV+, HSV+, EBV+) prophy.   - Bactrim or appropriate PCP therapy to start d+28.                                             4.  GI:   - Diarrhea: C.diff and Enteric negative. Imodium PRN.  - prn zofran and compazine  - Protonix for GI prophy.     5.  FEN/Renal:   - Monitor Cr/lytes.      6. Endo:   - Hx DM type II. On metformin and glimepiride on admission. Held both due to potential for n/v with prep; also glimepiride can inhibit engraftment  - BG checks QID with meals and bedtime.   - NPH 22 units qam and 8 units qpm  - fixed mealtime novolog 8 units. Didn't order this on admit.  - high sliding scale with meals and bedtime  - after engraftment, can transition back to home regimen     7. CV:   - HTN: on metoprolol BID ordered hold parameters.  - Tachy on arrival: SIRS? EKG sinus tach. Asymptomatic. No events on tele. Resolved with fluid resuscitation.      Diane Morgan     Attending Summary  The patient was seen and examined by me separate from the midlevel provider.The note above  reflects my assessment and plan. I have personally reviewed today's labs,vital and radiology results. The points of care that were added by me are:     History and physical  Day +8 BEAM conditioned ASCT for MCL. Fever max 100.9. Feels well. Intact po intake.    On exam:  Head/mouth/neck: Oropharynx clear.  No lymphadenopathy.  Heart: Regular rate and rhythm.  No murmurs rubs or gallops.  Lungs: Lungs are clear bilaterally.  Abdomen: Abdomen is soft nontender nondistended.  Intact bowel sounds.  Ext: No edema.  Skin: No rash.    Pertinent Labs:  reviewed    ASSESSMENT AND PLAN  1.  MCL: Day +8 BEAM conditioned ASCT  2.  NF: Admitted on day +6 for SIRS and fever. Continue vanco + cefepime.  3.  Chemotherapy induced GI toxicity: Continue immodium. C. Diff negative.    Sam Zavala MD

## 2020-11-18 NOTE — PLAN OF CARE
Pt doing well, up in chair most of the day, showered and worked out with OT.  Pt had diarrhea and imodium was given x3 and finally slowed down. Gave pt wet wipes and creams for bottom.  Pt recheck on K at 1200 was 3.2 gave 40 mEq po and recheck at 1700 was 3.5 will recheck in am. Pt needed insulin x2 today for increased BG  Problem: Adult Inpatient Plan of Care  Goal: Plan of Care Review  Outcome: Improving  Flowsheets (Taken 11/18/2020 1749)  Plan of Care Reviewed With: patient  Progress: improving  Goal: Patient-Specific Goal (Individualized)  Outcome: Improving  Goal: Absence of Hospital-Acquired Illness or Injury  Outcome: Improving  Goal: Optimal Comfort and Wellbeing  Outcome: Improving  Goal: Readiness for Transition of Care  Outcome: Improving     Problem: Fever (Fever with Neutropenia)  Goal: Baseline Body Temperature  Outcome: Improving     Problem: Infection Risk (Fever with Neutropenia)  Goal: Absence of Infection  Outcome: Improving  Intervention: Prevent Infection and Maximize Resistance  Recent Flowsheet Documentation  Taken 11/18/2020 1400 by Natasha Sauer RN  Oral Care: oral rinse provided  Taken 11/18/2020 1000 by Natasha Sauer RN  Oral Care:   oral rinse provided   teeth brushed     Problem: Diarrhea  Goal: Fluid and Electrolyte Balance  Outcome: Improving  Intervention: Manage Diarrhea  Recent Flowsheet Documentation  Taken 11/18/2020 0800 by Natasha Sauer RN  Fluid/Electrolyte Management: fluids provided  Isolation Precautions: protective environment maintained     Problem: Discharge Planning  Goal: Discharge Planning (Adult, OB, Behavioral, Peds)  Outcome: Improving

## 2020-11-18 NOTE — PLAN OF CARE
Patient felt well overnight. 1 unit of insulin given for bedtime glucose. Appetite remains good. Denies nausea/pain. States his diarrhea is improving - went once overnight. Education regarding fluid status and urinating in urinal reinforced. Potassium supplemented. Platelets and RBCs given. Continue to monitor.       Problem: Adult Inpatient Plan of Care  Goal: Plan of Care Review  Outcome: No Change     Problem: Adult Inpatient Plan of Care  Goal: Optimal Comfort and Wellbeing  Outcome: No Change     Problem: Fever (Fever with Neutropenia)  Goal: Baseline Body Temperature  Outcome: No Change     Problem: Infection Risk (Fever with Neutropenia)  Goal: Absence of Infection  Outcome: No Change  Intervention: Prevent Infection and Maximize Resistance  Recent Flowsheet Documentation  Taken 11/17/2020 2030 by Hayde Da Silva, RN  Infection Prevention:   environmental surveillance performed   hand hygiene promoted   personal protective equipment utilized   rest/sleep promoted   single patient room provided   visitors restricted/screened     Problem: Diarrhea  Goal: Fluid and Electrolyte Balance  Outcome: No Change  Intervention: Manage Diarrhea  Recent Flowsheet Documentation  Taken 11/17/2020 2030 by Hayde Da Silva, RN  Fluid/Electrolyte Management: fluids provided  Isolation Precautions: protective environment maintained     Problem: Discharge Planning  Goal: Discharge Planning (Adult, OB, Behavioral, Peds)  Outcome: No Change

## 2020-11-19 NOTE — PROGRESS NOTES
BMT Daily Progress Note   11/19/2020    Patient ID:  Theo Roblero is a 55 year old male, currently day +9 of his HCT. Re-admitted on 11/16 with concern for SIRS.      [facility] Non-Hodgkin [facility], Mantle cell/intermediate differentiation  HCT Type Autologous    Prep Regimen BCNU  Agueda-C  Etoposide  Melphalan   Donor Source Autologous PBSC    GVHD Prophylaxis No  Primary BMT MD      INTERVAL  HISTORY   Still with low grade temps. No new issues. Still eating some. Still some looser stools.   No new localizing signs/symptoms.     Review of Systems: 10 point ROS negative except as noted above.    PHYSICAL EXAM     Weight In/Out     Wt Readings from Last 3 Encounters:   11/18/20 115.2 kg (253 lb 14.4 oz)   11/16/20 112.5 kg (248 lb)   11/15/20 113.4 kg (250 lb)      I/O last 3 completed shifts:  In: 4650 [P.O.:2120; I.V.:2230]  Out: 2726 [Urine:2725; Stool:1]       KPS:  80    /81   Pulse 90   Temp 98.5  F (36.9  C)   Resp 16   Ht 1.829 m (6')   Wt 115.2 kg (253 lb 14.4 oz)   SpO2 96%   BMI 34.44 kg/m       General: NAD   Eyes: MARYAN, sclera anicteric   Nose/Mouth/Throat: OP clear, buccal mucosa moist, no ulcerations   Lungs: CTA bilaterally  Cardiovascular: RRR, no M/R/G   Abdominal/Rectal: +BS, soft, NT, ND  Lymphatics: no edema  Skin: no rashes or petechaie  Neuro: A&O   Additional Findings: Forte site NT, no drainage. R Port-a-cath not accessed   LABS AND IMAGING      I have assessed all abnormal lab values for their clinical significance and any values considered clinically significant have been addressed in the assessment and plan      Lab Results   Component Value Date    WBC 0.2 (LL) 11/19/2020    ANEU 0.5 (L) 11/13/2020    HGB 7.3 (L) 11/19/2020    HCT 21.5 (L) 11/19/2020    PLT 13 (LL) 11/19/2020     11/19/2020    POTASSIUM 3.3 (L) 11/19/2020    CHLORIDE 108 11/19/2020    CO2 24 11/19/2020    GLC 97 11/19/2020    BUN 15 11/19/2020    CR 1.00 11/19/2020    MAG 1.8 11/19/2020     INR 1.04 11/09/2020     OVERALL PLAN   ID: Theo Roblero is a 55 year old male with mantle cell lymphoma, recently admitted for auto BMT with BEAM prep. Re-admitted with SIRS.      1.  BMT: Day +9 today.   - BEAM prep, tolerated well.  - Palifermin complete.  - Cell dose 4.3 million.  - GCSF started d+5 and cont to until ANC>2500 x2 consecutive days.   - Re-stage per protocol.     2.  HEME:   -- Keep Hgb>7 and plts>10K. Premeds for blood products (no hx of reaction, but has received premeds in the past).  -- pancytopenia due to chemotherapy.      3.  ID:    SIRS: 11/16 Febrile and tachy 130-160. Resolved with fluids/antibiotics. Source not identified.  - Tmax 100.8 - no focal symptoms.  - Cefepime. Vanco added back overnight. Stopped this am. Not necessary. Cx's have been negative. Vitals okay. Only 1 temp spike of 100.8. WBC starting to come in.  - BC's NGTD, UA neg, Urine Cx neg. CXR neg. Lactic neg.   - COVID neg.   - prophy Fluc, and HD ACV (CMV+, HSV+, EBV+) prophy.   - Bactrim or appropriate PCP therapy to start d+28.                                             4.  GI:   - Diarrhea: C.diff and Enteric negative. Imodium PRN.  - prn zofran and compazine  - Protonix for GI prophy.     5.  FEN/Renal:   - Monitor Cr/lytes.      6. Endo:   - Hx DM type II. On metformin and glimepiride on admission. Held both due to potential for n/v with prep; also glimepiride can inhibit engraftment  - BG checks QID with meals and bedtime.   - NPH 22 units qam and 8 units qpm  - fixed mealtime novolog 8 units. Didn't order this on admit.  - high sliding scale with meals and bedtime  - after engraftment, can transition back to home regimen     7. CV:   - HTN: on metoprolol BID ordered hold parameters.  - Tachy on arrival: SIRS? EKG sinus tach. Asymptomatic. No events on tele. Resolved with fluid resuscitation.      Diane Morgan     Attending Summary  The patient was seen and examined by me separate from the midlevel  provider.The note above reflects my assessment and plan. I have personally reviewed today's labs,vital and radiology results. The points of care that were added by me are:     History and physical  Day +9 BEAM conditioned ASCT for MCL. Fever max 100.8. Feels well. Intact po intake.    On exam:  Head/mouth/neck: Oropharynx clear.  No lymphadenopathy.  Heart: Regular rate and rhythm.  No murmurs rubs or gallops.  Lungs: Lungs are clear bilaterally.  Abdomen: Abdomen is soft nontender nondistended.  Intact bowel sounds.  Ext: No edema.  Skin: No rash.    Pertinent Labs:  reviewed    ASSESSMENT AND PLAN  1.  MCL: Day +9 BEAM conditioned ASCT  2.  NF: Admitted on day +6 for SIRS and fever. Continue vanco + cefepime.  3.  Chemotherapy induced GI toxicity: Continue immodium. C. Diff negative.    Sam Zavala MD

## 2020-11-19 NOTE — PLAN OF CARE
/77 (BP Location: Left arm)   Pulse 96   Temp 99.1  F (37.3  C) (Oral)   Resp 14   Ht 1.829 m (6')   Wt 115.2 kg (253 lb 14.4 oz)   SpO2 96%   BMI 34.44 kg/m      Patient spiked a fever of 100.7 yesterday around 2000. Lactic acid sepsis protocol also triggered. Lactic acid came back within normal limits. Blood cultures drawn from each CVC lumen. Vancomycin restarted at midnight. Potassium supplemented per protocol. No patient complaints. Diarrhea continues to improve with PRN imodium. Denies nausea/pain. Continue to monitor.       Problem: Adult Inpatient Plan of Care  Goal: Plan of Care Review  Outcome: No Change  Flowsheets (Taken 11/18/2020 4299 by Natasha Sauer RN)  Plan of Care Reviewed With: patient  Goal: Patient-Specific Goal (Individualized)  Outcome: No Change  Goal: Absence of Hospital-Acquired Illness or Injury  Outcome: No Change  Intervention: Identify and Manage Fall Risk  Recent Flowsheet Documentation  Taken 11/18/2020 2000 by Hayde Da Silva, RN  Safety Promotion/Fall Prevention:   assistive device/personal items within reach   clutter free environment maintained   lighting adjusted   nonskid shoes/slippers when out of bed   room organization consistent   safety round/check completed  Intervention: Prevent Skin Injury  Recent Flowsheet Documentation  Taken 11/18/2020 2000 by Hayde Da Silva, RN  Body Position: position changed independently  Intervention: Prevent Infection  Recent Flowsheet Documentation  Taken 11/18/2020 2000 by Hayde Da Silva, RN  Infection Prevention:   environmental surveillance performed   hand hygiene promoted   rest/sleep promoted   single patient room provided  Goal: Optimal Comfort and Wellbeing  Outcome: No Change  Goal: Readiness for Transition of Care  Outcome: No Change     Problem: Infection Risk (Fever with Neutropenia)  Goal: Absence of Infection  Outcome: No Change  Intervention: Prevent Infection and Maximize Resistance  Recent Flowsheet  Documentation  Taken 11/18/2020 2000 by Hayde Da Silva, RN  Infection Prevention:   environmental surveillance performed   hand hygiene promoted   rest/sleep promoted   single patient room provided     Problem: Discharge Planning  Goal: Discharge Planning (Adult, OB, Behavioral, Peds)  Outcome: No Change     Problem: Adult Inpatient Plan of Care  Goal: Plan of Care Review  Outcome: No Change  Flowsheets (Taken 11/18/2020 1749 by Natasha Sauer RN)  Plan of Care Reviewed With: patient     Problem: Infection Risk (Fever with Neutropenia)  Goal: Absence of Infection  Outcome: No Change  Intervention: Prevent Infection and Maximize Resistance  Recent Flowsheet Documentation  Taken 11/18/2020 2000 by Hayde Da Silva, RN  Infection Prevention:   environmental surveillance performed   hand hygiene promoted   rest/sleep promoted   single patient room provided

## 2020-11-19 NOTE — PHARMACY-VANCOMYCIN DOSING SERVICE
Pharmacy Vancomycin Initial Note  Date of Service 2020  Patient's  [id number]  55 year old, male    Indication: Sepsis    Current estimated CrCl = Estimated Creatinine Clearance: 121.4 mL/min (based on SCr of 0.89 mg/dL).    Creatinine for last 3 days  2020: 10:05 AM Creatinine 1.09 mg/dL  2020:  3:16 AM Creatinine 1.07 mg/dL  2020:  4:15 AM Creatinine 0.89 mg/dL    Recent Vancomycin Level(s) for last 3 days  No results found for requested labs within last 72 hours.      Vancomycin IV Administrations (past 72 hours)                   vancomycin (VANCOCIN) 1,750 mg in sodium chloride 0.9 % 500 mL intermittent infusion (mg) 1,750 mg New Bag 20 0806     1,750 mg New Bag 20     1,750 mg New Bag  0819     1,750 mg New Bag 20    vancomycin (VANCOCIN) 2,000 mg in sodium chloride 0.9 % 500 mL intermittent infusion (mg) 2,000 mg New Bag 20 1135                Nephrotoxins and other renal medications (From now, onward)    Start     Dose/Rate Route Frequency Ordered Stop    20 0030  vancomycin (VANCOCIN) 2,000 mg in sodium chloride 0.9 % 250 mL intermittent infusion      2,000 mg (central catheter)  over 60 Minutes Intravenous EVERY 12 HOURS 20 0003      20 2000  acyclovir (ZOVIRAX) tablet 800 mg      800 mg Oral 2 TIMES DAILY 20 1403            Contrast Orders - past 72 hours (72h ago, onward)    None                Plan:  1.  Start vancomycin  2000 mg IV q12h (no loading dose given as patient had a 1750 mg IV vancomycin dose 16 hours ago). Dose increased from previous dosing given new indication of sepsis (higher goal trough range)  2.  Goal Trough Level: 15-20 mg/L   3.  Pharmacy will check trough levels as appropriate in 1-3 Days.    4. Serum creatinine levels will be ordered daily for the first week of therapy and at least twice weekly for subsequent weeks.    5. Prospect method utilized to dose vancomycin therapy: Method 2    Mike  Fina, PharmD  [phone]

## 2020-11-20 NOTE — PLAN OF CARE
Patient has no complaints of pain or nausea tonight.  He is getting up independently.  He slept most of the evening.  Problem: Adult Inpatient Plan of Care  Goal: Plan of Care Review  Outcome: No Change     Problem: Adult Inpatient Plan of Care  Goal: Patient-Specific Goal (Individualized)  Outcome: No Change     Problem: Adult Inpatient Plan of Care  Goal: Absence of Hospital-Acquired Illness or Injury  Outcome: No Change     Problem: Adult Inpatient Plan of Care  Goal: Absence of Hospital-Acquired Illness or Injury  Intervention: Prevent Skin Injury  Recent Flowsheet Documentation  Taken 11/19/2020 2000 by Maricarmen Larose RN  Body Position: position changed independently     Problem: Adult Inpatient Plan of Care  Goal: Absence of Hospital-Acquired Illness or Injury  Intervention: Identify and Manage Fall Risk  Recent Flowsheet Documentation  Taken 11/19/2020 2000 by Maricarmen Larose RN  Safety Promotion/Fall Prevention:   clutter free environment maintained   lighting adjusted   nonskid shoes/slippers when out of bed     Problem: Adult Inpatient Plan of Care  Goal: Absence of Hospital-Acquired Illness or Injury  Intervention: Prevent Infection  Recent Flowsheet Documentation  Taken 11/19/2020 2000 by Maricarmen Larose RN  Infection Prevention:   environmental surveillance performed   rest/sleep promoted   single patient room provided

## 2020-11-20 NOTE — PROGRESS NOTES
BMT SOCIAL WORK DISCHARGE NOTE:    Focus: Discharge Plan     Data: Pt is a 55 year old male with a hx of Mantle cell lymphoma s/p Day +10 of his HCT BMT per medical record; Pt was readmitted on 11/16 w/ concern for SIRS.    Interventions: Per Med Team, pt is anticipated to be medically stable for discharge in the next several days. SW met with pt at bedside to assess coping and discharge plan. Pt reports things had been going well at home and denies concern for upcoming discharge. CSW informed Pt that the clinical verification for LLS was submitted; Pt expressed appreciated and denied additional needs at this time. SW encouraged pt to contact SW for support, questions and/or resources.     Education Provided: SW Discharge Resource Packet provided upon last discharge, Caregiver Self-Care Education, and Expected Emotional Responses to Transition Home.    Assessment: Pt presented as pleasant and engaged.    Plan: Pt to discharge home ([facility]) with his brother as his primary caregiver. SW will continue to provide psychosocial support and assistance with resources as needed. SW will continue to collaborate with multidisciplinary team regarding pt's plan of care.     DIMITRI Linn, [facility]  Adult Blood & Marrow Transplant   Phone: [phone]  Pager: [phone]

## 2020-11-20 NOTE — PLAN OF CARE
Pt. Showered, worked out, ate 3 meals. Pt recheck K was 3.0 gave 60mEq KCL and recheck was 3.6 and no replacement needed, recheck in am. Pt got immodium x1  Problem: Adult Inpatient Plan of Care  Goal: Plan of Care Review  Outcome: Improving  Flowsheets (Taken 11/19/2020 1839)  Plan of Care Reviewed With: patient  Progress: improving  Goal: Patient-Specific Goal (Individualized)  Outcome: Improving  Goal: Absence of Hospital-Acquired Illness or Injury  Outcome: Improving  Intervention: Prevent Skin Injury  Recent Flowsheet Documentation  Taken 11/19/2020 0800 by Natasha Sauer RN  Body Position: position changed independently  Goal: Optimal Comfort and Wellbeing  Outcome: Improving  Goal: Readiness for Transition of Care  Outcome: Improving     Problem: Fever (Fever with Neutropenia)  Goal: Baseline Body Temperature  Outcome: Improving     Problem: Infection Risk (Fever with Neutropenia)  Goal: Absence of Infection  Outcome: Improving  Intervention: Prevent Infection and Maximize Resistance  Recent Flowsheet Documentation  Taken 11/19/2020 0800 by Natasha Sauer RN  Oral Care:   teeth brushed   oral rinse provided     Problem: Diarrhea  Goal: Fluid and Electrolyte Balance  Outcome: Improving  Intervention: Manage Diarrhea  Recent Flowsheet Documentation  Taken 11/19/2020 0800 by Natasha Sauer RN  Fluid/Electrolyte Management: fluids provided     Problem: Discharge Planning  Goal: Discharge Planning (Adult, OB, Behavioral, Peds)  Outcome: Improving

## 2020-11-20 NOTE — PLAN OF CARE
Patient slept much of night, awakening to use bathroom. Up independently. Voiding large amounts of pale yellow urine. IV fluid infusing at TKO between meds. Antibiotic coverage with Cefepime . He denies pain/nuasea. Afebrile, vitals stable. Sats ok on room air. BP less hypertensive overnight. Sodium Phos replaced in the morning (15 mmol ) over 4 hours for level of 2.0. Heme labs ok; no replacements necessary. WBC's increased to 0.9. Got growth factor yesterday.

## 2020-11-20 NOTE — PROGRESS NOTES
BMT Daily Progress Note   11/20/2020    Patient ID:  Theo Roblero is a 55 year old male, currently day +10 of his HCT. Re-admitted on 11/16 with concern for SIRS.      Diagnosis [facility] Non-Hodgkin lymphoma, Mantle cell/intermediate differentiation  HCT Type Autologous    Prep Regimen BCNU  Agueda-C  Etoposide  Melphalan   Donor Source Autologous PBSC    GVHD Prophylaxis No  Primary BMT MD      INTERVAL  HISTORY   Tm 99.2. Feeling better. No new complaints this morning.    Review of Systems: 10 point ROS negative except as noted above.    PHYSICAL EXAM     Weight In/Out     Wt Readings from Last 3 Encounters:   11/20/20 113.4 kg (249 lb 14.4 oz)   11/16/20 112.5 kg (248 lb)   11/15/20 113.4 kg (250 lb)      I/O last 3 completed shifts:  In: 4230 [P.O.:2980; I.V.:1250]  Out: 4350 [Urine:4350]       KPS:  80    BP (!) 144/82 (BP Location: Left arm)   Pulse 92   Temp 98.8  F (37.1  C)   Resp 16   Ht 1.829 m (6')   Wt 113.4 kg (249 lb 14.4 oz)   SpO2 94%   BMI 33.89 kg/m       General: NAD   Eyes: MARYAN, sclera anicteric   Nose/Mouth/Throat: OP clear, buccal mucosa moist, no ulcerations   Lungs: CTA bilaterally  Cardiovascular: RRR, no M/R/G   Abdominal/Rectal: +BS, soft, NT, ND  Lymphatics: no edema  Skin: no rashes or petechaie  Neuro: A&O   Additional Findings: Forte site NT, no drainage. R Port-a-cath not accessed   LABS AND IMAGING      I have assessed all abnormal lab values for their clinical significance and any values considered clinically significant have been addressed in the assessment and plan      Lab Results   Component Value Date    WBC 0.9 (LL) 11/20/2020    ANEU 0.3 (LL) 11/20/2020    HGB 7.7 (L) 11/20/2020    HCT 22.6 (L) 11/20/2020    PLT 15 (LL) 11/20/2020     11/20/2020    POTASSIUM 3.6 11/20/2020    CHLORIDE 108 11/20/2020    CO2 26 11/20/2020    GLC 93 11/20/2020    BUN 12 11/20/2020    CR 0.90 11/20/2020    MAG 1.7 11/20/2020    INR 1.04 11/09/2020     OVERALL PLAN   ID: Theo CARCAMO  Osbaldo is a 55 year old male with mantle cell lymphoma, recently admitted for auto BMT with BEAM prep. Re-admitted with SIRS.      1.  BMT: Day +10 today. WBC coming up  - BEAM prep, tolerated well.  - Palifermin complete.  - Cell dose 4.3 million.  - GCSF started d+5 and cont to until ANC>2500 x2 consecutive days.   - Re-stage per protocol.     2.  HEME:   -- Keep Hgb>7 and plts>10K. Premeds for blood products (no hx of reaction, but has received premeds in the past).  -- pancytopenia due to chemotherapy.      3.  ID:    SIRS: 11/16 Febrile and tachy 130-160. Resolved with fluids/antibiotics. Source not identified.  - Tmax trending down-99.2(100.8) - no focal symptoms.  - Cefepime continues. Vanco added back 11/18. Stopped 11/19. Not necessary. Cx's have been negative.  WBC starting to come in.  - BC's NGTD, UA neg, Urine Cx neg. CXR neg. Lactic neg.   - COVID neg.   - prophy Fluc, and HD ACV (CMV+, HSV+, EBV+) prophy.   - Bactrim or appropriate PCP therapy to start d+28.                                             4.  GI:   - Diarrhea: C.diff and Enteric negative. Imodium PRN.  - prn zofran and compazine  - Protonix for GI prophy.     5.  FEN/Renal:   - Monitor Cr/lytes.      6. Endo:   - Hx DM type II. On metformin and glimepiride on admission. Held both due to potential for n/v with prep; also glimepiride can inhibit engraftment  - BG checks QID with meals and bedtime.   - NPH 22 units qam and 8 units qpm  - fixed mealtime novolog 8 units. Didn't order this on admit.  - high sliding scale with meals and bedtime  - after engraftment, can transition back to home regimen     7. CV:   - HTN: on metoprolol BID ordered hold parameters. Hydralazine prn  - Tachy on arrival: SIRS? EKG sinus tach. Asymptomatic. No events on tele. Resolved with fluid resuscitation.      Jessika Camacho NP     Attending Summary  The patient was seen and examined by me separate from the midlevel provider.The note above reflects my assessment  and plan. I have personally reviewed today's labs,vital and radiology results. The points of care that were added by me are:     History and physical  Day +10 BEAM conditioned ASCT for MCL. Afebrile.  On exam:  Head/mouth/neck: Oropharynx clear.  No lymphadenopathy.  Heart: Regular rate and rhythm.  No murmurs rubs or gallops.  Lungs: Lungs are clear bilaterally.  Abdomen: Abdomen is soft nontender nondistended.  Intact bowel sounds.  Ext: No edema.  Skin: No rash.    Pertinent Labs:  reviewed    ASSESSMENT AND PLAN  1.  MCL: Day +9 BEAM conditioned ASCT. Engrafting.  2.  NF: Admitted on day +6 for SIRS and fever. Continue cefepime.  3.  Chemotherapy induced GI toxicity: Continue immodium. C. Diff negative.    Sam Zavala MD

## 2020-11-20 NOTE — PLAN OF CARE
Don has felt well today.  Denies pain or nausea.  Eating well.  Blood sugars 111 and 190.  No fevers.  Tachycardic and hypertensive during physical therapy.  Rechecks at rest were WNL.  Sepsis protocol was triggered with lactic acid of 1.6.  He has been out of bed all shift, spent most of the day in his chair watching TV.  Anticipating discharge in the next couple days.      Problem: Adult Inpatient Plan of Care  Goal: Plan of Care Review  Outcome: No Change

## 2020-11-21 NOTE — PLAN OF CARE
Don feels well today.  Eating well.  Blood sugars 129 and 233, on sliding scale insulin.  He is pleased with the increase in his WBC.  IV antibiotics stopped, afebrile so far.  Give growth factor tonight and anticipate discharge tomorrow if he remains afebrile.      Problem: Adult Inpatient Plan of Care  Goal: Plan of Care Review  Outcome: No Change

## 2020-11-21 NOTE — PROGRESS NOTES
BMT Daily Progress Note   11/21/2020    Patient ID:  Theo Roblero is a 55 year old male, currently day +11 of his HCT. Re-admitted on 11/16 with concern for SIRS.      Diagnosis [facility] Non-Hodgkin lymphoma, Mantle cell/intermediate differentiation  HCT Type Autologous    Prep Regimen BCNU  Agueda-C  Etoposide  Melphalan   Donor Source Autologous PBSC    GVHD Prophylaxis No  Primary BMT MD      INTERVAL  HISTORY   Feeling good.  Eating with no N/V/D.  Afebrile with no cough or SOB.  No pain, bleeding or rash  Review of Systems: 8 point ROS negative except as noted above.    PHYSICAL EXAM     Weight In/Out     Wt Readings from Last 3 Encounters:   11/20/20 113.4 kg (249 lb 14.4 oz)   11/16/20 112.5 kg (248 lb)   11/15/20 113.4 kg (250 lb)      I/O last 3 completed shifts:  In: 2790 [P.O.:2330; I.V.:460]  Out: 2325 [Urine:2325]       KPS:  80    BP (!) 141/85 (BP Location: Left arm)   Pulse 101   Temp 98.5  F (36.9  C) (Oral)   Resp 16   Ht 1.829 m (6')   Wt 113.4 kg (249 lb 14.4 oz)   SpO2 97%   BMI 33.89 kg/m       General: NAD   Eyes: MARYAN, sclera anicteric   Nose/Mouth/Throat: OP clear, buccal mucosa moist, no ulcerations   Lungs: CTA bilaterally  Cardiovascular: RRR, no M/R/G   Abdominal/Rectal: +BS, soft, NT, ND  Lymphatics: no edema  Skin: no rashes or petechaie  Neuro: A&O   Additional Findings: Forte site NT, no drainage. R Port-a-cath not accessed   LABS AND IMAGING      I have assessed all abnormal lab values for their clinical significance and any values considered clinically significant have been addressed in the assessment and plan      Lab Results   Component Value Date    WBC 4.5 11/21/2020    ANEU 3.3 11/21/2020    HGB 7.9 (L) 11/21/2020    HCT 23.6 (L) 11/21/2020    PLT 20 (LL) 11/21/2020     11/21/2020    POTASSIUM 3.2 (L) 11/21/2020    CHLORIDE 107 11/21/2020    CO2 25 11/21/2020     (H) 11/21/2020    BUN 12 11/21/2020    CR 0.88 11/21/2020    MAG 1.6 11/21/2020    INR 1.04  11/09/2020     OVERALL PLAN   ID: Theo Roblero is a 55 year old male with mantle cell lymphoma, day +11 s/p auto PBSCT.  . Re-admitted with SIRS.      1.  BMT: Day +11 today. WBC coming up  - BEAM prep, tolerated well.  - Palifermin complete.  - Cell dose 4.3 million.  - Cont GCSF, likely last dose today  - Re-stage per protocol.     2.  HEME:   -- Keep Hgb>7 and plts>10K. Premeds for blood products (no hx of reaction, but has received premeds in the past).  -- pancytopenia due to chemotherapy.      3.  ID:    SIRS: 11/16 Febrile and tachy 130-160. Resolved with fluids/antibiotics. Source not identified.  - discontinue Cefipime today.   - BC's NGTD, UA neg, Urine Cx neg. CXR neg. Lactic neg.   - COVID neg.   - prophy Fluc, and HD ACV (CMV+, HSV+, EBV+) prophy.   - Bactrim or appropriate PCP therapy to start d+28.                                             4.  GI: No GI symptoms  - Protonix for GI prophy.     5.  FEN/Renal:   - Monitor Cr/lytes.      6. Endo:   - Hx DM type II. On metformin and glimepiride on admission. Held both due to potential for n/v with prep; also glimepiride can inhibit engraftment  - BG checks QID with meals and bedtime.   - NPH 22 units qam and 8 units qpm  - fixed mealtime novolog 8 units. Didn't order this on admit.  - high sliding scale with meals and bedtime  - after engraftment, can transition back to home regimen     7. CV:   - HTN: on metoprolol BID ordered hold parameters. Hydralazine prn    discontinue tomorrow if afebrile overnight.    Erlinda Dupont   Attending Summary  The patient was seen and examined by me separate from the midlevel provider.The note above reflects my assessment and plan. I have personally reviewed today's labs,vital and radiology results. The points of care that were added by me are:     History and physical  Day +11 BEAM conditioned ASCT for MCL. Afebrile.    On exam:  Head/mouth/neck: Oropharynx clear.  No lymphadenopathy.  Heart: Regular rate and  rhythm.  No murmurs rubs or gallops.  Lungs: Lungs are clear bilaterally.  Abdomen: Abdomen is soft nontender nondistended.  Intact bowel sounds.  Ext: No edema.  Skin: No rash.    Pertinent Labs:  reviewed    ASSESSMENT AND PLAN  1.  MCL: Day +11 BEAM conditioned ASCT. Engrafted.  2.  NF: Admitted on day +6 for SIRS and fever. Stop cefepime today.  3.  Chemotherapy induced GI toxicity: Continue immodium. C. Diff negative. Improved.  4. Dispo:  Expect discharge tomorrow.    Sam Zavala MD

## 2020-11-21 NOTE — PROGRESS NOTES
BMT Summary of Care    November 21, 2020 10:13 AM  Theo Roblero  MRN: [id number]    Discharge Date: 11/22/20    BMT Primary Physician: Sam Zavala    BMT Nurse Coordinator: Rhonda Hannon    Discharge Diagnosis: S/P BMT    Discharge To: Home    Activity: as tolerated    Catheter Care: Forte - Flush each line with 5mL of 10 unit/mL heparin every 24 hours    Vascular Access Device Protocol Per Policy  Supplies through home infusion  [facility]  Fax: [phone]  Ph: [phone]       IV Medications through home infusion: None    Nutrition: Regular diet as tolerated    Blood Transfusions:  Transfuse if Hemoglobin < or equal 7 mg/dL  Red Blood Cell Order: 2units, irradiated and leukoreduced   Transfuse if Platelet count < or equal 10 uL  Platelet order: 1 adult dose, irradiated and leukoreduced  Transfusion Pre-meds:  None    Intravenous Electrolyte Replacement:  Potassium  chloride (give only if serum creatinine < 2)     3-3.3  20mEq/hr  over 1 hour x 2 doses     <3      20mEq/hr  over 1 hour x 3 doses  Magnesium sulfate 1.3-1.7     2 grams over 1 hour x1 dose                                     <1.3         2 grams over 2 hours x1 dose        Laboratory Tests:  At next clinic appointment (date: 11/23/20)  Hemogram (CBC) differential, platelet count  Comprehensive Metabolic Panel    Support Services:  None    Appointments:   BMT Clinic (date, time, provider): 11/23/20 8:15 lab, 9:00 provider    FELICITY Alexander CNP      BMT Contact Information:-   For issues including fevers 100.5 or more:  Please call during the week: Monday through Friday between hours of 8:00 am and 4:30 pm- Call BMT office- [phone]  After hours/Weekends: Please call [facility] : [phone] and ask for BMT physician on call and the  will have the BMT physician call you back.

## 2020-11-21 NOTE — PLAN OF CARE
Low grade fever, 99.5. HTN, did not meet hydralazine parameters. OVSS. Denies pain. Denies n/v/d. Bedtime . Potassium 3.2, 40 mEq replaced, recheck scheduled for 10 am. No other replacements needed. Voiding adequately. Up independently. Continue plan of care.           Problem: Adult Inpatient Plan of Care  Goal: Plan of Care Review  Outcome: No Change  Flowsheets (Taken 11/21/2020 3086)  Plan of Care Reviewed With: patient  Progress: no change     Problem: Adult Inpatient Plan of Care  Goal: Patient-Specific Goal (Individualized)  Outcome: No Change     Problem: Adult Inpatient Plan of Care  Goal: Absence of Hospital-Acquired Illness or Injury  Outcome: No Change     Problem: Adult Inpatient Plan of Care  Goal: Optimal Comfort and Wellbeing  Outcome: No Change     Problem: Adult Inpatient Plan of Care  Goal: Readiness for Transition of Care  Outcome: No Change     Problem: Fever (Fever with Neutropenia)  Goal: Baseline Body Temperature  Outcome: No Change     Problem: Infection Risk (Fever with Neutropenia)  Goal: Absence of Infection  Outcome: No Change     Problem: Diarrhea  Goal: Fluid and Electrolyte Balance  Outcome: No Change

## 2020-11-21 NOTE — DISCHARGE SUMMARY
Ynaick Roblero   Home Medication Instructions NICK:[id number]    Printed on:11/21/20 1020   Medication Information                      acyclovir (ZOVIRAX) 800 MG tablet  Take 1 tablet (800 mg) by mouth 2 times daily             Alcohol Swabs PADS  1 pad 4 times daily (before meals and nightly)             blood glucose (NO BRAND SPECIFIED) test strip  Use to test blood sugar 4 times daily or as directed.             blood glucose monitoring (ACCU-CHEK FASTCLIX) lancets  Use to test blood sugar 4 times daily or as directed.             fluconazole (DIFLUCAN) 200 MG tablet  Take 1 tablet (200 mg) by mouth daily             heparin lock flush 10 UNIT/ML SOLN injection  5-10 mLs by Intracatheter route every 24 hours             insulin aspart (NOVOLOG PEN) 100 UNIT/ML pen  Inject 8 units with meals three times daily, as well as sliding scale four times daily with meals and at bedtime             insulin isophane human (HUMULIN N PEN) 100 UNIT/ML injection  Inject 22 units in the morning and 8 units in the evening             insulin pen needle (32G X 4 MM) 32G X 4 MM miscellaneous  Use 4 pen needles daily or as directed.             levofloxacin (LEVAQUIN) 250 MG tablet  Take 1 tablet (250 mg) by mouth daily             metoprolol succinate ER (TOPROL-XL) 25 MG 24 hr tablet  Take 1 tablet (25 mg) by mouth 2 times daily             nicotine (NICODERM CQ) 21 MG/24HR 24 hr patch  Place 1 patch onto the skin daily             ondansetron (ZOFRAN) 8 MG tablet  Take 1 tablet (8 mg) by mouth every 8 hours as needed for nausea             pantoprazole (PROTONIX) 40 MG EC tablet  Take 1 tablet (40 mg) by mouth daily             prochlorperazine (COMPAZINE) 5 MG tablet  Take 1-2 tablets (5-10 mg) by mouth every 6 hours as needed for nausea or vomiting             sulfamethoxazole-trimethoprim (BACTRIM DS) 800-160 MG tablet  Take 1 tablet by mouth Every Mon, Tues two times daily DO NOT START until directed by your provider

## 2020-11-21 NOTE — DISCHARGE SUMMARY
[facility] Discharge Summary   Theo Roblero MRN# [id number]   Age: 55 year old  YOB: 1965   Date of Admission: 11/16/2020  Date of Discharge:  11/22/20  Admitting Physician: Gracia Martinez MD  Discharge Physician:  Dr. Zavala  Discharge Diagnoses:    1.  NHL s/p auto PBSCT  2.  DM Type 2  3.  HTN  Discharge Medications:       Yanick Roblero   Home Medication Instructions NICK:[id number]    Printed on:11/21/20 1021   Medication Information                      acyclovir (ZOVIRAX) 800 MG tablet  Take 1 tablet (800 mg) by mouth 2 times daily             Alcohol Swabs PADS  1 pad 4 times daily (before meals and nightly)             blood glucose (NO BRAND SPECIFIED) test strip  Use to test blood sugar 4 times daily or as directed.             blood glucose monitoring (ACCU-CHEK FASTCLIX) lancets  Use to test blood sugar 4 times daily or as directed.             fluconazole (DIFLUCAN) 200 MG tablet  Take 1 tablet (200 mg) by mouth daily             heparin lock flush 10 UNIT/ML SOLN injection  5-10 mLs by Intracatheter route every 24 hours             insulin aspart (NOVOLOG PEN) 100 UNIT/ML pen  Inject 8 units with meals three times daily, as well as sliding scale four times daily with meals and at bedtime             insulin isophane human (HUMULIN N PEN) 100 UNIT/ML injection  Inject 22 units in the morning and 8 units in the evening             insulin pen needle (32G X 4 MM) 32G X 4 MM miscellaneous  Use 4 pen needles daily or as directed.             levofloxacin (LEVAQUIN) 250 MG tablet  Take 1 tablet (250 mg) by mouth daily             metoprolol succinate ER (TOPROL-XL) 25 MG 24 hr tablet  Take 1 tablet (25 mg) by mouth 2 times daily             nicotine (NICODERM CQ) 21 MG/24HR 24 hr patch  Place 1 patch onto the skin daily             ondansetron (ZOFRAN) 8 MG tablet  Take 1 tablet (8 mg) by mouth every 8 hours as needed for nausea             pantoprazole (PROTONIX) 40 MG EC  tablet  Take 1 tablet (40 mg) by mouth daily             prochlorperazine (COMPAZINE) 5 MG tablet  Take 1-2 tablets (5-10 mg) by mouth every 6 hours as needed for nausea or vomiting             sulfamethoxazole-trimethoprim (BACTRIM DS) 800-160 MG tablet  Take 1 tablet by mouth Every Mon, Tues two times daily DO NOT START until directed by your provider               Brief History of Illness:    Theo Roblero is a 55 year old male with high risk (IPI 6.5 and Ki67 of 20%), stage IV Mantle cell lymphoma in CR1 s/p 8 cycles of alternating 6 cycles of Maxi-R- CHOP alternating with high-dose LITZY-C. Last cycle was given on 6/11/2020. His therapy was complicated by a kidney stone and sepsis in 5/2020, prior to cycle 5. Upon diagnosis, a CT was performed to evaluate pain concerning for an aortic dissection and showed LAD above and below the diaphragm, with large portacaval nodes measure in excess of 5cm. No thoracoabdominal aortic dissection or aneurysm was found.The findings of LAD were confirmed by a PET/CT. Lymph node biopsy on 2/7/2020 showed MCL, as described as CD20 positive B cells with positive staining for CD5 and cyclin D1.  The Ki-67 proliferation index was 20%. A bone marrow biopsy on 2/18/2020 was positive for intramedullary MCL (<5%). Early in his disease course, he reportedly developed a sepsis syndrome with leukocytosis (17.8) a creatinine 2.1 and was admitted on 2/20/2020. Kidney function has since improved.   He went on to receive Maxi-R- CHOP alternating with high-dose LITZY-C from 2-6/2020. Follow up imaging post therapy on 7/6/2020 showed a CR by PET CT, with resolution of avid lymph nodes. A persistent pleural effusion was observed and not PET avid.    S/P auto PBSCT 11/10/20 with Palifermin.  Readmitted with neutropenic fever    Physical Exam:    BP (!) 141/85 (BP Location: Left arm)   Pulse 101   Temp 98.5  F (36.9  C) (Oral)   Resp 16   Ht 1.829 m (6')   Wt 113.4 kg (249 lb 14.4 oz)   SpO2  97%   BMI 33.89 kg/m    General Appearance: well appearing, NAD.   HEENT: sclera anicteric, EOMI. Moist mucus membranes, no ulcerations or thrush.   CV: RRR, no murmur or rub.   RESP: CTA bilaterally; no rales or wheezes.  GI: +BS, soft, nontender, no masses or hepatosplenomegaly.  EXT: no edema reji LE's  SKIN:  No rash or lesions on exposed areas.  NEURO: A&O x3; CN II-XII grossly intact.  PSYCH: Appropriate affect  VASCULAR ACCESS:   Hospital Course:    1.  BMT: Day +10 today. WBC coming up  - BEAM prep, tolerated well.  - Palifermin complete.  - Cell dose 4.3 million.  - GCSF started d+5, last dose 11/21   - Re-stage per protocol.     2.  HEME:   -- Keep Hgb>7 and plts>10K. Premeds for blood products (no hx of reaction, but has received premeds in the past).  -- pancytopenia due to chemotherapy.      3.  ID:    SIRS: 11/16 Febrile and tachy 130-160. Resolved with fluids/antibiotics. Source not identified.  - Off Cefipime as of 11/21.  No recurrent fever or infectious symptom,s  - BC's NGTD, UA neg, Urine Cx neg. CXR neg. Lactic neg.   - COVID neg.   - prophy Fluc, and HD ACV (CMV+, HSV+, EBV+) prophy.   - Bactrim or appropriate PCP therapy to start d+28.                                             4.  GI:   - Diarrhea: C.diff and Enteric negative. Imodium PRN.  - prn zofran and compazine  - Protonix for GI prophy.     5.  FEN/Renal:   - Monitor Cr/lytes.      6. Endo:   - Hx DM type II. On metformin and glimepiride on admission. Held both due to potential for n/v with prep; also glimepiride can inhibit engraftment  - discontinue on Insulin sliding scale, may resume home regimen when we are sure his WBC will be stable off GCSF     7. CV:   - HTN: on metoprolol BID   - Tachy on arrival: SIRS? EKG sinus tach. Asymptomatic. No events on tele. Resolved with fluid resuscitation.   CODE STATUS: Full  Discharge Instructions and Follow-Up:    Discharge diet: Regular diet as tolerated  Discharge activity: Activity as  tolerated   Discharge follow-up: Follow up with BMT clinic on 11/23/20 8:15 lab, 9:00 provider    Discharge Disposition:    Discharged to home.    Erlinda Dupont    Attending Summary  I saw and examined the patient and agree with the details noted above.  Total time in coordinating discharge plan was <30 minutes.

## 2020-11-22 NOTE — PLAN OF CARE
Low grade temp, 99.4. HTN. OVSS. Denies pain. Denies n/v/d. Bedtime . Magnesium 1.5, 4g infusing. Potassium 3.4, will need 40 meq PO, ordered for 8 am. No other placements. Possibly discharging home today. Voiding adequately. Up independently. Continue plan of care.         Problem: Adult Inpatient Plan of Care  Goal: Plan of Care Review  Outcome: No Change  Flowsheets (Taken 11/22/2020 0619)  Plan of Care Reviewed With: patient  Progress: no change     Problem: Adult Inpatient Plan of Care  Goal: Patient-Specific Goal (Individualized)  Outcome: No Change     Problem: Adult Inpatient Plan of Care  Goal: Absence of Hospital-Acquired Illness or Injury  Outcome: No Change     Problem: Adult Inpatient Plan of Care  Goal: Optimal Comfort and Wellbeing  Outcome: No Change     Problem: Adult Inpatient Plan of Care  Goal: Readiness for Transition of Care  Outcome: No Change     Problem: Fever (Fever with Neutropenia)  Goal: Baseline Body Temperature  Outcome: No Change     Problem: Infection Risk (Fever with Neutropenia)  Goal: Absence of Infection  Outcome: No Change     Problem: Diarrhea  Goal: Fluid and Electrolyte Balance  Outcome: Improving

## 2020-11-22 NOTE — PROGRESS NOTES
BMT Daily Progress Note   11/22/2020    Patient ID:  Theo Roblero is a 55 year old male, currently day +12 of his HCT. Re-admitted on 11/16 with concern for SIRS.      Diagnosis [facility] Non-Hodgkin lymphoma, Mantle cell/intermediate differentiation  HCT Type Autologous    Prep Regimen BCNU  Agueda-C  Etoposide  Melphalan   Donor Source Autologous PBSC    GVHD Prophylaxis No  Primary BMT MD      INTERVAL  HISTORY   Feeling good.  Eating with no N/V/D.  Afebrile with no cough or SOB.  No pain, bleeding or rash  Review of Systems: 8 point ROS negative except as noted above.    PHYSICAL EXAM     Weight In/Out     Wt Readings from Last 3 Encounters:   11/22/20 111.7 kg (246 lb 4.8 oz)   11/16/20 112.5 kg (248 lb)   11/15/20 113.4 kg (250 lb)      I/O last 3 completed shifts:  In: 2000 [P.O.:1800; I.V.:200]  Out: 2750 [Urine:2750]       KPS:  80    /81 (BP Location: Left arm)   Pulse 89   Temp 98.7  F (37.1  C) (Oral)   Resp 16   Ht 1.829 m (6')   Wt 111.7 kg (246 lb 4.8 oz)   SpO2 95%   BMI 33.40 kg/m       General: NAD   Eyes: MARYAN, sclera anicteric   Nose/Mouth/Throat: OP clear, buccal mucosa moist, no ulcerations   Lungs: CTA bilaterally  Cardiovascular: RRR, no M/R/G   Abdominal/Rectal: +BS, soft, NT, ND  Lymphatics: no edema  Skin: no rashes or petechaie  Neuro: A&O   Additional Findings: Forte site NT, no drainage. R Port-a-cath not accessed   LABS AND IMAGING      I have assessed all abnormal lab values for their clinical significance and any values considered clinically significant have been addressed in the assessment and plan      Lab Results   Component Value Date    WBC 12.6 (H) 11/22/2020    ANEU 10.2 (H) 11/22/2020    HGB 8.1 (L) 11/22/2020    HCT 24.0 (L) 11/22/2020    PLT 31 (LL) 11/22/2020     11/22/2020    POTASSIUM 3.4 11/22/2020    CHLORIDE 106 11/22/2020    CO2 29 11/22/2020     (H) 11/22/2020    BUN 10 11/22/2020    CR 0.89 11/22/2020    MAG 1.5 (L) 11/22/2020    INR  1.04 11/09/2020     OVERALL PLAN   ID: Theo Roblero is a 55 year old male with mantle cell lymphoma, day +12 s/p auto PBSCT.  . Re-admitted with SIRS.      1.  BMT:   - BEAM prep, tolerated well.  - Palifermin complete.  - Cell dose 4.3 million.  - Last dose GCSF 11/21  - Re-stage per protocol.     2.  HEME:   -- Keep Hgb>7 and plts>10K. Premeds for blood products (no hx of reaction, but has received premeds in the past).      3.  ID:    SIRS: 11/16 Febrile and tachy 130-160. Resolved with fluids/antibiotics. Source not identified.  - last dose Cefipime 11/21.   - BC's NGTD, UA neg, Urine Cx neg. CXR neg. Lactic neg.   - COVID neg.   - prophy Fluc, and HD ACV (CMV+, HSV+, EBV+)    - Bactrim or appropriate PCP therapy to start d+28.                                             4.  GI: No GI symptoms  - Protonix for GI prophy.     5.  FEN/Renal: Replace lytes per SS     6. Endo:   - Hx DM type II. On metformin and glimepiride on admission. Held both due to potential for n/v with prep; also glimepiride can inhibit engraftment  - discharge today & cont sliding scale insulin regimen until we are sure his white count will stay up without GCSF.  When counts are stable can resume home regimen     7. CV:   - HTN: on metoprolol BID   Discharge today & follow-up in clinic tomorrow    Erlinda Dupont   Attending Summary  The patient was seen and examined by me separate from the midlevel provider.The note above reflects my assessment and plan. I have personally reviewed today's labs,vital and radiology results. The points of care that were added by me are:     History and physical  Day +12 BEAM conditioned ASCT for MCL. Afebrile. Engrafted.    On exam:  Head/mouth/neck: Oropharynx clear.  No lymphadenopathy.  Heart: Regular rate and rhythm.  No murmurs rubs or gallops.  Lungs: Lungs are clear bilaterally.  Abdomen: Abdomen is soft nontender nondistended.  Intact bowel sounds.  Ext: No edema.  Skin: No rash.    Pertinent  Labs:  reviewed    ASSESSMENT AND PLAN  1.  MCL: Day +12 BEAM conditioned ASCT. Engrafted. Restaging per protocol at day +30.  2. Dispo:  Discharge today with clinic follow up.    Sam Zavala MD

## 2020-11-22 NOTE — PROGRESS NOTES
Yanick was discharged to home with his brother.  His brother met him at the front of the hospital and drove him home to [facility].  Before he left, his CVC was heparin locked and the caps were changed.  Discharge instructions and medications were reviewed.  Discharge instructions signed by Yanick and myself.  He is planning to return to clinic tomorrow morning as scheduled for lab work and a provider visit.  He has all his medications already at home from his discharge day +1 so no prescriptions were needed.

## 2020-11-22 NOTE — PROGRESS NOTES
Patient has had a transplant within 6 months (BMT) and should be contacted by the transplant RNCC. Will close post- hospital call at this time.     Eli Sifuentes CMA, [facility]  Post Hospital Discharge Team

## 2020-11-22 NOTE — PLAN OF CARE
Problem: Adult Inpatient Plan of Care  Goal: Plan of Care Review  Outcome: Adequate for Discharge

## 2020-11-23 NOTE — PLAN OF CARE
Occupational Therapy Discharge Summary    Reason for therapy discharge:    Discharged to home.    Progress towards therapy goal(s). See goals on Care Plan in Psychiatric electronic health record for goal details.  Goals partially met.  Barriers to achieving goals:   discharge from facility.    Therapy recommendation(s):    Continue home exercise program.

## 2020-11-23 NOTE — PROGRESS NOTES
BMT Daily Progress Note   11/23/2020    Patient ID:  Theo Roblero is a 55 year old male, currently day +13 of his HCT. Re-admitted on 11/16 with concern for SIRS.      Diagnosis [facility] Non-Hodgkin lymphoma, Mantle cell/intermediate differentiation  HCT Type Autologous    Prep Regimen BCNU  Agueda-C  Etoposide  Melphalan   Donor Source Autologous PBSC    GVHD Prophylaxis No  Primary BMT MD      INTERVAL  HISTORY   Discharged yesterday. No new complaints today. Eating/drinking without n/v/d/c.   Review of Systems: 8 point ROS negative except as noted above.    PHYSICAL EXAM     Weight In/Out     Wt Readings from Last 3 Encounters:   11/22/20 111.7 kg (246 lb 4.8 oz)   11/16/20 112.5 kg (248 lb)   11/15/20 113.4 kg (250 lb)      I/O last 3 completed shifts:  In: 2000 [P.O.:1800; I.V.:200]  Out: 2750 [Urine:2750]       KPS:  80    BP (!) 146/85 (BP Location: Right arm, Patient Position: Sitting, Cuff Size: Adult Large)   Pulse 97   Temp 98.2  F (36.8  C) (Oral)   Resp 18   Ht 1.829 m (6')   Wt 113.5 kg (250 lb 3.2 oz)   SpO2 98%   BMI 33.93 kg/m       General: NAD   Eyes: MARYAN, sclera anicteric   Nose/Mouth/Throat: OP clear, buccal mucosa moist, no ulcerations   Lungs: CTA bilaterally  Cardiovascular: RRR, no M/R/G   Abdominal/Rectal: +BS, soft, NT, ND  Lymphatics: no edema  Skin: no rashes or petechaie  Neuro: A&O   Additional Findings: Forte site NT, no drainage. R Port-a-cath not accessed   LABS AND IMAGING      I have assessed all abnormal lab values for their clinical significance and any values considered clinically significant have been addressed in the assessment and plan      Lab Results   Component Value Date    WBC 10.2 11/23/2020    ANEU 10.2 (H) 11/22/2020    HGB 9.1 (L) 11/23/2020    HCT 27.7 (L) 11/23/2020    PLT 59 (L) 11/23/2020     11/23/2020    POTASSIUM 3.5 11/23/2020    CHLORIDE 103 11/23/2020    CO2 30 11/23/2020     (H) 11/23/2020    BUN 12 11/23/2020    CR 0.84 11/23/2020     MAG 1.5 (L) 11/22/2020    INR 1.04 11/09/2020     OVERALL PLAN   ID: Theo Roblero is a 55 year old male with mantle cell lymphoma, day +13 s/p auto PBSCT.  . Re-admitted with SIRS.      1.  BMT:   - BEAM prep, tolerated well.  - Palifermin complete.  - Cell dose 4.3 million.  - Last dose GCSF 11/21  - Re-stage per protocol.     2.  HEME:   -- Keep Hgb>7 and plts>10K. Premeds for blood products (no hx of reaction, but has received premeds in the past).      3.  ID:    SIRS: 11/16 Febrile and tachy 130-160. Resolved with fluids/antibiotics. Source not identified.  - last dose Cefipime 11/21.   - BC's NGTD, UA neg, Urine Cx neg. CXR neg. Lactic neg.   - COVID neg.   - prophy Fluc, and HD ACV (CMV+, HSV+, EBV+)    - Bactrim or appropriate PCP therapy to start d+28.                                             4.  GI: No GI symptoms  - Protonix for GI prophy.     5.  FEN/Renal: Replace lytes per SS     6. Endo:   - Hx DM type II. On metformin and glimepiride on admission. Held both due to potential for n/v with prep; also glimepiride can inhibit engraftment  - discharged on sliding scale insulin regimen until we are sure his white count will stay up without GCSF.  When counts are stable can resume home regimen     7. CV:   - HTN: on metoprolol BID     RETURN TO CLINIC: Friday for f/u  Jessika Camacho NP

## 2020-11-23 NOTE — NURSING NOTE
Oncology Rooming Note    November 23, 2020 9:01 AM   Theo Roblero is a 55 year old male who presents for:    Chief Complaint   Patient presents with     Blood Draw     labs drawn via red lumen CVC by RN in lab     RECHECK     Return: Mantle cell lymphoma of lymph nodes of multiple sites     Initial Vitals: BP (!) 146/85 (BP Location: Right arm, Patient Position: Sitting, Cuff Size: Adult Large)   Pulse 97   Temp 98.2  F (36.8  C) (Oral)   Resp 18   Ht 1.829 m (6')   Wt 113.5 kg (250 lb 3.2 oz)   SpO2 98%   BMI 33.93 kg/m   Estimated body mass index is 33.93 kg/m  as calculated from the following:    Height as of this encounter: 1.829 m (6').    Weight as of this encounter: 113.5 kg (250 lb 3.2 oz). Body surface area is 2.4 meters squared.  No Pain (0) Comment: Data Unavailable   No LMP for male patient.  Allergies reviewed: Yes  Medications reviewed: Yes    Medications: Medication refills not needed today.  Pharmacy name entered into [employer]:    [facility] #[id number] - [place], [place] - [address] AT [facility] [address]  [place]    Clinical concerns: N/A        Katrin Nix CMA

## 2020-11-23 NOTE — NURSING NOTE
Chief Complaint   Patient presents with     Blood Draw     labs drawn via red lumen CVC by RN in lab     BP (!) 146/85 (BP Location: Right arm, Patient Position: Sitting, Cuff Size: Adult Large)   Pulse 97   Temp 98.2  F (36.8  C) (Oral)   Resp 18   Wt 113.5 kg (250 lb 3.2 oz)   SpO2 98%   BMI 33.93 kg/m      Lines accessed. Labs drawn via red lumen. Both lines flushed with normal saline and heparin. Pt tolerated well. Checked into next appointment.    Tamiko Phan RN on 11/23/2020 at 8:44 AM

## 2020-11-24 NOTE — PROGRESS NOTES
This is a recent snapshot of the patient's [facility] medical record.  For current drug dose and complete information and questions, call [phone] or In Basket pool, [facility] home infusion ([id number])  CSN Number:  [id number]

## 2020-11-27 NOTE — PROGRESS NOTES
BMT Daily Progress Note   11/27/2020    Patient ID:  Theo Roblero is a 55 year old male, currently day +17 of his HCT. Re-admitted on 11/16 with concern for SIRS.      Diagnosis [facility] Non-Hodgkin lymphoma, Mantle cell/intermediate differentiation  HCT Type Autologous    Prep Regimen BCNU  Agueda-C  Etoposide  Melphalan   Donor Source Autologous PBSC    GVHD Prophylaxis No  Primary BMT MD      INTERVAL  HISTORY   No new complaints today. Eating/drinking without n/v/d/c. Doing really well.    Review of Systems: 8 point ROS negative except as noted above.    PHYSICAL EXAM     Weight In/Out     Wt Readings from Last 3 Encounters:   11/23/20 113.5 kg (250 lb 3.2 oz)   11/22/20 111.7 kg (246 lb 4.8 oz)   11/16/20 112.5 kg (248 lb)      I/O last 3 completed shifts:  In: 2000 [P.O.:1800; I.V.:200]  Out: 2750 [Urine:2750]       KPS:  80    Pulse 102, resp. rate 18, weight 109.3 kg (241 lb), SpO2 98 %.     Wt Readings from Last 4 Encounters:   11/27/20 109.3 kg (241 lb)   11/23/20 113.5 kg (250 lb 3.2 oz)   11/22/20 111.7 kg (246 lb 4.8 oz)   11/16/20 112.5 kg (248 lb)     General: NAD   Eyes: MARYAN, sclera anicteric   Nose/Mouth/Throat: OP clear, buccal mucosa moist, no ulcerations   Lungs: CTA bilaterally  Cardiovascular: RRR, no M/R/G   Abdominal/Rectal: +BS, soft, NT, ND  Lymphatics: no edema  Skin: no rashes or petechaie  Neuro: A&O   Additional Findings: Forte site NT, no drainage. R Port-a-cath not accessed   LABS AND IMAGING      I have assessed all abnormal lab values for their clinical significance and any values considered clinically significant have been addressed in the assessment and plan    Lab Results   Component Value Date    WBC 5.8 11/27/2020    ANEU 7.6 11/23/2020    HGB 9.8 (L) 11/27/2020    HCT 30.1 (L) 11/27/2020     (L) 11/27/2020     11/27/2020    POTASSIUM 3.7 11/27/2020    CHLORIDE 100 11/27/2020    CO2 28 11/27/2020     (H) 11/27/2020    BUN 16 11/27/2020    CR 0.97  11/27/2020    MAG 1.5 (L) 11/22/2020    INR 1.04 11/09/2020       OVERALL PLAN   ID: Theo Roblero is a 55 year old male with mantle cell lymphoma, day +17 s/p BEAM prep and auto PBSCT.       1.  BMT:   - BEAM prep  - Palifermin complete.  - Cell dose 4.3 million.  - Last dose GCSF 11/21  - Re-stage per protocol.     2.  HEME:   -- Keep Hgb>7 and plts>10K. Premeds for blood products (no hx of reaction, but has received premeds in the past).      3.  ID:    SIRS: 11/16 Febrile and tachy 130-160. Resolved with fluids/antibiotics. Source not identified.  - last dose Cefepime 11/21.   - BC's NGTD, UA neg, Urine Cx neg. CXR neg. Lactic neg.   - COVID neg.   - prophy Fluc, and HD ACV (CMV+, HSV+, EBV+)    - Bactrim or appropriate PCP therapy to start d+28.                                             4.  GI: No GI symptoms  - Protonix for GI prophy.     5.  FEN/Renal: Replace lytes per SS     6. Endo:   - Hx DM type II. On metformin and glimepiride on admission. Held both due to potential for n/v with prep; also glimepiride can inhibit engraftment  - discharged on sliding scale insulin regimen until we are sure his white count will stay up without GCSF.  When counts are stable can resume home regimen     7. CV:   - HTN: on metoprolol BID     RTC:  Tuesday, also requested line removal (will need covid test prior once scheduled) for Friday if possible    Radha Bell pa-c  [phone]

## 2020-11-27 NOTE — NURSING NOTE
Chief Complaint   Patient presents with     Lab Only     cvc, heparin locked, vitals checked, caps changed     RECHECK     Return: NHL (non-Hodgkin's lymphoma)     Lines do not draw well, pt has a port, please consider removing line   Mony Velásquez RN on 11/27/2020 at 8:09 AM

## 2020-11-27 NOTE — NURSING NOTE
Oncology Rooming Note    November 27, 2020 8:09 AM   Theo Roblero is a 55 year old male who presents for:    Chief Complaint   Patient presents with     Lab Only     cvc, heparin locked, vitals checked, caps changed     RECHECK     Return: NHL (non-Hodgkin's lymphoma)     Initial Vitals: /82   Pulse 102   Temp 98.1  F (36.7  C)   Resp 18   Ht 1.829 m (6')   Wt 109.3 kg (241 lb)   SpO2 98%   BMI 32.69 kg/m   Estimated body mass index is 32.69 kg/m  as calculated from the following:    Height as of this encounter: 1.829 m (6').    Weight as of this encounter: 109.3 kg (241 lb). Body surface area is 2.36 meters squared.  No Pain (0) Comment: Data Unavailable   No LMP for male patient.  Allergies reviewed: Yes  Medications reviewed: Yes    Medications: Medication refills not needed today.  Pharmacy name entered into [employer]:    [facility] #[id number] - [place], [place] - [address] AT [facility] [address]  [facility]    Clinical concerns: N/A     Katrin Nix CMA

## 2020-11-27 NOTE — LETTER
11/27/2020         RE: Theo Roblero  [address]  [address]  [place] [address]        Dear Colleague,    Thank you for referring your patient, Theo Roblero, to the [facility] BLOOD AND MARROW TRANSPLANT PROGRAM [place]. Please see a copy of my visit note below.    BMT Daily Progress Note   11/27/2020    Patient ID:  Theo Roblero is a 55 year old male, currently day +17 of his HCT. Re-admitted on 11/16 with concern for SIRS.      Diagnosis [facility] Non-Hodgkin lymphoma, Mantle cell/intermediate differentiation  HCT Type Autologous    Prep Regimen BCNU  Agueda-C  Etoposide  Melphalan   Donor Source Autologous PBSC    GVHD Prophylaxis No  Primary BMT MD      INTERVAL  HISTORY   No new complaints today. Eating/drinking without n/v/d/c. Doing really well.    Review of Systems: 8 point ROS negative except as noted above.    PHYSICAL EXAM     Weight In/Out     Wt Readings from Last 3 Encounters:   11/23/20 113.5 kg (250 lb 3.2 oz)   11/22/20 111.7 kg (246 lb 4.8 oz)   11/16/20 112.5 kg (248 lb)      I/O last 3 completed shifts:  In: 2000 [P.O.:1800; I.V.:200]  Out: 2750 [Urine:2750]       KPS:  80    Pulse 102, resp. rate 18, weight 109.3 kg (241 lb), SpO2 98 %.     Wt Readings from Last 4 Encounters:   11/27/20 109.3 kg (241 lb)   11/23/20 113.5 kg (250 lb 3.2 oz)   11/22/20 111.7 kg (246 lb 4.8 oz)   11/16/20 112.5 kg (248 lb)     General: NAD   Eyes: MARYAN, sclera anicteric   Nose/Mouth/Throat: OP clear, buccal mucosa moist, no ulcerations   Lungs: CTA bilaterally  Cardiovascular: RRR, no M/R/G   Abdominal/Rectal: +BS, soft, NT, ND  Lymphatics: no edema  Skin: no rashes or petechaie  Neuro: A&O   Additional Findings: Forte site NT, no drainage. R Port-a-cath not accessed   LABS AND IMAGING      I have assessed all abnormal lab values for their clinical significance and any values considered clinically significant have been addressed in the assessment and plan    Lab Results   Component Value  Date    WBC 5.8 11/27/2020    ANEU 7.6 11/23/2020    HGB 9.8 (L) 11/27/2020    HCT 30.1 (L) 11/27/2020     (L) 11/27/2020     11/27/2020    POTASSIUM 3.7 11/27/2020    CHLORIDE 100 11/27/2020    CO2 28 11/27/2020     (H) 11/27/2020    BUN 16 11/27/2020    CR 0.97 11/27/2020    MAG 1.5 (L) 11/22/2020    INR 1.04 11/09/2020       OVERALL PLAN   ID: Theo Roblero is a 55 year old male with mantle cell lymphoma, day +17 s/p BEAM prep and auto PBSCT.       1.  BMT:   - BEAM prep  - Palifermin complete.  - Cell dose 4.3 million.  - Last dose GCSF 11/21  - Re-stage per protocol.     2.  HEME:   -- Keep Hgb>7 and plts>10K. Premeds for blood products (no hx of reaction, but has received premeds in the past).      3.  ID:    SIRS: 11/16 Febrile and tachy 130-160. Resolved with fluids/antibiotics. Source not identified.  - last dose Cefepime 11/21.   - BC's NGTD, UA neg, Urine Cx neg. CXR neg. Lactic neg.   - COVID neg.   - prophy Fluc, and HD ACV (CMV+, HSV+, EBV+)    - Bactrim or appropriate PCP therapy to start d+28.                                             4.  GI: No GI symptoms  - Protonix for GI prophy.     5.  FEN/Renal: Replace lytes per SS     6. Endo:   - Hx DM type II. On metformin and glimepiride on admission. Held both due to potential for n/v with prep; also glimepiride can inhibit engraftment  - discharged on sliding scale insulin regimen until we are sure his white count will stay up without GCSF.  When counts are stable can resume home regimen     7. CV:   - HTN: on metoprolol BID     RTC:  Tuesday, also requested line removal (will need covid test prior once scheduled) for Friday if possible    Radha Bell pa-c  [phone]            Again, thank you for allowing me to participate in the care of your patient.        Sincerely,        BMT Advanced Practice Provider

## 2020-11-27 NOTE — NURSING NOTE
Dressing change completed sterile technique used. Site WDL. Patient tolerated dressing change. See Dock Flowsheet.     Katrin Nix MA

## 2020-12-01 NOTE — PROGRESS NOTES
BMT Daily Progress Note   12/01/2020    Patient ID:  Theo Roblero is a 55 year old male, currently day +21 of his HCT. Re-admitted on 11/16 with concern for SIRS.      Diagnosis [facility] Non-Hodgkin lymphoma, Mantle cell/intermediate differentiation  HCT Type Autologous    Prep Regimen BCNU  Agueda-C  Etoposide  Melphalan   Donor Source Autologous PBSC    GVHD Prophylaxis No  Primary BMT MD      INTERVAL  HISTORY   No new complaints today. Eating/drinking without n/v/d/c. Doing really well.  Line removal scheduled for Tuesday and covid the Saturday prior.    Review of Systems: 8 point ROS negative except as noted above.    PHYSICAL EXAM     Weight In/Out     Wt Readings from Last 3 Encounters:   12/01/20 107.7 kg (237 lb 8 oz)   11/27/20 109.3 kg (241 lb)   11/23/20 113.5 kg (250 lb 3.2 oz)      I/O last 3 completed shifts:  In: 2000 [P.O.:1800; I.V.:200]  Out: 2750 [Urine:2750]       KPS:  80    Blood pressure 108/74, pulse 99, temperature 97.9  F (36.6  C), temperature source Oral, resp. rate 18, height 1.829 m (6'), weight 107.7 kg (237 lb 8 oz), SpO2 99 %.     Wt Readings from Last 4 Encounters:   12/01/20 107.7 kg (237 lb 8 oz)   11/27/20 109.3 kg (241 lb)   11/23/20 113.5 kg (250 lb 3.2 oz)   11/22/20 111.7 kg (246 lb 4.8 oz)     General: NAD   Eyes: MARYAN, sclera anicteric   Nose/Mouth/Throat: OP clear, buccal mucosa moist, no ulcerations   Lungs: CTA bilaterally  Cardiovascular: RRR, no M/R/G   Lymphatics: no edema  Skin: no rashes or petechaie  Neuro: A&O   Additional Findings: Forte site NT, no drainage. R Port-a-cath not accessed   LABS AND IMAGING      I have assessed all abnormal lab values for their clinical significance and any values considered clinically significant have been addressed in the assessment and plan    Lab Results   Component Value Date    WBC 8.1 12/01/2020    ANEU 5.3 12/01/2020    HGB 10.9 (L) 12/01/2020    HCT 33.6 (L) 12/01/2020     12/01/2020     12/01/2020     POTASSIUM 3.9 12/01/2020    CHLORIDE 103 12/01/2020    CO2 26 12/01/2020     (H) 12/01/2020    BUN 19 12/01/2020    CR 0.97 12/01/2020    MAG 1.5 (L) 11/22/2020    INR 1.04 11/09/2020       OVERALL PLAN   ID: Theo Roblero is a 55 year old male with mantle cell lymphoma, day +17 s/p BEAM prep and auto PBSCT.       1.  BMT:   - BEAM prep  - Palifermin complete.  - Cell dose 4.3 million.  - Last dose GCSF 11/21  - Re-stage per protocol.     2.  HEME:   -- Keep Hgb>7 and plts>10K. Premeds for blood products (no hx of reaction, but has received premeds in the past).      3.  ID:    SIRS: 11/16 Febrile and tachy 130-160. Resolved with fluids/antibiotics. Source not identified.  - last dose Cefepime 11/21.   - BC's NGTD, UA neg, Urine Cx neg. CXR neg. Lactic neg.   - COVID neg.   - prophy Fluc, and HD ACV (CMV+, HSV+, EBV+)    - Bactrim or appropriate PCP therapy to start d+28.                                             4.  GI: No GI symptoms  - Protonix for GI prophy.     5.  FEN/Renal: Replace lytes per SS     6. Endo:   - Hx DM type II. On metformin and glimepiride on admission. Held both due to potential for n/v with prep; also glimepiride can inhibit engraftment  - discharged on sliding scale insulin regimen until we are sure his white count will stay up without GCSF.  When counts are stable can resume home regimen     7. CV:   - HTN: on metoprolol BID     RTC:  Sat-covid test  Tues-line removal  Wed-scans/bmbx  12/14-Dr. Zavala. Advised pt to call earlier with sxs or prn    Jessika Camacho NP

## 2020-12-01 NOTE — LETTER
12/1/2020         RE: Theo Roblero  [address]  [address]  [place] [address]        Dear Colleague,    Thank you for referring your patient, Theo Roblero, to the [facility] BLOOD AND MARROW TRANSPLANT PROGRAM [place]. Please see a copy of my visit note below.    BMT Daily Progress Note   12/01/2020    Patient ID:  Theo Roblero is a 55 year old male, currently day +21 of his HCT. Re-admitted on 11/16 with concern for SIRS.      Diagnosis [facility] Non-Hodgkin lymphoma, Mantle cell/intermediate differentiation  HCT Type Autologous    Prep Regimen BCNU  Agueda-C  Etoposide  Melphalan   Donor Source Autologous PBSC    GVHD Prophylaxis No  Primary BMT MD      INTERVAL  HISTORY   No new complaints today. Eating/drinking without n/v/d/c. Doing really well.  Line removal scheduled for Tuesday and covid the Saturday prior.    Review of Systems: 8 point ROS negative except as noted above.    PHYSICAL EXAM     Weight In/Out     Wt Readings from Last 3 Encounters:   12/01/20 107.7 kg (237 lb 8 oz)   11/27/20 109.3 kg (241 lb)   11/23/20 113.5 kg (250 lb 3.2 oz)      I/O last 3 completed shifts:  In: 2000 [P.O.:1800; I.V.:200]  Out: 2750 [Urine:2750]       KPS:  80    Blood pressure 108/74, pulse 99, temperature 97.9  F (36.6  C), temperature source Oral, resp. rate 18, height 1.829 m (6'), weight 107.7 kg (237 lb 8 oz), SpO2 99 %.     Wt Readings from Last 4 Encounters:   12/01/20 107.7 kg (237 lb 8 oz)   11/27/20 109.3 kg (241 lb)   11/23/20 113.5 kg (250 lb 3.2 oz)   11/22/20 111.7 kg (246 lb 4.8 oz)     General: NAD   Eyes: MARYAN, sclera anicteric   Nose/Mouth/Throat: OP clear, buccal mucosa moist, no ulcerations   Lungs: CTA bilaterally  Cardiovascular: RRR, no M/R/G   Lymphatics: no edema  Skin: no rashes or petechaie  Neuro: A&O   Additional Findings: Forte site NT, no drainage. R Port-a-cath not accessed   LABS AND IMAGING      I have assessed all abnormal lab values for their clinical  significance and any values considered clinically significant have been addressed in the assessment and plan    Lab Results   Component Value Date    WBC 8.1 12/01/2020    ANEU 5.3 12/01/2020    HGB 10.9 (L) 12/01/2020    HCT 33.6 (L) 12/01/2020     12/01/2020     12/01/2020    POTASSIUM 3.9 12/01/2020    CHLORIDE 103 12/01/2020    CO2 26 12/01/2020     (H) 12/01/2020    BUN 19 12/01/2020    CR 0.97 12/01/2020    MAG 1.5 (L) 11/22/2020    INR 1.04 11/09/2020       OVERALL PLAN   ID: Theo Roblero is a 55 year old male with mantle cell lymphoma, day +17 s/p BEAM prep and auto PBSCT.       1.  BMT:   - BEAM prep  - Palifermin complete.  - Cell dose 4.3 million.  - Last dose GCSF 11/21  - Re-stage per protocol.     2.  HEME:   -- Keep Hgb>7 and plts>10K. Premeds for blood products (no hx of reaction, but has received premeds in the past).      3.  ID:    SIRS: 11/16 Febrile and tachy 130-160. Resolved with fluids/antibiotics. Source not identified.  - last dose Cefepime 11/21.   - BC's NGTD, UA neg, Urine Cx neg. CXR neg. Lactic neg.   - COVID neg.   - prophy Fluc, and HD ACV (CMV+, HSV+, EBV+)    - Bactrim or appropriate PCP therapy to start d+28.                                             4.  GI: No GI symptoms  - Protonix for GI prophy.     5.  FEN/Renal: Replace lytes per SS     6. Endo:   - Hx DM type II. On metformin and glimepiride on admission. Held both due to potential for n/v with prep; also glimepiride can inhibit engraftment  - discharged on sliding scale insulin regimen until we are sure his white count will stay up without GCSF.  When counts are stable can resume home regimen     7. CV:   - HTN: on metoprolol BID     RTC:  Sat-covid test  Tues-line removal  Wed-scans/bmbx  12/14-Dr. Zavala. Advised pt to call earlier with sxs or prn    Jessika Camacho NP              Again, thank you for allowing me to participate in the care of your patient.        Sincerely,        BMT Advanced  Practice Provider

## 2020-12-01 NOTE — NURSING NOTE
Oncology Rooming Note    December 1, 2020 9:00 AM   Theo Roblero is a 55 year old male who presents for:    Chief Complaint   Patient presents with     Blood Draw     labs drawn from CVC by RN in lab      Oncology Clinic Visit     MANTLE CELL LYMPHOMA      Initial Vitals: /74 (BP Location: Right arm, Patient Position: Sitting, Cuff Size: Adult Large)   Pulse 99   Temp 97.9  F (36.6  C) (Oral)   Resp 18   Ht 1.829 m (6')   Wt 107.7 kg (237 lb 8 oz)   SpO2 99%   BMI 32.21 kg/m   Estimated body mass index is 32.21 kg/m  as calculated from the following:    Height as of this encounter: 1.829 m (6').    Weight as of this encounter: 107.7 kg (237 lb 8 oz). Body surface area is 2.34 meters squared.  No Pain (0) Comment: Data Unavailable   No LMP for male patient.  Allergies reviewed: Yes  Medications reviewed: Yes    Medications: Medication refills not needed today.  Pharmacy name entered into [employer]:    [id number] - [place], [place] - [address] AT [facility] [address]  [facility]    Clinical concerns: No new concerns today  mirna  was notified.      Мария Mcmahan

## 2020-12-01 NOTE — NURSING NOTE
Chief Complaint   Patient presents with     Blood Draw     labs drawn from CVC by RN in lab      /74 (BP Location: Right arm, Patient Position: Sitting, Cuff Size: Adult Large)   Pulse 99   Temp 97.9  F (36.6  C) (Oral)   Resp 18   Wt 107.7 kg (237 lb 8 oz)   SpO2 99%   BMI 32.21 kg/m      Lines accessed. Labs drawn via red lumen. Both lines flushed with normal saline and heparin. Pt tolerated well. Checked into next appointment.    Tamiko Phan RN on 12/1/2020 at 8:21 AM

## 2020-12-04 NOTE — PROGRESS NOTES
12/04/2020  142, 148    12/03/2020  137, 174, 164    12/02/2020  189, 143, 160    12/01/2020  172, 140, 131    11/30/2020  201, 150, 176    11/29/2020  155, 202, 192    11/28/2020  125, 109, 225    11/27/2020  176, 99, 225    11/26/2020  176, 99, 198    11/25/2020  150, 123, 174    11/24/2020  178, 114, 155    11/23/2020  199, 150, 315    11/22/2020  105, 176

## 2020-12-04 NOTE — PROGRESS NOTES
"Theo Roblero is a 55 year old male who is being evaluated via a billable video visit.      The patient has been notified of following:     \"This video visit will be conducted via a call between you and your physician/provider. We have found that certain health care needs can be provided without the need for an in-person physical exam.  This service lets us provide the care you need with a video conversation.  If a prescription is necessary we can send it directly to your pharmacy.  If lab work is needed we can place an order for that and you can then stop by our lab to have the test done at a later time.    Video visits are billed at different rates depending on your insurance coverage.  Please reach out to your insurance provider with any questions.    If during the course of the call the physician/provider feels a video visit is not appropriate, you will not be charged for this service.\"    Patient has given verbal consent for Video visit? Yes    Okay for doximity video visit: [phone]     How would you like to obtain your AVS? MyChart     Will anyone else be joining your video visit? No    Video-Visit Details    Type of service:  Video Visit    Distant Location (provider location):  [facility] LORRI Manzanares MA    Outcome for 12/04/20 11:47 AM :Glucose data obtained via Phone and Located in Table Below           "

## 2020-12-04 NOTE — PATIENT INSTRUCTIONS
1. Continue your current insulin regimen, until we get the OK from BMT team on resuming pill diabetes medications post transplant. We will likely start first with Metformin, and then resume Glimepiride or another similar agent after this.     ________________    We appreciate your assistance in coordinating your healthcare.     Please upload your insulin pump, blood sugar meter and/or continuous glucose monitor at home 1-2 days before your next diabetes-related appointment.   This will allow your provider to review your  data before your scheduled virtual visit.    To ask a question to your Endocrine care team, please send them a [employer] message, or reach them by phone at [phone]     To expedite your medication refill(s), please contact your pharmacy and have them   fax a refill request to: [phone].  *Please allow 3 business days for routine medication refills.  *Please allow 5 business days for controlled substance medication refills.    For after-hours urgent Endocrine issues, that do not require 091, please dial [phone], and ask to speak with the Endocrinologist On-Call

## 2020-12-06 NOTE — TELEPHONE ENCOUNTER
Patient calling to clarify his directions for taking  Bactrim DS.  He is to start the prescription Tuesday 12-8-20.    Clari Dsouza RN  [facility]    Additional Information    Caller has medication question only, adult not sick, and triager answers question    Protocols used: MEDICATION QUESTION CALL-A-

## 2020-12-07 NOTE — LETTER
"12/7/2020       RE: Theo Roblero  [address] [facility]  [address] [place] [id number]     Dear Colleague,    Thank you for referring your patient, Theo Roblero, to the [facility] [place] at [facility]. Please see a copy of my visit note below.    Theo Roblero is a 55 year old male who is being evaluated via a billable video visit.      The patient has been notified of following:     \"This video visit will be conducted via a call between you and your physician/provider. We have found that certain health care needs can be provided without the need for an in-person physical exam.  This service lets us provide the care you need with a video conversation.  If a prescription is necessary we can send it directly to your pharmacy.  If lab work is needed we can place an order for that and you can then stop by our lab to have the test done at a later time.    Video visits are billed at different rates depending on your insurance coverage.  Please reach out to your insurance provider with any questions.    If during the course of the call the physician/provider feels a video visit is not appropriate, you will not be charged for this service.\"    Patient has given verbal consent for Video visit? Yes    Okay for doximity video visit: [phone]     How would you like to obtain your AVS? MyChart     Will anyone else be joining your video visit? No    Video-Visit Details    Type of service:  Video Visit    Distant Location (provider location):  [facility] [place]     Cyndi Manzanares MA    Outcome for 12/04/20 11:47 AM :Glucose data obtained via Phone and Located in Table Below             12/04/2020  142, 148    12/03/2020  137, 174, 164    12/02/2020  189, 143, 160    12/01/2020  172, 140, 131    11/30/2020  201, 150, 176    11/29/2020  155, 202, 192    11/28/2020  125, 109, 225    11/27/2020  176, 99, " 225    11/26/2020  176, 99, 198    11/25/2020  150, 123, 174    11/24/2020  178, 114, 155    11/23/2020  199, 150, 315    11/22/2020  105, 176    [facility]- Outpatient Diabetes Follow up  Yanick is a 55 year old male with history of TIIDM, high risk Mantle cell lymphoma recently s/p PBSCT 11/10/20,who is following up today for diabetes management. In his hospitalization for auto-transplant, his oral antihyperglycemics were held and he was started on basal bolus insulin.     He was readmitted 11/16/20 from clinic as he was not feeling well, having upset stomach and diarrhea without nausea or vomiting, found to have neutropenic fever. Source was not identified, was given IVF and Abx during his admission.     Per heme-onc note, Metformin held due to potential for N/V following transplant, and Glimepiride as it can inhibit engraftment. He is to continue off these oral antihyperglycemics until WBC stable off GCSF.    Yanick feels fine. His appetite is stable. Losing some weight, but is not concerned about this; mostly due to dietary modification as he has noticed that bread, pasta cause his sugars to spike and is minimizing this intake. He was drinking a diet coke during our video visit today. He would like to make the transition back to oral antihyperglycemics as soon as possible.     Pt denies headaches, visual changes, n/v, SOB at rest, chest pain, abd pain, nausea/vomiting, diarrhea, dysuria, hematuria or foot ulcers.      Current Diabetes Regimen:   NPH 22 units AM, 8 units PM  Novolog 8 units TID WM plus high intensity sliding scale AC/HS    Glucometer Settings  Glucometer type:   Checking 3-4 times per day on average   Fasting BG range 105-201, average 118  Midday BG range , average 140  Evening BG range 131-315, average 167    Weight: losing some weight, he couldn't quantify how much    Diabetes Care  Retinopathy: no    Nephropathy: BP well controlled. No Hx microalbuminuria. Creatinine 0.97(stable).  Taking ACEi/ARB: no    Neuropathy: no    Lipids: Taking ASA: no, statin: no  No results found for: LDL       ROS: 10 point review of systems completed; pertinent positives and negatives documented in HPI      Allergies  No Known Allergies    Medications  Current Outpatient Medications   Medication Sig Dispense Refill     acyclovir (ZOVIRAX) 800 MG tablet Take 1 tablet (800 mg) by mouth 2 times daily 60 tablet 1     Alcohol Swabs PADS 1 pad 4 times daily (before meals and nightly) 120 each 3     blood glucose (NO BRAND SPECIFIED) test strip Use to test blood sugar 4 times daily or as directed. 120 strip 3     blood glucose monitoring (ACCU-CHEK FASTCLIX) lancets Use to test blood sugar 4 times daily or as directed. 102 each 3     fluconazole (DIFLUCAN) 200 MG tablet Take 1 tablet (200 mg) by mouth daily 30 tablet 1     heparin lock flush 10 UNIT/ML SOLN injection 5-10 mLs by Intracatheter route every 24 hours 60 vial 1     insulin aspart (NOVOLOG PEN) 100 UNIT/ML pen Inject 8 units with meals three times daily, as well as sliding scale four times daily with meals and at bedtime 3 mL 1     insulin isophane human (HUMULIN N PEN) 100 UNIT/ML injection Inject 22 units in the morning and 8 units in the evening 3 mL 3     insulin pen needle (32G X 4 MM) 32G X 4 MM miscellaneous Use 4 pen needles daily or as directed. 110 each 3     levofloxacin (LEVAQUIN) 250 MG tablet Take 1 tablet (250 mg) by mouth daily 30 tablet 1     metoprolol succinate ER (TOPROL-XL) 25 MG 24 hr tablet Take 1 tablet (25 mg) by mouth 2 times daily 60 tablet 1     nicotine (NICODERM CQ) 21 MG/24HR 24 hr patch Place 1 patch onto the skin daily 30 patch 1     ondansetron (ZOFRAN) 8 MG tablet Take 1 tablet (8 mg) by mouth every 8 hours as needed for nausea 30 tablet 0     pantoprazole (PROTONIX) 40 MG EC tablet Take 1 tablet (40 mg) by mouth daily 30 tablet 1     prochlorperazine (COMPAZINE) 5 MG tablet Take 1-2 tablets (5-10 mg) by mouth every 6 hours  as needed for nausea or vomiting 20 tablet 0     [START ON 12/8/2020] sulfamethoxazole-trimethoprim (BACTRIM DS) 800-160 MG tablet Take 1 tablet by mouth Every Mon, Tues two times daily DO NOT START until directed by your provider 16 tablet 11       Family History  family history includes Heart Disease in his father, mother, and sister; Lymphoma in his mother.    Social History   reports that he has never smoked. His smokeless tobacco use includes chew. He reports previous alcohol use. He reports that he does not use drugs.     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 laser lithotripsy Left 06/04/2020    treat kidney stones     INSERT CATHETER VASCULAR ACCESS Left 10/26/2020    Procedure: dual lumen non valved tunneled line vascular placement;  Surgeon: Alessandro Steven MD;  [place] [facility] [place]     IR CVC TUNNEL PLACEMENT > 5 YRS OF AGE  10/26/2020     port a cath placement Right 02/18/2020    internal jugular vein       Physical Exam  There were no vitals taken for this visit.  There is no height or weight on file to calculate BMI.    GENERAL : In no apparent distress over video chat  SKIN: Normal color.  No  suspicious lesions or rashes  EYES: No proptosis.  MOUTH: Moist, pink  NECK: No visible masses.  RESP: normal respiratory effort. No cough  NEURO: awake, alert, responds appropriately to questions.   Remainder of physical exam not able to be completed 2/2 COVID precaution, social distancing     RESULTS    No results found for: A1C    Creatinine   Date Value Ref Range Status   12/01/2020 0.97 0.66 - 1.25 mg/dL Final     GFR Estimate    Date Value Ref Range Status   12/01/2020 86 >60 mL/min/[1.73_m2] Final     Comment:     Non  GFR Calc  Starting 12/18/2018, serum creatinine based estimated GFR (eGFR) will be   calculated using the Chronic Kidney Disease Epidemiology Collaboration   (CKD-EPI) equation.       Potassium   Date Value Ref Range Status   12/01/2020 3.9 3.4 - 5.3 mmol/L Final     ALT   Date Value Ref Range Status   11/23/2020 18 0 - 70 U/L Final     AST   Date Value Ref Range Status   11/23/2020 12 0 - 45 U/L Final     TSH   Date Value Ref Range Status   08/26/2020 2.17 0.40 - 4.00 mU/L Final     T4 Free   Date Value Ref Range Status   08/26/2020 1.12 0.76 - 1.46 ng/dL Final       TSH   Date Value Ref Range Status   08/26/2020 2.17 0.40 - 4.00 mU/L Final     T4 Free   Date Value Ref Range Status   08/26/2020 1.12 0.76 - 1.46 ng/dL Final       Creatinine   Date Value Ref Range Status   12/01/2020 0.97 0.66 - 1.25 mg/dL Final       No results for input(s): CHOL, HDL, LDL, TRIG, CHOLHDLRATIO in the last 06631 hours.    No results found for: [id number], [id number], [id number]      ASSESSMENT/PLAN:    1. Diabetes type II, reasonably controlled, with recent steroid induced hyperglycemia    -continue AM NPH 22 units and PM NPH 8 units   -continue Novolog 8 units TID AC and sliding scale aspart.    -will reach out to heme-onc providers to discuss appropriate timing of the resumption of oral antihyperglycemic agents. Would start Metformin and titrate NPH, then resume Glimepiride if safe, versus another prandial agent such as Januvia, in an attempt to wean off insulin.       Refills today:  1. Insulin pen needles     Follow up:  1. With me in 1 month.     The patient is  enrolled in [employer] services    This patient has met [place] community measures for diabetes control: no (D5: Control blood pressure ,Lower bad cholesterol Maintain blood sugar, Be tobacco-free, Take aspirin as recommended)    This patient is eligible for graduation  from [facility] End[facility] clinic: no    Follow up was done today via video encounter, due to high risk patients implementing social distancing due to COVID 19.     I spent a total of 15 minutes via video with patient, and  and 10 minutes chart review/coordination of care.    Prisca Corley PA-C  Diabetes Management Service

## 2020-12-07 NOTE — PROGRESS NOTES
[facility] Endocrinology- Outpatient Diabetes Follow up  Yanick is a 55 year old male with history of TIIDM, high risk Mantle cell lymphoma recently s/p PBSCT 11/10/20,who is following up today for diabetes management. In his hospitalization for auto-transplant, his oral antihyperglycemics were held and he was started on basal bolus insulin.     He was readmitted 11/16/20 from clinic as he was not feeling well, having upset stomach and diarrhea without nausea or vomiting, found to have neutropenic fever. Source was not identified, was given IVF and Abx during his admission.     Per heme-onc note, Metformin held due to potential for N/V following transplant, and Glimepiride as it can inhibit engraftment. He is to continue off these oral antihyperglycemics until WBC stable off GCSF.    Yanick feels fine. His appetite is stable. Losing some weight, but is not concerned about this; mostly due to dietary modification as he has noticed that bread, pasta cause his sugars to spike and is minimizing this intake. He was drinking a diet coke during our video visit today. He would like to make the transition back to oral antihyperglycemics as soon as possible.     Pt denies headaches, visual changes, n/v, SOB at rest, chest pain, abd pain, nausea/vomiting, diarrhea, dysuria, hematuria or foot ulcers.      Current Diabetes Regimen:   NPH 22 units AM, 8 units PM  Novolog 8 units TID WM plus high intensity sliding scale AC/HS    Glucometer Settings  Glucometer type:   Checking 3-4 times per day on average   Fasting BG range 105-201, average 118  Midday BG range , average 140  Evening BG range 131-315, average 167    Weight: losing some weight, he couldn't quantify how much    Diabetes Care  Retinopathy: no    Nephropathy: BP well controlled. No Hx microalbuminuria. Creatinine 0.97(stable). Taking ACEi/ARB: no    Neuropathy: no    Lipids: Taking ASA: no, statin: no  No results found for: LDL       ROS: 10 point review of systems  completed; pertinent positives and negatives documented in HPI      Allergies  No Known Allergies    Medications  Current Outpatient Medications   Medication Sig Dispense Refill     acyclovir (ZOVIRAX) 800 MG tablet Take 1 tablet (800 mg) by mouth 2 times daily 60 tablet 1     Alcohol Swabs PADS 1 pad 4 times daily (before meals and nightly) 120 each 3     blood glucose (NO BRAND SPECIFIED) test strip Use to test blood sugar 4 times daily or as directed. 120 strip 3     blood glucose monitoring (ACCU-CHEK FASTCLIX) lancets Use to test blood sugar 4 times daily or as directed. 102 each 3     fluconazole (DIFLUCAN) 200 MG tablet Take 1 tablet (200 mg) by mouth daily 30 tablet 1     heparin lock flush 10 UNIT/ML SOLN injection 5-10 mLs by Intracatheter route every 24 hours 60 vial 1     insulin aspart (NOVOLOG PEN) 100 UNIT/ML pen Inject 8 units with meals three times daily, as well as sliding scale four times daily with meals and at bedtime 3 mL 1     insulin isophane human (HUMULIN N PEN) 100 UNIT/ML injection Inject 22 units in the morning and 8 units in the evening 3 mL 3     insulin pen needle (32G X 4 MM) 32G X 4 MM miscellaneous Use 4 pen needles daily or as directed. 110 each 3     levofloxacin (LEVAQUIN) 250 MG tablet Take 1 tablet (250 mg) by mouth daily 30 tablet 1     metoprolol succinate ER (TOPROL-XL) 25 MG 24 hr tablet Take 1 tablet (25 mg) by mouth 2 times daily 60 tablet 1     nicotine (NICODERM CQ) 21 MG/24HR 24 hr patch Place 1 patch onto the skin daily 30 patch 1     ondansetron (ZOFRAN) 8 MG tablet Take 1 tablet (8 mg) by mouth every 8 hours as needed for nausea 30 tablet 0     pantoprazole (PROTONIX) 40 MG EC tablet Take 1 tablet (40 mg) by mouth daily 30 tablet 1     prochlorperazine (COMPAZINE) 5 MG tablet Take 1-2 tablets (5-10 mg) by mouth every 6 hours as needed for nausea or vomiting 20 tablet 0     [START ON 12/8/2020] sulfamethoxazole-trimethoprim (BACTRIM DS) 800-160 MG tablet Take 1  tablet by mouth Every Mon, Tues two times daily DO NOT START until directed by your provider 16 tablet 11       Family History  family history includes Heart Disease in his father, mother, and sister; Lymphoma in his mother.    Social History   reports that he has never smoked. His smokeless tobacco use includes chew. He reports previous alcohol use. He reports that he does not use drugs.     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 laser lithotripsy Left 06/04/2020    treat kidney stones     INSERT CATHETER VASCULAR ACCESS Left 10/26/2020    Procedure: dual lumen non valved tunneled line vascular placement;  Surgeon: Alessandro Steven MD;  Location: [facility] OR     IR CVC TUNNEL PLACEMENT > 5 YRS OF AGE  10/26/2020     port a cath placement Right 02/18/2020    internal jugular vein       Physical Exam  There were no vitals taken for this visit.  There is no height or weight on file to calculate BMI.    GENERAL : In no apparent distress over video chat  SKIN: Normal color.  No  suspicious lesions or rashes  EYES: No proptosis.  MOUTH: Moist, pink  NECK: No visible masses.  RESP: normal respiratory effort. No cough  NEURO: awake, alert, responds appropriately to questions.   Remainder of physical exam not able to be completed 2/2 COVID precaution, social distancing     RESULTS    No results found for: A1C    Creatinine   Date Value Ref Range Status   12/01/2020 0.97 0.66 - 1.25 mg/dL Final     GFR Estimate   Date Value Ref Range Status   12/01/2020 86 >60 mL/min/[1.73_m2] Final     Comment:     Non  GFR Calc  Starting 12/18/2018,  serum creatinine based estimated GFR (eGFR) will be   calculated using the Chronic Kidney Disease Epidemiology Collaboration   (CKD-EPI) equation.       Potassium   Date Value Ref Range Status   12/01/2020 3.9 3.4 - 5.3 mmol/L Final     ALT   Date Value Ref Range Status   11/23/2020 18 0 - 70 U/L Final     AST   Date Value Ref Range Status   11/23/2020 12 0 - 45 U/L Final     TSH   Date Value Ref Range Status   08/26/2020 2.17 0.40 - 4.00 mU/L Final     T4 Free   Date Value Ref Range Status   08/26/2020 1.12 0.76 - 1.46 ng/dL Final       TSH   Date Value Ref Range Status   08/26/2020 2.17 0.40 - 4.00 mU/L Final     T4 Free   Date Value Ref Range Status   08/26/2020 1.12 0.76 - 1.46 ng/dL Final       Creatinine   Date Value Ref Range Status   12/01/2020 0.97 0.66 - 1.25 mg/dL Final       No results for input(s): CHOL, HDL, LDL, TRIG, CHOLHDLRATIO in the last 44901 hours.    No results found for: [id number], [id number], [id number]      ASSESSMENT/PLAN:    1. Diabetes type II, reasonably controlled, with recent steroid induced hyperglycemia    -continue AM NPH 22 units and PM NPH 8 units   -continue Novolog 8 units TID AC and sliding scale aspart.    -will reach out to heme-onc providers to discuss appropriate timing of the resumption of oral antihyperglycemic agents. Would start Metformin and titrate NPH, then resume Glimepiride if safe, versus another prandial agent such as Januvia, in an attempt to wean off insulin.       Refills today:  1. Insulin pen needles     Follow up:  1. With me in 1 month.     The patient is  enrolled in [employer] services    This patient [place] met [place] community measures for diabetes control: no (D5: Control blood pressure ,Lower bad cholesterol Maintain blood sugar, Be tobacco-free, Take aspirin as recommended)    This patient is eligible for graduation from [facility] clinic: no    Follow up was done today via video encounter, due to high risk patients implementing social  distancing due to COVID 19.     I spent a total of 15 minutes via video with patient, and  and 10 minutes chart review/coordination of care.    ZOË FlowerC  Diabetes Management Service

## 2020-12-08 NOTE — DISCHARGE INSTRUCTIONS
A collaboration between [facility] and [facility]  Experts in minimally invasive, targeted treatments performed using imaging guidance    Venous Access Device, Port Catheter or Tunneled Central Line Removal    Today you had your existing venous access device removed, either because it was no longer needed or because there was malfunction or infection issues.    After you go home:  - Drink plenty of fluids.  Generally 6-8 (8 ounce) glasses a day is recommended.  - Resume your regular diet unless otherwise ordered by a medical provider.  - Keep any applied tape/gauze dressings clean and dry.  Change tape/gauze dressings if they get wet or soiled.  - You may shower the following day after procedure, however cover and protect from moisture any tape/gauze dressings.  You may let water hit and run over dried skin glue, but do not scrub.  Pat the area dry after showering.  - Port removal incisions are closed with absorbable suture, meaning they do not need to be removed at a later date, and a topical skin adhesive (skin glue).  This glue will wear off in 7-14 days.  Do not remove before this time.  If 14 days have passed and residual glue is present, you may gently remove it.  - You may remove tape/gauze dressings after 5 days if the site looks closed and in the process of healing.  - Do not apply gels, lotions, or ointments to the glue site for the first 10 days as this may cause the glue to prematurely soften and fail.  - Do not perform strenuous activities or lift greater than 10 pounds for the next three days.  - If there is bleeding or oozing from the procedure site, apply firm pressure to the area for 5-10 minutes.  If the bleeding continues seek medical advice at the numbers below.  - Mild procedure site discomfort can be treated with an ice pack and over-the-counter pain relievers.              For 24 hours after any sedation used:  - Relax and take it easy.  No  strenuous activities.  - Do not drive or operate machines at home or at work.  - No alcohol consumption.  - Do not make any important or legal decisions.    Call our Interventional Radiology (IR) service if:  - If you start bleeding from the procedure site.  If you do start to bleed from the site, lie down and hold some pressure on the site.  Our radiology provider can help you decide if you need to return to the hospital.  - If you have new or worsening pain related to the procedure.  - If you have concerning swelling at the procedure site.  - If you develop persistent nausea or vomiting.  - If you develop hives or a rash or any unexplained itching.  - If you have a fever of greater than 100.5  F and chills in the first 5 days after procedure.  - Any other concerns related to your procedure.      [facility]  Interventional Radiology (IR)  [address] [place] [address]    Contact Number:  [phone]  (IR control desk)  - Monday - Friday 8:00 am - 4:30 pm    After hours for urgent concerns:  [phone]  - After 4:30 pm Monday - Friday, Weekends and Holidays.   - Ask for Interventional Radiology on-call.  Someone is available 24 hours a day.  - [facility] toll free number:  [phone]

## 2020-12-09 NOTE — NURSING NOTE
Oncology Rooming Note    December 9, 2020 9:41 AM   Theo Roblero is a 55 year old male who presents for:    Chief Complaint   Patient presents with     RECHECK     Patient here for add on provider visit r/t [facility] post transplant.      Initial Vitals: /86   Pulse 83   Temp 98.3  F (36.8  C)   Resp 16   Wt 107.8 kg (237 lb 10.5 oz)   SpO2 99%   BMI 32.23 kg/m   Estimated body mass index is 32.23 kg/m  as calculated from the following:    Height as of 12/1/20: 1.829 m (6').    Weight as of this encounter: 107.8 kg (237 lb 10.5 oz). Body surface area is 2.34 meters squared.  Data Unavailable Comment: Data Unavailable   No LMP for male patient.  Allergies reviewed: Yes  Medications reviewed: No    Medications: Medication refills not needed today.  Pharmacy name entered into e[employer]:    [id number] - [place], [place] - [address] [facility] [address]  [address] [facility]    Clinical concerns: BMBX today as well.       Daisy Rodarte RN

## 2020-12-09 NOTE — NURSING NOTE
Provider order received to administer Versed 2mg IVP as premed for BMBX. Procedural consent discussed and pt's signature obtained.  Allergies reviewed.  PT currently alert and oriented to plan of care.  Pt lying prone in stretcher.  Call light w/in reach.  Provider and  at bedside.    CRISTINA FAYE RN        BMT Teaching Flowsheet   Teaching Topic: post biopsy instructions    Person(s) involved in teaching: Patient  Motivation Level  Asks Questions: Yes  Eager to Learn: Yes  Cooperative: Yes  Receptive (willing/able to accept information): Yes    Patient demonstrates understanding of the following:   - Reason for the appointment, diagnosis and treatment plan: Yes  - Knowledge of proper use of medications and conditions for which they are ordered (with special attention to potential side effects or drug interactions): Yes  - Which situations necessitate calling provider and whom to contact: Yes    Teaching concerns addressed: reviewed activity restrictions if received premeds, potential for bleeding and actions to take if develops any of the issues below    Proper use and care of (medical equipment, care aids, etc.) Yes  Pain management techniques: Yes  Patient instructed on hand hygiene: Yes  How and/when to access community resources: Yes    Infection Control:  Patient demonstrates understanding of the following:   Surgical procedure site care taught NA  Signs and symptoms of infection taught Yes  Wound care taught Yes  Central venous catheter care taught NA    Instructional Materials Used/Given: verbal, print out of post biopsy instructions.     Time spent with patient: 0 minutes.    Specific Concerns: NA

## 2020-12-09 NOTE — LETTER
12/9/2020         RE: Theo Rolbero  [address] [place] [address]  [address]  [facility] [id number]        Dear Colleague,    Thank you for referring your patient, Theo Roblero, to the [facility] BLOOD AND MARROW TRANSPLANT PROGRAM [place]. Please see a copy of my visit note below.    BMT ONC Adult Bone Marrow Biopsy Procedure Note  December 9, 2020  /86   Pulse 83   Temp 98.3  F (36.8  C)   Resp 16   Wt 107.8 kg (237 lb 9.6 oz)   SpO2 99%   BMI 32.22 kg/m       Learning needs assessment complete within 12 months? YES    DIAGNOSIS: NHL D+29 post auto PBSCT     PROCEDURE: Unilateral Bone Marrow Biopsy and Unilateral Aspirate    LOCATION: [facility] [place] 2nd Floor    Patient s identification was positively verified by verbal identification and invasive procedure safety checklist was completed. Informed consent was obtained. Following the administration of Midazolam 2mg IV as pre-medication, patient was placed in the prone position and prepped and draped in a sterile manner. Approximately 15 cc of 1% Lidocaine was used over the left posterior iliac spine. Following this a 3 mm incision was made. Trephine bone marrow core(s) was (were) obtained from the IC. Bone marrow aspirates were obtained from the [facility]. Aspirates were sent for morphology, immunophenotyping, cytogenetics and molecular diagnostics -hold. A total of approximately 18 ml of marrow was aspirated. Following this procedure a sterile dressing was applied to the bone marrow biopsy site(s). The patient was placed in the supine position to maintain pressure on the biopsy site. Post-procedure wound care instructions were given.     Complications: NO    Pre-procedural pain: 0 out of 10 on the numeric pain rating scale.     Procedural pain: 8 out of 10 on the numeric pain rating scale. (brief, tolerated procedure well)    Post-procedural pain assessment: 0 out of 10 on the numeric pain rating scale.     Interventions: NO    Length of procedure:20 minutes or  less      Procedure performed by: Deisi Talavera PA-C  [phone]            Again, thank you for allowing me to participate in the care of your patient.        Sincerely,        [facility]

## 2020-12-09 NOTE — PROGRESS NOTES
BMT Clinic Note   12/09/2020    Patient ID:  Theo Roblero is a 55 year old man D+29 s/p auto PBSCT for MCL      Diagnosis [facility] Non-Hodgkin lymphoma, Mantle cell/intermediate differentiation  HCT Type Autologous    Prep Regimen BCNU  Agueda-C  Etoposide  Melphalan    Primary BMT MD Dr. Zavala     INTERVAL  HISTORY   Here for restaging BMbx but visit added-on d/t new LUE DVT found incidentally on restaging CT. Yanick is feeling well. Denies n/v/d/c, fevers, URI sx, edema, pain, or dyspnea. L chest CVC was removed yesterday; PAC remains in place.    Review of Systems: 8 point ROS negative except as noted above.    PHYSICAL EXAM   KPS:  90    Blood pressure 128/86, pulse 83, temperature 98.3  F (36.8  C), resp. rate 16, weight 107.8 kg (237 lb 10.5 oz), SpO2 99 %.     Wt Readings from Last 4 Encounters:   12/09/20 107.8 kg (237 lb 10.5 oz)   12/09/20 107.8 kg (237 lb 9.6 oz)   12/01/20 107.7 kg (237 lb 8 oz)   11/27/20 109.3 kg (241 lb)     General: NAD   Eyes: sclera anicteric   Lungs: CTA bilaterally  Cardiovascular: RRR, no M/R/G   Lymphatics: no upper extremity edema; trace LE edema bilaterally  Skin: no rash  Neuro: non-focal   Additional Findings: interval removal L chest CVC 12/8 - dressing with dried blood under, no active bleeding, NT. R chest PAC accessed     LABS AND IMAGING      I have assessed all abnormal lab values for their clinical significance and any values considered clinically significant have been addressed in the assessment and plan    Lab Results   Component Value Date    WBC 5.5 12/09/2020    ANEU 2.8 12/09/2020    HGB 11.1 (L) 12/09/2020    HCT 33.5 (L) 12/09/2020     12/09/2020     12/09/2020    POTASSIUM 4.3 12/09/2020    CHLORIDE 105 12/09/2020    CO2 27 12/09/2020     (H) 12/09/2020    BUN 16 12/09/2020    CR 1.33 (H) 12/09/2020    MAG 1.5 (L) 11/22/2020    INR 1.04 11/09/2020    BILITOTAL 0.2 11/23/2020    AST 12 11/23/2020    ALT 18 11/23/2020    ALKPHOS 92 11/23/2020     PROTTOTAL 6.1 (L) 11/23/2020    ALBUMIN 2.6 (L) 11/23/2020     Neck CT (12/9/20): 1. No evidence of mass or lymphadenopathy within the neck.  2. Nonocclusive thrombus left internal jugular vein extending to the brachiocephalic vein and superior vena cava.    CT CAP (12/9/20): 1. No lymphadenopathy in the chest, abdomen, or pelvis. Continued complete response by CT Lugano criteria in this patient with history of mantle cell lymphoma.  2. Stable small left pleural effusion.  3. Nonocclusive thrombus in the left internal jugular vein and innominate vein extending into the SVC.      ASSESSMENT/PLAN   Theo Roblero is a 56 yo man D+29 s/p auto PBSCT with BEAM prep for MCL        1.  BMT/MCL:   - Palifermin complete.  - Cell dose 4.3 million.  - Last dose GCSF 11/21  - Re-stage per protocol. BMbx 12/9, results pending. Imaging as above.     2.  HEME: New non-occlusive DVT LIJ extending to SVC as above, related to recent L CVC that was removed 12/8/20. No pain, edema, or dyspnea. No hx clot per pt. Plts normal. Start Xarelto 15mg bid x3w, then 20mg/d, likely 3mo Rx. Discussed w/ pt today 12/9.      3.  ID:  Afebrile. No active infections.  Hx SIRS: 11/16 Febrile and tachy 130-160. Resolved with fluids/antibiotics. Source not identified. S/p empiric Abx.  - prophy Fluc, ACV, Bactrim (started D28, 12/8).                                             4.  GI: No complaints.     5.  FEN/Renal: Creat, lytes wnl.    6. Endo: NOT ADDRESSED today.  - Hx DM type II. On metformin and glimepiride on admission. Held both due to potential for n/v with prep; also glimepiride can inhibit engraftment  - discharged on sliding scale insulin regimen.  When counts are stable can resume home regimen.      7. CV:   - HTN: metoprolol      Final Plan:  BMbx today  Start Xarelto for new DVT as above  Bactrim to start 12/14  Follow-up with Dr. Zavala 12/14 - please discuss diabetes regimen, consider resuming oral diabetes meds.    Deisi  AVEL Talavera  [phone]

## 2020-12-09 NOTE — PROGRESS NOTES
Author contacted by Sinai WILSON on behalf of radiology regarding incidental findings on CT today of thrombus. Deisi Talavera PA-C saw patient today for BMBX and has placed order for Xarelto.

## 2020-12-09 NOTE — PROGRESS NOTES
BMT ONC Adult Bone Marrow Biopsy Procedure Note  December 9, 2020  /86   Pulse 83   Temp 98.3  F (36.8  C)   Resp 16   Wt 107.8 kg (237 lb 9.6 oz)   SpO2 99%   BMI 32.22 kg/m       Learning needs assessment complete within 12 months? YES    DIAGNOSIS: NHL D+29 post auto PBSCT     PROCEDURE: Unilateral Bone Marrow Biopsy and Unilateral Aspirate    LOCATION: [facility] [place] 2nd [facility]    Patient s identification was positively verified by verbal identification and invasive procedure safety checklist was completed. Informed consent was obtained. Following the administration of Midazolam 2mg IV as pre-medication, patient was placed in the prone position and prepped and draped in a sterile manner. Approximately 15 cc of 1% Lidocaine was used over the left posterior iliac spine. Following this a 3 mm incision was made. Trephine bone marrow core(s) was (were) obtained from the [facility]. Bone marrow aspirates were obtained from the IC. Aspirates were sent for morphology, immunophenotyping, cytogenetics and molecular diagnostics -hold. A total of approximately 18 ml of marrow was aspirated. Following this procedure a sterile dressing was applied to the bone marrow biopsy site(s). The patient was placed in the supine position to maintain pressure on the biopsy site. Post-procedure wound care instructions were given.     Complications: NO    Pre-procedural pain: 0 out of 10 on the numeric pain rating scale.     Procedural pain: 8 out of 10 on the numeric pain rating scale. (brief, tolerated procedure well)    Post-procedural pain assessment: 0 out of 10 on the numeric pain rating scale.     Interventions: NO    Length of procedure:20 minutes or less      Procedure performed by: Deisi Talavera PA-C  [phone]

## 2020-12-09 NOTE — LETTER
12/9/2020         RE: Theo Roblero  [address]  [address]  [place] [id number]        Dear Colleague,    Thank you for referring your patient, Theo Roblero, to the [facility] BLOOD AND MARROW TRANSPLANT PROGRAM [place]. Please see a copy of my visit note below.    BMT Clinic Note   12/09/2020    Patient ID:  Theo Roblero is a 55 year old man D+29 s/p auto PBSCT for MCL      Diagnosis [facility] Non-Hodgkin lymphoma, Mantle cell/intermediate differentiation  HCT Type Autologous    Prep Regimen BCNU  Agueda-C  Etoposide  Melphalan    Primary BMT MD Dr. Zavala     INTERVAL  HISTORY   Here for restaging BMbx but visit added-on d/t new LUE DVT found incidentally on restaging CT. Yanick is feeling well. Denies n/v/d/c, fevers, URI sx, edema, pain, or dyspnea. L chest CVC was removed yesterday; PAC remains in place.    Review of Systems: 8 point ROS negative except as noted above.    PHYSICAL EXAM   KPS:  90    Blood pressure 128/86, pulse 83, temperature 98.3  F (36.8  C), resp. rate 16, weight 107.8 kg (237 lb 10.5 oz), SpO2 99 %.     Wt Readings from Last 4 Encounters:   12/09/20 107.8 kg (237 lb 10.5 oz)   12/09/20 107.8 kg (237 lb 9.6 oz)   12/01/20 107.7 kg (237 lb 8 oz)   11/27/20 109.3 kg (241 lb)     General: NAD   Eyes: sclera anicteric   Lungs: CTA bilaterally  Cardiovascular: RRR, no M/R/G   Lymphatics: no upper extremity edema; trace LE edema bilaterally  Skin: no rash  Neuro: non-focal   Additional Findings: interval removal L chest CVC 12/8 - dressing with dried blood under, no active bleeding, NT. R chest PAC accessed     LABS AND IMAGING      I have assessed all abnormal lab values for their clinical significance and any values considered clinically significant have been addressed in the assessment and plan    Lab Results   Component Value Date    WBC 5.5 12/09/2020    ANEU 2.8 12/09/2020    HGB 11.1 (L) 12/09/2020    HCT 33.5 (L) 12/09/2020     12/09/2020     12/09/2020     POTASSIUM 4.3 12/09/2020    CHLORIDE 105 12/09/2020    CO2 27 12/09/2020     (H) 12/09/2020    BUN 16 12/09/2020    CR 1.33 (H) 12/09/2020    MAG 1.5 (L) 11/22/2020    INR 1.04 11/09/2020    BILITOTAL 0.2 11/23/2020    AST 12 11/23/2020    ALT 18 11/23/2020    ALKPHOS 92 11/23/2020    PROTTOTAL 6.1 (L) 11/23/2020    ALBUMIN 2.6 (L) 11/23/2020     Neck CT (12/9/20): 1. No evidence of mass or lymphadenopathy within the neck.  2. Nonocclusive thrombus left internal jugular vein extending to the brachiocephalic vein and superior vena cava.    CT CAP (12/9/20): 1. No lymphadenopathy in the chest, abdomen, or pelvis. Continued complete response by CT Lugano criteria in this patient with history of mantle cell lymphoma.  2. Stable small left pleural effusion.  3. Nonocclusive thrombus in the left internal jugular vein and innominate vein extending into the SVC.      ASSESSMENT/PLAN   Theo Roblero is a 54 yo man D+29 s/p auto PBSCT with BEAM prep for MCL        1.  BMT/MCL:   - Palifermin complete.  - Cell dose 4.3 million.  - Last dose GCSF 11/21  - Re-stage per protocol. BMbx 12/9, results pending. Imaging as above.     2.  HEME: New non-occlusive DVT LIJ extending to SVC as above, related to recent L CVC that was removed 12/8/20. No pain, edema, or dyspnea. No hx clot per pt. Plts normal. Start Xarelto 15mg bid x3w, then 20mg/d, likely 3mo Rx. Discussed w/ pt today 12/9.      3.  ID:  Afebrile. No active infections.  Hx SIRS: 11/16 Febrile and tachy 130-160. Resolved with fluids/antibiotics. Source not identified. S/p empiric Abx.  - prophy Fluc, ACV, Bactrim (started D28, 12/8).                                             4.  GI: No complaints.     5.  FEN/Renal: Creat, lytes wnl.    6. Endo: NOT ADDRESSED today.  - Hx DM type II. On metformin and glimepiride on admission. Held both due to potential for n/v with prep; also glimepiride can inhibit engraftment  - discharged on sliding scale insulin regimen.   When counts are stable can resume home regimen.      7. CV:   - HTN: metoprolol      Final Plan:  BMbx today  Start Xarelto for new DVT as above  Bactrim to start 12/14  Follow-up with Dr. Zavala 12/14 - please discuss diabetes regimen, consider resuming oral diabetes meds.    RAHDA Miner-C  [phone]

## 2020-12-13 NOTE — PROGRESS NOTES
BMT Daily Progress Note   12/14/2020    Patient ID:  Theo Roblero is a 55 year old man D+34 s/p auto PBSCT for MCL. Left chest CVC removed, due to recent discovery of a thrombosis (nonocclusive thrombus left internal jugular vein extending to the brachiocephalic vein and superior vena cava). Tolerating AC with Xarelto. Remains in CR per imaging at day +28.     Diagnosis [facility] Non-Hodgkin lymphoma, Mantle cell/intermediate differentiation  HCT Type Autologous    Prep Regimen BCNU  Agueda-C  Etoposide  Melphalan   Donor Source No data was found    GVHD Prophylaxis No  Primary BMT Provider Sally     Review of Systems: 10 point ROS negative except as noted above.  # Pain Assessment:  Current Pain Score 11/22/2020   Patient currently in pain? denies   Theo s pain level was assessed and he currently denies pain.      Scheduled Medications    Current Outpatient Medications   Medication     acyclovir (ZOVIRAX) 800 MG tablet     Alcohol Swabs PADS     blood glucose (NO BRAND SPECIFIED) test strip     blood glucose monitoring (ACCU-CHEK FASTCLIX) lancets     insulin aspart (NOVOLOG PEN) 100 UNIT/ML pen     insulin isophane human (HUMULIN N PEN) 100 UNIT/ML injection     insulin pen needle (32G X 4 MM) 32G X 4 MM miscellaneous     metoprolol succinate ER (TOPROL-XL) 25 MG 24 hr tablet     Rivaroxaban ANTICOAGULANT (XARELTO STARTER PACK ANTICOAGULANT) 15 & 20 MG TBPK     sulfamethoxazole-trimethoprim (BACTRIM DS) 800-160 MG tablet     heparin lock flush 10 UNIT/ML SOLN injection     Current Facility-Administered Medications   Medication     heparin 100 UNIT/ML injection 5 mL       PHYSICAL EXAM     Weight In/Out     Wt Readings from Last 3 Encounters:   12/14/20 106.6 kg (235 lb 1.6 oz)   12/09/20 107.8 kg (237 lb 10.5 oz)   12/09/20 107.8 kg (237 lb 9.6 oz)      [unfilled]       S:  100    /71   Pulse 112   Temp 97.5  F (36.4  C) (Tympanic)   Resp 18   Wt 106.6 kg (235 lb 1.6 oz)   SpO2 99%   BMI 31.89  kg/m         General: NAD   Eyes: : MARYAN, sclera anicteric   Lungs: CTA bilaterally  Cardiovascular: RRR, no M/R/G   Abdominal/Rectal: +BS, soft, NT, ND, No HSM   Lymphatics: no edema  Skin: no rashes or petechaie  Neuro: A&O     LABS AND IMAGING - PAST 24 HOURS     Results for orders placed or performed in visit on 12/14/20 (from the past 24 hour(s))   Comprehensive metabolic panel   Result Value Ref Range    Sodium 138 133 - 144 mmol/L    Potassium 4.1 3.4 - 5.3 mmol/L    Chloride 104 94 - 109 mmol/L    Carbon Dioxide 26 20 - 32 mmol/L    Anion Gap 8 3 - 14 mmol/L    Glucose 215 (H) 70 - 99 mg/dL    Urea Nitrogen 20 7 - 30 mg/dL    Creatinine 1.23 0.66 - 1.25 mg/dL    GFR Estimate 65 >60 mL/min/[1.73_m2]    GFR Estimate If Black 75 >60 mL/min/[1.73_m2]    Calcium 9.1 8.5 - 10.1 mg/dL    Bilirubin Total 0.3 0.2 - 1.3 mg/dL    Albumin 3.2 (L) 3.4 - 5.0 g/dL    Protein Total 6.7 (L) 6.8 - 8.8 g/dL    Alkaline Phosphatase 112 40 - 150 U/L    ALT 26 0 - 70 U/L    AST 21 0 - 45 U/L   CBC with platelets differential   Result Value Ref Range    WBC 6.3 4.0 - 11.0 10e9/L    RBC Count 3.62 (L) 4.4 - 5.9 10e12/L    Hemoglobin 11.5 (L) 13.3 - 17.7 g/dL    Hematocrit 34.2 (L) 40.0 - 53.0 %    MCV 95 78 - 100 fl    MCH 31.8 26.5 - 33.0 pg    MCHC 33.6 31.5 - 36.5 g/dL    RDW 15.9 (H) 10.0 - 15.0 %    Platelet Count 201 150 - 450 10e9/L    Diff Method Automated Method     % Neutrophils 64.3 %    % Lymphocytes 15.4 %    % Monocytes 12.1 %    % Eosinophils 6.7 %    % Basophils 0.5 %    % Immature Granulocytes 1.0 %    Nucleated RBCs 0 0 /100    Absolute Neutrophil 4.0 1.6 - 8.3 10e9/L    Absolute Lymphocytes 1.0 0.8 - 5.3 10e9/L    Absolute Monocytes 0.8 0.0 - 1.3 10e9/L    Absolute Eosinophils 0.4 0.0 - 0.7 10e9/L    Absolute Basophils 0.0 0.0 - 0.2 10e9/L    Abs Immature Granulocytes 0.1 0 - 0.4 10e9/L    Absolute Nucleated RBC 0.0          ASSESSMENT BY SYSTEMS     Theo Roblero is a 56 yo man D+34 s/p auto PBSCT with BEAM  prep for MCL        1.  BMT/MCL:   - Palifermin complete.  - Cell dose 4.3 million.  - Last dose GCSF 11/21  - Day +28 restaging: Remains in CR per imaging. No evidence of disease in the bone marrow; hypocellular marrow for age (20-30%) with trilineage hematopoiesis.  - Recommend starting maintenance rituximab for MCL at 3 months post ASCT with referring physician. This will be discussed at our day +100 visit. Typically rituximab is given at 375?mg/m2/day every 2 months for up to 3 years or until progression (Sheela FRANCIS et al. NEJ. 2017).   - Re-establish care with referring physician, with [facility] BMT follow up at 3 months post ASCT.      2.  HEME: Non-occlusive DVT LIJ extending to SVC as above, related to recent L CVC that was removed 12/8/20. No pain, edema, or dyspnea. No hx clot per pt. Plts normal. Start Xarelto 15mg bid x3w, then 20mg/d, likely 3mo Rx. Discussed w/ pt today 12/9.  - Will need to hold AC for invasive procedures or bone marrow biopsies.  - Given the clot extended to the vena cava, recommend 6 months of AC. Line is out.      3.  ID:  Afebrile. No active infections.  - prophy Fluc, ACV, Bactrim (started D28, 12/8).                 - Consider flu shot at day +60 locally.                              4.  GI: No complaints.     5.  FEN/Renal: Creat, lytes wnl.     6. Endo:   - Hx DM type II. On metformin and glimepiride on admission. Held both due to potential for n/v with prep; also glimepiride can inhibit engraftment  - discharged on sliding scale insulin regimen.    - Reasonable to resume metformin and follow up with PCP to resume glimepiride. Closely monitor blood glucose levels during transition to oral DM medications. Can hold sliding scale unless blood glucose levels >200. Contact clinic if this occurs.     7. CV:   - HTN: metoprolol      Sam Zavala

## 2020-12-14 NOTE — NURSING NOTE
Oncology Rooming Note    December 14, 2020 9:52 AM   Theo Roblero is a 55 year old male who presents for:    Chief Complaint   Patient presents with     Port Draw     Labs drawn via port by RN in lab. VS taken.      RECHECK     Mantle cell lymphoma of lymph nodes of multiple sites (H)     Initial Vitals: /71   Pulse 112   Temp 97.5  F (36.4  C) (Tympanic)   Resp 18   Wt 106.6 kg (235 lb 1.6 oz)   SpO2 99%   BMI 31.89 kg/m   Estimated body mass index is 31.89 kg/m  as calculated from the following:    Height as of 12/1/20: 1.829 m (6').    Weight as of this encounter: 106.6 kg (235 lb 1.6 oz). Body surface area is 2.33 meters squared.  No Pain (0) Comment: Data Unavailable   No LMP for male patient.  Allergies reviewed: Yes  Medications reviewed: Yes    Medications: Medication refills not needed today.  Pharmacy name entered into [employer]:    [id number] - [place], [place] - [address] AT [facility] [address]  [facility]    Clinical concerns: PT REQUESTING REFILLS OF ALL MEDICATION.      Flores Doshi CMA

## 2020-12-14 NOTE — LETTER
12/14/2020         RE: Theo Roblero  [address]  [address]  [place] [address]        Dear Colleague,    Thank you for referring your patient, Theo Roblero, to the [facility] BLOOD AND MARROW TRANSPLANT PROGRAM [place]. Please see a copy of my visit note below.    BMT Daily Progress Note   12/14/2020    Patient ID:  Theo Roblero is a 55 year old man D+34 s/p auto PBSCT for MCL. Left chest CVC removed, due to recent discovery of a thrombosis (nonocclusive thrombus left internal jugular vein extending to the brachiocephalic vein and superior vena cava). Tolerating AC with Xarelto. Remains in CR per imaging at day +28.     Diagnosis [facility] Non-Hodgkin lymphoma, Mantle cell/intermediate differentiation  HCT Type Autologous    Prep Regimen BCNU  Agueda-C  Etoposide  Melphalan   Donor Source No data was found    GVHD Prophylaxis No  Primary BMT Provider Sally     Review of Systems: 10 point ROS negative except as noted above.  # Pain Assessment:  Current Pain Score 11/22/2020   Patient currently in pain? denies   Theo s pain level was assessed and he currently denies pain.      Scheduled Medications    Current Outpatient Medications   Medication     acyclovir (ZOVIRAX) 800 MG tablet     Alcohol Swabs PADS     blood glucose (NO BRAND SPECIFIED) test strip     blood glucose monitoring (ACCU-CHEK FASTCLIX) lancets     insulin aspart (NOVOLOG PEN) 100 UNIT/ML pen     insulin isophane human (HUMULIN N PEN) 100 UNIT/ML injection     insulin pen needle (32G X 4 MM) 32G X 4 MM miscellaneous     metoprolol succinate ER (TOPROL-XL) 25 MG 24 hr tablet     Rivaroxaban ANTICOAGULANT (XARELTO STARTER PACK ANTICOAGULANT) 15 & 20 MG TBPK     sulfamethoxazole-trimethoprim (BACTRIM DS) 800-160 MG tablet     heparin lock flush 10 UNIT/ML SOLN injection     Current Facility-Administered Medications   Medication     heparin 100 UNIT/ML injection 5 mL       PHYSICAL EXAM     Weight In/Out     Wt Readings from Last  3 Encounters:   12/14/20 106.6 kg (235 lb 1.6 oz)   12/09/20 107.8 kg (237 lb 10.5 oz)   12/09/20 107.8 kg (237 lb 9.6 oz)      [unfilled]       S:  100    /71   Pulse 112   Temp 97.5  F (36.4  C) (Tympanic)   Resp 18   Wt 106.6 kg (235 lb 1.6 oz)   SpO2 99%   BMI 31.89 kg/m         General: NAD   Eyes: : MARYAN, sclera anicteric   Lungs: CTA bilaterally  Cardiovascular: RRR, no M/R/G   Abdominal/Rectal: +BS, soft, NT, ND, No HSM   Lymphatics: no edema  Skin: no rashes or petechaie  Neuro: A&O     LABS AND IMAGING - PAST 24 HOURS     Results for orders placed or performed in visit on 12/14/20 (from the past 24 hour(s))   Comprehensive metabolic panel   Result Value Ref Range    Sodium 138 133 - 144 mmol/L    Potassium 4.1 3.4 - 5.3 mmol/L    Chloride 104 94 - 109 mmol/L    Carbon Dioxide 26 20 - 32 mmol/L    Anion Gap 8 3 - 14 mmol/L    Glucose 215 (H) 70 - 99 mg/dL    Urea Nitrogen 20 7 - 30 mg/dL    Creatinine 1.23 0.66 - 1.25 mg/dL    GFR Estimate 65 >60 mL/min/[1.73_m2]    GFR Estimate If Black 75 >60 mL/min/[1.73_m2]    Calcium 9.1 8.5 - 10.1 mg/dL    Bilirubin Total 0.3 0.2 - 1.3 mg/dL    Albumin 3.2 (L) 3.4 - 5.0 g/dL    Protein Total 6.7 (L) 6.8 - 8.8 g/dL    Alkaline Phosphatase 112 40 - 150 U/L    ALT 26 0 - 70 U/L    AST 21 0 - 45 U/L   CBC with platelets differential   Result Value Ref Range    WBC 6.3 4.0 - 11.0 10e9/L    RBC Count 3.62 (L) 4.4 - 5.9 10e12/L    Hemoglobin 11.5 (L) 13.3 - 17.7 g/dL    Hematocrit 34.2 (L) 40.0 - 53.0 %    MCV 95 78 - 100 fl    MCH 31.8 26.5 - 33.0 pg    MCHC 33.6 31.5 - 36.5 g/dL    RDW 15.9 (H) 10.0 - 15.0 %    Platelet Count 201 150 - 450 10e9/L    Diff Method Automated Method     % Neutrophils 64.3 %    % Lymphocytes 15.4 %    % Monocytes 12.1 %    % Eosinophils 6.7 %    % Basophils 0.5 %    % Immature Granulocytes 1.0 %    Nucleated RBCs 0 0 /100    Absolute Neutrophil 4.0 1.6 - 8.3 10e9/L    Absolute Lymphocytes 1.0 0.8 - 5.3 10e9/L    Absolute  Monocytes 0.8 0.0 - 1.3 10e9/L    Absolute Eosinophils 0.4 0.0 - 0.7 10e9/L    Absolute Basophils 0.0 0.0 - 0.2 10e9/L    Abs Immature Granulocytes 0.1 0 - 0.4 10e9/L    Absolute Nucleated RBC 0.0          ASSESSMENT BY SYSTEMS     Theo DONA Roblero is a 54 yo man D+34 s/p auto PBSCT with BEAM prep for MCL        1.  BMT/MCL:   - Palifermin complete.  - Cell dose 4.3 million.  - Last dose GCSF 11/21  - Day +28 restaging: Remains in CR per imaging. No evidence of disease in the bone marrow; hypocellular marrow for age (20-30%) with trilineage hematopoiesis.  - Recommend starting maintenance rituximab for MCL at 3 months post ASCT with referring physician. This will be discussed at our day +100 visit. Typically rituximab is given at 375?mg/m2/day every 2 months for up to 3 years or until progression (Sheela FRANCIS et al. NE. 2017).   - Re-establish care with referring physician, with [facility] BMT follow up at 3 months post ASCT.      2.  HEME: Non-occlusive DVT LIJ extending to SVC as above, related to recent L CVC that was removed 12/8/20. No pain, edema, or dyspnea. No hx clot per pt. Plts normal. Start Xarelto 15mg bid x3w, then 20mg/d, likely 3mo Rx. Discussed w/ pt today 12/9.  - Will need to hold AC for invasive procedures or bone marrow biopsies.  - Given the clot extended to the vena cava, recommend 6 months of AC. Line is out.      3.  ID:  Afebrile. No active infections.  - prophy Fluc, ACV, Bactrim (started D28, 12/8).                 - Consider flu shot at day +60 locally.                              4.  GI: No complaints.     5.  FEN/Renal: Creat, lytes wnl.     6. Endo:   - Hx DM type II. On metformin and glimepiride on admission. Held both due to potential for n/v with prep; also glimepiride can inhibit engraftment  - discharged on sliding scale insulin regimen.    - Reasonable to resume metformin and follow up with PCP to resume glimepiride. Closely monitor blood glucose levels during transition to oral  DM medications. Can hold sliding scale unless blood glucose levels >200. Contact clinic if this occurs.     7. CV:   - HTN: metoprolol      Sam Zavala        Again, thank you for allowing me to participate in the care of your patient.        Sincerely,        Sam Zavala MD

## 2020-12-14 NOTE — NURSING NOTE
Chief Complaint   Patient presents with     Port Draw     Labs drawn via port by RN in lab. VS taken.      Labs drawn via Port accessed using 20g gripper needle. Line flushed and Heparin locked. Vital signs taken. Checked into next appointment.       Rosa Elena Marquez RN

## 2020-12-14 NOTE — PROGRESS NOTES
This is a recent snapshot of the patient's [facility] medical record.  For current drug dose and complete information and questions, call [phone] or In Basket pool, [facility] home infusion ([id number])  CSN Number:  [id number]

## 2021-01-01 ENCOUNTER — APPOINTMENT (OUTPATIENT)
Dept: GENERAL RADIOLOGY | Facility: CLINIC | Age: 56
DRG: 814 | End: 2021-01-01
Attending: INTERNAL MEDICINE
Payer: COMMERCIAL

## 2021-01-01 ENCOUNTER — TELEPHONE (OUTPATIENT)
Dept: ONCOLOGY | Facility: CLINIC | Age: 56
End: 2021-01-01

## 2021-01-01 ENCOUNTER — VIRTUAL VISIT (OUTPATIENT)
Dept: TRANSPLANT | Facility: CLINIC | Age: 56
End: 2021-01-01
Attending: INTERNAL MEDICINE
Payer: COMMERCIAL

## 2021-01-01 ENCOUNTER — APPOINTMENT (OUTPATIENT)
Dept: PHYSICAL THERAPY | Facility: CLINIC | Age: 56
DRG: 814 | End: 2021-01-01
Attending: STUDENT IN AN ORGANIZED HEALTH CARE EDUCATION/TRAINING PROGRAM
Payer: COMMERCIAL

## 2021-01-01 ENCOUNTER — HOSPITAL ENCOUNTER (OUTPATIENT)
Dept: PET IMAGING | Facility: CLINIC | Age: 56
End: 2021-02-17
Attending: PHYSICIAN ASSISTANT
Payer: COMMERCIAL

## 2021-01-01 ENCOUNTER — NURSE TRIAGE (OUTPATIENT)
Dept: NURSING | Facility: CLINIC | Age: 56
End: 2021-01-01

## 2021-01-01 ENCOUNTER — PATIENT OUTREACH (OUTPATIENT)
Dept: ONCOLOGY | Facility: CLINIC | Age: 56
End: 2021-01-01

## 2021-01-01 ENCOUNTER — APPOINTMENT (OUTPATIENT)
Dept: PHYSICAL THERAPY | Facility: CLINIC | Age: 56
DRG: 814 | End: 2021-01-01
Payer: COMMERCIAL

## 2021-01-01 ENCOUNTER — APPOINTMENT (OUTPATIENT)
Dept: CARDIOLOGY | Facility: CLINIC | Age: 56
DRG: 814 | End: 2021-01-01
Attending: STUDENT IN AN ORGANIZED HEALTH CARE EDUCATION/TRAINING PROGRAM
Payer: COMMERCIAL

## 2021-01-01 ENCOUNTER — TELEPHONE (OUTPATIENT)
Dept: TRANSPLANT | Facility: CLINIC | Age: 56
End: 2021-01-01

## 2021-01-01 ENCOUNTER — HOSPITAL ENCOUNTER (OUTPATIENT)
Facility: CLINIC | Age: 56
Discharge: HOME | End: 2021-03-10
Attending: INTERNAL MEDICINE | Admitting: RADIOLOGY
Payer: COMMERCIAL

## 2021-01-01 ENCOUNTER — RECORDS - HEALTHEAST (OUTPATIENT)
Dept: ADMINISTRATIVE | Facility: CLINIC | Age: 56
End: 2021-01-01

## 2021-01-01 ENCOUNTER — APPOINTMENT (OUTPATIENT)
Dept: LAB | Facility: CLINIC | Age: 56
End: 2021-01-01
Attending: PHYSICIAN ASSISTANT
Payer: COMMERCIAL

## 2021-01-01 ENCOUNTER — PRE VISIT (OUTPATIENT)
Dept: ALLERGY | Facility: CLINIC | Age: 56
End: 2021-01-01

## 2021-01-01 ENCOUNTER — PRE VISIT (OUTPATIENT)
Dept: TRANSPLANT | Facility: CLINIC | Age: 56
End: 2021-01-01

## 2021-01-01 ENCOUNTER — HOSPITAL ENCOUNTER (OUTPATIENT)
Facility: CLINIC | Age: 56
Setting detail: SPECIMEN
Discharge: HOME | End: 2021-04-22
Admitting: INTERNAL MEDICINE
Payer: COMMERCIAL

## 2021-01-01 ENCOUNTER — VIRTUAL VISIT (OUTPATIENT)
Dept: ENDOCRINOLOGY | Facility: CLINIC | Age: 56
End: 2021-01-01
Payer: COMMERCIAL

## 2021-01-01 ENCOUNTER — APPOINTMENT (OUTPATIENT)
Dept: INTERVENTIONAL RADIOLOGY/VASCULAR | Facility: CLINIC | Age: 56
End: 2021-01-01
Attending: PHYSICIAN ASSISTANT
Payer: COMMERCIAL

## 2021-01-01 ENCOUNTER — COMMUNICATION - HEALTHEAST (OUTPATIENT)
Dept: SCHEDULING | Facility: CLINIC | Age: 56
End: 2021-01-01

## 2021-01-01 ENCOUNTER — APPOINTMENT (OUTPATIENT)
Dept: OCCUPATIONAL THERAPY | Facility: CLINIC | Age: 56
DRG: 814 | End: 2021-01-01
Payer: COMMERCIAL

## 2021-01-01 ENCOUNTER — RESEARCH ENCOUNTER (OUTPATIENT)
Dept: ONCOLOGY | Facility: CLINIC | Age: 56
End: 2021-01-01

## 2021-01-01 ENCOUNTER — OFFICE VISIT (OUTPATIENT)
Dept: DERMATOLOGY | Facility: CLINIC | Age: 56
End: 2021-01-01
Payer: COMMERCIAL

## 2021-01-01 ENCOUNTER — RESEARCH ENCOUNTER (OUTPATIENT)
Dept: TRANSPLANT | Facility: CLINIC | Age: 56
End: 2021-01-01

## 2021-01-01 ENCOUNTER — HOSPITAL ENCOUNTER (INPATIENT)
Facility: CLINIC | Age: 56
LOS: 11 days | Discharge: HOME HEALTH | DRG: 814 | End: 2021-04-21
Attending: INTERNAL MEDICINE | Admitting: INTERNAL MEDICINE
Payer: COMMERCIAL

## 2021-01-01 ENCOUNTER — HOSPITAL ENCOUNTER (OUTPATIENT)
Dept: PET IMAGING | Facility: CLINIC | Age: 56
Discharge: HOME | End: 2021-05-18
Attending: INTERNAL MEDICINE | Admitting: INTERNAL MEDICINE
Payer: COMMERCIAL

## 2021-01-01 ENCOUNTER — VIRTUAL VISIT (OUTPATIENT)
Dept: ALLERGY | Facility: CLINIC | Age: 56
End: 2021-01-01
Payer: COMMERCIAL

## 2021-01-01 ENCOUNTER — CARE COORDINATION (OUTPATIENT)
Dept: TRANSPLANT | Facility: CLINIC | Age: 56
End: 2021-01-01

## 2021-01-01 ENCOUNTER — APPOINTMENT (OUTPATIENT)
Dept: LAB | Facility: CLINIC | Age: 56
End: 2021-01-01
Attending: INTERNAL MEDICINE
Payer: COMMERCIAL

## 2021-01-01 ENCOUNTER — APPOINTMENT (OUTPATIENT)
Dept: CT IMAGING | Facility: CLINIC | Age: 56
DRG: 814 | End: 2021-01-01
Attending: PHYSICIAN ASSISTANT
Payer: COMMERCIAL

## 2021-01-01 ENCOUNTER — APPOINTMENT (OUTPATIENT)
Dept: MEDSURG UNIT | Facility: CLINIC | Age: 56
End: 2021-01-01
Attending: INTERNAL MEDICINE
Payer: COMMERCIAL

## 2021-01-01 ENCOUNTER — ONCOLOGY VISIT (OUTPATIENT)
Dept: TRANSPLANT | Facility: CLINIC | Age: 56
End: 2021-01-01
Attending: PHYSICIAN ASSISTANT
Payer: COMMERCIAL

## 2021-01-01 ENCOUNTER — AMBULATORY - HEALTHEAST (OUTPATIENT)
Dept: FAMILY MEDICINE | Facility: CLINIC | Age: 56
End: 2021-01-01

## 2021-01-01 ENCOUNTER — HEALTH MAINTENANCE LETTER (OUTPATIENT)
Age: 56
End: 2021-01-01

## 2021-01-01 ENCOUNTER — APPOINTMENT (OUTPATIENT)
Dept: GENERAL RADIOLOGY | Facility: CLINIC | Age: 56
DRG: 814 | End: 2021-01-01
Attending: PHYSICIAN ASSISTANT
Payer: COMMERCIAL

## 2021-01-01 ENCOUNTER — MYC MEDICAL ADVICE (OUTPATIENT)
Dept: TRANSPLANT | Facility: CLINIC | Age: 56
End: 2021-01-01

## 2021-01-01 ENCOUNTER — APPOINTMENT (OUTPATIENT)
Dept: ULTRASOUND IMAGING | Facility: CLINIC | Age: 56
DRG: 814 | End: 2021-01-01
Payer: COMMERCIAL

## 2021-01-01 ENCOUNTER — APPOINTMENT (OUTPATIENT)
Dept: CT IMAGING | Facility: CLINIC | Age: 56
DRG: 814 | End: 2021-01-01
Attending: STUDENT IN AN ORGANIZED HEALTH CARE EDUCATION/TRAINING PROGRAM
Payer: COMMERCIAL

## 2021-01-01 ENCOUNTER — VIRTUAL VISIT (OUTPATIENT)
Dept: TRANSPLANT | Facility: CLINIC | Age: 56
End: 2021-01-01
Attending: NURSE PRACTITIONER
Payer: COMMERCIAL

## 2021-01-01 ENCOUNTER — HOSPITAL ENCOUNTER (OUTPATIENT)
Dept: PET IMAGING | Facility: CLINIC | Age: 56
Discharge: HOME | End: 2021-03-26
Attending: INTERNAL MEDICINE | Admitting: INTERNAL MEDICINE
Payer: COMMERCIAL

## 2021-01-01 ENCOUNTER — DOCUMENTATION ONLY (OUTPATIENT)
Dept: ONCOLOGY | Facility: CLINIC | Age: 56
End: 2021-01-01

## 2021-01-01 ENCOUNTER — TELEPHONE (OUTPATIENT)
Dept: ALLERGY | Facility: CLINIC | Age: 56
End: 2021-01-01
Payer: COMMERCIAL

## 2021-01-01 ENCOUNTER — APPOINTMENT (OUTPATIENT)
Dept: OCCUPATIONAL THERAPY | Facility: CLINIC | Age: 56
DRG: 814 | End: 2021-01-01
Attending: PHYSICIAN ASSISTANT
Payer: COMMERCIAL

## 2021-01-01 ENCOUNTER — DOCUMENTATION ONLY (OUTPATIENT)
Dept: INTERVENTIONAL RADIOLOGY/VASCULAR | Facility: CLINIC | Age: 56
End: 2021-01-01

## 2021-01-01 ENCOUNTER — MEDICAL CORRESPONDENCE (OUTPATIENT)
Dept: TRANSPLANT | Facility: CLINIC | Age: 56
End: 2021-01-01

## 2021-01-01 VITALS
HEART RATE: 71 BPM | DIASTOLIC BLOOD PRESSURE: 78 MMHG | OXYGEN SATURATION: 98 % | RESPIRATION RATE: 16 BRPM | TEMPERATURE: 97.7 F | SYSTOLIC BLOOD PRESSURE: 118 MMHG

## 2021-01-01 VITALS
HEART RATE: 90 BPM | OXYGEN SATURATION: 99 % | DIASTOLIC BLOOD PRESSURE: 85 MMHG | TEMPERATURE: 98.4 F | SYSTOLIC BLOOD PRESSURE: 144 MMHG | RESPIRATION RATE: 16 BRPM

## 2021-01-01 VITALS
BODY MASS INDEX: 33.05 KG/M2 | TEMPERATURE: 99.4 F | SYSTOLIC BLOOD PRESSURE: 98 MMHG | HEIGHT: 71 IN | DIASTOLIC BLOOD PRESSURE: 68 MMHG | OXYGEN SATURATION: 99 % | WEIGHT: 236.1 LBS | RESPIRATION RATE: 16 BRPM | HEART RATE: 114 BPM

## 2021-01-01 VITALS
TEMPERATURE: 97.4 F | BODY MASS INDEX: 33.26 KG/M2 | RESPIRATION RATE: 16 BRPM | DIASTOLIC BLOOD PRESSURE: 80 MMHG | HEART RATE: 83 BPM | OXYGEN SATURATION: 99 % | WEIGHT: 238.5 LBS | SYSTOLIC BLOOD PRESSURE: 126 MMHG

## 2021-01-01 VITALS
HEART RATE: 68 BPM | TEMPERATURE: 98 F | WEIGHT: 242.2 LBS | RESPIRATION RATE: 16 BRPM | BODY MASS INDEX: 32.85 KG/M2 | SYSTOLIC BLOOD PRESSURE: 149 MMHG | DIASTOLIC BLOOD PRESSURE: 94 MMHG | OXYGEN SATURATION: 99 %

## 2021-01-01 VITALS
TEMPERATURE: 98.3 F | HEIGHT: 71 IN | BODY MASS INDEX: 33.74 KG/M2 | HEART RATE: 72 BPM | RESPIRATION RATE: 16 BRPM | OXYGEN SATURATION: 97 % | DIASTOLIC BLOOD PRESSURE: 86 MMHG | SYSTOLIC BLOOD PRESSURE: 142 MMHG | WEIGHT: 241 LBS

## 2021-01-01 VITALS
DIASTOLIC BLOOD PRESSURE: 76 MMHG | OXYGEN SATURATION: 99 % | HEIGHT: 71 IN | WEIGHT: 235 LBS | RESPIRATION RATE: 18 BRPM | TEMPERATURE: 98.2 F | SYSTOLIC BLOOD PRESSURE: 109 MMHG | BODY MASS INDEX: 32.9 KG/M2 | HEART RATE: 91 BPM

## 2021-01-01 VITALS
SYSTOLIC BLOOD PRESSURE: 133 MMHG | BODY MASS INDEX: 35.8 KG/M2 | WEIGHT: 256.7 LBS | RESPIRATION RATE: 16 BRPM | TEMPERATURE: 97.7 F | HEART RATE: 104 BPM | DIASTOLIC BLOOD PRESSURE: 73 MMHG | OXYGEN SATURATION: 99 %

## 2021-01-01 VITALS
TEMPERATURE: 98.2 F | DIASTOLIC BLOOD PRESSURE: 81 MMHG | HEART RATE: 98 BPM | WEIGHT: 242.3 LBS | BODY MASS INDEX: 33.79 KG/M2 | RESPIRATION RATE: 16 BRPM | SYSTOLIC BLOOD PRESSURE: 144 MMHG | OXYGEN SATURATION: 99 %

## 2021-01-01 VITALS
SYSTOLIC BLOOD PRESSURE: 125 MMHG | BODY MASS INDEX: 34.59 KG/M2 | HEART RATE: 89 BPM | OXYGEN SATURATION: 98 % | WEIGHT: 248 LBS | DIASTOLIC BLOOD PRESSURE: 78 MMHG | RESPIRATION RATE: 16 BRPM | TEMPERATURE: 97.5 F

## 2021-01-01 VITALS
HEART RATE: 85 BPM | RESPIRATION RATE: 16 BRPM | HEIGHT: 71 IN | OXYGEN SATURATION: 98 % | TEMPERATURE: 98.7 F | DIASTOLIC BLOOD PRESSURE: 83 MMHG | WEIGHT: 251.77 LBS | SYSTOLIC BLOOD PRESSURE: 120 MMHG | BODY MASS INDEX: 35.25 KG/M2

## 2021-01-01 VITALS
OXYGEN SATURATION: 99 % | SYSTOLIC BLOOD PRESSURE: 119 MMHG | WEIGHT: 243.3 LBS | HEART RATE: 98 BPM | BODY MASS INDEX: 33.93 KG/M2 | RESPIRATION RATE: 16 BRPM | TEMPERATURE: 98.3 F | DIASTOLIC BLOOD PRESSURE: 73 MMHG

## 2021-01-01 VITALS
DIASTOLIC BLOOD PRESSURE: 80 MMHG | OXYGEN SATURATION: 100 % | HEART RATE: 78 BPM | BODY MASS INDEX: 32.44 KG/M2 | RESPIRATION RATE: 16 BRPM | TEMPERATURE: 97.4 F | SYSTOLIC BLOOD PRESSURE: 132 MMHG | WEIGHT: 239.2 LBS

## 2021-01-01 DIAGNOSIS — A04.72 C. DIFFICILE COLITIS: ICD-10-CM

## 2021-01-01 DIAGNOSIS — C83.10 MALIGNANT LYMPHOMA, CENTROCYTIC (H): ICD-10-CM

## 2021-01-01 DIAGNOSIS — T50.905A DRESS SYNDROME: ICD-10-CM

## 2021-01-01 DIAGNOSIS — C83.10 MANTLE CELL LYMPHOMA, UNSPECIFIED BODY REGION (H): ICD-10-CM

## 2021-01-01 DIAGNOSIS — C83.18 MANTLE CELL LYMPHOMA OF LYMPH NODES OF MULTIPLE REGIONS (H): ICD-10-CM

## 2021-01-01 DIAGNOSIS — C83.18 MANTLE CELL LYMPHOMA OF LYMPH NODES OF MULTIPLE SITES (H): ICD-10-CM

## 2021-01-01 DIAGNOSIS — R50.9 FEVER AND CHILLS: ICD-10-CM

## 2021-01-01 DIAGNOSIS — E11.69 TYPE 2 DIABETES MELLITUS WITH OTHER SPECIFIED COMPLICATION, WITHOUT LONG-TERM CURRENT USE OF INSULIN (H): ICD-10-CM

## 2021-01-01 DIAGNOSIS — C83.18 MANTLE CELL LYMPHOMA OF LYMPH NODES OF MULTIPLE SITES (H): Primary | ICD-10-CM

## 2021-01-01 DIAGNOSIS — Z11.59 ENCOUNTER FOR SCREENING FOR OTHER VIRAL DISEASES: ICD-10-CM

## 2021-01-01 DIAGNOSIS — Z11.52 ENCOUNTER FOR SCREENING LABORATORY TESTING FOR SEVERE ACUTE RESPIRATORY SYNDROME CORONAVIRUS 2 (SARS-COV-2): ICD-10-CM

## 2021-01-01 DIAGNOSIS — Z94.81 STATUS POST BONE MARROW TRANSPLANT (H): Primary | ICD-10-CM

## 2021-01-01 DIAGNOSIS — Z94.81 S/P BONE MARROW TRANSPLANT (H): ICD-10-CM

## 2021-01-01 DIAGNOSIS — C85.90 LYMPHOMA, UNSPECIFIED BODY REGION, UNSPECIFIED LYMPHOMA TYPE (H): ICD-10-CM

## 2021-01-01 DIAGNOSIS — L27.0 DRUG RASH: ICD-10-CM

## 2021-01-01 DIAGNOSIS — Z94.81 S/P BONE MARROW TRANSPLANT (H): Primary | ICD-10-CM

## 2021-01-01 DIAGNOSIS — Z94.81 STATUS POST BONE MARROW TRANSPLANT (H): ICD-10-CM

## 2021-01-01 DIAGNOSIS — C83.10 MANTLE CELL LYMPHOMA, UNSPECIFIED BODY REGION (H): Primary | ICD-10-CM

## 2021-01-01 DIAGNOSIS — C85.90 NHL (NON-HODGKIN'S LYMPHOMA) (H): ICD-10-CM

## 2021-01-01 DIAGNOSIS — D72.12 DRESS SYNDROME: ICD-10-CM

## 2021-01-01 DIAGNOSIS — E11.69 TYPE 2 DIABETES MELLITUS WITH OTHER SPECIFIED COMPLICATION, WITHOUT LONG-TERM CURRENT USE OF INSULIN (H): Primary | ICD-10-CM

## 2021-01-01 DIAGNOSIS — D72.12 DRESS SYNDROME: Primary | ICD-10-CM

## 2021-01-01 DIAGNOSIS — T50.905A DRESS SYNDROME: Primary | ICD-10-CM

## 2021-01-01 DIAGNOSIS — I82.622 ACUTE DEEP VEIN THROMBOSIS (DVT) OF LEFT UPPER EXTREMITY, UNSPECIFIED VEIN (H): ICD-10-CM

## 2021-01-01 DIAGNOSIS — Z86.2 PERSONAL HISTORY OF DISEASES OF BLOOD AND BLOOD-FORMING ORGANS: ICD-10-CM

## 2021-01-01 DIAGNOSIS — L27.0 DRUG RASH: Primary | ICD-10-CM

## 2021-01-01 DIAGNOSIS — C85.90 NHL (NON-HODGKIN'S LYMPHOMA) (H): Primary | ICD-10-CM

## 2021-01-01 DIAGNOSIS — I82.722 CHRONIC DEEP VEIN THROMBOSIS (DVT) OF LEFT UPPER EXTREMITY, UNSPECIFIED VEIN (H): ICD-10-CM

## 2021-01-01 LAB
ABO + RH BLD: NORMAL
ALBUMIN SERPL-MCNC: 1.6 G/DL (ref 3.4–5)
ALBUMIN SERPL-MCNC: 1.8 G/DL (ref 3.4–5)
ALBUMIN SERPL-MCNC: 1.9 G/DL (ref 3.4–5)
ALBUMIN SERPL-MCNC: 1.9 G/DL (ref 3.4–5)
ALBUMIN SERPL-MCNC: 2 G/DL (ref 3.4–5)
ALBUMIN SERPL-MCNC: 2 G/DL (ref 3.4–5)
ALBUMIN SERPL-MCNC: 2.1 G/DL (ref 3.4–5)
ALBUMIN SERPL-MCNC: 2.2 G/DL (ref 3.4–5)
ALBUMIN SERPL-MCNC: 2.3 G/DL (ref 3.4–5)
ALBUMIN SERPL-MCNC: 2.3 G/DL (ref 3.4–5)
ALBUMIN SERPL-MCNC: 2.4 G/DL (ref 3.4–5)
ALBUMIN SERPL-MCNC: 2.4 G/DL (ref 3.4–5)
ALBUMIN SERPL-MCNC: 3 G/DL (ref 3.4–5)
ALBUMIN SERPL-MCNC: 3.2 G/DL (ref 3.4–5)
ALBUMIN UR-MCNC: 20 MG/DL
ALBUMIN UR-MCNC: 50 MG/DL
ALBUMIN UR-MCNC: 50 MG/DL
ALBUMIN UR-MCNC: NEGATIVE MG/DL
ALP SERPL-CCNC: 100 U/L (ref 40–150)
ALP SERPL-CCNC: 105 U/L (ref 40–150)
ALP SERPL-CCNC: 105 U/L (ref 40–150)
ALP SERPL-CCNC: 109 U/L (ref 40–150)
ALP SERPL-CCNC: 118 U/L (ref 40–150)
ALP SERPL-CCNC: 121 U/L (ref 40–150)
ALP SERPL-CCNC: 136 U/L (ref 40–150)
ALP SERPL-CCNC: 139 U/L (ref 40–150)
ALP SERPL-CCNC: 141 U/L (ref 40–150)
ALP SERPL-CCNC: 162 U/L (ref 40–150)
ALP SERPL-CCNC: 175 U/L (ref 40–150)
ALP SERPL-CCNC: 191 U/L (ref 40–150)
ALP SERPL-CCNC: 191 U/L (ref 40–150)
ALP SERPL-CCNC: 195 U/L (ref 40–150)
ALP SERPL-CCNC: 195 U/L (ref 40–150)
ALP SERPL-CCNC: 217 U/L (ref 40–150)
ALP SERPL-CCNC: 48 U/L (ref 40–150)
ALP SERPL-CCNC: 50 U/L (ref 40–150)
ALP SERPL-CCNC: 50 U/L (ref 40–150)
ALP SERPL-CCNC: 66 U/L (ref 40–150)
ALP SERPL-CCNC: 69 U/L (ref 40–150)
ALP SERPL-CCNC: 71 U/L (ref 40–150)
ALP SERPL-CCNC: 77 U/L (ref 40–150)
ALP SERPL-CCNC: 79 U/L (ref 40–150)
ALP SERPL-CCNC: 81 U/L (ref 40–150)
ALP SERPL-CCNC: 90 U/L (ref 40–150)
ALP SERPL-CCNC: 93 U/L (ref 40–150)
ALT SERPL W P-5'-P-CCNC: 15 U/L (ref 0–70)
ALT SERPL W P-5'-P-CCNC: 17 U/L (ref 0–70)
ALT SERPL W P-5'-P-CCNC: 20 U/L (ref 0–70)
ALT SERPL W P-5'-P-CCNC: 37 U/L (ref 0–70)
ALT SERPL W P-5'-P-CCNC: 40 U/L (ref 0–70)
ALT SERPL W P-5'-P-CCNC: 41 U/L (ref 0–70)
ALT SERPL W P-5'-P-CCNC: 42 U/L (ref 0–70)
ALT SERPL W P-5'-P-CCNC: 47 U/L (ref 0–70)
ALT SERPL W P-5'-P-CCNC: 52 U/L (ref 0–70)
ALT SERPL W P-5'-P-CCNC: 53 U/L (ref 0–70)
ALT SERPL W P-5'-P-CCNC: 53 U/L (ref 0–70)
ALT SERPL W P-5'-P-CCNC: 55 U/L (ref 0–70)
ALT SERPL W P-5'-P-CCNC: 55 U/L (ref 0–70)
ALT SERPL W P-5'-P-CCNC: 56 U/L (ref 0–70)
ALT SERPL W P-5'-P-CCNC: 58 U/L (ref 0–70)
ALT SERPL W P-5'-P-CCNC: 59 U/L (ref 0–70)
ALT SERPL W P-5'-P-CCNC: 60 U/L (ref 0–70)
ALT SERPL W P-5'-P-CCNC: 64 U/L (ref 0–70)
ALT SERPL W P-5'-P-CCNC: 67 U/L (ref 0–70)
ALT SERPL W P-5'-P-CCNC: 77 U/L (ref 0–70)
ALT SERPL W P-5'-P-CCNC: 83 U/L (ref 0–70)
ALT SERPL W P-5'-P-CCNC: 88 U/L (ref 0–70)
ALT SERPL W P-5'-P-CCNC: 91 U/L (ref 0–70)
ALT SERPL W P-5'-P-CCNC: 95 U/L (ref 0–70)
ALT SERPL W P-5'-P-CCNC: 97 U/L (ref 0–70)
AMORPH CRY #/AREA URNS HPF: ABNORMAL /HPF
AMYLASE SERPL-CCNC: 10 U/L (ref 30–110)
AMYLASE SERPL-CCNC: 12 U/L (ref 30–110)
AMYLASE SERPL-CCNC: 12 U/L (ref 30–110)
AMYLASE SERPL-CCNC: 22 U/L (ref 30–110)
ANION GAP SERPL CALCULATED.3IONS-SCNC: 10 MMOL/L (ref 3–14)
ANION GAP SERPL CALCULATED.3IONS-SCNC: 11 MMOL/L (ref 3–14)
ANION GAP SERPL CALCULATED.3IONS-SCNC: 12 MMOL/L (ref 3–14)
ANION GAP SERPL CALCULATED.3IONS-SCNC: 14 MMOL/L (ref 3–14)
ANION GAP SERPL CALCULATED.3IONS-SCNC: 14 MMOL/L (ref 3–14)
ANION GAP SERPL CALCULATED.3IONS-SCNC: 15 MMOL/L (ref 3–14)
ANION GAP SERPL CALCULATED.3IONS-SCNC: 16 MMOL/L (ref 3–14)
ANION GAP SERPL CALCULATED.3IONS-SCNC: 5 MMOL/L (ref 3–14)
ANION GAP SERPL CALCULATED.3IONS-SCNC: 5 MMOL/L (ref 3–14)
ANION GAP SERPL CALCULATED.3IONS-SCNC: 6 MMOL/L (ref 3–14)
ANION GAP SERPL CALCULATED.3IONS-SCNC: 6 MMOL/L (ref 3–14)
ANION GAP SERPL CALCULATED.3IONS-SCNC: 7 MMOL/L (ref 3–14)
ANION GAP SERPL CALCULATED.3IONS-SCNC: 8 MMOL/L (ref 3–14)
ANION GAP SERPL CALCULATED.3IONS-SCNC: 8 MMOL/L (ref 3–14)
ANION GAP SERPL CALCULATED.3IONS-SCNC: 9 MMOL/L (ref 3–14)
ANISOCYTOSIS BLD QL SMEAR: SLIGHT
APPEARANCE UR: ABNORMAL
APPEARANCE UR: ABNORMAL
APPEARANCE UR: CLEAR
APPEARANCE UR: CLEAR
APTT PPP: 31 SEC (ref 22–37)
APTT PPP: 32 SEC (ref 22–37)
APTT PPP: 40 SEC (ref 22–37)
APTT PPP: 41 SEC (ref 22–37)
APTT PPP: 43 SEC (ref 22–37)
APTT PPP: 68 SEC (ref 22–37)
AST SERPL W P-5'-P-CCNC: 12 U/L (ref 0–45)
AST SERPL W P-5'-P-CCNC: 126 U/L (ref 0–45)
AST SERPL W P-5'-P-CCNC: 152 U/L (ref 0–45)
AST SERPL W P-5'-P-CCNC: 22 U/L (ref 0–45)
AST SERPL W P-5'-P-CCNC: 26 U/L (ref 0–45)
AST SERPL W P-5'-P-CCNC: 28 U/L (ref 0–45)
AST SERPL W P-5'-P-CCNC: 31 U/L (ref 0–45)
AST SERPL W P-5'-P-CCNC: 31 U/L (ref 0–45)
AST SERPL W P-5'-P-CCNC: 35 U/L (ref 0–45)
AST SERPL W P-5'-P-CCNC: 35 U/L (ref 0–45)
AST SERPL W P-5'-P-CCNC: 36 U/L (ref 0–45)
AST SERPL W P-5'-P-CCNC: 37 U/L (ref 0–45)
AST SERPL W P-5'-P-CCNC: 42 U/L (ref 0–45)
AST SERPL W P-5'-P-CCNC: 45 U/L (ref 0–45)
AST SERPL W P-5'-P-CCNC: 46 U/L (ref 0–45)
AST SERPL W P-5'-P-CCNC: 49 U/L (ref 0–45)
AST SERPL W P-5'-P-CCNC: 53 U/L (ref 0–45)
AST SERPL W P-5'-P-CCNC: 55 U/L (ref 0–45)
AST SERPL W P-5'-P-CCNC: 58 U/L (ref 0–45)
AST SERPL W P-5'-P-CCNC: 67 U/L (ref 0–45)
AST SERPL W P-5'-P-CCNC: 72 U/L (ref 0–45)
AST SERPL W P-5'-P-CCNC: 8 U/L (ref 0–45)
AST SERPL W P-5'-P-CCNC: 84 U/L (ref 0–45)
AST SERPL W P-5'-P-CCNC: 91 U/L (ref 0–45)
AST SERPL W P-5'-P-CCNC: 96 U/L (ref 0–45)
B-HCG FREE SERPL-ACNC: 1.1 [IU]/L (ref 0.86–1.14)
BACTERIA #/AREA URNS HPF: ABNORMAL /HPF
BACTERIA SPEC CULT: NO GROWTH
BACTERIA SPEC CULT: NORMAL
BASE DEFICIT BLDA-SCNC: 6.3 MMOL/L
BASE DEFICIT BLDV-SCNC: 10.9 MMOL/L
BASE DEFICIT BLDV-SCNC: 12.8 MMOL/L
BASE DEFICIT BLDV-SCNC: 13 MMOL/L
BASOPHILS # BLD AUTO: 0 10E9/L (ref 0–0.2)
BASOPHILS NFR BLD AUTO: 0 %
BASOPHILS NFR BLD AUTO: 0.1 %
BASOPHILS NFR BLD AUTO: 0.3 %
BASOPHILS NFR BLD AUTO: 0.3 %
BASOPHILS NFR BLD AUTO: 0.4 %
BASOPHILS NFR BLD AUTO: 0.4 %
BASOPHILS NFR BLD AUTO: 0.5 %
BASOPHILS NFR BLD AUTO: 0.6 %
BILIRUB SERPL-MCNC: 0.4 MG/DL (ref 0.2–1.3)
BILIRUB SERPL-MCNC: 0.5 MG/DL (ref 0.2–1.3)
BILIRUB SERPL-MCNC: 0.5 MG/DL (ref 0.2–1.3)
BILIRUB SERPL-MCNC: 0.6 MG/DL (ref 0.2–1.3)
BILIRUB SERPL-MCNC: 0.7 MG/DL (ref 0.2–1.3)
BILIRUB SERPL-MCNC: 0.8 MG/DL (ref 0.2–1.3)
BILIRUB UR QL STRIP: NEGATIVE
BLD GP AB SCN SERPL QL: NORMAL
BLD PROD TYP BPU: NORMAL
BLD UNIT ID BPU: 0
BLOOD BANK CMNT PATIENT-IMP: NORMAL
BLOOD PRODUCT CODE: NORMAL
BPU ID: NORMAL
BUN SERPL-MCNC: 12 MG/DL (ref 7–30)
BUN SERPL-MCNC: 21 MG/DL (ref 7–30)
BUN SERPL-MCNC: 21 MG/DL (ref 7–30)
BUN SERPL-MCNC: 23 MG/DL (ref 7–30)
BUN SERPL-MCNC: 24 MG/DL (ref 7–30)
BUN SERPL-MCNC: 25 MG/DL (ref 7–30)
BUN SERPL-MCNC: 25 MG/DL (ref 7–30)
BUN SERPL-MCNC: 26 MG/DL (ref 7–30)
BUN SERPL-MCNC: 26 MG/DL (ref 7–30)
BUN SERPL-MCNC: 27 MG/DL (ref 7–30)
BUN SERPL-MCNC: 28 MG/DL (ref 7–30)
BUN SERPL-MCNC: 28 MG/DL (ref 7–30)
BUN SERPL-MCNC: 40 MG/DL (ref 7–30)
BUN SERPL-MCNC: 45 MG/DL (ref 7–30)
BUN SERPL-MCNC: 46 MG/DL (ref 7–30)
BUN SERPL-MCNC: 46 MG/DL (ref 7–30)
BUN SERPL-MCNC: 56 MG/DL (ref 7–30)
BUN SERPL-MCNC: 56 MG/DL (ref 7–30)
BUN SERPL-MCNC: 66 MG/DL (ref 7–30)
BUN SERPL-MCNC: 67 MG/DL (ref 7–30)
BUN SERPL-MCNC: 73 MG/DL (ref 7–30)
BUN SERPL-MCNC: 75 MG/DL (ref 7–30)
BUN SERPL-MCNC: 78 MG/DL (ref 7–30)
BUN SERPL-MCNC: 86 MG/DL (ref 7–30)
BUN SERPL-MCNC: 88 MG/DL (ref 7–30)
BUN SERPL-MCNC: 88 MG/DL (ref 7–30)
BUN SERPL-MCNC: 91 MG/DL (ref 7–30)
BUN SERPL-MCNC: 91 MG/DL (ref 7–30)
BUN SERPL-MCNC: 92 MG/DL (ref 7–30)
BUN SERPL-MCNC: 93 MG/DL (ref 7–30)
BUN SERPL-MCNC: 94 MG/DL (ref 7–30)
BURR CELLS BLD QL SMEAR: ABNORMAL
BURR CELLS BLD QL SMEAR: SLIGHT
BURR CELLS BLD QL SMEAR: SLIGHT
C DIFF TOX B STL QL: POSITIVE
CA-I BLD-MCNC: 4.1 MG/DL (ref 4.4–5.2)
CA-I BLD-MCNC: 4.3 MG/DL (ref 4.4–5.2)
CALCIUM SERPL-MCNC: 6.8 MG/DL (ref 8.5–10.1)
CALCIUM SERPL-MCNC: 6.9 MG/DL (ref 8.5–10.1)
CALCIUM SERPL-MCNC: 6.9 MG/DL (ref 8.5–10.1)
CALCIUM SERPL-MCNC: 7 MG/DL (ref 8.5–10.1)
CALCIUM SERPL-MCNC: 7 MG/DL (ref 8.5–10.1)
CALCIUM SERPL-MCNC: 7.1 MG/DL (ref 8.5–10.1)
CALCIUM SERPL-MCNC: 7.2 MG/DL (ref 8.5–10.1)
CALCIUM SERPL-MCNC: 7.3 MG/DL (ref 8.5–10.1)
CALCIUM SERPL-MCNC: 7.4 MG/DL (ref 8.5–10.1)
CALCIUM SERPL-MCNC: 7.6 MG/DL (ref 8.5–10.1)
CALCIUM SERPL-MCNC: 7.6 MG/DL (ref 8.5–10.1)
CALCIUM SERPL-MCNC: 7.8 MG/DL (ref 8.5–10.1)
CALCIUM SERPL-MCNC: 7.9 MG/DL (ref 8.5–10.1)
CALCIUM SERPL-MCNC: 8 MG/DL (ref 8.5–10.1)
CALCIUM SERPL-MCNC: 8.1 MG/DL (ref 8.5–10.1)
CALCIUM SERPL-MCNC: 8.2 MG/DL (ref 8.5–10.1)
CALCIUM SERPL-MCNC: 8.2 MG/DL (ref 8.5–10.1)
CALCIUM SERPL-MCNC: 8.3 MG/DL (ref 8.5–10.1)
CALCIUM SERPL-MCNC: 8.4 MG/DL (ref 8.5–10.1)
CALCIUM SERPL-MCNC: 8.5 MG/DL (ref 8.5–10.1)
CALCIUM SERPL-MCNC: 8.5 MG/DL (ref 8.5–10.1)
CALCIUM SERPL-MCNC: 8.7 MG/DL (ref 8.5–10.1)
CALCIUM SERPL-MCNC: 8.8 MG/DL (ref 8.5–10.1)
CALCIUM SERPL-MCNC: 8.9 MG/DL (ref 8.5–10.1)
CALCIUM SERPL-MCNC: 9.3 MG/DL (ref 8.5–10.1)
CALCIUM SERPL-MCNC: 9.5 MG/DL (ref 8.5–10.1)
CHLORIDE SERPL-SCNC: 100 MMOL/L (ref 94–109)
CHLORIDE SERPL-SCNC: 100 MMOL/L (ref 94–109)
CHLORIDE SERPL-SCNC: 101 MMOL/L (ref 94–109)
CHLORIDE SERPL-SCNC: 102 MMOL/L (ref 94–109)
CHLORIDE SERPL-SCNC: 103 MMOL/L (ref 94–109)
CHLORIDE SERPL-SCNC: 104 MMOL/L (ref 94–109)
CHLORIDE SERPL-SCNC: 105 MMOL/L (ref 94–109)
CHLORIDE SERPL-SCNC: 106 MMOL/L (ref 94–109)
CHLORIDE SERPL-SCNC: 107 MMOL/L (ref 94–109)
CHLORIDE SERPL-SCNC: 107 MMOL/L (ref 94–109)
CHLORIDE SERPL-SCNC: 108 MMOL/L (ref 94–109)
CHLORIDE SERPL-SCNC: 109 MMOL/L (ref 94–109)
CHLORIDE SERPL-SCNC: 110 MMOL/L (ref 94–109)
CHLORIDE SERPL-SCNC: 111 MMOL/L (ref 94–109)
CHLORIDE SERPL-SCNC: 94 MMOL/L (ref 94–109)
CHLORIDE SERPL-SCNC: 95 MMOL/L (ref 94–109)
CHLORIDE SERPL-SCNC: 97 MMOL/L (ref 94–109)
CHLORIDE SERPL-SCNC: 98 MMOL/L (ref 94–109)
CK SERPL-CCNC: 118 U/L (ref 30–300)
CK SERPL-CCNC: 169 U/L (ref 30–300)
CK SERPL-CCNC: 30 U/L (ref 30–300)
CK SERPL-CCNC: 38 U/L (ref 30–300)
CMV DNA SPEC NAA+PROBE-ACNC: <137 [IU]/ML
CMV DNA SPEC NAA+PROBE-LOG#: <2.1 {LOG_IU}/ML
CMV IGG SERPL QL IA: 3.9 AI (ref 0–0.8)
CO2 SERPL-SCNC: 11 MMOL/L (ref 20–32)
CO2 SERPL-SCNC: 12 MMOL/L (ref 20–32)
CO2 SERPL-SCNC: 14 MMOL/L (ref 20–32)
CO2 SERPL-SCNC: 16 MMOL/L (ref 20–32)
CO2 SERPL-SCNC: 17 MMOL/L (ref 20–32)
CO2 SERPL-SCNC: 18 MMOL/L (ref 20–32)
CO2 SERPL-SCNC: 19 MMOL/L (ref 20–32)
CO2 SERPL-SCNC: 20 MMOL/L (ref 20–32)
CO2 SERPL-SCNC: 22 MMOL/L (ref 20–32)
CO2 SERPL-SCNC: 22 MMOL/L (ref 20–32)
CO2 SERPL-SCNC: 23 MMOL/L (ref 20–32)
CO2 SERPL-SCNC: 23 MMOL/L (ref 20–32)
CO2 SERPL-SCNC: 24 MMOL/L (ref 20–32)
CO2 SERPL-SCNC: 25 MMOL/L (ref 20–32)
CO2 SERPL-SCNC: 27 MMOL/L (ref 20–32)
CO2 SERPL-SCNC: 27 MMOL/L (ref 20–32)
CO2 SERPL-SCNC: 28 MMOL/L (ref 20–32)
CO2 SERPL-SCNC: 29 MMOL/L (ref 20–32)
COLOR UR AUTO: NORMAL
COLOR UR AUTO: YELLOW
COPATH REPORT: NORMAL
CORTIS SERPL-MCNC: 32.8 UG/DL (ref 4–22)
CREAT SERPL-MCNC: 0.98 MG/DL (ref 0.66–1.25)
CREAT SERPL-MCNC: 1.11 MG/DL (ref 0.66–1.25)
CREAT SERPL-MCNC: 1.14 MG/DL (ref 0.66–1.25)
CREAT SERPL-MCNC: 1.16 MG/DL (ref 0.66–1.25)
CREAT SERPL-MCNC: 1.23 MG/DL (ref 0.66–1.25)
CREAT SERPL-MCNC: 1.24 MG/DL (ref 0.66–1.25)
CREAT SERPL-MCNC: 1.27 MG/DL (ref 0.66–1.25)
CREAT SERPL-MCNC: 1.28 MG/DL (ref 0.66–1.25)
CREAT SERPL-MCNC: 1.3 MG/DL (ref 0.66–1.25)
CREAT SERPL-MCNC: 1.38 MG/DL (ref 0.66–1.25)
CREAT SERPL-MCNC: 1.4 MG/DL (ref 0.66–1.25)
CREAT SERPL-MCNC: 1.47 MG/DL (ref 0.66–1.25)
CREAT SERPL-MCNC: 1.53 MG/DL (ref 0.66–1.25)
CREAT SERPL-MCNC: 1.63 MG/DL (ref 0.66–1.25)
CREAT SERPL-MCNC: 1.73 MG/DL (ref 0.66–1.25)
CREAT SERPL-MCNC: 1.78 MG/DL (ref 0.66–1.25)
CREAT SERPL-MCNC: 2.13 MG/DL (ref 0.66–1.25)
CREAT SERPL-MCNC: 2.13 MG/DL (ref 0.66–1.25)
CREAT SERPL-MCNC: 2.24 MG/DL (ref 0.66–1.25)
CREAT SERPL-MCNC: 2.35 MG/DL (ref 0.66–1.25)
CREAT SERPL-MCNC: 2.77 MG/DL (ref 0.66–1.25)
CREAT SERPL-MCNC: 3.08 MG/DL (ref 0.66–1.25)
CREAT SERPL-MCNC: 3.14 MG/DL (ref 0.66–1.25)
CREAT SERPL-MCNC: 3.23 MG/DL (ref 0.66–1.25)
CREAT SERPL-MCNC: 3.27 MG/DL (ref 0.66–1.25)
CREAT SERPL-MCNC: 3.54 MG/DL (ref 0.66–1.25)
CREAT SERPL-MCNC: 3.58 MG/DL (ref 0.66–1.25)
CREAT SERPL-MCNC: 3.63 MG/DL (ref 0.66–1.25)
CREAT SERPL-MCNC: 3.63 MG/DL (ref 0.66–1.25)
CREAT SERPL-MCNC: 3.68 MG/DL (ref 0.66–1.25)
CREAT SERPL-MCNC: 3.83 MG/DL (ref 0.66–1.25)
CREAT UR-MCNC: 158 MG/DL
CRP SERPL-MCNC: 14 MG/L (ref 0–8)
CRP SERPL-MCNC: 150 MG/L (ref 0–8)
CRP SERPL-MCNC: 160 MG/L (ref 0–8)
CRP SERPL-MCNC: 170 MG/L (ref 0–8)
CRP SERPL-MCNC: 21 MG/L (ref 0–8)
CRP SERPL-MCNC: 220 MG/L (ref 0–8)
CRP SERPL-MCNC: 38 MG/L (ref 0–8)
CRP SERPL-MCNC: 49 MG/L (ref 0–8)
CRP SERPL-MCNC: 58 MG/L (ref 0–8)
CRP SERPL-MCNC: 85 MG/L (ref 0–8)
CRP SERPL-MCNC: 9.3 MG/L (ref 0–8)
DEPRECATED CALCIDIOL+CALCIFEROL SERPL-MC: 9 UG/L (ref 20–75)
DEPRECATED S PYO AG THROAT QL EIA: NEGATIVE
DIFFERENTIAL METHOD BLD: ABNORMAL
EBV DNA # SPEC NAA+PROBE: NORMAL {COPIES}/ML
EBV DNA # SPEC NAA+PROBE: NORMAL {COPIES}/ML
EBV DNA SPEC NAA+PROBE-LOG#: NORMAL {LOG_COPIES}/ML
EBV DNA SPEC NAA+PROBE-LOG#: NORMAL {LOG_COPIES}/ML
EBV VCA IGG SER QL IA: 4.7 AI (ref 0–0.8)
EOSINOPHIL # BLD AUTO: 0 10E9/L (ref 0–0.7)
EOSINOPHIL # BLD AUTO: 0.1 10E9/L (ref 0–0.7)
EOSINOPHIL # BLD AUTO: 0.2 10E9/L (ref 0–0.7)
EOSINOPHIL # BLD AUTO: 0.3 10E9/L (ref 0–0.7)
EOSINOPHIL # BLD AUTO: 0.3 10E9/L (ref 0–0.7)
EOSINOPHIL # BLD AUTO: 0.4 10E9/L (ref 0–0.7)
EOSINOPHIL NFR BLD AUTO: 0 %
EOSINOPHIL NFR BLD AUTO: 0.2 %
EOSINOPHIL NFR BLD AUTO: 0.6 %
EOSINOPHIL NFR BLD AUTO: 0.7 %
EOSINOPHIL NFR BLD AUTO: 0.9 %
EOSINOPHIL NFR BLD AUTO: 0.9 %
EOSINOPHIL NFR BLD AUTO: 1.2 %
EOSINOPHIL NFR BLD AUTO: 1.2 %
EOSINOPHIL NFR BLD AUTO: 1.7 %
EOSINOPHIL NFR BLD AUTO: 1.7 %
EOSINOPHIL NFR BLD AUTO: 2.2 %
EOSINOPHIL NFR BLD AUTO: 2.6 %
EOSINOPHIL NFR BLD AUTO: 2.8 %
EOSINOPHIL NFR BLD AUTO: 3.2 %
EOSINOPHIL NFR BLD AUTO: 3.5 %
EOSINOPHIL NFR BLD AUTO: 3.9 %
ERYTHROCYTE [DISTWIDTH] IN BLOOD BY AUTOMATED COUNT: 11.9 % (ref 10–15)
ERYTHROCYTE [DISTWIDTH] IN BLOOD BY AUTOMATED COUNT: 11.9 % (ref 10–15)
ERYTHROCYTE [DISTWIDTH] IN BLOOD BY AUTOMATED COUNT: 12.4 % (ref 10–15)
ERYTHROCYTE [DISTWIDTH] IN BLOOD BY AUTOMATED COUNT: 12.7 % (ref 10–15)
ERYTHROCYTE [DISTWIDTH] IN BLOOD BY AUTOMATED COUNT: 13 % (ref 10–15)
ERYTHROCYTE [DISTWIDTH] IN BLOOD BY AUTOMATED COUNT: 13.2 % (ref 10–15)
ERYTHROCYTE [DISTWIDTH] IN BLOOD BY AUTOMATED COUNT: 13.2 % (ref 10–15)
ERYTHROCYTE [DISTWIDTH] IN BLOOD BY AUTOMATED COUNT: 13.4 % (ref 10–15)
ERYTHROCYTE [DISTWIDTH] IN BLOOD BY AUTOMATED COUNT: 13.6 % (ref 10–15)
ERYTHROCYTE [DISTWIDTH] IN BLOOD BY AUTOMATED COUNT: 13.7 % (ref 10–15)
ERYTHROCYTE [DISTWIDTH] IN BLOOD BY AUTOMATED COUNT: 13.9 % (ref 10–15)
ERYTHROCYTE [DISTWIDTH] IN BLOOD BY AUTOMATED COUNT: 14.1 % (ref 10–15)
ERYTHROCYTE [DISTWIDTH] IN BLOOD BY AUTOMATED COUNT: 14.4 % (ref 10–15)
ERYTHROCYTE [DISTWIDTH] IN BLOOD BY AUTOMATED COUNT: 14.5 % (ref 10–15)
ERYTHROCYTE [DISTWIDTH] IN BLOOD BY AUTOMATED COUNT: 14.5 % (ref 10–15)
ERYTHROCYTE [DISTWIDTH] IN BLOOD BY AUTOMATED COUNT: 14.7 % (ref 10–15)
ERYTHROCYTE [DISTWIDTH] IN BLOOD BY AUTOMATED COUNT: 14.7 % (ref 10–15)
ERYTHROCYTE [DISTWIDTH] IN BLOOD BY AUTOMATED COUNT: 14.8 % (ref 10–15)
ERYTHROCYTE [DISTWIDTH] IN BLOOD BY AUTOMATED COUNT: 14.8 % (ref 10–15)
ERYTHROCYTE [DISTWIDTH] IN BLOOD BY AUTOMATED COUNT: 14.9 % (ref 10–15)
ERYTHROCYTE [DISTWIDTH] IN BLOOD BY AUTOMATED COUNT: 15.1 % (ref 10–15)
ERYTHROCYTE [DISTWIDTH] IN BLOOD BY AUTOMATED COUNT: 15.2 % (ref 10–15)
ERYTHROCYTE [DISTWIDTH] IN BLOOD BY AUTOMATED COUNT: 15.8 % (ref 10–15)
ERYTHROCYTE [DISTWIDTH] IN BLOOD BY AUTOMATED COUNT: 17.7 % (ref 10–15)
ERYTHROCYTE [DISTWIDTH] IN BLOOD BY AUTOMATED COUNT: 18.9 % (ref 10–15)
ERYTHROCYTE [DISTWIDTH] IN BLOOD BY AUTOMATED COUNT: 19.2 % (ref 10–15)
ERYTHROCYTE [DISTWIDTH] IN BLOOD BY AUTOMATED COUNT: 19.8 % (ref 10–15)
ERYTHROCYTE [SEDIMENTATION RATE] IN BLOOD BY WESTERGREN METHOD: 28 MM/H (ref 0–20)
ERYTHROCYTE [SEDIMENTATION RATE] IN BLOOD BY WESTERGREN METHOD: 31 MM/H (ref 0–20)
FIBRINOGEN PPP-MCNC: 171 MG/DL (ref 200–420)
FIBRINOGEN PPP-MCNC: 178 MG/DL (ref 200–420)
FIBRINOGEN PPP-MCNC: 198 MG/DL (ref 200–420)
FIBRINOGEN PPP-MCNC: 224 MG/DL (ref 200–420)
FIBRINOGEN PPP-MCNC: 228 MG/DL (ref 200–420)
FIBRINOGEN PPP-MCNC: 233 MG/DL (ref 200–420)
FIBRINOGEN PPP-MCNC: 335 MG/DL (ref 200–420)
FLUAV RNA RESP QL NAA+PROBE: NEGATIVE
FLUBV RNA RESP QL NAA+PROBE: NEGATIVE
FRACT EXCRET NA UR+SERPL-RTO: 0.4 %
GFR SERPL CREATININE-BSD FRML MDRD: 16 ML/MIN/{1.73_M2}
GFR SERPL CREATININE-BSD FRML MDRD: 17 ML/MIN/{1.73_M2}
GFR SERPL CREATININE-BSD FRML MDRD: 18 ML/MIN/{1.73_M2}
GFR SERPL CREATININE-BSD FRML MDRD: 18 ML/MIN/{1.73_M2}
GFR SERPL CREATININE-BSD FRML MDRD: 20 ML/MIN/{1.73_M2}
GFR SERPL CREATININE-BSD FRML MDRD: 20 ML/MIN/{1.73_M2}
GFR SERPL CREATININE-BSD FRML MDRD: 21 ML/MIN/{1.73_M2}
GFR SERPL CREATININE-BSD FRML MDRD: 21 ML/MIN/{1.73_M2}
GFR SERPL CREATININE-BSD FRML MDRD: 24 ML/MIN/{1.73_M2}
GFR SERPL CREATININE-BSD FRML MDRD: 30 ML/MIN/{1.73_M2}
GFR SERPL CREATININE-BSD FRML MDRD: 31 ML/MIN/{1.73_M2}
GFR SERPL CREATININE-BSD FRML MDRD: 33 ML/MIN/{1.73_M2}
GFR SERPL CREATININE-BSD FRML MDRD: 33 ML/MIN/{1.73_M2}
GFR SERPL CREATININE-BSD FRML MDRD: 41 ML/MIN/{1.73_M2}
GFR SERPL CREATININE-BSD FRML MDRD: 43 ML/MIN/{1.73_M2}
GFR SERPL CREATININE-BSD FRML MDRD: 46 ML/MIN/{1.73_M2}
GFR SERPL CREATININE-BSD FRML MDRD: 50 ML/MIN/{1.73_M2}
GFR SERPL CREATININE-BSD FRML MDRD: 52 ML/MIN/{1.73_M2}
GFR SERPL CREATININE-BSD FRML MDRD: 55 ML/MIN/{1.73_M2}
GFR SERPL CREATININE-BSD FRML MDRD: 56 ML/MIN/{1.73_M2}
GFR SERPL CREATININE-BSD FRML MDRD: 60 ML/MIN/{1.73_M2}
GFR SERPL CREATININE-BSD FRML MDRD: 62 ML/MIN/{1.73_M2}
GFR SERPL CREATININE-BSD FRML MDRD: 62 ML/MIN/{1.73_M2}
GFR SERPL CREATININE-BSD FRML MDRD: 64 ML/MIN/{1.73_M2}
GFR SERPL CREATININE-BSD FRML MDRD: 65 ML/MIN/{1.73_M2}
GFR SERPL CREATININE-BSD FRML MDRD: 69 ML/MIN/{1.73_M2}
GFR SERPL CREATININE-BSD FRML MDRD: 71 ML/MIN/{1.73_M2}
GFR SERPL CREATININE-BSD FRML MDRD: 73 ML/MIN/{1.73_M2}
GFR SERPL CREATININE-BSD FRML MDRD: 85 ML/MIN/{1.73_M2}
GGT SERPL-CCNC: 12 U/L (ref 0–75)
GGT SERPL-CCNC: 19 U/L (ref 0–75)
GGT SERPL-CCNC: 56 U/L (ref 0–75)
GGT SERPL-CCNC: 9 U/L (ref 0–75)
GLUCOSE BLDC GLUCOMTR-MCNC: 102 MG/DL (ref 70–99)
GLUCOSE BLDC GLUCOMTR-MCNC: 103 MG/DL (ref 70–99)
GLUCOSE BLDC GLUCOMTR-MCNC: 111 MG/DL (ref 70–99)
GLUCOSE BLDC GLUCOMTR-MCNC: 111 MG/DL (ref 70–99)
GLUCOSE BLDC GLUCOMTR-MCNC: 115 MG/DL (ref 70–99)
GLUCOSE BLDC GLUCOMTR-MCNC: 117 MG/DL (ref 70–99)
GLUCOSE BLDC GLUCOMTR-MCNC: 125 MG/DL (ref 70–99)
GLUCOSE BLDC GLUCOMTR-MCNC: 128 MG/DL (ref 70–99)
GLUCOSE BLDC GLUCOMTR-MCNC: 130 MG/DL (ref 70–99)
GLUCOSE BLDC GLUCOMTR-MCNC: 130 MG/DL (ref 70–99)
GLUCOSE BLDC GLUCOMTR-MCNC: 131 MG/DL (ref 70–99)
GLUCOSE BLDC GLUCOMTR-MCNC: 132 MG/DL (ref 70–99)
GLUCOSE BLDC GLUCOMTR-MCNC: 133 MG/DL (ref 70–99)
GLUCOSE BLDC GLUCOMTR-MCNC: 133 MG/DL (ref 70–99)
GLUCOSE BLDC GLUCOMTR-MCNC: 134 MG/DL (ref 70–99)
GLUCOSE BLDC GLUCOMTR-MCNC: 137 MG/DL (ref 70–99)
GLUCOSE BLDC GLUCOMTR-MCNC: 140 MG/DL (ref 70–99)
GLUCOSE BLDC GLUCOMTR-MCNC: 142 MG/DL (ref 70–99)
GLUCOSE BLDC GLUCOMTR-MCNC: 143 MG/DL (ref 70–99)
GLUCOSE BLDC GLUCOMTR-MCNC: 144 MG/DL (ref 70–99)
GLUCOSE BLDC GLUCOMTR-MCNC: 144 MG/DL (ref 70–99)
GLUCOSE BLDC GLUCOMTR-MCNC: 145 MG/DL (ref 70–99)
GLUCOSE BLDC GLUCOMTR-MCNC: 145 MG/DL (ref 70–99)
GLUCOSE BLDC GLUCOMTR-MCNC: 147 MG/DL (ref 70–99)
GLUCOSE BLDC GLUCOMTR-MCNC: 148 MG/DL (ref 70–99)
GLUCOSE BLDC GLUCOMTR-MCNC: 149 MG/DL (ref 70–99)
GLUCOSE BLDC GLUCOMTR-MCNC: 151 MG/DL (ref 70–99)
GLUCOSE BLDC GLUCOMTR-MCNC: 152 MG/DL (ref 70–99)
GLUCOSE BLDC GLUCOMTR-MCNC: 152 MG/DL (ref 70–99)
GLUCOSE BLDC GLUCOMTR-MCNC: 154 MG/DL (ref 70–99)
GLUCOSE BLDC GLUCOMTR-MCNC: 159 MG/DL (ref 70–99)
GLUCOSE BLDC GLUCOMTR-MCNC: 162 MG/DL (ref 70–99)
GLUCOSE BLDC GLUCOMTR-MCNC: 162 MG/DL (ref 70–99)
GLUCOSE BLDC GLUCOMTR-MCNC: 164 MG/DL (ref 70–99)
GLUCOSE BLDC GLUCOMTR-MCNC: 167 MG/DL (ref 70–99)
GLUCOSE BLDC GLUCOMTR-MCNC: 168 MG/DL (ref 70–99)
GLUCOSE BLDC GLUCOMTR-MCNC: 170 MG/DL (ref 70–99)
GLUCOSE BLDC GLUCOMTR-MCNC: 170 MG/DL (ref 70–99)
GLUCOSE BLDC GLUCOMTR-MCNC: 176 MG/DL (ref 70–99)
GLUCOSE BLDC GLUCOMTR-MCNC: 180 MG/DL (ref 70–99)
GLUCOSE BLDC GLUCOMTR-MCNC: 184 MG/DL (ref 70–99)
GLUCOSE BLDC GLUCOMTR-MCNC: 185 MG/DL (ref 70–99)
GLUCOSE BLDC GLUCOMTR-MCNC: 186 MG/DL (ref 70–99)
GLUCOSE BLDC GLUCOMTR-MCNC: 187 MG/DL (ref 70–99)
GLUCOSE BLDC GLUCOMTR-MCNC: 187 MG/DL (ref 70–99)
GLUCOSE BLDC GLUCOMTR-MCNC: 188 MG/DL (ref 70–99)
GLUCOSE BLDC GLUCOMTR-MCNC: 189 MG/DL (ref 70–99)
GLUCOSE BLDC GLUCOMTR-MCNC: 190 MG/DL (ref 70–99)
GLUCOSE BLDC GLUCOMTR-MCNC: 196 MG/DL (ref 70–99)
GLUCOSE BLDC GLUCOMTR-MCNC: 202 MG/DL (ref 70–99)
GLUCOSE BLDC GLUCOMTR-MCNC: 206 MG/DL (ref 70–99)
GLUCOSE BLDC GLUCOMTR-MCNC: 213 MG/DL (ref 70–99)
GLUCOSE BLDC GLUCOMTR-MCNC: 215 MG/DL (ref 70–99)
GLUCOSE BLDC GLUCOMTR-MCNC: 251 MG/DL (ref 70–99)
GLUCOSE BLDC GLUCOMTR-MCNC: 270 MG/DL (ref 70–99)
GLUCOSE BLDC GLUCOMTR-MCNC: 307 MG/DL (ref 70–99)
GLUCOSE BLDC GLUCOMTR-MCNC: 330 MG/DL (ref 70–99)
GLUCOSE BLDC GLUCOMTR-MCNC: 335 MG/DL (ref 70–99)
GLUCOSE BLDC GLUCOMTR-MCNC: 339 MG/DL (ref 70–99)
GLUCOSE BLDC GLUCOMTR-MCNC: 391 MG/DL (ref 70–99)
GLUCOSE BLDC GLUCOMTR-MCNC: 396 MG/DL (ref 70–99)
GLUCOSE BLDC GLUCOMTR-MCNC: 400 MG/DL (ref 70–99)
GLUCOSE BLDC GLUCOMTR-MCNC: 51 MG/DL (ref 70–99)
GLUCOSE BLDC GLUCOMTR-MCNC: 71 MG/DL (ref 70–99)
GLUCOSE BLDC GLUCOMTR-MCNC: 73 MG/DL (ref 70–99)
GLUCOSE BLDC GLUCOMTR-MCNC: 79 MG/DL (ref 70–99)
GLUCOSE BLDC GLUCOMTR-MCNC: 84 MG/DL (ref 70–99)
GLUCOSE BLDC GLUCOMTR-MCNC: 94 MG/DL (ref 70–99)
GLUCOSE BLDC GLUCOMTR-MCNC: 96 MG/DL (ref 70–99)
GLUCOSE BLDC GLUCOMTR-MCNC: 96 MG/DL (ref 70–99)
GLUCOSE BLDC GLUCOMTR-MCNC: 97 MG/DL (ref 70–99)
GLUCOSE BLDC GLUCOMTR-MCNC: 98 MG/DL (ref 70–99)
GLUCOSE SERPL-MCNC: 109 MG/DL (ref 70–99)
GLUCOSE SERPL-MCNC: 115 MG/DL (ref 70–99)
GLUCOSE SERPL-MCNC: 116 MG/DL (ref 70–99)
GLUCOSE SERPL-MCNC: 118 MG/DL (ref 70–99)
GLUCOSE SERPL-MCNC: 121 MG/DL (ref 70–99)
GLUCOSE SERPL-MCNC: 122 MG/DL (ref 70–99)
GLUCOSE SERPL-MCNC: 124 MG/DL (ref 70–99)
GLUCOSE SERPL-MCNC: 130 MG/DL (ref 70–99)
GLUCOSE SERPL-MCNC: 132 MG/DL (ref 70–99)
GLUCOSE SERPL-MCNC: 135 MG/DL (ref 70–99)
GLUCOSE SERPL-MCNC: 137 MG/DL (ref 70–99)
GLUCOSE SERPL-MCNC: 140 MG/DL (ref 70–99)
GLUCOSE SERPL-MCNC: 141 MG/DL (ref 70–99)
GLUCOSE SERPL-MCNC: 150 MG/DL (ref 70–99)
GLUCOSE SERPL-MCNC: 151 MG/DL (ref 70–99)
GLUCOSE SERPL-MCNC: 159 MG/DL (ref 70–99)
GLUCOSE SERPL-MCNC: 166 MG/DL (ref 70–99)
GLUCOSE SERPL-MCNC: 182 MG/DL (ref 70–99)
GLUCOSE SERPL-MCNC: 185 MG/DL (ref 70–99)
GLUCOSE SERPL-MCNC: 190 MG/DL (ref 70–99)
GLUCOSE SERPL-MCNC: 192 MG/DL (ref 70–99)
GLUCOSE SERPL-MCNC: 228 MG/DL (ref 70–99)
GLUCOSE SERPL-MCNC: 244 MG/DL (ref 70–99)
GLUCOSE SERPL-MCNC: 276 MG/DL (ref 70–99)
GLUCOSE SERPL-MCNC: 370 MG/DL (ref 70–99)
GLUCOSE SERPL-MCNC: 384 MG/DL (ref 70–99)
GLUCOSE SERPL-MCNC: 61 MG/DL (ref 70–99)
GLUCOSE SERPL-MCNC: 80 MG/DL (ref 70–99)
GLUCOSE SERPL-MCNC: 82 MG/DL (ref 70–99)
GLUCOSE SERPL-MCNC: 93 MG/DL (ref 70–99)
GLUCOSE SERPL-MCNC: 94 MG/DL (ref 70–99)
GLUCOSE UR STRIP-MCNC: 100 MG/DL
GLUCOSE UR STRIP-MCNC: >1000 MG/DL
GLUCOSE UR STRIP-MCNC: NEGATIVE MG/DL
GLUCOSE UR STRIP-MCNC: NEGATIVE MG/DL
GRAN CASTS #/AREA URNS LPF: 5 /LPF
HAPTOGLOB SERPL-MCNC: 140 MG/DL (ref 32–197)
HAPTOGLOB SERPL-MCNC: 55 MG/DL (ref 32–197)
HBA1C MFR BLD: 5.9 % (ref 0–5.6)
HBA1C MFR BLD: 5.9 % (ref 0–5.6)
HBV CORE AB SERPL QL IA: NONREACTIVE
HBV SURFACE AB SERPL IA-ACNC: 5.37 M[IU]/ML
HBV SURFACE AG SERPL QL IA: NONREACTIVE
HBV SURFACE AG SERPL QL IA: NONREACTIVE
HCO3 BLD-SCNC: 17 MMOL/L (ref 21–28)
HCO3 BLDV-SCNC: 12 MMOL/L (ref 21–28)
HCO3 BLDV-SCNC: 12 MMOL/L (ref 21–28)
HCO3 BLDV-SCNC: 13 MMOL/L (ref 21–28)
HCT VFR BLD AUTO: 14.2 % (ref 40–53)
HCT VFR BLD AUTO: 15.1 % (ref 40–53)
HCT VFR BLD AUTO: 19.3 % (ref 40–53)
HCT VFR BLD AUTO: 19.7 % (ref 40–53)
HCT VFR BLD AUTO: 20.3 % (ref 40–53)
HCT VFR BLD AUTO: 20.6 % (ref 40–53)
HCT VFR BLD AUTO: 21.2 % (ref 40–53)
HCT VFR BLD AUTO: 21.5 % (ref 40–53)
HCT VFR BLD AUTO: 21.5 % (ref 40–53)
HCT VFR BLD AUTO: 22.1 % (ref 40–53)
HCT VFR BLD AUTO: 22.2 % (ref 40–53)
HCT VFR BLD AUTO: 22.5 % (ref 40–53)
HCT VFR BLD AUTO: 22.8 % (ref 40–53)
HCT VFR BLD AUTO: 23 % (ref 40–53)
HCT VFR BLD AUTO: 23.1 % (ref 40–53)
HCT VFR BLD AUTO: 23.6 % (ref 40–53)
HCT VFR BLD AUTO: 26.4 % (ref 40–53)
HCT VFR BLD AUTO: 26.7 % (ref 40–53)
HCT VFR BLD AUTO: 26.8 % (ref 40–53)
HCT VFR BLD AUTO: 28.2 % (ref 40–53)
HCT VFR BLD AUTO: 29.2 % (ref 40–53)
HCT VFR BLD AUTO: 29.4 % (ref 40–53)
HCT VFR BLD AUTO: 30.6 % (ref 40–53)
HCT VFR BLD AUTO: 31.1 % (ref 40–53)
HCT VFR BLD AUTO: 31.8 % (ref 40–53)
HCT VFR BLD AUTO: 33.4 % (ref 40–53)
HCT VFR BLD AUTO: 33.7 % (ref 40–53)
HCT VFR BLD AUTO: 34.5 % (ref 40–53)
HCT VFR BLD AUTO: 35.4 % (ref 40–53)
HCT VFR BLD AUTO: 35.6 % (ref 40–53)
HCV AB SERPL QL IA: NONREACTIVE
HCV AB SERPL QL IA: NONREACTIVE
HGB BLD-MCNC: 10.4 G/DL (ref 13.3–17.7)
HGB BLD-MCNC: 10.5 G/DL (ref 13.3–17.7)
HGB BLD-MCNC: 10.8 G/DL (ref 13.3–17.7)
HGB BLD-MCNC: 11.3 G/DL (ref 13.3–17.7)
HGB BLD-MCNC: 11.7 G/DL (ref 13.3–17.7)
HGB BLD-MCNC: 11.7 G/DL (ref 13.3–17.7)
HGB BLD-MCNC: 11.9 G/DL (ref 13.3–17.7)
HGB BLD-MCNC: 11.9 G/DL (ref 13.3–17.7)
HGB BLD-MCNC: 4.7 G/DL (ref 13.3–17.7)
HGB BLD-MCNC: 4.9 G/DL (ref 13.3–17.7)
HGB BLD-MCNC: 5.3 G/DL (ref 13.3–17.7)
HGB BLD-MCNC: 6.6 G/DL (ref 13.3–17.7)
HGB BLD-MCNC: 7 G/DL (ref 13.3–17.7)
HGB BLD-MCNC: 7 G/DL (ref 13.3–17.7)
HGB BLD-MCNC: 7.1 G/DL (ref 13.3–17.7)
HGB BLD-MCNC: 7.1 G/DL (ref 13.3–17.7)
HGB BLD-MCNC: 7.3 G/DL (ref 13.3–17.7)
HGB BLD-MCNC: 7.3 G/DL (ref 13.3–17.7)
HGB BLD-MCNC: 7.4 G/DL (ref 13.3–17.7)
HGB BLD-MCNC: 7.6 G/DL (ref 13.3–17.7)
HGB BLD-MCNC: 7.6 G/DL (ref 13.3–17.7)
HGB BLD-MCNC: 7.8 G/DL (ref 13.3–17.7)
HGB BLD-MCNC: 7.9 G/DL (ref 13.3–17.7)
HGB BLD-MCNC: 8.3 G/DL (ref 13.3–17.7)
HGB BLD-MCNC: 8.4 G/DL (ref 13.3–17.7)
HGB BLD-MCNC: 8.4 G/DL (ref 13.3–17.7)
HGB BLD-MCNC: 8.6 G/DL (ref 13.3–17.7)
HGB BLD-MCNC: 8.7 G/DL (ref 13.3–17.7)
HGB BLD-MCNC: 8.7 G/DL (ref 13.3–17.7)
HGB BLD-MCNC: 9.1 G/DL (ref 13.3–17.7)
HGB BLD-MCNC: 9.8 G/DL (ref 13.3–17.7)
HGB BLD-MCNC: 9.9 G/DL (ref 13.3–17.7)
HGB UR QL STRIP: ABNORMAL
HGB UR QL STRIP: ABNORMAL
HGB UR QL STRIP: NEGATIVE
HGB UR QL STRIP: NEGATIVE
HHV6 DNA # SPEC NAA+PROBE: NORMAL COPIES/ML
HHV6 DNA SPEC NAA+PROBE-LOG#: NORMAL LOG COPIES/ML
HIV 1+2 AB+HIV1 P24 AG SERPL QL IA: NONREACTIVE
HIV 1+2 AB+HIV1 P24 AG SERPL QL IA: NONREACTIVE
IGA SERPL-MCNC: 73 MG/DL (ref 84–499)
IGG SERPL-MCNC: 340 MG/DL (ref 610–1616)
IGG SERPL-MCNC: 643 MG/DL (ref 610–1616)
IGM SERPL-MCNC: 21 MG/DL (ref 35–242)
IMM GRANULOCYTES # BLD: 0 10E9/L (ref 0–0.4)
IMM GRANULOCYTES # BLD: 0 10E9/L (ref 0–0.4)
IMM GRANULOCYTES # BLD: 0.1 10E9/L (ref 0–0.4)
IMM GRANULOCYTES # BLD: 0.2 10E9/L (ref 0–0.4)
IMM GRANULOCYTES # BLD: 0.4 10E9/L (ref 0–0.4)
IMM GRANULOCYTES NFR BLD: 0.5 %
IMM GRANULOCYTES NFR BLD: 0.6 %
IMM GRANULOCYTES NFR BLD: 0.6 %
IMM GRANULOCYTES NFR BLD: 0.8 %
IMM GRANULOCYTES NFR BLD: 1 %
IMM GRANULOCYTES NFR BLD: 1.2 %
IMM GRANULOCYTES NFR BLD: 1.3 %
IMM GRANULOCYTES NFR BLD: 2 %
IMM GRANULOCYTES NFR BLD: 2.3 %
IMM GRANULOCYTES NFR BLD: 3 %
IMM GRANULOCYTES NFR BLD: 4.1 %
INR PPP: 1.08 (ref 0.86–1.14)
INR PPP: 1.09 (ref 0.86–1.14)
INR PPP: 1.12 (ref 0.86–1.14)
INR PPP: 1.16 (ref 0.86–1.14)
INR PPP: 1.23 (ref 0.86–1.14)
INR PPP: 1.24 (ref 0.86–1.14)
INR PPP: 1.25 (ref 0.86–1.14)
INR PPP: 1.45 (ref 0.86–1.14)
INR PPP: 1.46 (ref 0.86–1.14)
INTERPRETATION ECG - MUSE: NORMAL
KETONES BLD-SCNC: 0.8 MMOL/L (ref 0–0.6)
KETONES UR STRIP-MCNC: ABNORMAL MG/DL
KETONES UR STRIP-MCNC: ABNORMAL MG/DL
KETONES UR STRIP-MCNC: NEGATIVE MG/DL
KETONES UR STRIP-MCNC: NEGATIVE MG/DL
LABORATORY COMMENT REPORT: NORMAL
LABORATORY COMMENT REPORT: NORMAL
LACTATE BLD-SCNC: 1.4 MMOL/L (ref 0.7–2)
LACTATE BLD-SCNC: 1.5 MMOL/L (ref 0.7–2)
LACTATE BLD-SCNC: 1.6 MMOL/L (ref 0.7–2)
LACTATE BLD-SCNC: 1.7 MMOL/L (ref 0.7–2)
LACTATE BLD-SCNC: 1.8 MMOL/L (ref 0.7–2)
LACTATE BLD-SCNC: 2.6 MMOL/L (ref 0.7–2)
LACTATE BLD-SCNC: 2.8 MMOL/L (ref 0.7–2)
LACTATE BLD-SCNC: 2.8 MMOL/L (ref 0.7–2)
LACTATE BLD-SCNC: 3.5 MMOL/L (ref 0.7–2)
LACTATE BLD-SCNC: 4 MMOL/L (ref 0.7–2)
LACTATE BLD-SCNC: 5.2 MMOL/L (ref 0.7–2)
LACTATE BLD-SCNC: 5.3 MMOL/L (ref 0.7–2)
LACTATE BLD-SCNC: 5.4 MMOL/L (ref 0.7–2)
LACTATE BLD-SCNC: 5.7 MMOL/L (ref 0.7–2)
LACTATE BLD-SCNC: 6.7 MMOL/L (ref 0.7–2)
LDH SERPL L TO P-CCNC: 120 U/L (ref 85–227)
LDH SERPL L TO P-CCNC: 128 U/L (ref 85–227)
LDH SERPL L TO P-CCNC: 149 U/L (ref 85–227)
LDH SERPL L TO P-CCNC: 176 U/L (ref 85–227)
LDH SERPL L TO P-CCNC: 322 U/L (ref 85–227)
LDH SERPL L TO P-CCNC: 324 U/L (ref 85–227)
LEUKOCYTE ESTERASE UR QL STRIP: NEGATIVE
LIPASE SERPL-CCNC: 29 U/L (ref 73–393)
LIPASE SERPL-CCNC: 29 U/L (ref 73–393)
LIPASE SERPL-CCNC: 36 U/L (ref 73–393)
LIPASE SERPL-CCNC: 64 U/L (ref 73–393)
LYMPHOCYTES # BLD AUTO: 0 10E9/L (ref 0.8–5.3)
LYMPHOCYTES # BLD AUTO: 0 10E9/L (ref 0.8–5.3)
LYMPHOCYTES # BLD AUTO: 0.1 10E9/L (ref 0.8–5.3)
LYMPHOCYTES # BLD AUTO: 0.2 10E9/L (ref 0.8–5.3)
LYMPHOCYTES # BLD AUTO: 0.3 10E9/L (ref 0.8–5.3)
LYMPHOCYTES # BLD AUTO: 0.4 10E9/L (ref 0.8–5.3)
LYMPHOCYTES # BLD AUTO: 0.4 10E9/L (ref 0.8–5.3)
LYMPHOCYTES # BLD AUTO: 0.5 10E9/L (ref 0.8–5.3)
LYMPHOCYTES # BLD AUTO: 0.6 10E9/L (ref 0.8–5.3)
LYMPHOCYTES # BLD AUTO: 0.6 10E9/L (ref 0.8–5.3)
LYMPHOCYTES # BLD AUTO: 0.7 10E9/L (ref 0.8–5.3)
LYMPHOCYTES # BLD AUTO: 0.8 10E9/L (ref 0.8–5.3)
LYMPHOCYTES # BLD AUTO: 0.9 10E9/L (ref 0.8–5.3)
LYMPHOCYTES # BLD AUTO: 1 10E9/L (ref 0.8–5.3)
LYMPHOCYTES # BLD AUTO: 1.3 10E9/L (ref 0.8–5.3)
LYMPHOCYTES # BLD AUTO: 1.3 10E9/L (ref 0.8–5.3)
LYMPHOCYTES # BLD AUTO: 1.6 10E9/L (ref 0.8–5.3)
LYMPHOCYTES NFR BLD AUTO: 0 %
LYMPHOCYTES NFR BLD AUTO: 0 %
LYMPHOCYTES NFR BLD AUTO: 0.9 %
LYMPHOCYTES NFR BLD AUTO: 0.9 %
LYMPHOCYTES NFR BLD AUTO: 1 %
LYMPHOCYTES NFR BLD AUTO: 1.5 %
LYMPHOCYTES NFR BLD AUTO: 1.7 %
LYMPHOCYTES NFR BLD AUTO: 1.7 %
LYMPHOCYTES NFR BLD AUTO: 1.8 %
LYMPHOCYTES NFR BLD AUTO: 13.2 %
LYMPHOCYTES NFR BLD AUTO: 13.8 %
LYMPHOCYTES NFR BLD AUTO: 15.1 %
LYMPHOCYTES NFR BLD AUTO: 2.5 %
LYMPHOCYTES NFR BLD AUTO: 2.6 %
LYMPHOCYTES NFR BLD AUTO: 2.7 %
LYMPHOCYTES NFR BLD AUTO: 2.7 %
LYMPHOCYTES NFR BLD AUTO: 20 %
LYMPHOCYTES NFR BLD AUTO: 20.1 %
LYMPHOCYTES NFR BLD AUTO: 3.4 %
LYMPHOCYTES NFR BLD AUTO: 4.3 %
LYMPHOCYTES NFR BLD AUTO: 5.1 %
LYMPHOCYTES NFR BLD AUTO: 6.1 %
LYMPHOCYTES NFR BLD AUTO: 6.3 %
LYMPHOCYTES NFR BLD AUTO: 6.4 %
LYMPHOCYTES NFR BLD AUTO: 7.4 %
LYMPHOCYTES NFR BLD AUTO: 7.7 %
LYMPHOCYTES NFR BLD AUTO: 7.7 %
Lab: NORMAL
MACROCYTES BLD QL SMEAR: PRESENT
MAGNESIUM SERPL-MCNC: 1.5 MG/DL (ref 1.6–2.3)
MAGNESIUM SERPL-MCNC: 1.7 MG/DL (ref 1.6–2.3)
MAGNESIUM SERPL-MCNC: 1.7 MG/DL (ref 1.6–2.3)
MAGNESIUM SERPL-MCNC: 1.9 MG/DL (ref 1.6–2.3)
MAGNESIUM SERPL-MCNC: 1.9 MG/DL (ref 1.6–2.3)
MAGNESIUM SERPL-MCNC: 2 MG/DL (ref 1.6–2.3)
MAGNESIUM SERPL-MCNC: 2 MG/DL (ref 1.6–2.3)
MAGNESIUM SERPL-MCNC: 2.1 MG/DL (ref 1.6–2.3)
MAGNESIUM SERPL-MCNC: 2.3 MG/DL (ref 1.6–2.3)
MAGNESIUM SERPL-MCNC: 2.4 MG/DL (ref 1.6–2.3)
MAGNESIUM SERPL-MCNC: 2.5 MG/DL (ref 1.6–2.3)
MAGNESIUM SERPL-MCNC: 2.8 MG/DL (ref 1.6–2.3)
MAGNESIUM SERPL-MCNC: 2.8 MG/DL (ref 1.6–2.3)
MCH RBC QN AUTO: 30.3 PG (ref 26.5–33)
MCH RBC QN AUTO: 30.5 PG (ref 26.5–33)
MCH RBC QN AUTO: 30.6 PG (ref 26.5–33)
MCH RBC QN AUTO: 30.8 PG (ref 26.5–33)
MCH RBC QN AUTO: 31 PG (ref 26.5–33)
MCH RBC QN AUTO: 31.1 PG (ref 26.5–33)
MCH RBC QN AUTO: 31.4 PG (ref 26.5–33)
MCH RBC QN AUTO: 31.5 PG (ref 26.5–33)
MCH RBC QN AUTO: 31.6 PG (ref 26.5–33)
MCH RBC QN AUTO: 31.7 PG (ref 26.5–33)
MCH RBC QN AUTO: 31.7 PG (ref 26.5–33)
MCH RBC QN AUTO: 31.8 PG (ref 26.5–33)
MCH RBC QN AUTO: 31.8 PG (ref 26.5–33)
MCH RBC QN AUTO: 31.9 PG (ref 26.5–33)
MCH RBC QN AUTO: 32 PG (ref 26.5–33)
MCH RBC QN AUTO: 32.2 PG (ref 26.5–33)
MCH RBC QN AUTO: 32.2 PG (ref 26.5–33)
MCH RBC QN AUTO: 32.3 PG (ref 26.5–33)
MCH RBC QN AUTO: 32.3 PG (ref 26.5–33)
MCH RBC QN AUTO: 32.4 PG (ref 26.5–33)
MCH RBC QN AUTO: 32.5 PG (ref 26.5–33)
MCH RBC QN AUTO: 32.5 PG (ref 26.5–33)
MCH RBC QN AUTO: 32.8 PG (ref 26.5–33)
MCH RBC QN AUTO: 33 PG (ref 26.5–33)
MCH RBC QN AUTO: 33.2 PG (ref 26.5–33)
MCH RBC QN AUTO: 34.1 PG (ref 26.5–33)
MCHC RBC AUTO-ENTMCNC: 31.7 G/DL (ref 31.5–36.5)
MCHC RBC AUTO-ENTMCNC: 32.1 G/DL (ref 31.5–36.5)
MCHC RBC AUTO-ENTMCNC: 32.3 G/DL (ref 31.5–36.5)
MCHC RBC AUTO-ENTMCNC: 32.6 G/DL (ref 31.5–36.5)
MCHC RBC AUTO-ENTMCNC: 32.9 G/DL (ref 31.5–36.5)
MCHC RBC AUTO-ENTMCNC: 33 G/DL (ref 31.5–36.5)
MCHC RBC AUTO-ENTMCNC: 33.4 G/DL (ref 31.5–36.5)
MCHC RBC AUTO-ENTMCNC: 33.5 G/DL (ref 31.5–36.5)
MCHC RBC AUTO-ENTMCNC: 33.6 G/DL (ref 31.5–36.5)
MCHC RBC AUTO-ENTMCNC: 33.6 G/DL (ref 31.5–36.5)
MCHC RBC AUTO-ENTMCNC: 33.7 G/DL (ref 31.5–36.5)
MCHC RBC AUTO-ENTMCNC: 33.8 G/DL (ref 31.5–36.5)
MCHC RBC AUTO-ENTMCNC: 33.8 G/DL (ref 31.5–36.5)
MCHC RBC AUTO-ENTMCNC: 33.9 G/DL (ref 31.5–36.5)
MCHC RBC AUTO-ENTMCNC: 34 G/DL (ref 31.5–36.5)
MCHC RBC AUTO-ENTMCNC: 34.2 G/DL (ref 31.5–36.5)
MCHC RBC AUTO-ENTMCNC: 34.2 G/DL (ref 31.5–36.5)
MCHC RBC AUTO-ENTMCNC: 34.5 G/DL (ref 31.5–36.5)
MCHC RBC AUTO-ENTMCNC: 34.5 G/DL (ref 31.5–36.5)
MCHC RBC AUTO-ENTMCNC: 34.7 G/DL (ref 31.5–36.5)
MCHC RBC AUTO-ENTMCNC: 35.1 G/DL (ref 31.5–36.5)
MCHC RBC AUTO-ENTMCNC: 35.3 G/DL (ref 31.5–36.5)
MCHC RBC AUTO-ENTMCNC: 35.5 G/DL (ref 31.5–36.5)
MCHC RBC AUTO-ENTMCNC: 35.6 G/DL (ref 31.5–36.5)
MCHC RBC AUTO-ENTMCNC: 35.6 G/DL (ref 31.5–36.5)
MCHC RBC AUTO-ENTMCNC: 36 G/DL (ref 31.5–36.5)
MCHC RBC AUTO-ENTMCNC: 36.1 G/DL (ref 31.5–36.5)
MCHC RBC AUTO-ENTMCNC: 36.4 G/DL (ref 31.5–36.5)
MCV RBC AUTO: 101 FL (ref 78–100)
MCV RBC AUTO: 101 FL (ref 78–100)
MCV RBC AUTO: 102 FL (ref 78–100)
MCV RBC AUTO: 86 FL (ref 78–100)
MCV RBC AUTO: 87 FL (ref 78–100)
MCV RBC AUTO: 88 FL (ref 78–100)
MCV RBC AUTO: 89 FL (ref 78–100)
MCV RBC AUTO: 90 FL (ref 78–100)
MCV RBC AUTO: 91 FL (ref 78–100)
MCV RBC AUTO: 92 FL (ref 78–100)
MCV RBC AUTO: 93 FL (ref 78–100)
MCV RBC AUTO: 94 FL (ref 78–100)
MCV RBC AUTO: 95 FL (ref 78–100)
MCV RBC AUTO: 95 FL (ref 78–100)
MCV RBC AUTO: 97 FL (ref 78–100)
MCV RBC AUTO: 98 FL (ref 78–100)
METAMYELOCYTES # BLD: 0.1 10E9/L
METAMYELOCYTES # BLD: 0.3 10E9/L
METAMYELOCYTES NFR BLD MANUAL: 0.9 %
METAMYELOCYTES NFR BLD MANUAL: 1.7 %
MICROCYTES BLD QL SMEAR: PRESENT
MONOCYTES # BLD AUTO: 0 10E9/L (ref 0–1.3)
MONOCYTES # BLD AUTO: 0.1 10E9/L (ref 0–1.3)
MONOCYTES # BLD AUTO: 0.2 10E9/L (ref 0–1.3)
MONOCYTES # BLD AUTO: 0.3 10E9/L (ref 0–1.3)
MONOCYTES # BLD AUTO: 0.4 10E9/L (ref 0–1.3)
MONOCYTES # BLD AUTO: 0.4 10E9/L (ref 0–1.3)
MONOCYTES # BLD AUTO: 0.5 10E9/L (ref 0–1.3)
MONOCYTES # BLD AUTO: 0.6 10E9/L (ref 0–1.3)
MONOCYTES # BLD AUTO: 0.7 10E9/L (ref 0–1.3)
MONOCYTES # BLD AUTO: 0.7 10E9/L (ref 0–1.3)
MONOCYTES # BLD AUTO: 0.8 10E9/L (ref 0–1.3)
MONOCYTES # BLD AUTO: 0.9 10E9/L (ref 0–1.3)
MONOCYTES # BLD AUTO: 0.9 10E9/L (ref 0–1.3)
MONOCYTES NFR BLD AUTO: 0 %
MONOCYTES NFR BLD AUTO: 0.9 %
MONOCYTES NFR BLD AUTO: 1.8 %
MONOCYTES NFR BLD AUTO: 1.8 %
MONOCYTES NFR BLD AUTO: 10.1 %
MONOCYTES NFR BLD AUTO: 10.3 %
MONOCYTES NFR BLD AUTO: 11.9 %
MONOCYTES NFR BLD AUTO: 2 %
MONOCYTES NFR BLD AUTO: 2.6 %
MONOCYTES NFR BLD AUTO: 2.6 %
MONOCYTES NFR BLD AUTO: 2.7 %
MONOCYTES NFR BLD AUTO: 3.4 %
MONOCYTES NFR BLD AUTO: 3.5 %
MONOCYTES NFR BLD AUTO: 4.1 %
MONOCYTES NFR BLD AUTO: 4.1 %
MONOCYTES NFR BLD AUTO: 5.1 %
MONOCYTES NFR BLD AUTO: 5.2 %
MONOCYTES NFR BLD AUTO: 5.8 %
MONOCYTES NFR BLD AUTO: 6 %
MONOCYTES NFR BLD AUTO: 7.3 %
MONOCYTES NFR BLD AUTO: 8.8 %
MONOCYTES NFR BLD AUTO: 8.9 %
MRSA DNA SPEC QL NAA+PROBE: NEGATIVE
MUCOUS THREADS #/AREA URNS LPF: PRESENT /LPF
MYELOCYTES # BLD: 0.2 10E9/L
MYELOCYTES NFR BLD MANUAL: 1.7 %
NEUTROPHILS # BLD AUTO: 10 10E9/L (ref 1.6–8.3)
NEUTROPHILS # BLD AUTO: 10.2 10E9/L (ref 1.6–8.3)
NEUTROPHILS # BLD AUTO: 10.3 10E9/L (ref 1.6–8.3)
NEUTROPHILS # BLD AUTO: 10.5 10E9/L (ref 1.6–8.3)
NEUTROPHILS # BLD AUTO: 10.6 10E9/L (ref 1.6–8.3)
NEUTROPHILS # BLD AUTO: 11 10E9/L (ref 1.6–8.3)
NEUTROPHILS # BLD AUTO: 11.2 10E9/L (ref 1.6–8.3)
NEUTROPHILS # BLD AUTO: 12.2 10E9/L (ref 1.6–8.3)
NEUTROPHILS # BLD AUTO: 12.4 10E9/L (ref 1.6–8.3)
NEUTROPHILS # BLD AUTO: 12.7 10E9/L (ref 1.6–8.3)
NEUTROPHILS # BLD AUTO: 13 10E9/L (ref 1.6–8.3)
NEUTROPHILS # BLD AUTO: 14.4 10E9/L (ref 1.6–8.3)
NEUTROPHILS # BLD AUTO: 4.1 10E9/L (ref 1.6–8.3)
NEUTROPHILS # BLD AUTO: 4.5 10E9/L (ref 1.6–8.3)
NEUTROPHILS # BLD AUTO: 4.6 10E9/L (ref 1.6–8.3)
NEUTROPHILS # BLD AUTO: 5.4 10E9/L (ref 1.6–8.3)
NEUTROPHILS # BLD AUTO: 5.5 10E9/L (ref 1.6–8.3)
NEUTROPHILS # BLD AUTO: 6.1 10E9/L (ref 1.6–8.3)
NEUTROPHILS # BLD AUTO: 6.3 10E9/L (ref 1.6–8.3)
NEUTROPHILS # BLD AUTO: 6.8 10E9/L (ref 1.6–8.3)
NEUTROPHILS # BLD AUTO: 7.7 10E9/L (ref 1.6–8.3)
NEUTROPHILS # BLD AUTO: 7.8 10E9/L (ref 1.6–8.3)
NEUTROPHILS # BLD AUTO: 7.8 10E9/L (ref 1.6–8.3)
NEUTROPHILS # BLD AUTO: 8.8 10E9/L (ref 1.6–8.3)
NEUTROPHILS # BLD AUTO: 9 10E9/L (ref 1.6–8.3)
NEUTROPHILS # BLD AUTO: 9.5 10E9/L (ref 1.6–8.3)
NEUTROPHILS # BLD AUTO: 9.8 10E9/L (ref 1.6–8.3)
NEUTROPHILS NFR BLD AUTO: 67.1 %
NEUTROPHILS NFR BLD AUTO: 69.3 %
NEUTROPHILS NFR BLD AUTO: 71.6 %
NEUTROPHILS NFR BLD AUTO: 71.8 %
NEUTROPHILS NFR BLD AUTO: 72.1 %
NEUTROPHILS NFR BLD AUTO: 77.8 %
NEUTROPHILS NFR BLD AUTO: 80.2 %
NEUTROPHILS NFR BLD AUTO: 85.4 %
NEUTROPHILS NFR BLD AUTO: 85.4 %
NEUTROPHILS NFR BLD AUTO: 87 %
NEUTROPHILS NFR BLD AUTO: 89.4 %
NEUTROPHILS NFR BLD AUTO: 90.1 %
NEUTROPHILS NFR BLD AUTO: 91.9 %
NEUTROPHILS NFR BLD AUTO: 92.1 %
NEUTROPHILS NFR BLD AUTO: 92.1 %
NEUTROPHILS NFR BLD AUTO: 92.4 %
NEUTROPHILS NFR BLD AUTO: 93.1 %
NEUTROPHILS NFR BLD AUTO: 93.6 %
NEUTROPHILS NFR BLD AUTO: 94.8 %
NEUTROPHILS NFR BLD AUTO: 95.5 %
NEUTROPHILS NFR BLD AUTO: 96.5 %
NEUTROPHILS NFR BLD AUTO: 96.5 %
NEUTROPHILS NFR BLD AUTO: 97 %
NEUTROPHILS NFR BLD AUTO: 97.3 %
NEUTROPHILS NFR BLD AUTO: 97.4 %
NEUTROPHILS NFR BLD AUTO: 99.1 %
NEUTROPHILS NFR BLD AUTO: 99.1 %
NITRATE UR QL: NEGATIVE
NRBC # BLD AUTO: 0 10*3/UL
NRBC # BLD AUTO: 0.1 10*3/UL
NRBC # BLD AUTO: 0.2 10*3/UL
NRBC # BLD AUTO: 0.2 10*3/UL
NRBC # BLD AUTO: 0.5 10*3/UL
NRBC # BLD AUTO: 0.6 10*3/UL
NRBC # BLD AUTO: 0.6 10*3/UL
NRBC # BLD AUTO: 0.8 10*3/UL
NRBC # BLD AUTO: 1 10*3/UL
NRBC # BLD AUTO: 1.4 10*3/UL
NRBC BLD AUTO-RTO: 0 /100
NRBC BLD AUTO-RTO: 1 /100
NRBC BLD AUTO-RTO: 13 /100
NRBC BLD AUTO-RTO: 2 /100
NRBC BLD AUTO-RTO: 2 /100
NRBC BLD AUTO-RTO: 4 /100
NRBC BLD AUTO-RTO: 4 /100
NRBC BLD AUTO-RTO: 5 /100
NRBC BLD AUTO-RTO: 7 /100
NRBC BLD AUTO-RTO: 9 /100
NT-PROBNP SERPL-MCNC: 1883 PG/ML (ref 0–900)
NUM BPU REQUESTED: 1
NUM BPU REQUESTED: 2
NUM BPU REQUESTED: 2
NUM BPU REQUESTED: 4
O2/TOTAL GAS SETTING VFR VENT: 21 %
O2/TOTAL GAS SETTING VFR VENT: ABNORMAL %
OSMOLALITY SERPL: 284 MMOL/KG (ref 275–295)
OSMOLALITY UR: 510 MMOL/KG (ref 100–1200)
OVALOCYTES BLD QL SMEAR: SLIGHT
PCO2 BLD: 25 MM HG (ref 35–45)
PCO2 BLDV: 22 MM HG (ref 40–50)
PCO2 BLDV: 23 MM HG (ref 40–50)
PCO2 BLDV: 24 MM HG (ref 40–50)
PH BLD: 7.43 PH (ref 7.35–7.45)
PH BLDV: 7.31 PH (ref 7.32–7.43)
PH BLDV: 7.33 PH (ref 7.32–7.43)
PH BLDV: 7.36 PH (ref 7.32–7.43)
PH UR STRIP: 5 PH (ref 5–7)
PH UR STRIP: 5.5 PH (ref 5–7)
PH UR STRIP: 5.5 PH (ref 5–7)
PH UR STRIP: 6 PH (ref 5–7)
PHOSPHATE SERPL-MCNC: 2.6 MG/DL (ref 2.5–4.5)
PHOSPHATE SERPL-MCNC: 2.8 MG/DL (ref 2.5–4.5)
PHOSPHATE SERPL-MCNC: 3 MG/DL (ref 2.5–4.5)
PHOSPHATE SERPL-MCNC: 3.1 MG/DL (ref 2.5–4.5)
PHOSPHATE SERPL-MCNC: 3.3 MG/DL (ref 2.5–4.5)
PHOSPHATE SERPL-MCNC: 3.5 MG/DL (ref 2.5–4.5)
PHOSPHATE SERPL-MCNC: 3.6 MG/DL (ref 2.5–4.5)
PHOSPHATE SERPL-MCNC: 3.7 MG/DL (ref 2.5–4.5)
PHOSPHATE SERPL-MCNC: 4.1 MG/DL (ref 2.5–4.5)
PHOSPHATE SERPL-MCNC: 4.2 MG/DL (ref 2.5–4.5)
PHOSPHATE SERPL-MCNC: 4.2 MG/DL (ref 2.5–4.5)
PHOSPHATE SERPL-MCNC: 4.3 MG/DL (ref 2.5–4.5)
PHOSPHATE SERPL-MCNC: 4.4 MG/DL (ref 2.5–4.5)
PHOSPHATE SERPL-MCNC: 4.5 MG/DL (ref 2.5–4.5)
PHOSPHATE SERPL-MCNC: 4.7 MG/DL (ref 2.5–4.5)
PHOSPHATE SERPL-MCNC: 4.8 MG/DL (ref 2.5–4.5)
PHOSPHATE SERPL-MCNC: 4.8 MG/DL (ref 2.5–4.5)
PHOSPHATE SERPL-MCNC: 5.1 MG/DL (ref 2.5–4.5)
PHOSPHATE SERPL-MCNC: 5.2 MG/DL (ref 2.5–4.5)
PHOSPHATE SERPL-MCNC: 5.2 MG/DL (ref 2.5–4.5)
PHOSPHATE SERPL-MCNC: 5.7 MG/DL (ref 2.5–4.5)
PLATELET # BLD AUTO: 102 10E9/L (ref 150–450)
PLATELET # BLD AUTO: 124 10E9/L (ref 150–450)
PLATELET # BLD AUTO: 128 10E9/L (ref 150–450)
PLATELET # BLD AUTO: 174 10E9/L (ref 150–450)
PLATELET # BLD AUTO: 185 10E9/L (ref 150–450)
PLATELET # BLD AUTO: 196 10E9/L (ref 150–450)
PLATELET # BLD AUTO: 20 10E9/L (ref 150–450)
PLATELET # BLD AUTO: 21 10E9/L (ref 150–450)
PLATELET # BLD AUTO: 213 10E9/L (ref 150–450)
PLATELET # BLD AUTO: 251 10E9/L (ref 150–450)
PLATELET # BLD AUTO: 29 10E9/L (ref 150–450)
PLATELET # BLD AUTO: 32 10E9/L (ref 150–450)
PLATELET # BLD AUTO: 36 10E9/L (ref 150–450)
PLATELET # BLD AUTO: 37 10E9/L (ref 150–450)
PLATELET # BLD AUTO: 39 10E9/L (ref 150–450)
PLATELET # BLD AUTO: 45 10E9/L (ref 150–450)
PLATELET # BLD AUTO: 47 10E9/L (ref 150–450)
PLATELET # BLD AUTO: 49 10E9/L (ref 150–450)
PLATELET # BLD AUTO: 50 10E9/L (ref 150–450)
PLATELET # BLD AUTO: 52 10E9/L (ref 150–450)
PLATELET # BLD AUTO: 54 10E9/L (ref 150–450)
PLATELET # BLD AUTO: 55 10E9/L (ref 150–450)
PLATELET # BLD AUTO: 60 10E9/L (ref 150–450)
PLATELET # BLD AUTO: 63 10E9/L (ref 150–450)
PLATELET # BLD AUTO: 65 10E9/L (ref 150–450)
PLATELET # BLD AUTO: 66 10E9/L (ref 150–450)
PLATELET # BLD AUTO: 71 10E9/L (ref 150–450)
PLATELET # BLD AUTO: 75 10E9/L (ref 150–450)
PLATELET # BLD EST: ABNORMAL 10*3/UL
PO2 BLD: 75 MM HG (ref 80–105)
PO2 BLDV: 27 MM HG (ref 25–47)
PO2 BLDV: 29 MM HG (ref 25–47)
PO2 BLDV: 41 MM HG (ref 25–47)
POIKILOCYTOSIS BLD QL SMEAR: ABNORMAL
POIKILOCYTOSIS BLD QL SMEAR: SLIGHT
POLYCHROMASIA BLD QL SMEAR: ABNORMAL
POLYCHROMASIA BLD QL SMEAR: SLIGHT
POTASSIUM SERPL-SCNC: 3.3 MMOL/L (ref 3.4–5.3)
POTASSIUM SERPL-SCNC: 3.4 MMOL/L (ref 3.4–5.3)
POTASSIUM SERPL-SCNC: 3.4 MMOL/L (ref 3.4–5.3)
POTASSIUM SERPL-SCNC: 3.5 MMOL/L (ref 3.4–5.3)
POTASSIUM SERPL-SCNC: 3.7 MMOL/L (ref 3.4–5.3)
POTASSIUM SERPL-SCNC: 3.8 MMOL/L (ref 3.4–5.3)
POTASSIUM SERPL-SCNC: 3.9 MMOL/L (ref 3.4–5.3)
POTASSIUM SERPL-SCNC: 4 MMOL/L (ref 3.4–5.3)
POTASSIUM SERPL-SCNC: 4.1 MMOL/L (ref 3.4–5.3)
POTASSIUM SERPL-SCNC: 4.2 MMOL/L (ref 3.4–5.3)
POTASSIUM SERPL-SCNC: 4.3 MMOL/L (ref 3.4–5.3)
POTASSIUM SERPL-SCNC: 4.4 MMOL/L (ref 3.4–5.3)
POTASSIUM SERPL-SCNC: 4.4 MMOL/L (ref 3.4–5.3)
POTASSIUM SERPL-SCNC: 4.6 MMOL/L (ref 3.4–5.3)
PREALB SERPL IA-MCNC: 5 MG/DL (ref 15–45)
PROCALCITONIN SERPL-MCNC: 0.51 NG/ML
PROCALCITONIN SERPL-MCNC: 1.58 NG/ML
PROMYELOCYTES # BLD MANUAL: 0.1 10E9/L
PROMYELOCYTES NFR BLD MANUAL: 0.9 %
PROT SERPL-MCNC: 3.4 G/DL (ref 6.8–8.8)
PROT SERPL-MCNC: 3.5 G/DL (ref 6.8–8.8)
PROT SERPL-MCNC: 3.5 G/DL (ref 6.8–8.8)
PROT SERPL-MCNC: 3.7 G/DL (ref 6.8–8.8)
PROT SERPL-MCNC: 3.8 G/DL (ref 6.8–8.8)
PROT SERPL-MCNC: 3.8 G/DL (ref 6.8–8.8)
PROT SERPL-MCNC: 3.9 G/DL (ref 6.8–8.8)
PROT SERPL-MCNC: 3.9 G/DL (ref 6.8–8.8)
PROT SERPL-MCNC: 4 G/DL (ref 6.8–8.8)
PROT SERPL-MCNC: 4.1 G/DL (ref 6.8–8.8)
PROT SERPL-MCNC: 4.1 G/DL (ref 6.8–8.8)
PROT SERPL-MCNC: 4.3 G/DL (ref 6.8–8.8)
PROT SERPL-MCNC: 4.4 G/DL (ref 6.8–8.8)
PROT SERPL-MCNC: 4.5 G/DL (ref 6.8–8.8)
PROT SERPL-MCNC: 4.6 G/DL (ref 6.8–8.8)
PROT SERPL-MCNC: 4.8 G/DL (ref 6.8–8.8)
PROT SERPL-MCNC: 4.9 G/DL (ref 6.8–8.8)
PROT SERPL-MCNC: 5.3 G/DL (ref 6.8–8.8)
PROT SERPL-MCNC: 6.5 G/DL (ref 6.8–8.8)
PROT SERPL-MCNC: 6.5 G/DL (ref 6.8–8.8)
PROT SERPL-MCNC: 6.6 G/DL (ref 6.8–8.8)
PROT SERPL-MCNC: 6.7 G/DL (ref 6.8–8.8)
RBC # BLD AUTO: 1.52 10E12/L (ref 4.4–5.9)
RBC # BLD AUTO: 1.64 10E12/L (ref 4.4–5.9)
RBC # BLD AUTO: 2.14 10E12/L (ref 4.4–5.9)
RBC # BLD AUTO: 2.16 10E12/L (ref 4.4–5.9)
RBC # BLD AUTO: 2.25 10E12/L (ref 4.4–5.9)
RBC # BLD AUTO: 2.28 10E12/L (ref 4.4–5.9)
RBC # BLD AUTO: 2.29 10E12/L (ref 4.4–5.9)
RBC # BLD AUTO: 2.32 10E12/L (ref 4.4–5.9)
RBC # BLD AUTO: 2.33 10E12/L (ref 4.4–5.9)
RBC # BLD AUTO: 2.34 10E12/L (ref 4.4–5.9)
RBC # BLD AUTO: 2.45 10E12/L (ref 4.4–5.9)
RBC # BLD AUTO: 2.45 10E12/L (ref 4.4–5.9)
RBC # BLD AUTO: 2.58 10E12/L (ref 4.4–5.9)
RBC # BLD AUTO: 2.6 10E12/L (ref 4.4–5.9)
RBC # BLD AUTO: 2.64 10E12/L (ref 4.4–5.9)
RBC # BLD AUTO: 2.64 10E12/L (ref 4.4–5.9)
RBC # BLD AUTO: 2.65 10E12/L (ref 4.4–5.9)
RBC # BLD AUTO: 2.65 10E12/L (ref 4.4–5.9)
RBC # BLD AUTO: 2.77 10E12/L (ref 4.4–5.9)
RBC # BLD AUTO: 2.9 10E12/L (ref 4.4–5.9)
RBC # BLD AUTO: 3.1 10E12/L (ref 4.4–5.9)
RBC # BLD AUTO: 3.14 10E12/L (ref 4.4–5.9)
RBC # BLD AUTO: 3.28 10E12/L (ref 4.4–5.9)
RBC # BLD AUTO: 3.31 10E12/L (ref 4.4–5.9)
RBC # BLD AUTO: 3.35 10E12/L (ref 4.4–5.9)
RBC # BLD AUTO: 3.4 10E12/L (ref 4.4–5.9)
RBC # BLD AUTO: 3.43 10E12/L (ref 4.4–5.9)
RBC # BLD AUTO: 3.61 10E12/L (ref 4.4–5.9)
RBC # BLD AUTO: 3.66 10E12/L (ref 4.4–5.9)
RBC # BLD AUTO: 3.67 10E12/L (ref 4.4–5.9)
RBC #/AREA URNS AUTO: 1 /HPF (ref 0–2)
RBC #/AREA URNS AUTO: <1 /HPF (ref 0–2)
RBC INCLUSIONS BLD: SLIGHT
RBC INCLUSIONS BLD: SLIGHT
RESEARCH KIT COLLECTION: NORMAL
RETICS # AUTO: 20.1 10E9/L (ref 25–95)
RETICS/RBC NFR AUTO: 1.3 % (ref 0.5–2)
RSV RNA SPEC QL NAA+PROBE: NEGATIVE
SARS-COV-2 PCR COMMENT: NORMAL
SARS-COV-2 RNA RESP QL NAA+PROBE: NEGATIVE
SARS-COV-2 RNA RESP QL NAA+PROBE: NEGATIVE
SARS-COV-2 RNA SPEC QL NAA+PROBE: NEGATIVE
SARS-COV-2 VIRUS SPECIMEN SOURCE: NORMAL
SODIUM SERPL-SCNC: 125 MMOL/L (ref 133–144)
SODIUM SERPL-SCNC: 126 MMOL/L (ref 133–144)
SODIUM SERPL-SCNC: 127 MMOL/L (ref 133–144)
SODIUM SERPL-SCNC: 127 MMOL/L (ref 133–144)
SODIUM SERPL-SCNC: 128 MMOL/L (ref 133–144)
SODIUM SERPL-SCNC: 129 MMOL/L (ref 133–144)
SODIUM SERPL-SCNC: 130 MMOL/L (ref 133–144)
SODIUM SERPL-SCNC: 131 MMOL/L (ref 133–144)
SODIUM SERPL-SCNC: 132 MMOL/L (ref 133–144)
SODIUM SERPL-SCNC: 133 MMOL/L (ref 133–144)
SODIUM SERPL-SCNC: 134 MMOL/L (ref 133–144)
SODIUM SERPL-SCNC: 135 MMOL/L (ref 133–144)
SODIUM SERPL-SCNC: 135 MMOL/L (ref 133–144)
SODIUM SERPL-SCNC: 136 MMOL/L (ref 133–144)
SODIUM SERPL-SCNC: 137 MMOL/L (ref 133–144)
SODIUM SERPL-SCNC: 139 MMOL/L (ref 133–144)
SODIUM SERPL-SCNC: 140 MMOL/L (ref 133–144)
SODIUM SERPL-SCNC: 141 MMOL/L (ref 133–144)
SODIUM UR-SCNC: 17 MMOL/L
SODIUM UR-SCNC: 56 MMOL/L
SOURCE: ABNORMAL
SOURCE: NORMAL
SP GR UR STRIP: 1 (ref 1–1.03)
SP GR UR STRIP: 1.02 (ref 1–1.03)
SP GR UR STRIP: 1.02 (ref 1–1.03)
SP GR UR STRIP: 1.03 (ref 1–1.03)
SPECIMEN EXP DATE BLD: NORMAL
SPECIMEN SOURCE: ABNORMAL
SPECIMEN SOURCE: ABNORMAL
SPECIMEN SOURCE: NORMAL
SQUAMOUS #/AREA URNS AUTO: 0 /HPF (ref 0–1)
STREP GROUP A PCR: NOT DETECTED
TRANS CELLS #/AREA URNS HPF: <1 /HPF (ref 0–1)
TRANS CELLS #/AREA URNS HPF: <1 /HPF (ref 0–1)
TRANSFUSION STATUS PATIENT QL: NORMAL
TROPONIN I SERPL-MCNC: 0.02 UG/L (ref 0–0.04)
TROPONIN I SERPL-MCNC: 0.04 UG/L (ref 0–0.04)
TSH SERPL DL<=0.005 MIU/L-ACNC: 1.11 MU/L (ref 0.4–4)
TSH SERPL DL<=0.005 MIU/L-ACNC: 3.08 MU/L (ref 0.4–4)
URATE SERPL-MCNC: 2.2 MG/DL (ref 3.5–7.2)
URATE SERPL-MCNC: 2.2 MG/DL (ref 3.5–7.2)
URATE SERPL-MCNC: 3.1 MG/DL (ref 3.5–7.2)
URATE SERPL-MCNC: 3.9 MG/DL (ref 3.5–7.2)
URATE SERPL-MCNC: 4.3 MG/DL (ref 3.5–7.2)
URATE SERPL-MCNC: 5.2 MG/DL (ref 3.5–7.2)
URATE SERPL-MCNC: 5.2 MG/DL (ref 3.5–7.2)
URATE SERPL-MCNC: 6.8 MG/DL (ref 3.5–7.2)
URATE SERPL-MCNC: 7.5 MG/DL (ref 3.5–7.2)
URATE SERPL-MCNC: 8.5 MG/DL (ref 3.5–7.2)
URATE SERPL-MCNC: 9.1 MG/DL (ref 3.5–7.2)
UROBILINOGEN UR STRIP-MCNC: 0 MG/DL (ref 0–2)
UROBILINOGEN UR STRIP-MCNC: NORMAL MG/DL (ref 0–2)
WBC # BLD AUTO: 10.1 10E9/L (ref 4–11)
WBC # BLD AUTO: 10.3 10E9/L (ref 4–11)
WBC # BLD AUTO: 10.6 10E9/L (ref 4–11)
WBC # BLD AUTO: 10.6 10E9/L (ref 4–11)
WBC # BLD AUTO: 10.9 10E9/L (ref 4–11)
WBC # BLD AUTO: 11 10E9/L (ref 4–11)
WBC # BLD AUTO: 11.3 10E9/L (ref 4–11)
WBC # BLD AUTO: 11.5 10E9/L (ref 4–11)
WBC # BLD AUTO: 11.5 10E9/L (ref 4–11)
WBC # BLD AUTO: 13.2 10E9/L (ref 4–11)
WBC # BLD AUTO: 13.3 10E9/L (ref 4–11)
WBC # BLD AUTO: 13.5 10E9/L (ref 4–11)
WBC # BLD AUTO: 13.8 10E9/L (ref 4–11)
WBC # BLD AUTO: 16.6 10E9/L (ref 4–11)
WBC # BLD AUTO: 5.7 10E9/L (ref 4–11)
WBC # BLD AUTO: 5.9 10E9/L (ref 4–11)
WBC # BLD AUTO: 6.2 10E9/L (ref 4–11)
WBC # BLD AUTO: 6.3 10E9/L (ref 4–11)
WBC # BLD AUTO: 6.7 10E9/L (ref 4–11)
WBC # BLD AUTO: 7.6 10E9/L (ref 4–11)
WBC # BLD AUTO: 7.8 10E9/L (ref 4–11)
WBC # BLD AUTO: 8.1 10E9/L (ref 4–11)
WBC # BLD AUTO: 8.2 10E9/L (ref 4–11)
WBC # BLD AUTO: 8.7 10E9/L (ref 4–11)
WBC # BLD AUTO: 9.1 10E9/L (ref 4–11)
WBC # BLD AUTO: 9.5 10E9/L (ref 4–11)
WBC # BLD AUTO: 9.5 10E9/L (ref 4–11)
WBC # BLD AUTO: 9.7 10E9/L (ref 4–11)
WBC #/AREA URNS AUTO: 0 /HPF (ref 0–5)
WBC #/AREA URNS AUTO: 3 /HPF (ref 0–5)
WBC #/AREA URNS AUTO: 7 /HPF (ref 0–5)
WBC #/AREA URNS AUTO: <1 /HPF (ref 0–5)
YEAST SPEC QL CULT: NO GROWTH

## 2021-01-01 PROCEDURE — 96365 THER/PROPH/DIAG IV INF INIT: CPT

## 2021-01-01 PROCEDURE — 85025 COMPLETE CBC W/AUTO DIFF WBC: CPT | Performed by: INTERNAL MEDICINE

## 2021-01-01 PROCEDURE — 258N000003 HC RX IP 258 OP 636: Performed by: PHYSICIAN ASSISTANT

## 2021-01-01 PROCEDURE — 74176 CT ABD & PELVIS W/O CONTRAST: CPT | Mod: 26 | Performed by: RADIOLOGY

## 2021-01-01 PROCEDURE — 86850 RBC ANTIBODY SCREEN: CPT | Performed by: INTERNAL MEDICINE

## 2021-01-01 PROCEDURE — 83690 ASSAY OF LIPASE: CPT | Performed by: INTERNAL MEDICINE

## 2021-01-01 PROCEDURE — 87636 SARSCOV2 & INF A&B AMP PRB: CPT | Performed by: INTERNAL MEDICINE

## 2021-01-01 PROCEDURE — 97110 THERAPEUTIC EXERCISES: CPT | Mod: GP

## 2021-01-01 PROCEDURE — 83615 LACTATE (LD) (LDH) ENZYME: CPT | Performed by: PHYSICIAN ASSISTANT

## 2021-01-01 PROCEDURE — 97161 PT EVAL LOW COMPLEX 20 MIN: CPT | Mod: GP

## 2021-01-01 PROCEDURE — 250N000011 HC RX IP 250 OP 636: Performed by: INTERNAL MEDICINE

## 2021-01-01 PROCEDURE — 88280 CHROMOSOME KARYOTYPE STUDY: CPT | Performed by: PHYSICIAN ASSISTANT

## 2021-01-01 PROCEDURE — 86900 BLOOD TYPING SEROLOGIC ABO: CPT | Performed by: INTERNAL MEDICINE

## 2021-01-01 PROCEDURE — 74177 CT ABD & PELVIS W/CONTRAST: CPT | Mod: 26 | Performed by: RADIOLOGY

## 2021-01-01 PROCEDURE — 83605 ASSAY OF LACTIC ACID: CPT | Performed by: PHYSICIAN ASSISTANT

## 2021-01-01 PROCEDURE — 80053 COMPREHEN METABOLIC PANEL: CPT | Performed by: INTERNAL MEDICINE

## 2021-01-01 PROCEDURE — 999N001121 HC STATISTIC RESEARCH KIT COLLECTION: Performed by: INTERNAL MEDICINE

## 2021-01-01 PROCEDURE — G0463 HOSPITAL OUTPT CLINIC VISIT: HCPCS

## 2021-01-01 PROCEDURE — 36430 TRANSFUSION BLD/BLD COMPNT: CPT

## 2021-01-01 PROCEDURE — P9040 RBC LEUKOREDUCED IRRADIATED: HCPCS | Performed by: INTERNAL MEDICINE

## 2021-01-01 PROCEDURE — 84443 ASSAY THYROID STIM HORMONE: CPT | Performed by: PHYSICIAN ASSISTANT

## 2021-01-01 PROCEDURE — 71045 X-RAY EXAM CHEST 1 VIEW: CPT | Mod: 26 | Performed by: RADIOLOGY

## 2021-01-01 PROCEDURE — 87640 STAPH A DNA AMP PROBE: CPT | Performed by: STUDENT IN AN ORGANIZED HEALTH CARE EDUCATION/TRAINING PROGRAM

## 2021-01-01 PROCEDURE — 86901 BLOOD TYPING SEROLOGIC RH(D): CPT | Performed by: INTERNAL MEDICINE

## 2021-01-01 PROCEDURE — 85730 THROMBOPLASTIN TIME PARTIAL: CPT | Performed by: PHYSICIAN ASSISTANT

## 2021-01-01 PROCEDURE — 250N000013 HC RX MED GY IP 250 OP 250 PS 637: Performed by: INTERNAL MEDICINE

## 2021-01-01 PROCEDURE — 85384 FIBRINOGEN ACTIVITY: CPT | Performed by: PHYSICIAN ASSISTANT

## 2021-01-01 PROCEDURE — 250N000011 HC RX IP 250 OP 636: Performed by: PHYSICIAN ASSISTANT

## 2021-01-01 PROCEDURE — 86803 HEPATITIS C AB TEST: CPT | Performed by: INTERNAL MEDICINE

## 2021-01-01 PROCEDURE — 93005 ELECTROCARDIOGRAM TRACING: CPT

## 2021-01-01 PROCEDURE — 99213 OFFICE O/P EST LOW 20 MIN: CPT | Mod: 95

## 2021-01-01 PROCEDURE — 87040 BLOOD CULTURE FOR BACTERIA: CPT | Performed by: PHYSICIAN ASSISTANT

## 2021-01-01 PROCEDURE — 93970 EXTREMITY STUDY: CPT

## 2021-01-01 PROCEDURE — 96367 TX/PROPH/DG ADDL SEQ IV INF: CPT | Performed by: INTERNAL MEDICINE

## 2021-01-01 PROCEDURE — U0005 INFEC AGEN DETEC AMPLI PROBE: HCPCS | Performed by: PHYSICIAN ASSISTANT

## 2021-01-01 PROCEDURE — 250N000013 HC RX MED GY IP 250 OP 250 PS 637: Performed by: STUDENT IN AN ORGANIZED HEALTH CARE EDUCATION/TRAINING PROGRAM

## 2021-01-01 PROCEDURE — 250N000012 HC RX MED GY IP 250 OP 636 PS 637: Performed by: PHYSICIAN ASSISTANT

## 2021-01-01 PROCEDURE — 81001 URINALYSIS AUTO W/SCOPE: CPT | Performed by: PATHOLOGY

## 2021-01-01 PROCEDURE — 999N001017 HC STATISTIC GLUCOSE BY METER IP

## 2021-01-01 PROCEDURE — 99285 EMERGENCY DEPT VISIT HI MDM: CPT | Mod: 25 | Performed by: INTERNAL MEDICINE

## 2021-01-01 PROCEDURE — 83605 ASSAY OF LACTIC ACID: CPT | Performed by: INTERNAL MEDICINE

## 2021-01-01 PROCEDURE — 85610 PROTHROMBIN TIME: CPT | Performed by: PHYSICIAN ASSISTANT

## 2021-01-01 PROCEDURE — 99233 SBSQ HOSP IP/OBS HIGH 50: CPT | Performed by: STUDENT IN AN ORGANIZED HEALTH CARE EDUCATION/TRAINING PROGRAM

## 2021-01-01 PROCEDURE — 88341 IMHCHEM/IMCYTCHM EA ADD ANTB: CPT | Mod: 26 | Performed by: STUDENT IN AN ORGANIZED HEALTH CARE EDUCATION/TRAINING PROGRAM

## 2021-01-01 PROCEDURE — 88161 CYTOPATH SMEAR OTHER SOURCE: CPT | Mod: TC | Performed by: PHYSICIAN ASSISTANT

## 2021-01-01 PROCEDURE — 88305 TISSUE EXAM BY PATHOLOGIST: CPT | Mod: 26 | Performed by: DERMATOLOGY

## 2021-01-01 PROCEDURE — 120N000005 HC R&B MS OVERFLOW UMMC

## 2021-01-01 PROCEDURE — 87799 DETECT AGENT NOS DNA QUANT: CPT | Performed by: PHYSICIAN ASSISTANT

## 2021-01-01 PROCEDURE — 88311 DECALCIFY TISSUE: CPT | Mod: 26 | Performed by: PATHOLOGY

## 2021-01-01 PROCEDURE — 82550 ASSAY OF CK (CPK): CPT | Performed by: INTERNAL MEDICINE

## 2021-01-01 PROCEDURE — 84100 ASSAY OF PHOSPHORUS: CPT | Performed by: INTERNAL MEDICINE

## 2021-01-01 PROCEDURE — 99215 OFFICE O/P EST HI 40 MIN: CPT | Mod: 95 | Performed by: DERMATOLOGY

## 2021-01-01 PROCEDURE — 86140 C-REACTIVE PROTEIN: CPT | Performed by: PHYSICIAN ASSISTANT

## 2021-01-01 PROCEDURE — 99214 OFFICE O/P EST MOD 30 MIN: CPT | Performed by: INTERNAL MEDICINE

## 2021-01-01 PROCEDURE — 86923 COMPATIBILITY TEST ELECTRIC: CPT | Performed by: INTERNAL MEDICINE

## 2021-01-01 PROCEDURE — 85027 COMPLETE CBC AUTOMATED: CPT | Performed by: INTERNAL MEDICINE

## 2021-01-01 PROCEDURE — 97165 OT EVAL LOW COMPLEX 30 MIN: CPT | Mod: GO

## 2021-01-01 PROCEDURE — 87340 HEPATITIS B SURFACE AG IA: CPT | Performed by: INTERNAL MEDICINE

## 2021-01-01 PROCEDURE — 250N000009 HC RX 250: Performed by: PHYSICIAN ASSISTANT

## 2021-01-01 PROCEDURE — 99232 SBSQ HOSP IP/OBS MODERATE 35: CPT | Performed by: NURSE PRACTITIONER

## 2021-01-01 PROCEDURE — 88342 IMHCHEM/IMCYTCHM 1ST ANTB: CPT | Mod: TC | Performed by: INTERNAL MEDICINE

## 2021-01-01 PROCEDURE — 99232 SBSQ HOSP IP/OBS MODERATE 35: CPT

## 2021-01-01 PROCEDURE — 87389 HIV-1 AG W/HIV-1&-2 AB AG IA: CPT | Performed by: INTERNAL MEDICINE

## 2021-01-01 PROCEDURE — 250N000011 HC RX IP 250 OP 636

## 2021-01-01 PROCEDURE — 83735 ASSAY OF MAGNESIUM: CPT | Performed by: PHYSICIAN ASSISTANT

## 2021-01-01 PROCEDURE — 82150 ASSAY OF AMYLASE: CPT | Performed by: INTERNAL MEDICINE

## 2021-01-01 PROCEDURE — 250N000013 HC RX MED GY IP 250 OP 250 PS 637: Performed by: PHYSICIAN ASSISTANT

## 2021-01-01 PROCEDURE — 258N000003 HC RX IP 258 OP 636: Performed by: STUDENT IN AN ORGANIZED HEALTH CARE EDUCATION/TRAINING PROGRAM

## 2021-01-01 PROCEDURE — 87040 BLOOD CULTURE FOR BACTERIA: CPT | Performed by: STUDENT IN AN ORGANIZED HEALTH CARE EDUCATION/TRAINING PROGRAM

## 2021-01-01 PROCEDURE — 93010 ELECTROCARDIOGRAM REPORT: CPT | Performed by: INTERNAL MEDICINE

## 2021-01-01 PROCEDURE — 86140 C-REACTIVE PROTEIN: CPT | Performed by: INTERNAL MEDICINE

## 2021-01-01 PROCEDURE — 999N001136 HC STATISTIC FLOW INT 9-15 ABY TC 88188: Performed by: INTERNAL MEDICINE

## 2021-01-01 PROCEDURE — 97530 THERAPEUTIC ACTIVITIES: CPT | Mod: GP

## 2021-01-01 PROCEDURE — 88307 TISSUE EXAM BY PATHOLOGIST: CPT | Mod: TC | Performed by: INTERNAL MEDICINE

## 2021-01-01 PROCEDURE — 87533 HHV-6 DNA QUANT: CPT | Performed by: PHYSICIAN ASSISTANT

## 2021-01-01 PROCEDURE — 83010 ASSAY OF HAPTOGLOBIN QUANT: CPT | Performed by: PHYSICIAN ASSISTANT

## 2021-01-01 PROCEDURE — 85025 COMPLETE CBC W/AUTO DIFF WBC: CPT | Performed by: PHYSICIAN ASSISTANT

## 2021-01-01 PROCEDURE — 999N001174 HC STATISTIC STREP A RAPID: Performed by: STUDENT IN AN ORGANIZED HEALTH CARE EDUCATION/TRAINING PROGRAM

## 2021-01-01 PROCEDURE — 258N000003 HC RX IP 258 OP 636: Performed by: INTERNAL MEDICINE

## 2021-01-01 PROCEDURE — 86706 HEP B SURFACE ANTIBODY: CPT | Performed by: INTERNAL MEDICINE

## 2021-01-01 PROCEDURE — 99152 MOD SED SAME PHYS/QHP 5/>YRS: CPT

## 2021-01-01 PROCEDURE — 97110 THERAPEUTIC EXERCISES: CPT | Mod: GO

## 2021-01-01 PROCEDURE — 93970 EXTREMITY STUDY: CPT | Mod: 26 | Performed by: RADIOLOGY

## 2021-01-01 PROCEDURE — 97535 SELF CARE MNGMENT TRAINING: CPT | Mod: GO

## 2021-01-01 PROCEDURE — 88305 TISSUE EXAM BY PATHOLOGIST: CPT | Mod: TC | Performed by: PHYSICIAN ASSISTANT

## 2021-01-01 PROCEDURE — G0463 HOSPITAL OUTPT CLINIC VISIT: HCPCS | Mod: 25

## 2021-01-01 PROCEDURE — 38222 DX BONE MARROW BX & ASPIR: CPT

## 2021-01-01 PROCEDURE — 96372 THER/PROPH/DIAG INJ SC/IM: CPT | Performed by: INTERNAL MEDICINE

## 2021-01-01 PROCEDURE — 78816 PET IMAGE W/CT FULL BODY: CPT | Mod: PS

## 2021-01-01 PROCEDURE — 88239 TISSUE CULTURE TUMOR: CPT | Performed by: INTERNAL MEDICINE

## 2021-01-01 PROCEDURE — 84300 ASSAY OF URINE SODIUM: CPT | Performed by: STUDENT IN AN ORGANIZED HEALTH CARE EDUCATION/TRAINING PROGRAM

## 2021-01-01 PROCEDURE — 86140 C-REACTIVE PROTEIN: CPT

## 2021-01-01 PROCEDURE — 82977 ASSAY OF GGT: CPT | Performed by: INTERNAL MEDICINE

## 2021-01-01 PROCEDURE — 88280 CHROMOSOME KARYOTYPE STUDY: CPT | Performed by: INTERNAL MEDICINE

## 2021-01-01 PROCEDURE — 83735 ASSAY OF MAGNESIUM: CPT | Performed by: INTERNAL MEDICINE

## 2021-01-01 PROCEDURE — 71045 X-RAY EXAM CHEST 1 VIEW: CPT

## 2021-01-01 PROCEDURE — 85730 THROMBOPLASTIN TIME PARTIAL: CPT | Performed by: INTERNAL MEDICINE

## 2021-01-01 PROCEDURE — 86665 EPSTEIN-BARR CAPSID VCA: CPT | Performed by: PHYSICIAN ASSISTANT

## 2021-01-01 PROCEDURE — 343N000001 HC RX 343: Performed by: INTERNAL MEDICINE

## 2021-01-01 PROCEDURE — U0003 INFECTIOUS AGENT DETECTION BY NUCLEIC ACID (DNA OR RNA); SEVERE ACUTE RESPIRATORY SYNDROME CORONAVIRUS 2 (SARS-COV-2) (CORONAVIRUS DISEASE [COVID-19]), AMPLIFIED PROBE TECHNIQUE, MAKING USE OF HIGH THROUGHPUT TECHNOLOGIES AS DESCRIBED BY CMS-2020-01-R: HCPCS | Performed by: PHYSICIAN ASSISTANT

## 2021-01-01 PROCEDURE — 80053 COMPREHEN METABOLIC PANEL: CPT | Performed by: PHYSICIAN ASSISTANT

## 2021-01-01 PROCEDURE — 3E043XZ INTRODUCTION OF VASOPRESSOR INTO CENTRAL VEIN, PERCUTANEOUS APPROACH: ICD-10-PCS | Performed by: STUDENT IN AN ORGANIZED HEALTH CARE EDUCATION/TRAINING PROGRAM

## 2021-01-01 PROCEDURE — 88275 CYTOGENETICS 100-300: CPT | Performed by: PHYSICIAN ASSISTANT

## 2021-01-01 PROCEDURE — 97530 THERAPEUTIC ACTIVITIES: CPT | Mod: GP | Performed by: REHABILITATION PRACTITIONER

## 2021-01-01 PROCEDURE — 38505 NEEDLE BIOPSY LYMPH NODES: CPT

## 2021-01-01 PROCEDURE — 84484 ASSAY OF TROPONIN QUANT: CPT | Performed by: INTERNAL MEDICINE

## 2021-01-01 PROCEDURE — 250N000011 HC RX IP 250 OP 636: Performed by: STUDENT IN AN ORGANIZED HEALTH CARE EDUCATION/TRAINING PROGRAM

## 2021-01-01 PROCEDURE — 999N001193 HC VIDEO/TELEPHONE VISIT; NO CHARGE

## 2021-01-01 PROCEDURE — 88185 FLOWCYTOMETRY/TC ADD-ON: CPT | Mod: TC | Performed by: INTERNAL MEDICINE

## 2021-01-01 PROCEDURE — 71260 CT THORAX DX C+: CPT | Mod: 26 | Performed by: RADIOLOGY

## 2021-01-01 PROCEDURE — 88264 CHROMOSOME ANALYSIS 20-25: CPT | Performed by: INTERNAL MEDICINE

## 2021-01-01 PROCEDURE — 84550 ASSAY OF BLOOD/URIC ACID: CPT | Performed by: INTERNAL MEDICINE

## 2021-01-01 PROCEDURE — 88333 PATH CONSLTJ SURG CYTO XM 1: CPT | Mod: 26 | Performed by: STUDENT IN AN ORGANIZED HEALTH CARE EDUCATION/TRAINING PROGRAM

## 2021-01-01 PROCEDURE — 999N001126 HC STATISTIC BONE MARROW INTERP TC 85097: Performed by: PHYSICIAN ASSISTANT

## 2021-01-01 PROCEDURE — 71260 CT THORAX DX C+: CPT

## 2021-01-01 PROCEDURE — 84132 ASSAY OF SERUM POTASSIUM: CPT | Performed by: STUDENT IN AN ORGANIZED HEALTH CARE EDUCATION/TRAINING PROGRAM

## 2021-01-01 PROCEDURE — 88341 IMHCHEM/IMCYTCHM EA ADD ANTB: CPT | Mod: TC | Performed by: INTERNAL MEDICINE

## 2021-01-01 PROCEDURE — 88311 DECALCIFY TISSUE: CPT | Mod: TC | Performed by: PHYSICIAN ASSISTANT

## 2021-01-01 PROCEDURE — 84550 ASSAY OF BLOOD/URIC ACID: CPT | Performed by: PHYSICIAN ASSISTANT

## 2021-01-01 PROCEDURE — 87103 BLOOD FUNGUS CULTURE: CPT | Performed by: INTERNAL MEDICINE

## 2021-01-01 PROCEDURE — 258N000003 HC RX IP 258 OP 636: Performed by: RADIOLOGY

## 2021-01-01 PROCEDURE — 84100 ASSAY OF PHOSPHORUS: CPT | Performed by: STUDENT IN AN ORGANIZED HEALTH CARE EDUCATION/TRAINING PROGRAM

## 2021-01-01 PROCEDURE — 84100 ASSAY OF PHOSPHORUS: CPT | Performed by: PHYSICIAN ASSISTANT

## 2021-01-01 PROCEDURE — 83605 ASSAY OF LACTIC ACID: CPT | Performed by: EMERGENCY MEDICINE

## 2021-01-01 PROCEDURE — 250N000009 HC RX 250: Performed by: STUDENT IN AN ORGANIZED HEALTH CARE EDUCATION/TRAINING PROGRAM

## 2021-01-01 PROCEDURE — 99213 OFFICE O/P EST LOW 20 MIN: CPT | Mod: 95 | Performed by: INTERNAL MEDICINE

## 2021-01-01 PROCEDURE — 83930 ASSAY OF BLOOD OSMOLALITY: CPT | Performed by: PHYSICIAN ASSISTANT

## 2021-01-01 PROCEDURE — 82962 GLUCOSE BLOOD TEST: CPT

## 2021-01-01 PROCEDURE — 84132 ASSAY OF SERUM POTASSIUM: CPT

## 2021-01-01 PROCEDURE — 85027 COMPLETE CBC AUTOMATED: CPT | Performed by: STUDENT IN AN ORGANIZED HEALTH CARE EDUCATION/TRAINING PROGRAM

## 2021-01-01 PROCEDURE — 82010 KETONE BODYS QUAN: CPT | Performed by: INTERNAL MEDICINE

## 2021-01-01 PROCEDURE — 85025 COMPLETE CBC W/AUTO DIFF WBC: CPT

## 2021-01-01 PROCEDURE — 96365 THER/PROPH/DIAG IV INF INIT: CPT | Performed by: INTERNAL MEDICINE

## 2021-01-01 PROCEDURE — 85025 COMPLETE CBC W/AUTO DIFF WBC: CPT | Performed by: EMERGENCY MEDICINE

## 2021-01-01 PROCEDURE — 999N000132 HC STATISTIC PP CARE STAGE 1

## 2021-01-01 PROCEDURE — 93306 TTE W/DOPPLER COMPLETE: CPT | Mod: 26 | Performed by: INTERNAL MEDICINE

## 2021-01-01 PROCEDURE — 99233 SBSQ HOSP IP/OBS HIGH 50: CPT | Performed by: INTERNAL MEDICINE

## 2021-01-01 PROCEDURE — 71250 CT THORAX DX C-: CPT

## 2021-01-01 PROCEDURE — 36591 DRAW BLOOD OFF VENOUS DEVICE: CPT

## 2021-01-01 PROCEDURE — 99215 OFFICE O/P EST HI 40 MIN: CPT

## 2021-01-01 PROCEDURE — P9037 PLATE PHERES LEUKOREDU IRRAD: HCPCS | Performed by: PHYSICIAN ASSISTANT

## 2021-01-01 PROCEDURE — 81001 URINALYSIS AUTO W/SCOPE: CPT | Performed by: PHYSICIAN ASSISTANT

## 2021-01-01 PROCEDURE — 99233 SBSQ HOSP IP/OBS HIGH 50: CPT

## 2021-01-01 PROCEDURE — 999N001102 HC STATISTIC DNA PROCESS AND HOLD: Performed by: PHYSICIAN ASSISTANT

## 2021-01-01 PROCEDURE — 85018 HEMOGLOBIN: CPT | Performed by: PHYSICIAN ASSISTANT

## 2021-01-01 PROCEDURE — 99233 SBSQ HOSP IP/OBS HIGH 50: CPT | Mod: GC | Performed by: DERMATOLOGY

## 2021-01-01 PROCEDURE — 999N001086 HC STATISTIC MORPHOLOGY W/INTERP HEMEPATH TC 85060: Performed by: INTERNAL MEDICINE

## 2021-01-01 PROCEDURE — 250N000012 HC RX MED GY IP 250 OP 636 PS 637: Performed by: INTERNAL MEDICINE

## 2021-01-01 PROCEDURE — 86704 HEP B CORE ANTIBODY TOTAL: CPT | Performed by: INTERNAL MEDICINE

## 2021-01-01 PROCEDURE — 96375 TX/PRO/DX INJ NEW DRUG ADDON: CPT | Performed by: INTERNAL MEDICINE

## 2021-01-01 PROCEDURE — 99207 PR NO CHARGE-RESEARCH SERVICE: CPT

## 2021-01-01 PROCEDURE — 82803 BLOOD GASES ANY COMBINATION: CPT | Performed by: STUDENT IN AN ORGANIZED HEALTH CARE EDUCATION/TRAINING PROGRAM

## 2021-01-01 PROCEDURE — 85045 AUTOMATED RETICULOCYTE COUNT: CPT | Performed by: INTERNAL MEDICINE

## 2021-01-01 PROCEDURE — 71250 CT THORAX DX C-: CPT | Mod: 26 | Performed by: RADIOLOGY

## 2021-01-01 PROCEDURE — 999N000128 HC STATISTIC PERIPHERAL IV START W/O US GUIDANCE

## 2021-01-01 PROCEDURE — 99152 MOD SED SAME PHYS/QHP 5/>YRS: CPT | Mod: GC | Performed by: RADIOLOGY

## 2021-01-01 PROCEDURE — 88188 FLOWCYTOMETRY/READ 9-15: CPT | Performed by: PATHOLOGY

## 2021-01-01 PROCEDURE — 82330 ASSAY OF CALCIUM: CPT | Performed by: PHYSICIAN ASSISTANT

## 2021-01-01 PROCEDURE — 999N000208 ECHOCARDIOGRAM COMPLETE

## 2021-01-01 PROCEDURE — 99291 CRITICAL CARE FIRST HOUR: CPT | Mod: GC | Performed by: INTERNAL MEDICINE

## 2021-01-01 PROCEDURE — 83615 LACTATE (LD) (LDH) ENZYME: CPT | Performed by: INTERNAL MEDICINE

## 2021-01-01 PROCEDURE — 99213 OFFICE O/P EST LOW 20 MIN: CPT | Mod: 25

## 2021-01-01 PROCEDURE — C9803 HOPD COVID-19 SPEC COLLECT: HCPCS | Performed by: INTERNAL MEDICINE

## 2021-01-01 PROCEDURE — 80048 BASIC METABOLIC PNL TOTAL CA: CPT | Performed by: PHYSICIAN ASSISTANT

## 2021-01-01 PROCEDURE — 36600 WITHDRAWAL OF ARTERIAL BLOOD: CPT

## 2021-01-01 PROCEDURE — 70450 CT HEAD/BRAIN W/O DYE: CPT | Mod: 26 | Performed by: STUDENT IN AN ORGANIZED HEALTH CARE EDUCATION/TRAINING PROGRAM

## 2021-01-01 PROCEDURE — 83935 ASSAY OF URINE OSMOLALITY: CPT | Performed by: PHYSICIAN ASSISTANT

## 2021-01-01 PROCEDURE — 88291 CYTO/MOLECULAR REPORT: CPT | Performed by: MEDICAL GENETICS

## 2021-01-01 PROCEDURE — 82570 ASSAY OF URINE CREATININE: CPT | Performed by: STUDENT IN AN ORGANIZED HEALTH CARE EDUCATION/TRAINING PROGRAM

## 2021-01-01 PROCEDURE — 250N000009 HC RX 250: Performed by: NURSE PRACTITIONER

## 2021-01-01 PROCEDURE — 87493 C DIFF AMPLIFIED PROBE: CPT | Performed by: PHYSICIAN ASSISTANT

## 2021-01-01 PROCEDURE — P9041 ALBUMIN (HUMAN),5%, 50ML: HCPCS | Performed by: STUDENT IN AN ORGANIZED HEALTH CARE EDUCATION/TRAINING PROGRAM

## 2021-01-01 PROCEDURE — 83880 ASSAY OF NATRIURETIC PEPTIDE: CPT | Performed by: PHYSICIAN ASSISTANT

## 2021-01-01 PROCEDURE — 99001 SPECIMEN HANDLING PT-LAB: CPT | Performed by: INTERNAL MEDICINE

## 2021-01-01 PROCEDURE — 38505 NEEDLE BIOPSY LYMPH NODES: CPT | Mod: LT | Performed by: RADIOLOGY

## 2021-01-01 PROCEDURE — 99223 1ST HOSP IP/OBS HIGH 75: CPT | Mod: AI | Performed by: INTERNAL MEDICINE

## 2021-01-01 PROCEDURE — 99214 OFFICE O/P EST MOD 30 MIN: CPT | Mod: 95 | Performed by: PHYSICIAN ASSISTANT

## 2021-01-01 PROCEDURE — 99213 OFFICE O/P EST LOW 20 MIN: CPT | Performed by: INTERNAL MEDICINE

## 2021-01-01 PROCEDURE — 80053 COMPREHEN METABOLIC PANEL: CPT

## 2021-01-01 PROCEDURE — 84484 ASSAY OF TROPONIN QUANT: CPT | Performed by: STUDENT IN AN ORGANIZED HEALTH CARE EDUCATION/TRAINING PROGRAM

## 2021-01-01 PROCEDURE — 96366 THER/PROPH/DIAG IV INF ADDON: CPT | Performed by: INTERNAL MEDICINE

## 2021-01-01 PROCEDURE — 99204 OFFICE O/P NEW MOD 45 MIN: CPT | Mod: GC | Performed by: STUDENT IN AN ORGANIZED HEALTH CARE EDUCATION/TRAINING PROGRAM

## 2021-01-01 PROCEDURE — 82306 VITAMIN D 25 HYDROXY: CPT | Performed by: PHYSICIAN ASSISTANT

## 2021-01-01 PROCEDURE — 70491 CT SOFT TISSUE NECK W/DYE: CPT | Mod: 26 | Performed by: RADIOLOGY

## 2021-01-01 PROCEDURE — 88184 FLOWCYTOMETRY/ TC 1 MARKER: CPT | Mod: TC | Performed by: INTERNAL MEDICINE

## 2021-01-01 PROCEDURE — 88305 TISSUE EXAM BY PATHOLOGIST: CPT | Mod: 26 | Performed by: PATHOLOGY

## 2021-01-01 PROCEDURE — 99215 OFFICE O/P EST HI 40 MIN: CPT | Performed by: INTERNAL MEDICINE

## 2021-01-01 PROCEDURE — 99285 EMERGENCY DEPT VISIT HI MDM: CPT | Performed by: INTERNAL MEDICINE

## 2021-01-01 PROCEDURE — 99238 HOSP IP/OBS DSCHRG MGMT 30/<: CPT | Performed by: STUDENT IN AN ORGANIZED HEALTH CARE EDUCATION/TRAINING PROGRAM

## 2021-01-01 PROCEDURE — 258N000001 HC RX 258: Performed by: NURSE PRACTITIONER

## 2021-01-01 PROCEDURE — 85610 PROTHROMBIN TIME: CPT | Performed by: EMERGENCY MEDICINE

## 2021-01-01 PROCEDURE — 83605 ASSAY OF LACTIC ACID: CPT | Performed by: STUDENT IN AN ORGANIZED HEALTH CARE EDUCATION/TRAINING PROGRAM

## 2021-01-01 PROCEDURE — 999N001086 HC STATISTIC MORPHOLOGY W/INTERP HEMEPATH TC 85060: Performed by: PHYSICIAN ASSISTANT

## 2021-01-01 PROCEDURE — 88271 CYTOGENETICS DNA PROBE: CPT | Performed by: PHYSICIAN ASSISTANT

## 2021-01-01 PROCEDURE — 97110 THERAPEUTIC EXERCISES: CPT | Mod: GP | Performed by: REHABILITATION PRACTITIONER

## 2021-01-01 PROCEDURE — 999N001068 HC STATISTIC BONE MARROW CORE PERF TC 38221: Performed by: PHYSICIAN ASSISTANT

## 2021-01-01 PROCEDURE — 0HB7XZX EXCISION OF ABDOMEN SKIN, EXTERNAL APPROACH, DIAGNOSTIC: ICD-10-PCS | Performed by: DERMATOLOGY

## 2021-01-01 PROCEDURE — A9552 F18 FDG: HCPCS | Performed by: INTERNAL MEDICINE

## 2021-01-01 PROCEDURE — 36415 COLL VENOUS BLD VENIPUNCTURE: CPT | Performed by: STUDENT IN AN ORGANIZED HEALTH CARE EDUCATION/TRAINING PROGRAM

## 2021-01-01 PROCEDURE — 87651 STREP A DNA AMP PROBE: CPT | Performed by: STUDENT IN AN ORGANIZED HEALTH CARE EDUCATION/TRAINING PROGRAM

## 2021-01-01 PROCEDURE — 83735 ASSAY OF MAGNESIUM: CPT | Performed by: STUDENT IN AN ORGANIZED HEALTH CARE EDUCATION/TRAINING PROGRAM

## 2021-01-01 PROCEDURE — 76942 ECHO GUIDE FOR BIOPSY: CPT | Mod: 26 | Performed by: RADIOLOGY

## 2021-01-01 PROCEDURE — 88342 IMHCHEM/IMCYTCHM 1ST ANTB: CPT | Mod: 26 | Performed by: STUDENT IN AN ORGANIZED HEALTH CARE EDUCATION/TRAINING PROGRAM

## 2021-01-01 PROCEDURE — 84300 ASSAY OF URINE SODIUM: CPT | Performed by: PHYSICIAN ASSISTANT

## 2021-01-01 PROCEDURE — 88185 FLOWCYTOMETRY/TC ADD-ON: CPT | Mod: TC | Performed by: PHYSICIAN ASSISTANT

## 2021-01-01 PROCEDURE — 85610 PROTHROMBIN TIME: CPT

## 2021-01-01 PROCEDURE — 88350 IMFLUOR EA ADDL 1ANTB STN PX: CPT | Mod: TC | Performed by: DERMATOLOGY

## 2021-01-01 PROCEDURE — 86644 CMV ANTIBODY: CPT | Performed by: PHYSICIAN ASSISTANT

## 2021-01-01 PROCEDURE — 82803 BLOOD GASES ANY COMBINATION: CPT | Performed by: PHYSICIAN ASSISTANT

## 2021-01-01 PROCEDURE — 78816 PET IMAGE W/CT FULL BODY: CPT | Mod: 26 | Performed by: RADIOLOGY

## 2021-01-01 PROCEDURE — 84134 ASSAY OF PREALBUMIN: CPT | Performed by: PHYSICIAN ASSISTANT

## 2021-01-01 PROCEDURE — 80053 COMPREHEN METABOLIC PANEL: CPT | Performed by: EMERGENCY MEDICINE

## 2021-01-01 PROCEDURE — 84145 PROCALCITONIN (PCT): CPT | Performed by: PHYSICIAN ASSISTANT

## 2021-01-01 PROCEDURE — 85652 RBC SED RATE AUTOMATED: CPT | Performed by: INTERNAL MEDICINE

## 2021-01-01 PROCEDURE — 255N000002 HC RX 255 OP 636: Performed by: STUDENT IN AN ORGANIZED HEALTH CARE EDUCATION/TRAINING PROGRAM

## 2021-01-01 PROCEDURE — 80048 BASIC METABOLIC PNL TOTAL CA: CPT | Performed by: INTERNAL MEDICINE

## 2021-01-01 PROCEDURE — 82533 TOTAL CORTISOL: CPT | Performed by: STUDENT IN AN ORGANIZED HEALTH CARE EDUCATION/TRAINING PROGRAM

## 2021-01-01 PROCEDURE — 85610 PROTHROMBIN TIME: CPT | Performed by: INTERNAL MEDICINE

## 2021-01-01 PROCEDURE — 88333 PATH CONSLTJ SURG CYTO XM 1: CPT | Mod: TC | Performed by: INTERNAL MEDICINE

## 2021-01-01 PROCEDURE — 86140 C-REACTIVE PROTEIN: CPT | Performed by: STUDENT IN AN ORGANIZED HEALTH CARE EDUCATION/TRAINING PROGRAM

## 2021-01-01 PROCEDURE — 88346 IMFLUOR 1ST 1ANTB STAIN PX: CPT | Mod: 26 | Performed by: DERMATOLOGY

## 2021-01-01 PROCEDURE — 99233 SBSQ HOSP IP/OBS HIGH 50: CPT | Mod: GC | Performed by: INTERNAL MEDICINE

## 2021-01-01 PROCEDURE — 97530 THERAPEUTIC ACTIVITIES: CPT | Mod: GO

## 2021-01-01 PROCEDURE — A9552 F18 FDG: HCPCS | Performed by: PHYSICIAN ASSISTANT

## 2021-01-01 PROCEDURE — 85097 BONE MARROW INTERPRETATION: CPT | Performed by: PATHOLOGY

## 2021-01-01 PROCEDURE — 82784 ASSAY IGA/IGD/IGG/IGM EACH: CPT | Performed by: PHYSICIAN ASSISTANT

## 2021-01-01 PROCEDURE — 99233 SBSQ HOSP IP/OBS HIGH 50: CPT | Mod: GC

## 2021-01-01 PROCEDURE — 83036 HEMOGLOBIN GLYCOSYLATED A1C: CPT | Performed by: NURSE PRACTITIONER

## 2021-01-01 PROCEDURE — 999N001022 HC STATISTIC H-SPHEME PROCESS B/S: Performed by: PHYSICIAN ASSISTANT

## 2021-01-01 PROCEDURE — 87103 BLOOD FUNGUS CULTURE: CPT | Performed by: PHYSICIAN ASSISTANT

## 2021-01-01 PROCEDURE — 81001 URINALYSIS AUTO W/SCOPE: CPT | Performed by: INTERNAL MEDICINE

## 2021-01-01 PROCEDURE — 80053 COMPREHEN METABOLIC PANEL: CPT | Performed by: STUDENT IN AN ORGANIZED HEALTH CARE EDUCATION/TRAINING PROGRAM

## 2021-01-01 PROCEDURE — 88305 TISSUE EXAM BY PATHOLOGIST: CPT | Mod: TC | Performed by: DERMATOLOGY

## 2021-01-01 PROCEDURE — 83036 HEMOGLOBIN GLYCOSYLATED A1C: CPT | Performed by: INTERNAL MEDICINE

## 2021-01-01 PROCEDURE — 200N000002 HC R&B ICU UMMC

## 2021-01-01 PROCEDURE — 99213 OFFICE O/P EST LOW 20 MIN: CPT

## 2021-01-01 PROCEDURE — 99231 SBSQ HOSP IP/OBS SF/LOW 25: CPT | Performed by: PHYSICIAN ASSISTANT

## 2021-01-01 PROCEDURE — 97116 GAIT TRAINING THERAPY: CPT | Mod: GP

## 2021-01-01 PROCEDURE — 85652 RBC SED RATE AUTOMATED: CPT | Performed by: STUDENT IN AN ORGANIZED HEALTH CARE EDUCATION/TRAINING PROGRAM

## 2021-01-01 PROCEDURE — 258N000003 HC RX IP 258 OP 636: Performed by: EMERGENCY MEDICINE

## 2021-01-01 PROCEDURE — 88346 IMFLUOR 1ST 1ANTB STAIN PX: CPT | Mod: TC | Performed by: DERMATOLOGY

## 2021-01-01 PROCEDURE — 97116 GAIT TRAINING THERAPY: CPT | Mod: GP | Performed by: REHABILITATION PRACTITIONER

## 2021-01-01 PROCEDURE — 88275 CYTOGENETICS 100-300: CPT | Performed by: INTERNAL MEDICINE

## 2021-01-01 PROCEDURE — 88264 CHROMOSOME ANALYSIS 20-25: CPT | Performed by: PHYSICIAN ASSISTANT

## 2021-01-01 PROCEDURE — 88307 TISSUE EXAM BY PATHOLOGIST: CPT | Mod: 26 | Performed by: STUDENT IN AN ORGANIZED HEALTH CARE EDUCATION/TRAINING PROGRAM

## 2021-01-01 PROCEDURE — 99223 1ST HOSP IP/OBS HIGH 75: CPT | Performed by: INTERNAL MEDICINE

## 2021-01-01 PROCEDURE — 87086 URINE CULTURE/COLONY COUNT: CPT | Performed by: INTERNAL MEDICINE

## 2021-01-01 PROCEDURE — 250N000012 HC RX MED GY IP 250 OP 636 PS 637: Performed by: NURSE PRACTITIONER

## 2021-01-01 PROCEDURE — 999N001136 HC STATISTIC FLOW INT 9-15 ABY TC 88188: Performed by: PHYSICIAN ASSISTANT

## 2021-01-01 PROCEDURE — 250N000011 HC RX IP 250 OP 636: Performed by: EMERGENCY MEDICINE

## 2021-01-01 PROCEDURE — 85018 HEMOGLOBIN: CPT | Performed by: INTERNAL MEDICINE

## 2021-01-01 PROCEDURE — 70491 CT SOFT TISSUE NECK W/DYE: CPT

## 2021-01-01 PROCEDURE — 206N000001 HC R&B BMT UMMC

## 2021-01-01 PROCEDURE — 99233 SBSQ HOSP IP/OBS HIGH 50: CPT | Performed by: NURSE PRACTITIONER

## 2021-01-01 PROCEDURE — 88271 CYTOGENETICS DNA PROBE: CPT | Performed by: INTERNAL MEDICINE

## 2021-01-01 PROCEDURE — 83605 ASSAY OF LACTIC ACID: CPT | Performed by: RADIOLOGY

## 2021-01-01 PROCEDURE — 87040 BLOOD CULTURE FOR BACTERIA: CPT | Performed by: INTERNAL MEDICINE

## 2021-01-01 PROCEDURE — 99233 SBSQ HOSP IP/OBS HIGH 50: CPT | Performed by: PHYSICIAN ASSISTANT

## 2021-01-01 PROCEDURE — 99232 SBSQ HOSP IP/OBS MODERATE 35: CPT | Performed by: STUDENT IN AN ORGANIZED HEALTH CARE EDUCATION/TRAINING PROGRAM

## 2021-01-01 PROCEDURE — 70450 CT HEAD/BRAIN W/O DYE: CPT

## 2021-01-01 PROCEDURE — 88184 FLOWCYTOMETRY/ TC 1 MARKER: CPT | Mod: TC | Performed by: PHYSICIAN ASSISTANT

## 2021-01-01 PROCEDURE — 81001 URINALYSIS AUTO W/SCOPE: CPT | Performed by: STUDENT IN AN ORGANIZED HEALTH CARE EDUCATION/TRAINING PROGRAM

## 2021-01-01 PROCEDURE — 86140 C-REACTIVE PROTEIN: CPT | Performed by: EMERGENCY MEDICINE

## 2021-01-01 PROCEDURE — 36415 COLL VENOUS BLD VENIPUNCTURE: CPT | Performed by: INTERNAL MEDICINE

## 2021-01-01 PROCEDURE — 82803 BLOOD GASES ANY COMBINATION: CPT | Performed by: INTERNAL MEDICINE

## 2021-01-01 PROCEDURE — 74176 CT ABD & PELVIS W/O CONTRAST: CPT

## 2021-01-01 PROCEDURE — 84145 PROCALCITONIN (PCT): CPT

## 2021-01-01 PROCEDURE — 87641 MR-STAPH DNA AMP PROBE: CPT | Performed by: STUDENT IN AN ORGANIZED HEALTH CARE EDUCATION/TRAINING PROGRAM

## 2021-01-01 PROCEDURE — 88261 CHROMOSOME ANALYSIS 5: CPT | Mod: TC | Performed by: INTERNAL MEDICINE

## 2021-01-01 PROCEDURE — 99214 OFFICE O/P EST MOD 30 MIN: CPT | Mod: 95 | Performed by: INTERNAL MEDICINE

## 2021-01-01 PROCEDURE — 250N000013 HC RX MED GY IP 250 OP 250 PS 637

## 2021-01-01 PROCEDURE — 99223 1ST HOSP IP/OBS HIGH 75: CPT | Performed by: NURSE PRACTITIONER

## 2021-01-01 PROCEDURE — 96361 HYDRATE IV INFUSION ADD-ON: CPT | Performed by: INTERNAL MEDICINE

## 2021-01-01 PROCEDURE — 85004 AUTOMATED DIFF WBC COUNT: CPT | Performed by: INTERNAL MEDICINE

## 2021-01-01 PROCEDURE — 250N000012 HC RX MED GY IP 250 OP 636 PS 637

## 2021-01-01 PROCEDURE — 343N000001 HC RX 343: Performed by: PHYSICIAN ASSISTANT

## 2021-01-01 PROCEDURE — 85060 BLOOD SMEAR INTERPRETATION: CPT | Performed by: PATHOLOGY

## 2021-01-01 PROCEDURE — 88237 TISSUE CULTURE BONE MARROW: CPT | Performed by: PHYSICIAN ASSISTANT

## 2021-01-01 RX ORDER — FUROSEMIDE 10 MG/ML
100 INJECTION INTRAMUSCULAR; INTRAVENOUS EVERY 12 HOURS
Status: DISCONTINUED | OUTPATIENT
Start: 2021-01-01 | End: 2021-01-01

## 2021-01-01 RX ORDER — LANCETS
EACH MISCELLANEOUS
Qty: 102 EACH | Refills: 3 | OUTPATIENT
Start: 2021-01-01

## 2021-01-01 RX ORDER — NALOXONE HYDROCHLORIDE 0.4 MG/ML
0.4 INJECTION, SOLUTION INTRAMUSCULAR; INTRAVENOUS; SUBCUTANEOUS
Status: DISCONTINUED | OUTPATIENT
Start: 2021-01-01 | End: 2021-01-01 | Stop reason: HOSPADM

## 2021-01-01 RX ORDER — DEXTROSE MONOHYDRATE 25 G/50ML
25-50 INJECTION, SOLUTION INTRAVENOUS
Status: DISCONTINUED | OUTPATIENT
Start: 2021-01-01 | End: 2021-01-01

## 2021-01-01 RX ORDER — SULFAMETHOXAZOLE AND TRIMETHOPRIM 400; 80 MG/1; MG/1
2 TABLET ORAL
Status: DISCONTINUED | OUTPATIENT
Start: 2021-01-01 | End: 2021-01-01 | Stop reason: HOSPADM

## 2021-01-01 RX ORDER — HEPARIN SODIUM (PORCINE) LOCK FLUSH IV SOLN 100 UNIT/ML 100 UNIT/ML
5 SOLUTION INTRAVENOUS EVERY 8 HOURS PRN
Status: DISCONTINUED | OUTPATIENT
Start: 2021-01-01 | End: 2021-01-01 | Stop reason: HOSPADM

## 2021-01-01 RX ORDER — ACYCLOVIR 400 MG/1
400 TABLET ORAL 2 TIMES DAILY
Status: DISCONTINUED | OUTPATIENT
Start: 2021-01-01 | End: 2021-01-01

## 2021-01-01 RX ORDER — NALOXONE HYDROCHLORIDE 0.4 MG/ML
0.2 INJECTION, SOLUTION INTRAMUSCULAR; INTRAVENOUS; SUBCUTANEOUS
Status: DISCONTINUED | OUTPATIENT
Start: 2021-01-01 | End: 2021-01-01 | Stop reason: HOSPADM

## 2021-01-01 RX ORDER — ACETAMINOPHEN 325 MG/1
650 TABLET ORAL EVERY 4 HOURS PRN
COMMUNITY
Start: 2021-01-01

## 2021-01-01 RX ORDER — HEPARIN SODIUM (PORCINE) LOCK FLUSH IV SOLN 100 UNIT/ML 100 UNIT/ML
5 SOLUTION INTRAVENOUS
Status: CANCELLED | OUTPATIENT
Start: 2021-01-01

## 2021-01-01 RX ORDER — SODIUM CHLORIDE AND POTASSIUM CHLORIDE 150; 900 MG/100ML; MG/100ML
INJECTION, SOLUTION INTRAVENOUS CONTINUOUS
Status: DISCONTINUED | OUTPATIENT
Start: 2021-01-01 | End: 2021-01-01

## 2021-01-01 RX ORDER — LIDOCAINE HYDROCHLORIDE AND EPINEPHRINE 10; 10 MG/ML; UG/ML
3 INJECTION, SOLUTION INFILTRATION; PERINEURAL ONCE
Status: DISCONTINUED | OUTPATIENT
Start: 2021-01-01 | End: 2021-01-01

## 2021-01-01 RX ORDER — PANTOPRAZOLE SODIUM 40 MG/1
40 TABLET, DELAYED RELEASE ORAL
Status: DISCONTINUED | OUTPATIENT
Start: 2021-01-01 | End: 2021-01-01

## 2021-01-01 RX ORDER — METHYLPREDNISOLONE SODIUM SUCCINATE 125 MG/2ML
1 INJECTION, POWDER, LYOPHILIZED, FOR SOLUTION INTRAMUSCULAR; INTRAVENOUS DAILY
Status: DISCONTINUED | OUTPATIENT
Start: 2021-01-01 | End: 2021-01-01

## 2021-01-01 RX ORDER — PIPERACILLIN SODIUM, TAZOBACTAM SODIUM 3; .375 G/15ML; G/15ML
3.38 INJECTION, POWDER, LYOPHILIZED, FOR SOLUTION INTRAVENOUS ONCE
Status: COMPLETED | OUTPATIENT
Start: 2021-01-01 | End: 2021-01-01

## 2021-01-01 RX ORDER — METOPROLOL TARTRATE 1 MG/ML
5 INJECTION, SOLUTION INTRAVENOUS 2 TIMES DAILY PRN
Status: DISCONTINUED | OUTPATIENT
Start: 2021-01-01 | End: 2021-01-01 | Stop reason: HOSPADM

## 2021-01-01 RX ORDER — PANTOPRAZOLE SODIUM 40 MG/1
40 TABLET, DELAYED RELEASE ORAL
Status: DISCONTINUED | OUTPATIENT
Start: 2021-01-01 | End: 2021-01-01 | Stop reason: HOSPADM

## 2021-01-01 RX ORDER — SULFAMETHOXAZOLE/TRIMETHOPRIM 800-160 MG
1 TABLET ORAL
Qty: 16 TABLET | Refills: 11 | Status: SHIPPED | OUTPATIENT
Start: 2021-01-01

## 2021-01-01 RX ORDER — HEPARIN SODIUM (PORCINE) LOCK FLUSH IV SOLN 100 UNIT/ML 100 UNIT/ML
500 SOLUTION INTRAVENOUS ONCE
Status: COMPLETED | OUTPATIENT
Start: 2021-01-01 | End: 2021-01-01

## 2021-01-01 RX ORDER — ALBUMIN, HUMAN INJ 5% 5 %
25 SOLUTION INTRAVENOUS ONCE
Status: COMPLETED | OUTPATIENT
Start: 2021-01-01 | End: 2021-01-01

## 2021-01-01 RX ORDER — ONDANSETRON 4 MG/1
8 TABLET, ORALLY DISINTEGRATING ORAL EVERY 8 HOURS PRN
Status: DISCONTINUED | OUTPATIENT
Start: 2021-01-01 | End: 2021-01-01 | Stop reason: HOSPADM

## 2021-01-01 RX ORDER — POTASSIUM CHLORIDE 29.8 MG/ML
20 INJECTION INTRAVENOUS ONCE
Status: COMPLETED | OUTPATIENT
Start: 2021-01-01 | End: 2021-01-01

## 2021-01-01 RX ORDER — ACETAMINOPHEN 325 MG/1
650 TABLET ORAL EVERY 4 HOURS PRN
Status: DISCONTINUED | OUTPATIENT
Start: 2021-01-01 | End: 2021-01-01 | Stop reason: HOSPADM

## 2021-01-01 RX ORDER — DIPHENHYDRAMINE HCL 25 MG
25 CAPSULE ORAL EVERY 6 HOURS PRN
Status: DISCONTINUED | OUTPATIENT
Start: 2021-01-01 | End: 2021-01-01 | Stop reason: HOSPADM

## 2021-01-01 RX ORDER — TRIAMCINOLONE ACETONIDE 1 MG/G
CREAM TOPICAL 2 TIMES DAILY
COMMUNITY
Start: 2021-01-01

## 2021-01-01 RX ORDER — PREDNISONE 50 MG/1
TABLET ORAL
Qty: 45 TABLET | Refills: 0 | Status: SHIPPED | OUTPATIENT
Start: 2021-01-01

## 2021-01-01 RX ORDER — NICOTINE POLACRILEX 4 MG
15-30 LOZENGE BUCCAL
Status: DISCONTINUED | OUTPATIENT
Start: 2021-01-01 | End: 2021-01-01 | Stop reason: HOSPADM

## 2021-01-01 RX ORDER — VANCOMYCIN HYDROCHLORIDE 125 MG/1
125 CAPSULE ORAL 4 TIMES DAILY
Status: DISCONTINUED | OUTPATIENT
Start: 2021-01-01 | End: 2021-01-01 | Stop reason: HOSPADM

## 2021-01-01 RX ORDER — ALBUTEROL SULFATE 90 UG/1
1-2 AEROSOL, METERED RESPIRATORY (INHALATION)
Status: CANCELLED
Start: 2021-01-01

## 2021-01-01 RX ORDER — GLIMEPIRIDE 4 MG/1
4 TABLET ORAL
Qty: 30 TABLET | Refills: 3 | Status: ON HOLD | OUTPATIENT
Start: 2021-01-01 | End: 2021-01-01

## 2021-01-01 RX ORDER — METOPROLOL SUCCINATE 25 MG/1
25 TABLET, EXTENDED RELEASE ORAL 2 TIMES DAILY
Qty: 60 TABLET | Refills: 1 | Status: SHIPPED | OUTPATIENT
Start: 2021-01-01

## 2021-01-01 RX ORDER — FLUMAZENIL 0.1 MG/ML
0.2 INJECTION, SOLUTION INTRAVENOUS
Status: DISCONTINUED | OUTPATIENT
Start: 2021-01-01 | End: 2021-01-01 | Stop reason: HOSPADM

## 2021-01-01 RX ORDER — SODIUM CHLORIDE 9 MG/ML
1000 INJECTION, SOLUTION INTRAVENOUS CONTINUOUS PRN
Status: CANCELLED
Start: 2021-01-01

## 2021-01-01 RX ORDER — PREDNISONE 20 MG/1
1 TABLET ORAL DAILY
Qty: 44 TABLET | Refills: 0 | Status: SHIPPED | OUTPATIENT
Start: 2021-01-01 | End: 2021-01-01

## 2021-01-01 RX ORDER — CALCIUM CARBONATE 500 MG/1
500 TABLET, CHEWABLE ORAL DAILY PRN
Status: DISCONTINUED | OUTPATIENT
Start: 2021-01-01 | End: 2021-01-01 | Stop reason: HOSPADM

## 2021-01-01 RX ORDER — METFORMIN HCL 500 MG
1000 TABLET, EXTENDED RELEASE 24 HR ORAL
Qty: 120 TABLET | Refills: 3 | Status: ON HOLD | OUTPATIENT
Start: 2021-01-01 | End: 2021-01-01

## 2021-01-01 RX ORDER — DEXTROSE MONOHYDRATE 100 MG/ML
INJECTION, SOLUTION INTRAVENOUS CONTINUOUS PRN
Status: DISCONTINUED | OUTPATIENT
Start: 2021-01-01 | End: 2021-01-01

## 2021-01-01 RX ORDER — METOPROLOL SUCCINATE 25 MG/1
25 TABLET, EXTENDED RELEASE ORAL 2 TIMES DAILY
Status: DISCONTINUED | OUTPATIENT
Start: 2021-01-01 | End: 2021-01-01 | Stop reason: HOSPADM

## 2021-01-01 RX ORDER — HEPARIN SODIUM,PORCINE 10 UNIT/ML
3 VIAL (ML) INTRAVENOUS
Status: DISCONTINUED | OUTPATIENT
Start: 2021-01-01 | End: 2021-01-01 | Stop reason: HOSPADM

## 2021-01-01 RX ORDER — CALCIUM ACETATE 667 MG/1
1334 CAPSULE ORAL
Status: DISCONTINUED | OUTPATIENT
Start: 2021-01-01 | End: 2021-01-01

## 2021-01-01 RX ORDER — NYSTATIN 100000/ML
500000 SUSPENSION, ORAL (FINAL DOSE FORM) ORAL 4 TIMES DAILY
Status: DISCONTINUED | OUTPATIENT
Start: 2021-01-01 | End: 2021-01-01 | Stop reason: HOSPADM

## 2021-01-01 RX ORDER — ONDANSETRON 2 MG/ML
4 INJECTION INTRAMUSCULAR; INTRAVENOUS EVERY 6 HOURS PRN
Status: DISCONTINUED | OUTPATIENT
Start: 2021-01-01 | End: 2021-01-01 | Stop reason: HOSPADM

## 2021-01-01 RX ORDER — FUROSEMIDE 20 MG
60 TABLET ORAL DAILY
Qty: 40 TABLET | Refills: 0 | Status: SHIPPED | OUTPATIENT
Start: 2021-01-01

## 2021-01-01 RX ORDER — FENTANYL CITRATE 50 UG/ML
25-50 INJECTION, SOLUTION INTRAMUSCULAR; INTRAVENOUS EVERY 5 MIN PRN
Status: DISCONTINUED | OUTPATIENT
Start: 2021-01-01 | End: 2021-01-01 | Stop reason: HOSPADM

## 2021-01-01 RX ORDER — HEPARIN SODIUM (PORCINE) LOCK FLUSH IV SOLN 100 UNIT/ML 100 UNIT/ML
5 SOLUTION INTRAVENOUS ONCE
Status: COMPLETED | OUTPATIENT
Start: 2021-01-01 | End: 2021-01-01

## 2021-01-01 RX ORDER — LIDOCAINE 40 MG/G
CREAM TOPICAL
Status: DISCONTINUED | OUTPATIENT
Start: 2021-01-01 | End: 2021-01-01 | Stop reason: HOSPADM

## 2021-01-01 RX ORDER — PREDNISONE 20 MG/1
TABLET ORAL
Qty: 80 TABLET | Refills: 0 | Status: SHIPPED | OUTPATIENT
Start: 2021-01-01

## 2021-01-01 RX ORDER — ATORVASTATIN CALCIUM 40 MG/1
40 TABLET, FILM COATED ORAL
COMMUNITY
Start: 2020-01-13

## 2021-01-01 RX ORDER — ALBUTEROL SULFATE 0.83 MG/ML
2.5 SOLUTION RESPIRATORY (INHALATION)
Status: CANCELLED | OUTPATIENT
Start: 2021-01-01

## 2021-01-01 RX ORDER — ACYCLOVIR 800 MG/1
800 TABLET ORAL 2 TIMES DAILY
Status: DISCONTINUED | OUTPATIENT
Start: 2021-01-01 | End: 2021-01-01

## 2021-01-01 RX ORDER — PIPERACILLIN SODIUM, TAZOBACTAM SODIUM 3; .375 G/15ML; G/15ML
3.38 INJECTION, POWDER, LYOPHILIZED, FOR SOLUTION INTRAVENOUS ONCE
Status: DISCONTINUED | OUTPATIENT
Start: 2021-01-01 | End: 2021-01-01

## 2021-01-01 RX ORDER — MEPERIDINE HYDROCHLORIDE 25 MG/ML
25 INJECTION INTRAMUSCULAR; INTRAVENOUS; SUBCUTANEOUS EVERY 30 MIN PRN
Status: CANCELLED | OUTPATIENT
Start: 2021-01-01

## 2021-01-01 RX ORDER — NICOTINE POLACRILEX 4 MG
15-30 LOZENGE BUCCAL
Status: DISCONTINUED | OUTPATIENT
Start: 2021-01-01 | End: 2021-01-01

## 2021-01-01 RX ORDER — LIDOCAINE 40 MG/G
CREAM TOPICAL
Status: DISCONTINUED | OUTPATIENT
Start: 2021-01-01 | End: 2021-01-01

## 2021-01-01 RX ORDER — TRIAMCINOLONE ACETONIDE 1 MG/G
OINTMENT TOPICAL 2 TIMES DAILY
Status: DISCONTINUED | OUTPATIENT
Start: 2021-01-01 | End: 2021-01-01

## 2021-01-01 RX ORDER — SULFAMETHOXAZOLE/TRIMETHOPRIM 800-160 MG
1 TABLET ORAL
Status: DISCONTINUED | OUTPATIENT
Start: 2021-01-01 | End: 2021-01-01

## 2021-01-01 RX ORDER — FUROSEMIDE 20 MG
40 TABLET ORAL ONCE
Status: COMPLETED | OUTPATIENT
Start: 2021-01-01 | End: 2021-01-01

## 2021-01-01 RX ORDER — SODIUM CHLORIDE 9 MG/ML
INJECTION, SOLUTION INTRAVENOUS CONTINUOUS
Status: DISCONTINUED | OUTPATIENT
Start: 2021-01-01 | End: 2021-01-01

## 2021-01-01 RX ORDER — HEPARIN SODIUM (PORCINE) LOCK FLUSH IV SOLN 100 UNIT/ML 100 UNIT/ML
5 SOLUTION INTRAVENOUS
Status: DISCONTINUED | OUTPATIENT
Start: 2021-01-01 | End: 2021-01-01 | Stop reason: HOSPADM

## 2021-01-01 RX ORDER — FLUCONAZOLE 200 MG/1
TABLET ORAL
COMMUNITY
Start: 2020-01-01 | End: 2021-01-01

## 2021-01-01 RX ORDER — ALBUMIN (HUMAN) 12.5 G/50ML
125 SOLUTION INTRAVENOUS ONCE
Status: DISCONTINUED | OUTPATIENT
Start: 2021-01-01 | End: 2021-01-01

## 2021-01-01 RX ORDER — VANCOMYCIN HYDROCHLORIDE 125 MG/1
125 CAPSULE ORAL 4 TIMES DAILY
Qty: 28 CAPSULE | Refills: 0 | Status: SHIPPED | OUTPATIENT
Start: 2021-01-01 | End: 2021-01-01

## 2021-01-01 RX ORDER — SODIUM CHLORIDE, SODIUM LACTATE, POTASSIUM CHLORIDE, CALCIUM CHLORIDE 600; 310; 30; 20 MG/100ML; MG/100ML; MG/100ML; MG/100ML
INJECTION, SOLUTION INTRAVENOUS CONTINUOUS
Status: DISCONTINUED | OUTPATIENT
Start: 2021-01-01 | End: 2021-01-01

## 2021-01-01 RX ORDER — ACYCLOVIR 200 MG/1
400 CAPSULE ORAL 2 TIMES DAILY
Status: DISCONTINUED | OUTPATIENT
Start: 2021-01-01 | End: 2021-01-01

## 2021-01-01 RX ORDER — LOPERAMIDE HCL 2 MG
2 CAPSULE ORAL 4 TIMES DAILY PRN
Status: DISCONTINUED | OUTPATIENT
Start: 2021-01-01 | End: 2021-01-01 | Stop reason: CLARIF

## 2021-01-01 RX ORDER — ONDANSETRON 4 MG/1
4 TABLET, ORALLY DISINTEGRATING ORAL EVERY 6 HOURS PRN
Status: DISCONTINUED | OUTPATIENT
Start: 2021-01-01 | End: 2021-01-01 | Stop reason: HOSPADM

## 2021-01-01 RX ORDER — HEPARIN SODIUM,PORCINE 10 UNIT/ML
5 VIAL (ML) INTRAVENOUS
Status: CANCELLED | OUTPATIENT
Start: 2021-01-01

## 2021-01-01 RX ORDER — DEXTROSE MONOHYDRATE 25 G/50ML
25-50 INJECTION, SOLUTION INTRAVENOUS
Status: DISCONTINUED | OUTPATIENT
Start: 2021-01-01 | End: 2021-01-01 | Stop reason: HOSPADM

## 2021-01-01 RX ORDER — SODIUM CHLORIDE 9 MG/ML
INJECTION, SOLUTION INTRAVENOUS CONTINUOUS
Status: DISCONTINUED | OUTPATIENT
Start: 2021-01-01 | End: 2021-01-01 | Stop reason: HOSPADM

## 2021-01-01 RX ORDER — PIPERACILLIN SODIUM, TAZOBACTAM SODIUM 3; .375 G/15ML; G/15ML
3.38 INJECTION, POWDER, LYOPHILIZED, FOR SOLUTION INTRAVENOUS EVERY 8 HOURS
Status: DISCONTINUED | OUTPATIENT
Start: 2021-01-01 | End: 2021-01-01

## 2021-01-01 RX ORDER — SODIUM BICARBONATE 650 MG/1
1300 TABLET ORAL 3 TIMES DAILY
Status: DISCONTINUED | OUTPATIENT
Start: 2021-01-01 | End: 2021-01-01

## 2021-01-01 RX ORDER — METOPROLOL TARTRATE 1 MG/ML
5 INJECTION, SOLUTION INTRAVENOUS 2 TIMES DAILY PRN
Status: DISCONTINUED | OUTPATIENT
Start: 2021-01-01 | End: 2021-01-01

## 2021-01-01 RX ORDER — SULFAMETHOXAZOLE AND TRIMETHOPRIM 400; 80 MG/1; MG/1
1 TABLET ORAL
Status: DISCONTINUED | OUTPATIENT
Start: 2021-01-01 | End: 2021-01-01

## 2021-01-01 RX ORDER — DEXAMETHASONE 4 MG/1
TABLET ORAL
Qty: 24 TABLET | Refills: 0 | Status: ON HOLD | OUTPATIENT
Start: 2021-01-01 | End: 2021-01-01

## 2021-01-01 RX ORDER — DEXTROSE MONOHYDRATE, SODIUM CHLORIDE, AND POTASSIUM CHLORIDE 50; 1.49; 4.5 G/1000ML; G/1000ML; G/1000ML
INJECTION, SOLUTION INTRAVENOUS CONTINUOUS
Status: DISCONTINUED | OUTPATIENT
Start: 2021-01-01 | End: 2021-01-01

## 2021-01-01 RX ORDER — POTASSIUM CHLORIDE 1500 MG/1
20 TABLET, EXTENDED RELEASE ORAL DAILY
Qty: 30 TABLET | Refills: 1 | Status: CANCELLED | OUTPATIENT
Start: 2021-01-01

## 2021-01-01 RX ORDER — HYDROXYZINE HYDROCHLORIDE 25 MG/1
25 TABLET, FILM COATED ORAL EVERY 6 HOURS PRN
Status: DISCONTINUED | OUTPATIENT
Start: 2021-01-01 | End: 2021-01-01 | Stop reason: HOSPADM

## 2021-01-01 RX ORDER — ACYCLOVIR SODIUM 500 MG/10ML
50 INJECTION, SOLUTION INTRAVENOUS ONCE
Qty: 1 ML | Refills: 0 | Status: SHIPPED | OUTPATIENT
Start: 2021-01-01 | End: 2021-01-01

## 2021-01-01 RX ORDER — POTASSIUM CHLORIDE 750 MG/1
10 TABLET, EXTENDED RELEASE ORAL 2 TIMES DAILY
Qty: 60 TABLET | Refills: 0 | Status: SHIPPED | OUTPATIENT
Start: 2021-01-01

## 2021-01-01 RX ORDER — METOPROLOL SUCCINATE 25 MG/1
25 TABLET, EXTENDED RELEASE ORAL 2 TIMES DAILY
Qty: 60 TABLET | Refills: 1 | Status: SHIPPED | OUTPATIENT
Start: 2021-01-01 | End: 2021-01-01

## 2021-01-01 RX ORDER — EPINEPHRINE 1 MG/ML
0.3 INJECTION, SOLUTION INTRAMUSCULAR; SUBCUTANEOUS EVERY 5 MIN PRN
Status: CANCELLED | OUTPATIENT
Start: 2021-01-01

## 2021-01-01 RX ORDER — LORAZEPAM 2 MG/ML
0.5 INJECTION INTRAMUSCULAR EVERY 4 HOURS PRN
Status: CANCELLED | OUTPATIENT
Start: 2021-01-01

## 2021-01-01 RX ORDER — OXYCODONE HYDROCHLORIDE 5 MG/1
5 TABLET ORAL EVERY 4 HOURS PRN
Status: DISCONTINUED | OUTPATIENT
Start: 2021-01-01 | End: 2021-01-01 | Stop reason: HOSPADM

## 2021-01-01 RX ORDER — PANTOPRAZOLE SODIUM 40 MG/1
40 TABLET, DELAYED RELEASE ORAL EVERY 24 HOURS
Status: DISCONTINUED | OUTPATIENT
Start: 2021-01-01 | End: 2021-01-01

## 2021-01-01 RX ORDER — NICOTINE POLACRILEX 4 MG
15-30 LOZENGE BUCCAL
Qty: 4 ML | Refills: 1 | Status: SHIPPED | OUTPATIENT
Start: 2021-01-01

## 2021-01-01 RX ORDER — IOPAMIDOL 755 MG/ML
45-150 INJECTION, SOLUTION INTRAVASCULAR ONCE
Status: COMPLETED | OUTPATIENT
Start: 2021-01-01 | End: 2021-01-01

## 2021-01-01 RX ORDER — LANCETS
EACH MISCELLANEOUS
Qty: 102 EACH | Refills: 3 | Status: SHIPPED | OUTPATIENT
Start: 2021-01-01

## 2021-01-01 RX ORDER — ACYCLOVIR 200 MG/1
200 CAPSULE ORAL 2 TIMES DAILY
Status: DISCONTINUED | OUTPATIENT
Start: 2021-01-01 | End: 2021-01-01

## 2021-01-01 RX ORDER — POTASSIUM CHLORIDE 1500 MG/1
20 TABLET, EXTENDED RELEASE ORAL DAILY
Qty: 30 TABLET | Refills: 0 | Status: SHIPPED | OUTPATIENT
Start: 2021-01-01

## 2021-01-01 RX ORDER — FUROSEMIDE 10 MG/ML
100 INJECTION INTRAMUSCULAR; INTRAVENOUS ONCE
Status: COMPLETED | OUTPATIENT
Start: 2021-01-01 | End: 2021-01-01

## 2021-01-01 RX ORDER — NALOXONE HYDROCHLORIDE 0.4 MG/ML
.1-.4 INJECTION, SOLUTION INTRAMUSCULAR; INTRAVENOUS; SUBCUTANEOUS
Status: CANCELLED | OUTPATIENT
Start: 2021-01-01

## 2021-01-01 RX ORDER — ONDANSETRON 8 MG/1
8 TABLET, ORALLY DISINTEGRATING ORAL EVERY 8 HOURS PRN
Qty: 30 TABLET | Refills: 0 | Status: SHIPPED | OUTPATIENT
Start: 2021-01-01

## 2021-01-01 RX ORDER — IOPAMIDOL 755 MG/ML
60-135 INJECTION, SOLUTION INTRAVASCULAR ONCE
Status: COMPLETED | OUTPATIENT
Start: 2021-01-01 | End: 2021-01-01

## 2021-01-01 RX ORDER — SODIUM CHLORIDE, SODIUM LACTATE, POTASSIUM CHLORIDE, CALCIUM CHLORIDE 600; 310; 30; 20 MG/100ML; MG/100ML; MG/100ML; MG/100ML
INJECTION, SOLUTION INTRAVENOUS
Status: DISPENSED
Start: 2021-01-01 | End: 2021-01-01

## 2021-01-01 RX ORDER — PREDNISONE 20 MG/1
80 TABLET ORAL DAILY
Qty: 120 TABLET | Refills: 4 | Status: SHIPPED | OUTPATIENT
Start: 2021-01-01

## 2021-01-01 RX ORDER — PIPERACILLIN SODIUM, TAZOBACTAM SODIUM 4; .5 G/20ML; G/20ML
4.5 INJECTION, POWDER, LYOPHILIZED, FOR SOLUTION INTRAVENOUS EVERY 6 HOURS
Status: DISCONTINUED | OUTPATIENT
Start: 2021-01-01 | End: 2021-01-01

## 2021-01-01 RX ORDER — SULFAMETHOXAZOLE AND TRIMETHOPRIM 80; 16 MG/ML; MG/ML
80 INJECTION INTRAVENOUS ONCE
Status: ACTIVE | OUTPATIENT
Start: 2021-01-01

## 2021-01-01 RX ORDER — ALBUMIN, HUMAN INJ 5% 5 %
500 SOLUTION INTRAVENOUS ONCE
Status: COMPLETED | OUTPATIENT
Start: 2021-01-01 | End: 2021-01-01

## 2021-01-01 RX ORDER — PIPERACILLIN SODIUM, TAZOBACTAM SODIUM 3; .375 G/15ML; G/15ML
3.38 INJECTION, POWDER, LYOPHILIZED, FOR SOLUTION INTRAVENOUS EVERY 6 HOURS
Status: DISCONTINUED | OUTPATIENT
Start: 2021-01-01 | End: 2021-01-01 | Stop reason: DRUGHIGH

## 2021-01-01 RX ORDER — TRIAMCINOLONE ACETONIDE 1 MG/G
CREAM TOPICAL 2 TIMES DAILY
Status: DISCONTINUED | OUTPATIENT
Start: 2021-01-01 | End: 2021-01-01 | Stop reason: HOSPADM

## 2021-01-01 RX ORDER — PIPERACILLIN SODIUM, TAZOBACTAM SODIUM 3; .375 G/15ML; G/15ML
3.38 INJECTION, POWDER, LYOPHILIZED, FOR SOLUTION INTRAVENOUS EVERY 6 HOURS
Status: DISCONTINUED | OUTPATIENT
Start: 2021-01-01 | End: 2021-01-01

## 2021-01-01 RX ORDER — DIPHENHYDRAMINE HYDROCHLORIDE 50 MG/ML
25 INJECTION INTRAMUSCULAR; INTRAVENOUS ONCE
Status: COMPLETED | OUTPATIENT
Start: 2021-01-01 | End: 2021-01-01

## 2021-01-01 RX ORDER — ATORVASTATIN CALCIUM 40 MG/1
40 TABLET, FILM COATED ORAL DAILY
Status: DISCONTINUED | OUTPATIENT
Start: 2021-01-01 | End: 2021-01-01

## 2021-01-01 RX ORDER — METHYLPREDNISOLONE SODIUM SUCCINATE 125 MG/2ML
125 INJECTION, POWDER, LYOPHILIZED, FOR SOLUTION INTRAMUSCULAR; INTRAVENOUS
Status: CANCELLED
Start: 2021-01-01

## 2021-01-01 RX ORDER — ACYCLOVIR 200 MG/1
800 CAPSULE ORAL 2 TIMES DAILY
Status: DISCONTINUED | OUTPATIENT
Start: 2021-01-01 | End: 2021-01-01 | Stop reason: HOSPADM

## 2021-01-01 RX ORDER — ONDANSETRON 2 MG/ML
4 INJECTION INTRAMUSCULAR; INTRAVENOUS ONCE
Status: ACTIVE | OUTPATIENT
Start: 2021-01-01

## 2021-01-01 RX ORDER — VANCOMYCIN HYDROCHLORIDE 125 MG/1
125 CAPSULE ORAL 4 TIMES DAILY
Qty: 12 CAPSULE | Refills: 0 | Status: SHIPPED | OUTPATIENT
Start: 2021-01-01 | End: 2021-01-01

## 2021-01-01 RX ORDER — DIPHENHYDRAMINE HYDROCHLORIDE 50 MG/ML
50 INJECTION INTRAMUSCULAR; INTRAVENOUS
Status: CANCELLED
Start: 2021-01-01

## 2021-01-01 RX ORDER — CALCIUM ACETATE 667 MG/1
667 CAPSULE ORAL
Status: DISCONTINUED | OUTPATIENT
Start: 2021-01-01 | End: 2021-01-01

## 2021-01-01 RX ORDER — PANTOPRAZOLE SODIUM 40 MG/1
40 TABLET, DELAYED RELEASE ORAL
Qty: 30 TABLET | Refills: 1 | Status: SHIPPED | OUTPATIENT
Start: 2021-01-01

## 2021-01-01 RX ORDER — MAGNESIUM SULFATE HEPTAHYDRATE 40 MG/ML
4 INJECTION, SOLUTION INTRAVENOUS ONCE
Status: COMPLETED | OUTPATIENT
Start: 2021-01-01 | End: 2021-01-01

## 2021-01-01 RX ORDER — POTASSIUM CHLORIDE 750 MG/1
40 TABLET, EXTENDED RELEASE ORAL ONCE
Status: COMPLETED | OUTPATIENT
Start: 2021-01-01 | End: 2021-01-01

## 2021-01-01 RX ADMIN — NYSTATIN 500000 UNITS: 500000 SUSPENSION ORAL at 19:43

## 2021-01-01 RX ADMIN — NYSTATIN 500000 UNITS: 500000 SUSPENSION ORAL at 12:41

## 2021-01-01 RX ADMIN — SODIUM CHLORIDE 6 MG: 9 INJECTION, SOLUTION INTRAVENOUS at 00:57

## 2021-01-01 RX ADMIN — TRIAMCINOLONE ACETONIDE: 1 CREAM TOPICAL at 09:37

## 2021-01-01 RX ADMIN — ONDANSETRON 8 MG: 4 TABLET, ORALLY DISINTEGRATING ORAL at 08:22

## 2021-01-01 RX ADMIN — SULFAMETHOXAZOLE AND TRIMETHOPRIM 1 TABLET: 800; 160 TABLET ORAL at 20:26

## 2021-01-01 RX ADMIN — ACYCLOVIR 200 MG: 200 CAPSULE ORAL at 08:22

## 2021-01-01 RX ADMIN — CALCIUM ACETATE 667 MG: 667 CAPSULE ORAL at 14:54

## 2021-01-01 RX ADMIN — SODIUM BICARBONATE 650 MG TABLET 1300 MG: at 13:48

## 2021-01-01 RX ADMIN — SULFAMETHOXAZOLE AND TRIMETHOPRIM 2 TABLET: 400; 80 TABLET ORAL at 20:08

## 2021-01-01 RX ADMIN — METHYLPREDNISOLONE SODIUM SUCCINATE 118.75 MG: 125 INJECTION, POWDER, FOR SOLUTION INTRAMUSCULAR; INTRAVENOUS at 09:46

## 2021-01-01 RX ADMIN — METOPROLOL SUCCINATE 25 MG: 25 TABLET, EXTENDED RELEASE ORAL at 07:47

## 2021-01-01 RX ADMIN — NYSTATIN 500000 UNITS: 500000 SUSPENSION ORAL at 15:18

## 2021-01-01 RX ADMIN — PANTOPRAZOLE SODIUM 40 MG: 40 TABLET, DELAYED RELEASE ORAL at 08:49

## 2021-01-01 RX ADMIN — SODIUM BICARBONATE 650 MG TABLET 1300 MG: at 14:40

## 2021-01-01 RX ADMIN — TRIAMCINOLONE ACETONIDE: 1 OINTMENT TOPICAL at 18:20

## 2021-01-01 RX ADMIN — NYSTATIN 500000 UNITS: 500000 SUSPENSION ORAL at 09:36

## 2021-01-01 RX ADMIN — PIPERACILLIN SODIUM AND TAZOBACTAM SODIUM 3.38 G: 3; .375 INJECTION, POWDER, LYOPHILIZED, FOR SOLUTION INTRAVENOUS at 05:21

## 2021-01-01 RX ADMIN — VANCOMYCIN HYDROCHLORIDE 125 MG: 125 CAPSULE ORAL at 08:22

## 2021-01-01 RX ADMIN — SODIUM CHLORIDE, PRESERVATIVE FREE 5 ML: 5 INJECTION INTRAVENOUS at 09:10

## 2021-01-01 RX ADMIN — SODIUM CHLORIDE: 9 INJECTION, SOLUTION INTRAVENOUS at 09:31

## 2021-01-01 RX ADMIN — POTASSIUM CHLORIDE 20 MEQ: 29.8 INJECTION, SOLUTION INTRAVENOUS at 22:11

## 2021-01-01 RX ADMIN — SODIUM BICARBONATE 650 MG TABLET 1300 MG: at 19:43

## 2021-01-01 RX ADMIN — METOPROLOL SUCCINATE 25 MG: 25 TABLET, EXTENDED RELEASE ORAL at 20:45

## 2021-01-01 RX ADMIN — DEXTROSE MONOHYDRATE 25 ML: 500 INJECTION PARENTERAL at 04:59

## 2021-01-01 RX ADMIN — SODIUM BICARBONATE 650 MG TABLET 1300 MG: at 14:32

## 2021-01-01 RX ADMIN — VANCOMYCIN HYDROCHLORIDE 125 MG: 125 CAPSULE ORAL at 08:02

## 2021-01-01 RX ADMIN — TRIAMCINOLONE ACETONIDE: 1 OINTMENT TOPICAL at 08:57

## 2021-01-01 RX ADMIN — NYSTATIN 500000 UNITS: 500000 SUSPENSION ORAL at 08:01

## 2021-01-01 RX ADMIN — ACYCLOVIR 800 MG: 800 TABLET ORAL at 20:33

## 2021-01-01 RX ADMIN — CALCIUM ACETATE 1334 MG: 667 CAPSULE ORAL at 07:58

## 2021-01-01 RX ADMIN — POTASSIUM CHLORIDE 20 MEQ: 29.8 INJECTION, SOLUTION INTRAVENOUS at 14:35

## 2021-01-01 RX ADMIN — METHYLPREDNISOLONE SODIUM SUCCINATE 118.75 MG: 125 INJECTION, POWDER, FOR SOLUTION INTRAMUSCULAR; INTRAVENOUS at 10:19

## 2021-01-01 RX ADMIN — METOPROLOL SUCCINATE 25 MG: 25 TABLET, EXTENDED RELEASE ORAL at 11:02

## 2021-01-01 RX ADMIN — SODIUM CHLORIDE 500 MG: 9 INJECTION, SOLUTION INTRAVENOUS at 23:10

## 2021-01-01 RX ADMIN — SODIUM CHLORIDE: 9 INJECTION, SOLUTION INTRAVENOUS at 08:56

## 2021-01-01 RX ADMIN — SODIUM CHLORIDE 1000 ML: 9 INJECTION, SOLUTION INTRAVENOUS at 03:25

## 2021-01-01 RX ADMIN — SULFAMETHOXAZOLE AND TRIMETHOPRIM 2 TABLET: 400; 80 TABLET ORAL at 19:18

## 2021-01-01 RX ADMIN — SULFAMETHOXAZOLE AND TRIMETHOPRIM 2 TABLET: 400; 80 TABLET ORAL at 07:54

## 2021-01-01 RX ADMIN — PANTOPRAZOLE SODIUM 40 MG: 40 TABLET, DELAYED RELEASE ORAL at 04:04

## 2021-01-01 RX ADMIN — Medication 5 ML: at 08:14

## 2021-01-01 RX ADMIN — PIPERACILLIN SODIUM AND TAZOBACTAM SODIUM 3.38 G: 3; .375 INJECTION, POWDER, LYOPHILIZED, FOR SOLUTION INTRAVENOUS at 12:00

## 2021-01-01 RX ADMIN — MICAFUNGIN SODIUM 150 MG: 50 INJECTION, POWDER, LYOPHILIZED, FOR SOLUTION INTRAVENOUS at 08:10

## 2021-01-01 RX ADMIN — HUMAN INSULIN 5.5 UNITS/HR: 100 INJECTION, SOLUTION SUBCUTANEOUS at 09:23

## 2021-01-01 RX ADMIN — VANCOMYCIN HYDROCHLORIDE 125 MG: 125 CAPSULE ORAL at 15:37

## 2021-01-01 RX ADMIN — VANCOMYCIN HYDROCHLORIDE 125 MG: 125 CAPSULE ORAL at 16:28

## 2021-01-01 RX ADMIN — NYSTATIN 500000 UNITS: 500000 SUSPENSION ORAL at 12:32

## 2021-01-01 RX ADMIN — PIPERACILLIN SODIUM AND TAZOBACTAM SODIUM 3.38 G: 3; .375 INJECTION, POWDER, LYOPHILIZED, FOR SOLUTION INTRAVENOUS at 05:04

## 2021-01-01 RX ADMIN — VANCOMYCIN HYDROCHLORIDE 125 MG: 125 CAPSULE ORAL at 09:34

## 2021-01-01 RX ADMIN — SODIUM CHLORIDE, PRESERVATIVE FREE 3 ML: 5 INJECTION INTRAVENOUS at 22:39

## 2021-01-01 RX ADMIN — FENTANYL CITRATE 100 MCG: 50 INJECTION, SOLUTION INTRAMUSCULAR; INTRAVENOUS at 09:55

## 2021-01-01 RX ADMIN — ACYCLOVIR 800 MG: 800 TABLET ORAL at 08:57

## 2021-01-01 RX ADMIN — NYSTATIN 500000 UNITS: 500000 SUSPENSION ORAL at 07:54

## 2021-01-01 RX ADMIN — PANTOPRAZOLE SODIUM 40 MG: 40 TABLET, DELAYED RELEASE ORAL at 08:25

## 2021-01-01 RX ADMIN — CALCIUM ACETATE 1334 MG: 667 CAPSULE ORAL at 16:34

## 2021-01-01 RX ADMIN — Medication 5 ML: at 09:20

## 2021-01-01 RX ADMIN — VANCOMYCIN HYDROCHLORIDE 125 MG: 125 CAPSULE ORAL at 16:34

## 2021-01-01 RX ADMIN — SODIUM BICARBONATE 650 MG TABLET 1300 MG: at 20:06

## 2021-01-01 RX ADMIN — PIPERACILLIN SODIUM AND TAZOBACTAM SODIUM 3.38 G: 3; .375 INJECTION, POWDER, LYOPHILIZED, FOR SOLUTION INTRAVENOUS at 22:29

## 2021-01-01 RX ADMIN — PANTOPRAZOLE SODIUM 40 MG: 40 TABLET, DELAYED RELEASE ORAL at 15:47

## 2021-01-01 RX ADMIN — ALBUMIN HUMAN 25 G: 0.05 INJECTION, SOLUTION INTRAVENOUS at 14:24

## 2021-01-01 RX ADMIN — TRIAMCINOLONE ACETONIDE: 1 CREAM TOPICAL at 19:43

## 2021-01-01 RX ADMIN — TRIAMCINOLONE ACETONIDE: 1 OINTMENT TOPICAL at 21:11

## 2021-01-01 RX ADMIN — SODIUM BICARBONATE 650 MG TABLET 1300 MG: at 21:09

## 2021-01-01 RX ADMIN — ACYCLOVIR 200 MG: 200 CAPSULE ORAL at 09:47

## 2021-01-01 RX ADMIN — VANCOMYCIN HYDROCHLORIDE 125 MG: 125 CAPSULE ORAL at 19:43

## 2021-01-01 RX ADMIN — VANCOMYCIN HYDROCHLORIDE 2000 MG: 1 INJECTION, POWDER, LYOPHILIZED, FOR SOLUTION INTRAVENOUS at 00:22

## 2021-01-01 RX ADMIN — HUMAN INSULIN 5.5 UNITS/HR: 100 INJECTION, SOLUTION SUBCUTANEOUS at 16:21

## 2021-01-01 RX ADMIN — NYSTATIN 500000 UNITS: 500000 SUSPENSION ORAL at 20:33

## 2021-01-01 RX ADMIN — FLUDEOXYGLUCOSE F-18 14.36 MCI.: 500 INJECTION, SOLUTION INTRAVENOUS at 08:00

## 2021-01-01 RX ADMIN — CALCIUM ACETATE 1334 MG: 667 CAPSULE ORAL at 13:48

## 2021-01-01 RX ADMIN — LOPERAMIDE HYDROCHLORIDE 2 MG: 2 CAPSULE ORAL at 13:20

## 2021-01-01 RX ADMIN — PREDNISONE 110 MG: 10 TABLET ORAL at 08:01

## 2021-01-01 RX ADMIN — PIPERACILLIN SODIUM AND TAZOBACTAM SODIUM 3.38 G: 3; .375 INJECTION, POWDER, LYOPHILIZED, FOR SOLUTION INTRAVENOUS at 04:38

## 2021-01-01 RX ADMIN — VANCOMYCIN HYDROCHLORIDE 125 MG: 125 CAPSULE ORAL at 07:59

## 2021-01-01 RX ADMIN — TRIAMCINOLONE ACETONIDE: 1 CREAM TOPICAL at 09:48

## 2021-01-01 RX ADMIN — ENOXAPARIN SODIUM 40 MG: 40 INJECTION SUBCUTANEOUS at 08:02

## 2021-01-01 RX ADMIN — PIPERACILLIN SODIUM AND TAZOBACTAM SODIUM 4.5 G: 4; .5 INJECTION, POWDER, LYOPHILIZED, FOR SOLUTION INTRAVENOUS at 12:44

## 2021-01-01 RX ADMIN — Medication 5 ML: at 07:40

## 2021-01-01 RX ADMIN — NYSTATIN 500000 UNITS: 500000 SUSPENSION ORAL at 12:15

## 2021-01-01 RX ADMIN — ACETAMINOPHEN 650 MG: 325 TABLET, FILM COATED ORAL at 20:45

## 2021-01-01 RX ADMIN — POTASSIUM CHLORIDE, DEXTROSE MONOHYDRATE AND SODIUM CHLORIDE: 150; 5; 450 INJECTION, SOLUTION INTRAVENOUS at 15:16

## 2021-01-01 RX ADMIN — SODIUM CHLORIDE, POTASSIUM CHLORIDE, SODIUM LACTATE AND CALCIUM CHLORIDE: 600; 310; 30; 20 INJECTION, SOLUTION INTRAVENOUS at 21:05

## 2021-01-01 RX ADMIN — SODIUM BICARBONATE 650 MG TABLET 1300 MG: at 08:22

## 2021-01-01 RX ADMIN — CALCIUM ACETATE 1334 MG: 667 CAPSULE ORAL at 17:56

## 2021-01-01 RX ADMIN — HUMAN INSULIN: 100 INJECTION, SOLUTION SUBCUTANEOUS at 20:34

## 2021-01-01 RX ADMIN — INSULIN HUMAN 30 UNITS: 100 INJECTION, SUSPENSION SUBCUTANEOUS at 08:01

## 2021-01-01 RX ADMIN — ENOXAPARIN SODIUM 40 MG: 100 INJECTION SUBCUTANEOUS at 08:41

## 2021-01-01 RX ADMIN — ATORVASTATIN CALCIUM 40 MG: 40 TABLET, FILM COATED ORAL at 08:57

## 2021-01-01 RX ADMIN — HUMAN INSULIN 6 UNITS/HR: 100 INJECTION, SOLUTION SUBCUTANEOUS at 13:05

## 2021-01-01 RX ADMIN — TRIAMCINOLONE ACETONIDE: 1 CREAM TOPICAL at 10:53

## 2021-01-01 RX ADMIN — PANTOPRAZOLE SODIUM 40 MG: 40 TABLET, DELAYED RELEASE ORAL at 08:02

## 2021-01-01 RX ADMIN — METHYLPREDNISOLONE SODIUM SUCCINATE 118.75 MG: 125 INJECTION, POWDER, FOR SOLUTION INTRAMUSCULAR; INTRAVENOUS at 09:35

## 2021-01-01 RX ADMIN — NYSTATIN 500000 UNITS: 500000 SUSPENSION ORAL at 12:30

## 2021-01-01 RX ADMIN — POTASSIUM CHLORIDE AND SODIUM CHLORIDE: 900; 150 INJECTION, SOLUTION INTRAVENOUS at 02:32

## 2021-01-01 RX ADMIN — NYSTATIN 500000 UNITS: 500000 SUSPENSION ORAL at 09:20

## 2021-01-01 RX ADMIN — TRIAMCINOLONE ACETONIDE: 1 OINTMENT TOPICAL at 20:27

## 2021-01-01 RX ADMIN — METOPROLOL SUCCINATE 25 MG: 25 TABLET, EXTENDED RELEASE ORAL at 08:22

## 2021-01-01 RX ADMIN — VANCOMYCIN HYDROCHLORIDE 125 MG: 125 CAPSULE ORAL at 16:03

## 2021-01-01 RX ADMIN — ENOXAPARIN SODIUM 40 MG: 100 INJECTION SUBCUTANEOUS at 08:57

## 2021-01-01 RX ADMIN — PANTOPRAZOLE SODIUM 40 MG: 40 TABLET, DELAYED RELEASE ORAL at 08:00

## 2021-01-01 RX ADMIN — NYSTATIN 500000 UNITS: 500000 SUSPENSION ORAL at 15:47

## 2021-01-01 RX ADMIN — SODIUM BICARBONATE 650 MG TABLET 1300 MG: at 09:47

## 2021-01-01 RX ADMIN — ACYCLOVIR 200 MG: 200 CAPSULE ORAL at 07:58

## 2021-01-01 RX ADMIN — ACYCLOVIR 800 MG: 800 TABLET ORAL at 07:47

## 2021-01-01 RX ADMIN — IOPAMIDOL 135 ML: 755 INJECTION, SOLUTION INTRAVENOUS at 08:51

## 2021-01-01 RX ADMIN — MAGNESIUM SULFATE IN WATER 4 G: 40 INJECTION, SOLUTION INTRAVENOUS at 15:30

## 2021-01-01 RX ADMIN — HUMAN INSULIN: 100 INJECTION, SOLUTION SUBCUTANEOUS at 14:18

## 2021-01-01 RX ADMIN — VANCOMYCIN HYDROCHLORIDE 1500 MG: 10 INJECTION, POWDER, LYOPHILIZED, FOR SOLUTION INTRAVENOUS at 08:57

## 2021-01-01 RX ADMIN — POTASSIUM CHLORIDE AND SODIUM CHLORIDE: 900; 150 INJECTION, SOLUTION INTRAVENOUS at 13:34

## 2021-01-01 RX ADMIN — Medication 5 ML: at 08:29

## 2021-01-01 RX ADMIN — METOPROLOL SUCCINATE 25 MG: 25 TABLET, EXTENDED RELEASE ORAL at 19:39

## 2021-01-01 RX ADMIN — ACYCLOVIR 400 MG: 200 CAPSULE ORAL at 08:00

## 2021-01-01 RX ADMIN — Medication 5 ML: at 07:20

## 2021-01-01 RX ADMIN — NYSTATIN 500000 UNITS: 500000 SUSPENSION ORAL at 09:48

## 2021-01-01 RX ADMIN — SODIUM CHLORIDE, PRESERVATIVE FREE 3 ML: 5 INJECTION INTRAVENOUS at 07:18

## 2021-01-01 RX ADMIN — VANCOMYCIN HYDROCHLORIDE 125 MG: 125 CAPSULE ORAL at 12:41

## 2021-01-01 RX ADMIN — Medication: at 18:45

## 2021-01-01 RX ADMIN — ENOXAPARIN SODIUM 40 MG: 100 INJECTION SUBCUTANEOUS at 07:47

## 2021-01-01 RX ADMIN — SODIUM BICARBONATE 650 MG TABLET 1300 MG: at 19:48

## 2021-01-01 RX ADMIN — FUROSEMIDE 40 MG: 20 TABLET ORAL at 10:04

## 2021-01-01 RX ADMIN — NYSTATIN 500000 UNITS: 500000 SUSPENSION ORAL at 12:22

## 2021-01-01 RX ADMIN — VANCOMYCIN HYDROCHLORIDE 125 MG: 125 CAPSULE ORAL at 19:39

## 2021-01-01 RX ADMIN — VANCOMYCIN HYDROCHLORIDE 125 MG: 125 CAPSULE ORAL at 21:09

## 2021-01-01 RX ADMIN — HUMAN INSULIN 4 UNITS/HR: 100 INJECTION, SOLUTION SUBCUTANEOUS at 18:47

## 2021-01-01 RX ADMIN — VANCOMYCIN HYDROCHLORIDE 125 MG: 125 CAPSULE ORAL at 12:22

## 2021-01-01 RX ADMIN — ACETAMINOPHEN 650 MG: 325 TABLET, FILM COATED ORAL at 00:03

## 2021-01-01 RX ADMIN — ACYCLOVIR 200 MG: 200 CAPSULE ORAL at 19:43

## 2021-01-01 RX ADMIN — TRIAMCINOLONE ACETONIDE: 1 CREAM TOPICAL at 08:25

## 2021-01-01 RX ADMIN — VANCOMYCIN HYDROCHLORIDE 125 MG: 125 CAPSULE ORAL at 09:47

## 2021-01-01 RX ADMIN — ONDANSETRON 4 MG: 2 INJECTION INTRAMUSCULAR; INTRAVENOUS at 04:06

## 2021-01-01 RX ADMIN — SODIUM CHLORIDE, PRESERVATIVE FREE 3 ML: 5 INJECTION INTRAVENOUS at 03:38

## 2021-01-01 RX ADMIN — SULFAMETHOXAZOLE AND TRIMETHOPRIM 2 TABLET: 400; 80 TABLET ORAL at 08:00

## 2021-01-01 RX ADMIN — Medication 500 UNITS: at 08:48

## 2021-01-01 RX ADMIN — METOPROLOL SUCCINATE 25 MG: 25 TABLET, EXTENDED RELEASE ORAL at 08:01

## 2021-01-01 RX ADMIN — Medication 1 TABLET: at 21:21

## 2021-01-01 RX ADMIN — HUMAN INSULIN 4 UNITS/HR: 100 INJECTION, SOLUTION SUBCUTANEOUS at 23:58

## 2021-01-01 RX ADMIN — SODIUM CHLORIDE 1000 ML: 9 INJECTION, SOLUTION INTRAVENOUS at 09:33

## 2021-01-01 RX ADMIN — CALCIUM ACETATE 1334 MG: 667 CAPSULE ORAL at 12:22

## 2021-01-01 RX ADMIN — ACETAMINOPHEN 650 MG: 325 TABLET, FILM COATED ORAL at 08:57

## 2021-01-01 RX ADMIN — METOPROLOL SUCCINATE 25 MG: 25 TABLET, EXTENDED RELEASE ORAL at 20:05

## 2021-01-01 RX ADMIN — INSULIN HUMAN 26 UNITS: 100 INJECTION, SUSPENSION SUBCUTANEOUS at 16:31

## 2021-01-01 RX ADMIN — SODIUM BICARBONATE 650 MG TABLET 1300 MG: at 07:58

## 2021-01-01 RX ADMIN — VANCOMYCIN HYDROCHLORIDE 125 MG: 125 CAPSULE ORAL at 08:00

## 2021-01-01 RX ADMIN — TRIAMCINOLONE ACETONIDE: 1 CREAM TOPICAL at 08:02

## 2021-01-01 RX ADMIN — PIPERACILLIN SODIUM AND TAZOBACTAM SODIUM 3.38 G: 3; .375 INJECTION, POWDER, LYOPHILIZED, FOR SOLUTION INTRAVENOUS at 17:36

## 2021-01-01 RX ADMIN — SODIUM BICARBONATE 650 MG TABLET 1300 MG: at 14:54

## 2021-01-01 RX ADMIN — PANTOPRAZOLE SODIUM 40 MG: 40 TABLET, DELAYED RELEASE ORAL at 16:16

## 2021-01-01 RX ADMIN — CALCIUM ACETATE 1334 MG: 667 CAPSULE ORAL at 08:23

## 2021-01-01 RX ADMIN — ATORVASTATIN CALCIUM 40 MG: 40 TABLET, FILM COATED ORAL at 08:32

## 2021-01-01 RX ADMIN — HUMAN INSULIN 3 UNITS/HR: 100 INJECTION, SOLUTION SUBCUTANEOUS at 09:42

## 2021-01-01 RX ADMIN — SODIUM CHLORIDE 1000 ML: 9 INJECTION, SOLUTION INTRAVENOUS at 21:54

## 2021-01-01 RX ADMIN — DIPHENHYDRAMINE HYDROCHLORIDE AND ZINC ACETATE: 10; 1 CREAM TOPICAL at 17:42

## 2021-01-01 RX ADMIN — IOPAMIDOL 135 ML: 755 INJECTION, SOLUTION INTRAVENOUS at 08:47

## 2021-01-01 RX ADMIN — METOPROLOL SUCCINATE 25 MG: 25 TABLET, EXTENDED RELEASE ORAL at 07:58

## 2021-01-01 RX ADMIN — VANCOMYCIN HYDROCHLORIDE 125 MG: 125 CAPSULE ORAL at 16:38

## 2021-01-01 RX ADMIN — PREDNISONE 110 MG: 10 TABLET ORAL at 07:54

## 2021-01-01 RX ADMIN — SODIUM CHLORIDE, PRESERVATIVE FREE 5 ML: 5 INJECTION INTRAVENOUS at 11:34

## 2021-01-01 RX ADMIN — PIPERACILLIN SODIUM AND TAZOBACTAM SODIUM 4.5 G: 4; .5 INJECTION, POWDER, LYOPHILIZED, FOR SOLUTION INTRAVENOUS at 06:42

## 2021-01-01 RX ADMIN — INSULIN ASPART 4 UNITS: 100 INJECTION, SOLUTION INTRAVENOUS; SUBCUTANEOUS at 20:27

## 2021-01-01 RX ADMIN — PANTOPRAZOLE SODIUM 40 MG: 40 TABLET, DELAYED RELEASE ORAL at 16:21

## 2021-01-01 RX ADMIN — ONDANSETRON 8 MG: 4 TABLET, ORALLY DISINTEGRATING ORAL at 16:25

## 2021-01-01 RX ADMIN — ALBUMIN HUMAN 500 ML: 0.05 INJECTION, SOLUTION INTRAVENOUS at 13:00

## 2021-01-01 RX ADMIN — NYSTATIN 500000 UNITS: 500000 SUSPENSION ORAL at 13:48

## 2021-01-01 RX ADMIN — ONDANSETRON 8 MG: 4 TABLET, ORALLY DISINTEGRATING ORAL at 15:11

## 2021-01-01 RX ADMIN — NYSTATIN 500000 UNITS: 500000 SUSPENSION ORAL at 16:38

## 2021-01-01 RX ADMIN — CEFEPIME HYDROCHLORIDE 2 G: 2 INJECTION, POWDER, FOR SOLUTION INTRAVENOUS at 20:44

## 2021-01-01 RX ADMIN — ACYCLOVIR 400 MG: 200 CAPSULE ORAL at 19:39

## 2021-01-01 RX ADMIN — INSULIN HUMAN 30 UNITS: 100 INJECTION, SUSPENSION SUBCUTANEOUS at 08:06

## 2021-01-01 RX ADMIN — FLUDEOXYGLUCOSE F-18 14.43 MCI.: 500 INJECTION, SOLUTION INTRAVENOUS at 07:01

## 2021-01-01 RX ADMIN — PANTOPRAZOLE SODIUM 40 MG: 40 TABLET, DELAYED RELEASE ORAL at 03:25

## 2021-01-01 RX ADMIN — SODIUM CHLORIDE, PRESERVATIVE FREE 5 ML: 5 INJECTION INTRAVENOUS at 09:37

## 2021-01-01 RX ADMIN — SODIUM CHLORIDE, PRESERVATIVE FREE 3 ML: 5 INJECTION INTRAVENOUS at 05:21

## 2021-01-01 RX ADMIN — ENOXAPARIN SODIUM 40 MG: 40 INJECTION SUBCUTANEOUS at 09:39

## 2021-01-01 RX ADMIN — HUMAN ALBUMIN MICROSPHERES AND PERFLUTREN 5 ML: 10; .22 INJECTION, SOLUTION INTRAVENOUS at 09:09

## 2021-01-01 RX ADMIN — SODIUM CHLORIDE: 9 INJECTION, SOLUTION INTRAVENOUS at 01:47

## 2021-01-01 RX ADMIN — SODIUM CHLORIDE, POTASSIUM CHLORIDE, SODIUM LACTATE AND CALCIUM CHLORIDE 1000 ML: 600; 310; 30; 20 INJECTION, SOLUTION INTRAVENOUS at 21:03

## 2021-01-01 RX ADMIN — NYSTATIN 500000 UNITS: 500000 SUSPENSION ORAL at 20:05

## 2021-01-01 RX ADMIN — INSULIN ASPART 4 UNITS: 100 INJECTION, SOLUTION INTRAVENOUS; SUBCUTANEOUS at 14:03

## 2021-01-01 RX ADMIN — SODIUM CHLORIDE, PRESERVATIVE FREE 3 ML: 5 INJECTION INTRAVENOUS at 14:22

## 2021-01-01 RX ADMIN — SODIUM CHLORIDE 1000 ML: 9 INJECTION, SOLUTION INTRAVENOUS at 00:13

## 2021-01-01 RX ADMIN — TRIAMCINOLONE ACETONIDE: 1 CREAM TOPICAL at 21:09

## 2021-01-01 RX ADMIN — NYSTATIN 500000 UNITS: 500000 SUSPENSION ORAL at 13:06

## 2021-01-01 RX ADMIN — INSULIN HUMAN 30 UNITS: 100 INJECTION, SUSPENSION SUBCUTANEOUS at 09:19

## 2021-01-01 RX ADMIN — POTASSIUM CHLORIDE, DEXTROSE MONOHYDRATE AND SODIUM CHLORIDE: 150; 5; 450 INJECTION, SOLUTION INTRAVENOUS at 00:07

## 2021-01-01 RX ADMIN — VANCOMYCIN HYDROCHLORIDE 125 MG: 125 CAPSULE ORAL at 19:48

## 2021-01-01 RX ADMIN — ACYCLOVIR 800 MG: 800 TABLET ORAL at 08:47

## 2021-01-01 RX ADMIN — PIPERACILLIN SODIUM AND TAZOBACTAM SODIUM 3.38 G: 3; .375 INJECTION, POWDER, LYOPHILIZED, FOR SOLUTION INTRAVENOUS at 01:14

## 2021-01-01 RX ADMIN — LOPERAMIDE HYDROCHLORIDE 2 MG: 2 CAPSULE ORAL at 20:33

## 2021-01-01 RX ADMIN — ACETAMINOPHEN 650 MG: 325 TABLET, FILM COATED ORAL at 14:42

## 2021-01-01 RX ADMIN — ACYCLOVIR 800 MG: 800 TABLET ORAL at 20:26

## 2021-01-01 RX ADMIN — NYSTATIN 500000 UNITS: 500000 SUSPENSION ORAL at 16:16

## 2021-01-01 RX ADMIN — ACYCLOVIR 800 MG: 800 TABLET ORAL at 08:32

## 2021-01-01 RX ADMIN — PANTOPRAZOLE SODIUM 40 MG: 40 TABLET, DELAYED RELEASE ORAL at 16:28

## 2021-01-01 RX ADMIN — SODIUM CHLORIDE, POTASSIUM CHLORIDE, SODIUM LACTATE AND CALCIUM CHLORIDE 1000 ML: 600; 310; 30; 20 INJECTION, SOLUTION INTRAVENOUS at 02:41

## 2021-01-01 RX ADMIN — PIPERACILLIN SODIUM AND TAZOBACTAM SODIUM 3.38 G: 3; .375 INJECTION, POWDER, LYOPHILIZED, FOR SOLUTION INTRAVENOUS at 23:23

## 2021-01-01 RX ADMIN — SODIUM CHLORIDE, PRESERVATIVE FREE 5 ML: 5 INJECTION INTRAVENOUS at 07:06

## 2021-01-01 RX ADMIN — PIPERACILLIN SODIUM AND TAZOBACTAM SODIUM 3.38 G: 3; .375 INJECTION, POWDER, LYOPHILIZED, FOR SOLUTION INTRAVENOUS at 23:42

## 2021-01-01 RX ADMIN — NYSTATIN 500000 UNITS: 500000 SUSPENSION ORAL at 16:21

## 2021-01-01 RX ADMIN — ACYCLOVIR 800 MG: 800 TABLET ORAL at 20:45

## 2021-01-01 RX ADMIN — NYSTATIN 500000 UNITS: 500000 SUSPENSION ORAL at 21:09

## 2021-01-01 RX ADMIN — DEXTROSE MONOHYDRATE 25 ML: 500 INJECTION PARENTERAL at 05:19

## 2021-01-01 RX ADMIN — VANCOMYCIN HYDROCHLORIDE 2000 MG: 1 INJECTION, POWDER, LYOPHILIZED, FOR SOLUTION INTRAVENOUS at 02:37

## 2021-01-01 RX ADMIN — PANTOPRAZOLE SODIUM 40 MG: 40 TABLET, DELAYED RELEASE ORAL at 07:54

## 2021-01-01 RX ADMIN — ACETAMINOPHEN 650 MG: 325 TABLET, FILM COATED ORAL at 05:02

## 2021-01-01 RX ADMIN — ACETAMINOPHEN 650 MG: 325 TABLET, FILM COATED ORAL at 11:29

## 2021-01-01 RX ADMIN — SULFAMETHOXAZOLE AND TRIMETHOPRIM 1 TABLET: 800; 160 TABLET ORAL at 08:32

## 2021-01-01 RX ADMIN — INSULIN HUMAN 30 UNITS: 100 INJECTION, SUSPENSION SUBCUTANEOUS at 07:59

## 2021-01-01 RX ADMIN — NYSTATIN 500000 UNITS: 500000 SUSPENSION ORAL at 08:47

## 2021-01-01 RX ADMIN — Medication 500 UNITS: at 08:51

## 2021-01-01 RX ADMIN — INSULIN ASPART 2 UNITS: 100 INJECTION, SOLUTION INTRAVENOUS; SUBCUTANEOUS at 19:17

## 2021-01-01 RX ADMIN — VANCOMYCIN HYDROCHLORIDE 125 MG: 125 CAPSULE ORAL at 13:48

## 2021-01-01 RX ADMIN — ACYCLOVIR 800 MG: 200 CAPSULE ORAL at 20:08

## 2021-01-01 RX ADMIN — NYSTATIN 500000 UNITS: 500000 SUSPENSION ORAL at 19:19

## 2021-01-01 RX ADMIN — SODIUM CHLORIDE, PRESERVATIVE FREE 3 ML: 5 INJECTION INTRAVENOUS at 03:22

## 2021-01-01 RX ADMIN — SODIUM BICARBONATE 650 MG TABLET 1300 MG: at 08:46

## 2021-01-01 RX ADMIN — PREDNISONE 110 MG: 10 TABLET ORAL at 08:22

## 2021-01-01 RX ADMIN — VANCOMYCIN HYDROCHLORIDE 125 MG: 125 CAPSULE ORAL at 12:40

## 2021-01-01 RX ADMIN — LIDOCAINE HYDROCHLORIDE 10 ML: 10 INJECTION, SOLUTION EPIDURAL; INFILTRATION; INTRACAUDAL; PERINEURAL at 09:56

## 2021-01-01 RX ADMIN — DIPHENHYDRAMINE HYDROCHLORIDE 25 MG: 50 INJECTION, SOLUTION INTRAMUSCULAR; INTRAVENOUS at 00:40

## 2021-01-01 RX ADMIN — VANCOMYCIN HYDROCHLORIDE 125 MG: 125 CAPSULE ORAL at 16:21

## 2021-01-01 RX ADMIN — FUROSEMIDE 100 MG: 10 INJECTION, SOLUTION INTRAVENOUS at 17:51

## 2021-01-01 RX ADMIN — INSULIN HUMAN 26 UNITS: 100 INJECTION, SUSPENSION SUBCUTANEOUS at 16:34

## 2021-01-01 RX ADMIN — ACYCLOVIR 200 MG: 200 CAPSULE ORAL at 19:48

## 2021-01-01 RX ADMIN — CALCIUM ACETATE 667 MG: 667 CAPSULE ORAL at 18:48

## 2021-01-01 RX ADMIN — CALCIUM GLUCONATE 2 G: 98 INJECTION, SOLUTION INTRAVENOUS at 14:18

## 2021-01-01 RX ADMIN — VANCOMYCIN HYDROCHLORIDE 125 MG: 125 CAPSULE ORAL at 13:06

## 2021-01-01 RX ADMIN — SODIUM CHLORIDE 500 MG: 9 INJECTION, SOLUTION INTRAVENOUS at 12:14

## 2021-01-01 RX ADMIN — ATORVASTATIN CALCIUM 40 MG: 40 TABLET, FILM COATED ORAL at 07:47

## 2021-01-01 RX ADMIN — CALCIUM CARBONATE (ANTACID) CHEW TAB 500 MG 500 MG: 500 CHEW TAB at 21:13

## 2021-01-01 RX ADMIN — CALCIUM ACETATE 1334 MG: 667 CAPSULE ORAL at 09:34

## 2021-01-01 RX ADMIN — PREDNISONE 110 MG: 10 TABLET ORAL at 07:58

## 2021-01-01 RX ADMIN — ACYCLOVIR 800 MG: 800 TABLET ORAL at 21:17

## 2021-01-01 RX ADMIN — ONDANSETRON 8 MG: 4 TABLET, ORALLY DISINTEGRATING ORAL at 15:47

## 2021-01-01 RX ADMIN — PANTOPRAZOLE SODIUM 40 MG: 40 TABLET, DELAYED RELEASE ORAL at 09:47

## 2021-01-01 RX ADMIN — HUMAN INSULIN 7 UNITS/HR: 100 INJECTION, SOLUTION SUBCUTANEOUS at 14:04

## 2021-01-01 RX ADMIN — ACETAMINOPHEN 650 MG: 325 TABLET, FILM COATED ORAL at 22:15

## 2021-01-01 RX ADMIN — HUMAN INSULIN 3 UNITS/HR: 100 INJECTION, SOLUTION SUBCUTANEOUS at 16:27

## 2021-01-01 RX ADMIN — VANCOMYCIN HYDROCHLORIDE 125 MG: 125 CAPSULE ORAL at 07:54

## 2021-01-01 RX ADMIN — METOPROLOL SUCCINATE 25 MG: 25 TABLET, EXTENDED RELEASE ORAL at 07:54

## 2021-01-01 RX ADMIN — NYSTATIN 500000 UNITS: 500000 SUSPENSION ORAL at 12:40

## 2021-01-01 RX ADMIN — INSULIN ASPART 1 UNITS: 100 INJECTION, SOLUTION INTRAVENOUS; SUBCUTANEOUS at 09:04

## 2021-01-01 RX ADMIN — VANCOMYCIN HYDROCHLORIDE 125 MG: 125 CAPSULE ORAL at 12:32

## 2021-01-01 RX ADMIN — MIDAZOLAM 2 MG: 1 INJECTION INTRAMUSCULAR; INTRAVENOUS at 09:55

## 2021-01-01 RX ADMIN — SODIUM BICARBONATE 650 MG TABLET 1300 MG: at 09:35

## 2021-01-01 RX ADMIN — INSULIN ASPART 1 UNITS: 100 INJECTION, SOLUTION INTRAVENOUS; SUBCUTANEOUS at 18:19

## 2021-01-01 RX ADMIN — CALCIUM CARBONATE (ANTACID) CHEW TAB 500 MG 500 MG: 500 CHEW TAB at 19:24

## 2021-01-01 RX ADMIN — TRIAMCINOLONE ACETONIDE: 1 CREAM TOPICAL at 20:07

## 2021-01-01 RX ADMIN — SODIUM CHLORIDE 500 MG: 9 INJECTION, SOLUTION INTRAVENOUS at 23:05

## 2021-01-01 RX ADMIN — ONDANSETRON 8 MG: 4 TABLET, ORALLY DISINTEGRATING ORAL at 07:56

## 2021-01-01 RX ADMIN — PIPERACILLIN SODIUM AND TAZOBACTAM SODIUM 3.38 G: 3; .375 INJECTION, POWDER, LYOPHILIZED, FOR SOLUTION INTRAVENOUS at 11:13

## 2021-01-01 RX ADMIN — HUMAN INSULIN: 100 INJECTION, SOLUTION SUBCUTANEOUS at 10:55

## 2021-01-01 RX ADMIN — NYSTATIN 500000 UNITS: 500000 SUSPENSION ORAL at 19:48

## 2021-01-01 RX ADMIN — CALCIUM CARBONATE (ANTACID) CHEW TAB 500 MG 500 MG: 500 CHEW TAB at 02:45

## 2021-01-01 RX ADMIN — PIPERACILLIN SODIUM AND TAZOBACTAM SODIUM 3.38 G: 3; .375 INJECTION, POWDER, LYOPHILIZED, FOR SOLUTION INTRAVENOUS at 11:27

## 2021-01-01 RX ADMIN — Medication 1 TABLET: at 08:02

## 2021-01-01 RX ADMIN — Medication 5 ML: at 08:28

## 2021-01-01 RX ADMIN — ACYCLOVIR 200 MG: 200 CAPSULE ORAL at 09:34

## 2021-01-01 RX ADMIN — NYSTATIN 500000 UNITS: 500000 SUSPENSION ORAL at 12:07

## 2021-01-01 RX ADMIN — ONDANSETRON 8 MG: 4 TABLET, ORALLY DISINTEGRATING ORAL at 09:19

## 2021-01-01 RX ADMIN — VANCOMYCIN HYDROCHLORIDE 125 MG: 125 CAPSULE ORAL at 19:18

## 2021-01-01 RX ADMIN — INSULIN HUMAN 26 UNITS: 100 INJECTION, SUSPENSION SUBCUTANEOUS at 16:42

## 2021-01-01 RX ADMIN — ACYCLOVIR 800 MG: 200 CAPSULE ORAL at 08:01

## 2021-01-01 RX ADMIN — VANCOMYCIN HYDROCHLORIDE 125 MG: 125 CAPSULE ORAL at 12:07

## 2021-01-01 RX ADMIN — NYSTATIN 500000 UNITS: 500000 SUSPENSION ORAL at 08:23

## 2021-01-01 RX ADMIN — VANCOMYCIN HYDROCHLORIDE 125 MG: 125 CAPSULE ORAL at 20:04

## 2021-01-01 RX ADMIN — ACYCLOVIR 800 MG: 200 CAPSULE ORAL at 07:54

## 2021-01-01 RX ADMIN — FUROSEMIDE 100 MG: 10 INJECTION, SOLUTION INTRAVENOUS at 10:06

## 2021-01-01 RX ADMIN — TRIAMCINOLONE ACETONIDE: 1 CREAM TOPICAL at 11:14

## 2021-01-01 RX ADMIN — NYSTATIN 500000 UNITS: 500000 SUSPENSION ORAL at 20:06

## 2021-01-01 RX ADMIN — PANTOPRAZOLE SODIUM 40 MG: 40 TABLET, DELAYED RELEASE ORAL at 09:36

## 2021-01-01 RX ADMIN — NYSTATIN 500000 UNITS: 500000 SUSPENSION ORAL at 07:58

## 2021-01-01 RX ADMIN — SODIUM CHLORIDE, PRESERVATIVE FREE 5 ML: 5 INJECTION INTRAVENOUS at 10:50

## 2021-01-01 RX ADMIN — PANTOPRAZOLE SODIUM 40 MG: 40 TABLET, DELAYED RELEASE ORAL at 16:38

## 2021-01-01 RX ADMIN — METOPROLOL SUCCINATE 25 MG: 25 TABLET, EXTENDED RELEASE ORAL at 19:48

## 2021-01-01 RX ADMIN — SODIUM CHLORIDE, PRESERVATIVE FREE 5 ML: 5 INJECTION INTRAVENOUS at 13:38

## 2021-01-01 RX ADMIN — VANCOMYCIN HYDROCHLORIDE 1500 MG: 10 INJECTION, POWDER, LYOPHILIZED, FOR SOLUTION INTRAVENOUS at 12:16

## 2021-01-01 RX ADMIN — NYSTATIN 500000 UNITS: 500000 SUSPENSION ORAL at 16:28

## 2021-01-01 RX ADMIN — METOPROLOL SUCCINATE 25 MG: 25 TABLET, EXTENDED RELEASE ORAL at 08:02

## 2021-01-01 RX ADMIN — PIPERACILLIN SODIUM AND TAZOBACTAM SODIUM 3.38 G: 3; .375 INJECTION, POWDER, LYOPHILIZED, FOR SOLUTION INTRAVENOUS at 16:47

## 2021-01-01 RX ADMIN — FLUDEOXYGLUCOSE F-18 14.15 MCI.: 500 INJECTION, SOLUTION INTRAVENOUS at 08:44

## 2021-01-01 RX ADMIN — ACYCLOVIR 800 MG: 200 CAPSULE ORAL at 19:18

## 2021-01-01 RX ADMIN — SODIUM CHLORIDE 1000 ML: 9 INJECTION, SOLUTION INTRAVENOUS at 11:13

## 2021-01-01 RX ADMIN — MIDAZOLAM HYDROCHLORIDE 2 MG: 1 INJECTION, SOLUTION INTRAMUSCULAR; INTRAVENOUS at 07:50

## 2021-01-01 RX ADMIN — NYSTATIN 500000 UNITS: 500000 SUSPENSION ORAL at 16:03

## 2021-01-01 RX ADMIN — SODIUM CHLORIDE, POTASSIUM CHLORIDE, SODIUM LACTATE AND CALCIUM CHLORIDE 1000 ML: 600; 310; 30; 20 INJECTION, SOLUTION INTRAVENOUS at 17:02

## 2021-01-01 RX ADMIN — SODIUM CHLORIDE 1000 ML: 9 INJECTION, SOLUTION INTRAVENOUS at 06:39

## 2021-01-01 RX ADMIN — NYSTATIN 500000 UNITS: 500000 SUSPENSION ORAL at 19:39

## 2021-01-01 RX ADMIN — SODIUM CHLORIDE 500 MG: 9 INJECTION, SOLUTION INTRAVENOUS at 10:48

## 2021-01-01 RX ADMIN — METOPROLOL SUCCINATE 25 MG: 25 TABLET, EXTENDED RELEASE ORAL at 20:06

## 2021-01-01 RX ADMIN — SODIUM CHLORIDE 1000 ML: 9 INJECTION, SOLUTION INTRAVENOUS at 02:03

## 2021-01-01 RX ADMIN — PANTOPRAZOLE SODIUM 40 MG: 40 TABLET, DELAYED RELEASE ORAL at 07:58

## 2021-01-01 RX ADMIN — VANCOMYCIN HYDROCHLORIDE 125 MG: 125 CAPSULE ORAL at 20:06

## 2021-01-01 RX ADMIN — TRIAMCINOLONE ACETONIDE: 1 CREAM TOPICAL at 08:48

## 2021-01-01 RX ADMIN — NYSTATIN 500000 UNITS: 500000 SUSPENSION ORAL at 08:00

## 2021-01-01 RX ADMIN — HUMAN INSULIN 1 UNITS/HR: 100 INJECTION, SOLUTION SUBCUTANEOUS at 08:37

## 2021-01-01 RX ADMIN — ACYCLOVIR 200 MG: 200 CAPSULE ORAL at 20:06

## 2021-01-01 RX ADMIN — POTASSIUM CHLORIDE 40 MEQ: 750 TABLET, EXTENDED RELEASE ORAL at 07:58

## 2021-01-01 RX ADMIN — SULFAMETHOXAZOLE AND TRIMETHOPRIM 1 TABLET: 800; 160 TABLET ORAL at 08:57

## 2021-01-01 RX ADMIN — NYSTATIN 500000 UNITS: 500000 SUSPENSION ORAL at 15:37

## 2021-01-01 RX ADMIN — SODIUM CHLORIDE, PRESERVATIVE FREE 3 ML: 5 INJECTION INTRAVENOUS at 03:32

## 2021-01-01 RX ADMIN — SULFAMETHOXAZOLE AND TRIMETHOPRIM 1 TABLET: 800; 160 TABLET ORAL at 20:33

## 2021-01-01 RX ADMIN — METOPROLOL SUCCINATE 25 MG: 25 TABLET, EXTENDED RELEASE ORAL at 19:18

## 2021-01-01 ASSESSMENT — ACTIVITIES OF DAILY LIVING (ADL)
DRESSING/BATHING: BATHING DIFFICULTY, ASSISTANCE 1 PERSON
ADLS_ACUITY_SCORE: 19
ADLS_ACUITY_SCORE: 24
ADLS_ACUITY_SCORE: 20
ADLS_ACUITY_SCORE: 19
ADLS_ACUITY_SCORE: 23
ADLS_ACUITY_SCORE: 22
ADLS_ACUITY_SCORE: 19
ADLS_ACUITY_SCORE: 23
ADLS_ACUITY_SCORE: 18
ADLS_ACUITY_SCORE: 20
ADLS_ACUITY_SCORE: 15
ADLS_ACUITY_SCORE: 18
ADLS_ACUITY_SCORE: 19
ADLS_ACUITY_SCORE: 23
DRESSING/BATHING_DIFFICULTY: YES
ADLS_ACUITY_SCORE: 19
WEAR_GLASSES_OR_BLIND: YES
ADLS_ACUITY_SCORE: 19
ADLS_ACUITY_SCORE: 20
ADLS_ACUITY_SCORE: 19
ADLS_ACUITY_SCORE: 20
ADLS_ACUITY_SCORE: 19
ADLS_ACUITY_SCORE: 23
ADLS_ACUITY_SCORE: 19
ADLS_ACUITY_SCORE: 20
TOILETING_ASSISTANCE: TOILETING DIFFICULTY, ASSISTANCE 1 PERSON
WALKING_OR_CLIMBING_STAIRS: AMBULATION DIFFICULTY, ASSISTANCE 1 PERSON
ADLS_ACUITY_SCORE: 19
ADLS_ACUITY_SCORE: 22
ADLS_ACUITY_SCORE: 23
ADLS_ACUITY_SCORE: 19
ADLS_ACUITY_SCORE: 19
DIFFICULTY_COMMUNICATING: NO
ADLS_ACUITY_SCORE: 15
ADLS_ACUITY_SCORE: 20
ADLS_ACUITY_SCORE: 18
WALKING_OR_CLIMBING_STAIRS_DIFFICULTY: YES
ADLS_ACUITY_SCORE: 15
ADLS_ACUITY_SCORE: 20
ADLS_ACUITY_SCORE: 19
WHICH_OF_THE_ABOVE_FUNCTIONAL_RISKS_HAD_A_RECENT_ONSET_OR_CHANGE?: AMBULATION;TRANSFERRING;TOILETING;BATHING
ADLS_ACUITY_SCORE: 20
ADLS_ACUITY_SCORE: 19
ADLS_ACUITY_SCORE: 22
ADLS_ACUITY_SCORE: 18
ADLS_ACUITY_SCORE: 19
ADLS_ACUITY_SCORE: 22
ADLS_ACUITY_SCORE: 19
ADLS_ACUITY_SCORE: 22
ADLS_ACUITY_SCORE: 22
ADLS_ACUITY_SCORE: 18
ADLS_ACUITY_SCORE: 20
ADLS_ACUITY_SCORE: 19
ADLS_ACUITY_SCORE: 22
ADLS_ACUITY_SCORE: 15
ADLS_ACUITY_SCORE: 20
DEPENDENT_IADLS:: CLEANING;COOKING;LAUNDRY;MEAL PREPARATION;MEDICATION MANAGEMENT;TRANSPORTATION
ADLS_ACUITY_SCORE: 15
FALL_HISTORY_WITHIN_LAST_SIX_MONTHS: NO
ADLS_ACUITY_SCORE: 18
ADLS_ACUITY_SCORE: 20
ADLS_ACUITY_SCORE: 19
DOING_ERRANDS_INDEPENDENTLY_DIFFICULTY: NO
ADLS_ACUITY_SCORE: 23
ADLS_ACUITY_SCORE: 18
ADLS_ACUITY_SCORE: 19
ADLS_ACUITY_SCORE: 19
DIFFICULTY_EATING/SWALLOWING: NO
TOILETING_ISSUES: YES
ADLS_ACUITY_SCORE: 15
ADLS_ACUITY_SCORE: 19
ADLS_ACUITY_SCORE: 20
ADLS_ACUITY_SCORE: 15
ADLS_ACUITY_SCORE: 22
ADLS_ACUITY_SCORE: 19
CONCENTRATING,_REMEMBERING_OR_MAKING_DECISIONS_DIFFICULTY: NO
ADLS_ACUITY_SCORE: 22
ADLS_ACUITY_SCORE: 20

## 2021-01-01 ASSESSMENT — PAIN SCALES - GENERAL
PAINLEVEL: NO PAIN (0)
PAINLEVEL: MILD PAIN (2)
PAINLEVEL: NO PAIN (0)

## 2021-01-01 ASSESSMENT — MIFFLIN-ST. JEOR
SCORE: 2000.13
SCORE: 2023.13
SCORE: 2041.13
SCORE: 1917.81
SCORE: 1989.13
SCORE: 1931.23
SCORE: 1940.3
SCORE: 2039.18
SCORE: 1913.08
SCORE: 1881.33
SCORE: 1998.81
SCORE: 2006.98

## 2021-01-01 ASSESSMENT — ENCOUNTER SYMPTOMS
DIARRHEA: 0
WEAKNESS: 0
TROUBLE SWALLOWING: 0
ABDOMINAL PAIN: 0
BACK PAIN: 0
SHORTNESS OF BREATH: 0
WHEEZING: 0
CONFUSION: 0
ADENOPATHY: 0
VOMITING: 0
NAUSEA: 0
LIGHT-HEADEDNESS: 0
COUGH: 0
NUMBNESS: 0
NECK PAIN: 0
PALPITATIONS: 0
DYSURIA: 0
CHILLS: 0
FEVER: 1

## 2021-01-15 NOTE — PROGRESS NOTES
Yanick is a 56 year old who is being evaluated via a billable video visit.      How would you like to obtain your AVS? MyChart    Will anyone else be joining your video visit? No    Cyndi Manzanares MA    Outcome for 01/15/21 3:38 PM :Glucose data obtained via Phone and Located in Table Below

## 2021-01-15 NOTE — PROGRESS NOTES
[facility] Endocrinology- Outpatient Diabetes Follow up  Yanick is a 55 year old male with history of TIIDM, high risk Mantle cell lymphoma recently s/p PBSCT 11/10/20,who is following up today for diabetes management. In his hospitalization for auto-transplant, his oral antihyperglycemics were held and he was started on basal bolus insulin.     In our last follow up, basal and bolus insulin were continued.   He recently followed up with Dr. Zavala, and he was able to resume Metformin XR 2000 mg daily and Glimepiride 4mg daily, and stop insulin.     He has been primarily checking evening BG, are excellently controlled with average of 128.   He hasn't been checking AM BG, but did a spot check this morning and was 91.      In the interim, Yanick's CVC catheter was removed for a nonocclusive left internal jugular thrombus. He is now on Xarelto.    Pt denies headaches, visual changes, n/v, SOB at rest, chest pain, abd pain, nausea/vomiting, diarrhea, dysuria, hematuria or foot ulcers. He has an eye appointment tomorrow for Rx adjustment for glasses.      Current Diabetes Regimen:   NPH 22 units AM, 8 units PM  Novolog 8 units TID WM plus high intensity sliding scale AC/HS    Glucometer Settings  Glucometer type:   Checking 3-4 times per day on average   Single fasting BG this AM 91  Evening BG range  109-151, average 128    Weight: lost some weight, now stabilized.   Activity: improving, trying to go for a walk every day    Diabetes Care  Retinopathy: no    Nephropathy: BP well controlled. No Hx microalbuminuria. Creatinine 0.97(stable). Taking ACEi/ARB: no    Neuropathy: no    Lipids: Taking ASA: no, statin: no  No results found for: LDL     ROS: 10 point review of systems completed; pertinent positives and negatives documented in HPI    Allergies  No Known Allergies    Medications  Current Outpatient Medications   Medication Sig Dispense Refill     acyclovir (ZOVIRAX) 800 MG tablet Take 1 tablet (800 mg) by mouth 2 times daily  60 tablet 11     Alcohol Swabs PADS 1 pad 4 times daily (before meals and nightly) 120 each 3     blood glucose (NO BRAND SPECIFIED) test strip Use to test blood sugar 4 times daily or as directed. 120 strip 3     blood glucose monitoring (ACCU-CHEK FASTCLIX) lancets Use to test blood sugar 4 times daily or as directed. 102 each 3     glimepiride (AMARYL) 4 MG tablet Take 1 tablet (4 mg) by mouth every morning (before breakfast) 30 tablet 3     metFORMIN (GLUCOPHAGE-XR) 500 MG 24 hr tablet Take 2 tablets (1,000 mg) by mouth daily (with dinner) 120 tablet 3     metoprolol succinate ER (TOPROL-XL) 25 MG 24 hr tablet Take 1 tablet (25 mg) by mouth 2 times daily 60 tablet 1     rivaroxaban ANTICOAGULANT (XARELTO ANTICOAGULANT) 20 MG TABS tablet Take 1 tablet (20 mg) by mouth daily (with dinner) 30 tablet 1     sulfamethoxazole-trimethoprim (BACTRIM DS) 800-160 MG tablet Take 1 tablet by mouth Every Mon, Tues two times daily Started 12/8/20 16 tablet 11       Family History  family history includes Heart Disease in his father, mother, and sister; Lymphoma in his mother.    Social History   reports that he has never smoked. He has quit using smokeless tobacco.  His smokeless tobacco use included chew. He reports previous alcohol use. He reports that he does not use drugs.     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 laser lithotripsy Left 06/04/2020    treat kidney stones     INSERT CATHETER VASCULAR ACCESS Left 10/26/2020    Procedure: dual lumen non valved tunneled line vascular placement;  Surgeon: Alessandro Steven MD;  Location: [facility] OR      IR CVC TUNNEL PLACEMENT > 5 YRS OF AGE  10/26/2020     port a cath placement Right 02/18/2020    internal jugular vein       Physical Exam  There were no vitals taken for this visit.  There is no height or weight on file to calculate BMI.    GENERAL : In no apparent distress over video chat  SKIN: Normal color.  No  suspicious lesions or rashes  EYES: No proptosis.  MOUTH: Moist, pink  NECK: No visible masses.  RESP: normal respiratory effort. No cough  NEURO: awake, alert, responds appropriately to questions.   Remainder of physical exam not able to be completed 2/2 COVID precaution, social distancing     RESULTS    No results found for: A1C    Creatinine   Date Value Ref Range Status   12/14/2020 1.23 0.66 - 1.25 mg/dL Final     GFR Estimate   Date Value Ref Range Status   12/14/2020 65 >60 mL/min/[1.73_m2] Final     Comment:     Non  GFR Calc  Starting 12/18/2018, serum creatinine based estimated GFR (eGFR) will be   calculated using the Chronic Kidney Disease Epidemiology Collaboration   (CKD-EPI) equation.       Potassium   Date Value Ref Range Status   12/14/2020 4.1 3.4 - 5.3 mmol/L Final     ALT   Date Value Ref Range Status   12/14/2020 26 0 - 70 U/L Final     AST   Date Value Ref Range Status   12/14/2020 21 0 - 45 U/L Final     TSH   Date Value Ref Range Status   08/26/2020 2.17 0.40 - 4.00 mU/L Final     T4 Free   Date Value Ref Range Status   08/26/2020 1.12 0.76 - 1.46 ng/dL Final       TSH   Date Value Ref Range Status   08/26/2020 2.17 0.40 - 4.00 mU/L Final     T4 Free   Date Value Ref Range Status   08/26/2020 1.12 0.76 - 1.46 ng/dL Final       Creatinine   Date Value Ref Range Status   12/14/2020 1.23 0.66 - 1.25 mg/dL Final       No results for input(s): CHOL, HDL, LDL, TRIG, CHOLHDLRATIO in the last 30946 hours.    No results found for: [id number], [id number], [id number]      ASSESSMENT/PLAN:    1. Diabetes type II, reasonably controlled, with recent steroid induced  "hyperglycemia    -discontinued all insulin.    -Metformin XR 1000 mg daily    -Glimepiride 4mg daily   -ordered interval labs: A1c, TSH/T4, urine microalbumin- he will have these done with other labs mid-Februrary   -Don feels comfortable returning to his PCP for routine diabetes care; will send him a message and graduate from our clinic.       Refills today:  Metformin, Glimepiride    Follow up:  1. With PCP within 3 months  2. With diabetes service at [facility], if needs change.     The patient is  enrolled in [employer] services    This patient has met [place] community measures for diabetes control: no (D5: Control blood pressure ,Lower bad cholesterol Maintain blood sugar, Be tobacco-free, Take aspirin as recommended)    This patient is eligible for graduation from [facility] Endocrinology clinic: yes    Follow up was done today via virtual/ video encounter, due to high risk patients and implementing social distancing due to COVID 19.       \"over 30 minutes spent on the date of the encounter doing chart review, history and exam, documentation and further activities as noted above\"     Prisca Corley PA-C  Diabetes Management Service      "

## 2021-01-15 NOTE — PROGRESS NOTES
01/14/2021  PM: 131    01/13/2021  PM: 151    01/12/2021  PM: 122    01/11/2021  PM: 128    01/10/2021  PM: 109    01/09/2021  PM: 132    01/08/2021  PM: 142    01/07/2021  PM: 133    01/06/2021  PM: 129    01/05/2021  PM: 114    01/04/2021  PM: 121    01/03/2021  PM: 128

## 2021-01-15 NOTE — PATIENT INSTRUCTIONS
You have earned this graduation by achieving diabetes related goals and stability.    It has been a pleasure serving you.  Please use the lessons learned in the diabetes clinic as a base on which to build a lifetime of better health and wellness.  You should continue to regularly follow up with your primary care provider for diabetes management.    Please schedule appointment with your primary care provider within the next 3 months.   We have refilled all of your diabetes-related medication for the next 3 months.    Future changes and refills should come from your primary care provider.     The diabetes care team remains available to you for future consultation should you and your doctor have new questions or concerns.      Please call the [facility] endocrinology clinic at [phone] if you have questions prior to returning to your PCP for diabetes care.       _____________________________________________      We appreciate your assistance in coordinating your healthcare.     Please upload your insulin pump, blood sugar meter and/or continuous glucose monitor at home 1-2 days before your next diabetes-related appointment.   This will allow your provider to review your  data before your scheduled virtual visit.    To ask a question to your Endocrine care team, please send them a [employer] message, or reach them by phone at [phone]     To expedite your medication refill(s), please contact your pharmacy and have them   fax a refill request to: [phone].  *Please allow 3 business days for routine medication refills.  *Please allow 5 business days for controlled substance medication refills.    For after-hours urgent Endocrine issues, that do not require 911, please dial [phone], and ask to speak with the Endocrinologist On-Call

## 2021-01-18 NOTE — LETTER
Dear Doctor Price;     Theo has been following with [facility] Endocrinology clinic for evaluation/ treatment of diabetes mellitus type 2.  At this point, Theo has reached a stable level for  blood sugar-related diabetes care and would be best served in your primary care office.  Theo is aware of our recommendation and was instructed to schedule a visit with you within the next 3 months and to contact you with any future concerns.      Our recommendations for ongoing care are:  Oral Medications: Glimepiride - Dose: 4 mg, Time: breakfast and Metformin - Dose: 1000mg, Time: supper      I have provided refills for the next 3 months to help with the transition back to your team. After this, you should continue to provide the necessary refills.       Routine diabetes related follow up for Theo  may continue under your direction, and should include the following:    Yearly labs : lipids, creatinine, potassium, urine albumin/creatinine ratio    DM visit with HgbA1c every 3-6 months.  The target HgbA1c for this patient is 7%    Diabetic foot exam yearly minimum, ideally every visit    Ophthalmology exam yearly or more frequent if deemed necessary by the patient's ophthalmologist     Routine follow up in diabetes education with certified diabetes educators (CDE) including either/or both RD CDE and RN CDE, as indicated. Typical eligibility includes:     3 hours/year of medical nutrition therapy    2 hours/year of nursing education for previous diabetes diagnosis     Feel free to contact us with any future questions or concerns that might arise with Theo  diabetes care.  Alternatively, we would be happy to see  Theo again, as needed, if questions or concerns arise, including rising hemoglobin A1c, concern for new diabetes or medication related complications or need for adjustment.      Sincerely,  Prisca Corley PA-c from the Diabetes team at [facility] contact: [phone]        References:  Standards of  Medical Care in Diabetes-2019 Abridged for Primary Care Providers

## 2021-01-18 NOTE — Clinical Note
Please mail graduation letter to   [facility]  C/o Mike Price MD  [address] [place] [phone]

## 2021-01-18 NOTE — LETTER
1/18/2021       RE: Theo Roblero  [address]  [address]  [place] [address]     Dear Colleague,    Thank you for referring your patient, Theo Roblero, to the [facility] [place] [facility]. Please see a copy of my visit note below.    Yanick is a 56 year old who is being evaluated via a billable video visit.      How would you like to obtain your AVS? MyChart    Will anyone else be joining your video visit? No    Cyndi Manzanares MA    Outcome for 01/15/21 3:38 PM :Glucose data obtained via Phone and Located in Table Below         [facility]- Outpatient Diabetes Follow up  Yanick is a 55 year old male with history of TIIDM, high risk Mantle cell lymphoma recently s/p PBSCT 11/10/20,who is following up today for diabetes management. In his hospitalization for auto-transplant, his oral antihyperglycemics were held and he was started on basal bolus insulin.     In our last follow up, basal and bolus insulin were continued.   He recently followed up with Dr. Zavala, and he was able to resume Metformin XR 2000 mg daily and Glimepiride 4mg daily, and stop insulin.     He has been primarily checking evening BG, are excellently controlled with average of 128.   He hasn't been checking AM BG, but did a spot check this morning and was 91.      In the interim, Yanick's CVC catheter was removed for a nonocclusive left internal jugular thrombus. He is now on Xarelto.    Pt denies headaches, visual changes, n/v, SOB at rest, chest pain, abd pain, nausea/vomiting, diarrhea, dysuria, hematuria or foot ulcers. He has an eye appointment tomorrow for Rx adjustment for glasses.      Current Diabetes Regimen:   NPH 22 units AM, 8 units PM  Novolog 8 units TID WM plus high intensity sliding scale AC/HS    Glucometer Settings  Glucometer type:   Checking 3-4 times per day on average   Single fasting BG this AM 91  Evening BG range  109-151, average  128    Weight: lost some weight, now stabilized.   Activity: improving, trying to go for a walk every day    Diabetes Care  Retinopathy: no    Nephropathy: BP well controlled. No Hx microalbuminuria. Creatinine 0.97(stable). Taking ACEi/ARB: no    Neuropathy: no    Lipids: Taking ASA: no, statin: no  No results found for: LDL     ROS: 10 point review of systems completed; pertinent positives and negatives documented in HPI    Allergies  No Known Allergies    Medications  Current Outpatient Medications   Medication Sig Dispense Refill     acyclovir (ZOVIRAX) 800 MG tablet Take 1 tablet (800 mg) by mouth 2 times daily 60 tablet 11     Alcohol Swabs PADS 1 pad 4 times daily (before meals and nightly) 120 each 3     blood glucose (NO BRAND SPECIFIED) test strip Use to test blood sugar 4 times daily or as directed. 120 strip 3     blood glucose monitoring (ACCU-CHEK FASTCLIX) lancets Use to test blood sugar 4 times daily or as directed. 102 each 3     glimepiride (AMARYL) 4 MG tablet Take 1 tablet (4 mg) by mouth every morning (before breakfast) 30 tablet 3     metFORMIN (GLUCOPHAGE-XR) 500 MG 24 hr tablet Take 2 tablets (1,000 mg) by mouth daily (with dinner) 120 tablet 3     metoprolol succinate ER (TOPROL-XL) 25 MG 24 hr tablet Take 1 tablet (25 mg) by mouth 2 times daily 60 tablet 1     rivaroxaban ANTICOAGULANT (XARELTO ANTICOAGULANT) 20 MG TABS tablet Take 1 tablet (20 mg) by mouth daily (with dinner) 30 tablet 1     sulfamethoxazole-trimethoprim (BACTRIM DS) 800-160 MG tablet Take 1 tablet by mouth Every Mon, Tues two times daily Started 12/8/20 16 tablet 11       Family History  family history includes Heart Disease in his father, mother, and sister; Lymphoma in his mother.    Social History   reports that he has never smoked. He has quit using smokeless tobacco.  His smokeless tobacco use included chew. He reports previous alcohol use. He reports that he does not use drugs.     Past Medical History  Past Medical  History:   Diagnosis Date     Kidney stones 06/2020    left     Sepsis without septic shock (H) 05/12/2020    seconary tp pyelonephritis from obstruction left ureteroliethasis       Past Surgical History:   Procedure Laterality Date     cystoscopy, left pyelogram, ureteral stent placement  05/12/2020    stone was blocking the ureter     cystoscopy, retrograde pyelogram, uretroscopy. laser lithotripsy Left 06/04/2020    treat kidney stones     INSERT CATHETER VASCULAR ACCESS Left 10/26/2020    Procedure: dual lumen non valved tunneled line vascular placement;  Surgeon: Alessandro Steven MD;  Location: [facility] OR     IR CVC TUNNEL PLACEMENT > 5 YRS OF AGE  10/26/2020     port a cath placement Right 02/18/2020    internal jugular vein       Physical Exam  There were no vitals taken for this visit.  There is no height or weight on file to calculate BMI.    GENERAL : In no apparent distress over video chat  SKIN: Normal color.  No  suspicious lesions or rashes  EYES: No proptosis.  MOUTH: Moist, pink  NECK: No visible masses.  RESP: normal respiratory effort. No cough  NEURO: awake, alert, responds appropriately to questions.   Remainder of physical exam not able to be completed 2/2 COVID precaution, social distancing     RESULTS    No results found for: A1C    Creatinine   Date Value Ref Range Status   12/14/2020 1.23 0.66 - 1.25 mg/dL Final     GFR Estimate   Date Value Ref Range Status   12/14/2020 65 >60 mL/min/[1.73_m2] Final     Comment:     Non  GFR Calc  Starting 12/18/2018, serum creatinine based estimated GFR (eGFR) will be   calculated using the Chronic Kidney Disease Epidemiology Collaboration   (CKD-EPI) equation.       Potassium   Date Value Ref Range Status   12/14/2020 4.1 3.4 - 5.3 mmol/L Final     ALT   Date Value Ref Range Status   12/14/2020 26 0 - 70 U/L Final     AST   Date Value Ref Range Status   12/14/2020 21 0 - 45 U/L Final     TSH   Date Value Ref Range Status   08/26/2020 2.17  0.40 - 4.00 mU/L Final     T4 Free   Date Value Ref Range Status   08/26/2020 1.12 0.76 - 1.46 ng/dL Final       TSH   Date Value Ref Range Status   08/26/2020 2.17 0.40 - 4.00 mU/L Final     T4 Free   Date Value Ref Range Status   08/26/2020 1.12 0.76 - 1.46 ng/dL Final       Creatinine   Date Value Ref Range Status   12/14/2020 1.23 0.66 - 1.25 mg/dL Final       No results for input(s): CHOL, HDL, LDL, TRIG, CHOLHDLRATIO in the last 84046 hours.    No results found for: [id number], [id number], [id number]      ASSESSMENT/PLAN:    1. Diabetes type II, reasonably controlled, with recent steroid induced hyperglycemia    -discontinued all insulin.    -Metformin XR 1000 mg daily    -Glimepiride 4mg daily   -ordered interval labs: A1c, TSH/T4, urine microalbumin- he will have these done with other labs mid-Februrary   -Don feels comfortable returning to his PCP for routine diabetes care; will send him a message and graduate from our clinic.       Refills today:  Metformin, Glimepiride    Follow up:  1. With PCP within 3 months  2. With diabetes service at [facility], if needs change.     The patient is  enrolled in [employer] services    This patient has met [place] community measures for diabetes control: no (D5: Control blood pressure ,Lower bad cholesterol Maintain blood sugar, Be tobacco-free, Take aspirin as recommended)    This patient is eligible for graduation from [facility] clinic: yes    Follow up was done today via virtual/ video encounter, due to high risk patients and implementing social distancing due to COVID 19.     I spent a total of 10 minutes via telephone with patient, and  and 20 minutes chart review/coordination of care and documentation.    Prisca Corley PA-C  Diabetes Management Service        01/14/2021  PM: 131    01/13/2021  PM: 151    01/12/2021  PM: 122    01/11/2021  PM: 128    01/10/2021  PM: 109    01/09/2021  PM: 132    01/08/2021  PM: 142    01/07/2021  PM:  133    01/06/2021  PM: 129    01/05/2021  PM: 114    01/04/2021  PM: 121    01/03/2021  PM: 128

## 2021-02-08 PROBLEM — C83.18 MANTLE CELL LYMPHOMA OF LYMPH NODES OF MULTIPLE SITES (H): Status: ACTIVE | Noted: 2020-02-07

## 2021-02-10 NOTE — PROGRESS NOTES
"  Yanick is a 56 year old who is being evaluated via a billable telephone visit.      How would you like to obtain your AVS? MyChart  If the video visit is dropped, the invitation should be resent by: Text to cell phone: [phone]  Will anyone else be joining your video visit? No      Vitals - Patient Reported  Weight (Patient Reported): (PATIENT UNSURE)  Height (Patient Reported): 180.3 cm (5' 11\")  Pain Score: No Pain (0)      I have reviewed and updated patient's allergy and medication list.    Concerns: NONE  Refills: NONE      Silvia So [employer]    Phone Start Time: 0705  Video-Visit Details    Type of service:  Video Visit    Video End Time:0725    Originating Location (pt. Location): Home    Distant Location (provider location):  [facility] BLOOD AND MARROW TRANSPLANT PROGRAM [place]     Platform used for Video Visit: [employer]              BMT Day 100 Post-Autologous BMT   Survivorship Care Plan    Date: February 10, 2021    Treatment Team:  Patient Care Team:  Mike Price as PCP - General (Family Practice)  Martin Mae MD as Referring Physician (Hematology & Oncology)  Jessika Marroquin RN as Specialty Care Coordinator (BMT - Adult)  Sam Zavala MD as Assigned Cancer Care Provider  Fabiola Fernández MSW as   Jessika Marroquin RN as Specialty Care Coordinator (BMT - Adult)    Date of Transplant: 11/10/20    Transplant Essential Data:  Diagnosis [facility] Non-Hodgkin lymphoma, Mantle cell/intermediate differentiation  HCT Type Autologous    Prep Regimen BCNU  Litzy-C  Etoposide  Melphalan   Donor Source No data was found    GVHD Prophylaxis No  Clinical Trials none      Oncology Treatment History:  Theo Roblero is a 55 year old male with high risk (IPI 6.5 and Ki67 of 20%), stage IV Mantle cell lymphoma in CR1 s/p 8 cycles of alternating 6 cycles of Maxi-R- CHOP alternating with high-dose LITZY-C. Last cycle was given on 6/11/2020. His therapy was complicated by a kidney stone and " sepsis in 5/2020, prior to cycle 5. Upon diagnosis, a CT was performed to evaluate pain concerning for an aortic dissection and showed LAD above and below the diaphragm, with large portacaval nodes measure in excess of 5cm. No thoracoabdominal aortic dissection or aneurysm was found.The findings of LAD were confirmed by a PET/CT. Lymph node biopsy on 2/7/2020 showed MCL, as described as CD20 positive B cells with positive staining for CD5 and cyclin D1.  The Ki-67 proliferation index was 20%. A bone marrow biopsy on 2/18/2020 was positive for intramedullary MCL (<5%). Early in his disease course, he reportedly developed a sepsis syndrome with leukocytosis (17.8) a creatinine 2.1 and was admitted on 2/20/2020. Kidney function has since improved.     He went on to receive Maxi-R- CHOP alternating with high-dose LITZY-C from 2-6/2020. Follow up imaging post therapy on 7/6/2020 showed a CR by PET CT, with resolution of avid lymph nodes. A persistent pleural effusion was observed and not PET avid.        Admitted for transplant today. Received palifermin D-9 through D-7. Feels a thick sensation in his mouth, but no rash. Went to the dentist and had 7 teeth extracted about four weeks ago. Healing well. No residual pain. No recent fevers, cough or cold symptoms. Ready to start transplant process today     Treatment/Chemotherapy Number of Cycles Date Range Outcomes & Complications   [place] Regimen (Maxi-R-CHOP alternating high-dose Litzy-C)  - Cycle #1 CHOP 02/21/2020, presenting as TLS, received Rasburicase x 1  - Cycle #2 R+Litzy-c 03/12/2020  - Cycle #3 R-CHOP 04/02/2020  - Cycle #4 R+Litzy-C 04/23/2020  - Cycle #5 R-CHOP 05/20/2020, delayed due to urosepsis  - Cycle #6 R+Litzy-c 06/11/2020 February through June 2020 CR   Maintenance rituxan given due to delay in transplant 1 9/30/20        General Recommendations:     Ask your physician if you can return to work and usual activity. If you need more time for recovery, please  let your physician know.    You can resume a regular diet.    Avoid large crowds and exposure to friends or family members with cold like symptoms while your immune system continues to rebuild.    You can drive without limitations as long as:  o Your platelet count is > 50,000 and your neutrophil count is >1,000.  o You are not taking any narcotics or sedative medications  o You feel well enough to preform driving activities     You can resume sexual activity with your partner. Women should avoid becoming pregnant for 2 years. Birth control should be used to prevent pregnancy.    Vaccinations will be given at your 1- and 2-year anniversary visits in the BMT clinic.  An exception is the influenza vaccine, which can be given after day +60 post-transplant during influenza season.    Continue to take prescribed medications to prevent infection     You can stop wearing your blue N95 mask after your first day + 28 anniversary visit.     For general health concerns you can be seen by your primary care provider.   o If you have questions about your transplant or follow up tests contact your BMT RN coordinator.      Survivorship Care Plan:     This individualized care plan is designed to inform you and your healthcare team of recommended follow-up visits, tests, health maintenance activities, and cancer screening you should receive after transplant.     Possible Late-Effects:  Possible late or long-term complications include infections, cataracts, cancer (oral, skin, blood, solid tumors), cavities, changes in lung function, pneumonia, heart failure, low thyroid and other gland function, kidney and liver disease, nerve pain/neuropathy, altered cognitive function, muscle weakness, osteoporosis/weak bones, stress, depression, anxiety, sexual dysfunction, and infertility.    Health Monitoring: In the months following autologous stem cell transplant you may experience health conditions that require further evaluation by your  healthcare team.     Contact your provider if you experience any of the following conditions or symptoms:   ? Cold symptoms or fever higher than 100.5 F  ? Blurry or double vision, eye pain  ? Persistent or recurrent headaches  ? High blood pressure or high blood sugar   ? Difficulty breathing, cough, shortness of breath  ? Chest pain or palpitations  ? Swelling in legs or extremities    ? Unusual bruising or bleeding  ? Symptoms of cancer recurrence   ? Changes in moles or new skin lesions   ? Increased or worsening fatigue  ? Heat or cold intolerance  ? Loss of interest in sex or erectile dysfunction  ? Muscle weakness or increasing difficulty with daily activates   ? Pain or tingling in hands or feet  ? Changes in memory or difficulty concentrating (chemo brain) that does not improve within 3 months after your transplant  ? Abdominal pain on the upper right side or yellowing of the skin  ? Mouth pain, oral lesions, bleeding gums, or chronic dry mouth   ? Stress, depression, or anxiety  ? Inability to stop using narcotic pain medications or addictive substances    Healthy Behaviors & Lifestyle:    Schedule a full dental exam. Your mouth should be evaluated for oral cancer yearly. Follow your dentist s recommendations for cleanings.    Schedule an eye exam yearly. After transplant your risk for cataracts is higher. Let your eye doctor know you have had cancer treatment.    Avoid smoking or tobacco products.    Wear sunscreen and protect skin from sunburns.     Limit daily alcohol intake to less than 1 drink for women and 2 for men.    Follow general guidelines for physical activity as recommended by the [place] Office of Disease Prevention & Health Promotion:    Avoid Inactivity: Some physical activity is better than none -- any amount has benefits.  Do Aerobic Activity: Do aerobic physical activity in episodes of at least 10 minutes, as many times as possible per day. This could include going for walks or  using the elliptical or stationary bike.  Ask your doctor what aerobic activities would be safe and helpful for you, and set a goal for yourself!  Strengthen Muscles: Do muscle-strengthening activities (such as lifting light weights or using resistance bands and/or going up and down stairs) that are moderate or high intensity and involve all major muscle groups at least 4 days a week.      Recommendations & Follow-Up:    Referrals & tests placed during this visit: No orders of the defined types were placed in this encounter.      When to see your BMT Provider: 3/1/21    When to see your Primary Care Provider or Local Hematologist/Oncologist: ASAUYKO Dupont      I spent 20 minutes with the patient and family, over half of which was spent discussing preventative care strategies, self-management practices, and potential complications after transplant.      Information used for these recommendations was obtained from: FAITH Hensley., RUTHIE Mike, GEORGIA Salomon, TITO Figueroa., ADARSH Novak., ISAURA Jarvis.,   Jaden, K. M. (2013). Prevalence of Hematopoietic Cell Transplant Survivors in the [place]. Biology of Blood and Marrow Transplantation?: Journal of the American Society for Blood and Marrow Transplantation, 19(10), 4699-2319. doi 10.1016/j.bbmt.2013.07.020

## 2021-02-11 NOTE — LETTER
"    2/11/2021         RE: Theo Roblero  [address] [facility]  [address] [place] [id number]        Dear Colleague,    Thank you for referring your patient, Theo Roblero, to the [facility] BLOOD AND MARROW TRANSPLANT PROGRAM [place]. Please see a copy of my visit note below.      Yanick is a 56 year old who is being evaluated via a billable telephone visit.      How would you like to obtain your AVS? MyChart  If the video visit is dropped, the invitation should be resent by: Text to cell phone: [phone]  Will anyone else be joining your video visit? No      Vitals - Patient Reported  Weight (Patient Reported): (PATIENT UNSURE)  Height (Patient Reported): 180.3 cm (5' 11\")  Pain Score: No Pain (0)      I have reviewed and updated patient's allergy and medication list.    Concerns: NONE  Refills: NONE      [email or web address]    Phone Start Time: 0705  Video-Visit Details    Type of service:  Video Visit    Video End Time:0725    Originating Location (pt. Location): Home    Distant Location (provider location):  [facility] BLOOD AND MARROW TRANSPLANT PROGRAM [place]     Platform used for Video Visit: LiveRe              BMT Day 100 Post-Autologous BMT   Survivorship Care Plan    Date: February 10, 2021    Treatment Team:  Patient Care Team:  Mike Price as PCP - General (Family Practice)  Martin Mae MD as Referring Physician (Hematology & Oncology)  Jessika Marroquin, RN as Specialty Care Coordinator (BMT - Adult)  Sam Zavala MD as Assigned Cancer Care Provider  Fabiola Fernández MSW as   Jessika Marroquin, RN as Specialty Care Coordinator (BMT - Adult)    Date of Transplant: 11/10/20    Transplant Essential Data:  Diagnosis [facility] Non-Hodgkin lymphoma, Mantle cell/intermediate differentiation  HCT Type Autologous    Prep Regimen BCNU  Agueda-C  Etoposide  Melphalan   Donor Source No data was found    GVHD Prophylaxis No  Clinical Trials none      Oncology Treatment " History:  Theo Roblero is a 55 year old male with high risk (IPI 6.5 and Ki67 of 20%), stage IV Mantle cell lymphoma in CR1 s/p 8 cycles of alternating 6 cycles of Maxi-R- CHOP alternating with high-dose LITZY-C. Last cycle was given on 6/11/2020. His therapy was complicated by a kidney stone and sepsis in 5/2020, prior to cycle 5. Upon diagnosis, a CT was performed to evaluate pain concerning for an aortic dissection and showed LAD above and below the diaphragm, with large portacaval nodes measure in excess of 5cm. No thoracoabdominal aortic dissection or aneurysm was found.The findings of LAD were confirmed by a PET/CT. Lymph node biopsy on 2/7/2020 showed MCL, as described as CD20 positive B cells with positive staining for CD5 and cyclin D1.  The Ki-67 proliferation index was 20%. A bone marrow biopsy on 2/18/2020 was positive for intramedullary MCL (<5%). Early in his disease course, he reportedly developed a sepsis syndrome with leukocytosis (17.8) a creatinine 2.1 and was admitted on 2/20/2020. Kidney function has since improved.     He went on to receive Maxi-R- CHOP alternating with high-dose LITZY-C from 2-6/2020. Follow up imaging post therapy on 7/6/2020 showed a CR by PET CT, with resolution of avid lymph nodes. A persistent pleural effusion was observed and not PET avid.        Admitted for transplant today. Received palifermin D-9 through D-7. Feels a thick sensation in his mouth, but no rash. Went to the dentist and had 7 teeth extracted about four weeks ago. Healing well. No residual pain. No recent fevers, cough or cold symptoms. Ready to start transplant process today     Treatment/Chemotherapy Number of Cycles Date Range Outcomes & Complications   [place] Regimen (Maxi-R-CHOP alternating high-dose Litzy-C)  - Cycle #1 CHOP 02/21/2020, presenting as TLS, received Rasburicase x 1  - Cycle #2 R+Litzy-c 03/12/2020  - Cycle #3 R-CHOP 04/02/2020  - Cycle #4 R+Litzy-C 04/23/2020  - Cycle #5 R-CHOP  05/20/2020, delayed due to urosepsis  - Cycle #6 R+Agueda-c 06/11/2020 February through June 2020 CR   Maintenance rituxan given due to delay in transplant 1 9/30/20        General Recommendations:     Ask your physician if you can return to work and usual activity. If you need more time for recovery, please let your physician know.    You can resume a regular diet.    Avoid large crowds and exposure to friends or family members with cold like symptoms while your immune system continues to rebuild.    You can drive without limitations as long as:  o Your platelet count is > 50,000 and your neutrophil count is >1,000.  o You are not taking any narcotics or sedative medications  o You feel well enough to preform driving activities     You can resume sexual activity with your partner. Women should avoid becoming pregnant for 2 years. Birth control should be used to prevent pregnancy.    Vaccinations will be given at your 1- and 2-year anniversary visits in the BMT clinic.  An exception is the influenza vaccine, which can be given after day +60 post-transplant during influenza season.    Continue to take prescribed medications to prevent infection     You can stop wearing your blue N95 mask after your first day + 28 anniversary visit.     For general health concerns you can be seen by your primary care provider.   o If you have questions about your transplant or follow up tests contact your BMT RN coordinator.      Survivorship Care Plan:     This individualized care plan is designed to inform you and your healthcare team of recommended follow-up visits, tests, health maintenance activities, and cancer screening you should receive after transplant.     Possible Late-Effects:  Possible late or long-term complications include infections, cataracts, cancer (oral, skin, blood, solid tumors), cavities, changes in lung function, pneumonia, heart failure, low thyroid and other gland function, kidney and liver disease, nerve  pain/neuropathy, altered cognitive function, muscle weakness, osteoporosis/weak bones, stress, depression, anxiety, sexual dysfunction, and infertility.    Health Monitoring: In the months following autologous stem cell transplant you may experience health conditions that require further evaluation by your healthcare team.     Contact your provider if you experience any of the following conditions or symptoms:   ? Cold symptoms or fever higher than 100.5 F  ? Blurry or double vision, eye pain  ? Persistent or recurrent headaches  ? High blood pressure or high blood sugar   ? Difficulty breathing, cough, shortness of breath  ? Chest pain or palpitations  ? Swelling in legs or extremities    ? Unusual bruising or bleeding  ? Symptoms of cancer recurrence   ? Changes in moles or new skin lesions   ? Increased or worsening fatigue  ? Heat or cold intolerance  ? Loss of interest in sex or erectile dysfunction  ? Muscle weakness or increasing difficulty with daily activates   ? Pain or tingling in hands or feet  ? Changes in memory or difficulty concentrating (chemo brain) that does not improve within 3 months after your transplant  ? Abdominal pain on the upper right side or yellowing of the skin  ? Mouth pain, oral lesions, bleeding gums, or chronic dry mouth   ? Stress, depression, or anxiety  ? Inability to stop using narcotic pain medications or addictive substances    Healthy Behaviors & Lifestyle:    Schedule a full dental exam. Your mouth should be evaluated for oral cancer yearly. Follow your dentist s recommendations for cleanings.    Schedule an eye exam yearly. After transplant your risk for cataracts is higher. Let your eye doctor know you have had cancer treatment.    Avoid smoking or tobacco products.    Wear sunscreen and protect skin from sunburns.     Limit daily alcohol intake to less than 1 drink for women and 2 for men.    Follow general guidelines for physical activity as recommended by the [place]  [facility] Office of Disease Prevention & Health Promotion:    Avoid Inactivity: Some physical activity is better than none -- any amount has benefits.  Do Aerobic Activity: Do aerobic physical activity in episodes of at least 10 minutes, as many times as possible per day. This could include going for walks or using the elliptical or stationary bike.  Ask your doctor what aerobic activities would be safe and helpful for you, and set a goal for yourself!  Strengthen Muscles: Do muscle-strengthening activities (such as lifting light weights or using resistance bands and/or going up and down stairs) that are moderate or high intensity and involve all major muscle groups at least 4 days a week.      Recommendations & Follow-Up:    Referrals & tests placed during this visit: No orders of the defined types were placed in this encounter.      When to see your BMT Provider: 3/1/21    When to see your Primary Care Provider or Local Hematologist/Oncologist: TANA Dupont      I spent 20 minutes with the patient and family, over half of which was spent discussing preventative care strategies, self-management practices, and potential complications after transplant.      Information used for these recommendations was obtained from: FAITH Hensley., RUTHIE Mike, GEORGIA Salomon, TITO Figueroa., ADARSH Novak., ISAURA Jarvis.,   Jaden, K. M. (2013). Prevalence of Hematopoietic Cell Transplant Survivors in the [place]. Biology of Blood and Marrow Transplantation?: Journal of the American Society for Blood and Marrow Transplantation, 19(10), 6811-5140. doi 10.1016/j.bbmt.2013.07.020      Again, thank you for allowing me to participate in the care of your patient.        Sincerely,        BMT Advanced Practice Provider

## 2021-02-17 NOTE — NURSING NOTE
Pt arrived for 100 day BMBx. Hx mantle cell lymphoma. VSS, A&Ox4. Weight obtained. Labs drawn via port with success. Port deaccessed with heparin post labs. Pt unable to complete BMBx due to not holding his blood thinners. Pt to reschedule BMBx.

## 2021-02-17 NOTE — PROGRESS NOTES
Pt took Xarelto last night. Needs BM BX re-scheduled. Messaged anniversary schedulers. Instructed patient he needs to hold for a full 24hrs before BM BX.     I reordered BM BX in open orders so just need to release it.     Diane Morgan PA-C  #[id number]

## 2021-02-17 NOTE — LETTER
2/17/2021         RE: Theo Roblero  [address] [facility]  [address] [place] [id number]        Dear Colleague,    Thank you for referring your patient, Theo Roblero, to the [facility] BLOOD AND MARROW TRANSPLANT PROGRAM [place]. Please see a copy of my visit note below.    Pt took Xarelto last night. Needs BM BX re-scheduled. Messaged anniversary schedulers. Instructed patient he needs to hold for a full 24hrs before BM BX.     I reordered BM BX in open orders so just need to release it.     Diane Morgan PA-C  #[id number]      Again, thank you for allowing me to participate in the care of your patient.        Sincerely,        [facility]

## 2021-02-24 NOTE — PROGRESS NOTES
Patient states he held xarelto for procedure.  Restart xarelto 2/25/2021    RADHA Chatterjee_C  [id number]

## 2021-02-24 NOTE — NURSING NOTE
Chief Complaint   Patient presents with     Port Draw     Labs drawn via port by rn in lab. VS taken.     Port accessed with 20g 3/4 inch gripper needle by RN, labs collected, line flushed with saline and heparin.  Vitals taken. Pt checked in for appointment(s).    Angel Jackson RN

## 2021-02-24 NOTE — PROGRESS NOTES
BMT ONC Adult Bone Marrow Biopsy Procedure Note  February 24, 2021  /80 (BP Location: Right arm, Patient Position: Sitting, Cuff Size: Adult Regular)   Pulse 78   Temp 97.4  F (36.3  C) (Oral)   Resp 16   Wt 108.5 kg (239 lb 3.2 oz)   SpO2 100%   BMI 32.44 kg/m       Learning needs assessment complete within 12 months? YES    DIAGNOSIS: D+106 s/p auto PBSCT for MCL.     PROCEDURE: Unilateral Bone Marrow Biopsy and Unilateral Aspirate    [place] [facility] [place]    Patient s identification was positively verified by verbal identification and invasive procedure safety checklist was completed. Informed consent was obtained. Following the administration of Midazolam 2mg IV x1 as pre-medication, patient was placed in the prone position and prepped and draped in a sterile manner. Approximately 10 cc of 1% Lidocaine was used over the left posterior iliac spine. Following this a 3 mm incision was made. Trephine bone marrow core(s) was (were) obtained from the [facility]. Bone marrow aspirates were obtained from the [facility]. Aspirates were sent for morphology, immunophenotyping, cytogenetics and molecular diagnostics Hold ACD syringe. A total of approximately 20 ml of marrow was aspirated. Following this procedure a sterile dressing was applied to the bone marrow biopsy site(s). The patient was placed in the supine position to maintain pressure on the biopsy site. Post-procedure wound care instructions were given.     Complications: NO    Pre-procedural pain: 0 out of 10 on the numeric pain rating scale.     Procedural pain: 4 out of 10 on the numeric pain rating scale.     Post-procedural pain assessment: 0 out of 10 on the numeric pain rating scale.     Interventions: NO    Length of procedure:20 minutes or less      Procedure performed by: Marcelina Hernandez PA-C  [id number]

## 2021-02-24 NOTE — NURSING NOTE
BMT Teaching Flowsheet  Teaching Topic: bone marrow biopsy  Person(s) involved in teaching: Patient  Motivation Level  Asks Questions: Yes  Eager to Learn: Yes  Cooperative: Yes  Receptive (willing/able to accept information): Yes  Patient demonstrates understanding of the following:   - Reason for the appointment, diagnosis and treatment plan: Yes  - Knowledge of proper use of medications and conditions for which they are ordered (with special attention to potential side effects or drug interactions): Yes  - Which situations necessitate calling provider and whom to contact: Yes  Teaching concerns addressed: what to expect during and post procedure including restrictions  Proper use and care of (medical equipment, care aids, etc.) NA  Pain management techniques: Yes  Patient instructed on hand hygiene: NA  How and/when to access community resources: NA  Infection Control:  Patient demonstrates understanding of the following:   Surgical procedure site care taught Yes  Signs and symptoms of infection taught Yes  Wound care taught Yes  Central venous catheter care taught NA  Instructional Materials Used/Given: post procedure instructions  Pt would like sedation    Post procedure vss, drsg dry and intact

## 2021-02-24 NOTE — LETTER
2/24/2021         RE: Theo Roblero  [address] [facility]  [address]  [place] [id number]        Dear Colleague,    Thank you for referring your patient, Theo Roblero, to the [facility] BLOOD AND MARROW TRANSPLANT PROGRAM [place]. Please see a copy of my visit note below.    BMT ONC Adult Bone Marrow Biopsy Procedure Note  February 24, 2021  /80 (BP Location: Right arm, Patient Position: Sitting, Cuff Size: Adult Regular)   Pulse 78   Temp 97.4  F (36.3  C) (Oral)   Resp 16   Wt 108.5 kg (239 lb 3.2 oz)   SpO2 100%   BMI 32.44 kg/m       Learning needs assessment complete within 12 months? YES    DIAGNOSIS: D+106 s/p auto PBSCT for MCL.     PROCEDURE: Unilateral Bone Marrow Biopsy and Unilateral Aspirate    [place] [facility] [place] [facility]    Patient s identification was positively verified by verbal identification and invasive procedure safety checklist was completed. Informed consent was obtained. Following the administration of Midazolam 2mg IV x1 as pre-medication, patient was placed in the prone position and prepped and draped in a sterile manner. Approximately 10 cc of 1% Lidocaine was used over the left posterior iliac spine. Following this a 3 mm incision was made. Trephine bone marrow core(s) was (were) obtained from the [facility]. Bone marrow aspirates were obtained from the [facility]. Aspirates were sent for morphology, immunophenotyping, cytogenetics and molecular diagnostics Hold ACD syringe. A total of approximately 20 ml of marrow was aspirated. Following this procedure a sterile dressing was applied to the bone marrow biopsy site(s). The patient was placed in the supine position to maintain pressure on the biopsy site. Post-procedure wound care instructions were given.     Complications: NO    Pre-procedural pain: 0 out of 10 on the numeric pain rating scale.     Procedural pain: 4 out of 10 on the numeric pain rating scale.     Post-procedural pain assessment: 0 out of 10 on the numeric pain  rating scale.     Interventions: NO    Length of procedure:20 minutes or less      Procedure performed by: Marcelina Hernandez PA-C  [id number]      Patient states he held xarelto for procedure.  Restart xarelto 2/25/2021    MELONY Chatterjee  [id number]      Again, thank you for allowing me to participate in the care of your patient.        Sincerely,        [facility]

## 2021-02-26 NOTE — PROGRESS NOTES
"Yanick is a 56 year old who is being evaluated via a billable video visit.      How would you like to obtain your AVS? MyChart  If the video visit is dropped, the invitation should be resent by: Text to cell phone: [phone]  Will anyone else be joining your video visit? No      I have reviewed and updated the patient's allergies and medication list.    Concerns: No new concerns.   Refills: None needed.        Vitals - Patient Reported  Weight (Patient Reported): 108 kg (238 lb)  Height (Patient Reported): 180.3 cm (5' 11\")  BMI (Based on Pt Reported Ht/Wt): 33.19  Pain Score: No Pain (0)      Shakira Berman CMA      Video Start Time: 8:50 AM    Subjective   HPI   Patient ID:  Theo Roblero is a 55 year old man D+111 s/p auto PBSCT for MCL. 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 Tolerating AC with Xarelto.     Day +100 restaging shows new FDG avid intervally enlarged bilateral axillary, upper paratracheal and bilateral hilar, bilateral inguinal and bilateral external iliac lymphadenopathy. Small left pleural effusion, smaller since prior examination. Interval decrease in size of non-FDG avid hypodense subcapsular area adjacent to the spleen.    Given new evidence of progression, we will pursue biopsy. He is interested in the ibrutinib/Lonca clinical trial, if MCL relapse is confirmed by path. Bone marrow shows no morphologic or immunophenotypic evidence of B-cell lymphoma, 30-40% cellular.      Review of Systems   Constitutional, HEENT, cardiovascular, pulmonary, gi and gu systems are negative, except as otherwise noted.      Objective       Vitals:  No vitals were obtained today due to virtual visit.    Physical Exam   GENERAL: Healthy, alert and no distress    Reviewed labs and imaging.    Theo Roblero is a 56 yo man D+111 s/p auto PBSCT with BEAM prep for MCL        1. "  BMT/MCL:   - Palifermin complete.  - Cell dose 4.3 million.  - Day +28 restaging: Remains in CR per imaging. No evidence of disease in the bone marrow; hypocellular marrow for age (20-30%) with trilineage hematopoiesis.  - New evidence of progression. Ordered IR biopsy and he will receive a copy of the ibrutinib/Lonca consent. A referral for clinton was placed with either [facility] or Omar. We will reserve CD19 CAR T for later use. Also, we discussed the potential option of a THEA alloHCT once in CR2. This will be discussed at our TB.     2.  HEME: Non-occlusive DVT LIJ extending to SVC as above, related to recent L CVC that was removed 12/8/20. No pain, edema, or dyspnea. No hx clot per pt. Plts normal. Start Xarelto 15mg bid x3w, then 20mg/d.  - Given plans to treat with ibrutinib, along with the removal of the inciting CVC and 3 months of AC, will now stop Xarelto. Will order new baseline doppler however.      3.  ID:  Afebrile. No active infections.  - prophy Fluc, ACV, Bactrim (started D28, 12/8).                 - He was scheduled for a COVID-19 vaccine, but will delay that until ater the biopsy and/ir coordinate it with his potential treatment.                              4.  GI: No complaints.     5.  FEN/Renal: Creat, lytes wnl.     6. Endo:   - Hx DM type II. Managed locally.       7. CV:   - HTN: metoprolol          Video-Visit Details    Type of service:  Video Visit    Video End Time:9:15am    Originating Location (pt. Location): Home    Distant Location (provider location):  [facility] BLOOD AND MARROW TRANSPLANT PROGRAM [place]     Platform used for Video Visit: [employer]

## 2021-03-01 NOTE — PROGRESS NOTES
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 New FDG avid intervally  enlarged bilateral axillary, upper paratracheal and bilateral hilar,  bilateral inguinal and bilateral external iliac lymphadenopathy.  Small left pleural effusion, smaller since prior examination.  Interval decrease in size of non-FDG avid hypodense subcapsular area  adjacent to the spleen.        ===    Review of Epic entered chart data shows the patient is on rivaroxaban ANTICOAGULANT (XARELTO ANTICOAGULANT) 20 MG    No anticoagulation hold for this LOW-BLEED-RISK procedure.    Platelets = 196  INR = none recent    COVID-19 PCR results = none recent    ===    Case request and imaging reviewed with IR Dr. Alok Corona. Approved for ultrasound-guided biopsy of ANY lesion that corresponds to PET+ lesions on recent scan. Dr. Corona favors inguinal node biopsy RIGHT or LEFT. No anticoagulation hold for this LOW-BLEED-RISK procedure.    Keyur Vázquez, KATY, PA-C

## 2021-03-01 NOTE — LETTER
"3/1/2021    RE: Theo Roblero  [address]  [address]  [place] [address]     Yanick is a 56 year old who is being evaluated via a billable video visit.      How would you like to obtain your AVS? MyChart  If the video visit is dropped, the invitation should be resent by: Text to cell phone: [phone]  Will anyone else be joining your video visit? No      I have reviewed and updated the patient's allergies and medication list.    Concerns: No new concerns.   Refills: None needed.        Vitals - Patient Reported  Weight (Patient Reported): 108 kg (238 lb)  Height (Patient Reported): 180.3 cm (5' 11\")  BMI (Based on Pt Reported Ht/Wt): 33.19  Pain Score: No Pain (0)      Shakira Berman CMA      Video Start Time: 8:50 AM    Subjective   HPI   Patient ID:  Theo Roblero is a 55 year old man D+111 s/p auto PBSCT for MCL. 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 Tolerating AC with Xarelto.     Day +100 restaging shows new FDG avid intervally enlarged bilateral axillary, upper paratracheal and bilateral hilar, bilateral inguinal and bilateral external iliac lymphadenopathy. Small left pleural effusion, smaller since prior examination. Interval decrease in size of non-FDG avid hypodense subcapsular area adjacent to the spleen.    Given new evidence of progression, we will pursue biopsy. He is interested in the ibrutinib/Lonca clinical trial, if MCL relapse is confirmed by path. Bone marrow shows no morphologic or immunophenotypic evidence of B-cell lymphoma, 30-40% cellular.      Review of Systems   Constitutional, HEENT, cardiovascular, pulmonary, gi and gu systems are negative, except as otherwise noted.      Objective       Vitals:  No vitals were obtained today due to virtual visit.    Physical Exam   GENERAL: Healthy, alert and no distress    Reviewed labs and imaging.    Theo Roblero is " a 54 yo man D+111 s/p auto PBSCT with BEAM prep for MCL        1.  BMT/MCL:   - Palifermin complete.  - Cell dose 4.3 million.  - Day +28 restaging: Remains in CR per imaging. No evidence of disease in the bone marrow; hypocellular marrow for age (20-30%) with trilineage hematopoiesis.  - New evidence of progression. Ordered IR biopsy and he will receive a copy of the ibrutinib/Lonca consent. A referral for clinton was placed with either [facility] or [facility]. We will reserve CD19 CAR T for later use. Also, we discussed the potential option of a THEA alloHCT once in CR2. This will be discussed at our TB.     2.  HEME: Non-occlusive DVT LIJ extending to SVC as above, related to recent L CVC that was removed 12/8/20. No pain, edema, or dyspnea. No hx clot per pt. Plts normal. Start Xarelto 15mg bid x3w, then 20mg/d.  - Given plans to treat with ibrutinib, along with the removal of the inciting CVC and 3 months of AC, will now stop Xarelto. Will order new baseline doppler however.      3.  ID:  Afebrile. No active infections.  - prophy Fluc, ACV, Bactrim (started D28, 12/8).                 - He was scheduled for a COVID-19 vaccine, but will delay that until ater the biopsy and/ir coordinate it with his potential treatment.                              4.  GI: No complaints.     5.  FEN/Renal: Creat, lytes wnl.     6. Endo:   - Hx DM type II. Managed locally.       7. CV:   - HTN: metoprolol          Video-Visit Details  Type of service:  Video Visit  Video End Time:9:15am  Originating Location (pt. Location): Home  Distant Location (provider location):  [facility] BLOOD AND MARROW TRANSPLANT PROGRAM [place]   Platform used for Video Visit: Bibiana Zavala MD

## 2021-03-01 NOTE — LETTER
"    3/1/2021         RE: Theo Roblero  [address]  [address]  [place] [address]        Dear Colleague,    Thank you for referring your patient, Theo Roblero, to the [facility] BLOOD AND MARROW TRANSPLANT PROGRAM [place]. Please see a copy of my visit note below.    Yanick is a 56 year old who is being evaluated via a billable video visit.      How would you like to obtain your AVS? MyChart  If the video visit is dropped, the invitation should be resent by: Text to cell phone: [phone]  Will anyone else be joining your video visit? No      I have reviewed and updated the patient's allergies and medication list.    Concerns: No new concerns.   Refills: None needed.        Vitals - Patient Reported  Weight (Patient Reported): 108 kg (238 lb)  Height (Patient Reported): 180.3 cm (5' 11\")  BMI (Based on Pt Reported Ht/Wt): 33.19  Pain Score: No Pain (0)      Shakira Berman CMA      Video Start Time: 8:50 AM    Subjective   HPI   Patient ID:  Theo Roblero is a 55 year old man D+111 s/p auto PBSCT for MCL. 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 Tolerating AC with Xarelto.     Day +100 restaging shows new FDG avid intervally enlarged bilateral axillary, upper paratracheal and bilateral hilar, bilateral inguinal and bilateral external iliac lymphadenopathy. Small left pleural effusion, smaller since prior examination. Interval decrease in size of non-FDG avid hypodense subcapsular area adjacent to the spleen.    Given new evidence of progression, we will pursue biopsy. He is interested in the ibrutinib/Lonca clinical trial, if MCL relapse is confirmed by path. Bone marrow shows no morphologic or immunophenotypic evidence of B-cell lymphoma, 30-40% cellular.      Review of Systems   Constitutional, HEENT, cardiovascular, pulmonary, gi and gu systems are negative, except as " otherwise noted.      Objective       Vitals:  No vitals were obtained today due to virtual visit.    Physical Exam   GENERAL: Healthy, alert and no distress    Reviewed labs and imaging.    Theo Roblero is a 56 yo man D+111 s/p auto PBSCT with BEAM prep for MCL        1.  BMT/MCL:   - Palifermin complete.  - Cell dose 4.3 million.  - Day +28 restaging: Remains in CR per imaging. No evidence of disease in the bone marrow; hypocellular marrow for age (20-30%) with trilineage hematopoiesis.  - New evidence of progression. Ordered IR biopsy and he will receive a copy of the ibrutinib/Lonca consent. A referral for masonic was placed with either [facility] or [facility]. We will reserve CD19 CAR T for later use. Also, we discussed the potential option of a THEA alloHCT once in CR2. This will be discussed at our TB.     2.  HEME: Non-occlusive DVT LIJ extending to SVC as above, related to recent L CVC that was removed 12/8/20. No pain, edema, or dyspnea. No hx clot per pt. Plts normal. Start Xarelto 15mg bid x3w, then 20mg/d.  - Given plans to treat with ibrutinib, along with the removal of the inciting CVC and 3 months of AC, will now stop Xarelto. Will order new baseline doppler however.      3.  ID:  Afebrile. No active infections.  - prophy Fluc, ACV, Bactrim (started D28, 12/8).                 - He was scheduled for a COVID-19 vaccine, but will delay that until ater the biopsy and/ir coordinate it with his potential treatment.                              4.  GI: No complaints.     5.  FEN/Renal: Creat, lytes wnl.     6. Endo:   - Hx DM type II. Managed locally.       7. CV:   - HTN: metoprolol          Video-Visit Details    Type of service:  Video Visit    Video End Time:9:15am    Originating Location (pt. Location): Home    Distant Location (provider location):  [facility] BLOOD AND MARROW TRANSPLANT PROGRAM [place]     Platform used for Video Visit: Bibiana        Again, thank you for allowing  me to participate in the care of your patient.        Sincerely,        Sam Zavala MD

## 2021-03-01 NOTE — PROGRESS NOTES
Orders placed for IR biopsy of LN (location to be determined by IR referral.   Morph, flow, FISH (11,14), and Lymphoma NGS panel

## 2021-03-01 NOTE — TELEPHONE ENCOUNTER
Informed patient that biopsy in IR approved by department. Provided IR scheduling number [phone] to call in order to schedule at first available convenience.  He states he will give them a call right away.

## 2021-03-02 NOTE — PROGRESS NOTES
"Regarding lymph node biopsy requested by Dr. Zavala;    He has asked for assistance entering desired specimen labs; morph, flow leukemia/lymphoma, Fish (11,14) and NGS lymphoma protocol.     I was able to order specimen lab orders on his behalf in the appropriate status for pending admission;  [id number] - Diffuse Large B Cell Lymphoma (DLBCL) NGS Panel  HPW0658 - Leukemia Lymphoma Evaluation Non CSF    However, I was unable to locate corresponding labs either through the Epic order database or the intranet's Reference Lab Guide Test Catalog for;  \"morph\"  \"Fish (11, 14)\"    I notified him through Epic in basket messaging that if these are labs are desired, that he will either have to enter them correctly himself, or give me specific lab codes.  "

## 2021-03-10 NOTE — PROGRESS NOTES
Prep and teaching complete for Lymph Node Biopsy; pt awake and alert, denies pain. Port accessed without difficulty, good blood return. Consent current; pt ready.

## 2021-03-10 NOTE — PROGRESS NOTES
Patient Name: Theo Roblero  Medical Record Number: [id number]  Today's Date: 3/10/2021    Procedure: Image guided inguinal lymph node biopsy.  Proceduralist: MD Rosanne., MD Jenaro.    Procedure Start: 0938  Procedure end: 0955  Sedation medications administered: Fentanyl: 100 mcg Versed: 2mg     Report given to: 2A, RN  : n/a    Other Notes: Pt arrived to IR room 6 from . Consent reviewed. Pt denies any questions or concerns regarding procedure. Pt positioned supine and monitored per protocol. Pathology present,  5 cores obtained, sent to lab.  Pt tolerated procedure without any noted complications. Pt transferred back to .    Мария Fry, SAGAR

## 2021-03-10 NOTE — PROGRESS NOTES
Patient returned to 2A post lymph node biopsy.  VSS.  Denies pain.  Left groin site C/D/I, no hematoma.  PO food and fluids at bedside.  1 hr bedrest, patient aware.

## 2021-03-10 NOTE — PRE-PROCEDURE
GENERAL PRE-PROCEDURE:   Procedure:  Image guided inguinal lymph node biopsy.     Verbal consent obtained?: Yes    Written consent obtained?: Yes    Risks and benefits: Risks, benefits and alternatives were discussed    Consent given by:  Patient  Patient states understanding of procedure being performed: Yes    Patient's understanding of procedure matches consent: Yes    Procedure consent matches procedure scheduled: Yes    Expected level of sedation:  Moderate  Appropriately NPO:  Yes  ASA Class:  Class 2- mild systemic disease, no acute problems, no functional limitations  Mallampati  :  Grade 2- soft palate, base of uvula, tonsillar pillars, and portion of posterior pharyngeal wall visible  Lungs:  Lungs clear with good breath sounds bilaterally  Heart:  Normal heart sounds and rate  History & Physical reviewed:  History and physical reviewed and no updates needed  Statement of review:  I have reviewed the lab findings, diagnostic data, medications, and the plan for sedation

## 2021-03-10 NOTE — IP AVS SNAPSHOT
[facility] [address] [address]                                    After Visit Summary   3/10/2021    Mr. Theo Roblero    MRN: [id number]           After Visit Summary Signature Page    I have received my discharge instructions, and my questions have been answered. I have discussed any challenges I see with this plan with the nurse or doctor.    ..........................................................................................................................................  Patient/Patient Representative Signature      ..........................................................................................................................................  Patient Representative Print Name and Relationship to Patient    ..................................................               ................................................  Date                                   Time    ..........................................................................................................................................  Reviewed by Signature/Title    ...................................................              ..............................................  Date                                               Time          22EPIC Rev 08/18

## 2021-03-10 NOTE — IP AVS SNAPSHOT
MRN:[id number]                      After Visit Summary   3/10/2021    Mr. Theo Roblero    MRN: [id number]           Visit Information        Department      3/10/2021  7:45 AM [facility] [address] [place]          Review of your medicines      UNREVIEWED medicines. Ask your doctor about these medicines       Dose / Directions   acyclovir 800 MG tablet  Commonly known as: ZOVIRAX  Indication: Medication Treatment to Prevent Cytomegalovirus Disease, Treatment to Prevent Influenza  Used for: Mantle cell lymphoma of lymph nodes of multiple sites (H)      Dose: 800 mg  Take 1 tablet (800 mg) by mouth 2 times daily  Quantity: 60 tablet  Refills: 11     glimepiride 4 MG tablet  Commonly known as: AMARYL  Used for: Type 2 diabetes mellitus with other specified complication, without long-term current use of insulin (H)      Dose: 4 mg  Take 1 tablet (4 mg) by mouth every morning (before breakfast)  Quantity: 30 tablet  Refills: 3     metFORMIN 500 MG 24 hr tablet  Commonly known as: GLUCOPHAGE-XR  Used for: Type 2 diabetes mellitus with other specified complication, without long-term current use of insulin (H)      Dose: 1,000 mg  Take 2 tablets (1,000 mg) by mouth daily (with dinner)  Quantity: 120 tablet  Refills: 3     metoprolol succinate ER 25 MG 24 hr tablet  Commonly known as: TOPROL-XL  Used for: Mantle cell lymphoma of lymph nodes of multiple sites (H)      Dose: 25 mg  Take 1 tablet (25 mg) by mouth 2 times daily  Quantity: 60 tablet  Refills: 1     rivaroxaban ANTICOAGULANT 20 MG Tabs tablet  Commonly known as: XARELTO ANTICOAGULANT  Used for: Acute deep vein thrombosis (DVT) of left upper extremity, unspecified vein (H)      Dose: 20 mg  Take 1 tablet (20 mg) by mouth daily (with dinner)  Quantity: 30 tablet  Refills: 0     sulfamethoxazole-trimethoprim 800-160 MG tablet  Commonly known as: BACTRIM DS  Indication: PCP prophy  Used for: Mantle cell lymphoma of lymph nodes of  multiple sites (H)      Dose: 1 tablet  Take 1 tablet by mouth Every Mon, Tues two times daily Started 12/8/20  Quantity: 16 tablet  Refills: 11        CONTINUE these medicines which have NOT CHANGED       Dose / Directions   Alcohol Swabs Pads  Used for: Type 2 diabetes mellitus with other specified complication, without long-term current use of insulin (H)      Dose: 1 pad  1 pad 4 times daily (before meals and nightly)  Quantity: 120 each  Refills: 3     blood glucose monitoring lancets  Used for: Type 2 diabetes mellitus with other specified complication, without long-term current use of insulin (H)      Use to test blood sugar 4 times daily or as directed.  Quantity: 102 each  Refills: 3     blood glucose test strip  Commonly known as: NO BRAND SPECIFIED  Used for: Type 2 diabetes mellitus with other specified complication, without long-term current use of insulin (H)      Use to test blood sugar 4 times daily or as directed.  Quantity: 120 strip  Refills: 3              Protect others around you: Learn how to safely use, store and throw away your medicines at www.disposemymeds.org.       Follow-ups after your visit       Care Instructions       Further instructions from your care team       [facility]    Interventional Radiology  Patient Instructions Following Lymph Node Biopsy    AFTER YOU GO HOME  ? If you were given sedation DO NOT drive or operate machinery at home or at work for at least 24 hours  ? DO relax and take it easy for 48 hours, no strenuous activity for 24 hours  ? DO drink plenty of fluids  ? DO resume your regular diet, unless otherwise instructed by your Primary Physician  ? Keep the dressing dry and in place for 24 hours.  ? DO NOT SMOKE FOR AT LEAST 24 HOURS, if you have been given any medications that were to help you relax or sedate you during your procedure  ? DO NOT drink alcoholic beverages the day of your procedure  ? DO NOT do any strenuous exercise or lifting (>  10 lbs) for at least 7 days following your procedure  ? DO NOT take a bath or shower for at least 12 hours following your procedure  ? Remove dressing after shower the next day. Replace with Band aid for 2 days.  Never leave a wet dressing in place.  ? DO NOT make any important or legal decisions for 24 hours following your procedure  ? There should be minimum drainage from the biopsy site    CALL THE PHYSICIAN IF:  ? You start bleeding from the procedure site.  If you do start to bleed from that site, lie down flat and hold pressure on the site for a minimum of 10 minutes.  Your physician will tell you if you need to return to the hospital  ? You develop nausea or vomiting  ? You have excessive swelling, redness, or tenderness at the site  ? You have drainage that looks like it is infected.  ? You experience severe pain  ? You develop hives or a rash or unexplained itching  ? You develop shortness of breath  ? You develop a temperature of 101 degrees F or greater    ADDITIONAL INSTRUCTIONS  If you are taking Coumadin, restart tonight.  Follow up with your Coumadin Clinic or Primary Care MD to have your INR rechecked.    [facility] INTERVENTIONAL RADIOLOGY DEPARTMENT  Procedure Physician: Dr. Eason and Dr. Lay                                  Date of procedure: March 10, 2021  Telephone Numbers: [phone] Monday-Friday 8:00 am to 4:30 pm  [phone] After 4:30 pm Monday-Friday, Weekends & Holidays.   Ask for the Interventional Radiologist on call.  Someone is on call 24 hrs/day  [facility] toll free number: [phone] Monday-Friday 8:00 am to 4:30 pm  [facility] Emergency Dept: [phone]          Additional Information About Your Visit       MyChart Information    [employer] gives you secure access to your electronic health record. If you see a primary care provider, you can also send messages to your care team and make appointments. If you have questions, please call your primary care clinic.  If you do not have a  "primary care provider, please call [phone] and they will assist you.       Care EveryWhere ID    This is your Care EveryWhere ID. This could be used by other organizations to access your [facility] medical records  [id number]       Your Vitals Were  Most recent update: 3/10/2021 10:14 AM    Blood Pressure   125/88 (BP Location: Right arm)          Pulse   68          Temperature   98.3  F (36.8  C) (Oral)          Respirations   16          Height   1.803 m (5' 11\")             Weight   109.3 kg (241 lb)    Pulse Oximetry   95%    BMI (Body Mass Index)   33.61 kg/m           Primary Care Provider Office Phone # Fax #    Mike Price [phone]      Equal Access to Services    LITA GONZALEZ : Hadii yajaira ku hadasho Soomaali, waaxda luqadaha, qaybta kaalmada adeegyada, waxlucrecia idiin hayesteban krishnan . So [facility] [phone].    ATENCIÓN: Si habla español, tiene a siddiqi disposición servicios gratuitos de asistencia lingüística. Llame al [phone].    We comply with applicable federal and state civil rights laws, including the [place] Human Rights Act. We do not discriminate on the basis of race, color, creed, Sikhism, national origin, marital status, age, disability, sex, sexual orientation, or gender identity.    If you would like an itemization of your charges they will now be available in [facility] 30 days after discharge. To access the itemized statements in [facility] go to billing/billing summary. From there select view account. There will be multiple tabs showing an overview of your account, detail, payments, and communications. From the communications tab you can see your monthly statements, your itemized statements, and any billing letters generated for your account. If you do not have a [facility] account and need help getting access please contact [facility] support at [phone].  If you would prefer to have your itemized statements mailed please contact our automated itemized bill " request line at [phone] option  2.       Thank you!    Thank you for choosing [facility] for your care. Our goal is always to provide you with excellent care. Hearing back from our patients is one way we can continue to improve our services. Please take a few minutes to complete the written survey that you may receive in the mail after you visit with us. Thank you!            Medication List      Medications          Morning Afternoon Evening Bedtime As Needed    Alcohol Swabs Pads  INSTRUCTIONS: 1 pad 4 times daily (before meals and nightly)                     blood glucose monitoring lancets  INSTRUCTIONS: Use to test blood sugar 4 times daily or as directed.                     blood glucose test strip  Also known as: NO BRAND SPECIFIED  INSTRUCTIONS: Use to test blood sugar 4 times daily or as directed.                       ASK your doctor about these medications          Morning Afternoon Evening Bedtime As Needed    acyclovir 800 MG tablet  Also known as: ZOVIRAX  INSTRUCTIONS: Take 1 tablet (800 mg) by mouth 2 times daily  Reason for med: Medication Treatment to Prevent Cytomegalovirus Disease, Treatment to Prevent Influenza                     glimepiride 4 MG tablet  Also known as: AMARYL  INSTRUCTIONS: Take 1 tablet (4 mg) by mouth every morning (before breakfast)                     metFORMIN 500 MG 24 hr tablet  Also known as: GLUCOPHAGE-XR  INSTRUCTIONS: Take 2 tablets (1,000 mg) by mouth daily (with dinner)                     metoprolol succinate ER 25 MG 24 hr tablet  Also known as: TOPROL-XL  INSTRUCTIONS: Take 1 tablet (25 mg) by mouth 2 times daily  Doctor's comments: Patient should request refill from PCP or referring MD who started him on Metoprolol for future refills.                     rivaroxaban ANTICOAGULANT 20 MG Tabs tablet  Also known as: XARELTO ANTICOAGULANT  INSTRUCTIONS: Take 1 tablet (20 mg) by mouth daily (with dinner)                     sulfamethoxazole-trimethoprim 800-160  MG tablet  Also known as: BACTRIM DS  INSTRUCTIONS: Take 1 tablet by mouth Every Mon, Tues two times daily Started 12/8/20  Reason for med: PCP prophy

## 2021-03-10 NOTE — DISCHARGE INSTRUCTIONS
[facility]    Interventional Radiology  Patient Instructions Following Lymph Node Biopsy    AFTER YOU GO HOME  ? If you were given sedation DO NOT drive or operate machinery at home or at work for at least 24 hours  ? DO relax and take it easy for 48 hours, no strenuous activity for 24 hours  ? DO drink plenty of fluids  ? DO resume your regular diet, unless otherwise instructed by your Primary Physician  ? Keep the dressing dry and in place for 24 hours.  ? DO NOT SMOKE FOR AT LEAST 24 HOURS, if you have been given any medications that were to help you relax or sedate you during your procedure  ? DO NOT drink alcoholic beverages the day of your procedure  ? DO NOT do any strenuous exercise or lifting (> 10 lbs) for at least 7 days following your procedure  ? DO NOT take a bath or shower for at least 12 hours following your procedure  ? Remove dressing after shower the next day. Replace with Band aid for 2 days.  Never leave a wet dressing in place.  ? DO NOT make any important or legal decisions for 24 hours following your procedure  ? There should be minimum drainage from the biopsy site    CALL THE PHYSICIAN IF:  ? You start bleeding from the procedure site.  If you do start to bleed from that site, lie down flat and hold pressure on the site for a minimum of 10 minutes.  Your physician will tell you if you need to return to the hospital  ? You develop nausea or vomiting  ? You have excessive swelling, redness, or tenderness at the site  ? You have drainage that looks like it is infected.  ? You experience severe pain  ? You develop hives or a rash or unexplained itching  ? You develop shortness of breath  ? You develop a temperature of 101 degrees F or greater    ADDITIONAL INSTRUCTIONS  If you are taking Coumadin, restart tonight.  Follow up with your Coumadin Clinic or Primary Care MD to have your INR rechecked.    [facility] INTERVENTIONAL RADIOLOGY DEPARTMENT  Procedure Physician: Dr. Eason  and Dr. Lay                                  Date of procedure: March 10, 2021  Telephone Numbers: [phone] Monday-Friday 8:00 am to 4:30 pm  [phone] After 4:30 pm Monday-Friday, Weekends & Holidays.   Ask for the Interventional Radiologist on call.  Someone is on call 24 hrs/day  [facility] toll free number: [phone] Monday-Friday 8:00 am to 4:30 pm  [facility] Emergency Dept: [phone]

## 2021-03-10 NOTE — PROCEDURES
[facility]    Procedure: Inguinal LN Biopsy    Date/Time: 3/10/2021 9:52 AM  Performed by: Edwin Eason MD  Authorized by: Edwin Eason MD   IR Fellow Physician: Edwin Eason    UNIVERSAL PROTOCOL   Site Marked: NA  Prior Images Obtained and Reviewed:  Yes  Required items: Required blood products, implants, devices and special equipment available    Patient identity confirmed:  Verbally with patient, arm band, provided demographic data and hospital-assigned identification number  Patient was reevaluated immediately before administering moderate or deep sedation or anesthesia  Confirmation Checklist:  Patient's identity using two indicators, relevant allergies, procedure was appropriate and matched the consent or emergent situation and correct equipment/implants were available  Time out: Immediately prior to the procedure a time out was called    Universal Protocol: the Joint Commission Universal Protocol was followed    Preparation: Patient was prepped and draped in usual sterile fashion    ESBL (mL):  0         ANESTHESIA    Anesthesia: Local infiltration  Local Anesthetic:  Lidocaine 1% without epinephrine  Anesthetic Total (mL):  5      SEDATION    Patient Sedated: Yes    Sedation Type:  Moderate (conscious) sedation  Vital signs: Vital signs monitored during sedation    See dictated procedure note for full details.  Findings: 4 core biopsy specimens from left inguinal lymphnode    Specimens: none    Complications: None    Condition: Stable    Plan: - Bed rest for 1 hr    PROCEDURE   Patient Tolerance:  Patient tolerated the procedure well with no immediate complications    Length of time physician/provider present for 1:1 monitoring during sedation: 10

## 2021-03-10 NOTE — PROGRESS NOTES
Patient tolerated recovery stage well. VSS, left groin site clean/dry/intact, no hematoma, and denies pain. Patient tolerated PO food and fluids. Teaching was done and discharge instructions were given. Patient ambulated, voided, and port was de-accessed and heparin-locked.  Patient discharged from the hospital via wheel chair to home with  @ 1105.

## 2021-03-12 NOTE — PROGRESS NOTES
Notified pt that Dr Nelson will see him for virtual consult on 3/17 at 2 pm.   Onc Intake Scheduling team will call him to confirm and set the appt.  Pt voiced understanding of above instructions and information and denied further questions, has my name and callback number below for interval questions.    Cyndi Jolly, RN, BSN, OCN  Hematology/Oncology Nurse Navigator  [facility]  [phone]

## 2021-03-16 NOTE — TELEPHONE ENCOUNTER
RECORDS STATUS - ALL OTHER DIAGNOSIS      RECORDS RECEIVED FROM: [facility]/ (Imaging previously resolved), [facility]   DATE RECEIVED: 3/16   NOTES STATUS DETAILS   OFFICE NOTE from referring provider Sam Platt MD in [facility] BMT   OFFICE NOTE from medical oncologist [facility] Dr. Martin Mae: 6/11/20   DISCHARGE SUMMARY from hospital [facility]/ Epic:  3/10/21, 11/16/20, 11/4/20    HP:  6/12/20, 5/12/20, 4/24/20   DISCHARGE REPORT from the ER  -  5/11/20, 2/20/20, 2/6/20   OPERATIVE REPORT Epic/POLO - , [facility] Epic:  10/26/20: dual lumen non valved tunneled line vascular placement    HP:  6/4/20: Cystopscopy, Retrograde Pyelogram  5/12/20: Cystoscopitc Placement Ureteral Stent/Retrograde Pyelogram  1/29/18: Colonoscopy    [facility]:  8/14/08: Herniorhhaphy, Umbilical   MEDICATION LIST [facility]    CLINICAL TRIAL TREATMENTS TO DATE     LABS     PATHOLOGY REPORTS [facility] 3/10/21: Surg Path  2/24/21, 12/9/20, 10/13/20, 8/27/20: BMB  9/8/20: Path Consult   ANYTHING RELATED TO DIAGNOSIS Epic 3/10/21   GENONOMIC TESTING     TYPE: Epic 3/10/21, 2/24/21: FISH   IMAGING (NEED IMAGES & REPORT)     CT SCANS PACS [facility]   MRI     MAMMO     ULTRASOUND PACS    PET PACS Epic/HP

## 2021-03-17 NOTE — PROGRESS NOTES
"Yanick is a 56 year old who is being evaluated via a billable video visit.      How would you like to obtain your AVS? MyChart  If the video visit is dropped, the invitation should be resent by: Text to cell phone: [phone]  Will anyone else be joining your video visit? No       I have reviewed and updated the patient's allergies and medication list.    Concerns: none  Refills: none     Vitals - Patient Reported  Weight (Patient Reported): 108.9 kg (240 lb)  Height (Patient Reported): 180.3 cm (5' 11\")  BMI (Based on Pt Reported Ht/Wt): 33.47  Pain Score: No Pain (0)    Fabiola Jackson NESTOR      Yanick is a 56 year old who is being evaluated via a billable video visit.      How would you like to obtain your AVS? MyChart  If the video visit is dropped, the invitation should be resent by: Text to cell phone: [phone]  Will anyone else be joining your video visit? No      I have reviewed and updated the patient's allergies and medication list.    Concerns: No new concerns.   Refills: None needed.      Subjective   HPI   Patient ID:  Theo Roblero is a 55 year old man D+111 s/p auto PBSCT for MCL. 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 Tolerating AC with Xarelto.     Day +100 restaging shows new FDG avid intervally enlarged bilateral axillary, upper paratracheal and bilateral hilar, bilateral inguinal and bilateral external iliac lymphadenopathy. Small left pleural effusion, smaller since prior examination. Interval decrease in size of non-FDG avid hypodense subcapsular area adjacent to the spleen.    Given new evidence of progression, we will pursue biopsy. He is interested in the ibrutinib/Lonca clinical trial, if MCL relapse is confirmed by path. Bone marrow shows no morphologic or immunophenotypic evidence of B-cell lymphoma, 30-40% cellular.      Review of Systems   Constitutional, HEENT, " cardiovascular, pulmonary, gi and gu systems are negative, except as otherwise noted.      Objective       Vitals:  No vitals were obtained today due to virtual visit.    Physical Exam   GENERAL: Healthy, alert and no distress    Reviewed labs and imaging.    Theo Roblero is a 56 yo man D+111 s/p auto PBSCT with BEAM prep for MCL        1.  BMT/MCL:   - Palifermin complete.  - Cell dose 4.3 million.  - Day +28 restaging: Remains in CR per imaging. No evidence of disease in the bone marrow; hypocellular marrow for age (20-30%) with trilineage hematopoiesis.  - New evidence of progression. Ordered IR biopsy and he will receive a copy of the ibrutinib/Lonca consent.       I discussed the pros and cons of three options which are all efficacious  1. ADCT  Trial- Loncatuximab + Ibrutinib  2. Ibrutinib + Venetoclax  3. CART     I would favor number 1 and I explained the rationale. All three are equally good options but have their own advantages and disadvantages.  Josselin Merchant will contact Mr. Roblero and send him the consent forms        Suresh LAURENT MS  Attending Physician  Pager - [phone]  Email - [email or web address]

## 2021-03-17 NOTE — LETTER
"    3/17/2021         RE: Theo Roblero  [address]  [address] [place] [id number]        Dear Colleague,    Thank you for referring your patient, Theo Roblero, to the [facility] BLOOD AND MARROW TRANSPLANT PROGRAM [place]. Please see a copy of my visit note below.    Yanick is a 56 year old who is being evaluated via a billable video visit.      How would you like to obtain your AVS? MyChart  If the video visit is dropped, the invitation should be resent by: Text to cell phone: [phone]  Will anyone else be joining your video visit? No       I have reviewed and updated the patient's allergies and medication list.    Concerns: none  Refills: none     Vitals - Patient Reported  Weight (Patient Reported): 108.9 kg (240 lb)  Height (Patient Reported): 180.3 cm (5' 11\")  BMI (Based on Pt Reported Ht/Wt): 33.47  Pain Score: No Pain (0)    Fabiola Jackson CMA      Yanick is a 56 year old who is being evaluated via a billable video visit.      How would you like to obtain your AVS? MyChart  If the video visit is dropped, the invitation should be resent by: Text to cell phone: [phone]  Will anyone else be joining your video visit? No      I have reviewed and updated the patient's allergies and medication list.    Concerns: No new concerns.   Refills: None needed.      Subjective   HPI   Patient ID:  Theo Roblero is a 55 year old man D+111 s/p auto PBSCT for MCL. 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 Tolerating AC with Xarelto.     Day +100 restaging shows new FDG avid intervally enlarged bilateral axillary, upper paratracheal and bilateral hilar, bilateral inguinal and bilateral external iliac lymphadenopathy. Small left pleural effusion, smaller since prior examination. Interval decrease in size of non-FDG avid hypodense subcapsular area adjacent to the spleen.    Given new " evidence of progression, we will pursue biopsy. He is interested in the ibrutinib/Lonca clinical trial, if MCL relapse is confirmed by path. Bone marrow shows no morphologic or immunophenotypic evidence of B-cell lymphoma, 30-40% cellular.      Review of Systems   Constitutional, HEENT, cardiovascular, pulmonary, gi and gu systems are negative, except as otherwise noted.      Objective       Vitals:  No vitals were obtained today due to virtual visit.    Physical Exam   GENERAL: Healthy, alert and no distress    Reviewed labs and imaging.    Theo Roblero is a 56 yo man D+111 s/p auto PBSCT with BEAM prep for MCL        1.  BMT/MCL:   - Palifermin complete.  - Cell dose 4.3 million.  - Day +28 restaging: Remains in CR per imaging. No evidence of disease in the bone marrow; hypocellular marrow for age (20-30%) with trilineage hematopoiesis.  - New evidence of progression. Ordered IR biopsy and he will receive a copy of the ibrutinib/Lonca consent.       I discussed the pros and cons of three options which are all efficacious  1. ADCT  Trial- Loncatuximab + Ibrutinib  2. Ibrutinib + Venetoclax  3. CART     I would favor number 1 and I explained the rationale. All three are equally good options but have their own advantages and disadvantages.  Josselin Merchant will contact Mr. Roblero and send him the consent forms        Suresh LAURENT MS  Attending Physician  Pager - [phone]  Email - [email or web address]

## 2021-03-24 NOTE — PROGRESS NOTES
Subjective   HPI   Patient ID:  Theo Roblero is a 55 year old man D+134 s/p auto PBSCT for MCL. 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 Tolerating AC with Xarelto.     Day +100 restaging shows new FDG avid intervally enlarged bilateral axillary, upper paratracheal and bilateral hilar, bilateral inguinal and bilateral external iliac lymphadenopathy. Small left pleural effusion, smaller since prior examination. Interval decrease in size of non-FDG avid hypodense subcapsular area adjacent to the spleen.    Given new evidence of progression, we will pursue biopsy. He is interested in the ibrutinib/Lonca clinical trial, if MCL relapse is confirmed by path. Bone marrow shows no morphologic or immunophenotypic evidence of B-cell lymphoma, 30-40% cellular.      Onc Hx :    Theo Roblero is a 55 year old male with high risk (IPI 6.5 and Ki67 of 20%), stage IV Mantle cell lymphoma in CR1 s/p 8 cycles of alternating 6 cycles of Maxi-R- CHOP alternating with high-dose LITZY-C. Last cycle was given on 6/11/2020. His therapy was complicated by a kidney stone and sepsis in 5/2020, prior to cycle 5.     Upon diagnosis, a CT was performed to evaluate pain concerning for an aortic dissection and showed LAD above and below the diaphragm, with large portacaval nodes measure in excess of 5cm. No thoracoabdominal aortic dissection or aneurysm was found.The findings of LAD were confirmed by a PET/CT. Lymph node biopsy on 2/7/2020 showed MCL, as described as CD20 positive B cells with positive staining for CD5 and cyclin D1.  The Ki-67 proliferation index was 20%. A bone marrow biopsy on 2/18/2020 was positive for intramedullary MCL (<5%). Early in his disease course, he developed a sepsis syndrome with leukocytosis (17.8) a creatinine 2.1 and was admitted on 2/20/2020. Kidney function has since  improved.     He went on to receive Maxi-R- CHOP alternating with high-dose LITZY-C from 2-6/2020. Follow up imaging post therapy on 7/6/2020 showed a CR by PET CT, with resolution of avid lymph nodes. A persistent pleural effusion was observed and not PET avid.       Review of Systems   Constitutional, HEENT, cardiovascular, pulmonary, gi and gu systems are negative, except as otherwise noted.      Objective    Vitals - Patient Reported  Pain Score: No Pain (0)    General: NAD   Eyes: MARYAN, sclera anicteric   Nose/Mouth/Throat: OP clear, buccal mucosa moist, no ulcerations   Lungs: CTA bilaterally  Cardiovascular: RRR, no M/R/G   Abdominal/Rectal: +BS, soft, NT, ND, No HSM   Lymphatics: No edema  Skin: No rashes or petechaie  Neuro: A&O   ECOG PS- 1    Reviewed labs and imaging.     Theo Roblero is a 55 year old male with high risk (IPI 6.5 and Ki67 of 20%), stage IV Mantle cell lymphoma in 1st relapse s/p 8 cycles of alternating 6 cycles of Maxi-R- CHOP alternating with high-dose LITZY-C and BEAM ASCT consolidation D+134 s/p auto PBSCT         I discussed the pros and cons in my previous visit of three options which are all efficacious  1. ADCT  Trial- Loncatuximab + Ibrutinib  2. Ibrutinib + Venetoclax  3. CART     Mr. Roblero and I discussed and we decided to proceed with Lonca + Ibrutinib. He signed the consent today. All questions were answered including risks, benefits and alternatives. We are planning to start this next week.    Sruesh LAURENT MS  Attending Physician  Pager - [phone]  Email - [email or web address]

## 2021-03-24 NOTE — NURSING NOTE
"Oncology Rooming Note    March 24, 2021 8:09 AM   hTeo Roblero is a 56 year old male who presents for:    Chief Complaint   Patient presents with     RECHECK     Mantle cell lymphoma of lymph nodes of multiple sites (H)     Initial Vitals: /76   Pulse 91   Temp 98.2  F (36.8  C) (Tympanic)   Resp 18   Ht 1.803 m (5' 11\")   Wt 106.6 kg (235 lb)   SpO2 99%   BMI 32.78 kg/m   Estimated body mass index is 32.78 kg/m  as calculated from the following:    Height as of this encounter: 1.803 m (5' 11\").    Weight as of this encounter: 106.6 kg (235 lb). Body surface area is 2.31 meters squared.  No Pain (0) Comment: Data Unavailable   No LMP for male patient.  Allergies reviewed: Yes  Medications reviewed: Yes    Medications: Medication refills not needed today.  Pharmacy name entered into [facility]:    [id number] - [place], [place] - [address] AT [facility] [address]  [facility]    Clinical concerns: NO NEW NEEDS      Flores Doshi CMA              "

## 2021-03-24 NOTE — PROGRESS NOTES
[id number]: Study Visit Note   Subject name: Theo Roblero     Visit: Screening    Did the study visit occur within the appropriate window allowed by the protocol? yes    Triplicate EKGs were collected per protocol on the study-provided machine. EKGs were reviewed by Dr. Nelson and further sent for central review.     Theo Roblero was given the opportunity to ask any trial related questions.  Please see provider progress note for physical exam and other clinical information. Labs were reviewed - any significant lab values were addressed and reviewed.    Jack Angeles

## 2021-03-24 NOTE — LETTER
3/24/2021         RE: Theo Roblero  [address]  [address]  [place] [address]        Dear Colleague,    Thank you for referring your patient, Theo Roblero, to the [facility] BLOOD AND MARROW TRANSPLANT PROGRAM [place]. Please see a copy of my visit note below.        Subjective   HPI   Patient ID:  Theo Roblero is a 55 year old man D+134 s/p auto PBSCT for MCL. 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 Tolerating AC with Xarelto.     Day +100 restaging shows new FDG avid intervally enlarged bilateral axillary, upper paratracheal and bilateral hilar, bilateral inguinal and bilateral external iliac lymphadenopathy. Small left pleural effusion, smaller since prior examination. Interval decrease in size of non-FDG avid hypodense subcapsular area adjacent to the spleen.    Given new evidence of progression, we will pursue biopsy. He is interested in the ibrutinib/Lonca clinical trial, if MCL relapse is confirmed by path. Bone marrow shows no morphologic or immunophenotypic evidence of B-cell lymphoma, 30-40% cellular.      Onc Hx :    Theo Roblero is a 55 year old male with high risk (IPI 6.5 and Ki67 of 20%), stage IV Mantle cell lymphoma in CR1 s/p 8 cycles of alternating 6 cycles of Maxi-R- CHOP alternating with high-dose LITZY-C. Last cycle was given on 6/11/2020. His therapy was complicated by a kidney stone and sepsis in 5/2020, prior to cycle 5.     Upon diagnosis, a CT was performed to evaluate pain concerning for an aortic dissection and showed LAD above and below the diaphragm, with large portacaval nodes measure in excess of 5cm. No thoracoabdominal aortic dissection or aneurysm was found.The findings of LAD were confirmed by a PET/CT. Lymph node biopsy on 2/7/2020 showed MCL, as described as CD20 positive B cells with positive staining for CD5 and cyclin  D1.  The Ki-67 proliferation index was 20%. A bone marrow biopsy on 2/18/2020 was positive for intramedullary MCL (<5%). Early in his disease course, he developed a sepsis syndrome with leukocytosis (17.8) a creatinine 2.1 and was admitted on 2/20/2020. Kidney function has since improved.     He went on to receive Maxi-R- CHOP alternating with high-dose LITZY-C from 2-6/2020. Follow up imaging post therapy on 7/6/2020 showed a CR by PET CT, with resolution of avid lymph nodes. A persistent pleural effusion was observed and not PET avid.       Review of Systems   Constitutional, HEENT, cardiovascular, pulmonary, gi and gu systems are negative, except as otherwise noted.      Objective    Vitals - Patient Reported  Pain Score: No Pain (0)    General: NAD   Eyes: MARYAN, sclera anicteric   Nose/Mouth/Throat: OP clear, buccal mucosa moist, no ulcerations   Lungs: CTA bilaterally  Cardiovascular: RRR, no M/R/G   Abdominal/Rectal: +BS, soft, NT, ND, No HSM   Lymphatics: No edema  Skin: No rashes or petechaie  Neuro: A&O   ECOG PS- 1    Reviewed labs and imaging.     Theo Roblero is a 55 year old male with high risk (IPI 6.5 and Ki67 of 20%), stage IV Mantle cell lymphoma in 1st relapse s/p 8 cycles of alternating 6 cycles of Maxi-R- CHOP alternating with high-dose LITZY-C and BEAM ASCT consolidation D+134 s/p auto PBSCT         I discussed the pros and cons in my previous visit of three options which are all efficacious  1. ADCT  Trial- Loncatuximab + Ibrutinib  2. Ibrutinib + Venetoclax  3. CART     Mr. Roblero and I discussed and we decided to proceed with Lonca + Ibrutinib. He signed the consent today. All questions were answered including risks, benefits and alternatives. We are planning to start this next week.    Suresh LAURENT MS  Attending Physician  Pager - [phone]  Email - [email or web address]

## 2021-03-24 NOTE — NURSING NOTE
[id number]: Informed Consent Note     The consent form, including purpose, risks and benefits, was reviewed with Theo Roblero, and all questions were answered before he signed the consent form. The patient understands that the study involves an active treatment phase as well as a post-treatment follow up phase.     We discussed contraception.  Yanick was told that if he has a  female partner who is of childbearing potential he must agree to use a highly effective method of contraception from the time of giving infomed consent until at least 20 weeks after the he receives his last dose of study therapy.  Yanick agreed to this plan.     Present during the discussion was Don and myself. A copy of the signed form was provided to the patient.  No procedures specific to this study were performed prior to the patient signing the consent form.  Labs and EKG were performed after the consent was signed.  Pt then followed up with Dr. Nelson.      Consent Version Date: 10/6/20    Approved by [facility] on 1/15/21  Consent obtained by: Josselin Merchant RN    Date:  3/24/21  HIPAA authorization signed?:  Yes   HIPAA authorization version date: 7/18/16  Josselin Merchant RN

## 2021-03-29 NOTE — TELEPHONE ENCOUNTER
Telephone call to Don regarding Xarelto.  Pt stated that he did discontinue Xarelto on 3/17/21 and is no longer taking any anticoagulant.

## 2021-04-02 NOTE — NURSING NOTE
"Oncology Rooming Note    April 2, 2021 7:09 AM   Theo Roblero is a 56 year old male who presents for:    Chief Complaint   Patient presents with     RECHECK     mantle cell lymphoma      Initial Vitals: /80   Pulse 83   Temp 97.4  F (36.3  C) (Oral)   Resp 16   Wt 108.2 kg (238 lb 8 oz)   SpO2 99%   BMI 33.26 kg/m   Estimated body mass index is 33.26 kg/m  as calculated from the following:    Height as of 3/24/21: 1.803 m (5' 11\").    Weight as of this encounter: 108.2 kg (238 lb 8 oz). Body surface area is 2.33 meters squared.  No Pain (0) Comment: Data Unavailable   No LMP for male patient.  Allergies reviewed: Yes  Medications reviewed: Yes    Medications: Medication refills not needed today.  Pharmacy name entered into [employer]:    [id number] - [place], [place] - [address] [facility] [address]  [place]    Clinical concerns: none       Masha Kitchen CMA              "

## 2021-04-02 NOTE — PROGRESS NOTES
[id number]: Study Visit Note   Subject name: Theo Roblero     Visit: C1D1    Did the study visit occur within the appropriate window allowed by the protocol? yes    Since the last study visit, He has been doing well. Today was Cycle 1 Day 1. Patient received LoncaT infusion and seemed to tolerate well. Triplicate EKGs were performed on the study-provided machine according to protocol for all three time points.     Theo Roblero was given the opportunity to ask any trial related questions.  Please see provider progress note for physical exam and other clinical information. Labs were reviewed - any significant lab values were addressed and reviewed.    Jack Angeles

## 2021-04-02 NOTE — LETTER
4/2/2021         RE: Theo Roblero  [address]  [address]  [place] [address]        Dear Colleague,    Thank you for referring your patient, Theo Roblero, to the [facility]LOOD AND MARROW TRANSPLANT PROGRAM [place]. Please see a copy of my visit note below.    Infusion Nursing Note:  Theo Roblero presents today for Loncastuximab IV. See MAR.     Patient seen by provider today: Yes: RADHA Duarte   present during visit today: Not Applicable.    Note: Pt received Cycle 1, Day 1 Loncastuximab IV and tolerated it well over 30 minutes. No Premeds administered here in clinic, but Pt is taking Decadron as prescribed. Also obtained script for Ibrutinib today. Pt assessment completed by Provider prior to infusion. EKG's and research labs collected as ordered. Chemo checked with another RN prior to administration per protocol. Pt monitored for 4 hours post infusion per orders. Pt with no noted side effects during this time. Discharged in stable condition.      Intravenous Access:  Implanted Port.    Treatment Conditions:  Results reviewed, labs MET treatment parameters, ok to proceed with treatment.      Post Infusion Assessment:  Patient tolerated infusion without incident.  Blood return noted pre and post infusion.  Access discontinued per protocol.       Discharge Plan:   Prescription refills given for Ibrutinib.  Discharge instructions reviewed with: Patient.  Patient and/or family verbalized understanding of discharge instructions and all questions answered. Pt aware that he needs to be monitoring his weight and to call if it changes by more than 1 kg.   Patient discharged in stable condition accompanied by: self.  Departure Mode: Ambulatory.    Cherelle Ryan RN                            Again, thank you for allowing me to participate in the care of your patient.        Sincerely,        [facility]

## 2021-04-02 NOTE — LETTER
2021         RE: Theo Roblero  [address]  [address]  [place] [address]        Dear Colleague,    Thank you for referring your patient, Theo Roblero, to the [facility] BLOOD AND MARROW TRANSPLANT PROGRAM [place]. Please see a copy of my visit note below.    BMT Clinic Note    HPI: Really feeling okay- gardually more fatigued- thinks strength is about 75% of baseline. No issues with n/v/d - eating and drinking well. Some issues sleeping- maybe relapse/stress + dex starting yesterday. He will monitor and see if improves. He is working from home which is going well, manages all housework without issue currently. No infectious concerns. He historically hasn't had nausea with chemo but doesn't have any antiemetics on hand. BS have been well controlled recently.     Onc Hx :  Theo Roblero is a 55 year old male with high risk (IPI 6.5 and Ki67 of 20%), stage IV Mantle cell lymphoma in CR1 s/p 8 cycles of alternating 6 cycles of Maxi-R- CHOP alternating with high-dose LITZY-C. Last cycle was given on 2020. His therapy was complicated by a kidney stone and sepsis in 2020, prior to cycle 5.   Achieved CR  2020 BEAM  - Relapse after auto: starting experimental study  -157.     Review of Systems   Constitutional, HEENT, cardiovascular, pulmonary, gi and gu systems are negative, except as otherwise noted.      Objective    /80   Pulse 83   Temp 97.4  F (36.3  C) (Oral)   Resp 16   Wt 108.2 kg (238 lb 8 oz)   SpO2 99%   BMI 33.26 kg/m    ECO  General: NAD   Eyes: MARYAN, sclera anicteric   Nose/Mouth/Throat: OP clear, buccal mucosa moist, no ulcerations   Lungs: CTA bilaterally  Cardiovascular: RRR, no M/R/G   Abdominal/Rectal: +BS, soft, NT  Lymphatics: No edema  Skin: No rashes or petechaie- undressed and thorough skin exam. Dry skin on distal lower extremities otherwise noting notable.  Neuro: A&O       Reviewed labs and imaging.     Theo Roblero is a 55  year old male with high risk (IPI 6.5 and Ki67 of 20%), stage IV Mantle cell lymphoma in 1st relapse s/p 8 cycles of alternating 6 cycles of Maxi-R- CHOP alternating with high-dose LITZY-C and BEAM ASCT consolidation D+134 s/p auto PBSCT           1. ADCT  Trial- Loncatuximab + Ibrutinib (2018IS-157) For relapsed Mantle cell   Loncastuximab day 1, cycle 1 today (4/2), cycle every 3 weeks for 2 cycles then re-evaluate after 2 cycles.   -will take ibrutinib 560 daily continually.   -Baseline EKG.   Take 4mg dex PO BID x 3 days (4/1-4/3).   RTC in 1 week for study labs, visit, survey, ekg  Will get cycle 2 day 1 loncatuximab 4/23/21.     2. Counts have been stable. Transfuse to keep hgb >7 and plts >10    3.ID: No ID prophy per study.   - ON prophy acyclovir 800mg PO daily. Bactrim wmon/tues as with 1 yr of auto.     4.Gi: prn antiemetics. Sent script for zofran ODT if needed.     5.) CV: on atorvastatin 40mg-- discussed with BMT pharmacist just monitor LFTS.     6. ENDO: Type II DM - on metformin and glimepride     7. FEN: monitor wt closely per study. Request daily wt log at home.       RTC as needed   Per study Will have him RTC 4/9 - EKG, Survey (study requested), labs and Provider visit.     ZOË DuarteC  [phone]      Again, thank you for allowing me to participate in the care of your patient.        Sincerely,        BMT Advanced Practice Provider

## 2021-04-02 NOTE — NURSING NOTE
Chief Complaint   Patient presents with     RECHECK     Pt here for Loncastuximab IV. Pt is s/p BMT for Lymphoma.      Cherelle Ryan RN

## 2021-04-02 NOTE — PROGRESS NOTES
BMT Clinic Note    HPI: Really feeling okay- gardually more fatigued- thinks strength is about 75% of baseline. No issues with n/v/d - eating and drinking well. Some issues sleeping- maybe relapse/stress + dex starting yesterday. He will monitor and see if improves. He is working from home which is going well, manages all housework without issue currently. No infectious concerns. He historically hasn't had nausea with chemo but doesn't have any antiemetics on hand. BS have been well controlled recently.     Onc Hx :  Theo Roblero is a 55 year old male with high risk (IPI 6.5 and Ki67 of 20%), stage IV Mantle cell lymphoma in CR1 s/p 8 cycles of alternating 6 cycles of Maxi-R- CHOP alternating with high-dose LITZY-C. Last cycle was given on 2020. His therapy was complicated by a kidney stone and sepsis in 2020, prior to cycle 5.   Achieved CR  2020 BEAM  - Relapse after auto: starting experimental study  -157.     Review of Systems   Constitutional, HEENT, cardiovascular, pulmonary, gi and gu systems are negative, except as otherwise noted.      Objective    /80   Pulse 83   Temp 97.4  F (36.3  C) (Oral)   Resp 16   Wt 108.2 kg (238 lb 8 oz)   SpO2 99%   BMI 33.26 kg/m    ECO  General: NAD   Eyes: MARYAN, sclera anicteric   Nose/Mouth/Throat: OP clear, buccal mucosa moist, no ulcerations   Lungs: CTA bilaterally  Cardiovascular: RRR, no M/R/G   Abdominal/Rectal: +BS, soft, NT  Lymphatics: No edema  Skin: No rashes or petechaie- undressed and thorough skin exam. Dry skin on distal lower extremities otherwise noting notable.  Neuro: A&O       Reviewed labs and imaging.     Theo Roblero is a 55 year old male with high risk (IPI 6.5 and Ki67 of 20%), stage IV Mantle cell lymphoma in 1st relapse s/p 8 cycles of alternating 6 cycles of Maxi-R- CHOP alternating with high-dose LITZY-C and BEAM ASCT consolidation D+134 s/p auto PBSCT           1. ADCT  Trial- Loncatuximab + Ibrutinib  (2018IS-157) For relapsed Mantle cell   Loncastuximab day 1, cycle 1 today (4/2), cycle every 3 weeks for 2 cycles then re-evaluate after 2 cycles.   -will take ibrutinib 560 daily continually.   -Baseline EKG.   Take 4mg dex PO BID x 3 days (4/1-4/3).   RTC in 1 week for study labs, visit, survey, ekg  Will get cycle 2 day 1 loncatuximab 4/23/21.     2. Counts have been stable. Transfuse to keep hgb >7 and plts >10    3.ID: No ID prophy per study.   - ON prophy acyclovir 800mg PO daily. Bactrim wmon/tues as with 1 yr of auto.     4.Gi: prn antiemetics. Sent script for zofran ODT if needed.     5.) CV: on atorvastatin 40mg-- discussed with BMT pharmacist just monitor LFTS.     6. ENDO: Type II DM - on metformin and glimepride     7. FEN: monitor wt closely per study. Request daily wt log at home.       RTC as needed   Per study Will have him RTC 4/9 - EKG, Survey (study requested), labs and Provider visit.     Clemencia Galvan PA-C  [phone]

## 2021-04-02 NOTE — NURSING NOTE
"Chief Complaint   Patient presents with     RECHECK     mantle cell lymphoma      Port Draw     Labs drawn via port by RN. VS taken.     Port accessed with 20g 3/4\" gripper needle and labs drawn by rn.  Port flushed with NS and heparin.  Pt tolerated well.  VS taken.  Pt checked in for next appt.    Research labs collected.    Shadi Cotton RN  "

## 2021-04-02 NOTE — PROGRESS NOTES
Infusion Nursing Note:  Theo Roblero presents today for Loncastuximab IV. See MAR.     Patient seen by provider today: Yes: RADHA Duarte   present during visit today: Not Applicable.    Note: Pt received Cycle 1, Day 1 Loncastuximab IV and tolerated it well over 30 minutes. No Premeds administered here in clinic, but Pt is taking Decadron as prescribed. Also obtained script for Ibrutinib today. Pt assessment completed by Provider prior to infusion. EKG's and research labs collected as ordered. Chemo checked with another RN prior to administration per protocol. Pt monitored for 4 hours post infusion per orders. Pt with no noted side effects during this time. Discharged in stable condition.      Intravenous Access:  Implanted Port.    Treatment Conditions:  Results reviewed, labs MET treatment parameters, ok to proceed with treatment.      Post Infusion Assessment:  Patient tolerated infusion without incident.  Blood return noted pre and post infusion.  Access discontinued per protocol.       Discharge Plan:   Prescription refills given for Ibrutinib.  Discharge instructions reviewed with: Patient.  Patient and/or family verbalized understanding of discharge instructions and all questions answered. Pt aware that he needs to be monitoring his weight and to call if it changes by more than 1 kg.   Patient discharged in stable condition accompanied by: self.  Departure Mode: Ambulatory.    Cherelle Ryan RN

## 2021-04-06 NOTE — NURSING NOTE
[id number]: Study Visit Note   Subject name: Theo Roblero     Visit: Cycle 1 Day 1    Did the study visit occur within the appropriate window allowed by the protocol? Yes     Since the last study visit, Yanick is doing well.  No significant changes.  ECOG = 1     Pt took 4 mg Dexamethasone, premed for Loncastuxmab Tesirine, on 4/1/21 @ 8:30 and 18:00.  He took this mornings dose @ 6:00.  He was instructed to take 4 mg Dexamethasone po again this afternoon and BID tomorrow.        Medication Count/IDS Note       IDS number: [id number]  Drug name: Ibrutinib  Number of bottles dispensed: 1  Lot number:  [id number]  Number of capsules dispensed:  120    Pt was provided with a Drug Diary.  His first dose of Ibrutinib 560 mg po was given in the clinic at 10:00.     Pt was instructed to check his weight daily in the morning and record this on his diary.  Pt to call us with a weight gain of greater than 2 lbs from baseline.        I have personally interviewed Theo Roblero and reviewed his medical record for adverse events and concomitant medications and these have been recorded on the corresponding logs in Theo Robertsseema's research file.     Theo Roblero was given the opportunity to ask any trial related questions.  Please see provider progress note for physical exam and other clinical information. Labs were reviewed - any significant lab values were addressed and reviewed.    Josselin Merchant RN

## 2021-04-09 NOTE — NURSING NOTE
[id number]: Study Visit Note   Subject name: Theo Roblero     Visit: Cycle 1 Day 8    Did the study visit occur within the appropriate window allowed by the protocol?  Yes     Since the last study visit, pt states that he has had difficulty sleeping the last 3 nights. He is eating smaller meals more often due to lack of appetite.  Pt has been keeping a daily diary and brought it with him today.  He has a 5-6 lb wt loss according to his scale. Pt has a new asymptomatic macular rash on his back.    I have personally interviewed Theo Roblero and reviewed his medical record for adverse events and concomitant medications and these have been recorded on the corresponding logs in Theo Roblero's research file.     Theo Roblero was given the opportunity to ask any trial related questions.  Please see provider progress note for physical exam and other clinical information. Labs were reviewed - any significant lab values were addressed and reviewed.    Pt is scheduled to RTC on 4/16/21 for Cycle 1 Day 15 visit.  Pt instructed to call me or the clinic staff with any changes before his return appt.    Josselin Merchant RN

## 2021-04-09 NOTE — NURSING NOTE
"Oncology Rooming Note    April 9, 2021 7:38 AM   Theo Roblero is a 56 year old male who presents for:    Chief Complaint   Patient presents with     Oncology Clinic Visit     [facility] RETURN - MANTLE CELL LYMPHOMA     Initial Vitals: BP 98/68 (BP Location: Left arm, Patient Position: Chair, Cuff Size: Adult Regular)   Pulse 114   Temp 99.4  F (37.4  C) (Oral)   Resp 16   Ht 1.803 m (5' 10.98\")   Wt 107.1 kg (236 lb 1.6 oz)   SpO2 99%   BMI 32.94 kg/m   Estimated body mass index is 32.94 kg/m  as calculated from the following:    Height as of this encounter: 1.803 m (5' 10.98\").    Weight as of this encounter: 107.1 kg (236 lb 1.6 oz). Body surface area is 2.32 meters squared.  No Pain (0) Comment: Data Unavailable   No LMP for male patient.  Allergies reviewed: Yes  Medications reviewed: Yes    Medications: Medication refills not needed today.  Pharmacy name entered into [employer]:    [facility] #[id number] - [place], [place] - [address] AT [facility] [address]  [place] [facility]    Clinical concerns: Patient's blood pressure 98/68 and pulse 114. Patient hasn't ate anything yet this morning, but feels fine. Clemencia was notified.      Willy Carpenter LPN            "

## 2021-04-09 NOTE — LETTER
"    2021         RE: Theo Roblero  [address]  [address]  [place] [address]        Dear Colleague,    Thank you for referring your patient, Theo Roblero, to the [facility] BLOOD AND MARROW TRANSPLANT PROGRAM [place]. Please see a copy of my visit note below.    BMT Clinic Note        Onc Hx :  Theo Roblero is a 55 year old male with high risk (IPI 6.5 and Ki67 of 20%), stage IV Mantle cell lymphoma in CR1 s/p 8 cycles of alternating 6 cycles of Maxi-R- CHOP alternating with high-dose LIZTY-C. Last cycle was given on 2020. His therapy was complicated by a kidney stone and sepsis in 2020, prior to cycle 5.   Achieved CR  2020 BEAM  - Relapse after auto: starting experimental study  2018IS-157, currently cycle 1 day 8     HPI: Really feeling okay. Only change he has noticed is decreased appetite- can't really sit down and eat a full meal- eating smaller amounts more frequently bc he gets full fast. Denies nausea, not taking antiemetics. No rash that he was aware of- found rash on back on physical exam however this is asymptomatic. He has been sleeping restlessly the last 3 days-- no pain. No clear cause of insomnia. No edema. He reports drinking well- BP is lower, heart rate higher but feels well, denies dizziness. Prefers to drink more water daily than get IVF today.     Review of Systems   Constitutional, HEENT, cardiovascular, pulmonary, gi and gu systems are negative, except as otherwise noted.      Objective    BP 98/68 (BP Location: Left arm, Patient Position: Chair, Cuff Size: Adult Regular)   Pulse 114   Temp 99.4  F (37.4  C) (Oral)   Resp 16   Ht 1.803 m (5' 10.98\")   Wt 107.1 kg (236 lb 1.6 oz)   SpO2 99%   BMI 32.94 kg/m    ECO  General: NAD   Eyes: MARYAN, sclera anicteric   Nose/Mouth/Throat: OP clear, buccal mucosa moist, no ulcerations   Lungs: CTA bilaterally  Cardiovascular: RRR, no M/R/G   Abdominal/Rectal: +BS, soft, NT  Lymphatics: No edema  Skin: No " rashes or petechaie- undressed and thorough skin exam. Dry skin on distal lower extremities otherwise noting notable. New macular mildly erythematous scattered rash on back ~18% bsa-- see photo below.   Neuro: A&O     4/9/21 Skin rash limited to back       Reviewed labs and imaging.     Theo Roblero is a 55 year old male with high risk (IPI 6.5 and Ki67 of 20%), stage IV Mantle cell lymphoma in 1st relapse s/p 8 cycles of alternating 6 cycles of Maxi-R- CHOP alternating with high-dose LITZY-C and BEAM ASCT consolidation D+134 s/p auto PBSCT           1. ADCT  Trial- Loncatuximab + Ibrutinib (2018IS-157) For relapsed Mantle cell   Loncastuximab day 1, cycle 1 today (4/2), cycle every 3 weeks for 2 cycles then re-evaluate after 2 cycles.   -will take ibrutinib 560 daily continually.   -Baseline EKG.   Take 4mg dex PO BID x 3 days (4/1-4/3).   RTC in 1 week for study labs, visit, survey, ekg  Will get cycle 2 day 1 loncatuximab 4/23/21.     2. Counts have been stable. Transfuse to keep hgb >7 and plts >10    3.ID: No ID prophy per study.   - ON prophy acyclovir 800mg PO daily. Bactrim wmon/tues as with 1 yr of auto.     4.Gi: prn antiemetics. Sent script for zofran ODT if needed.   - Loose stools - suspect due to ibrutinib- recommend prn imodium qAM as I suspect Gi looses are actually driving NGOZI as slightly dry.   5.) CV: on atorvastatin 40mg-- discussed with BMT pharmacist just monitor LFTS.     6. ENDO: Type II DM - on metformin and glimepride     7. FEN: monitor wt closely per study. Request daily wt log at home. - wt down about 7lbs in the last week  loos stools -- labs no returned until after left clinic. Encouraged increased oral intake. If Cr not improved next week give IVF. COnsider dose reduction of ibrutinib as this can also cause some change in Cr.   - Encouraged increased oral intake- f/u this week.     8. Derm: new skin rash -- asymptomatic to patient. LIkely ibrutinib with has 12-30% incidence of  rash-- as long as asymptomatic will just monitor. If progressive pain/itching blisters pt to notify BMT clinic and steroid cream vs stop drug will be considered.     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 Staff and clients have been vaccinated for covid19-- all required to wear mask, and staff also wear sheilds. Givne counts are wnl I think it is reasonable for him to return to work in person. Awaiting Dr Corea's clearance as well then letter will be provided for patients employer.       RTC as needed -- called with Cr more elevated,   Per study Will have him RTC 4/16 - EKG, Survey (study requested), labs and Provider visit.     Clemencia Galvan PA-C  [phone]      Again, thank you for allowing me to participate in the care of your patient.        Sincerely,        BMT Advanced Practice Provider

## 2021-04-09 NOTE — LETTER
Transition Communication Hand-off for Care Transitions to Next Level of Care Provider    Name: Theo Roblero  : 1965  MRN #: [id number]  Primary Care Provider: KENYA DANIELS     Primary Clinic: [facility] [address]  [place] [address]     Reason for Hospitalization:  Drug rash [L27.0]  Fever and chills [R50.9]  Lymphoma, unspecified body region, unspecified lymphoma type (H) [C85.90]  Admit Date/Time: 2021 11:50 PM  Discharge Date: 21  Payor Source: Payor: MEDICA / Plan: MEDICA CHOICE / Product Type: Indemnity /     Teho Roblero is a 56 year old male with PMHx of T2DM, obesity, tobacco use, HTN, HLD, and stage IV Mantle cell lymphoma (s/p PBSCT 2020) with relapsed disease. Most recently received Loncatuximab + Ibrutinib 21. Presented to [facility] with fevers, hypotension, tachycardia and whole body rash.         Reason for Communication Hand-off Referral: Fragility    Discharge Plan: Home with  PT/OT services and clinic follow-up as recommended.       Concern for non-adherence with plan of care: N    Discharge Needs Assessment:  Needs      Most Recent Value   Equipment Currently Used at Home  grab bar, tub/shower   # of Referrals Placed by [facility]  External Care Coordination, Post Acute Facilities        Follow-up specialty is recommended: Yes    Follow-up plan:    Future Appointments   Date Time Provider Department Center   2021  7:30 AM 1,  Bmt Nurse [facility]   2021  7:45 AM  [facility]   2021  8:30 AM  BMT LITO #2 [facility]   2021  8:30 AM Maureen Grubbs MD [facility]   2021  4:45 PM  [facility]   2021  5:30 PM Suresh Nelson MD [facility]       Any outstanding tests or procedures:        Referrals     Future Labs/Procedures    Home Care OT Referral for Hospital Discharge     Comments:    OT to eval and treat    Your provider has ordered home care - occupational therapy. If you have not  been contacted within 2 days of your discharge please call the department phone number listed on the top of this document.    Home Care PT Referral for Hospital Discharge     Comments:    PT to eval and treat    Your provider has ordered home care - physical therapy. If you have not been contacted within 2 days of your discharge please call the department phone number listed on the top of this document.          Supplies     Future Labs/Procedures    Walker     Process Instructions:    By signing this order, the Authorizing Provider is attesting that they have completed a face-to-face evaluation on the patient to determine their need for this equipment in the last 60 days. A new face-to-face evaluation is required each time  A new prescription for one of the specified items is ordered.   If an additional provider completed the evaluation, please indicate their name below.     **As of 2018, an order requisition and face sheet will print for all DME orders. Please give printed order and face sheet to patient if not obtaining product from [facility] DME closet.     Comments:    DME Documentation:   Describe the reason for need to support medical necessity: gait instability.     I, the undersigned, certify that the above prescribed supplies are medically necessary for this patient and is both reasonable and necessary in reference to accepted standards of medical and necessary in reference to accepted standards of medical practice in the treatment of this patient's condition and is not prescribed as a convenience.    Walker     Process Instructions:    By signing this order, the Authorizing Provider is attesting that they have completed a face-to-face evaluation on the patient to determine their need for this equipment in the last 60 days. A new face-to-face evaluation is required each time  A new prescription for one of the specified items is ordered.   If an additional provider completed the evaluation,  please indicate their name below.     **As of 2018, an order requisition and face sheet will print for all DME orders. Please give printed order and face sheet to patient if not obtaining product from [facility] DME closet.     Comments:    DME Documentation:   Describe the reason for need to support medical necessity: Severe Deconditioning, relapsed lymphoma.     I, the undersigned, certify that the above prescribed supplies are medically necessary for this patient and is both reasonable and necessary in reference to accepted standards of medical and necessary in reference to accepted standards of medical practice in the treatment of this patient's condition and is not prescribed as a convenience.          Erica Tee RN, BSN, PHN  Care Coordinator   P: [phone], [facility]     AVS/Discharge Summary is the source of truth; this is a helpful guide for improved communication of patient story

## 2021-04-09 NOTE — NURSING NOTE
Chief Complaint   Patient presents with     Port Draw     Labs drawn via port by RN in lab.      Port accessed with 20 gauge 3/4 inch gripper needle and labs drawn by rn.  Port flushed with NS and heparin locked. Port then de-accessed.  Pt tolerated well.     Norma Gordon, RN

## 2021-04-09 NOTE — LETTER
Health Information Management Services               Recipient:    ATNN: [facility]           Sender:    SAGAR Liu Care Coordinator  [phone]    Date: April 20, 2021  Patient Name:  Theo Roblero  Routing Message:      Referral for home PT and OT         The documents accompanying this notice contain confidential information belonging to the sender.  This information is intended only for the use of the individual or entity named above.  The authorized recipient of this information is prohibited from disclosing this information to any other party and is required to destroy the information after its stated need has been fulfilled, unless otherwise required by state law.      If you are not the intended recipient, you are hereby notified that any disclosure, copy, distribution or action taken in reliance on the contents of these documents is strictly prohibited.  If you have received this document in error, please return it by fax to [phone] with a note on the cover sheet explaining why you are returning it (e.g. not your patient, not your provider, etc.).  If you need further assistance, please call [facility] Centralized Transcription at [phone].  Documents may also be returned by mail to [employer], , [facility] [address], [place], [place] [address].

## 2021-04-09 NOTE — PROGRESS NOTES
"BMT Clinic Note        Onc Hx :  Theo Roblero is a 55 year old male with high risk (IPI 6.5 and Ki67 of 20%), stage IV Mantle cell lymphoma in CR1 s/p 8 cycles of alternating 6 cycles of Maxi-R- CHOP alternating with high-dose LITZY-C. Last cycle was given on 2020. His therapy was complicated by a kidney stone and sepsis in 2020, prior to cycle 5.   Achieved CR  2020 BEAM  - Relapse after auto: starting experimental study  IS-157, currently cycle 1 day 8     HPI: Really feeling okay. Only change he has noticed is decreased appetite- can't really sit down and eat a full meal- eating smaller amounts more frequently bc he gets full fast. Denies nausea, not taking antiemetics. No rash that he was aware of- found rash on back on physical exam however this is asymptomatic. He has been sleeping restlessly the last 3 days-- no pain. No clear cause of insomnia. No edema. He reports drinking well- BP is lower, heart rate higher but feels well, denies dizziness. Prefers to drink more water daily than get IVF today.     Review of Systems   Constitutional, HEENT, cardiovascular, pulmonary, gi and gu systems are negative, except as otherwise noted.      Objective    BP 98/68 (BP Location: Left arm, Patient Position: Chair, Cuff Size: Adult Regular)   Pulse 114   Temp 99.4  F (37.4  C) (Oral)   Resp 16   Ht 1.803 m (5' 10.98\")   Wt 107.1 kg (236 lb 1.6 oz)   SpO2 99%   BMI 32.94 kg/m    ECO  General: NAD   Eyes: MARYAN, sclera anicteric   Nose/Mouth/Throat: OP clear, buccal mucosa moist, no ulcerations   Lungs: CTA bilaterally  Cardiovascular: RRR, no M/R/G   Abdominal/Rectal: +BS, soft, NT  Lymphatics: No edema  Skin: No rashes or petechaie- undressed and thorough skin exam. Dry skin on distal lower extremities otherwise noting notable. New macular mildly erythematous scattered rash on back ~18% bsa-- see photo below.   Neuro: A&O     21 Skin rash limited to back       Reviewed labs and imaging.     " Theo Roblero is a 55 year old male with high risk (IPI 6.5 and Ki67 of 20%), stage IV Mantle cell lymphoma in 1st relapse s/p 8 cycles of alternating 6 cycles of Maxi-R- CHOP alternating with high-dose LITZY-C and BEAM ASCT consolidation D+134 s/p auto PBSCT           1. ADCT  Trial- Loncatuximab + Ibrutinib (2018IS-157) For relapsed Mantle cell   Loncastuximab day 1, cycle 1 today (4/2), cycle every 3 weeks for 2 cycles then re-evaluate after 2 cycles.   -will take ibrutinib 560 daily continually.   -Baseline EKG.   Take 4mg dex PO BID x 3 days (4/1-4/3).   RTC in 1 week for study labs, visit, survey, ekg  Will get cycle 2 day 1 loncatuximab 4/23/21.     2. Counts have been stable. Transfuse to keep hgb >7 and plts >10    3.ID: No ID prophy per study.   - ON prophy acyclovir 800mg PO daily. Bactrim wmon/tues as with 1 yr of auto.     4.Gi: prn antiemetics. Sent script for zofran ODT if needed.   - Loose stools - suspect due to ibrutinib- recommend prn imodium qAM as I suspect Gi looses are actually driving NGOZI as slightly dry.   5.) CV: on atorvastatin 40mg-- discussed with BMT pharmacist just monitor LFTS.     6. ENDO: Type II DM - on metformin and glimepride     7. FEN: monitor wt closely per study. Request daily wt log at home. - wt down about 7lbs in the last week  loos stools -- labs no returned until after left clinic. Encouraged increased oral intake. If Cr not improved next week give IVF. COnsider dose reduction of ibrutinib as this can also cause some change in Cr.   - Encouraged increased oral intake- f/u this week.     8. Derm: new skin rash -- asymptomatic to patient. LIkely ibrutinib with has 12-30% incidence of rash-- as long as asymptomatic will just monitor. If progressive pain/itching blisters pt to notify BMT clinic and steroid cream vs stop drug will be considered.     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 Staff  and clients have been vaccinated for covid19-- all required to wear mask, and staff also wear sheilds. Givne counts are wnl I think it is reasonable for him to return to work in person. Awaiting Dr Corea's clearance as well then letter will be provided for patients employer.       RTC as needed -- called with Cr more elevated,   Per study Will have him RTC 4/16 - EKG, Survey (study requested), labs and Provider visit.     Clemencia Galvan PA-C  [phone]

## 2021-04-10 PROBLEM — C85.90 LYMPHOMA, UNSPECIFIED BODY REGION, UNSPECIFIED LYMPHOMA TYPE (H): Status: ACTIVE | Noted: 2021-01-01

## 2021-04-10 PROBLEM — L27.0 DRUG RASH: Status: ACTIVE | Noted: 2021-01-01

## 2021-04-10 PROBLEM — R50.9 FEVER AND CHILLS: Status: ACTIVE | Noted: 2021-01-01

## 2021-04-10 NOTE — H&P
[facility]    History and Physical - Hospitalist Service, Hem/BMT/Gold       Date of Admission:  4/9/2021    Assessment & Plan   Theo Roblero is a 56 year old male  with high risk (IPI 6.5 and Ki67 of 20%), stage IV Mantle cell lymphoma in 1st relapse s/p 8 cycles of alternating 6 cycles of Maxi-R- CHOP alternating with high-dose LITZY-C and BEAM ASCT consolidation D+134 s/p auto PBSCT.  He was treated for relapse with Loncatuximab and Ibrutinib about 2 weeks ago. Presents with fever, rash, soft blood pressures, tachycardia.    1. Fever, hypotension, tachycardia, suspected sepsis  immunosuppressed  -- blood cultures obtained in ED  -- started on vancomycin and zosyn to cover for sepsis  -- lactic acid is 1.4, cxr negative, will get UA with reflex to cx  -- received 1 L NS in ED with improved blood pressure  -- hem malignancy to follow  -- continue acyclovir and bactrim prophylaxis     2.  Stage IV Mantle cell lymphoma with 1st relpase  Auto PBSCT  -- admit to hem  -- chemo per primary service      3. Rash -suspected due to chemo  -- will hold ibrutinib  -- benadryl for itching  -- defer to hem with respect to decreasing dose vs stopping medication    4. Hyponatremia  -- suspect from decreased oral intak  -- recheck Na and if still low consider further hyponatremia work up    5. DM  -- hold amaryl and metformin  -- start BG and med intensity ssi  -- mod cho diet    6. Hyperlipidemia  -- continue lipitor    7. HTN  -- continue metoprolol xl with improved blood pressure after IVF with hold parameters     Diet:   mod cho  DVT Prophylaxis: Enoxaparin (Lovenox) SQ  Lawson Catheter: not present  Code Status:   Full         Disposition Plan   Expected discharge: 2 - 3 days, recommended to prior living arrangement once SIRS/Sepsis treated.  Entered: Clayton Rae MD 04/10/2021, 1:22 AM     The patient's care was discussed with the Patient.    Clayton Rae MD    [facility]  Contact information available via [facility] Paging/Directory      ______________________________________________________________________    Chief Complaint   Fever, rash, soft blood pressure, tachycardia    History is obtained from the patient    History of Present Illness   Theo Roblero is a 56 year old male with high risk (IPI 6.5 and Ki67 of 20%), stage IV Mantle cell lymphoma in 1st relapse s/p 8 cycles of alternating 6 cycles of Maxi-R- CHOP alternating with high-dose LITZY-C and BEAM ASCT consolidation D+134 s/p auto PBSCT.  He was treated for relapse with Loncatuximab and Ibrutinib about 2 weeks ago.  He was seen in Hem-onc clinic 4/9 sleeping restlessly the last 3 days-- no pain. No clear cause of insomnia. No edema. He reports drinking well- BP is lower, heart rate higher but feels well, denies dizziness.  He was found to have rash on his back that was asymptomatic.  He now returns to the ED at [facility] with low grade fever, soft blood pressure, tachycardia and worsening rash.      In ED, he was seen by Dr Michelle.  His blood pressure 147/89, temp of 99.4.  His Na is 127, wbc 6.7, hgb 11.7, crp 49.  He received bcx, vanco and zosyn after discussing with fellow as well as 1 L of IVF.  He is being admitted with possible drug reaction to ibrutinib that was started 2 weeks ago in addition to     Review of Systems    The 10 point Review of Systems is negative other than noted in the HPI or here. Poor sleep, poor appetite with some weight loss, fevers, chills, rash, no nausea or vomiting or diarrhea.      Past Medical History    I have reviewed this patient's medical history and updated it with pertinent information if needed.   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  Past Surgical History:   Procedure Laterality Date     cystoscopy, left pyelogram, ureteral stent placement  2020    stone was blocking the ureter     cystoscopy, retrograde pyelogram, uretroscopy. laser lithotripsy Left 2020    treat kidney stones     INSERT CATHETER VASCULAR ACCESS Left 10/26/2020    Procedure: dual lumen non valved tunneled line vascular placement;  Surgeon: Alessandro Steven MD;  Location: [facility] OR     IR CVC TUNNEL PLACEMENT > 5 YRS OF AGE  10/26/2020     IR CVC TUNNEL REMOVAL LEFT  2020     IR LYMPH NODE BIOPSY  3/10/2021     port a cath placement Right 2020    internal jugular vein       Social History   I have reviewed this patient's social history and updated it with pertinent information if needed.  Social History     Tobacco Use     Smoking status: Never Smoker     Smokeless tobacco: Current User     Types: Chew     Tobacco comment: trying to quit   Substance Use Topics     Alcohol use: Not Currently     Drug use: Never       Family History   I have reviewed this patient's family history and updated it with pertinent information if needed.  Family History   Problem Relation Age of Onset     Heart Disease Mother      Lymphoma Mother      Heart Disease Father      Heart Disease Sister        Prior to Admission Medications   Prior to Admission Medications   Prescriptions Last Dose Informant Patient Reported? Taking?   Alcohol Swabs PADS   No No   Si pad 4 times daily (before meals and nightly)   acyclovir (ZOVIRAX) 800 MG tablet   No No   Sig: Take 1 tablet (800 mg) by mouth 2 times daily   Patient not taking: Reported on 3/24/2021   atorvastatin (LIPITOR) 40 MG tablet   Yes No   Sig: Take 40 mg by mouth   blood glucose (NO BRAND SPECIFIED) test strip   No No   Sig: Use to test blood sugar 4 times daily or as directed.   blood glucose monitoring (ACCU-CHEK FASTCLIX)  lancets   No No   Sig: Use to test blood sugar 4 times daily or as directed.   dexamethasone (DECADRON) 4 MG tablet   No No   Si mg twice daily day before, day of, and day after Lonca-T infusion   glimepiride (AMARYL) 4 MG tablet   No No   Sig: Take 1 tablet (4 mg) by mouth every morning (before breakfast)   metFORMIN (GLUCOPHAGE-XR) 500 MG 24 hr tablet   No No   Sig: Take 2 tablets (1,000 mg) by mouth daily (with dinner)   metoprolol succinate ER (TOPROL-XL) 25 MG 24 hr tablet   No No   Sig: Take 1 tablet (25 mg) by mouth 2 times daily   ondansetron (ZOFRAN-ODT) 8 MG ODT tab   No No   Sig: Take 1 tablet (8 mg) by mouth every 8 hours as needed for nausea   study - ibrutinib, IDS# [id number], 140 mg capsule   No No   Sig: Take 4 capsules (560 mg) by mouth daily Take at the same time each day. Swallow whole, Do NOT crush, chew or open capsules.   sulfamethoxazole-trimethoprim (BACTRIM DS) 800-160 MG tablet   No No   Sig: Take 1 tablet by mouth Every Mon, Tues two times daily Started 20      Facility-Administered Medications: None     Allergies   No Known Allergies    Physical Exam   Vital Signs: Temp: 99  F (37.2  C) Temp src: Oral BP: 129/77 Pulse: 112   Resp: 16 SpO2: 100 % O2 Device: None (Room air)    Weight: 228 lbs 0 oz    General Appearance: weak but not septic  Eyes: PERRLA  NO ICTERUS  HEENT: NCAT, FACIAL AND SCALP RASH  Respiratory: CTA  Cardiovascular: TACHYCARDIC  GI: SOFT +BS  Genitourinary: DEFFERED  Skin: DIFFUSE MACULOPAPULAR RASH  Musculoskeletal: NO EDEMA  Neurologic: NON FOCAL  Psychiatric: NORMAL    Data   Data reviewed today: I reviewed all medications, new labs and imaging results over the last 24 hours. I personally reviewed no images or EKG's today.    Recent Labs   Lab 21  2347 21  0830   WBC 6.7 8.7   HGB 11.7* 11.9*   MCV 94 97   * 128*   INR 1.08  --    * 130*   POTASSIUM 4.1 4.4   CHLORIDE 94 97   CO2 23 23   BUN 25 28   CR 1.24 1.47*   ANIONGAP 10 10   BELA 8.7  8.9   GLC 93 130*   ALBUMIN 3.2* 3.0*   PROTTOTAL 6.5* 6.5*   BILITOTAL 0.6 0.7   ALKPHOS 81 90   ALT 41 37   AST 35 31   LIPASE  --  36*

## 2021-04-10 NOTE — TELEPHONE ENCOUNTER
Hayde (nando) calls and says that Theo has a fever. Fever = 100.8-oral. Pt. Also has the chills and a rash on his back. Hayde says that the rash is from pt's chemotherapy. Hayde says that pt. Had a stem cell transplant in 11/2020. Hayde says that pt. Had his first chemotherapy infusion 1 week ago. Hayde says that she will take pt. To the [facility] [place]. COVID 19 Nurse Triage Plan/Patient Instructions    Please be aware that novel coronavirus (COVID-19) may be circulating in the community. If you develop symptoms such as fever, cough, or SOB or if you have concerns about the presence of another infection including coronavirus (COVID-19), please contact your health care provider or visit [email or web address].     Disposition/Instructions    ED Visit recommended. Follow protocol based instructions.     Bring Your Own Device:  Please also bring your smart device(s) (smart phones, tablets, laptops) and their charging cables for your personal use and to communicate with your care team during your visit.    Thank you for taking steps to prevent the spread of this virus.  o Limit your contact with others.  o Wear a simple mask to cover your cough.  o Wash your hands well and often.    Resources    [facility]: About COVID-19: [email or web address]    CDC: What to Do If You're Sick: www.cdc.gov/coronavirus/2019-ncov/about/steps-when-sick.html    CDC: Ending Home Isolation: www.cdc.gov/coronavirus/2019-ncov/hcp/disposition-in-home-patients.html     CDC: Caring for Someone: www.cdc.gov/coronavirus/2019-ncov/if-you-are-sick/care-for-someone.html     [facility]: Interim Guidance for Hospital Discharge to Home: www.health.[facility].[place]/diseases/coronavirus/hcp/hospdischarge.pdf    [facility] clinical trials (COVID-19 research studies): [email or web address]     Below are the COVID-19 hotlines at the [place] Department of Health ([facility]).  Interpreters are available.   o For health questions: Call 498-214-3936 or 1-174.773.5878 (7 a.m. to 7 p.m.)  o For questions about schools and childcare: Call 267-253-8613 or 1-472.125.7414 (7 a.m. to 7 p.m.)                   Reason for Disposition    [1] Neutropenia known or suspected (e.g., recent cancer chemotherapy) AND [2] fever > 100.4 F (38.0 C)    Additional Information    Negative: Shock suspected (e.g., cold/pale/clammy skin, too weak to stand, low BP, rapid pulse)    Negative: Difficult to awaken or acting confused (e.g., disoriented, slurred speech)    Negative: Bluish (or gray) lips or face now    Negative: New onset rash with many purple (or blood-colored) spots or dots    Negative: Sounds like a life-threatening emergency to the triager    Negative: Other symptom is present, see that guideline  (e.g., symptoms of cough, runny nose, sore throat, earache, abdominal pain, diarrhea, vomiting)    Negative: Fever > 104 F (40 C)    Protocols used: CANCER - FEVER-A-

## 2021-04-10 NOTE — ED TRIAGE NOTES
Patient arrives with fever and lower than normal blood pressures at home. Stem cell transplant in November. Cancer came back and received first dose of chemo last Friday. Called triaged line who recommended he come to the ER to be evaluated for sepsis due to low blood pressures and temperatures at home.

## 2021-04-10 NOTE — PHARMACY-VANCOMYCIN DOSING SERVICE
Pharmacy Vancomycin Initial Note  Date of Service April 10, 2021  Patient's  1965  56 year old, male    Indication: Febrile Neutropenia    Current estimated CrCl = Estimated Creatinine Clearance: 80.4 mL/min (based on SCr of 1.24 mg/dL).    Creatinine for last 3 days  2021:  8:30 AM Creatinine 1.47 mg/dL; 11:47 PM Creatinine 1.24 mg/dL    Recent Vancomycin Level(s) for last 3 days  No results found for requested labs within last 72 hours.      Vancomycin IV Administrations (past 72 hours)      No vancomycin orders with administrations in past 72 hours.                Nephrotoxins and other renal medications (From now, onward)    Start     Dose/Rate Route Frequency Ordered Stop    04/10/21 0100  piperacillin-tazobactam (ZOSYN) 3.375 g vial to attach to  mL bag      3.375 g  over 30 Minutes Intravenous ONCE 04/10/21 0058            Contrast Orders - past 72 hours (72h ago, onward)    None                Plan:  1.  Start vancomycin  2000 mg IV once, followed by 1750 mg IV  q12h.   2.  Goal Trough Level: 15-20 mg/L   3.  Pharmacy will check trough levels as appropriate in 1-3 Days.    4. Serum creatinine levels will be ordered daily for the first week of therapy and at least twice weekly for subsequent weeks.    5. [place] method utilized to dose vancomycin therapy: Method 2    Jarad Shields [facility]    The vanco was discontinued after one dose was given then restarted about 10 hours later. I decided to reload the patient and continue with the same dosing plan as detailed above.     CIRILO Shields, MagueD

## 2021-04-10 NOTE — ED PROVIDER NOTES
ED Provider Note  [facility]      History     Chief Complaint   Patient presents with     Fever     Hypotension     HPI  Theo Roblero is a 56 year old male who presents with fever and lower than ususal blood pressure at home today. HE has a history of mantle cell lymphoma and is s/p CHOP/AraC chemo in 2020 followed by autologous peripheral stem cell trnsplant. He was treated for relapse with Loncatuximab and Ibrutinib about 2 weeks ago. He had low grade fever this morning. He developed worsening fever this evening up to 100.8 at home. He developed diffuse body rash this morning, that has been spreading. He has lower than usual blood pressure today. He does not feel faint or lightheaded. He has no URI symptoms, cough, chest pain or shortness of breath. He has no nausea, vomiting or diarrhea. He denies dysuria. He has no leg pain or swelling.     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 laser lithotripsy Left 06/04/2020    treat kidney stones     INSERT CATHETER VASCULAR ACCESS Left 10/26/2020    Procedure: dual lumen non valved tunneled line vascular placement;  Surgeon: Alessandro Steven MD;  Location: [facility] OR     IR CVC TUNNEL PLACEMENT > 5 YRS OF AGE  10/26/2020     IR CVC TUNNEL REMOVAL LEFT  12/8/2020     IR LYMPH NODE BIOPSY  3/10/2021     port a cath placement Right 02/18/2020    internal jugular vein       Family History   Problem Relation Age of Onset     Heart Disease Mother      Lymphoma Mother      Heart Disease Father      Heart Disease Sister        Social History     Tobacco Use     Smoking status: Never Smoker      "Smokeless tobacco: Current User     Types: Chew     Tobacco comment: trying to quit   Substance Use Topics     Alcohol use: Not Currently          Review of Systems   Constitutional: Positive for fever. Negative for chills.   HENT: Negative for congestion and trouble swallowing.    Eyes: Negative for visual disturbance.   Respiratory: Negative for cough, shortness of breath and wheezing.    Cardiovascular: Negative for chest pain, palpitations and leg swelling.   Gastrointestinal: Negative for abdominal pain, diarrhea, nausea and vomiting.   Genitourinary: Negative for dysuria.   Musculoskeletal: Negative for back pain and neck pain.   Skin: Positive for rash.   Neurological: Negative for weakness, light-headedness and numbness.   Hematological: Negative for adenopathy.   Psychiatric/Behavioral: Negative for confusion.         Physical Exam   BP: 125/83  Pulse: 120  Temp: 99  F (37.2  C)  Resp: 16  Height: 180.3 cm (5' 11\")  Weight: 103.4 kg (228 lb)  SpO2: 98 %  Physical Exam  Vitals signs and nursing note reviewed.   Constitutional:       Appearance: Normal appearance.   HENT:      Head: Normocephalic and atraumatic.      Right Ear: External ear normal.      Left Ear: External ear normal.      Nose: Nose normal. No congestion or rhinorrhea.      Mouth/Throat:      Mouth: Mucous membranes are moist.   Eyes:      General: No scleral icterus.     Extraocular Movements: Extraocular movements intact.      Conjunctiva/sclera: Conjunctivae normal.      Pupils: Pupils are equal, round, and reactive to light.   Neck:      Musculoskeletal: Normal range of motion.   Cardiovascular:      Rate and Rhythm: Normal rate and regular rhythm.      Pulses: Normal pulses.      Heart sounds: No murmur.   Pulmonary:      Effort: Pulmonary effort is normal.      Breath sounds: Normal breath sounds. No wheezing or rales.   Abdominal:      General: Abdomen is flat.      Palpations: Abdomen is soft.      Tenderness: There is no abdominal " tenderness. There is no guarding.   Musculoskeletal: Normal range of motion.   Lymphadenopathy:      Cervical: No cervical adenopathy.   Skin:     General: Skin is warm and dry.      Findings: Erythema and rash present. Rash is macular and papular. Rash is not crusting.   Neurological:      General: No focal deficit present.      Mental Status: He is alert and oriented to person, place, and time.      Cranial Nerves: No cranial nerve deficit.   Psychiatric:         Mood and Affect: Mood normal.         Behavior: Behavior normal.         ED Course      Procedures            Labs/Imaging    Results for orders placed or performed during the hospital encounter of 04/09/21 (from the past 24 hour(s))   CBC with platelets differential   Result Value Ref Range    WBC 6.7 4.0 - 11.0 10e9/L    RBC Count 3.67 (L) 4.4 - 5.9 10e12/L    Hemoglobin 11.7 (L) 13.3 - 17.7 g/dL    Hematocrit 34.5 (L) 40.0 - 53.0 %    MCV 94 78 - 100 fl    MCH 31.9 26.5 - 33.0 pg    MCHC 33.9 31.5 - 36.5 g/dL    RDW 13.2 10.0 - 15.0 %    Platelet Count 124 (L) 150 - 450 10e9/L    Diff Method Automated Method     % Neutrophils 69.3 %    % Lymphocytes 20.1 %    % Monocytes 5.1 %    % Eosinophils 3.2 %    % Basophils 0.3 %    % Immature Granulocytes 2.0 %    Nucleated RBCs 0 0 /100    Absolute Neutrophil 4.6 1.6 - 8.3 10e9/L    Absolute Lymphocytes 1.3 0.8 - 5.3 10e9/L    Absolute Monocytes 0.3 0.0 - 1.3 10e9/L    Absolute Eosinophils 0.2 0.0 - 0.7 10e9/L    Absolute Basophils 0.0 0.0 - 0.2 10e9/L    Abs Immature Granulocytes 0.1 0 - 0.4 10e9/L    Absolute Nucleated RBC 0.0    Comprehensive metabolic panel   Result Value Ref Range    Sodium 127 (L) 133 - 144 mmol/L    Potassium 4.1 3.4 - 5.3 mmol/L    Chloride 94 94 - 109 mmol/L    Carbon Dioxide 23 20 - 32 mmol/L    Anion Gap 10 3 - 14 mmol/L    Glucose 93 70 - 99 mg/dL    Urea Nitrogen 25 7 - 30 mg/dL    Creatinine 1.24 0.66 - 1.25 mg/dL    GFR Estimate 64 >60 mL/min/[1.73_m2]    GFR Estimate If Black  74 >60 mL/min/[1.73_m2]    Calcium 8.7 8.5 - 10.1 mg/dL    Bilirubin Total 0.6 0.2 - 1.3 mg/dL    Albumin 3.2 (L) 3.4 - 5.0 g/dL    Protein Total 6.5 (L) 6.8 - 8.8 g/dL    Alkaline Phosphatase 81 40 - 150 U/L    ALT 41 0 - 70 U/L    AST 35 0 - 45 U/L   CRP inflammation   Result Value Ref Range    CRP Inflammation 49.0 (H) 0.0 - 8.0 mg/L   Lactic acid whole blood   Result Value Ref Range    Lactic Acid 1.4 0.7 - 2.0 mmol/L   XR Chest Port 1 View    Narrative    EXAM: XR CHEST PORT 1 VW  LOCATION: [facility]  DATE/TIME: 4/10/2021 12:20 AM    INDICATION: Fever. Bone marrow transplant.  COMPARISON: 11/16/2020.    FINDINGS: Right chest wall port. No pneumothorax. The heart size is normal. The lungs are clear. Degenerative disease in the spine.      Impression    IMPRESSION: No acute abnormality.       Medications   0.9% sodium chloride BOLUS (1,000 mLs Intravenous New Bag 4/10/21 0013)     Followed by   sodium chloride 0.9% infusion (has no administration in time range)   piperacillin-tazobactam (ZOSYN) 3.375 g vial to attach to  mL bag (has no administration in time range)   diphenhydrAMINE (BENADRYL) injection 25 mg (25 mg Intravenous Given 4/10/21 0040)        Assessments & Plan (with Medical Decision Making)   Impression:  Middle aged male with a history of mantle cell lymphoma in relapse after chemotherapy and autologous bone marrow transplant. He presents with fever to 100.8, tachycardia, soft blood pressures at home and diffuse body rash. He has central access port in place. He is not neutropenic. He was discussed with the Heme/onc fellow who recommends broad spectrum antibiotics and admission. Drug rash with systemic reaction or antilymphocyte induced autoimmune reaction is high on the differential. He does have chronic immune suppression and potential for central line infections. CXR is clear. He has mildly elevated CRP. Lactate is normal. In the ED he is tachycardic but not hypotensive. He  does not appear to have overt anaphylactic reaction. Early Yap-Venu like reaction cannot be excluded.    I have reviewed the nursing notes. I have reviewed the findings, diagnosis, plan and need for follow up with the patient.    New Prescriptions    No medications on file       Final diagnoses:   Fever and chills   Drug rash   Lymphoma, unspecified body region, unspecified lymphoma type (H)       --  Scott Michelle  [facility] EMERGENCY DEPARTMENT  4/9/2021     Scott Michelle MD  04/10/21 0106

## 2021-04-10 NOTE — PROGRESS NOTES
After administrated from ED temp 103.1 MD was notified, and administrated tylenol PO, no new order was placed.  patient has rash 90 % of body red and very hot and itch, poor appetite and weak, set up commode and urinal. Continue to monitor

## 2021-04-10 NOTE — PROGRESS NOTES
Patient admitted to:5c  Admitted from: ED  Arrived by: mingo  Reason for admission: rash  Patient accompanied by: himself  Belongings: cell phone and clothes with him   Teaching: done  Skin double check completed by: Earl TIWARI intact except rash all over his body

## 2021-04-10 NOTE — PROGRESS NOTES
[facility]    Progress Note- Hospitalist Service, Hem/BMT/Gold       Date of Admission:  4/9/2021  DOS: 04/10/21    Assessment & Plan   Theo Roblero is a 56 year old male  with high risk (IPI 6.5 and Ki67 of 20%), stage IV Mantle cell lymphoma in 1st relapse s/p 8 cycles of alternating 6 cycles of Maxi-R- CHOP alternating with high-dose LITZY-C and BEAM ASCT consolidation D+134 s/p auto PBSCT.  He was treated for relapse with Loncatuximab and Ibrutinib about 2 weeks ago. Presents with fever, rash, soft blood pressures, tachycardia.    Today:   -stop vanc/zosyn, start cefepime.   -Restart ibrutinib.    -PRN atarax PO and topical benadryl cream for itching.   -We have notified study team of his admission.     1. Fever, hypotension, tachycardia, suspected sepsis  immunosuppressed  -- blood cultures obtained in ED  -- started on vancomycin and zosyn to cover for sepsis on admission.  He does not have a clear source of infection and is not neutropenic.  We suspect fever may be from rash/inflammation rather than infection based on his current infectious workup.    -Switched to cefepime, stop vanc due to low concern for infection.    -- lactic acid is 1.4, cxr negative, UA without evidence concerning for infection.   -- received 1 L NS in ED with improved blood pressure  -- continue acyclovir and bactrim prophylaxis     2.  Stage IV Mantle cell lymphoma with 1st relpase  Auto PBST.  Relapsed mantle cell currently on trial with loncatuximab and ibrutinib.  Admitted on D8 of cycle 1 of trial.   -Continue ibrutinib on admission as ibrutinib typically doesn't cause rash.      3. Rash -suspected due to chemo  -- Ibrutinib held overnight on admission, restarted first hospital day (4/10).   -- benadryl for itching  -- topical benadryl cream and PO Atarax for itching.     4. Hyponatremia  -- suspect from decreased oral intak  -- recheck Na and if still low consider further  hyponatremia work up    5. DM  -- hold amaryl and metformin  -- start BG and med intensity ssi  -- mod cho diet    6. Hyperlipidemia  -- continue lipitor    7. HTN  -- continue metoprolol xl with improved blood pressure after IVF with hold parameters     Diet: Moderate Consistent CHO Diet mod cho  DVT Prophylaxis: Enoxaparin (Lovenox) SQ  Lawson Catheter: not present  Code Status: Full Code Full         Disposition Plan   Expected discharge: 2 - 3 days, recommended to prior living arrangement once SIRS/Sepsis treated.  Entered: Prakash Weinberg MD 04/10/2021, 8:27 AM     The patient's care was discussed with Dr. Corea.     Prakash Weinberg MD, PhD  Hematology/Oncology Fellow  Pager: [phone]  ______________________________________________________________________    Interval History:   Feeling better since presentation.  Breathing OK.  Rash is about the same.  Itches a bit, but not terrible.  No shortness of breath, mouth swelling.  No pain.  Otherwise feeling about the same. Never had a rash like this in the past.     Review of Systems    The 10 point Review of Systems is negative other than noted in the HPI or here. Poor sleep, poor appetite with some weight loss, fevers, chills, rash, no nausea or vomiting or diarrhea.    Prior to Admission Medications   Prior to Admission Medications   Prescriptions Last Dose Informant Patient Reported? Taking?   Alcohol Swabs PADS   No No   Si pad 4 times daily (before meals and nightly)   acyclovir (ZOVIRAX) 800 MG tablet   No No   Sig: Take 1 tablet (800 mg) by mouth 2 times daily   Patient not taking: Reported on 3/24/2021   atorvastatin (LIPITOR) 40 MG tablet   Yes No   Sig: Take 40 mg by mouth   blood glucose (NO BRAND SPECIFIED) test strip   No No   Sig: Use to test blood sugar 4 times daily or as directed.   blood glucose monitoring (ACCU-CHEK FASTCLIX) lancets   No No   Sig: Use to test blood sugar 4 times daily or as directed.   dexamethasone (DECADRON) 4 MG  tablet   No No   Si mg twice daily day before, day of, and day after Lonca-T infusion   glimepiride (AMARYL) 4 MG tablet   No No   Sig: Take 1 tablet (4 mg) by mouth every morning (before breakfast)   metFORMIN (GLUCOPHAGE-XR) 500 MG 24 hr tablet   No No   Sig: Take 2 tablets (1,000 mg) by mouth daily (with dinner)   metoprolol succinate ER (TOPROL-XL) 25 MG 24 hr tablet   No No   Sig: Take 1 tablet (25 mg) by mouth 2 times daily   ondansetron (ZOFRAN-ODT) 8 MG ODT tab   No No   Sig: Take 1 tablet (8 mg) by mouth every 8 hours as needed for nausea   study - ibrutinib, IDS# [id number], 140 mg capsule   No No   Sig: Take 4 capsules (560 mg) by mouth daily Take at the same time each day. Swallow whole, Do NOT crush, chew or open capsules.   sulfamethoxazole-trimethoprim (BACTRIM DS) 800-160 MG tablet   No No   Sig: Take 1 tablet by mouth Every Mon, Tues two times daily Started 20      Facility-Administered Medications: None     Allergies   No Known Allergies    Physical Exam   Vital Signs: Temp: 99.2  F (37.3  C) Temp src: Oral BP: 128/81 Pulse: 67   Resp: 23 SpO2: 99 % O2 Device: None (Room air)    Weight: 228 lbs 0 oz    General Appearance: lying in bed, NAD  Eyes: PERRLA  NO ICTERUS  HEENT: MMM, OP clear, macular rash diffusely on face, no oral swelling.   Respiratory: CTA, normal WOB on RA   Cardiovascular: TACHYCARDIC  GI: SOFT +BS, no masses or tenderness appreciated  Genitourinary: DEFFERED  Skin: DIFFUSE MACULOPAPULAR RASH  Musculoskeletal: NO EDEMA  Neurologic: NON FOCAL, CNII-XII grossly intact, normal speech and mentation.   Psychiatric: NORMAL, linear thought processes.     Data   Data reviewed today: I reviewed all medications, new labs and imaging results over the last 24 hours. I personally reviewed no images or EKG's today.    Recent Labs   Lab 04/10/21  0556 21  2347 21  0830   WBC 6.2 6.7 8.7   HGB 10.4* 11.7* 11.9*   MCV 93 94 97   * 124* 128*   INR  --  1.08  --    * 127*  130*   POTASSIUM 4.1 4.1 4.4   CHLORIDE 98 94 97   CO2 22 23 23   BUN 23 25 28   CR 1.28* 1.24 1.47*   ANIONGAP 8 10 10   BELA 8.1* 8.7 8.9   GLC 80 93 130*   ALBUMIN  --  3.2* 3.0*   PROTTOTAL  --  6.5* 6.5*   BILITOTAL  --  0.6 0.7   ALKPHOS  --  81 90   ALT  --  41 37   AST  --  35 31   LIPASE  --   --  36*     UA without e/o infection.      CXR without evidence of infectious source.

## 2021-04-10 NOTE — ED NOTES
[facility]   ED Nurse to Floor Handoff     Theo Roblero is a 56 year old male who speaks English and lives unknown,  in a home  They arrived in the ED by car from home    ED Chief Complaint: Fever and Hypotension    ED Dx;   Final diagnoses:   Fever and chills   Drug rash   Lymphoma, unspecified body region, unspecified lymphoma type (H)         Needed?: No    Allergies: No Known Allergies.  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      Baseline Mental status: WDL  Current Mental Status changes: at basesline    Infection present or suspected this encounter: cultures pending  Sepsis suspected: Yes  Isolation type: No active isolations  Patient tested for COVID 19 prior to admission: YES     Activity level - Baseline/Home:  Independent  Activity Level - Current:   Assist x1    Bariatric equipment needed?: No    In the ED these meds were given:   Medications   0.9% sodium chloride BOLUS (1,000 mLs Intravenous New Bag 4/10/21 0013)     Followed by   sodium chloride 0.9% infusion (has no administration in time range)   diphenhydrAMINE (BENADRYL) injection 25 mg (25 mg Intravenous Given 4/10/21 0040)       Drips running?  Yes, IV fluids infusing     Home pump  No    Current LDAs  Port A Cath Single 03/01/20 Right Chest wall (Active)   Dressing Status clean;dry;intact 04/09/21 2348   Dressing Intervention Transparent 04/09/21 2348   Access Date 04/09/21 04/09/21 2348   Access Attempts 1 04/09/21 2348   Gauge 20 gauge;3/4 inch 04/09/21 2348   Number of days: 405       Incision/Surgical Site 10/26/20 Left Chest (Active)   Number of days: 166       Labs results:   Labs Ordered and Resulted from Time of ED Arrival Up to the Time of Departure from the ED   CBC WITH PLATELETS DIFFERENTIAL - Abnormal;  "Notable for the following components:       Result Value    RBC Count 3.67 (*)     Hemoglobin 11.7 (*)     Hematocrit 34.5 (*)     Platelet Count 124 (*)     All other components within normal limits   COMPREHENSIVE METABOLIC PANEL - Abnormal; Notable for the following components:    Sodium 127 (*)     Albumin 3.2 (*)     Protein Total 6.5 (*)     All other components within normal limits   CRP INFLAMMATION - Abnormal; Notable for the following components:    CRP Inflammation 49.0 (*)     All other components within normal limits   LACTIC ACID WHOLE BLOOD   INFLUENZA A/B & SARS-COV2 PCR MULTIPLEX   INR   ROUTINE UA WITH MICROSCOPIC   BLOOD CULTURE   BLOOD CULTURE   URINE CULTURE AEROBIC BACTERIAL       Imaging Studies:   Recent Results (from the past 24 hour(s))   XR Chest Port 1 View    Narrative    EXAM: XR CHEST PORT 1 VW  LOCATION: [facility]  DATE/TIME: 4/10/2021 12:20 AM    INDICATION: Fever. Bone marrow transplant.  COMPARISON: 11/16/2020.    FINDINGS: Right chest wall port. No pneumothorax. The heart size is normal. The lungs are clear. Degenerative disease in the spine.      Impression    IMPRESSION: No acute abnormality.       Recent vital signs:   /81   Pulse 111   Temp 99  F (37.2  C) (Oral)   Resp 16   Ht 1.803 m (5' 11\")   Wt 103.4 kg (228 lb)   SpO2 100%   BMI 31.80 kg/m      [place] Coma Scale Score: 15 (04/10/21 0017)       Cardiac Rhythm: Tachycardia   Pt needs tele? No  Skin/wound Issues: Rash all over body that started 04/09 AM    Code Status: Full Code    Pain control: good3    Nausea control: good    Abnormal labs/tests/findings requiring intervention:     Family present during ED course? Yes   Family Comments/Social Situation comments: None    Tasks needing completion: None    Carolyn Padilla, RN  [phone]      "

## 2021-04-11 NOTE — PLAN OF CARE
Pt has been febrile all shift with q 4 hours Tylenol. Temp max 103.2 ax hear rate goes up to 130s  with increase temp. Pt was Hypotensive as low as 74/54 and recheck 5 minutes after that was 91/59 Initial Lactate was 2.8. Rapid Respond Team called, and Provider notified and total of 3 L flushes given. Post flush Lactate was 1.7 then recheck with AM labs was 3.5 at 04:40. Provider notified and he came to see pt and reorder another 1 L NS 0.9/L  flush and wrote in his note to recheck another Lactate after the flush is done. Pt was restarted on Zosyn and vancomycin. Ice packs applied. Skin rash is no change. Fall precaution initiated as pt is very unsteady on his feet and weak. Fall signs and arm band on and pt was instructed to call before getting up. He is A/O x 4.His study chemo, Ibrutinib is on hold. Blood cultures and fungal culture done. He did not eat anything from his tray yesterday evening. Continue to monitor pt and follow plan of care.       Problem: Adult Inpatient Plan of Care  Goal: Plan of Care Review  Outcome: No Change     Problem: Adult Inpatient Plan of Care  Goal: Patient-Specific Goal (Individualized)  Outcome: No Change     Problem: Adult Inpatient Plan of Care  Goal: Absence of Hospital-Acquired Illness or Injury  Intervention: Prevent Skin Injury  Recent Flowsheet Documentation  Taken 4/11/2021 0400 by Jennifer Lozano RN  Body Position: position changed independently  Taken 4/10/2021 2000 by Jennifer Lozano RN  Body Position: position changed independently     Problem: Bladder Irritation (Stem Cell/Bone Marrow Transplant)  Goal: Symptom-Free Urinary Elimination  Outcome: No Change     Problem: Fatigue (Stem Cell/Bone Marrow Transplant)  Goal: Energy Level Supports Daily Activity  Outcome: No Change     Problem: Infection Risk (Stem Cell/Bone Marrow Transplant)  Goal: Absence of Infection  Outcome: No Change

## 2021-04-11 NOTE — PROGRESS NOTES
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  He was treated for relapse with Loncatuximab and Ibrutinib about 2 weeks ago. Presents with fever, rash, soft blood pressures, tachycardia.  Intially most concerning for drug rash/reaction with trial drugs; however on Day 2, more likely infectious etiology.  On day 2 of admission, he developed hypotension refractory to fluid resuscitation and was transferred to MICU for pressors.      Today:   -Transfer to MICU.  MICU is now primary.   - Malignant heme will continue to consult.   - EKG repeat NSR, reviewed with cardiology fellow, no e/o ischemia.   - CT CAP w/o contrast for infectious eval  - Hold ibrutinib.   - Derm consult, images uploaded into Epic.    - 500mL 5% albumin given after 3L IVF.   - Add micafungin for fungal coverage.   - Switched back to vanc/zosyn  - Echo to evaluate cardiac function.   - Held PTA toprol XL due to hypotension.   - UA/FeNa pending.     1. Fever, hypotension, tachycardia, suspected sepsis  Immunosuppressed.  Transfer to MICU afternoon of 4/11 due to persistent hypotension and concern for sepsis.    -- blood cultures obtained in ED and again 4/11, NGTD  -- started on vancomycin and zosyn to cover for sepsis on admission.  He does not have a clear source of infection and is not neutropenic.  We suspect fever may be from rash/inflammation rather than infection based on his current infectious workup.    -Switched to cefepime, stop vanc due to low concern for infection on 4/10.    -Switched back to vanc/zosyn PM of 4/10 due to hypotension.   -- lactic acid is  1.4, cxr negative, UA without evidence concerning for infection on initial day.    -Overnight 4/10 increasingly hypotensive and unresponsive to fluids.    -lactate 3.5 overnight 4/10, improved to 1.8 with IVF.   -- Received 3L total IVF 4/10 PM and 4/11 AM prior to MICU transfer.   -- continue acyclovir and bactrim prophylaxis   - CT CAP w/o contrast (due to NGOZI) for infectious eval  - Echo pending  - EKG completed, NSR, no concern for ischemia per cardiology in person review.     2.  Stage IV Mantle cell lymphoma with 1st relpase  Auto PBST.  Relapsed mantle cell currently on trial with loncatuximab and ibrutinib.  Admitted on D8 of cycle 1 of trial.   -Ibrutinib initially not thought to be culprit of rash and continued 4/10.    -Held 4/11 due to worsening condition.       3. Rash -suspected due to chemo  -- Ibrutinib held overnight on admission, restarted first hospital day (4/10) and then held 4/11.   -- benadryl for itching  -- topical benadryl cream and PO Atarax for itching.   - Derm consult 4/11, likely drug rash,    -Biopsy done, stiches out 4/25.     4. Hyponatremia  -- suspect from decreased oral intak  -- recheck Na and if still low consider further hyponatremia work up    5. NGOZI likely secondary to hypotension/hypoperfusion.  Creatine     5. DM  -- hold amaryl and metformin  -- start BG and med intensity ssi  -- mod cho diet    6. Hyperlipidemia  -- continue lipitor    7. HTN  -- hold metoprolol xl due to persistent hypotension.      Diet: Moderate Consistent CHO Diet mod cho  DVT Prophylaxis: Enoxaparin (Lovenox) SQ  Lawson Catheter: not present  Code Status: Full Code Full         Disposition Plan   Expected discharge: 2 - 3 days, recommended to prior living arrangement once SIRS/Sepsis treated.  Entered: Prakash Weinberg MD 04/11/2021, 7:26 AM     The patient's care was discussed with Dr. Corea.     Prakash Weinberg MD, PhD  Hematology/Oncology Fellow  Pager:  [phone]  ______________________________________________________________________    Interval History:   Bad night last night.  Feels achy and low energy.  Febrile most of the night.  No pain. Rash is improving overall, but worse in axillae.  No chest tightness, pain, and no palpitations.  Not particularly hungry.  No diarrhea, no dysuria, no abdominal pain.      Review of Systems    The 10 point Review of Systems is negative other than noted in the HPI or here. Poor sleep, poor appetite with some weight loss, fevers, chills, rash, no nausea or vomiting or diarrhea.    Prior to Admission Medications   Prior to Admission Medications   Prescriptions Last Dose Informant Patient Reported? Taking?   Alcohol Swabs PADS   No No   Si pad 4 times daily (before meals and nightly)   acyclovir (ZOVIRAX) 800 MG tablet 4/10/2021 at Unknown time  No Yes   Sig: Take 1 tablet (800 mg) by mouth 2 times daily   atorvastatin (LIPITOR) 40 MG tablet 4/10/2021 at Unknown time  Yes Yes   Sig: Take 40 mg by mouth   blood glucose (NO BRAND SPECIFIED) test strip   No No   Sig: Use to test blood sugar 4 times daily or as directed.   blood glucose monitoring (ACCU-CHEK FASTCLIX) lancets   No No   Sig: Use to test blood sugar 4 times daily or as directed.   dexamethasone (DECADRON) 4 MG tablet   No No   Si mg twice daily day before, day of, and day after Lonca-T infusion   glimepiride (AMARYL) 4 MG tablet 2021 at Unknown time  No Yes   Sig: Take 1 tablet (4 mg) by mouth every morning (before breakfast)   metFORMIN (GLUCOPHAGE-XR) 500 MG 24 hr tablet 2021 at Unknown time  No Yes   Sig: Take 2 tablets (1,000 mg) by mouth daily (with dinner)   metoprolol succinate ER (TOPROL-XL) 25 MG 24 hr tablet 4/10/2021 at Unknown time  No Yes   Sig: Take 1 tablet (25 mg) by mouth 2 times daily   ondansetron (ZOFRAN-ODT) 8 MG ODT tab   No No   Sig: Take 1 tablet (8 mg) by mouth every 8 hours as needed for nausea   study - ibrutinib, IDS# [id number], 140 mg  capsule   No No   Sig: Take 4 capsules (560 mg) by mouth daily Take at the same time each day. Swallow whole, Do NOT crush, chew or open capsules.   sulfamethoxazole-trimethoprim (BACTRIM DS) 800-160 MG tablet   No No   Sig: Take 1 tablet by mouth Every Mon, Tues two times daily Started 12/8/20      Facility-Administered Medications: None     Allergies   No Known Allergies    Physical Exam   Vital Signs: Temp: 103.1  F (39.5  C) Temp src: Axillary BP: 105/66 Pulse: 132   Resp: 20 SpO2: 98 % O2 Device: None (Room air)    Weight: 228 lbs 0 oz    General Appearance: lying in bed, NAD  Eyes: PERRLA  NO ICTERUS  HEENT: MMM, OP clear, macular rash diffusely on face, R sided oral swelling today.  Mild throat erythema.    Respiratory: CTA, normal WOB on RA   Cardiovascular: TACHYCARDIC, RRR  GI: SOFT +BS, no masses or tenderness appreciated.  No RUQ tenderness to deep palpation.    Genitourinary: DEFFERED  Skin: DIFFUSE MACULOPAPULAR RASH, resolving with development of purpura in bilateral axillae.    Musculoskeletal: NO EDEMA  Neurologic: NON FOCAL, CNII-XII grossly intact, normal speech and mentation.   Psychiatric: NORMAL, linear thought processes.     Data   Data reviewed today: I reviewed all medications, new labs and imaging results over the last 24 hours.    EKG NSR without ischemic changes.      CT CAP w/o clear source of infection.      Recent Labs   Lab 04/11/21  0440 04/10/21  0556 04/09/21  2347 04/09/21  0830   WBC 13.8* 6.2 6.7 8.7   HGB 10.5* 10.4* 11.7* 11.9*   MCV 94 93 94 97   PLT 75* 102* 124* 128*   INR  --   --  1.08  --    * 129* 127* 130*   POTASSIUM 3.7 4.1 4.1 4.4   CHLORIDE 101 98 94 97   CO2 17* 22 23 23   BUN 24 23 25 28   CR 1.63* 1.28* 1.24 1.47*   ANIONGAP 11 8 10 10   BELA 7.8* 8.1* 8.7 8.9   * 80 93 130*   ALBUMIN 2.1*  --  3.2* 3.0*   PROTTOTAL 4.9*  --  6.5* 6.5*   BILITOTAL 0.7  --  0.6 0.7   ALKPHOS 50  --  81 90   ALT 40  --  41 37   AST 37  --  35 31   LIPASE  --   --   --   36*

## 2021-04-11 NOTE — PHARMACY-VANCOMYCIN DOSING SERVICE
Pharmacy Vancomycin Note  Date of Service: 2021  Patient's : 1965   56 year old, male    Indication: Febrile Neutropenia  Goal Trough Level: 15-20 mg/L    Date DOT SCr Vancomycin Dose Time Level   4/10 1 1.28 2,000 mg IV x1 load 0237     2 1.63 2,000 mg IV x1 re-load  1,500 mg IV q18h 0022  (1800)        (1200)                      Weight: 103.4 kg (138% of IBW)  Ideal body weight: 75.3 kg (166 lb 0.1 oz)  Adjusted ideal body weight: 86.5 kg (190 lb 12.9 oz)    RENAL:  Estimated Creatinine Clearance: 61.2 mL/min (A) (based on SCr of 1.63 mg/dL (H)).    Creatinine   Date Value Ref Range Status   2021 1.63 (H) 0.66 - 1.25 mg/dL Final     Has serum creatinine changed > 50% in last 72 hours: No  Urine Output: Diminished  Renal Function: Worsening    Intake/Output Summary (Last 24 hours) at 2021 1127  Last data filed at 2021 0900  Gross per 24 hour   Intake 6295 ml   Output 1220 ml   Net 5075 ml     Nephrotoxins and other renal medications (From now, onward)    Start     Dose/Rate Route Frequency Ordered Stop    21 1200  vancomycin (VANCOCIN) 1,750 mg in sodium chloride 0.9 % 500 mL intermittent infusion      1,750 mg  over 2 Hours Intravenous EVERY 12 HOURS 04/10/21 2313      04/10/21 2300  piperacillin-tazobactam (ZOSYN) 3.375 g vial to attach to  mL bag      3.375 g  over 30 Minutes Intravenous EVERY 6 HOURS 04/10/21 2231      04/10/21 0800  acyclovir (ZOVIRAX) tablet 800 mg      800 mg Oral 2 TIMES DAILY 04/10/21 0536          Plan:  1. Vancomycin ordered overnight as 1,750 mg IV q12h. Dosing adjusted to 1,500 mg (~17 mg/kg AdjBW) IV every 18 hours per rising SCr. High risk for nephrotoxicity with Vancomycin/Zosyn combination.  2. Pharmacy will check trough levels as appropriate in 2-3 days if Vancomycin continues.  3. Serum creatinine levels will be ordered daily.    Thank you for the opportunity to participate in this patient's care.  Aelxa Wooten, PharmD,  BCOP

## 2021-04-11 NOTE — PROVIDER NOTIFICATION
Paged [phone]  T 103.1, tyl given with poor response. Hr 131. Bp 160s/60s. Bladder scan 395 no urge to void. Should i strait cath? Thanks [phone]

## 2021-04-11 NOTE — PROVIDER NOTIFICATION
Provider notified via pager number [phone] with Lactate for Sepsis of 2.8 and that Rapid Response team called.

## 2021-04-11 NOTE — PROVIDER NOTIFICATION
Provider notified via pager # [phone] with abnormal vital signs, Temp of 102.7 ax, , BP 74/54 with recheck of 91/59. Requested from provider to order AM labs for pt.

## 2021-04-11 NOTE — PROVIDER NOTIFICATION
Provider notified with Lactic acid follow up of 3.5 from 1.7 and low Sodium level of 129 this morning. Via pager # [phone].   Pt up with steady gait to bathroom on way back from xray

## 2021-04-11 NOTE — PROGRESS NOTES
MD cross cover note  Patient had initial lactate of 2.8.  Was febrile to 102 and tachycardic was restarted on vancomycin and Zosyn.  Overnight he has received 3 L of IV fluid.  Repeat lactate is 3.5.  Still febrile to 102.7 tachycardic and now hypotensive.   -Awaiting fungal culture.  Discussed with morning team start empiric fungal coverage  Monitor closely we will check lactate after 1 L of NS bolus

## 2021-04-11 NOTE — H&P
MEDICAL ICU H&P  04/11/2021    Date of Hospital Admission: 04/10/2021  Date of ICU Admission: 04/11/2021  Reason for Critical Care Admission:   Date of Service (when I saw the patient): 04/11/2021    ASSESSMENT: Theo Roblero is a 56 year old male  with high risk (IPI 6.5 and Ki67 of 20%), stage IV Mantle cell lymphoma in 1st relapse s/p 8 cycles of alternating 6 cycles of Maxi-R- CHOP alternating with high-dose LITZY-C and BEAM ASCT consolidation D+134 s/p auto PBSCT.  He was treated for relapse with Loncatuximab and Ibrutinib about 2 weeks ago. Presents with fever, rash, soft blood pressures, tachycardia.  Intially most concerning for drug rash/reaction with trial drugs; however on Day 2, more likely infectious etiology.  On day 2 of admission, he developed hypotension refractory to fluid resuscitation and was transferred to MICU for pressors.       CHANGES and MAJOR THINGS TODAY:     - MRSA nares  - troponin/EKG  - ESR/CRP  - CBC/CMP  - ECHO    Acute problem List  #. SIRS  #. Coalescing erythematous macular blanching rash  #. NGOZI    PLAN:    Neuro:  AxO x 3  - no acute concerns    Pulmonary:  #No acute concerns    Cardiovascular:  # SIRS  Concerning for infection vs delayed infusion reaction. Patient on Ibrutinib and Ioncatox. CT CAP negative for intraabdominal or pulmonary source. CXR negative for pulmonary source. WBC downtrend from 13 to 10. Febrile to 103 but remains normotensive at this time.     Antibiotics:  - Vancomycin 4/11 -   - Zosyn  4/11 -   - Micafungin  4/11 -    - Acyclovir (home med)- HSV ppx  - Bactrim (home med)- PJP ppx     GI/Nutrition:  # Nutrition  - Medium carb consistent diet       Renal/Fluids/Electrolytes:  # NGOZI   likely prerenal in the setting of sepsis. No muddy brown cast on UA. S/p IVF.  - Fena pending    Endocrine:  # T2DM  - Carb consistent diet  - Glimeperide and Metformin    ID:  #. SIRS  #. Leukocytosis  Concerning for infection vs delayed infusion reaction. Symptoms  started Friday 4/9. Infusion was done 4/2.  Patient on Ibrutinib and Ioncatox. CT CAP negative for intraabdominal or pulmonary source. CXR negative for pulmonary source. WBC downtrend from 13 to 10. Febrile to 103 but remains normotensive at this time. Denies diarrhea. Previously admitted with urosepsis from kidney stone s/p stent placement. On evaluation per CT today, no evidence of stone. Previous stone was uric so it is not readily seen on radiology, but uric acid levels were normal. Previously had a clot from PICC line site, now s/p AC until March 2021. Eill evaluate for DVT as possible source of leukocytosis and fever in the setting of malignancy and recent DVT now off AC.     - Urinalysis  - CXR/ECHO  - CT CAP  - ESR/CRP  - Trend lactic acid   - LE DVT    Culture Data  - Blood cultures x 2 4/9- NGTD  - Blood culture x 2 4/10 - NGTD  - Blood culture x 2 4/11- NGTD  Antibiotics:  - Vancomycin 4/11 -   - Zosyn  4/11 -   - Micafungin  4/11 -    - Acyclovir (home med)- HSV ppx  - Bactrim (home med)- PJP ppx       Hematology:    # Stage IV Mantle cell lymphoma with 1st relpase  Auto PBSCT  -- H/o following  -- chemo per heme/onc  -- LE DVT    Musculoskeletal:  # No acute concerns    Skin:  # Rash   Presumed to be 2/2 to medication. Rash is macular, erythematous, blanching confluent rash. Differentials include drug rash/infusion reaction( rash occurring 2-7 days after infusion)SJS/TEN, DRESS(less likely given no eosinophilia),and Scarlet fever (GAS PCR negative- making this less likely).Of note, ibrutinib can lead to rash in up to 30% of patients. Skin punch biopsy done 4/11.     -- Dermatology consulted  -- will hold ibrutinib per BMT  -- benadryl for pruritis  -- Triamcinolone 0.1% ointment twice daily to affected areas on the body, as well as oral  diphenhydramine 25-50 mg q6h PRN.  --  Recommend suture removal in 14 days - around Sunday, 4/25/21.          General Cares/Prophylaxis:    DVT Prophylaxis: Enoxaparin  (Lovenox) SQ  GI Prophylaxis: Not indicated  Restraints: None  Family Communication: Discussed with nando at bedside.  Code Status: FULL    Lines/tubes/drains:  - Port    Disposition:  - Medical ICU     Patient seen and findings/plan discussed with medical ICU staff, Julio Mo (Original Author)    Brady Monteiro  Internal Medicine Residency, PGY-2  [facility]   p. [phone]      -----------------------------------------------------------------------    HISTORY PRESENTING ILLNESS:   Theo Roblero is a 56 year old male  with high risk (IPI 6.5 and Ki67 of 20%), stage IV Mantle cell lymphoma in 1st relapse s/p 8 cycles of alternating 6 cycles of Maxi-R- CHOP alternating with high-dose LITZY-C and BEAM ASCT consolidation D+134 s/p auto PBSCT.  He was treated for relapse with Loncatuximab and Ibrutinib about 2 weeks ago. Presents with fever, rash, soft blood pressures, tachycardia.  Patient was initiated on chemotherapy trial on 4/2/2021. He started his infusion of Loncatuximab on that Friday (to be done every Friday) and Ibrutinib daily. On Friday 2/09/2021, he called nando not feeling well. She came over and checked BP : 122/71 T 100.8. He was brought in to the ER.    Symptoms all occurred starting Friday 4/09: + Nausea when sitting up and from chewing tobacco, poor apatite, soft stools but no diarrhea, no abdominal pain or change in weight (measures it daily since starting chemo trial). Developed rash on back which progressed to his chest, arms, legs and scalp and anterior torso. Endorsed swelling of tongue and lips. Denies SOB or chest pain at the time and now. Patient previously noted clot in central line in December for which he was placed on AC until March when it was discontinued.    Denies smoking history (never), chews tobacco- no other illicit drug/ IVDU, denies alcohol.  Vitals :   T: 103>99.9>103  P:121   BP: 92/64  RR: 18  RA: 96%    Labs: cr 1.63  (baseline 1.1-1.2)BUN 24. CO2 17, Na 129,  procal 1.5, lactic acid 1.8   GFR 46, WBC 13.8, hgb 10/5, plt 75 (chronic)  Blood culture since 9th NGTD  Imaging: CT CAP: lymph nodes unchanged, perisplenic fluid unchanged  CXR normal, ECHo 8/27 Mildly (EF 45-50%) reduced left ventricular function is present. LVEF 50%  based on biplane 2D tracing. Grade I or early diastolic dysfunction.    Oncological History:    Diagnosis: Mantle Cell Lymphoma  - Stage IV by Benedict  - , Ki-67: 20%  - High-risk by Mantle-IPI  - No Marrow involvement at diagnosis    Treatment: Nordic Regimen (Maxi-R-CHOP alternating high-dose Agueda-C)  - Cycle #1 CHOP 02/21/2020, presenting as TLS, received Rasburicase x 1  - Cycle #2 R+Agueda-c 03/12/2020  - Cycle #3 R-CHOP 04/02/2020  - Cycle #4 R+Agueda-C 04/23/2020  - Cycle #5 R-CHOP 05/20/2020, delayed due to urosepsis  - Cycle #6 R+Agueda-c 06/11/2020    - Rituximab 7/2020  - Admit to [facility] for Agueda-C tomorrow (06/12)  - Neulasta 06/15 in [place]  - Repeat PET scan 07/06- complete response to therapy    REVIEW OF SYSTEMS: See HPI.    PAST MEDICAL HISTORY:   Past Medical History:   Diagnosis Date     Kidney stones 06/2020    left     Sepsis without septic shock (H) 05/12/2020    seconary tp pyelonephritis from obstruction left ureteroliethasis     SURGICAL HISTORY:  Past Surgical History:   Procedure Laterality Date     cystoscopy, left pyelogram, ureteral stent placement  05/12/2020    stone was blocking the ureter     cystoscopy, retrograde pyelogram, uretroscopy. laser lithotripsy Left 06/04/2020    treat kidney stones     INSERT CATHETER VASCULAR ACCESS Left 10/26/2020    Procedure: dual lumen non valved tunneled line vascular placement;  Surgeon: Alessanrdo Steven MD;  Location: [facility] OR     IR CVC TUNNEL PLACEMENT > 5 YRS OF AGE  10/26/2020     IR CVC TUNNEL REMOVAL LEFT  12/8/2020     IR LYMPH NODE BIOPSY  3/10/2021     port a cath placement Right 02/18/2020    internal jugular vein      SOCIAL HISTORY:  Social History     Socioeconomic History     Marital status: Single     Spouse name: None     Number of children: None     Years of education: None     Highest education level: None   Occupational History     None   Social Needs     Financial resource strain: None     Food insecurity     Worry: None     Inability: None     Transportation needs     Medical: None     Non-medical: None   Tobacco Use     Smoking status: Never Smoker     Smokeless tobacco: Current User     Types: Chew     Tobacco comment: trying to quit   Substance and Sexual Activity     Alcohol use: Not Currently     Drug use: Never     Sexual activity: None   Lifestyle     Physical activity     Days per week: None     Minutes per session: None     Stress: None   Relationships     Social connections     Talks on phone: None     Gets together: None     Attends Yarsanism service: None     Active member of club or organization: None     Attends meetings of clubs or organizations: None     Relationship status: None     Intimate partner violence     Fear of current or ex partner: None     Emotionally abused: None     Physically abused: None     Forced sexual activity: None   Other Topics Concern     None   Social History Narrative     None     FAMILY HISTORY:   Family History   Problem Relation Age of Onset     Heart Disease Mother      Lymphoma Mother      Heart Disease Father      Heart Disease Sister      ALLERGIES:   No Known Allergies  MEDICATIONS:  No current facility-administered medications on file prior to encounter.   acyclovir (ZOVIRAX) 800 MG tablet, Take 1 tablet (800 mg) by mouth 2 times daily  atorvastatin (LIPITOR) 40 MG tablet, Take 40 mg by mouth  glimepiride (AMARYL) 4 MG tablet, Take 1 tablet (4 mg) by mouth every morning (before breakfast)  metFORMIN (GLUCOPHAGE-XR) 500 MG 24 hr tablet, Take 2 tablets (1,000 mg) by mouth daily (with dinner)  metoprolol succinate ER (TOPROL-XL) 25 MG 24 hr tablet, Take 1 tablet (25  mg) by mouth 2 times daily  Alcohol Swabs PADS, 1 pad 4 times daily (before meals and nightly)  blood glucose (NO BRAND SPECIFIED) test strip, Use to test blood sugar 4 times daily or as directed.  blood glucose monitoring (ACCU-CHEK FASTCLIX) lancets, Use to test blood sugar 4 times daily or as directed.  dexamethasone (DECADRON) 4 MG tablet, 4 mg twice daily day before, day of, and day after Lonca-T infusion  ondansetron (ZOFRAN-ODT) 8 MG ODT tab, Take 1 tablet (8 mg) by mouth every 8 hours as needed for nausea  study - ibrutinib, IDS# [id number], 140 mg capsule, Take 4 capsules (560 mg) by mouth daily Take at the same time each day. Swallow whole, Do NOT crush, chew or open capsules.  sulfamethoxazole-trimethoprim (BACTRIM DS) 800-160 MG tablet, Take 1 tablet by mouth Every Mon, Tues two times daily Started 12/8/20        PHYSICAL EXAMINATION:  Temp:  [99.9  F (37.7  C)-103.2  F (39.6  C)] 99.9  F (37.7  C)  Pulse:  [110-138] 121  Resp:  [18-20] 18  BP: ()/(54-86) 92/64  SpO2:  [94 %-100 %] 96 %  General: Laying in bed shivering  HEENT: EOMI  Neuro: A&Ox3, NAD  Pulm/Resp: Clear breath sounds bilaterally without rhonchi, crackles or wheeze, breathing non-labored  CV: Tachycardia, no murmurs, warm   Abdomen: Soft, non-distended, obese abdomen, non-tender  : No ocampo  Incisions/Skin: erythematous, macular,, blanching, coalescing rash. Petechiae in lower extremities,     LABORATORY:  Results for orders placed or performed during the hospital encounter of 04/09/21 (from the past 24 hour(s))   Lactic acid level STAT   Result Value Ref Range    Lactate for Sepsis Protocol 2.8 (H) 0.7 - 2.0 mmol/L   Procalcitonin   Result Value Ref Range    Procalcitonin 0.51 ng/ml   Blood culture    Specimen: Portacath; Blood    Portacath   Result Value Ref Range    Specimen Description Blood Portacath     Culture Micro No growth after 16 hours    Blood culture yeast    Specimen: Blood    Portacath   Result Value Ref Range    Specimen  Description Blood Portacath     Culture Micro No growth after 16 hours    Glucose by meter   Result Value Ref Range    Glucose 132 (H) 70 - 99 mg/dL   Blood culture    Specimen: Blood   Result Value Ref Range    Specimen Description Blood     Special Requests Right Hand     Culture Micro No growth after 16 hours    Blood culture    Specimen: Blood, Venous    Port   Result Value Ref Range    Specimen Description Blood Port     Culture Micro No growth after 15 hours    Lactic acid whole blood   Result Value Ref Range    Lactic Acid 1.7 0.7 - 2.0 mmol/L   Procalcitonin   Result Value Ref Range    Procalcitonin 1.58 ng/ml   CRP inflammation   Result Value Ref Range    CRP Inflammation 150.0 (H) 0.0 - 8.0 mg/L   CBC with platelets differential   Result Value Ref Range    WBC 13.8 (H) 4.0 - 11.0 10e9/L    RBC Count 3.31 (L) 4.4 - 5.9 10e12/L    Hemoglobin 10.5 (L) 13.3 - 17.7 g/dL    Hematocrit 31.1 (L) 40.0 - 53.0 %    MCV 94 78 - 100 fl    MCH 31.7 26.5 - 33.0 pg    MCHC 33.8 31.5 - 36.5 g/dL    RDW 13.4 10.0 - 15.0 %    Platelet Count 75 (L) 150 - 450 10e9/L    Diff Method Automated Method     % Neutrophils 90.1 %    % Lymphocytes 6.4 %    % Monocytes 0.9 %    % Eosinophils 1.2 %    % Basophils 0.1 %    % Immature Granulocytes 1.3 %    Nucleated RBCs 0 0 /100    Absolute Neutrophil 12.4 (H) 1.6 - 8.3 10e9/L    Absolute Lymphocytes 0.9 0.8 - 5.3 10e9/L    Absolute Monocytes 0.1 0.0 - 1.3 10e9/L    Absolute Eosinophils 0.2 0.0 - 0.7 10e9/L    Absolute Basophils 0.0 0.0 - 0.2 10e9/L    Abs Immature Granulocytes 0.2 0 - 0.4 10e9/L    Absolute Nucleated RBC 0.0    Comprehensive metabolic panel   Result Value Ref Range    Sodium 129 (L) 133 - 144 mmol/L    Potassium 3.7 3.4 - 5.3 mmol/L    Chloride 101 94 - 109 mmol/L    Carbon Dioxide 17 (L) 20 - 32 mmol/L    Anion Gap 11 3 - 14 mmol/L    Glucose 116 (H) 70 - 99 mg/dL    Urea Nitrogen 24 7 - 30 mg/dL    Creatinine 1.63 (H) 0.66 - 1.25 mg/dL    GFR Estimate 46 (L) >60  mL/min/[1.73_m2]    GFR Estimate If Black 53 (L) >60 mL/min/[1.73_m2]    Calcium 7.8 (L) 8.5 - 10.1 mg/dL    Bilirubin Total 0.7 0.2 - 1.3 mg/dL    Albumin 2.1 (L) 3.4 - 5.0 g/dL    Protein Total 4.9 (L) 6.8 - 8.8 g/dL    Alkaline Phosphatase 50 40 - 150 U/L    ALT 40 0 - 70 U/L    AST 37 0 - 45 U/L   Lactic acid whole blood   Result Value Ref Range    Lactic Acid 3.5 (H) 0.7 - 2.0 mmol/L   Potassium   Result Value Ref Range    Potassium 3.7 3.4 - 5.3 mmol/L   Magnesium   Result Value Ref Range    Magnesium 1.5 (L) 1.6 - 2.3 mg/dL   Phosphorus   Result Value Ref Range    Phosphorus 2.6 2.5 - 4.5 mg/dL   Lactic acid whole blood   Result Value Ref Range    Lactic Acid 1.8 0.7 - 2.0 mmol/L   Glucose by meter   Result Value Ref Range    Glucose 137 (H) 70 - 99 mg/dL   Dermatology IP Consult: Patient to be seen: Routine - within 24 hours; Call back #: [phone]; new rash in clinical trial pt. Unclear etiology, drug reaction vs infection related.; Consultant may enter orders: Yes; Requesting provider? Hospitalist ...    Narrative    Nickolas Gonzalez MD     4/11/2021  3:16 PM  [facility] Inpatient Consult Dermatology Note    Impression/Plan:  1. Acute pruritic eruption 9 days after starting   ibrutinib-loncastuximab for stage IV mantle cell lymphoma:  - Given the first dose of chemotherapy was administered about 9   days ago, a drug eruption is favored. Of note, ibrutinib can lead   to rash in up to 30% of patients. These chemotherapies can have a   range of associated dermatoses, and urticarial eruptions are not   inconsistent with this. The appearance of his rash may be   affected by his thrombocytopenia, which can lead to erythematous   rashes becoming more petechial or purpuric. We note viral studies   thus far have been negative/unremarkable. We will perform a punch   biopsy of the skin to aid with diagnostic clarity. See procedure   note below.  - For symptomatic relief, use triamcinolone 0.1%  ointment twice   daily to affected areas on the body, as well as oral   antihistamines, such as diphenhydramine 25-50 mg q6h.  - Please let dermatology know if any vesicles/bullae, pustules,   skin sloughing, or mucosal involvement should develop. A   chemotherapy-induced mucositis could occur as well.    - Punch biopsy: After discussion of benefits and risks including   but not limited to bleeding, infection, scar, incomplete removal,   recurrence, and non-diagnostic biopsy, written consent and   photographs were obtained. Time-out was performed. The area was   cleaned with isopropyl alcohol. 1.0 mL of 1% lidocaine with   1:100,000 epinephrine was injected to obtain adequate anesthesia   of the lesion on the trunk. A 4 mm punch biopsy was performed   twice as one specimen was put in formalin for H&E and another put   in normal saline for direct immunofluorescence (DIF).  4-0   Prolene sutures were utilized to approximate the epidermal edges.    White petroleum jelly/VaselineTM and a bandage was applied to   the wound.  Explicit verbal wound care instructions were   provided.  Recommend suture removal in 14 days - around Sunday,   4/25/21.    Thank you for the dermatology consultation. Please do not   hesitate to contact the dermatology resident/faculty on call for   any additional questions or concerns. We will continue to follow.       Patient discussed with attending physician, Dr. Galindo Flores.    Nickolas Gonzalez MD  Dermatology Resident      Dermatology Problem List:  1. Stage IV mantle cell lymphoma  - Current treatment: loncastuximab infusion (cycle every 3 weeks   for 2 cycles then re-evaluate after 2 cycles) and ibrutinib as of   4/2/21  - Status post 8 cycles of alternating 6 cycles of Maxi-R- CHOP   alternating with high-dose LITZY-C and BEAM ASCT consolidation   D+134 s/p auto PBSCT    Date of Admission: Apr 9, 2021   Encounter Date: 04/11/2021    Reason for Consultation:   Rash    History of Present  "Illness:  Mr. Theo Roblero is a 56 year old male with high risk (IPI   6.5 and Ki67 of 20%), stage IV Mantle cell lymphoma in 1st   relapse s/p 8 cycles of alternating 6 cycles of Maxi-R- CHOP   alternating with high-dose LITZY-C and BEAM ASCT consolidation   D+134 s/p auto PBSCT, with recent induction with loncastuximab   infusion and ibrutinib on 4/2/21 who was admitted on 4/9/21 for   fever, rash, hypotension, and tachycardia. Dermatology was   consulted for rash.    The rash is with unclear onset as it was initially found on   physical examination during an oncology visit on 4/9/21.   Initially, it was documented as \"new macular mildly erythematous   scattered rash on back ~18% bsa\" in that clinic note. The evening   after the oncology appointment on 4/9/21, the patient began   experiencing fevers, chills, and expanding rash on the back, and   was recommended to present to the ED for evaluation. The rash has   reportedly now grown to involve about 90% of the body surface   area and is pruritic and described as \"hive-like.\" The patient   has not seen a dermatologist before. No history of skin problems.    4/9/21 blood cultures NGTD. The patient is being treated   empirically for sepsis with vanc-Zosyn. He had a normal lactic   acid until today, 4/11/21 at 4:40 a.m., when it was elevated at   3.5. 4/10/21 CXR was clear. He has a new leukocytosis as of   today, 4/11/21, at 13.8; Hgb appears to be at baseline at around   10-11; and new thrombocytopenia since his recent chemotherapy   with Plt 75. A urinalysis is pending as of time of this writing.   The primary team is currently giving diphenhydramine for   pruritus.    Past Medical History:   Patient Active Problem List   Diagnosis     Mantle cell lymphoma of lymph nodes of multiple sites (H)     Tobacco user     Obesity     Nuclear sclerosis     Impotence of organic origin     HTN (hypertension)     HLD (hyperlipidemia)     Diverticulosis of sigmoid colon     " Diabetic macular edema (H)     Type 2 diabetes mellitus, without long-term current use of   insulin (H)     NHL (non-Hodgkin's lymphoma) (H)     S/P bone marrow transplant (H)     Neutropenic fever (H)     Drug rash     Fever and chills     Lymphoma, unspecified body region, unspecified lymphoma type   (H)     Past Medical History:   Diagnosis Date     Kidney stones 06/2020    left     Sepsis without septic shock (H) 05/12/2020    seconary tp pyelonephritis from obstruction left   ureteroliethasis     Past Surgical History:   Procedure Laterality Date     cystoscopy, left pyelogram, ureteral stent placement    05/12/2020    stone was blocking the ureter     cystoscopy, retrograde pyelogram, uretroscopy. laser   lithotripsy Left 06/04/2020    treat kidney stones     INSERT CATHETER VASCULAR ACCESS Left 10/26/2020    Procedure: dual lumen non valved tunneled line vascular   placement;  Surgeon: Alessandro Steven MD;  Location: [facility] OR     IR CVC TUNNEL PLACEMENT > 5 YRS OF AGE  10/26/2020     IR CVC TUNNEL REMOVAL LEFT  12/8/2020     IR LYMPH NODE BIOPSY  3/10/2021     port a cath placement Right 02/18/2020    internal jugular vein         Social History:  Patient reports that he has never smoked. His smokeless tobacco   use includes chew. He reports previous alcohol use. He reports   that he does not use drugs.    Family History:  Family History   Problem Relation Age of Onset     Heart Disease Mother      Lymphoma Mother      Heart Disease Father      Heart Disease Sister        Medications:  Current Facility-Administered Medications   Medication     acetaminophen (TYLENOL) tablet 650 mg     acyclovir (ZOVIRAX) tablet 800 mg     atorvastatin (LIPITOR) tablet 40 mg     calcium carbonate (TUMS) chewable tablet 500 mg     glucose gel 15-30 g    Or     dextrose 50 % injection 25-50 mL    Or     glucagon injection 1 mg     diphenhydrAMINE (BENADRYL) capsule 25 mg     diphenhydrAMINE-zinc acetate (BENADRYL) 1-0.1 %  "cream     enoxaparin ANTICOAGULANT (LOVENOX) injection 40 mg     hydrOXYzine (ATARAX) tablet 25 mg     insulin aspart (NovoLOG) injection (RAPID ACTING)     insulin aspart (NovoLOG) injection (RAPID ACTING)     lidocaine (LMX4) cream     lidocaine 1 % 0.1-1 mL     magnesium sulfate 4 g in 100 mL sterile water (premade)     melatonin tablet 1 mg     metoprolol succinate ER (TOPROL-XL) 24 hr tablet 25 mg     micafungin (MYCAMINE) 150 mg in sodium chloride 0.9 % 100 mL   intermittent infusion     ondansetron (ZOFRAN-ODT) ODT tab 4 mg    Or     ondansetron (ZOFRAN) injection 4 mg     ondansetron (ZOFRAN-ODT) ODT tab 8 mg     piperacillin-tazobactam (ZOSYN) 3.375 g vial to attach to NS   100 mL bag     sodium chloride (PF) 0.9% PF flush 3 mL     sodium chloride (PF) 0.9% PF flush 3 mL     sodium chloride (PF) 0.9% PF flush 3 mL     sodium chloride 0.9% infusion     [Held by provider] study - ibrutinib (IDS# [id number]) capsule   CHEMOTHERAPY 560 mg     [START ON 4/12/2021] sulfamethoxazole-trimethoprim (BACTRIM DS)   800-160 MG per tablet 1 tablet     vancomycin 1500 mg in 0.9% NaCl 250 ml intermittent infusion   1,500 mg          No Known Allergies      Review of Systems:  -As per HPI      Physical exam:  Vitals: BP 92/64   Pulse 121   Temp 99.9  F (37.7  C) (Oral)     Resp 18   Ht 1.803 m (5' 11\")   Wt 103.4 kg (228 lb)   SpO2   96%   BMI 31.80 kg/m    GEN: This is a well developed, well-nourished male in no acute   distress, in a pleasant mood.    SKIN: Total skin excluding the undergarment areas was performed.   The exam included the head/face, neck, both arms, chest, back,   abdomen, both legs, digits and/or nails.   -Egan skin type: I  - On the trunk and extremities, there are pink papules coalescing   into well demarcated, slightly reticulated plaques that are   becoming confluent. The central areas of some plaques appears   darker red and petechial, especially on the upper extremities. No   mucosal " involvement, vesicles/bullae, or pustules are seen. No   facial swelling visualized.  -No other lesions of concern on areas examined.                                Laboratory:  Results for orders placed or performed during the hospital   encounter of 04/09/21 (from the past 24 hour(s))   Glucose by meter   Result Value Ref Range    Glucose 159 (H) 70 - 99 mg/dL   Lactic acid level STAT   Result Value Ref Range    Lactate for Sepsis Protocol 2.8 (H) 0.7 - 2.0 mmol/L   Procalcitonin   Result Value Ref Range    Procalcitonin 0.51 ng/ml   Blood culture    Specimen: Portacath; Blood    Portacath   Result Value Ref Range    Specimen Description Blood Portacath     Culture Micro No growth after 8 hours    Blood culture yeast    Specimen: Blood    Portacath   Result Value Ref Range    Specimen Description Blood Portacath     Culture Micro No growth after 7 hours    Glucose by meter   Result Value Ref Range    Glucose 132 (H) 70 - 99 mg/dL   Blood culture    Specimen: Blood   Result Value Ref Range    Specimen Description Blood     Special Requests Right Hand     Culture Micro No growth after 8 hours    Blood culture    Specimen: Blood, Venous    Port   Result Value Ref Range    Specimen Description Blood Port     Culture Micro No growth after 6 hours    Lactic acid whole blood   Result Value Ref Range    Lactic Acid 1.7 0.7 - 2.0 mmol/L   Procalcitonin   Result Value Ref Range    Procalcitonin 1.58 ng/ml   CRP inflammation   Result Value Ref Range    CRP Inflammation 150.0 (H) 0.0 - 8.0 mg/L   CBC with platelets differential   Result Value Ref Range    WBC 13.8 (H) 4.0 - 11.0 10e9/L    RBC Count 3.31 (L) 4.4 - 5.9 10e12/L    Hemoglobin 10.5 (L) 13.3 - 17.7 g/dL    Hematocrit 31.1 (L) 40.0 - 53.0 %    MCV 94 78 - 100 fl    MCH 31.7 26.5 - 33.0 pg    MCHC 33.8 31.5 - 36.5 g/dL    RDW 13.4 10.0 - 15.0 %    Platelet Count 75 (L) 150 - 450 10e9/L    Diff Method Automated Method     % Neutrophils 90.1 %    % Lymphocytes 6.4 %     % Monocytes 0.9 %    % Eosinophils 1.2 %    % Basophils 0.1 %    % Immature Granulocytes 1.3 %    Nucleated RBCs 0 0 /100    Absolute Neutrophil 12.4 (H) 1.6 - 8.3 10e9/L    Absolute Lymphocytes 0.9 0.8 - 5.3 10e9/L    Absolute Monocytes 0.1 0.0 - 1.3 10e9/L    Absolute Eosinophils 0.2 0.0 - 0.7 10e9/L    Absolute Basophils 0.0 0.0 - 0.2 10e9/L    Abs Immature Granulocytes 0.2 0 - 0.4 10e9/L    Absolute Nucleated RBC 0.0    Comprehensive metabolic panel   Result Value Ref Range    Sodium 129 (L) 133 - 144 mmol/L    Potassium 3.7 3.4 - 5.3 mmol/L    Chloride 101 94 - 109 mmol/L    Carbon Dioxide 17 (L) 20 - 32 mmol/L    Anion Gap 11 3 - 14 mmol/L    Glucose 116 (H) 70 - 99 mg/dL    Urea Nitrogen 24 7 - 30 mg/dL    Creatinine 1.63 (H) 0.66 - 1.25 mg/dL    GFR Estimate 46 (L) >60 mL/min/[1.73_m2]    GFR Estimate If Black 53 (L) >60 mL/min/[1.73_m2]    Calcium 7.8 (L) 8.5 - 10.1 mg/dL    Bilirubin Total 0.7 0.2 - 1.3 mg/dL    Albumin 2.1 (L) 3.4 - 5.0 g/dL    Protein Total 4.9 (L) 6.8 - 8.8 g/dL    Alkaline Phosphatase 50 40 - 150 U/L    ALT 40 0 - 70 U/L    AST 37 0 - 45 U/L   Lactic acid whole blood   Result Value Ref Range    Lactic Acid 3.5 (H) 0.7 - 2.0 mmol/L   Potassium   Result Value Ref Range    Potassium 3.7 3.4 - 5.3 mmol/L   Magnesium   Result Value Ref Range    Magnesium 1.5 (L) 1.6 - 2.3 mg/dL   Phosphorus   Result Value Ref Range    Phosphorus 2.6 2.5 - 4.5 mg/dL   Lactic acid whole blood   Result Value Ref Range    Lactic Acid 1.8 0.7 - 2.0 mmol/L   Glucose by meter   Result Value Ref Range    Glucose 137 (H) 70 - 99 mg/dL   Glucose by meter   Result Value Ref Range    Glucose 162 (H) 70 - 99 mg/dL       Dr. Galindo Flores staffed the patient.    Staff Involved:  Resident/Staff           Streptococcus A Rapid Scr w Reflx to PCR    Specimen: Throat   Result Value Ref Range    Strep Specimen Description Throat     Streptococcus Group A Rapid Screen Negative NEG^Negative   Group A Streptococcus PCR Throat Swab     Specimen: Throat   Result Value Ref Range    Specimen Description Throat     Strep Group A PCR Not Detected NDET^Not Detected   Glucose by meter   Result Value Ref Range    Glucose 162 (H) 70 - 99 mg/dL   EKG 12-lead, complete   Result Value Ref Range    Interpretation ECG Click View Image link to view waveform and result    CT Chest Abdomen Pelvis w/o Contrast    Narrative    EXAMINATION: CT CHEST ABDOMEN PELVIS W/O CONTRAST, 4/11/2021 1:17 PM    TECHNIQUE:  Helical CT images from the thoracic inlet through the  symphysis pubis were obtained without IV contrast.    COMPARISON: Whole-body PET/CT 3/26/2021.    HISTORY: Sepsis    FINDINGS:  TUBES/LINES: Right chest wall Port-A-Cath with tip in the low SVC.    CHEST:  LUNGS: The trachea and central airways are patent. No pneumothorax.  Trace bilateral pleural fluid. Small amount of fluid tracking into the  right major fissure. Incidental azygos fissure, normal variant.  Bandlike subsegmental atelectasis in the right lung base. No focal  airspace opacity. No suspicious pulmonary nodule.    MEDIASTINUM: Heart size is within normal limits. New small loculated  anterior pericardial fluid. The ascending aorta and main pulmonary  artery diameters are within normal limits. Coronary artery stents.  Multiple prominent and enlarged left greater than right axillary lymph  nodes for example left level 1 axillary node measuring 14 mm (series  3, image 64) these axillary lymph nodes demonstrated FDG activity on  prior PET CT. No suspicious mediastinal or hilar lymph nodes.    ABDOMEN/PELVIS:  Evaluation limited due to patient respiratory motion.    LIVER: No focal hepatic mass.    BILIARY: The gallbladder is decompressed. No intraluminal gallstone.  No intrahepatic or extrahepatic biliary ductal dilatation.    PANCREAS: Fatty infiltration of the pancreatic head, uncinate process  and pancreatic neck.    SPLEEN: Redemonstration of ill-defined hypodense perisplenic  fluid  collection that are visualized on the prior PET/CT likely representing  a subacute to chronic hematoma. Small splenule.    ADRENAL GLANDS: Within normal limits.    URINARY TRACT: Large exophytic simple cyst in the inferior pole of the  left kidney measuring up to 10.1 cm. No focal right renal mass.  Unchanged mild nonspecific perinephric stranding. No hydronephrosis or  hydroureter. No obstructing renal stone. Urinary bladder is  unremarkable.    REPRODUCTIVE ORGANS: Within normal limits.    STOMACH: Within normal limits.    BOWEL: Moderate colonic stool burden. No abnormally dilated loops of  large or small bowel. The previously visualized wall thickening in the  anus rectum, and sigmoid colon is less conspicuous on today's exam;  however there is inflammation/lymphoma in the distal sigmoid colon  series 2 image 221. The appendix is unremarkable.    PERITONEUM/FLUID: No free fluid.    VESSELS: Atherosclerotic calcifications of the abdominal aorta and  iliac arteries.    LYMPH NODES: No significant change in size or number of multiple  prominent peritoneal retroperitoneal lymph nodes for example 8 mm  right para-aortic lymph node (series 3, image 139) which previously  demonstrated FDG activity on recent PET/CT. Unchanged enlargement of  bilateral inguinal nodes for example 1.4 cm left inguinal node (series  3, image 718) and enlarged right inguinal node measuring 1.5 cm  (series 3, image 729) there is mild associated stranding and soft  tissue nodularity adjacent to these lymph nodes.     BONES/SOFT TISSUES: No aggressive osseous lesions. Probable Schmorl  node deformity to the superior endplate of L4. Anterior flowing  osteophytes throughout multiple thoracic vertebrae likely representing  diffuse idiopathic skeletal hyperostosis. Small amount of subcutaneous  emphysema about the lower midabdomen, likely related to recent  medication injection.      Impression    IMPRESSION: In this patient with a history  of mantle cell lymphoma  status post chemotherapy and stem cell transplant:    1. No significant interval change in scattered enlarged lymph nodes in  the chest abdomen pelvis which previously demonstrated FDG avidity on  recent whole body PET/CT concerning for disease progression.  2. Inflammation/lymphoma about the distal sigmoid colon, this might  represent a source for infection although not significantly changed  from PET/CT 3/26/2021.  3. Unchanged perisplenic fluid collection likely representing chronic  hematoma.  4. Trace bilateral pleural effusions. No focal airspace opacity. New  small anterior pericardial fluid without surrounding inflammation.    I have personally reviewed the examination and initial interpretation  and I agree with the findings.    MARY JOHNSON MD   CBC with platelets   Result Value Ref Range    WBC 10.9 4.0 - 11.0 10e9/L    RBC Count 3.10 (L) 4.4 - 5.9 10e12/L    Hemoglobin 9.8 (L) 13.3 - 17.7 g/dL    Hematocrit 29.2 (L) 40.0 - 53.0 %    MCV 94 78 - 100 fl    MCH 31.6 26.5 - 33.0 pg    MCHC 33.6 31.5 - 36.5 g/dL    RDW 13.6 10.0 - 15.0 %    Platelet Count 71 (L) 150 - 450 10e9/L   Comprehensive metabolic panel   Result Value Ref Range    Sodium 131 (L) 133 - 144 mmol/L    Potassium 3.7 3.4 - 5.3 mmol/L    Chloride 104 94 - 109 mmol/L    Carbon Dioxide 18 (L) 20 - 32 mmol/L    Anion Gap 9 3 - 14 mmol/L    Glucose 185 (H) 70 - 99 mg/dL    Urea Nitrogen 25 7 - 30 mg/dL    Creatinine 1.53 (H) 0.66 - 1.25 mg/dL    GFR Estimate 50 (L) >60 mL/min/[1.73_m2]    GFR Estimate If Black 58 (L) >60 mL/min/[1.73_m2]    Calcium 7.2 (L) 8.5 - 10.1 mg/dL    Bilirubin Total 0.8 0.2 - 1.3 mg/dL    Albumin 1.9 (L) 3.4 - 5.0 g/dL    Protein Total 4.3 (L) 6.8 - 8.8 g/dL    Alkaline Phosphatase 48 40 - 150 U/L    ALT 42 0 - 70 U/L    AST 35 0 - 45 U/L   Erythrocyte sedimentation rate auto   Result Value Ref Range    Sed Rate 31 (H) 0 - 20 mm/h   CRP inflammation   Result Value Ref Range    CRP  Inflammation 170.0 (H) 0.0 - 8.0 mg/L   Troponin I   Result Value Ref Range    Troponin I ES 0.024 0.000 - 0.045 ug/L   Methicillin Resistant Staph Aureus PCR    Specimen: Nares   Result Value Ref Range    Specimen Description Nares     Methicillin Resist/Sens S. aureus PCR Negative NEG^Negative   Blood gas arterial   Result Value Ref Range    pH Arterial 7.43 7.35 - 7.45 pH    pCO2 Arterial 25 (L) 35 - 45 mm Hg    pO2 Arterial 75 (L) 80 - 105 mm Hg    Bicarbonate Arterial 17 (L) 21 - 28 mmol/L    Base Deficit Art 6.3 mmol/L    FIO2 21    Lactic acid whole blood   Result Value Ref Range    Lactic Acid 1.6 0.7 - 2.0 mmol/L   Uric acid   Result Value Ref Range    Uric Acid 3.9 3.5 - 7.2 mg/dL   Fractional excretion of sodium   Result Value Ref Range    Creatinine Urine 158 mg/dL    Sodium Urine mmol/L 56 mmol/L    %FENA 0.4 %   UA with Microscopic reflex to Culture    Specimen: Urine clean catch   Result Value Ref Range    Color Urine Yellow     Appearance Urine Slightly Cloudy     Glucose Urine 100 (A) NEG^Negative mg/dL    Bilirubin Urine Negative NEG^Negative    Ketones Urine Trace (A) NEG^Negative mg/dL    Specific Gravity Urine 1.033 1.003 - 1.035    Blood Urine Trace (A) NEG^Negative    pH Urine 5.5 5.0 - 7.0 pH    Protein Albumin Urine 50 (A) NEG^Negative mg/dL    Urobilinogen mg/dL Normal 0.0 - 2.0 mg/dL    Nitrite Urine Negative NEG^Negative    Leukocyte Esterase Urine Negative NEG^Negative    Source Clean catch urine     WBC Urine 3 0 - 5 /HPF    RBC Urine 1 0 - 2 /HPF    Bacteria Urine Few (A) NEG^Negative /HPF    Squamous Epithelial /HPF Urine 0 0 - 1 /HPF    Mucous Urine Present (A) NEG^Negative /LPF   Glucose by meter   Result Value Ref Range    Glucose 180 (H) 70 - 99 mg/dL

## 2021-04-11 NOTE — PROGRESS NOTES
This morning applied male external cath for voiding however patient stated he was having hard time to urinate in home, after IV flush was done sent lactic acid 1.8, however still low bp 99/63 and continue to have high fever 101.4, continue to administrate tylenol 650 mg every 4 hrs, very poor oral food intake BS was 162 mg /dl, hold insulin due to no oral intake, alerted orientated, very week. R side abdominal skin biopsy was done, transfer order to 4 C due to sever septic, no O 2 was needed

## 2021-04-11 NOTE — CONSULTS
[facility] Inpatient Consult Dermatology Note    Impression/Plan:  1. Acute pruritic eruption 9 days after starting ibrutinib-loncastuximab for stage IV mantle cell lymphoma:  - Given the first dose of chemotherapy was administered about 9 days ago, a drug eruption is favored. Of note, ibrutinib can lead to rash in up to 30% of patients. These chemotherapies can have a range of associated dermatoses, and urticarial eruptions are not inconsistent with this. The appearance of his rash may be affected by his thrombocytopenia, which can lead to erythematous rashes becoming more petechial or purpuric. We note viral studies thus far have been negative/unremarkable. We will perform a punch biopsy of the skin to aid with diagnostic clarity. See procedure note below.  - For symptomatic relief, use triamcinolone 0.1% ointment twice daily to affected areas on the body, as well as oral antihistamines, such as diphenhydramine 25-50 mg q6h.  - Please let dermatology know if any vesicles/bullae, pustules, skin sloughing, or mucosal involvement should develop. A chemotherapy-induced mucositis could occur as well.    - Punch biopsy: After discussion of benefits and risks including but not limited to bleeding, infection, scar, incomplete removal, recurrence, and non-diagnostic biopsy, written consent and photographs were obtained. Time-out was performed. The area was cleaned with isopropyl alcohol. 1.0 mL of 1% lidocaine with 1:100,000 epinephrine was injected to obtain adequate anesthesia of the lesion on the trunk. A 4 mm punch biopsy was performed twice as one specimen was put in formalin for H&E and another put in normal saline for direct immunofluorescence (DIF).  4-0 Prolene sutures were utilized to approximate the epidermal edges.  White petroleum jelly/VaselineTM and a bandage was applied to the wound.  Explicit verbal wound care instructions were provided.  Recommend suture removal in 14 days - around  "Sunday, 4/25/21.    Thank you for the dermatology consultation. Please do not hesitate to contact the dermatology resident/faculty on call for any additional questions or concerns. We will continue to follow.     Patient discussed with attending physician, Dr. Galindo Flores.    Nickolas Gonzalez MD  Dermatology Resident      Dermatology Problem List:  1. Stage IV mantle cell lymphoma  - Current treatment: loncastuximab infusion (cycle every 3 weeks for 2 cycles then re-evaluate after 2 cycles) and ibrutinib as of 4/2/21  - Status post 8 cycles of alternating 6 cycles of Maxi-R- CHOP alternating with high-dose LITZY-C and BEAM ASCT consolidation D+134 s/p auto PBSCT    Date of Admission: Apr 9, 2021   Encounter Date: 04/11/2021    Reason for Consultation:   Rash    History of Present Illness:  Mr. Theo Roblero is a 56 year old male with high risk (IPI 6.5 and Ki67 of 20%), stage IV Mantle cell lymphoma in 1st relapse s/p 8 cycles of alternating 6 cycles of Maxi-R- CHOP alternating with high-dose LITZY-C and BEAM ASCT consolidation D+134 s/p auto PBSCT, with recent induction with loncastuximab infusion and ibrutinib on 4/2/21 who was admitted on 4/9/21 for fever, rash, hypotension, and tachycardia. Dermatology was consulted for rash.    The rash is with unclear onset as it was initially found on physical examination during an oncology visit on 4/9/21. Initially, it was documented as \"new macular mildly erythematous scattered rash on back ~18% bsa\" in that clinic note. The evening after the oncology appointment on 4/9/21, the patient began experiencing fevers, chills, and expanding rash on the back, and was recommended to present to the ED for evaluation. The rash has reportedly now grown to involve about 90% of the body surface area and is pruritic and described as \"hive-like.\" The patient has not seen a dermatologist before. No history of skin problems.    4/9/21 blood cultures NGTD. The patient is being treated " empirically for sepsis with vanc-Zosyn. He had a normal lactic acid until today, 4/11/21 at 4:40 a.m., when it was elevated at 3.5. 4/10/21 CXR was clear. He has a new leukocytosis as of today, 4/11/21, at 13.8; Hgb appears to be at baseline at around 10-11; and new thrombocytopenia since his recent chemotherapy with Plt 75. A urinalysis is pending as of time of this writing. The primary team is currently giving diphenhydramine for pruritus.    Past Medical History:   Patient Active Problem List   Diagnosis     Mantle cell lymphoma of lymph nodes of multiple sites (H)     Tobacco user     Obesity     Nuclear sclerosis     Impotence of organic origin     HTN (hypertension)     HLD (hyperlipidemia)     Diverticulosis of sigmoid colon     Diabetic macular edema (H)     Type 2 diabetes mellitus, without long-term current use of insulin (H)     NHL (non-Hodgkin's lymphoma) (H)     S/P bone marrow transplant (H)     Neutropenic fever (H)     Drug rash     Fever and chills     Lymphoma, unspecified body region, unspecified lymphoma type (H)     Past Medical History:   Diagnosis Date     Kidney stones 06/2020    left     Sepsis without septic shock (H) 05/12/2020    seconary tp pyelonephritis from obstruction left ureteroliethasis     Past Surgical History:   Procedure Laterality Date     cystoscopy, left pyelogram, ureteral stent placement  05/12/2020    stone was blocking the ureter     cystoscopy, retrograde pyelogram, uretroscopy. laser lithotripsy Left 06/04/2020    treat kidney stones     INSERT CATHETER VASCULAR ACCESS Left 10/26/2020    Procedure: dual lumen non valved tunneled line vascular placement;  Surgeon: Alessandro Steven MD;  Location: [facility] OR     IR CVC TUNNEL PLACEMENT > 5 YRS OF AGE  10/26/2020     IR CVC TUNNEL REMOVAL LEFT  12/8/2020     IR LYMPH NODE BIOPSY  3/10/2021     port a cath placement Right 02/18/2020    internal jugular vein         Social History:  Patient reports that he has never  smoked. His smokeless tobacco use includes chew. He reports previous alcohol use. He reports that he does not use drugs.    Family History:  Family History   Problem Relation Age of Onset     Heart Disease Mother      Lymphoma Mother      Heart Disease Father      Heart Disease Sister        Medications:  Current Facility-Administered Medications   Medication     acetaminophen (TYLENOL) tablet 650 mg     acyclovir (ZOVIRAX) tablet 800 mg     atorvastatin (LIPITOR) tablet 40 mg     calcium carbonate (TUMS) chewable tablet 500 mg     glucose gel 15-30 g    Or     dextrose 50 % injection 25-50 mL    Or     glucagon injection 1 mg     diphenhydrAMINE (BENADRYL) capsule 25 mg     diphenhydrAMINE-zinc acetate (BENADRYL) 1-0.1 % cream     enoxaparin ANTICOAGULANT (LOVENOX) injection 40 mg     hydrOXYzine (ATARAX) tablet 25 mg     insulin aspart (NovoLOG) injection (RAPID ACTING)     insulin aspart (NovoLOG) injection (RAPID ACTING)     lidocaine (LMX4) cream     lidocaine 1 % 0.1-1 mL     magnesium sulfate 4 g in 100 mL sterile water (premade)     melatonin tablet 1 mg     metoprolol succinate ER (TOPROL-XL) 24 hr tablet 25 mg     micafungin (MYCAMINE) 150 mg in sodium chloride 0.9 % 100 mL intermittent infusion     ondansetron (ZOFRAN-ODT) ODT tab 4 mg    Or     ondansetron (ZOFRAN) injection 4 mg     ondansetron (ZOFRAN-ODT) ODT tab 8 mg     piperacillin-tazobactam (ZOSYN) 3.375 g vial to attach to  mL bag     sodium chloride (PF) 0.9% PF flush 3 mL     sodium chloride (PF) 0.9% PF flush 3 mL     sodium chloride (PF) 0.9% PF flush 3 mL     sodium chloride 0.9% infusion     [Held by provider] study - ibrutinib (IDS# [id number]) capsule CHEMOTHERAPY 560 mg     [START ON 4/12/2021] sulfamethoxazole-trimethoprim (BACTRIM DS) 800-160 MG per tablet 1 tablet     vancomycin 1500 mg in 0.9% NaCl 250 ml intermittent infusion 1,500 mg          No Known Allergies      Review of Systems:  -As per HPI      Physical exam:  Vitals: BP  "92/64   Pulse 121   Temp 99.9  F (37.7  C) (Oral)   Resp 18   Ht 1.803 m (5' 11\")   Wt 103.4 kg (228 lb)   SpO2 96%   BMI 31.80 kg/m    GEN: This is a well developed, well-nourished male in no acute distress, in a pleasant mood.    SKIN: Total skin excluding the undergarment areas was performed. The exam included the head/face, neck, both arms, chest, back, abdomen, both legs, digits and/or nails.   -Egan skin type: I  - On the trunk and extremities, there are pink papules coalescing into well demarcated, slightly reticulated plaques that are becoming confluent. The central areas of some plaques appears darker red and petechial, especially on the upper extremities. No mucosal involvement, vesicles/bullae, or pustules are seen. No facial swelling visualized.  -No other lesions of concern on areas examined.                                Laboratory:  Results for orders placed or performed during the hospital encounter of 04/09/21 (from the past 24 hour(s))   Glucose by meter   Result Value Ref Range    Glucose 159 (H) 70 - 99 mg/dL   Lactic acid level STAT   Result Value Ref Range    Lactate for Sepsis Protocol 2.8 (H) 0.7 - 2.0 mmol/L   Procalcitonin   Result Value Ref Range    Procalcitonin 0.51 ng/ml   Blood culture    Specimen: Portacath; Blood    Portacath   Result Value Ref Range    Specimen Description Blood Portacath     Culture Micro No growth after 8 hours    Blood culture yeast    Specimen: Blood    Portacath   Result Value Ref Range    Specimen Description Blood Portacath     Culture Micro No growth after 7 hours    Glucose by meter   Result Value Ref Range    Glucose 132 (H) 70 - 99 mg/dL   Blood culture    Specimen: Blood   Result Value Ref Range    Specimen Description Blood     Special Requests Right Hand     Culture Micro No growth after 8 hours    Blood culture    Specimen: Blood, Venous    Port   Result Value Ref Range    Specimen Description Blood Port     Culture Micro No growth " after 6 hours    Lactic acid whole blood   Result Value Ref Range    Lactic Acid 1.7 0.7 - 2.0 mmol/L   Procalcitonin   Result Value Ref Range    Procalcitonin 1.58 ng/ml   CRP inflammation   Result Value Ref Range    CRP Inflammation 150.0 (H) 0.0 - 8.0 mg/L   CBC with platelets differential   Result Value Ref Range    WBC 13.8 (H) 4.0 - 11.0 10e9/L    RBC Count 3.31 (L) 4.4 - 5.9 10e12/L    Hemoglobin 10.5 (L) 13.3 - 17.7 g/dL    Hematocrit 31.1 (L) 40.0 - 53.0 %    MCV 94 78 - 100 fl    MCH 31.7 26.5 - 33.0 pg    MCHC 33.8 31.5 - 36.5 g/dL    RDW 13.4 10.0 - 15.0 %    Platelet Count 75 (L) 150 - 450 10e9/L    Diff Method Automated Method     % Neutrophils 90.1 %    % Lymphocytes 6.4 %    % Monocytes 0.9 %    % Eosinophils 1.2 %    % Basophils 0.1 %    % Immature Granulocytes 1.3 %    Nucleated RBCs 0 0 /100    Absolute Neutrophil 12.4 (H) 1.6 - 8.3 10e9/L    Absolute Lymphocytes 0.9 0.8 - 5.3 10e9/L    Absolute Monocytes 0.1 0.0 - 1.3 10e9/L    Absolute Eosinophils 0.2 0.0 - 0.7 10e9/L    Absolute Basophils 0.0 0.0 - 0.2 10e9/L    Abs Immature Granulocytes 0.2 0 - 0.4 10e9/L    Absolute Nucleated RBC 0.0    Comprehensive metabolic panel   Result Value Ref Range    Sodium 129 (L) 133 - 144 mmol/L    Potassium 3.7 3.4 - 5.3 mmol/L    Chloride 101 94 - 109 mmol/L    Carbon Dioxide 17 (L) 20 - 32 mmol/L    Anion Gap 11 3 - 14 mmol/L    Glucose 116 (H) 70 - 99 mg/dL    Urea Nitrogen 24 7 - 30 mg/dL    Creatinine 1.63 (H) 0.66 - 1.25 mg/dL    GFR Estimate 46 (L) >60 mL/min/[1.73_m2]    GFR Estimate If Black 53 (L) >60 mL/min/[1.73_m2]    Calcium 7.8 (L) 8.5 - 10.1 mg/dL    Bilirubin Total 0.7 0.2 - 1.3 mg/dL    Albumin 2.1 (L) 3.4 - 5.0 g/dL    Protein Total 4.9 (L) 6.8 - 8.8 g/dL    Alkaline Phosphatase 50 40 - 150 U/L    ALT 40 0 - 70 U/L    AST 37 0 - 45 U/L   Lactic acid whole blood   Result Value Ref Range    Lactic Acid 3.5 (H) 0.7 - 2.0 mmol/L   Potassium   Result Value Ref Range    Potassium 3.7 3.4 - 5.3  mmol/L   Magnesium   Result Value Ref Range    Magnesium 1.5 (L) 1.6 - 2.3 mg/dL   Phosphorus   Result Value Ref Range    Phosphorus 2.6 2.5 - 4.5 mg/dL   Lactic acid whole blood   Result Value Ref Range    Lactic Acid 1.8 0.7 - 2.0 mmol/L   Glucose by meter   Result Value Ref Range    Glucose 137 (H) 70 - 99 mg/dL   Glucose by meter   Result Value Ref Range    Glucose 162 (H) 70 - 99 mg/dL       Dr. Galindo Flores staffed the patient.    Staff Involved:  Resident/Staff      Case and photos reviewed with resident.  Agree with punch biopsy.  Will await results and follow.      Galindo Flores MD

## 2021-04-11 NOTE — PROVIDER NOTIFICATION
04/10/21 2100   Call Information   Date of Call 04/10/21   Time of Call 2129   Name of person requesting the team Ann   Title of person requesting team RN   RRT Arrival time 2130   Time RRT ended 2155   Reason for call   Type of RRT Adult   Primary reason for call Sepsis suspected   Sepsis Suspected Elevated Lactate level;Temperature <95 or >101;Heart Rate > 100   Was patient transferred from the ED, ICU, or PACU within last 24 hours prior to RRT call? No   SBAR   Situation LA = 2.8   Background a 56 year old male  with high risk (IPI 6.5 and Ki67 of 20%), stage IV Mantle cell lymphoma in 1st relapse s/p 8 cycles of alternating 6 cycles of Maxi-R- CHOP alternating with high-dose LITZY-C and BEAM ASCT consolidation D+134 s/p auto PBSCT.  He was treated for relapse with Loncatuximab and Ibrutinib about 2 weeks ago. Presents with fever, rash, soft blood pressures, tachycardia.   Notable History/Conditions Cancer;Diabetes;Hypertension   Assessment AOx4, denies SOB or pain   Interventions Fluid bolus;Labs   Patient Outcome   Patient Outcome Stabilized on unit   RRT Team   Attending/Primary/Covering Physician BMT   Physician(s) Naomy Powell PA-C   Lead RN Estrella Juarez   RT Jorge   Post RRT Intervention Assessment   Post RRT Assessment Stable/Improved   Date Follow Up Done 04/11/21   Time Follow Up Done 0009

## 2021-04-11 NOTE — CODE/RAPID RESPONSE
Rapid Response Team Note    Assessment   In assessment a rapid response was called on Theo Roblero due to SIRS/Sepsis trigger. This presentation is likely due to SIRS and possible sepsis and hypovolemia from poor PO intake, fever, and rash and worsened by lymphoma receiving chemotherapy. .     Plan   -  1L NS bolus, with repeat lactic acid in 4 hours  -  Repeat Blood cultures x2 peripherally, and from port  -  Broaden abx with Vancomycin/Zosyn   -  The Heme Malignancy  primary team was able to be reached and they are in agreement with the above plan.  -  Disposition: The patient will remain on the current unit. We will continue to monitor this patient closely.  -  Reassessment and plan follow-up will be performed by the primary team    Naomy Powell PA-C  [facility] RRT [facility] Job Code Contact #[phone]    Hospital Course   Brief Summary of events leading to rapid response:   RRT was called for lactic acid of 2.8, triggered by fever, tachycardia, and soft blood pressures.  Patient states he is feeling okay, except for a diffuse itchy rash. Denies any oral ulcers or lesions. Endorses dry eye, but no irritated eyes. Endorses diarrhea, 4 stools a day, over the last several days, but denies any today.     Denies any CP, SOB, abdominal pain, N/V, dysuria, hematuria, joint pain/swelling. Endorses dry cough since starting chemotherapy.     Infectious work up thus far, with clear CXR, negative UA. Blood cultures peripherally and from port NGTD.     Admission Diagnosis:   Drug rash [L27.0]  Fever and chills [R50.9]  Lymphoma, unspecified body region, unspecified lymphoma type (H) [C85.90]     Physical Exam   Temp: 103.2  F (39.6  C) Temp  Min: 98.4  F (36.9  C)  Max: 103.2  F (39.6  C)  Resp: 20 Resp  Min: 13  Max: 29  SpO2: 97 % SpO2  Min: 93 %  Max: 100 %  Pulse: 134 Pulse  Min: 108  Max: 138    No data recorded  BP: 98/63 Systolic (24hrs), Av , Min:84 , Max:147   Diastolic (24hrs), Av, Min:54, Max:90      I/Os: I/O last 3 completed shifts:  In: 490 [P.O.:240; I.V.:250]  Out: 20 [Urine:20]     Exam:   General: chronically ill appearing  Mental Status: AAOx4.  CV: Tachycardic rate, Regular rhythm   Resp: Lungs CTA. No wheezing, rales or rhonchi.   Abd: Soft. Non-tender   Extremities: no LE edema. WWP.   Skin: Diffuse maculopapular rash distributed over arms, trunk, back, abdomen and LE.     Significant Results and Procedures   Lactic Acid:   Recent Labs   Lab Test 04/10/21  0753 04/09/21  2348 11/20/20  1115 11/18/20  2035 11/17/20  1438 11/16/20  1440   LACT 1.5 1.4  --   --   --  1.6   LACTS  --   --  1.6 1.3 1.2  --      CBC:   Recent Labs   Lab Test 04/10/21  0556 04/09/21  2347 04/09/21  0830   WBC 6.2 6.7 8.7   HGB 10.4* 11.7* 11.9*   HCT 30.6* 34.5* 35.6*   * 124* 128*        Sepsis Evaluation   The patient is not known to have an infection.  NO EVIDENCE OF SEPSIS at this time.  Vital sign, physical exam, and lab findings are likely due to Dehydration .

## 2021-04-12 NOTE — PROGRESS NOTES
MEDICAL ICU H&P  04/12/2021    Date of Hospital Admission: 04/10/2021  Date of ICU Admission: 04/11/2021  Reason for Critical Care Admission:   Date of Service (when I saw the patient): 04/12/2021    ASSESSMENT: Theo Roblero is a 56 year old male  with high risk (IPI 6.5 and Ki67 of 20%), stage IV Mantle cell lymphoma in 1st relapse s/p 8 cycles of alternating 6 cycles of Maxi-R- CHOP alternating with high-dose LITZY-C and BEAM ASCT consolidation D+134 s/p auto PBSCT.  He was treated for relapse with Loncatuximab and Ibrutinib about 2 weeks ago. Presents with fever, rash, soft blood pressures, tachycardia.  Intially most concerning for drug rash/reaction with trial drugs; however on Day 2, more likely infectious etiology.  On day 2 of admission, he developed hypotension refractory to fluid resuscitation and was transferred to MICU for pressors.  Likely DRESS syndrome.     CHANGES and MAJOR THINGS TODAY:       Acute problem List  #. SIRS  #. Coalescing erythematous macular blanching rash  #. NGOZI    PLAN:    Neuro:  AxO x 3  - no acute concerns    Pulmonary:  #No acute concerns    Cardiovascular:  # SIRS  Concerning for infection vs delayed infusion reaction. Patient on Ibrutinib and Ioncatox. CT CAP negative for intraabdominal or pulmonary source. CXR negative for pulmonary source. WBC downtrend from 13 to 10. Febrile to 103 but remains normotensive at this time. ESR and CRP continue to uptrend.     IV methylpred started today  Coming down- continue Abx    Antibiotics:  - Vancomycin 4/11 - until tomorrow  - Zosyn  4/11 - until tomorrow  - Micafungin  4/11 -  4/12  - Acyclovir (home med)- HSV ppx  - Bactrim (home med)- PJP ppx     GI/Nutrition:  # Nutrition  - Medium carb consistent diet         Renal/Fluids/Electrolytes:  # NGOZI   likely prerenal in the setting of sepsis. No muddy brown cast on UA. S/p IVF. CT with large bladder  - Fena pending  - PVR with straight cath      Endocrine:  # T2DM  - Carb consistent  diet  - Glimeperide and Metformin    # Isolated Ast elevation  - Transaminitis katyley 2/2 DRESS    ID:  #. SIRS  #. Leukocytosis  #. DRESS  Concerning fo delayed infusion reaction/DRESS and less likely infection at this time. Symptoms started Friday 4/9. Infusion was done 4/2.  Patient on Ibrutinib and Ioncatox and started having fevers and rash the following week. Workup for infection has been negative. CT CAP negative for intraabdominal or pulmonary source. CXR negative for pulmonary source. WBC downtrend from 13 to 10. Febrile to 103 but remains normotensive at this time. ECHO unremarkable and DVT doppler negative.    - Trend ESR/CRP  - Trend lactic acid  - IV methylprednisolone       Culture Data  - Blood cultures x 2 4/9- NGTD  - Blood culture x 2 4/10 - NGTD  - Blood culture x 2 4/11- NGTD  Antibiotics:  - Vancomycin 4/11 - discontinue tomorrow (72 hours after negative culture)  - Zosyn  4/11 -   - Micafungin  4/11 -    - Acyclovir (home med)- HSV ppx  - Bactrim (home med)- PJP ppx       Hematology:    # Stage IV Mantle cell lymphoma with 1st relpase  Auto PBSCT  -- H/o following  -- chemo per heme/onc      Musculoskeletal:  # No acute concerns    Skin:  # Rash   Presumed to be 2/2 to medication. Rash is macular, erythematous, blanching confluent rash. Differentials include drug rash/infusion reaction( rash occurring 2-7 days after infusion  DRESS( though no evidence of eosinophilia. Perhaps skin biopsy will be eosinophil predominant),SJS/TENand Scarlet fever (GAS PCR negative- making this less likely).Of note, ibrutinib can lead to rash in up to 30% of patients. Skin punch biopsy done 4/11.     -- Dermatology consulted/ skin biopsy pending  -- will hold ibrutinib   -- benadryl for pruritis  -- Triamcinolone 0.1% ointment twice daily to affected areas on the body, as well as oral  diphenhydramine 25-50 mg q6h PRN.  --  Recommend suture removal in 14 days - around Sunday, 4/25/21.          General  Cares/Prophylaxis:    DVT Prophylaxis: Enoxaparin (Lovenox) SQ  GI Prophylaxis: Not indicated  Restraints: None  Family Communication: Discussed with nando at bedside.  Code Status: FULL    Lines/tubes/drains:  - Port    Disposition:  - Medical ICU     Patient seen and findings/plan discussed with medical ICU staff, Lulu Mo   --------------------------------------  Interval history: No acute events overnight. stable      REVIEW OF SYSTEMS: See HPI.    PAST MEDICAL HISTORY:   Past Medical History:   Diagnosis Date     Kidney stones 06/2020    left     Sepsis without septic shock (H) 05/12/2020    seconary tp pyelonephritis from obstruction left ureteroliethasis     SURGICAL HISTORY:  Past Surgical History:   Procedure Laterality Date     cystoscopy, left pyelogram, ureteral stent placement  05/12/2020    stone was blocking the ureter     cystoscopy, retrograde pyelogram, uretroscopy. laser lithotripsy Left 06/04/2020    treat kidney stones     INSERT CATHETER VASCULAR ACCESS Left 10/26/2020    Procedure: dual lumen non valved tunneled line vascular placement;  Surgeon: Alessandro Steven MD;  Location: [facility] OR     IR CVC TUNNEL PLACEMENT > 5 YRS OF AGE  10/26/2020     IR CVC TUNNEL REMOVAL LEFT  12/8/2020     IR LYMPH NODE BIOPSY  3/10/2021     port a cath placement Right 02/18/2020    internal jugular vein     SOCIAL HISTORY:  Social History     Socioeconomic History     Marital status: Single     Spouse name: None     Number of children: None     Years of education: None     Highest education level: None   Occupational History     None   Social Needs     Financial resource strain: None     Food insecurity     Worry: None     Inability: None     Transportation needs     Medical: None     Non-medical: None   Tobacco Use     Smoking status: Never Smoker     Smokeless tobacco: Current User     Types: Chew     Tobacco comment: trying to quit   Substance and Sexual Activity     Alcohol use: Not  Currently     Drug use: Never     Sexual activity: None   Lifestyle     Physical activity     Days per week: None     Minutes per session: None     Stress: None   Relationships     Social connections     Talks on phone: None     Gets together: None     Attends Judaism service: None     Active member of club or organization: None     Attends meetings of clubs or organizations: None     Relationship status: None     Intimate partner violence     Fear of current or ex partner: None     Emotionally abused: None     Physically abused: None     Forced sexual activity: None   Other Topics Concern     None   Social History Narrative     None     FAMILY HISTORY:   Family History   Problem Relation Age of Onset     Heart Disease Mother      Lymphoma Mother      Heart Disease Father      Heart Disease Sister      ALLERGIES:   No Known Allergies  MEDICATIONS:  No current facility-administered medications on file prior to encounter.   acyclovir (ZOVIRAX) 800 MG tablet, Take 1 tablet (800 mg) by mouth 2 times daily  atorvastatin (LIPITOR) 40 MG tablet, Take 40 mg by mouth  glimepiride (AMARYL) 4 MG tablet, Take 1 tablet (4 mg) by mouth every morning (before breakfast)  metFORMIN (GLUCOPHAGE-XR) 500 MG 24 hr tablet, Take 2 tablets (1,000 mg) by mouth daily (with dinner)  metoprolol succinate ER (TOPROL-XL) 25 MG 24 hr tablet, Take 1 tablet (25 mg) by mouth 2 times daily  Alcohol Swabs PADS, 1 pad 4 times daily (before meals and nightly)  blood glucose (NO BRAND SPECIFIED) test strip, Use to test blood sugar 4 times daily or as directed.  blood glucose monitoring (ACCU-CHEK FASTCLIX) lancets, Use to test blood sugar 4 times daily or as directed.  dexamethasone (DECADRON) 4 MG tablet, 4 mg twice daily day before, day of, and day after Lonca-T infusion  ondansetron (ZOFRAN-ODT) 8 MG ODT tab, Take 1 tablet (8 mg) by mouth every 8 hours as needed for nausea  study - ibrutinib, IDS# [id number], 140 mg capsule, Take 4 capsules (560 mg) by  mouth daily Take at the same time each day. Swallow whole, Do NOT crush, chew or open capsules.  sulfamethoxazole-trimethoprim (BACTRIM DS) 800-160 MG tablet, Take 1 tablet by mouth Every Mon, Tues two times daily Started 12/8/20        PHYSICAL EXAMINATION:  Temp:  [99.9  F (37.7  C)-103.1  F (39.5  C)] 102.8  F (39.3  C)  Pulse:  [118-151] 151  Resp:  [10-30] 29  BP: ()/(45-87) 95/62  SpO2:  [91 %-99 %] 98 %  General: Laying in bed shivering  HEENT: EOMI, swollen L face, tongue normal size  Neuro: A&Ox3, NAD  Pulm/Resp: Clear breath sounds bilaterally without rhonchi, crackles or wheeze, breathing non-labored  CV: Tachycardia, no murmurs, warm   Abdomen: Soft, non-distended, obese abdomen, non-tender  : No ocampo  Incisions/Skin: erythematous, macular,, blanching, coalescing rash. Petechiae in lower extremities, improved in the arms    LABORATORY:  Results for orders placed or performed during the hospital encounter of 04/09/21 (from the past 24 hour(s))   Potassium   Result Value Ref Range    Potassium 3.7 3.4 - 5.3 mmol/L   Magnesium   Result Value Ref Range    Magnesium 1.5 (L) 1.6 - 2.3 mg/dL   Phosphorus   Result Value Ref Range    Phosphorus 2.6 2.5 - 4.5 mg/dL   Lactic acid whole blood   Result Value Ref Range    Lactic Acid 1.8 0.7 - 2.0 mmol/L   Glucose by meter   Result Value Ref Range    Glucose 137 (H) 70 - 99 mg/dL   Dermatology IP Consult: Patient to be seen: Routine - within 24 hours; Call back #: [phone]; new rash in clinical trial pt. Unclear etiology, drug reaction vs infection related.; Consultant may enter orders: Yes; Requesting provider? Hospitalist ...    Narrative    Nickolas Gonzalez MD     4/11/2021  3:16 PM  [facility] Inpatient Consult Dermatology Note    Impression/Plan:  1. Acute pruritic eruption 9 days after starting   ibrutinib-loncastuximab for stage IV mantle cell lymphoma:  - Given the first dose of chemotherapy was administered about 9   days ago, a  drug eruption is favored. Of note, ibrutinib can lead   to rash in up to 30% of patients. These chemotherapies can have a   range of associated dermatoses, and urticarial eruptions are not   inconsistent with this. The appearance of his rash may be   affected by his thrombocytopenia, which can lead to erythematous   rashes becoming more petechial or purpuric. We note viral studies   thus far have been negative/unremarkable. We will perform a punch   biopsy of the skin to aid with diagnostic clarity. See procedure   note below.  - For symptomatic relief, use triamcinolone 0.1% ointment twice   daily to affected areas on the body, as well as oral   antihistamines, such as diphenhydramine 25-50 mg q6h.  - Please let dermatology know if any vesicles/bullae, pustules,   skin sloughing, or mucosal involvement should develop. A   chemotherapy-induced mucositis could occur as well.    - Punch biopsy: After discussion of benefits and risks including   but not limited to bleeding, infection, scar, incomplete removal,   recurrence, and non-diagnostic biopsy, written consent and   photographs were obtained. Time-out was performed. The area was   cleaned with isopropyl alcohol. 1.0 mL of 1% lidocaine with   1:100,000 epinephrine was injected to obtain adequate anesthesia   of the lesion on the trunk. A 4 mm punch biopsy was performed   twice as one specimen was put in formalin for H&E and another put   in normal saline for direct immunofluorescence (DIF).  4-0   Prolene sutures were utilized to approximate the epidermal edges.    White petroleum jelly/VaselineTM and a bandage was applied to   the wound.  Explicit verbal wound care instructions were   provided.  Recommend suture removal in 14 days - around Sunday,   4/25/21.    Thank you for the dermatology consultation. Please do not   hesitate to contact the dermatology resident/faculty on call for   any additional questions or concerns. We will continue to follow.  "      Patient discussed with attending physician, Dr. Galindo Flores.    Nickolas Gonzalez MD  Dermatology Resident      Dermatology Problem List:  1. Stage IV mantle cell lymphoma  - Current treatment: loncastuximab infusion (cycle every 3 weeks   for 2 cycles then re-evaluate after 2 cycles) and ibrutinib as of   4/2/21  - Status post 8 cycles of alternating 6 cycles of Maxi-R- CHOP   alternating with high-dose LITZY-C and BEAM ASCT consolidation   D+134 s/p auto PBSCT    Date of Admission: Apr 9, 2021   Encounter Date: 04/11/2021    Reason for Consultation:   Rash    History of Present Illness:  Mr. Theo Roblero is a 56 year old male with high risk (IPI   6.5 and Ki67 of 20%), stage IV Mantle cell lymphoma in 1st   relapse s/p 8 cycles of alternating 6 cycles of Maxi-R- CHOP   alternating with high-dose LITZY-C and BEAM ASCT consolidation   D+134 s/p auto PBSCT, with recent induction with loncastuximab   infusion and ibrutinib on 4/2/21 who was admitted on 4/9/21 for   fever, rash, hypotension, and tachycardia. Dermatology was   consulted for rash.    The rash is with unclear onset as it was initially found on   physical examination during an oncology visit on 4/9/21.   Initially, it was documented as \"new macular mildly erythematous   scattered rash on back ~18% bsa\" in that clinic note. The evening   after the oncology appointment on 4/9/21, the patient began   experiencing fevers, chills, and expanding rash on the back, and   was recommended to present to the ED for evaluation. The rash has   reportedly now grown to involve about 90% of the body surface   area and is pruritic and described as \"hive-like.\" The patient   has not seen a dermatologist before. No history of skin problems.    4/9/21 blood cultures NGTD. The patient is being treated   empirically for sepsis with vanc-Zosyn. He had a normal lactic   acid until today, 4/11/21 at 4:40 a.m., when it was elevated at   3.5. 4/10/21 CXR was clear. He has a new " leukocytosis as of   today, 4/11/21, at 13.8; Hgb appears to be at baseline at around   10-11; and new thrombocytopenia since his recent chemotherapy   with Plt 75. A urinalysis is pending as of time of this writing.   The primary team is currently giving diphenhydramine for   pruritus.    Past Medical History:   Patient Active Problem List   Diagnosis     Mantle cell lymphoma of lymph nodes of multiple sites (H)     Tobacco user     Obesity     Nuclear sclerosis     Impotence of organic origin     HTN (hypertension)     HLD (hyperlipidemia)     Diverticulosis of sigmoid colon     Diabetic macular edema (H)     Type 2 diabetes mellitus, without long-term current use of   insulin (H)     NHL (non-Hodgkin's lymphoma) (H)     S/P bone marrow transplant (H)     Neutropenic fever (H)     Drug rash     Fever and chills     Lymphoma, unspecified body region, unspecified lymphoma type   (H)     Past Medical History:   Diagnosis Date     Kidney stones 06/2020    left     Sepsis without septic shock (H) 05/12/2020    seconary tp pyelonephritis from obstruction left   ureteroliethasis     Past Surgical History:   Procedure Laterality Date     cystoscopy, left pyelogram, ureteral stent placement    05/12/2020    stone was blocking the ureter     cystoscopy, retrograde pyelogram, uretroscopy. laser   lithotripsy Left 06/04/2020    treat kidney stones     INSERT CATHETER VASCULAR ACCESS Left 10/26/2020    Procedure: dual lumen non valved tunneled line vascular   placement;  Surgeon: Alessandro Steven MD;  Location: [facility] OR     IR CVC TUNNEL PLACEMENT > 5 YRS OF AGE  10/26/2020     IR CVC TUNNEL REMOVAL LEFT  12/8/2020     IR LYMPH NODE BIOPSY  3/10/2021     port a cath placement Right 02/18/2020    internal jugular vein         Social History:  Patient reports that he has never smoked. His smokeless tobacco   use includes chew. He reports previous alcohol use. He reports   that he does not use drugs.    Family History:  Family  "History   Problem Relation Age of Onset     Heart Disease Mother      Lymphoma Mother      Heart Disease Father      Heart Disease Sister        Medications:  Current Facility-Administered Medications   Medication     acetaminophen (TYLENOL) tablet 650 mg     acyclovir (ZOVIRAX) tablet 800 mg     atorvastatin (LIPITOR) tablet 40 mg     calcium carbonate (TUMS) chewable tablet 500 mg     glucose gel 15-30 g    Or     dextrose 50 % injection 25-50 mL    Or     glucagon injection 1 mg     diphenhydrAMINE (BENADRYL) capsule 25 mg     diphenhydrAMINE-zinc acetate (BENADRYL) 1-0.1 % cream     enoxaparin ANTICOAGULANT (LOVENOX) injection 40 mg     hydrOXYzine (ATARAX) tablet 25 mg     insulin aspart (NovoLOG) injection (RAPID ACTING)     insulin aspart (NovoLOG) injection (RAPID ACTING)     lidocaine (LMX4) cream     lidocaine 1 % 0.1-1 mL     magnesium sulfate 4 g in 100 mL sterile water (premade)     melatonin tablet 1 mg     metoprolol succinate ER (TOPROL-XL) 24 hr tablet 25 mg     micafungin (MYCAMINE) 150 mg in sodium chloride 0.9 % 100 mL   intermittent infusion     ondansetron (ZOFRAN-ODT) ODT tab 4 mg    Or     ondansetron (ZOFRAN) injection 4 mg     ondansetron (ZOFRAN-ODT) ODT tab 8 mg     piperacillin-tazobactam (ZOSYN) 3.375 g vial to attach to NS   100 mL bag     sodium chloride (PF) 0.9% PF flush 3 mL     sodium chloride (PF) 0.9% PF flush 3 mL     sodium chloride (PF) 0.9% PF flush 3 mL     sodium chloride 0.9% infusion     [Held by provider] study - ibrutinib (IDS# [id number]) capsule   CHEMOTHERAPY 560 mg     [START ON 4/12/2021] sulfamethoxazole-trimethoprim (BACTRIM DS)   800-160 MG per tablet 1 tablet     vancomycin 1500 mg in 0.9% NaCl 250 ml intermittent infusion   1,500 mg          No Known Allergies      Review of Systems:  -As per HPI      Physical exam:  Vitals: BP 92/64   Pulse 121   Temp 99.9  F (37.7  C) (Oral)     Resp 18   Ht 1.803 m (5' 11\")   Wt 103.4 kg (228 lb)   SpO2   96%   BMI 31.80 " kg/m    GEN: This is a well developed, well-nourished male in no acute   distress, in a pleasant mood.    SKIN: Total skin excluding the undergarment areas was performed.   The exam included the head/face, neck, both arms, chest, back,   abdomen, both legs, digits and/or nails.   -Egan skin type: I  - On the trunk and extremities, there are pink papules coalescing   into well demarcated, slightly reticulated plaques that are   becoming confluent. The central areas of some plaques appears   darker red and petechial, especially on the upper extremities. No   mucosal involvement, vesicles/bullae, or pustules are seen. No   facial swelling visualized.  -No other lesions of concern on areas examined.                                Laboratory:  Results for orders placed or performed during the hospital   encounter of 04/09/21 (from the past 24 hour(s))   Glucose by meter   Result Value Ref Range    Glucose 159 (H) 70 - 99 mg/dL   Lactic acid level STAT   Result Value Ref Range    Lactate for Sepsis Protocol 2.8 (H) 0.7 - 2.0 mmol/L   Procalcitonin   Result Value Ref Range    Procalcitonin 0.51 ng/ml   Blood culture    Specimen: Portacath; Blood    Portacath   Result Value Ref Range    Specimen Description Blood Portacath     Culture Micro No growth after 8 hours    Blood culture yeast    Specimen: Blood    Portacath   Result Value Ref Range    Specimen Description Blood Portacath     Culture Micro No growth after 7 hours    Glucose by meter   Result Value Ref Range    Glucose 132 (H) 70 - 99 mg/dL   Blood culture    Specimen: Blood   Result Value Ref Range    Specimen Description Blood     Special Requests Right Hand     Culture Micro No growth after 8 hours    Blood culture    Specimen: Blood, Venous    Port   Result Value Ref Range    Specimen Description Blood Port     Culture Micro No growth after 6 hours    Lactic acid whole blood   Result Value Ref Range    Lactic Acid 1.7 0.7 - 2.0 mmol/L   Procalcitonin    Result Value Ref Range    Procalcitonin 1.58 ng/ml   CRP inflammation   Result Value Ref Range    CRP Inflammation 150.0 (H) 0.0 - 8.0 mg/L   CBC with platelets differential   Result Value Ref Range    WBC 13.8 (H) 4.0 - 11.0 10e9/L    RBC Count 3.31 (L) 4.4 - 5.9 10e12/L    Hemoglobin 10.5 (L) 13.3 - 17.7 g/dL    Hematocrit 31.1 (L) 40.0 - 53.0 %    MCV 94 78 - 100 fl    MCH 31.7 26.5 - 33.0 pg    MCHC 33.8 31.5 - 36.5 g/dL    RDW 13.4 10.0 - 15.0 %    Platelet Count 75 (L) 150 - 450 10e9/L    Diff Method Automated Method     % Neutrophils 90.1 %    % Lymphocytes 6.4 %    % Monocytes 0.9 %    % Eosinophils 1.2 %    % Basophils 0.1 %    % Immature Granulocytes 1.3 %    Nucleated RBCs 0 0 /100    Absolute Neutrophil 12.4 (H) 1.6 - 8.3 10e9/L    Absolute Lymphocytes 0.9 0.8 - 5.3 10e9/L    Absolute Monocytes 0.1 0.0 - 1.3 10e9/L    Absolute Eosinophils 0.2 0.0 - 0.7 10e9/L    Absolute Basophils 0.0 0.0 - 0.2 10e9/L    Abs Immature Granulocytes 0.2 0 - 0.4 10e9/L    Absolute Nucleated RBC 0.0    Comprehensive metabolic panel   Result Value Ref Range    Sodium 129 (L) 133 - 144 mmol/L    Potassium 3.7 3.4 - 5.3 mmol/L    Chloride 101 94 - 109 mmol/L    Carbon Dioxide 17 (L) 20 - 32 mmol/L    Anion Gap 11 3 - 14 mmol/L    Glucose 116 (H) 70 - 99 mg/dL    Urea Nitrogen 24 7 - 30 mg/dL    Creatinine 1.63 (H) 0.66 - 1.25 mg/dL    GFR Estimate 46 (L) >60 mL/min/[1.73_m2]    GFR Estimate If Black 53 (L) >60 mL/min/[1.73_m2]    Calcium 7.8 (L) 8.5 - 10.1 mg/dL    Bilirubin Total 0.7 0.2 - 1.3 mg/dL    Albumin 2.1 (L) 3.4 - 5.0 g/dL    Protein Total 4.9 (L) 6.8 - 8.8 g/dL    Alkaline Phosphatase 50 40 - 150 U/L    ALT 40 0 - 70 U/L    AST 37 0 - 45 U/L   Lactic acid whole blood   Result Value Ref Range    Lactic Acid 3.5 (H) 0.7 - 2.0 mmol/L   Potassium   Result Value Ref Range    Potassium 3.7 3.4 - 5.3 mmol/L   Magnesium   Result Value Ref Range    Magnesium 1.5 (L) 1.6 - 2.3 mg/dL   Phosphorus   Result Value Ref Range     Phosphorus 2.6 2.5 - 4.5 mg/dL   Lactic acid whole blood   Result Value Ref Range    Lactic Acid 1.8 0.7 - 2.0 mmol/L   Glucose by meter   Result Value Ref Range    Glucose 137 (H) 70 - 99 mg/dL   Glucose by meter   Result Value Ref Range    Glucose 162 (H) 70 - 99 mg/dL       Dr. Galindo Flores staffed the patient.    Staff Involved:  Resident/Staff           Streptococcus A Rapid Scr w Reflx to PCR    Specimen: Throat   Result Value Ref Range    Strep Specimen Description Throat     Streptococcus Group A Rapid Screen Negative NEG^Negative   Group A Streptococcus PCR Throat Swab    Specimen: Throat   Result Value Ref Range    Specimen Description Throat     Strep Group A PCR Not Detected NDET^Not Detected   Glucose by meter   Result Value Ref Range    Glucose 162 (H) 70 - 99 mg/dL   EKG 12-lead, complete   Result Value Ref Range    Interpretation ECG Click View Image link to view waveform and result    CT Chest Abdomen Pelvis w/o Contrast    Narrative    EXAMINATION: CT CHEST ABDOMEN PELVIS W/O CONTRAST, 4/11/2021 1:17 PM    TECHNIQUE:  Helical CT images from the thoracic inlet through the  symphysis pubis were obtained without IV contrast.    COMPARISON: Whole-body PET/CT 3/26/2021.    HISTORY: Sepsis    FINDINGS:  TUBES/LINES: Right chest wall Port-A-Cath with tip in the low SVC.    CHEST:  LUNGS: The trachea and central airways are patent. No pneumothorax.  Trace bilateral pleural fluid. Small amount of fluid tracking into the  right major fissure. Incidental azygos fissure, normal variant.  Bandlike subsegmental atelectasis in the right lung base. No focal  airspace opacity. No suspicious pulmonary nodule.    MEDIASTINUM: Heart size is within normal limits. New small loculated  anterior pericardial fluid. The ascending aorta and main pulmonary  artery diameters are within normal limits. Coronary artery stents.  Multiple prominent and enlarged left greater than right axillary lymph  nodes for example left level 1  axillary node measuring 14 mm (series  3, image 64) these axillary lymph nodes demonstrated FDG activity on  prior PET CT. No suspicious mediastinal or hilar lymph nodes.    ABDOMEN/PELVIS:  Evaluation limited due to patient respiratory motion.    LIVER: No focal hepatic mass.    BILIARY: The gallbladder is decompressed. No intraluminal gallstone.  No intrahepatic or extrahepatic biliary ductal dilatation.    PANCREAS: Fatty infiltration of the pancreatic head, uncinate process  and pancreatic neck.    SPLEEN: Redemonstration of ill-defined hypodense perisplenic fluid  collection that are visualized on the prior PET/CT likely representing  a subacute to chronic hematoma. Small splenule.    ADRENAL GLANDS: Within normal limits.    URINARY TRACT: Large exophytic simple cyst in the inferior pole of the  left kidney measuring up to 10.1 cm. No focal right renal mass.  Unchanged mild nonspecific perinephric stranding. No hydronephrosis or  hydroureter. No obstructing renal stone. Urinary bladder is  unremarkable.    REPRODUCTIVE ORGANS: Within normal limits.    STOMACH: Within normal limits.    BOWEL: Moderate colonic stool burden. No abnormally dilated loops of  large or small bowel. The previously visualized wall thickening in the  anus rectum, and sigmoid colon is less conspicuous on today's exam;  however there is inflammation/lymphoma in the distal sigmoid colon  series 2 image 221. The appendix is unremarkable.    PERITONEUM/FLUID: No free fluid.    VESSELS: Atherosclerotic calcifications of the abdominal aorta and  iliac arteries.    LYMPH NODES: No significant change in size or number of multiple  prominent peritoneal retroperitoneal lymph nodes for example 8 mm  right para-aortic lymph node (series 3, image 139) which previously  demonstrated FDG activity on recent PET/CT. Unchanged enlargement of  bilateral inguinal nodes for example 1.4 cm left inguinal node (series  3, image 718) and enlarged right inguinal  node measuring 1.5 cm  (series 3, image 729) there is mild associated stranding and soft  tissue nodularity adjacent to these lymph nodes.     BONES/SOFT TISSUES: No aggressive osseous lesions. Probable Schmorl  node deformity to the superior endplate of L4. Anterior flowing  osteophytes throughout multiple thoracic vertebrae likely representing  diffuse idiopathic skeletal hyperostosis. Small amount of subcutaneous  emphysema about the lower midabdomen, likely related to recent  medication injection.      Impression    IMPRESSION: In this patient with a history of mantle cell lymphoma  status post chemotherapy and stem cell transplant:    1. No significant interval change in scattered enlarged lymph nodes in  the chest abdomen pelvis which previously demonstrated FDG avidity on  recent whole body PET/CT concerning for disease progression.  2. Inflammation/lymphoma about the distal sigmoid colon, this might  represent a source for infection although not significantly changed  from PET/CT 3/26/2021.  3. Unchanged perisplenic fluid collection likely representing chronic  hematoma.  4. Trace bilateral pleural effusions. No focal airspace opacity. New  small anterior pericardial fluid without surrounding inflammation.    I have personally reviewed the examination and initial interpretation  and I agree with the findings.    MARY JHONSON MD   CBC with platelets   Result Value Ref Range    WBC 10.9 4.0 - 11.0 10e9/L    RBC Count 3.10 (L) 4.4 - 5.9 10e12/L    Hemoglobin 9.8 (L) 13.3 - 17.7 g/dL    Hematocrit 29.2 (L) 40.0 - 53.0 %    MCV 94 78 - 100 fl    MCH 31.6 26.5 - 33.0 pg    MCHC 33.6 31.5 - 36.5 g/dL    RDW 13.6 10.0 - 15.0 %    Platelet Count 71 (L) 150 - 450 10e9/L   Comprehensive metabolic panel   Result Value Ref Range    Sodium 131 (L) 133 - 144 mmol/L    Potassium 3.7 3.4 - 5.3 mmol/L    Chloride 104 94 - 109 mmol/L    Carbon Dioxide 18 (L) 20 - 32 mmol/L    Anion Gap 9 3 - 14 mmol/L    Glucose 185 (H)  70 - 99 mg/dL    Urea Nitrogen 25 7 - 30 mg/dL    Creatinine 1.53 (H) 0.66 - 1.25 mg/dL    GFR Estimate 50 (L) >60 mL/min/[1.73_m2]    GFR Estimate If Black 58 (L) >60 mL/min/[1.73_m2]    Calcium 7.2 (L) 8.5 - 10.1 mg/dL    Bilirubin Total 0.8 0.2 - 1.3 mg/dL    Albumin 1.9 (L) 3.4 - 5.0 g/dL    Protein Total 4.3 (L) 6.8 - 8.8 g/dL    Alkaline Phosphatase 48 40 - 150 U/L    ALT 42 0 - 70 U/L    AST 35 0 - 45 U/L   Erythrocyte sedimentation rate auto   Result Value Ref Range    Sed Rate 31 (H) 0 - 20 mm/h   CRP inflammation   Result Value Ref Range    CRP Inflammation 170.0 (H) 0.0 - 8.0 mg/L   Troponin I   Result Value Ref Range    Troponin I ES 0.024 0.000 - 0.045 ug/L   Methicillin Resistant Staph Aureus PCR    Specimen: Nares   Result Value Ref Range    Specimen Description Nares     Methicillin Resist/Sens S. aureus PCR Negative NEG^Negative   Blood gas arterial   Result Value Ref Range    pH Arterial 7.43 7.35 - 7.45 pH    pCO2 Arterial 25 (L) 35 - 45 mm Hg    pO2 Arterial 75 (L) 80 - 105 mm Hg    Bicarbonate Arterial 17 (L) 21 - 28 mmol/L    Base Deficit Art 6.3 mmol/L    FIO2 21    Lactic acid whole blood   Result Value Ref Range    Lactic Acid 1.6 0.7 - 2.0 mmol/L   Uric acid   Result Value Ref Range    Uric Acid 3.9 3.5 - 7.2 mg/dL   Fractional excretion of sodium   Result Value Ref Range    Creatinine Urine 158 mg/dL    Sodium Urine mmol/L 56 mmol/L    %FENA 0.4 %   UA with Microscopic reflex to Culture    Specimen: Urine clean catch   Result Value Ref Range    Color Urine Yellow     Appearance Urine Slightly Cloudy     Glucose Urine 100 (A) NEG^Negative mg/dL    Bilirubin Urine Negative NEG^Negative    Ketones Urine Trace (A) NEG^Negative mg/dL    Specific Gravity Urine 1.033 1.003 - 1.035    Blood Urine Trace (A) NEG^Negative    pH Urine 5.5 5.0 - 7.0 pH    Protein Albumin Urine 50 (A) NEG^Negative mg/dL    Urobilinogen mg/dL Normal 0.0 - 2.0 mg/dL    Nitrite Urine Negative NEG^Negative    Leukocyte  Esterase Urine Negative NEG^Negative    Source Clean catch urine     WBC Urine 3 0 - 5 /HPF    RBC Urine 1 0 - 2 /HPF    Bacteria Urine Few (A) NEG^Negative /HPF    Squamous Epithelial /HPF Urine 0 0 - 1 /HPF    Mucous Urine Present (A) NEG^Negative /LPF   Glucose by meter   Result Value Ref Range    Glucose 180 (H) 70 - 99 mg/dL   Glucose by meter   Result Value Ref Range    Glucose 215 (H) 70 - 99 mg/dL   Blood culture    Specimen: Blood    Left Arm   Result Value Ref Range    Specimen Description Blood Left Arm     Culture Micro No growth after 4 hours    Blood culture    Specimen: Blood    Right Arm   Result Value Ref Range    Specimen Description Blood Right Arm     Culture Micro No growth after 4 hours    Cortisol   Result Value Ref Range    Cortisol Serum 32.8 (H) 4 - 22 ug/dL   Magnesium   Result Value Ref Range    Magnesium 2.5 (H) 1.6 - 2.3 mg/dL   CBC with platelets   Result Value Ref Range    WBC 9.5 4.0 - 11.0 10e9/L    RBC Count 3.14 (L) 4.4 - 5.9 10e12/L    Hemoglobin 9.9 (L) 13.3 - 17.7 g/dL    Hematocrit 29.4 (L) 40.0 - 53.0 %    MCV 94 78 - 100 fl    MCH 31.5 26.5 - 33.0 pg    MCHC 33.7 31.5 - 36.5 g/dL    RDW 13.6 10.0 - 15.0 %    Platelet Count 60 (L) 150 - 450 10e9/L   Comprehensive metabolic panel   Result Value Ref Range    Sodium 129 (L) 133 - 144 mmol/L    Potassium 3.9 3.4 - 5.3 mmol/L    Chloride 102 94 - 109 mmol/L    Carbon Dioxide 18 (L) 20 - 32 mmol/L    Anion Gap 9 3 - 14 mmol/L    Glucose 137 (H) 70 - 99 mg/dL    Urea Nitrogen 26 7 - 30 mg/dL    Creatinine 1.78 (H) 0.66 - 1.25 mg/dL    GFR Estimate 41 (L) >60 mL/min/[1.73_m2]    GFR Estimate If Black 48 (L) >60 mL/min/[1.73_m2]    Calcium 7.3 (L) 8.5 - 10.1 mg/dL    Bilirubin Total 0.6 0.2 - 1.3 mg/dL    Albumin 2.0 (L) 3.4 - 5.0 g/dL    Protein Total 4.3 (L) 6.8 - 8.8 g/dL    Alkaline Phosphatase 50 40 - 150 U/L    ALT 53 0 - 70 U/L    AST 46 (H) 0 - 45 U/L

## 2021-04-12 NOTE — CODE/RAPID RESPONSE
Rapid Response Team Note    Assessment   In assessment a rapid response was called on Theo Roblero due to SIRS/Sepsis trigger and lactic acidosis. This presentation is likely due to hypovolemia and worsened by Diabetes Mellitus, Type II.     Plan   -  Provide 1L LR bolus over one hour.  Currently on broad spectrum antibiotics. Repeat lactate in two hours  -  The Internal Medicine primary team was able to be reached and they are in agreement with the above plan.  -  Disposition: The patient will remain on the current unit. We will continue to monitor this patient closely.  -  Reassessment and plan follow-up will be performed by the primary team      Yasmin Hinds PA-C  [facility] [place] RRT AMCOM Job Code Contact #[phone]    Hospital Course   Brief Summary of events leading to rapid response:   Patient is currently admitted for a new fever, rash, hypotension, and tachycardia thought initially to be a drug rash or reaction to his chemotherapy but now suspected to be an underlying infection. Infectious work up has so far been negative. His blood pressure and tachycardia trigger a lactic acid draw, which resulted at 5.2    Admission Diagnosis:   Drug rash [L27.0]  Fever and chills [R50.9]  Lymphoma, unspecified body region, unspecified lymphoma type (H) [C85.90]     Physical Exam   Temp: 99.1  F (37.3  C) Temp  Min: 99  F (37.2  C)  Max: 102.8  F (39.3  C)  Resp: 18 Resp  Min: 10  Max: 30  SpO2: 96 % SpO2  Min: 91 %  Max: 99 %  Pulse: 130 Pulse  Min: 121  Max: 151    No data recorded  BP: 96/55 Systolic (24hrs), Av , Min:72 , Max:131   Diastolic (24hrs), Av, Min:45, Max:92     I/Os: I/O last 3 completed shifts:  In: 4830 [P.O.:1560; I.V.:2270]  Out: 575 [Urine:575]     Exam:   GENERAL: Alert and oriented x 3. NAD. Ambulatory. Cooperative.   HEENT: Anicteric sclera. Mucous membranes dry. NC. AT.   CV: Tachycardic. S1, S2. No murmurs appreciated.   RESPIRATORY: Effort normal on RA. Lungs CTAB with no wheezing,  rales, rhonchi.   GI: Abdomen soft and non distended with normoactive bowel sounds present in all quadrants. No tenderness, rebound, guarding. No lesions.   NEUROLOGICAL: No focal deficits. Moves all extremities.  CN 2-12 grossly intact.  EXTREMITIES: No peripheral edema. Intact bilateral pedal pulses.   SKIN: No jaundice. Diffuse macular rash         Significant Results and Procedures   Lactic Acid:   Recent Labs   Lab Test 04/12/21  1542 04/11/21  1423 04/11/21  0803 04/11/21  0440 04/10/21  2055 04/10/21  2055 11/20/20  1115 11/20/20  1115   LACT  --  1.6 1.8 3.5*   < >  --    < >  --    LACTS 5.2*  --   --   --   --  2.8*  --  1.6    < > = values in this interval not displayed.     CBC:   Recent Labs   Lab Test 04/12/21  0415 04/11/21  1415 04/11/21  0440   WBC 9.5 10.9 13.8*   HGB 9.9* 9.8* 10.5*   HCT 29.4* 29.2* 31.1*   PLT 60* 71* 75*        Sepsis Evaluation   The patient is known to have an infection.  Theo Roblero meets SIRS criteria AND has a lactate >2 or other evidence of acute organ damage.  These vital signs, lab and physical exam findings are consistent with SEVERE SEPSIS.    Sepsis Time-Zero (time severe sepsis diagnosis confirmed): 1542  04/12/21 as this was the time when Lactate resulted, and the level was > 2.0     Anti-infectives (From now, onward)    Start     Dose/Rate Route Frequency Ordered Stop    04/12/21 1413  vancomycin place frankel - receiving intermittent dosing      1 each Intravenous SEE ADMIN INSTRUCTIONS 04/12/21 1413      04/12/21 0800  sulfamethoxazole-trimethoprim (BACTRIM DS) 800-160 MG per tablet 1 tablet      1 tablet Oral EVERY MONDAY TUESDAY BID 04/10/21 0536      04/10/21 2300  piperacillin-tazobactam (ZOSYN) 3.375 g vial to attach to  mL bag      3.375 g  over 30 Minutes Intravenous EVERY 6 HOURS 04/10/21 2231      04/10/21 0800  acyclovir (ZOVIRAX) tablet 800 mg      800 mg Oral 2 TIMES DAILY 04/10/21 0536          Current antibiotic coverage is appropriate  for source of infection.    3 Hour Severe Sepsis Bundle Completion:  1. Initial Lactic Acid result shown above. Repeat lactic acid ordered for 2 hours from now.   2. Blood Cultures before Antibiotics: Yes  3. Broad Spectrum Antibiotics Administered: yes  4. Fluids: 1000 mL fluids ORDERED to be given

## 2021-04-12 NOTE — PROGRESS NOTES
[facility]    Progress Note- Heme Malignancy Team       Date of Admission:  4/9/2021  DOS: 04/12/2021       Assessment & Plan   Theo Roblero is a 56 year old male  with high risk (IPI 6.5 and Ki67 of 20%), stage IV Mantle cell lymphoma in 1st relapse s/p 8 cycles of alternating 6 cycles of Maxi-R- CHOP alternating with high-dose LITZY-C and BEAM ASCT consolidation D+134 s/p auto PBSCT.  He was treated for relapse with Loncatuximab and Ibrutinib about 2 weeks ago. Presents with fever, rash, soft blood pressures, tachycardia.  Intially most concerning for drug rash/reaction with trial drugs; however on Day 2 it was suspected to be more likely infectious etiology.  On day 2 of admission, he developed hypotension refractory to fluid resuscitation and was transferred to MICU on 4/11 for possible need for pressors. CT CAP without findings of overt infection. Stabilized without pressors. Suspect rash from allopurinol hypersensitivity.     Today:   -Transfer out of the MICU today 4/12  - Hold ibrutinib  - Start steroids methylprednisolone 500 mg BID   - Dermatology evaluated patient, biopsied  - Continue antibiotics - vancomycin/Zosyn for now  - Continue acyclovir, Bactrim prophylaxis    1. Fever, hypotension, tachycardia, suspected sepsis  Immunosuppressed.  Transfer to MICU afternoon of 4/11 due to persistent hypotension and concern for sepsis.    -- blood cultures obtained in ED and again 4/11, NGTD  -- started on vancomycin and zosyn to cover for sepsis on admission.  He does not have a clear source of infection and is not neutropenic.  We suspect fever may be from rash/inflammation rather than infection based on his current infectious workup.    -Switched to cefepime, stop vanc due to low concern for infection on 4/10.    -Switched back to vanc/zosyn PM of 4/10 due to hypotension.   -- lactic acid is 1.4, cxr negative, UA without evidence concerning for infection on  initial day.    -Overnight 4/10 increasingly hypotensive and unresponsive to fluids.    -lactate 3.5 overnight 4/10, improved to 1.8 with IVF.   -- Received 3L total IVF 4/10 PM and 4/11 AM prior to MICU transfer  --Continue acyclovir and bactrim prophylaxis   - CT CAP w/o contrast (due to NGOZI) for infectious eval  - Echo pending  - EKG completed, NSR, no concern for ischemia per cardiology in person review.     2.  Stage IV Mantle cell lymphoma with 1st relpase  Auto PBST.  Relapsed mantle cell currently on trial with loncatuximab and ibrutinib.  Admitted on D8 of cycle 1 of trial.   -Ibrutinib initially not thought to be culprit of rash and continued 4/10.    -Held 4/11 due to worsening condition.       3. Rash -suspected due to chemo  -- Ibrutinib held overnight on admission, restarted first hospital day (4/10) and then held 4/11.   -- benadryl for itching  -- topical benadryl cream and PO Atarax for itching.   - Derm consult 4/11, likely drug rash,    -Biopsy done, stiches out 4/25.     4. Hyponatremia  -- suspect from decreased oral intake  -- recheck Na and if still low consider further hyponatremia work up    5. NGOZI likely secondary to hypotension/hypoperfusion.  Creatine     5. DM  -- hold amaryl and metformin  -- start BG and med intensity ssi  -- mod cho diet    6. Hyperlipidemia  -- continue lipitor    7. HTN  -- hold metoprolol xl due to persistent hypotension.      Diet: Moderate Consistent CHO Diet  Snacks/Supplements Pediatric: Boost glucose control; Between Meals mod cho  DVT Prophylaxis: Enoxaparin (Lovenox) SQ  Lawson Catheter: not present  Code Status: Full Code Full      Disposition Plan   Expected discharge: 2 - 3 days, recommended to prior living arrangement once SIRS/Sepsis treated.  Entered: Jailyn Smith MD 04/12/2021, 2:30 PM     The patient's care was discussed with Dr. Corea.     Jailyn Smith  Hematology, Oncology & Transplantation fellow  Pager: [phone]  04/12/2021    ______________________________________________________________________    Interval History:   No acute overnight events. Seen at bedside today. He reports somewhat improved. Rash is slowly improving, less itchy. Updated significant other at bedside.       Review of Systems    The 10 point Review of Systems is negative other than noted in the HPI or here. Poor sleep, poor appetite with some weight loss, fevers, chills, rash, no nausea or vomiting or diarrhea.    Prior to Admission Medications   Prior to Admission Medications   Prescriptions Last Dose Informant Patient Reported? Taking?   Alcohol Swabs PADS   No No   Si pad 4 times daily (before meals and nightly)   acyclovir (ZOVIRAX) 800 MG tablet 4/10/2021 at Unknown time  No Yes   Sig: Take 1 tablet (800 mg) by mouth 2 times daily   atorvastatin (LIPITOR) 40 MG tablet 4/10/2021 at Unknown time  Yes Yes   Sig: Take 40 mg by mouth   blood glucose (NO BRAND SPECIFIED) test strip   No No   Sig: Use to test blood sugar 4 times daily or as directed.   blood glucose monitoring (ACCU-CHEK FASTCLIX) lancets   No No   Sig: Use to test blood sugar 4 times daily or as directed.   dexamethasone (DECADRON) 4 MG tablet   No No   Si mg twice daily day before, day of, and day after Lonca-T infusion   glimepiride (AMARYL) 4 MG tablet 2021 at Unknown time  No Yes   Sig: Take 1 tablet (4 mg) by mouth every morning (before breakfast)   metFORMIN (GLUCOPHAGE-XR) 500 MG 24 hr tablet 2021 at Unknown time  No Yes   Sig: Take 2 tablets (1,000 mg) by mouth daily (with dinner)   metoprolol succinate ER (TOPROL-XL) 25 MG 24 hr tablet 4/10/2021 at Unknown time  No Yes   Sig: Take 1 tablet (25 mg) by mouth 2 times daily   ondansetron (ZOFRAN-ODT) 8 MG ODT tab   No No   Sig: Take 1 tablet (8 mg) by mouth every 8 hours as needed for nausea   study - ibrutinib, IDS# [id number], 140 mg capsule   No No   Sig: Take 4 capsules (560 mg) by mouth daily Take at the same time each day. Swallow  whole, Do NOT crush, chew or open capsules.   sulfamethoxazole-trimethoprim (BACTRIM DS) 800-160 MG tablet   No No   Sig: Take 1 tablet by mouth Every Mon, Tues two times daily Started 12/8/20      Facility-Administered Medications: None     Allergies   No Known Allergies    Physical Exam   Vital Signs: Temp: 102.8  F (39.3  C) Temp src: Oral BP: 102/61 Pulse: 140   Resp: 29 SpO2: 96 % O2 Device: None (Room air)    Weight: 228 lbs 0 oz    General Appearance: lying in bed, NAD  Eyes: PERRLA  NO ICTERUS  HEENT: MMM, OP clear, macular rash diffusely on face, R sided oral swelling today.  Mild throat erythema.    Respiratory: CTA, normal WOB on RA   Cardiovascular: TACHYCARDIC, RRR  GI: SOFT +BS, no masses or tenderness appreciated.  No RUQ tenderness to deep palpation.    Genitourinary: DEFFERED  Skin: DIFFUSE MACULOPAPULAR RASH, resolving with development of purpura in bilateral axillae.    Musculoskeletal: NO EDEMA  Neurologic: NON FOCAL, CNII-XII grossly intact, normal speech and mentation.   Psychiatric: NORMAL, linear thought processes.     Data   Data reviewed today: I reviewed all medications, new labs and imaging results over the last 24 hours.    EKG NSR without ischemic changes.      CT CAP w/o clear source of infection.      Recent Labs   Lab 04/12/21  0415 04/11/21  1415 04/11/21  0803 04/11/21  0440 04/09/21  2347 04/09/21  2347 04/09/21  0830   WBC 9.5 10.9  --  13.8*   < > 6.7 8.7   HGB 9.9* 9.8*  --  10.5*   < > 11.7* 11.9*   MCV 94 94  --  94   < > 94 97   PLT 60* 71*  --  75*   < > 124* 128*   INR  --   --   --   --   --  1.08  --    * 131*  --  129*   < > 127* 130*   POTASSIUM 3.9 3.7 3.7 3.7   < > 4.1 4.4   CHLORIDE 102 104  --  101   < > 94 97   CO2 18* 18*  --  17*   < > 23 23   BUN 26 25  --  24   < > 25 28   CR 1.78* 1.53*  --  1.63*   < > 1.24 1.47*   ANIONGAP 9 9  --  11   < > 10 10   BELA 7.3* 7.2*  --  7.8*   < > 8.7 8.9   * 185*  --  116*   < > 93 130*   ALBUMIN 2.0* 1.9*  --   2.1*  --  3.2* 3.0*   PROTTOTAL 4.3* 4.3*  --  4.9*  --  6.5* 6.5*   BILITOTAL 0.6 0.8  --  0.7  --  0.6 0.7   ALKPHOS 50 48  --  50  --  81 90   ALT 53 42  --  40  --  41 37   AST 46* 35  --  37  --  35 31   LIPASE  --   --   --   --   --   --  36*   TROPI  --  0.024  --   --   --   --   --     < > = values in this interval not displayed.

## 2021-04-12 NOTE — PROVIDER NOTIFICATION
04/12/21 1600   Call Information   Date of Call 04/12/21   Time of Call 1613   Name of person requesting the team Hilary Lea   Title of person requesting team RN   RRT Arrival time 1616   Time RRT ended 1636   Reason for call   Type of RRT Adult   Primary reason for call Sepsis suspected   Sepsis Suspected Elevated Lactate level;Heart Rate > 100   Was patient transferred from the ED, ICU, or PACU within last 24 hours prior to RRT call? No   SBAR   Situation LA- 5.0,    Background Lymphoma   Notable History/Conditions Cancer;Diabetes;Hypertension   Assessment A O X4   Interventions Fluid bolus;Labs   Patient Outcome   Patient Outcome Stabilized on unit   RRT Team   Attending/Primary/Covering Physician BMT   Physician(s) Yolande Hinds   Lead RN George Richard   RT Andisa   Post RRT Intervention Assessment   Post RRT Assessment Stable/Improved   Date Follow Up Done 04/12/21   Time Follow Up Done 1834   Comments LA- 4,0

## 2021-04-12 NOTE — PROGRESS NOTES
Transferred to:  at 15:30  Status at time of transfer: A&O on room air.  Albumin finishing up  Belongings: In personal backpack in bed with patient and in patient belonging bag in bed with patient.  Lawson removed? N/A not present (if no, why?):   Chart and medications: In bed with patient  Family notified: Significant other Hayde was notified as she left for the day and patient has his personal cell phone with him.

## 2021-04-12 NOTE — PROGRESS NOTES
Admitted/transferred from:   Reason for admission/transfer: Febrile with Soft BP  Patient status upon admission/transfer: Alert and answering questions appropriately. Febrile with T: 101.4 ax, denies having pain MIVF running at 125 mL/h   Interventions: Blood cultures drawn and Tylenol given for the fever.  Plan: Continue to monitor patient's hemodynamics  2 RN skin assessment: completed by Gail KENNEDY and Scott CALLAHAN  Result of skin assessment and interventions/actions: Skin rash covering 90% of body, flushed and warm. skin biopsy on the abdomen (RUQ) covered with primapore.    Height, weight, drug calc weight: Done  Patient belongings (see Flowsheet - Adult Profile for details): Taken home with SO.  MDRO education (if applicable): n/a

## 2021-04-12 NOTE — INTERIM SUMMARY
Addendum on 4/12/21 at 7 p.m.:    The biopsy results are now complete:    ---    FINAL DIAGNOSIS:   Skin, trunk, punch:   - Spongiotic and interface dermatitis with eosinophils - (see comment and   description)     COMMENT:   Together with the clinical information, the findings present in the   specimen are favored to represent a medication reaction; an eczematous dermatitis cannot be entirely excluded as the interface dermatitis features are somewhat subtle. There is no evidence of vasculitis; the petechial to purpuric finding seen clinically are related to simple erythrocyte extravasation.     ---    I spoke to the dermatopathologist, Dr. Donald Alex, at 3:43 p.m., who tells me that the biopsy specimen for H&E showed spongiotic dermatitis with focal areas of interface dermatitis. Overall, this is felt to be consistent with a drug rash and not infectious in etiology. We recommend continuing with the current management. The complete pathology report should be available shortly.    Davide Gonzalez MD  Chief Dermatology Resident

## 2021-04-12 NOTE — PROGRESS NOTES
Pt transferred from:   Reason for transfer: ICU no longer needed  Family aware of transfer: yes  Disposition of belongings: with patient  Report called from: Inez OBREGON RN  Skin double check completed by: Hilary Lea RN/Felecia Keyes RN all over body rash, abrasion on left shin, small scab behind left knee, small red andi top of left ear (gave nasal cannula pads for each ear), skin biopsy site covered with primapore right trunk.

## 2021-04-12 NOTE — PLAN OF CARE
ICU End of Shift Summary. See flowsheets for vital signs and detailed assessment.    Changes this shift: MIVF stopped, BP soft early on in the night but patient has been able to maintain MAP>65. Patient remains febrile.   Patient woke up feeling nauseous and dry heaving, zofran 4mg IV given with relief.    Plan: follow plan of care.

## 2021-04-12 NOTE — PROGRESS NOTES
I am the site  of this trial ADCT  Trial- Loncatuximab + Ibrutinib ([id number]). I discussed his case with the medical monitor of the study as well regarding whether any other patients had a Grade 3 skin reaction.    Loncatuximab is an antibody drug conjugate [simlar to rituxan or brentuximab]. Ibrutinib is a small molecule inhibitor  Till now a Grade 3 skin reaction has not been reported in the study combination nor with the single agent loncatuximab trials.    I suspect the 2 main drugs for drug induced syndrome are - ibrutinib (known to cause skin reactions, usually mild to moderate) or allopurinol (idiosyncratic) both of which has been stopped  The other drug can be bactrim  Loncatuximab is unlikely at this time point.    The treatment is likely to be steroids which I will discuss with  as well.      Suresh LAURENT MS  Attending Physician  Pager - [phone]  Email - [email or web address]

## 2021-04-12 NOTE — PHARMACY-VANCOMYCIN DOSING SERVICE
Pharmacy Empiric Dose Change Per Policy  Original Dose Ordered: Vancomycin 1500 Q18H  Dose Changed To: intermittent vancomycin dosing  This dose change was based on the pharmacist's assessment of this patient's age, weight, concurrent drug therapy, treatment goals, whether patient's creatinine clearance adequately indicates renal function (factoring in age, muscle mass, fluid and clinical status), and, if applicable, prior pharmacokinetic data. Renal function worsening. Scr from 1.28 -> 1.78 and UOP decreasing.    Creatinine Clearance= Estimated Creatinine Clearance: 56 mL/min (A) (based on SCr of 1.78 mg/dL (H)).     Will continue to follow and modify dosage according to levels, organ function and clinical condition    Ramu Darden, MagueD

## 2021-04-12 NOTE — PROGRESS NOTES
04/12/21 1100   Quick Adds   Type of Visit Initial PT Evaluation   Living Environment   People in home alone;child(radhames), dependent   Current Living Arrangements apartment   Home Accessibility stairs to enter home   Number of Stairs, Main Entrance   (20)   Stair Railings, Main Entrance railings on both sides of stairs   Transportation Anticipated family or friend will provide   Living Environment Comments Pt lives in an apt in [place], [place]. Daughter occassionally stays with him. S/O nearby   Self-Care   Usual Activity Tolerance moderate   Current Activity Tolerance poor   Regular Exercise No   Equipment Currently Used at Home none   Activity/Exercise/Self-Care Comment IND with mobility and ADL at baseline. Gets assist with errands/IADL. Has not worked in one year. Decreased activity tolerance later in the day, reports uncontrolled shaking at times   Disability/Function   Walking or Climbing Stairs Difficulty yes   Mobility Management No AD, fatigues quickly. Uses cart at grocery store etc   Dressing/Bathing Difficulty no   Toileting issues no   Doing Errands Independently Difficulty (such as shopping) yes   Errands Management Family assist   Fall history within last six months no   Change in Functional Status Since Onset of Current Illness/Injury yes   General Information   Onset of Illness/Injury or Date of Surgery 04/11/21   Referring Physician Jonathan Monteiro, Brady Estes MD   Patient/Family Therapy Goals Statement (PT) Return home   Pertinent History of Current Problem (include personal factors and/or comorbidities that impact the POC) 56 year old male  with high risk (IPI 6.5 and Ki67 of 20%), stage IV Mantle cell lymphoma in 1st relapse s/p 8 cycles of alternating 6 cycles of Maxi-R- CHOP alternating with high-dose LITZY-C and BEAM ASCT consolidation D+134 s/p auto PBSCT.  He was treated for relapse with Loncatuximab and Ibrutinib about 2 weeks ago. Presents with fever, rash, soft blood pressures,  tachycardia.  Intially most concerning for drug rash/reaction with trial drugs; however on Day 2, more likely infectious etiology.  On day 2 of admission, he developed hypotension refractory to fluid resuscitation and was transferred to MICU for pressors   Existing Precautions/Restrictions fall   Cognition   Orientation Status (Cognition) oriented x 4   Affect/Mental Status (Cognition) WFL   Follows Commands (Cognition) WFL   Integumentary/Edema   Integumentary/Edema Comments Petachiae throughout skin   Posture    Posture Forward head position   Range of Motion (ROM)   ROM Quick Adds ROM WFL   Strength   Manual Muscle Testing Quick Adds Strength WFL   Strength Comments BLE >3/5 functionally   Bed Mobility   Comment (Bed Mobility) SBA using bed rail   Transfers   Transfer Safety Comments CGA   Gait/Stairs (Locomotion)   Comment (Gait/Stairs) Unsteady shuffling gait without AD, flexed posture, fall risk   Balance   Balance Comments Requires UE support for safe dynamic balance   Sensory Examination   Sensory Perception patient reports no sensory changes   Clinical Impression   Criteria for Skilled Therapeutic Intervention yes, treatment indicated   PT Diagnosis (PT) Impaired functional mobility   Influenced by the following impairments Decreased activity tolerance, medical, generalized weakness   Functional limitations due to impairments Bed mobility, transfers, gait, stairs   Clinical Presentation Evolving/Changing   Clinical Presentation Rationale Clinical judgement, [facility]   Clinical Decision Making (Complexity) low complexity   Therapy Frequency (PT) 5x/week   Predicted Duration of Therapy Intervention (days/wks) two weeks   Planned Therapy Interventions (PT) balance training;bed mobility training;gait training;home exercise program;patient/family education;stair training;strengthening;transfer training   Anticipated Equipment Needs at Discharge (PT)   (may need FWW)   Risk & Benefits of therapy have been explained  patient;spouse/significant other   Clinical Impression Comments Present with decreased activity tolerance. Tachy and high RR with activity. Anticipate good mobility improvements with medical management during ELOS   PT Discharge Planning    PT Discharge Recommendation (DC Rec) Transitional Care Facility   PT Rationale for DC Rec At this time, would require rehab stay prior to home. Anticipate with ELOS pt will progress to home   PT Brief overview of current status  Assist of 1 with FWW   Total Evaluation Time   Total Evaluation Time (Minutes) 7

## 2021-04-12 NOTE — PROGRESS NOTES
[facility]  [facility] ([place]) 4c    Pt was blessed with holy oil by Father Robson De Leon   Pager [phone]

## 2021-04-13 PROBLEM — N20.0 NEPHROLITHIASIS: Status: ACTIVE | Noted: 2020-05-01

## 2021-04-13 NOTE — CONSULTS
Nephrology Initial Consult  April 13, 2021      Theo Roblero MRN:[id number] YOB: 1965  Date of Admission:4/9/2021  Primary care provider: Mike Price  Requesting physician: Omaira Corea MD    55 yo man S/P PBSCT in Nov 2020 now with relapsed mantle cell lymphoma adm 4/9 with diffuse total body skin rash and temp of 103 (Probably drug-induced and possibly DRESS) occurring in the context of new chemo regimen    ASSESSMENT AND RECOMMENDATIONS:     # NGOZI on CKD:  Baseline Cr was 0.9 in 2020.  Cr 1.1 on 4/2, 1.8 on 4/12 and 2.4 2/13.  UA bland (minimal proteinuria). CT from 4/11 does not show hydronephrosis.  BP was low requiring pressors until 4/11.  FENa 0.4 % on 4/11.  Alb 1.8.  Renal underperfusion (pre-renal azotemia) is possible given low albumin and leaky capillaries.  Possible nephrotoxins include new chemotherapeutic agents and now vancomycin.  In addition his Cr has been rising gradually since BMT so likely has underlying CKD.  Although CT does not show hydronephrosis, he has difficulty voiding and says he then voids a large amount.   - daily Cr   - discontinue vanco if possible (nephrotoxic and can cause skin rash)   - check post void residual   -consider ocampo cath for 24 hours to monitor UOP response to diuretics   - renal US    # Volume overload:  Weight is 119 kg up from 103 kg on 4/9.   Pt received 8L IVF on 4/11 and is now 12 L I>O in 72 hours.  Lungs remain clear and there is no ascites on CT 4/11 but his abdomen is very distended and he has anasarca. BP remains borderline with SBPs in 90s.    - rec stop IVF   - consider resuming pressors if BP remains low   - trial of IV diuretic.  Lasix 60 mg IV X one dose to see response   - may want to repeat abd CT in view of his distended abd, high fluid requirement and worsening diarrhea    #Anemia: Hb down to 7.9 from 11.5.  Possible hematoma seen on CT.  Multiple potential causes.   - work-up per team    # Acidosis: Anion gap  acidosis with high lactate and serum ketones.   Lactate production may be from ongoing hypoperfusion from capillary leak or lactate overproduction by tumor.   Now down to 5 from 7.  DKA seems unlikely since he has had type 2 DM controlled with metformin.  Ketosis may be from poor PO intake.  VBG pH 7.3 so respiratory compensation is appropriate.     - does not need DKA IVF protocol   - continue to monitor lactic acid trend    #Hyponatremia: Na 125 down from 130 on 4/9. Corrects to 127 with adjustment for high glucose.  U osm 519, urine Na 17 are consistent with underperfusion (appropriate ADH) and inability to excrete free water..  This may self-correct as capillary leak improves but would minimize IVF.    - monitor Na   - keep K greater than 4.0      Recommendations were communicated to primary team via note.    Nuria Carter MD   [phone]      REASON FOR CONSULT: NGOZI and anasarca    HISTORY OF PRESENT ILLNESS:  Admitting provider and nursing notes reviewed.  Patient is seen with significant other who says he is somewhat confused.    Theo Roblero is a 56 year old man S/P PBSCT in Nov 2020 now with relapsed mantle cell lymphoma adm 4/9 with diffuse total body skin rash and temp of 103 (Probably drug-induced and possibly DRESS) occurring in the context of new chemo regimen.      Pt was diagnosed with mantle cell lymphoma in 2020 and was treated with chemo followed by PBSCT in Nov 2020.  His lymphoma has relapsed so he was started on an experimental protocol with Ibrutinib and Loncatuximab one week PTA (4/2).  He presented to the ER with fever to 102 and total body rash on 4/9.  Possible DRESS was diagnosed (although pt does not have eosinophilia) so high dose steroids were started. He defervesced but remained hypotensive so he was transferred to the MICU for pressors 4/11 to 4/12.      His glucose wyatt secondary to steroids so DKA was diagnosed 4/13 and he was started on the DKA protocol.  He is now 12 L in  > out in the past 24 hours.  He says he feels tired but not SOB.  He notes increasing abdominal girth but has no abdominal pain.  He says he has difficulty urinating. Stool volume is increased but not frankly liquid per pt.    Patient has no known CKD although Cr has been rising very slowly over the past 6 months.  Cr was 0.9 in 8/2020, 1.3 on 12/9/2021, 1.8 on 4/12 and 2.4 today.  Hx infected kidney stone in 5/2020 per pt and chart but no stone seen on CT now (not stone cut CT).  Hypotensive as noted above for the past several days.           PAST MEDICAL HISTORY:  Reviewed with patient and SO on 04/13/2021     Past Medical History:   Diagnosis Date     History of peripheral stem cell transplant (H) 01/2021     Kidney stones 06/2020    left     Mantle cell lymphoma of lymph nodes of multiple sites (H) 2020     Nephrolithiasis 05/2020     Sepsis without septic shock (H) 05/12/2020    seconary tp pyelonephritis from obstruction left ureteroliethasis       Past Surgical History:   Procedure Laterality Date     cystoscopy, left pyelogram, ureteral stent placement  05/12/2020    stone was blocking the ureter     cystoscopy, retrograde pyelogram, uretroscopy. laser lithotripsy Left 06/04/2020    treat kidney stones     INSERT CATHETER VASCULAR ACCESS Left 10/26/2020    Procedure: dual lumen non valved tunneled line vascular placement;  Surgeon: Alessandro Steven MD;  Location: [facility] OR     IR CVC TUNNEL PLACEMENT > 5 YRS OF AGE  10/26/2020     IR CVC TUNNEL REMOVAL LEFT  12/8/2020     IR LYMPH NODE BIOPSY  3/10/2021     port a cath placement Right 02/18/2020    internal jugular vein        MEDICATIONS:  PTA Meds  Prior to Admission medications    Medication Sig Last Dose Taking? Auth Provider   acyclovir (ZOVIRAX) 800 MG tablet Take 1 tablet (800 mg) by mouth 2 times daily 4/10/2021 at Unknown time Yes Sam Zavala MD   atorvastatin (LIPITOR) 40 MG tablet Take 40 mg by mouth 4/10/2021 at Unknown time Yes Reported,  Patient   glimepiride (AMARYL) 4 MG tablet Take 1 tablet (4 mg) by mouth every morning (before breakfast) 4/9/2021 at Unknown time Yes Prisca Corley PA-C   metFORMIN (GLUCOPHAGE-XR) 500 MG 24 hr tablet Take 2 tablets (1,000 mg) by mouth daily (with dinner) 4/9/2021 at Unknown time Yes Prisca Corley PA-C   metoprolol succinate ER (TOPROL-XL) 25 MG 24 hr tablet Take 1 tablet (25 mg) by mouth 2 times daily 4/10/2021 at Unknown time Yes Sam Zavala MD   Alcohol Swabs PADS 1 pad 4 times daily (before meals and nightly)   Alexa Medrano PA-C   blood glucose (NO BRAND SPECIFIED) test strip Use to test blood sugar 4 times daily or as directed.   Sam Zavala MD   blood glucose monitoring (ACCU-CHEK FASTCLIX) lancets Use to test blood sugar 4 times daily or as directed.   Prisca Corley PA-C   dexamethasone (DECADRON) 4 MG tablet 4 mg twice daily day before, day of, and day after Lonca-T infusion   Abran Harmon MD   ondansetron (ZOFRAN-ODT) 8 MG ODT tab Take 1 tablet (8 mg) by mouth every 8 hours as needed for nausea   Nancy Galvan PA-C   study - ibrutinib, IDS# [id number], 140 mg capsule Take 4 capsules (560 mg) by mouth daily Take at the same time each day. Swallow whole, Do NOT crush, chew or open capsules.   Abran Harmon MD   sulfamethoxazole-trimethoprim (BACTRIM DS) 800-160 MG tablet Take 1 tablet by mouth Every Mon, Tues two times daily Started 12/8/20   Erlinda Dupont APRN CNP      Current Meds    acyclovir  800 mg Oral BID     enoxaparin ANTICOAGULANT  40 mg Subcutaneous Q24H     lidocaine 1% with EPINEPHrine 1:100,000  3 mL Intradermal Once     methylPREDNISolone  500 mg Intravenous Q12H     [Held by provider] metoprolol succinate ER  25 mg Oral BID     nystatin  500,000 Units Oral 4x Daily     pantoprazole  40 mg Oral Q24H     [Held by provider] study - ibrutinib (IDS# [id number])  560 mg Oral Daily     sulfamethoxazole-trimethoprim  1 tablet Oral Q Mon  "Tues BID     triamcinolone   Topical BID     Infusion Meds    0.9% sodium chloride + KCl 20 mEq/L 100 mL/hr at 21 1334     dextrose       dextrose 5% and 0.45% NaCl + KCl 20 mEq/L 100 mL/hr at 21 1516     insulin (regular) 8 mL/hr at 21 1623     - MEDICATION INSTRUCTIONS -         ALLERGIES:    Allergies   Allergen Reactions     Allopurinol Rash       REVIEW OF SYSTEMS:  A comprehensive of systems was negative except as noted above.    SOCIAL HISTORY:   No EtOH.  Has never smoked.    Reviewed with patient.  Significant other accompanies Theo Roblero in hospital room    FAMILY MEDICAL HISTORY:   Family History   Problem Relation Age of Onset     Heart Disease Mother      Lymphoma Mother      Heart Disease Father      Heart Disease Sister      Reviewed with patient    PHYSICAL EXAM:   Temp  Av.1  F (37.8  C)  Min: 97.4  F (36.3  C)  Max: 103.2  F (39.6  C)      Pulse  Av.8  Min: 71  Max: 158 Resp  Av.7  Min: 10  Max: 30  SpO2  Av.6 %  Min: 91 %  Max: 100 %       BP 95/50 (BP Location: Left arm)   Pulse 129   Temp 97.6  F (36.4  C) (Oral)   Resp 30   Ht 1.803 m (5' 11\")   Wt 119.2 kg (262 lb 12.8 oz)   SpO2 96%   BMI 36.65 kg/m     Date 21 0700 - 21 0659   Shift 8677-0459 2532-5279 6517-6087 24 Hour Total   INTAKE   I.V. 329.5 216  545.5   IV Piggyback 1000   1000   Shift Total(mL/kg) 1329.5(11.15) 216(1.81)  1545.5(12.97)   OUTPUT   Urine  0  0   Shift Total(mL/kg)  0(0)  0(0)   Weight (kg) 119.2 119.2 119.2 119.2      Admit Weight: 103.4 kg (228 lb)     GENERAL APPEARANCE: No  distress,  awake  EYES: no scleral icterus, pupils equal  Endo: no goiter, facial edema, no moon facies  Lymphatics: no cervical or supraclavicular LAD  Pulmonary: lungs clear to auscultation with equal breath sounds bilaterally, increased resp rate to 32   CV: heart sounds distant   - JVD cannot assess    - Edema  anasarca  GI: distended, nontender  MS: no evidence of " inflammation in joints, no muscle tenderness  : no ocampo  SKIN: extensive maculopapular total body skin rash.  NEURO: face symmetric, answers seem appropriate but does not remember details    LABS:   CMP  Recent Labs   Lab 04/13/21  1317 04/13/21  0521 04/13/21 0338 04/12/21 0415 04/11/21  1415 04/11/21  0803 04/09/21  0830 04/09/21  0830   * 126* 126* 129* 131*  --    < > 130*   POTASSIUM 3.8 4.2 4.2 3.9 3.7 3.7   < > 4.4   CHLORIDE 100 98 98 102 104  --    < > 97   CO2 14* 12* 14* 18* 18*  --    < > 23   ANIONGAP 11 16* 14 9 9  --    < > 10   * 384* 370* 137* 185*  --    < > 130*   BUN 56* 46* 46* 26 25  --    < > 28   CR 2.35* 2.13* 2.13* 1.78* 1.53*  --    < > 1.47*   GFRESTIMATED 30* 33* 33* 41* 50*  --    < > 52*   GFRESTBLACK 34* 39* 39* 48* 58*  --    < > 60*   BELA 6.9* 7.1* 7.0* 7.3* 7.2*  --    < > 8.9   MAG  --   --   --  2.5*  --  1.5*  --  2.0   PHOS 2.8  --   --   --   --  2.6  --  3.6   PROTTOTAL 3.7* 4.0*  --  4.3* 4.3*  --    < > 6.5*   ALBUMIN 1.6* 1.8*  --  2.0* 1.9*  --    < > 3.0*   BILITOTAL 0.5 0.7  --  0.6 0.8  --    < > 0.7   ALKPHOS 69 77  --  50 48  --    < > 90   AST 45 49*  --  46* 35  --    < > 31   ALT 59 60  --  53 42  --    < > 37    < > = values in this interval not displayed.     CBC  Recent Labs   Lab 04/13/21  0521 04/12/21 0415 04/11/21  1415 04/11/21  0440   HGB 7.8* 9.9* 9.8* 10.5*   WBC 10.1 9.5 10.9 13.8*   RBC 2.45* 3.14* 3.10* 3.31*   HCT 22.8* 29.4* 29.2* 31.1*   MCV 93 94 94 94   MCH 31.8 31.5 31.6 31.7   MCHC 34.2 33.7 33.6 33.8   RDW 13.6 13.6 13.6 13.4   PLT 63* 60* 71* 75*     INR  Recent Labs   Lab 04/13/21  0840 04/09/21  2347   INR 1.46* 1.08   PTT 41*  --      ABG  Recent Labs   Lab 04/13/21  1511 04/13/21  0338 04/11/21  1423   PH  --   --  7.43   PCO2  --   --  25*   PO2  --   --  75*   HCO3  --   --  17*   O2PER 3L 3LPM 21      URINE STUDIES  Recent Labs   Lab Test 04/11/21  1600 04/10/21  0611 03/26/21  0600 11/17/20  2200   COLOR Yellow  Yellow Straw Yellow   APPEARANCE Slightly Cloudy Clear Clear Clear   URINEGLC 100* Negative Negative 300*   URINEBILI Negative Negative Negative Negative   URINEKETONE Trace* Negative Negative Negative   SG 1.033 1.021 1.004 1.013   UBLD Trace* Negative Negative Trace*   URINEPH 5.5 5.0 6.0 5.5   PROTEIN 50* 20* Negative 10*   NITRITE Negative Negative Negative Negative   LEUKEST Negative Negative Negative Negative   RBCU 1 1 <1 1   WBCU 3 <1 0 1     No lab results found.  PTH  No lab results found.  IRON STUDIES  No lab results found.    IMAGING:  CT 4/11:  FINDINGS:  TUBES/LINES: Right chest wall Port-A-Cath with tip in the low SVC.     CHEST:  LUNGS: The trachea and central airways are patent. No pneumothorax.  Trace bilateral pleural fluid. Small amount of fluid tracking into the  right major fissure. Incidental azygos fissure, normal variant.  Bandlike subsegmental atelectasis in the right lung base. No focal  airspace opacity. No suspicious pulmonary nodule.     MEDIASTINUM: Heart size is within normal limits. New small loculated  anterior pericardial fluid. The ascending aorta and main pulmonary  artery diameters are within normal limits. Coronary artery stents.  Multiple prominent and enlarged left greater than right axillary lymph  nodes for example left level 1 axillary node measuring 14 mm (series  3, image 64) these axillary lymph nodes demonstrated FDG activity on  prior PET CT. No suspicious mediastinal or hilar lymph nodes.     ABDOMEN/PELVIS:  Evaluation limited due to patient respiratory motion.     LIVER: No focal hepatic mass.     BILIARY: The gallbladder is decompressed. No intraluminal gallstone.  No intrahepatic or extrahepatic biliary ductal dilatation.     PANCREAS: Fatty infiltration of the pancreatic head, uncinate process  and pancreatic neck.     SPLEEN: Redemonstration of ill-defined hypodense perisplenic fluid  collection that are visualized on the prior PET/CT likely representing  a  subacute to chronic hematoma. Small splenule.     ADRENAL GLANDS: Within normal limits.     URINARY TRACT: Large exophytic simple cyst in the inferior pole of the  left kidney measuring up to 10.1 cm. No focal right renal mass.  Unchanged mild nonspecific perinephric stranding. No hydronephrosis or  hydroureter. No obstructing renal stone. Urinary bladder is  unremarkable.     REPRODUCTIVE ORGANS: Within normal limits.     STOMACH: Within normal limits.     BOWEL: Moderate colonic stool burden. No abnormally dilated loops of  large or small bowel. The previously visualized wall thickening in the  anus rectum, and sigmoid colon is less conspicuous on today's exam;  however there is inflammation/lymphoma in the distal sigmoid colon  series 2 image 221. The appendix is unremarkable.     PERITONEUM/FLUID: No free fluid.     VESSELS: Atherosclerotic calcifications of the abdominal aorta and  iliac arteries.     LYMPH NODES: No significant change in size or number of multiple  prominent peritoneal retroperitoneal lymph nodes for example 8 mm  right para-aortic lymph node (series 3, image 139) which previously  demonstrated FDG activity on recent PET/CT. Unchanged enlargement of  bilateral inguinal nodes for example 1.4 cm left inguinal node (series  3, image 718) and enlarged right inguinal node measuring 1.5 cm  (series 3, image 729) there is mild associated stranding and soft  tissue nodularity adjacent to these lymph nodes.      BONES/SOFT TISSUES: No aggressive osseous lesions. Probable Schmorl  node deformity to the superior endplate of L4. Anterior flowing  osteophytes throughout multiple thoracic vertebrae likely representing  diffuse idiopathic skeletal hyperostosis. Small amount of subcutaneous  emphysema about the lower midabdomen, likely related to recent  medication injection.                                                                      IMPRESSION: In this patient with a history of mantle cell  lymphoma  status post chemotherapy and stem cell transplant:    1. No significant interval change in scattered enlarged lymph nodes in  the chest abdomen pelvis which previously demonstrated FDG avidity on  recent whole body PET/CT concerning for disease progression.  2. Inflammation/lymphoma about the distal sigmoid colon, this might  represent a source for infection although not significantly changed  from PET/CT 3/26/2021.  3. Unchanged perisplenic fluid collection likely representing chronic  hematoma.  4. Trace bilateral pleural effusions. No focal airspace opacity. New  small anterior pericardial fluid without surrounding inflammation.     I have personally reviewed the examination and initial interpretation  and I agree with the findings.    Nuria Carter MD

## 2021-04-13 NOTE — PROVIDER NOTIFICATION
04/12/21 2100   Call Information   Date of Call 04/12/21   Time of Call 2050   Name of person requesting the team Topher BARRAZA   Title of person requesting team RN   RRT Arrival time 2053   Time RRT ended 2109   Reason for call   Type of RRT Adult   Primary reason for call Sepsis suspected   Sepsis Suspected Elevated Lactate level;Heart Rate > 100   Was patient transferred from the ED, ICU, or PACU within last 24 hours prior to RRT call? No   SBAR   Situation LA- 5.4, hr- 120   Background Lymphoma   Notable History/Conditions Cancer;Diabetes   Assessment A & O, c/o heartburn   Interventions Fluid bolus;IV fluids   Patient Outcome   Patient Outcome Stabilized on unit   RRT Team   Attending/Primary/Covering Physician BMT   Physician(s) Yolande Hinds   Lead RN Christie BARRAZA   RT Andisa   Post RRT Intervention Assessment   Post RRT Assessment Other (see comment)   Date Follow Up Done 04/13/21   Time Follow Up Done 2330   Comments Pt found to be in afib w/ -170's, BP soft but maps ok. MD paged, EKG ordered, monitoring BP closely. Pt then converted back to sinus tachy 130-140, BP stable. Continue to monitor.

## 2021-04-13 NOTE — PROVIDER NOTIFICATION
04/13/21 0300   Call Information   Date of Call 04/13/21   Time of Call 0322   Name of person requesting the team Topher    Title of person requesting team RN   RRT Arrival time 0329   Time RRT ended 0345   Reason for call   Type of RRT Adult   Primary reason for call Sepsis suspected   Sepsis Suspected Elevated Lactate level;Heart Rate > 100;RR > 20, SaO2 <90% OR increasing O2 need   Was patient transferred from the ED, ICU, or PACU within last 24 hours prior to RRT call? No   SBAR   Situation LA 6.7   Background Lymphoma   Notable History/Conditions Cancer;Diabetes   Assessment A&Ox4, c/o heartburn.   Interventions Labs   Patient Outcome   Patient Outcome Stabilized on unit   RRT Team   Date Attending Physician notified 04/13/21   Time Attending Physician notified 0322   Physician(s) Tootie GARCIA, Lawanda George MD   Lead RN Rachel Hernandez   Post RRT Intervention Assessment   Date Follow Up Done 04/13/21   Time Follow Up Done 0550   Comments Repeat RRT called for LA 5.7, see next RRT flowsheet.

## 2021-04-13 NOTE — PROVIDER NOTIFICATION
04/13/21 0600   Call Information   Date of Call 04/13/21   Time of Call 0545   Name of person requesting the team Topher   Title of person requesting team RN   RRT Arrival time 0550   Time RRT ended 0555   Reason for call   Type of RRT Adult   Primary reason for call Sepsis suspected   Sepsis Suspected Elevated Lactate level;Heart Rate > 100;RR > 20, SaO2 <90% OR increasing O2 need   Was patient transferred from the ED, ICU, or PACU within last 24 hours prior to RRT call? No   SBAR   Situation LA 5.7   Background Lymphoma   Notable History/Conditions Cardiac;Diabetes   Assessment Pt resting comfortably, tachypneic, tachycardic, BP stable.      Interventions No interventions   Adjustments to Recommend Lactic decreasing.    Patient Outcome   Patient Outcome Stabilized on unit   RRT Team   Date Attending Physician notified 04/13/21   Time Attending Physician notified 0545   Physician(s) Tootie GARCIA, Lawanda George MD   Lead RN Rachel Hernandez   Post RRT Intervention Assessment   Post RRT Assessment Stable/Improved   Date Follow Up Done 04/13/21   Time Follow Up Done 0800   Comments Pt state his breathing feels better, also his heartburn has improved after protonix. Still Tachypneic and tachycardic. Continue to monitor.

## 2021-04-13 NOTE — CODE/RAPID RESPONSE
Rapid Response Team Note    Assessment   In assessment a rapid response was called on Theo Roblero due to lactic acidosis. This presentation is likely due to chemotherapy, drug rash and worsened by lymphoma, DM2.     Plan   -  Continue current antibiotic regimen.  Provide another 1L LR Bolus and start mIVF at 75cc/hr.  Repeat lactic acid at 4AM. Suspect persistently elevated lactate related to ongoing chemotherapy and rash  -  The Internal Medicine primary team was able to be reached and they are in agreement with the above plan.  -  Disposition: The patient will remain on the current unit. We will continue to monitor this patient closely.  -  Reassessment and plan follow-up will be performed by the primary team    Yasmin Hinds PA-C  [facility] RRT [facility] Job Code Contact #[phone]    Hospital Course   Brief Summary of events leading to rapid response:   Patient is currently admitted for a new fever, rash, hypotension, and tachycardia thought initially to be a drug rash or reaction to his chemotherapy but now suspected to be an underlying infection. Infectious work up has so far been negative. He received 1L LR bolus at previous rapid due to lactic acidosis. His repeat lactic acid remains elevated at 5.3    Admission Diagnosis:   Drug rash [L27.0]  Fever and chills [R50.9]  Lymphoma, unspecified body region, unspecified lymphoma type (H) [C85.90]     Physical Exam   Temp: 98.5  F (36.9  C) Temp  Min: 98.5  F (36.9  C)  Max: 102.8  F (39.3  C)  Resp: 20 Resp  Min: 10  Max: 30  SpO2: 99 % SpO2  Min: 91 %  Max: 99 %  Pulse: 126 Pulse  Min: 121  Max: 151    No data recorded  BP: 137/84 Systolic (24hrs), Av , Min:72 , Max:137   Diastolic (24hrs), Av, Min:45, Max:92     I/Os: I/O last 3 completed shifts:  In: 4830 [P.O.:1560; I.V.:2270]  Out: 575 [Urine:575]     Exam:   GENERAL: Alert and oriented x 3. NAD. Ambulatory. Cooperative.   HEENT: Anicteric sclera. Mucous membranes dry. NC. AT.   CV:  Tachycardic. S1, S2. No murmurs appreciated.   RESPIRATORY: Effort normal on RA. Lungs CTAB with no wheezing, rales, rhonchi.   GI: Abdomen soft and non distended with normoactive bowel sounds present in all quadrants. No tenderness, rebound, guarding. No lesions.   NEUROLOGICAL: No focal deficits. Moves all extremities.  CN 2-12 grossly intact.  EXTREMITIES: No peripheral edema. Intact bilateral pedal pulses.   SKIN: No jaundice. Diffuse macular rash         Significant Results and Procedures   Lactic Acid:   Recent Labs   Lab Test 04/12/21 2032 04/12/21  1651 04/12/21  1542 04/11/21  1423 04/10/21  2055 04/10/21  2055 11/20/20  1115 11/20/20  1115   LACT 5.3* 4.0*  --  1.6   < >  --    < >  --    LACTS  --   --  5.2*  --   --  2.8*  --  1.6    < > = values in this interval not displayed.     CBC:   Recent Labs   Lab Test 04/12/21  0415 04/11/21  1415 04/11/21  0440   WBC 9.5 10.9 13.8*   HGB 9.9* 9.8* 10.5*   HCT 29.4* 29.2* 31.1*   PLT 60* 71* 75*        Sepsis Evaluation   The patient is suspected to have an infection.  Theo Roblero meets SIRS criteria and has a lactate >2 or other evidence of acute organ damage.  These vital sign, lab and physical exam findings are consistent with SEVERE SEPSIS.    Severe Sepsis Time-Zero (time Severe Sepsis diagnosis confirmed): 2032 04/12/21    Anti-infectives (From now, onward)    Start     Dose/Rate Route Frequency Ordered Stop    04/12/21 1413  vancomycin place frankel - receiving intermittent dosing      1 each Intravenous SEE ADMIN INSTRUCTIONS 04/12/21 1413      04/12/21 0800  sulfamethoxazole-trimethoprim (BACTRIM DS) 800-160 MG per tablet 1 tablet      1 tablet Oral EVERY MONDAY TUESDAY BID 04/10/21 0536      04/10/21 2300  piperacillin-tazobactam (ZOSYN) 3.375 g vial to attach to  mL bag      3.375 g  over 30 Minutes Intravenous EVERY 6 HOURS 04/10/21 2231      04/10/21 0800  acyclovir (ZOVIRAX) tablet 800 mg      800 mg Oral 2 TIMES DAILY 04/10/21 1572           Current antibiotic coverage is appropriate for source of infection.

## 2021-04-13 NOTE — CONSULTS
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 Most recently received Loncatuximab + Ibrutinib 4/2/21. Presented to [facility] with fevers, hypotension, tachycardia and whole body rash. Developed hypotension refractory to fluid resuscitation and was transferred to MICU for pressors support briefly 4/11-4/12.   S/p bx of rash positive for drug rash likely 2/2 allopurinol or Ibrutinib. Started on methylprednisolone 500 mg BID (x4/12) with fever curve now down trending. Complicated by DKA.   Admitted on (4/10/2021)      Information was obtained from review of medical records and interview with patient.  Yanick reports he was dx with type 2 diabetes years ago. PTA medications  As listed below. He tests his blood sugar 3-4 times per day and reports blood sugars are \"good\".   He is followed by outpatient endocrinology and most recent visit was on (1/18/2021) with Prisca Corley PA-C. All insulin was discontinued and on oral medications for diabetes.    Blood sugars on arrival < 100. Blood sugars gradually increased and was started on novolog sliding scale.   Was given methylprednisolone 500 mg BID (x4/12). Blood sugars in the 300's.  Repeat laboratory studies indicating DKA ( bicarb 12, anion gap 16, lactic acid 5.7) DKA protocol started.    Currently denies fever, chills, chest pain, shortness of breath, abdominal pain, nausea and or vomiting. Appetite is reported as ok      Recent Labs   Lab 04/13/21  0521 04/13/21  0338 04/12/21  2157 04/12/21  1910 04/12/21  1402 04/12/21  0903 04/12/21  0415 04/11/21  2043 04/11/21  1738 04/11/21  1415 04/11/21  0440 04/11/21  0440 04/10/21  0556 04/10/21  0556   * 370* "  --   --   --   --  137*  --   --  185*  --  116*  --  80   BGM  --   --  339* 330* 335* 151*  --  215* 180*  --    < >  --    < >  --     < > = values in this interval not displayed.         Diabetes Type: type 2 diabetes  Diabetes Duration: many years  Usual Diabetes Regimen:   Metformin XR 2000 mg daily  Glimepiride 4 mg daily    Ability to [place] Prescribed Regimen: yes  Diabetes Control:   Lab Results   Component Value Date    A1C 5.9 04/10/2021     Diabetes Complications: none per aptient  Usual Diabetes Care Provider: Prisca Corley PA-C.  Factors Impacting Glucose Control: steroids      Review of Systems  10 point ROS completed with pertinent positives and negatives noted in the HPI    Past medical, family and social histories are reviewed and updated.    Past Medical History  Past Medical History:   Diagnosis Date     Kidney stones 06/2020    left     Sepsis without septic shock (H) 05/12/2020    seconary tp pyelonephritis from obstruction left ureteroliethasis       Family History  No family history specific for diabetes  Family History   Problem Relation Age of Onset     Heart Disease Mother      Lymphoma Mother      Heart Disease Father      Heart Disease Sister        Social History  Social History     Socioeconomic History     Marital status: Single     Spouse name: None     Number of children: None     Years of education: None     Highest education level: None   Occupational History     None   Social Needs     Financial resource strain: None     Food insecurity     Worry: None     Inability: None     Transportation needs     Medical: None     Non-medical: None   Tobacco Use     Smoking status: Never Smoker     Smokeless tobacco: Current User     Types: Chew     Tobacco comment: trying to quit   Substance and Sexual Activity     Alcohol use: Not Currently     Drug use: Never     Sexual activity: None   Lifestyle     Physical activity     Days per week: None     Minutes per session: None      "Stress: None   Relationships     Social connections     Talks on phone: None     Gets together: None     Attends Zoroastrianism service: None     Active member of club or organization: None     Attends meetings of clubs or organizations: None     Relationship status: None     Intimate partner violence     Fear of current or ex partner: None     Emotionally abused: None     Physically abused: None     Forced sexual activity: None   Other Topics Concern     None   Social History Narrative     None         Physical Exam  BP 92/59 (BP Location: Left arm)   Pulse 130   Temp 97.7  F (36.5  C) (Oral)   Resp 29   Ht 1.803 m (5' 11\")   Wt 119.2 kg (262 lb 12.8 oz)   SpO2 97%   BMI 36.65 kg/m      General:  pleasant  resting in bed, in no distress.   HEENT:  PER and anicteric, non-injected, oral mucous membranes moist.   Lungs:  kussmaul breathing   ABD: obese, soft  Skin: warm and dry   MSK:  Moving all extremteis  Mental status:  alert, oriented x3, communicating clearly  Psych:  calm, even mood    Laboratory  Recent Labs   Lab Test 04/13/21 0521 04/13/21  0338   * 126*   POTASSIUM 4.2 4.2   CHLORIDE 98 98   CO2 12* 14*   ANIONGAP 16* 14   * 370*   BUN 46* 46*   CR 2.13* 2.13*   BELA 7.1* 7.0*     CBC RESULTS:   Recent Labs   Lab Test 04/13/21 0521   WBC 10.1   RBC 2.45*   HGB 7.8*   HCT 22.8*   MCV 93   MCH 31.8   MCHC 34.2   RDW 13.6   PLT 63*       Liver Function Studies -   Recent Labs   Lab Test 04/13/21 0521   PROTTOTAL 4.0*   ALBUMIN 1.8*   BILITOTAL 0.7   ALKPHOS 77   AST 49*   ALT 60       Active Medications  Current Facility-Administered Medications   Medication     acetaminophen (TYLENOL) tablet 650 mg     acyclovir (ZOVIRAX) tablet 800 mg     atorvastatin (LIPITOR) tablet 40 mg     calcium carbonate (TUMS) chewable tablet 500 mg     dextrose 10% infusion     glucose gel 15-30 g    Or     dextrose 50 % injection 25-50 mL    Or     glucagon injection 1 mg     glucose gel 15-30 g    Or     dextrose " 50 % injection 25-50 mL    Or     glucagon injection 1 mg     diphenhydrAMINE (BENADRYL) capsule 25 mg     diphenhydrAMINE-zinc acetate (BENADRYL) 1-0.1 % cream     enoxaparin ANTICOAGULANT (LOVENOX) injection 40 mg     hydrOXYzine (ATARAX) tablet 25 mg     insulin 1 unit/mL in saline (NovoLIN, HumuLIN Regular) drip - ADULT IV Infusion     insulin aspart (NovoLOG) injection (RAPID ACTING)     insulin aspart (NovoLOG) injection (RAPID ACTING)     lactated ringers infusion     lactated ringers injection     lidocaine (LMX4) cream     lidocaine (LMX4) cream     lidocaine (LMX4) cream     lidocaine 1 % 0.1-1 mL     lidocaine 1 % 0.1-1 mL     lidocaine 1 % 0.1-1 mL     lidocaine 1% with EPINEPHrine 1:100,000 injection 3 mL     melatonin tablet 1 mg     methylPREDNISolone sodium succinate (solu-MEDROL) 500 mg in sodium chloride 0.9 % 100 mL intermittent infusion     [Held by provider] metoprolol succinate ER (TOPROL-XL) 24 hr tablet 25 mg     ondansetron (ZOFRAN-ODT) ODT tab 4 mg    Or     ondansetron (ZOFRAN) injection 4 mg     ondansetron (ZOFRAN-ODT) ODT tab 8 mg     pantoprazole (PROTONIX) EC tablet 40 mg     piperacillin-tazobactam (ZOSYN) 3.375 g vial to attach to  mL bag     sodium chloride (PF) 0.9% PF flush 10 mL     sodium chloride (PF) 0.9% PF flush 3 mL     sodium chloride (PF) 0.9% PF flush 3 mL     sodium chloride (PF) 0.9% PF flush 3 mL     sodium chloride (PF) 0.9% PF flush 3 mL     sodium chloride (PF) 0.9% PF flush 3 mL     sodium chloride (PF) 0.9% PF flush 3 mL     sodium chloride (PF) 0.9% PF flush 3 mL     sodium chloride (PF) 0.9% PF flush 3 mL     sodium chloride (PF) 0.9% PF flush 3 mL     [Held by provider] study - ibrutinib (IDS# [id number]) capsule CHEMOTHERAPY 560 mg     sulfamethoxazole-trimethoprim (BACTRIM DS) 800-160 MG per tablet 1 tablet     triamcinolone (KENALOG) 0.1 % ointment     vancomycin place frankel - receiving intermittent dosing     No current outpatient medications on file.  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 Most recently received Loncatuximab + Ibrutinib 4/2/21. Presented to [facility] with fevers, hypotension, tachycardia and whole body rash. Developed hypotension refractory to fluid resuscitation and was transferred to MICU for pressors support briefly 4/11-4/12.   S/p bx of rash positive for drug rash likely 2/2 allopurinol or Ibrutinib. Started on methylprednisolone 500 mg BID (x4/12) with fever curve now down trending. Complicated by DKA.   Admitted on (4/10/2021)    DKA: PTA on metformin and glimepiride. Most likely from initiation of HD steroids.   bicarb 12, anion gap 16, lactic acid 5.7, hyponatremia, hypotension,  -glucose > 300  - continues on methylprednisolone 500 mg every 12 hours   - ongoing lactic acidosis thought 2/2 malignancy, immune mediated process, possible infection and  DKA.      PLAN:  DKA protocol  -will add prandial insulin: novolog  -Metformin on hold dara to lactic acidosis ( will need to evaluate if this can be resumed at discharge)  - CHO counting  Protocol  - hypoglycemia protocol  -will continue to follow        To contact Endocrine Diabetes service:   From 8AM-4PM: page inpatient diabetes provider that is following the patient  For questions or updates from 4PM-8AM: page the diabetes job code for on call fellow: [phone]        Sara Meléndez -[phone]  Diabetes Management job code [phone]    "

## 2021-04-13 NOTE — PLAN OF CARE
"/58   Pulse 143   Temp 100  F (37.8  C) (Oral)   Resp 20   Ht 1.803 m (5' 11\")   Wt 103.4 kg (228 lb)   SpO2 100%   BMI 31.80 kg/m        T max 100, tachycardic 130's-180's. MD paged about possible A.Fib and EKG done. ABRAMS. 3L O2. Hypotensive in 80's / 50's with elevated heart rate but BP elevated once HR returned to 140's. Rapid called three times for elevated lactic.  1 liter LR given. Maintenance LR at 75. Pt complained of heart burn. Tums and Protonix given. Troponin and VBG's drawn.   No complaints of pain. 5 BM's overnight. Soft/loose, mentioned to MD during rapid.       Problem: Adult Inpatient Plan of Care  Goal: Plan of Care Review  Outcome: No Change  Goal: Patient-Specific Goal (Individualized)  Outcome: No Change  Goal: Absence of Hospital-Acquired Illness or Injury  Outcome: No Change  Intervention: Identify and Manage Fall Risk  Recent Flowsheet Documentation  Taken 4/12/2021 2100 by Topher Hernandez RN  Safety Promotion/Fall Prevention:    activity supervised    safety round/check completed  Intervention: Prevent Skin Injury  Recent Flowsheet Documentation  Taken 4/12/2021 2100 by Topher Hernandez RN  Body Position: position changed independently  Intervention: Prevent and Manage VTE (Venous Thromboembolism) Risk  Recent Flowsheet Documentation  Taken 4/12/2021 2100 by Topher Hernandez, RN  VTE Prevention/Management: bleeding precautions maintained     Problem: Adjustment to Transplant (Stem Cell/Bone Marrow Transplant)  Goal: Optimal Coping with Transplant  Outcome: No Change     Problem: Bladder Irritation (Stem Cell/Bone Marrow Transplant)  Goal: Symptom-Free Urinary Elimination  Outcome: No Change     Problem: Hematologic Alteration (Stem Cell/Bone Marrow Transplant)  Goal: Blood Counts Within Acceptable Range  Outcome: No Change     Problem: Hypersensitivity Reaction (Stem Cell/Bone Marrow Transplant)  Goal: Absence of Hypersensitivity Reaction  Outcome: No Change     Problem: " Infection Risk (Stem Cell/Bone Marrow Transplant)  Goal: Absence of Infection  Outcome: No Change  Intervention: Prevent and Manage Infection  Recent Flowsheet Documentation  Taken 4/12/2021 2100 by Topher Hernandez RN  Isolation Precautions: cytotoxic precautions maintained     Problem: Mucositis (Stem Cell/Bone Marrow Transplant)  Goal: Mucous Membrane Health and Integrity  Outcome: No Change     Problem: Nausea and Vomiting (Stem Cell/Bone Marrow Transplant)  Goal: Nausea and Vomiting Symptom Relief  Outcome: No Change     Problem: Nutrition Intake Altered (Stem Cell/Bone Marrow Transplant)  Goal: Optimal Nutrition Intake  Outcome: No Change     Problem: Adult Inpatient Plan of Care  Goal: Optimal Comfort and Wellbeing  Outcome: Declining  Goal: Readiness for Transition of Care  Outcome: Declining     Problem: Diarrhea (Stem Cell/Bone Marrow Transplant)  Goal: Diarrhea Symptom Control  Outcome: Declining     Problem: Fatigue (Stem Cell/Bone Marrow Transplant)  Goal: Energy Level Supports Daily Activity  Outcome: Declining

## 2021-04-13 NOTE — PLAN OF CARE
Patient transferred up from . Triggered lactic right away=result 5.2. RRT called. Rechecked right away for result of 4.0 1L LR given. Recheck pending. Patient put on 2L nasal cannula when sleeping dropping sats into 80s, otherwise fine when awake. Heart rate tachy 120s-130s. Continue POC.   Problem: Adult Inpatient Plan of Care  Goal: Plan of Care Review  Outcome: No Change     Problem: Adult Inpatient Plan of Care  Goal: Patient-Specific Goal (Individualized)  Outcome: No Change     Problem: Adult Inpatient Plan of Care  Goal: Absence of Hospital-Acquired Illness or Injury  Outcome: No Change     Problem: Adult Inpatient Plan of Care  Goal: Absence of Hospital-Acquired Illness or Injury  Intervention: Identify and Manage Fall Risk  Recent Flowsheet Documentation  Taken 4/12/2021 1600 by Hilary Lea, RN  Safety Promotion/Fall Prevention:   activity supervised   clutter free environment maintained     Problem: Adult Inpatient Plan of Care  Goal: Absence of Hospital-Acquired Illness or Injury  Intervention: Prevent Skin Injury  Recent Flowsheet Documentation  Taken 4/12/2021 1600 by Hilary Lea RN  Body Position: position changed independently     Problem: Adult Inpatient Plan of Care  Goal: Optimal Comfort and Wellbeing  Outcome: No Change

## 2021-04-13 NOTE — PROGRESS NOTES
"[facility] Dermatology Progress Note    Assessment and Plan:  1. Suspected DRESS, perhaps due to allopurinol  - A supportive skin biopsy was obtained on 4/11/21, consistent with a drug rash. Suture removal should occur around Sunday, 4/25/21.  - Although there is no peripheral eosinophilia, the fevers, rash (which can be polymorphous in DRESS), facial swelling, facial rash with relative periorbital sparing, possible lymphadenopathy, and evidence of kidney damage raise suspicion for DRESS. Using the RegiSCAR DRESS calculator, he scores at least a three, which suggests \"possible DRESS.\" Peripheral eosinophilia may not be present as the patient has a known immune system derangement given history of malignancy and recent chemotherapy induction.  - Allopurinol is one of the most common offending agents with DRESS with 24+ citations in PubMed. He should be watched closely for biochemical evidence of liver damage as the liver is often affected in DRESS.  - Allopurinol has already been withdrawn. We agree with systemic corticosteroids, triamcinolone 0.1% ointment BID, and supportive cares as per the primary team.  - Recommend obtaining EBV, CMV, and HHV6 PCRs, as viral reactivation can provoke DRESS.   - Continue to trend CBC with diff and CMP  - Long-term sequelae of DIHS include autoimmune conditions such as thyroiditis, myocarditis, pancreatitis, or systemic lupus erythematosus, or infectious complications such as herpesvirus infections and pneumonia (typically seen in the corticosteroid dosing reduction phase). Patients who have had DIHS are at an increased risk for becoming hypothyroid. This usually occurs 4-12 weeks after the reaction, so TSH should be followed up on upon discharge from the hospital.    We will continue to follow. Please do not hesitate to contact the dermatology resident/faculty on call for any additional questions or concerns.     Patient seen and evaluated with " attending physician, Dr. Brandon Morris.    I have seen and examined this patient and agree with the assessment and plan as documented in the resident's note.    Brandon Morris MD  Dermatology Attending      Dermatology Problem List:  1. Suspected DRESS due to allopurinol    Date of Admission: Apr 9, 2021   Encounter Date: 04/13/2021      Interval history:    The rash appears to be fading. His facial edema is about stable today. He continues to have lactic acidosis and is now on an insulin drip for suspected DKA.    Medications:  Current Facility-Administered Medications   Medication     acetaminophen (TYLENOL) tablet 650 mg     acyclovir (ZOVIRAX) tablet 800 mg     atorvastatin (LIPITOR) tablet 40 mg     calcium carbonate (TUMS) chewable tablet 500 mg     dextrose 10% infusion     glucose gel 15-30 g    Or     dextrose 50 % injection 25-50 mL    Or     glucagon injection 1 mg     diphenhydrAMINE (BENADRYL) capsule 25 mg     diphenhydrAMINE-zinc acetate (BENADRYL) 1-0.1 % cream     enoxaparin ANTICOAGULANT (LOVENOX) injection 40 mg     hydrOXYzine (ATARAX) tablet 25 mg     insulin 1 unit/mL in saline (NovoLIN, HumuLIN Regular) drip - ADULT IV Infusion     lidocaine (LMX4) cream     lidocaine (LMX4) cream     lidocaine (LMX4) cream     lidocaine 1 % 0.1-1 mL     lidocaine 1 % 0.1-1 mL     lidocaine 1 % 0.1-1 mL     lidocaine 1% with EPINEPHrine 1:100,000 injection 3 mL     loperamide (IMODIUM) capsule 2 mg     melatonin tablet 1 mg     methylPREDNISolone sodium succinate (solu-MEDROL) 500 mg in sodium chloride 0.9 % 100 mL intermittent infusion     [Held by provider] metoprolol succinate ER (TOPROL-XL) 24 hr tablet 25 mg     naloxone (NARCAN) injection 0.2 mg    Or     naloxone (NARCAN) injection 0.4 mg    Or     naloxone (NARCAN) injection 0.2 mg    Or     naloxone (NARCAN) injection 0.4 mg     nystatin (MYCOSTATIN) suspension 500,000 Units     ondansetron (ZOFRAN-ODT) ODT tab 4 mg    Or     ondansetron (ZOFRAN)  "injection 4 mg     ondansetron (ZOFRAN-ODT) ODT tab 8 mg     oxyCODONE (ROXICODONE) tablet 5 mg     pantoprazole (PROTONIX) EC tablet 40 mg     piperacillin-tazobactam (ZOSYN) 3.375 g vial to attach to  mL bag     sodium chloride (PF) 0.9% PF flush 10 mL     sodium chloride (PF) 0.9% PF flush 3 mL     sodium chloride (PF) 0.9% PF flush 3 mL     sodium chloride (PF) 0.9% PF flush 3 mL     sodium chloride (PF) 0.9% PF flush 3 mL     sodium chloride (PF) 0.9% PF flush 3 mL     sodium chloride (PF) 0.9% PF flush 3 mL     sodium chloride (PF) 0.9% PF flush 3 mL     sodium chloride (PF) 0.9% PF flush 3 mL     sodium chloride (PF) 0.9% PF flush 3 mL     sodium chloride 0.9% infusion     [Held by provider] study - ibrutinib (IDS# [id number]) capsule CHEMOTHERAPY 560 mg     sulfamethoxazole-trimethoprim (BACTRIM DS) 800-160 MG per tablet 1 tablet     triamcinolone (KENALOG) 0.1 % cream        Physical exam:  /65 (BP Location: Left arm)   Pulse 133   Temp 100.1  F (37.8  C) (Oral)   Resp 28   Ht 1.803 m (5' 11\")   Wt 119.2 kg (262 lb 12.8 oz)   SpO2 95%   BMI 36.65 kg/m    GEN:This is a well developed, well-nourished male in no acute distress, in a pleasant mood.    SKIN: Total skin excluding the undergarment areas was performed. The exam included the head/face, neck, both arms, chest, back, abdomen, both legs, digits and/or nails.   -Egan skin type: I  - Scattered about the head, neck, trunk, and extremities, there are poorly demarcated erythematous, minimally thick plaques.  - Facial edema present. There is relative sparing of rash at the periorbital areas.  - Photodocumentation obtained on 4/13 and 4/11.            Laboratory:  Results for orders placed or performed during the hospital encounter of 04/09/21 (from the past 24 hour(s))   Glucose by meter   Result Value Ref Range    Glucose 335 (H) 70 - 99 mg/dL   Lactic acid level STAT   Result Value Ref Range    Lactate for Sepsis Protocol 5.2 (HH) 0.7 - " 2.0 mmol/L   Lactic acid whole blood   Result Value Ref Range    Lactic Acid 4.0 (HH) 0.7 - 2.0 mmol/L   Glucose by meter   Result Value Ref Range    Glucose 330 (H) 70 - 99 mg/dL   Lactic acid whole blood   Result Value Ref Range    Lactic Acid 5.3 (HH) 0.7 - 2.0 mmol/L   Glucose by meter   Result Value Ref Range    Glucose 339 (H) 70 - 99 mg/dL   EKG 12-lead, tracing only   Result Value Ref Range    Interpretation ECG Click View Image link to view waveform and result    Lactic acid whole blood   Result Value Ref Range    Lactic Acid 6.7 (HH) 0.7 - 2.0 mmol/L   Basic metabolic panel   Result Value Ref Range    Sodium 126 (L) 133 - 144 mmol/L    Potassium 4.2 3.4 - 5.3 mmol/L    Chloride 98 94 - 109 mmol/L    Carbon Dioxide 14 (L) 20 - 32 mmol/L    Anion Gap 14 3 - 14 mmol/L    Glucose 370 (H) 70 - 99 mg/dL    Urea Nitrogen 46 (H) 7 - 30 mg/dL    Creatinine 2.13 (H) 0.66 - 1.25 mg/dL    GFR Estimate 33 (L) >60 mL/min/[1.73_m2]    GFR Estimate If Black 39 (L) >60 mL/min/[1.73_m2]    Calcium 7.0 (L) 8.5 - 10.1 mg/dL   Blood gas venous   Result Value Ref Range    Ph Venous 7.36 7.32 - 7.43 pH    PCO2 Venous 23 (L) 40 - 50 mm Hg    PO2 Venous 41 25 - 47 mm Hg    Bicarbonate Venous 13 (L) 21 - 28 mmol/L    Base Deficit Venous 10.9 mmol/L    FIO2 3LPM    Troponin I   Result Value Ref Range    Troponin I ES 0.039 0.000 - 0.045 ug/L   Lactic acid whole blood   Result Value Ref Range    Lactic Acid 5.7 (HH) 0.7 - 2.0 mmol/L   CBC with platelets   Result Value Ref Range    WBC 10.1 4.0 - 11.0 10e9/L    RBC Count 2.45 (L) 4.4 - 5.9 10e12/L    Hemoglobin 7.8 (L) 13.3 - 17.7 g/dL    Hematocrit 22.8 (L) 40.0 - 53.0 %    MCV 93 78 - 100 fl    MCH 31.8 26.5 - 33.0 pg    MCHC 34.2 31.5 - 36.5 g/dL    RDW 13.6 10.0 - 15.0 %    Platelet Count 63 (L) 150 - 450 10e9/L   Comprehensive metabolic panel   Result Value Ref Range    Sodium 126 (L) 133 - 144 mmol/L    Potassium 4.2 3.4 - 5.3 mmol/L    Chloride 98 94 - 109 mmol/L    Carbon  Dioxide 12 (L) 20 - 32 mmol/L    Anion Gap 16 (H) 3 - 14 mmol/L    Glucose 384 (H) 70 - 99 mg/dL    Urea Nitrogen 46 (H) 7 - 30 mg/dL    Creatinine 2.13 (H) 0.66 - 1.25 mg/dL    GFR Estimate 33 (L) >60 mL/min/[1.73_m2]    GFR Estimate If Black 39 (L) >60 mL/min/[1.73_m2]    Calcium 7.1 (L) 8.5 - 10.1 mg/dL    Bilirubin Total 0.7 0.2 - 1.3 mg/dL    Albumin 1.8 (L) 3.4 - 5.0 g/dL    Protein Total 4.0 (L) 6.8 - 8.8 g/dL    Alkaline Phosphatase 77 40 - 150 U/L    ALT 60 0 - 70 U/L    AST 49 (H) 0 - 45 U/L   Ketone Beta-Hydroxybutyrate Quantitative   Result Value Ref Range    Ketone Quantitative 0.8 (H) 0.0 - 0.6 mmol/L   WBC Differential   Result Value Ref Range    Diff Method Manual Differential     % Neutrophils 97.4 %    % Lymphocytes 1.7 %    % Monocytes 0.9 %    % Eosinophils 0.0 %    % Basophils 0.0 %    Absolute Neutrophil 9.8 (H) 1.6 - 8.3 10e9/L    Absolute Lymphocytes 0.2 (L) 0.8 - 5.3 10e9/L    Absolute Monocytes 0.1 0.0 - 1.3 10e9/L    Absolute Eosinophils 0.0 0.0 - 0.7 10e9/L    Absolute Basophils 0.0 0.0 - 0.2 10e9/L    Anisocytosis Slight     Poikilocytosis Moderate     Polychromasia Slight     [place] Cells Moderate     Platelet Estimate Confirming automated cell count    Glucose by meter   Result Value Ref Range    Glucose 400 (H) 70 - 99 mg/dL   Glucose by meter   Result Value Ref Range    Glucose 391 (H) 70 - 99 mg/dL       Staff Involved:  Resident/Staff

## 2021-04-13 NOTE — PLAN OF CARE
PT: cancel- attempted to see pt for therapy session. Pt w/ other provider, unable to check back 2/2 schedule constraints. Will reschedule per POC.

## 2021-04-13 NOTE — PLAN OF CARE
100.1 temp max. Remains tachycardic/tachypneic. On room air when awake. Has had a couple loose stools. Imodium given x1. Received 1L NS. Started on insulin drip. Currently on algorithm 4 at 8units/hr. Patient having trouble voiding. Bladder scanned for 700cc. Straight cath for 875cc. Urine samples sent to lab. Patient not eating well. Generalized edema. Skin very tight. VBG sent and lactic acid off that syringe was 5.4. MD notified and RRT called. Chemistry labs due at 2000. Continue POC.  Problem: Adult Inpatient Plan of Care  Goal: Plan of Care Review  Outcome: No Change     Problem: Adult Inpatient Plan of Care  Goal: Patient-Specific Goal (Individualized)  Outcome: No Change     Problem: Adult Inpatient Plan of Care  Goal: Absence of Hospital-Acquired Illness or Injury  Outcome: No Change     Problem: Adult Inpatient Plan of Care  Goal: Absence of Hospital-Acquired Illness or Injury  Intervention: Identify and Manage Fall Risk  Recent Flowsheet Documentation  Taken 4/13/2021 0900 by Hilary Lea, RN  Safety Promotion/Fall Prevention:   activity supervised   assistive device/personal items within reach     Problem: Adult Inpatient Plan of Care  Goal: Absence of Hospital-Acquired Illness or Injury  Intervention: Prevent Skin Injury  Recent Flowsheet Documentation  Taken 4/13/2021 0900 by Hilary Lea, RN  Body Position: position changed independently     Problem: Adult Inpatient Plan of Care  Goal: Optimal Comfort and Wellbeing  Outcome: No Change     Problem: Nausea and Vomiting (Stem Cell/Bone Marrow Transplant)  Goal: Nausea and Vomiting Symptom Relief  Outcome: No Change     Problem: Diarrhea (Stem Cell/Bone Marrow Transplant)  Goal: Diarrhea Symptom Control  Outcome: Declining     Problem: Fatigue (Stem Cell/Bone Marrow Transplant)  Goal: Energy Level Supports Daily Activity  Outcome: Declining     Problem: Nutrition Intake Altered (Stem Cell/Bone Marrow Transplant)  Goal: Optimal Nutrition  Intake  Outcome: Declining

## 2021-04-13 NOTE — CODE/RAPID RESPONSE
Rapid Response Team Note    Assessment   In assessment a rapid response was called on Theo Roblero due to lactic acidosis. This presentation is likely due to unclear etiology, possibly infectious vs 2/2 chemotherapy and presumed drug rash and worsened by Diabetes Mellitus, Type II  Relapsed MCL on loncastuximab/ibrutinib trial.     Plan   -  He has received 2L LR w/ continued worsening of his lactic acidosis. Biopsy of his whole-body rash was c/w drug rash, possible DRESS syndrome. He is on broad-spectrum abx currently, though no e/o localized infection and infectious w/u has thus far been negative. No further fluids/abx are indicated at this time.  -  BMP, VBG, TnI stat  -  Further plan, including consideration of MICU pending lab results and TBD by primary team.  -  The Hematology/Oncology primary team was present at the bedside and assisting in management of this complex patient's care.  -  Disposition: The patient will remain on the current unit for now, pending diagnostic results  -  Reassessment and plan follow-up will be performed by the primary team      CHA FLOWERS PA  [facility] RRT AMCOM Job Code Contact #[phone]    Hospital Course   Brief Summary of events leading to rapid response:   RRT called for repeat lactate of 6.7. Has had several rapid response calls for persistent lactic acidosis, currently on Zosyn despite negative infectious w/u. He received 1L LR bolus last night for LA 5.7, then additional 1L LR followed by mIVF after repeat LA 5.3. LA now up to 6.7. He is being treated for a biopsy-confirmed drug rash, presumably d/t allopurinol per heme/onc note. Patient had an episode of SVT vs AF w/ RVR earlier in the night associated w/ hypotension, resolved spontaneously. He remains tachycardic and mildly tachypneic. He c/o heartburn not relieved by TUMS. Denies other c/c at this time.    Admission Diagnosis:   Drug rash [L27.0]  Fever and chills [R50.9]  Lymphoma, unspecified body  region, unspecified lymphoma type (H) [C85.90]     Physical Exam   Temp: 100  F (37.8  C) Temp  Min: 98.5  F (36.9  C)  Max: 102.8  F (39.3  C)  Resp: 20 Resp  Min: 14  Max: 29  SpO2: 100 % SpO2  Min: 92 %  Max: 100 %  Pulse: 143 Pulse  Min: 116  Max: 158    No data recorded  BP: 134/58 Systolic (24hrs), Av , Min:84 , Max:137   Diastolic (24hrs), Av, Min:55, Max:92     I/Os: I/O last 3 completed shifts:  In: 4595 [P.O.:960; I.V.:2135; IV Piggyback:1000]  Out: 750 [Urine:750]     Exam:   General: in no acute distress  Mental Status: AAOx4.  CV: Tachycardic, well perfused.  Resp: Mildly tachypneic, no respiratory distress.    Significant Results and Procedures   Lactic Acid:   Recent Labs   Lab Test 21  0238 21  2032 21  1651 21  1542 04/10/21  2055 04/10/21  20520  1115 20  1115   LACT 6.7* 5.3* 4.0*  --    < >  --    < >  --    LACTS  --   --   --  5.2*  --  2.8*  --  1.6    < > = values in this interval not displayed.     CBC:   Recent Labs   Lab Test 21  0415 21  1415 21  0440   WBC 9.5 10.9 13.8*   HGB 9.9* 9.8* 10.5*   HCT 29.4* 29.2* 31.1*   PLT 60* 71* 75*        Sepsis Evaluation   The patient is not known to have an infection.  NO EVIDENCE OF SEPSIS at this time.  Vital sign, physical exam, and lab findings are likely due to type B lactic acidosis of uncertain etiology, possibly related to malignancy vs medications.

## 2021-04-13 NOTE — PROGRESS NOTES
[facility]    Hematology / Oncology Progress Note    Date of Service (when I saw the patient): 04/13/2021     Assessment & Plan   Theo Roblero is a 56 year old male with PMHx of T2DM, obesity, tobacco use, HTN, HLD, and stage IV Mantle cell lymphoma (s/p PBSCT 11/2020) with relapsed disease. Most recently received Loncatuximab + Ibrutinib 4/2/21. Presented to [facility] with fevers, hypotension, tachycardia and whole body rash. Developed hypotension refractory to fluid resuscitation and was transferred to MICU for pressors support briefly 4/11-4/12. S/p bx of rash positive for drug rash likely 2/2 allopurinol or Ibrutinib. Started on methylprednisolone 500 mg BID (x4/12) with fever curve now down trending. Complicated by DKA.       TODAY:   - DKA protocol ordered, endo consulted.    - ABG, Neuro checks QID, BG every hour until BG stable 100-150 x4    - Strict I/Os. Up 34lbs since admission.  - Worsening periorbital edema concerning for DRESS.    - Pending EBV, CMV, and HHV6 PCRs   - Monitor LFTs closely  - Telemetry monitoring ordered   - Nephrology consult for worsening renal failure and significant anasarca       HEME  #Mantle Cell Lymphoma (Stage IV)   Follows outpatient with Dr Nelson. High risk (IPI 6.5 and Ki67 of 20%), stage IV Mantle cell lymphoma in CR1 s/p 8 cycles of alternating 6 cycles of Maxi-R- CHOP alternating with high-dose LITZY-C with last cycle given 6/11/2020. Therapy was complicated by a kidney stone and sepsis in 5/2020, prior to cycle 5. S/p PBSCT 11/2020 with since relapse. Started on experimental study [id number] (PI Dr Nelson). Most recently received Loncatuximab + Ibrutinib 4/2/21.   - HOLD PTA Ibrutinib 560 mg daily given concern as cause of rash   - Next due for cycle 2 day 1 loncatuximab 4/23/21     #Anemia and Thrombocytopenia 2/2 Chemotherapy    - Transfuse if Hgb <7, plts <10K.     #Hypercoagulopathy   INR up trending, now 1.46  likely 2/2 component of DIC and poor nutrition. Fibrinogen WNL, elevated PTT.   - Monitor coags daily     DERM  #Suspected DRESS 2/2 allopurinol   Presented with whole body skin rash. Derm consulted, s/p skin bx (4/11/21) positive for drug rash. No eosinophilia on diff but facial swelling w/ periorbital edema, L axillary lymphadenopathy and kidney damage concerning for DRESS per derm. Suspect 2/2 Allopurinol or Ibrutinib (can lead to rash in up to 30% of patients).  - IV methylpred 500 mg BID (x4/12), Kenalog cream BID (patient prefers over ointment   - monitor CBC and CMP closely, can have evidence of liver damage 2/2 DRESS   - Pending -- EBV, CMV, and HHV6 PCRs, as viral reactivation can provoke DRESS  - will require suture removal 4/25    ID  #SIRS    Presented with fevers (Tmax 103), hypotension, tachycardia on 4/9/21. Concerning for infection vs delayed infusion reaction. Patient on Ibrutinib and Ioncatox (C1 = 4/2/21). CT CAP negative for intraabdominal or pulmonary source. CXR negative for pulmonary source. ESR 31, CRP 49 on presentation. Procal 0.51 -> 1.58. BCx NGTD. UA bland. WBC 10.9, plts 63k. ANC 7.8. Ongoing hypotension despite 30mL/kg fluid resuscitation 4/11, brief transfer to ICU for pressor support. Transferred back to floor 4/12.   - Ongoing lactic acidosis, suspect 2/2 malignancy, immune mediated process, possible infection and  DKA.    - mIVF per DKA protocol (Isotonic NS preferred)   - Due to persistent fevers and hypotension, started on IV methylpred 500 mg BID (x4/12)      Antimicrobials:   - Vancomycin 4/11 - 4/12   - Zosyn  4/11 - x  - Micafungin  4/11 -  4/12  - Acyclovir (home med)- HSV ppx  - Bactrim (home med)- PJP ppx      ENDO  #DKA  #T2DM   PTA on Metformin and Glimepride. Following initiation of HD steroids 4/12, blood sugars spikes to 300-400s with associated hyponatremia (126), bicarb 12, anion gap 16, +kussmals respirations, and ongoing hypotension resistant to LR boluses. Endo  consulted and started on insulin gtt per DKA protocol 4/13.   - A1C 5.9, ketone elevated in blood 0.8  - Per DKA protocol:   1) 1L NS bolus ordered   2) NS + KCl until    3) D5 + 1/2 NS until discontinued   - Monitor BG every hour until BG stable 100-150 x4    - Neuro checks QID   - CMP TID     #Hyponatremia   - Ha down trending, 126 today. Corrected for hyperglycemia improved to 132. Suspect hyponatremia driven by DKA and Renal Failure.   - Osm and urine Na - pending for completeness     RENAL  #NGOZI  #Anasarca   likely prerenal in the setting of sepsis and DKA. No muddy brown cast on UA. CT with large bladder  - Fena 0.4 suggestive of pre renal etiology. Cr 2.35 (baseline 0.9-1.1).   - Anasarca on exam 2/2 capillary leak and significant fluid resuscitation. Up 34 lbs since admission. Difficult to diurese in the context of DKA with hypotension.   - Renal consulted, appreciate assistance.   - Monitor with daily Cr    CARDS  #Sinus Tachycardia   - HRs 110-150s this admission in the setting of SIRS.   - Telemetry monitoring , PRN Metoprolol 5 mg ordered if sustained     #Hypertension   Inpatient ECHO unremarkable and DVT doppler negative.   - HOLD PTA Metoprolol ER 25 mg BID in the setting of acute illness and hypotension     #HLD  - HOLD PTA Lipitor 40 mg daily     PULM  #VILMA   Overnight this admission occasional desaturation noted overnight per nursing, down to mid 80s requiring 2-3L NC.   - Suspect VILMA, will need referral for sleep study as outpatient.     FEN  -MIVF per DKA protocol   -Lyte replacement PRN per protocol     PPx  #DVT: ppx Lovenox, HOLD if plts <50k   #GI: PPI    Consults: Derm, Endo, PT/OT   Code: FULL   Follow up: Follows outpatient with Dr Nelson in BMT clinic.    Dispo: Remain inpatient 5-7 days for management and work up of acute illness.     Discussed patient and plan with attending physician, Dr. Corea.     ZOË SousaC   Hematology/Oncology   Pager:  [phone]    Interval History   Overnight ongoing lactic acidosis and hyperglycemia. Tachy 150s, soft BPs continue. Required 3L overnight for desats. Denies pain, dyspnea. Denies issues with urination although starting to urinate less. Started having loose BMs yesterday, 5 total. No abd pain, cramping or foul smelling stools. No nausea or vomiting.     Physical Exam   Temp: 97.7  F (36.5  C) Temp src: Oral BP: 92/59 Pulse: 130   Resp: 29 SpO2: 97 % O2 Device: Nasal cannula Oxygen Delivery: 3 LPM  Vitals:    04/09/21 2335 04/13/21 0821   Weight: 103.4 kg (228 lb) 119.2 kg (262 lb 12.8 oz)     Vital Signs with Ranges  Temp:  [97.7  F (36.5  C)-100.4  F (38  C)] 97.7  F (36.5  C)  Pulse:  [116-158] 130  Resp:  [16-29] 29  BP: ()/(55-84) 92/59  SpO2:  [94 %-100 %] 97 %  I/O last 3 completed shifts:  In: 5378.75 [P.O.:1180; I.V.:2698.75; IV Piggyback:1000]  Out: 1200 [Urine:1200]    Constitutional: Pleasant male seen laying in bed. No apparent distress. Anasarca+  Eyes: Lids and lashes normal, sclera clear, conjunctiva normal. Periorbital edema.  ENT: Normocephalic, without obvious abnormality, atraumatic, oral pharynx with moist mucus membranes, tonsils without erythema or exudates, gums normal and good dentition.   Respiratory: +Kussmals, good air exchange, clear to auscultation bilaterally, no crackles or wheezing.  Cardiovascular: Normal apical impulse, tachy rate and rhythm, normal S1 and S2, and no murmur noted.  GI: No masses or scars. +BS. Soft. No tenderness on palpation.   Skin: Diffuse macular rash.   Extremities: Diffuse anasarca. No cyanosis.  Neurologic: Awake, alert, oriented to name, but not place or time.  Vascular access: R port c/d/i     Medications     0.9% sodium chloride + KCl 20 mEq/L       dextrose       dextrose 5% and 0.45% NaCl + KCl 20 mEq/L       insulin (regular) 12 mL/hr at 04/13/21 1055     - MEDICATION INSTRUCTIONS -         acyclovir  800 mg Oral BID     enoxaparin ANTICOAGULANT  40  mg Subcutaneous Q24H     lidocaine 1% with EPINEPHrine 1:100,000  3 mL Intradermal Once     methylPREDNISolone  500 mg Intravenous Q12H     [Held by provider] metoprolol succinate ER  25 mg Oral BID     nystatin  500,000 Units Oral 4x Daily     pantoprazole  40 mg Oral Q24H     piperacillin-tazobactam  3.375 g Intravenous Q6H     [Held by provider] study - ibrutinib (IDS# [id number])  560 mg Oral Daily     sulfamethoxazole-trimethoprim  1 tablet Oral Q Mon Tues BID     triamcinolone   Topical BID       Data   Results for orders placed or performed during the hospital encounter of 04/09/21 (from the past 24 hour(s))   Lactic acid level STAT   Result Value Ref Range    Lactate for Sepsis Protocol 5.2 (HH) 0.7 - 2.0 mmol/L   Lactic acid whole blood   Result Value Ref Range    Lactic Acid 4.0 (HH) 0.7 - 2.0 mmol/L   Glucose by meter   Result Value Ref Range    Glucose 330 (H) 70 - 99 mg/dL   Lactic acid whole blood   Result Value Ref Range    Lactic Acid 5.3 (HH) 0.7 - 2.0 mmol/L   Glucose by meter   Result Value Ref Range    Glucose 339 (H) 70 - 99 mg/dL   EKG 12-lead, tracing only   Result Value Ref Range    Interpretation ECG Click View Image link to view waveform and result    Lactic acid whole blood   Result Value Ref Range    Lactic Acid 6.7 (HH) 0.7 - 2.0 mmol/L   Basic metabolic panel   Result Value Ref Range    Sodium 126 (L) 133 - 144 mmol/L    Potassium 4.2 3.4 - 5.3 mmol/L    Chloride 98 94 - 109 mmol/L    Carbon Dioxide 14 (L) 20 - 32 mmol/L    Anion Gap 14 3 - 14 mmol/L    Glucose 370 (H) 70 - 99 mg/dL    Urea Nitrogen 46 (H) 7 - 30 mg/dL    Creatinine 2.13 (H) 0.66 - 1.25 mg/dL    GFR Estimate 33 (L) >60 mL/min/[1.73_m2]    GFR Estimate If Black 39 (L) >60 mL/min/[1.73_m2]    Calcium 7.0 (L) 8.5 - 10.1 mg/dL   Blood gas venous   Result Value Ref Range    Ph Venous 7.36 7.32 - 7.43 pH    PCO2 Venous 23 (L) 40 - 50 mm Hg    PO2 Venous 41 25 - 47 mm Hg    Bicarbonate Venous 13 (L) 21 - 28 mmol/L    Base Deficit  Venous 10.9 mmol/L    FIO2 3LPM    Troponin I   Result Value Ref Range    Troponin I ES 0.039 0.000 - 0.045 ug/L   Lactic acid whole blood   Result Value Ref Range    Lactic Acid 5.7 (HH) 0.7 - 2.0 mmol/L   CBC with platelets   Result Value Ref Range    WBC 10.1 4.0 - 11.0 10e9/L    RBC Count 2.45 (L) 4.4 - 5.9 10e12/L    Hemoglobin 7.8 (L) 13.3 - 17.7 g/dL    Hematocrit 22.8 (L) 40.0 - 53.0 %    MCV 93 78 - 100 fl    MCH 31.8 26.5 - 33.0 pg    MCHC 34.2 31.5 - 36.5 g/dL    RDW 13.6 10.0 - 15.0 %    Platelet Count 63 (L) 150 - 450 10e9/L   Comprehensive metabolic panel   Result Value Ref Range    Sodium 126 (L) 133 - 144 mmol/L    Potassium 4.2 3.4 - 5.3 mmol/L    Chloride 98 94 - 109 mmol/L    Carbon Dioxide 12 (L) 20 - 32 mmol/L    Anion Gap 16 (H) 3 - 14 mmol/L    Glucose 384 (H) 70 - 99 mg/dL    Urea Nitrogen 46 (H) 7 - 30 mg/dL    Creatinine 2.13 (H) 0.66 - 1.25 mg/dL    GFR Estimate 33 (L) >60 mL/min/[1.73_m2]    GFR Estimate If Black 39 (L) >60 mL/min/[1.73_m2]    Calcium 7.1 (L) 8.5 - 10.1 mg/dL    Bilirubin Total 0.7 0.2 - 1.3 mg/dL    Albumin 1.8 (L) 3.4 - 5.0 g/dL    Protein Total 4.0 (L) 6.8 - 8.8 g/dL    Alkaline Phosphatase 77 40 - 150 U/L    ALT 60 0 - 70 U/L    AST 49 (H) 0 - 45 U/L   Ketone Beta-Hydroxybutyrate Quantitative   Result Value Ref Range    Ketone Quantitative 0.8 (H) 0.0 - 0.6 mmol/L   WBC Differential   Result Value Ref Range    Diff Method Manual Differential     % Neutrophils 97.4 %    % Lymphocytes 1.7 %    % Monocytes 0.9 %    % Eosinophils 0.0 %    % Basophils 0.0 %    Absolute Neutrophil 9.8 (H) 1.6 - 8.3 10e9/L    Absolute Lymphocytes 0.2 (L) 0.8 - 5.3 10e9/L    Absolute Monocytes 0.1 0.0 - 1.3 10e9/L    Absolute Eosinophils 0.0 0.0 - 0.7 10e9/L    Absolute Basophils 0.0 0.0 - 0.2 10e9/L    Anisocytosis Slight     Poikilocytosis Moderate     Polychromasia Slight     Jose Daniel Cells Moderate     Platelet Estimate Confirming automated cell count    CRP inflammation   Result Value Ref  Range    CRP Inflammation 160.0 (H) 0.0 - 8.0 mg/L   Glucose by meter   Result Value Ref Range    Glucose 400 (H) 70 - 99 mg/dL   IgG   Result Value Ref Range     (L) 610 - 1,616 mg/dL   INR   Result Value Ref Range    INR 1.46 (H) 0.86 - 1.14   Partial thromboplastin time   Result Value Ref Range    PTT 41 (H) 22 - 37 sec   Fibrinogen activity   Result Value Ref Range    Fibrinogen 335 200 - 420 mg/dL   Glucose by meter   Result Value Ref Range    Glucose 391 (H) 70 - 99 mg/dL   Hemoglobin A1c   Result Value Ref Range    Hemoglobin A1C 5.9 (H) 0 - 5.6 %   Glucose by meter   Result Value Ref Range    Glucose 396 (H) 70 - 99 mg/dL   Glucose by meter   Result Value Ref Range    Glucose 307 (H) 70 - 99 mg/dL   Glucose by meter   Result Value Ref Range    Glucose 270 (H) 70 - 99 mg/dL   Comprehensive metabolic panel   Result Value Ref Range    Sodium 125 (L) 133 - 144 mmol/L    Potassium 3.8 3.4 - 5.3 mmol/L    Chloride 100 94 - 109 mmol/L    Carbon Dioxide 14 (L) 20 - 32 mmol/L    Anion Gap 11 3 - 14 mmol/L    Glucose 244 (H) 70 - 99 mg/dL    Urea Nitrogen 56 (H) 7 - 30 mg/dL    Creatinine 2.35 (H) 0.66 - 1.25 mg/dL    GFR Estimate 30 (L) >60 mL/min/[1.73_m2]    GFR Estimate If Black 34 (L) >60 mL/min/[1.73_m2]    Calcium 6.9 (L) 8.5 - 10.1 mg/dL    Bilirubin Total 0.5 0.2 - 1.3 mg/dL    Albumin 1.6 (L) 3.4 - 5.0 g/dL    Protein Total 3.7 (L) 6.8 - 8.8 g/dL    Alkaline Phosphatase 69 40 - 150 U/L    ALT 59 0 - 70 U/L    AST 45 0 - 45 U/L   Phosphorus   Result Value Ref Range    Phosphorus 2.8 2.5 - 4.5 mg/dL   Glucose by meter   Result Value Ref Range    Glucose 206 (H) 70 - 99 mg/dL

## 2021-04-13 NOTE — CODE/RAPID RESPONSE
Rapid Response Team Note    Assessment   In assessment a rapid response was called on Theo Roblero due to lactic acidosis. This presentation is likely due to unclear etiology, possibly infectious though more likely 2/2 chemotherapy and drug rash and worsened by Diabetes Mellitus, Type II  Relapsed MCL on loncastuximab/ibrutinib trial.     Plan   -  Continue to monitor.  -  The Internal Medicine primary team was able to be reached and they are in agreement with the above plan.  -  Disposition: The patient will remain on the current unit. We will continue to monitor this patient closely.  -  Reassessment and plan follow-up will be performed by the primary team      CHA FLOWERS PA  [facility] RRT AMCOM Job Code Contact #[phone]    Hospital Course   Brief Summary of events leading to rapid response:   RRT called for repeat LA 5.7 (6.7). He is resting comfortably at this time and AVSS.    Admission Diagnosis:   Drug rash [L27.0]  Fever and chills [R50.9]  Lymphoma, unspecified body region, unspecified lymphoma type (H) [C85.90]     Physical Exam   Temp: 100  F (37.8  C) Temp  Min: 98.5  F (36.9  C)  Max: 100.4  F (38  C)  Resp: 20 Resp  Min: 14  Max: 20  SpO2: 100 % SpO2  Min: 92 %  Max: 100 %  Pulse: 143 Pulse  Min: 116  Max: 158    No data recorded  BP: 134/58 Systolic (24hrs), Av , Min:84 , Max:137   Diastolic (24hrs), Av, Min:55, Max:92     I/Os: I/O last 3 completed shifts:  In: 4595 [P.O.:960; I.V.:2135; IV Piggyback:1000]  Out: 750 [Urine:750]     Exam:   General: in no acute distress  Mental Status: baseline mental status.      Significant Results and Procedures   Lactic Acid:   Recent Labs   Lab Test 21  0505 21  0238 21  1542 21  1542 04/10/21  2055 04/10/21  2055 11/20/20  1115 20  1115   LACT 5.7* 6.7* 5.3*   < >  --    < >  --    < >  --    LACTS  --   --   --   --  5.2*  --  2.8*  --  1.6    < > = values in this interval not displayed.      CBC:   Recent Labs   Lab Test 04/13/21  0521 04/12/21  0415 04/11/21  1415   WBC 10.1 9.5 10.9   HGB 7.8* 9.9* 9.8*   HCT 22.8* 29.4* 29.2*   PLT 63* 60* 71*        Sepsis Evaluation   The patient is not known to have an infection.  NO EVIDENCE OF SEPSIS at this time.  Vital sign, physical exam, and lab findings are likely due to type B lactic acidosis of uncertain etiology, possibly related to malignancy vs medications.

## 2021-04-14 NOTE — PROGRESS NOTES
[facility]    Hematology / Oncology Progress Note    Date of Service (when I saw the patient): 04/14/2021     Assessment & Plan   Theo Roblero is a 56 year old male with PMHx of T2DM, obesity, tobacco use, HTN, HLD, and stage IV Mantle cell lymphoma (s/p PBSCT 11/2020) with relapsed disease. Most recently received Loncatuximab + Ibrutinib 4/2/21. Presented to [facility] with fevers, hypotension, tachycardia and whole body rash. Developed hypotension refractory to fluid resuscitation and was transferred to MICU for pressors support briefly 4/11-4/12. S/p bx of rash positive for drug rash likely 2/2 allopurinol or Ibrutinib. Started on methylprednisolone 500 mg BID (x4/12) with fever curve now down trending. Complicated by NGOZI on CKD and DKA.      TODAY:   - Reduce steroids to Methylpred 1 mg/kg daily   - Nephrology consult for worsening renal failure and significant anasarca and reduced UOP   - Diuretic trial with Lasix 100 mg. Will transfer to ICU for CRRT if UOP does not improve +/- pressor support   - PO Bicarb TID started   - Hgb 4.7 from 11.5, CT A/P negative for bleed, stable hematoma. No s/sxs of bleeding on exam. 3u pRBCs ordered.   - CT Head w/o contrast for intermittent AMS - pending    - Endo following, continue insulin gtt   - RD consulted, poor PO intake. Prealbumin pending. Livan counts ordered 4/15-4/18      HEME  #Mantle Cell Lymphoma (Stage IV)   Follows outpatient with Dr Nelson. High risk (IPI 6.5 and Ki67 of 20%), stage IV Mantle cell lymphoma in CR1 s/p 8 cycles of alternating 6 cycles of Maxi-R- CHOP alternating with high-dose LITZY-C with last cycle given 6/11/2020. Therapy was complicated by a kidney stone and sepsis in 5/2020, prior to cycle 5. S/p PBSCT 11/2020 with since relapse. Started on experimental study [id number] (PI Dr Nelson). Most recently received Loncatuximab + Ibrutinib 4/2/21.   - HOLD PTA Ibrutinib 560 mg daily given concern as  possible cause of rash   - Next due for cycle 2 day 1 loncatuximab 4/23/21     #Anemia and Thrombocytopenia 2/2 Chemotherapy and possible Bleed    Hgb 4.7 from 11.5 on 4/14. No s/sxs of bleeding on exam. CT A/P negative for bleed, stable known hematoma. .  - Hapto and Smear - pending   - Transfuse if Hgb <7, plts <10K.     #Hypercoagulopathy   INR up trending, now 1.45 likely 2/2 component of DIC and poor nutrition. Fibrinogen WNL, elevated PTT.   - Monitor coags daily     DERM  #Suspected DRESS 2/2 allopurinol   Presented with whole body skin rash. Derm consulted, s/p skin bx (4/11/21) positive for drug rash. No eosinophilia on diff but facial swelling w/ periorbital edema, L axillary lymphadenopathy and kidney damage concerning for DRESS per derm. Suspect 2/2 Allopurinol or Ibrutinib (can lead to rash in up to 30% of patients).  - IV methylpred 500 mg BID (x4/12), Kenalog cream BID (patient prefers over ointment   - monitor CBC and CMP closely, can have evidence of liver damage 2/2 DRESS   - Pending -- EBV, CMV, and HHV6 PCRs, as viral reactivation can provoke DRESS  - will require suture removal 4/25   - repeat TSH/free T4 in 4 weeks (~5/12)    #Transaminitis   LFTs up trending likely 2/2 hypoperfusion and DRESS. T bili 0.7, Alk 100, ALT 95, .  - Monitor CMP TID.    ID  #SIRS    Presented with fevers (Tmax 103), hypotension, tachycardia on 4/9/21. Concerning for infection vs delayed infusion reaction. Patient on Ibrutinib and Ioncatox (C1 = 4/2/21). CT CAP negative for intraabdominal or pulmonary source. CXR negative for pulmonary source. ESR 31, CRP 49 on presentation. Procal 0.51 -> 1.58. BCx NGTD. UA bland. WBC 10.9, plts 63k. ANC 7.8. Ongoing hypotension despite 30mL/kg fluid resuscitation 4/11, brief transfer to ICU for pressor support. Transferred back to floor 4/12.   - Ongoing lactic acidosis, suspect 2/2 malignancy, immune mediated process, possible infection and  DKA.    - mIVF per DKA  protocol (Isotonic NS preferred)   - Due to persistent fevers and hypotension, started on HD steroids. IV methylpred 1 mg/kg daily (x4/12)      Antimicrobials:   - Vancomycin 4/11 - 4/12   - Zosyn  4/11 - 4/14  - Micafungin  4/11 -  4/12  - Acyclovir (home med) - HSV ppx  - Bactrim (home med) - PJP ppx      ENDO  #DKA  #T2DM   PTA on Metformin and Glimepride. Following initiation of HD steroids 4/12, blood sugars spikes to 300-400s with associated hyponatremia (126), bicarb 12, anion gap 16, +kussmals respirations, and ongoing hypotension resistant to LR boluses. Endo consulted and started on insulin gtt per DKA protocol 4/13.   - A1C 5.9, ketone elevated in blood 0.8, DKA protocol ordered 4/13, mIVF later discontinued 4/14 given hypervolemia   - Neuro checks QID   - CMP TID     #Hyponatremia   - Na down trending, 126 today. Corrected for hyperglycemia improved to 132. Suspect hyponatremia driven by DKA and Renal Failure.   - Serum osm 284, Urine osm 510 and urine Na 17, consistent with underperfusion and inability to excrete free water. Per nephro, should correct as capillary leak improves.   - CMP TID, keep K >4.0    RENAL  #NGOZI on CKD  #Anasarca   likely prerenal in the setting of sepsis and DKA. No muddy brown cast on UA. CT with large bladder  - Fena 0.4 suggestive of pre renal etiology. Cr 2.35 (baseline 0.9-1.1).   - Anasarca on exam 2/2 capillary leak and significant fluid resuscitation. Up 34 lbs since admission. Difficult to diurese in the context of DKA with hypotension. No UOP overnight 4/13, trial Lasix 100 mg with 625 ml output.   - Renal consulted, appreciate assistance.   - Monitor with daily CMP    #Acidosis   Anion gap acidosis with serum ketones likely 2/2 DKA, hypoperfusion, lymphoma and NGOZI on CKD.   - VBG (4/14) with pH 7.33, CO2 22, O2 27 and bicarb 12.   - PO Bicarb 1300 mg TID (4/14-x)     #Hypocalcemia   - Calcium down trending, iCal 4.1   - Calcium Gluconate 2g ordered (4/14)      CARDS  #Sinus Tachycardia   - HRs 110-150s this admission in the setting of SIRS.   - Telemetry monitoring, PRN Metoprolol 5 mg ordered if sustained     #Hypertension   Inpatient ECHO unremarkable and DVT doppler negative.   - HOLD PTA Metoprolol ER 25 mg BID in the setting of acute illness and hypotension     #HLD  - HOLD PTA Lipitor 40 mg daily     PULM  #VILMA  #R Hemidiaphragm Elevation    Overnight this admission occasional desaturation noted overnight per nursing, down to mid 80s requiring 2-3L NC. CXR 4/14 with R hemidiaphragm elevation.   - Suspect VILMA, will need referral for sleep study as outpatient.   - BiPAP as needed     FEN  - MIVF per DKA protocol   - Lyte replacement PRN per protocol      PPx  #DVT: HOLD ppx Lovenox (4/14-x) with concern for possible bleed   #GI: PPI BID (given possible bleed)   #Diarrhea: Started 4/13, C diff pending.     Consults: Derm, Endo, PT/OT, Nephro    Code: FULL   Follow up: Follows outpatient with Dr Nelson in BMT clinic.    Dispo: Remain inpatient 5-7 days for management and work up of acute illness.     Discussed patient and plan with attending physician, Dr. Stephen.     Ana Beltran PA-C   Hematology/Oncology   Pager: [phone]    Interval History   Overnight ongoing lactic acidosis intermittent AMS and tachypnea. Tachy, soft BPs continue. New worsening anemia, no bleeding reported per patient or nursing. Required 1-3L overnight. Denies pain, dyspnea. Denies issues with urination although starting to urinate less, agreeable to ocampo. Denies recurrent diarrhea. No abd pain, cramping or foul smelling stools. No nausea or vomiting.     Physical Exam   Temp: 97.5  F (36.4  C) Temp src: Oral BP: 93/49 Pulse: 109   Resp: 24 SpO2: 99 % O2 Device: Nasal cannula Oxygen Delivery: 3 LPM  Vitals:    04/09/21 2335 04/13/21 0821   Weight: 103.4 kg (228 lb) 119.2 kg (262 lb 12.8 oz)     Vital Signs with Ranges  Temp:  [96.4  F (35.8  C)-98  F (36.7  C)] 97.5  F (36.4   C)  Pulse:  [109-131] 109  Resp:  [23-30] 24  BP: (78-98)/(49-63) 93/49  SpO2:  [92 %-99 %] 99 %  I/O last 3 completed shifts:  In: 4484.84 [P.O.:200; I.V.:3284.84; IV Piggyback:1000]  Out: 875 [Urine:875]    Constitutional: Pleasant male seen laying in bed. No apparent distress. Anasarca+  Eyes: Lids and lashes normal, sclera clear, conjunctiva normal. Periorbital edema.  ENT: Normocephalic, without obvious abnormality, atraumatic, oral pharynx with moist mucus membranes, tonsils without erythema or exudates, gums normal and good dentition.   Respiratory: Tachypnea, good air exchange, clear to auscultation bilaterally, no crackles or wheezing.  Cardiovascular: Normal apical impulse, tachy rate and rhythm, normal S1 and S2, and no murmur noted.  GI: No masses or scars. +BS. Soft. No tenderness on palpation.   Skin: Diffuse macular rash.   Extremities: Diffuse anasarca. No cyanosis.  Neurologic: Awake, alert, oriented to name, but not place or time.  Vascular access: R port c/d/i     Medications     dextrose       insulin (regular) 2 mL/hr at 04/14/21 0854     - MEDICATION INSTRUCTIONS -         acyclovir  400 mg Oral BID     insulin aspart   Subcutaneous TID AC     methylPREDNISolone  1 mg/kg Intravenous Daily     [Held by provider] metoprolol succinate ER  25 mg Oral BID     nystatin  500,000 Units Oral 4x Daily     pantoprazole  40 mg Oral BID AC     sodium bicarbonate  1,300 mg Oral TID     [Held by provider] study - ibrutinib (IDS# [id number])  560 mg Oral Daily     sulfamethoxazole-trimethoprim  1 tablet Oral Q Mon Tues BID     triamcinolone   Topical BID       Data   Results for orders placed or performed during the hospital encounter of 04/09/21 (from the past 24 hour(s))   Glucose by meter   Result Value Ref Range    Glucose 270 (H) 70 - 99 mg/dL   Osmolality   Result Value Ref Range    Osmolality 284 275 - 295 mmol/kg   Comprehensive metabolic panel   Result Value Ref Range    Sodium 125 (L) 133 - 144 mmol/L     Potassium 3.8 3.4 - 5.3 mmol/L    Chloride 100 94 - 109 mmol/L    Carbon Dioxide 14 (L) 20 - 32 mmol/L    Anion Gap 11 3 - 14 mmol/L    Glucose 244 (H) 70 - 99 mg/dL    Urea Nitrogen 56 (H) 7 - 30 mg/dL    Creatinine 2.35 (H) 0.66 - 1.25 mg/dL    GFR Estimate 30 (L) >60 mL/min/[1.73_m2]    GFR Estimate If Black 34 (L) >60 mL/min/[1.73_m2]    Calcium 6.9 (L) 8.5 - 10.1 mg/dL    Bilirubin Total 0.5 0.2 - 1.3 mg/dL    Albumin 1.6 (L) 3.4 - 5.0 g/dL    Protein Total 3.7 (L) 6.8 - 8.8 g/dL    Alkaline Phosphatase 69 40 - 150 U/L    ALT 59 0 - 70 U/L    AST 45 0 - 45 U/L   Phosphorus   Result Value Ref Range    Phosphorus 2.8 2.5 - 4.5 mg/dL   Nephrology General Adult IP Consult: NGOZI in the setting of DKA and DRESS, anasarca; Consultant may enter orders: Yes; Requesting provider? Attending physician; Name: Nuria Danielle MD     4/13/2021  7:34 PM    Nephrology Initial Consult  April 13, 2021      Theo Roblero MRN:[id number] YOB: 1965  Date of Admission:4/9/2021  Primary care provider: Mike Price  Requesting physician: Omaira Corea MD    55 yo man S/P PBSCT in Nov 2020 now with relapsed mantle cell   lymphoma adm 4/9 with diffuse total body skin rash and temp of   103 (Probably drug-induced and possibly DRESS) occurring in the   context of new chemo regimen    ASSESSMENT AND RECOMMENDATIONS:     # NGOZI on CKD:  Baseline Cr was 0.9 in 2020.  Cr 1.1 on 4/2, 1.8   on 4/12 and 2.4 2/13.  UA bland (minimal proteinuria). CT from   4/11 does not show hydronephrosis.  BP was low requiring pressors   until 4/11.  FENa 0.4 % on 4/11.  Alb 1.8.  Renal underperfusion   (pre-renal azotemia) is possible given low albumin and leaky   capillaries.  Possible nephrotoxins include new chemotherapeutic   agents and now vancomycin.  In addition his Cr has been rising   gradually since BMT so likely has underlying CKD.  Although CT   does not show hydronephrosis, he has difficulty  voiding and says   he then voids a large amount.   - daily Cr   - discontinue vanco if possible (nephrotoxic and can cause skin   rash)   - check post void residual   -consider ocampo cath for 24 hours to monitor UOP response to   diuretics   - renal US    # Volume overload:  Weight is 119 kg up from 103 kg on 4/9.   Pt   received 8L IVF on 4/11 and is now 12 L I>O in 72 hours.  Lungs   remain clear and there is no ascites on CT 4/11 but his abdomen   is very distended and he has anasarca. BP remains borderline with   SBPs in 90s.    - rec stop IVF   - consider resuming pressors if BP remains low   - trial of IV diuretic.  Lasix 60 mg IV X one dose to see   response   - may want to repeat abd CT in view of his distended abd, high   fluid requirement and worsening diarrhea    #Anemia: Hb down to 7.9 from 11.5.  Possible hematoma seen on CT.    Multiple potential causes.   - work-up per team    # Acidosis: Anion gap acidosis with high lactate and serum   ketones.   Lactate production may be from ongoing hypoperfusion   from capillary leak or lactate overproduction by tumor.   Now   down to 5 from 7.  DKA seems unlikely since he has had type 2 DM   controlled with metformin.  Ketosis may be from poor PO intake.    VBG pH 7.3 so respiratory compensation is appropriate.     - does not need DKA IVF protocol   - continue to monitor lactic acid trend    #Hyponatremia: Na 125 down from 130 on 4/9. Corrects to 127 with   adjustment for high glucose.  U osm 519, urine Na 17 are   consistent with underperfusion (appropriate ADH) and inability to   excrete free water..  This may self-correct as capillary leak   improves but would minimize IVF.    - monitor Na   - keep K greater than 4.0      Recommendations were communicated to primary team via note.    Nuria Carter MD   [phone]      REASON FOR CONSULT: NGOZI and anasarca    HISTORY OF PRESENT ILLNESS:  Admitting provider and nursing notes reviewed.  Patient is seen   with  significant other who says he is somewhat confused.    Theo Roblero is a 56 year old man S/P PBSCT in Nov 2020 now   with relapsed mantle cell lymphoma adm 4/9 with diffuse total   body skin rash and temp of 103 (Probably drug-induced and   possibly DRESS) occurring in the context of new chemo regimen.      Pt was diagnosed with mantle cell lymphoma in 2020 and was   treated with chemo followed by PBSCT in Nov 2020.  His lymphoma   has relapsed so he was started on an experimental protocol with   Ibrutinib and Loncatuximab one week PTA (4/2).  He presented to   the ER with fever to 102 and total body rash on 4/9.  Possible   DRESS was diagnosed (although pt does not have eosinophilia) so   high dose steroids were started. He defervesced but remained   hypotensive so he was transferred to the MICU for pressors 4/11   to 4/12.      His glucose wyatt secondary to steroids so DKA was diagnosed 4/13   and he was started on the DKA protocol.  He is now 12 L in > out   in the past 24 hours.  He says he feels tired but not SOB.  He   notes increasing abdominal girth but has no abdominal pain.  He   says he has difficulty urinating. Stool volume is increased but   not frankly liquid per pt.    Patient has no known CKD although Cr has been rising very slowly   over the past 6 months.  Cr was 0.9 in 8/2020, 1.3 on 12/9/2021,   1.8 on 4/12 and 2.4 today.  Hx infected kidney stone in 5/2020   per pt and chart but no stone seen on CT now (not stone cut CT).    Hypotensive as noted above for the past several days.           PAST MEDICAL HISTORY:  Reviewed with patient and SO on 04/13/2021     Past Medical History:   Diagnosis Date     History of peripheral stem cell transplant (H) 01/2021     Kidney stones 06/2020    left     Mantle cell lymphoma of lymph nodes of multiple sites (H) 2020     Nephrolithiasis 05/2020     Sepsis without septic shock (H) 05/12/2020    seconary tp pyelonephritis from obstruction left    ureteroliethasis       Past Surgical History:   Procedure Laterality Date     cystoscopy, left pyelogram, ureteral stent placement    05/12/2020    stone was blocking the ureter     cystoscopy, retrograde pyelogram, uretroscopy. laser   lithotripsy Left 06/04/2020    treat kidney stones     INSERT CATHETER VASCULAR ACCESS Left 10/26/2020    Procedure: dual lumen non valved tunneled line vascular   placement;  Surgeon: Alessandro Steven MD;  Location: [facility] OR     IR CVC TUNNEL PLACEMENT > 5 YRS OF AGE  10/26/2020     IR CVC TUNNEL REMOVAL LEFT  12/8/2020     IR LYMPH NODE BIOPSY  3/10/2021     port a cath placement Right 02/18/2020    internal jugular vein        MEDICATIONS:  PTA Meds  Prior to Admission medications    Medication Sig Last Dose Taking? Auth Provider   acyclovir (ZOVIRAX) 800 MG tablet Take 1 tablet (800 mg) by mouth   2 times daily 4/10/2021 at Unknown time Yes Sam Zavala MD   atorvastatin (LIPITOR) 40 MG tablet Take 40 mg by mouth 4/10/2021   at Unknown time Yes Reported, Patient   glimepiride (AMARYL) 4 MG tablet Take 1 tablet (4 mg) by mouth   every morning (before breakfast) 4/9/2021 at Unknown time Yes   Prisca Corley PA-C   metFORMIN (GLUCOPHAGE-XR) 500 MG 24 hr tablet Take 2 tablets   (1,000 mg) by mouth daily (with dinner) 4/9/2021 at Unknown time   Yes Prisca Corley PA-C   metoprolol succinate ER (TOPROL-XL) 25 MG 24 hr tablet Take 1   tablet (25 mg) by mouth 2 times daily 4/10/2021 at Unknown time   Yes Sam Zavala MD   Alcohol Swabs PADS 1 pad 4 times daily (before meals and nightly)     Alexa Medrano PA-C   blood glucose (NO BRAND SPECIFIED) test strip Use to test blood   sugar 4 times daily or as directed.   Sam Zavala MD   blood glucose monitoring (ACCU-CHEK FASTCLIX) lancets Use to test   blood sugar 4 times daily or as directed.   Prisca Corley PA-C   dexamethasone (DECADRON) 4 MG tablet 4 mg twice daily day before,   day of, and day  after Lonca-T infusion   Abrna Harmon MD   ondansetron (ZOFRAN-ODT) 8 MG ODT tab Take 1 tablet (8 mg) by   mouth every 8 hours as needed for nausea   Nancy Galvan PA-C   study - ibrutinib, IDS# [id number], 140 mg capsule Take 4 capsules (560   mg) by mouth daily Take at the same time each day. Swallow whole,   Do NOT crush, chew or open capsules.   Abran Harmon MD   sulfamethoxazole-trimethoprim (BACTRIM DS) 800-160 MG tablet Take   1 tablet by mouth Every Mon, Tues two times daily Started 20     Erlinda Dupont APRN CNP      Current Meds    acyclovir  800 mg Oral BID     enoxaparin ANTICOAGULANT  40 mg Subcutaneous Q24H     lidocaine 1% with EPINEPHrine 1:100,000  3 mL Intradermal Once     methylPREDNISolone  500 mg Intravenous Q12H     [Held by provider] metoprolol succinate ER  25 mg Oral BID     nystatin  500,000 Units Oral 4x Daily     pantoprazole  40 mg Oral Q24H     [Held by provider] study - ibrutinib (IDS# [id number])  560 mg Oral   Daily     sulfamethoxazole-trimethoprim  1 tablet Oral Q  BID     triamcinolone   Topical BID     Infusion Meds    0.9% sodium chloride + KCl 20 mEq/L 100 mL/hr at 21 1334     dextrose       dextrose 5% and 0.45% NaCl + KCl 20 mEq/L 100 mL/hr at 21   1516     insulin (regular) 8 mL/hr at 21 1623     - MEDICATION INSTRUCTIONS -         ALLERGIES:    Allergies   Allergen Reactions     Allopurinol Rash       REVIEW OF SYSTEMS:  A comprehensive of systems was negative except as noted above.    SOCIAL HISTORY:   No EtOH.  Has never smoked.    Reviewed with patient.  Significant other accompanies Theo Roblero in hospital room    FAMILY MEDICAL HISTORY:   Family History   Problem Relation Age of Onset     Heart Disease Mother      Lymphoma Mother      Heart Disease Father      Heart Disease Sister      Reviewed with patient    PHYSICAL EXAM:   Temp  Av.1  F (37.8  C)  Min: 97.4  F (36.3  C)  Max: 103.2    F (39.6  C)     "  Pulse  Av.8  Min: 71  Max: 158 Resp  Av.7  Min: 10    Max: 30  SpO2  Av.6 %  Min: 91 %  Max: 100 %       BP 95/50 (BP Location: Left arm)   Pulse 129   Temp 97.6  F   (36.4  C) (Oral)   Resp 30   Ht 1.803 m (5' 11\")   Wt 119.2 kg   (262 lb 12.8 oz)   SpO2 96%   BMI 36.65 kg/m     Date 21 07 - 21 0659   Shift 6266-3053 0177-3085 9776-8944 24 Hour Total   INTAKE   I.V. 329.5 216  545.5   IV Piggyback 1000   1000   Shift Total(mL/kg) 1329.5(11.15) 216(1.81)  1545.5(12.97)   OUTPUT   Urine  0  0   Shift Total(mL/kg)  0(0)  0(0)   Weight (kg) 119.2 119.2 119.2 119.2      Admit Weight: 103.4 kg (228 lb)     GENERAL APPEARANCE: No  distress,  awake  EYES: no scleral icterus, pupils equal  Endo: no goiter, facial edema, no moon facies  Lymphatics: no cervical or supraclavicular LAD  Pulmonary: lungs clear to auscultation with equal breath sounds   bilaterally, increased resp rate to 32   CV: heart sounds distant   - JVD cannot assess    - Edema  anasarca  GI: distended, nontender  MS: no evidence of inflammation in joints, no muscle tenderness  : no ocampo  SKIN: extensive maculopapular total body skin rash.  NEURO: face symmetric, answers seem appropriate but does not   remember details    LABS:   CMP  Recent Labs   Lab 21  1317 21  0521 21  0338 21  0415 21  1415 21  0803 21  0830 21  0830   * 126* 126* 129* 131*  --    < > 130*   POTASSIUM 3.8 4.2 4.2 3.9 3.7 3.7   < > 4.4   CHLORIDE 100 98 98 102 104  --    < > 97   CO2 14* 12* 14* 18* 18*  --    < > 23   ANIONGAP 11 16* 14 9 9  --    < > 10   * 384* 370* 137* 185*  --    < > 130*   BUN 56* 46* 46* 26 25  --    < > 28   CR 2.35* 2.13* 2.13* 1.78* 1.53*  --    < > 1.47*   GFRESTIMATED 30* 33* 33* 41* 50*  --    < > 52*   GFRESTBLACK 34* 39* 39* 48* 58*  --    < > 60*   BELA 6.9* 7.1* 7.0* 7.3* 7.2*  --    < > 8.9   MAG  --   --   --  2.5*  --  1.5*  --  2.0   PHOS 2.8  --  "  --   --   --  2.6  --  3.6   PROTTOTAL 3.7* 4.0*  --  4.3* 4.3*  --    < > 6.5*   ALBUMIN 1.6* 1.8*  --  2.0* 1.9*  --    < > 3.0*   BILITOTAL 0.5 0.7  --  0.6 0.8  --    < > 0.7   ALKPHOS 69 77  --  50 48  --    < > 90   AST 45 49*  --  46* 35  --    < > 31   ALT 59 60  --  53 42  --    < > 37    < > = values in this interval not displayed.     CBC  Recent Labs   Lab 04/13/21  0521 04/12/21  0415 04/11/21  1415 04/11/21  0440   HGB 7.8* 9.9* 9.8* 10.5*   WBC 10.1 9.5 10.9 13.8*   RBC 2.45* 3.14* 3.10* 3.31*   HCT 22.8* 29.4* 29.2* 31.1*   MCV 93 94 94 94   MCH 31.8 31.5 31.6 31.7   MCHC 34.2 33.7 33.6 33.8   RDW 13.6 13.6 13.6 13.4   PLT 63* 60* 71* 75*     INR  Recent Labs   Lab 04/13/21  0840 04/09/21  2347   INR 1.46* 1.08   PTT 41*  --      ABG  Recent Labs   Lab 04/13/21  1511 04/13/21  0338 04/11/21  1423   PH  --   --  7.43   PCO2  --   --  25*   PO2  --   --  75*   HCO3  --   --  17*   O2PER 3L 3LPM 21      URINE STUDIES  Recent Labs   Lab Test 04/11/21  1600 04/10/21  0611 03/26/21  0600 11/17/20  2200   COLOR Yellow Yellow Straw Yellow   APPEARANCE Slightly Cloudy Clear Clear Clear   URINEGLC 100* Negative Negative 300*   URINEBILI Negative Negative Negative Negative   URINEKETONE Trace* Negative Negative Negative   SG 1.033 1.021 1.004 1.013   UBLD Trace* Negative Negative Trace*   URINEPH 5.5 5.0 6.0 5.5   PROTEIN 50* 20* Negative 10*   NITRITE Negative Negative Negative Negative   LEUKEST Negative Negative Negative Negative   RBCU 1 1 <1 1   WBCU 3 <1 0 1     No lab results found.  PTH  No lab results found.  IRON STUDIES  No lab results found.    IMAGING:  CT 4/11:  FINDINGS:  TUBES/LINES: Right chest wall Port-A-Cath with tip in the low   SVC.     CHEST:  LUNGS: The trachea and central airways are patent. No   pneumothorax.  Trace bilateral pleural fluid. Small amount of fluid tracking   into the  right major fissure. Incidental azygos fissure, normal variant.  Bandlike subsegmental atelectasis in  the right lung base. No   focal  airspace opacity. No suspicious pulmonary nodule.     MEDIASTINUM: Heart size is within normal limits. New small   loculated  anterior pericardial fluid. The ascending aorta and main   pulmonary  artery diameters are within normal limits. Coronary artery   stents.  Multiple prominent and enlarged left greater than right axillary   lymph  nodes for example left level 1 axillary node measuring 14 mm   (series  3, image 64) these axillary lymph nodes demonstrated FDG activity   on  prior PET CT. No suspicious mediastinal or hilar lymph nodes.     ABDOMEN/PELVIS:  Evaluation limited due to patient respiratory motion.     LIVER: No focal hepatic mass.     BILIARY: The gallbladder is decompressed. No intraluminal   gallstone.  No intrahepatic or extrahepatic biliary ductal dilatation.     PANCREAS: Fatty infiltration of the pancreatic head, uncinate   process  and pancreatic neck.     SPLEEN: Redemonstration of ill-defined hypodense perisplenic   fluid  collection that are visualized on the prior PET/CT likely   representing  a subacute to chronic hematoma. Small splenule.     ADRENAL GLANDS: Within normal limits.     URINARY TRACT: Large exophytic simple cyst in the inferior pole   of the  left kidney measuring up to 10.1 cm. No focal right renal mass.  Unchanged mild nonspecific perinephric stranding. No   hydronephrosis or  hydroureter. No obstructing renal stone. Urinary bladder is  unremarkable.     REPRODUCTIVE ORGANS: Within normal limits.     STOMACH: Within normal limits.     BOWEL: Moderate colonic stool burden. No abnormally dilated loops   of  large or small bowel. The previously visualized wall thickening   in the  anus rectum, and sigmoid colon is less conspicuous on today's   exam;  however there is inflammation/lymphoma in the distal sigmoid   colon  series 2 image 221. The appendix is unremarkable.     PERITONEUM/FLUID: No free fluid.     VESSELS: Atherosclerotic  calcifications of the abdominal aorta   and  iliac arteries.     LYMPH NODES: No significant change in size or number of multiple  prominent peritoneal retroperitoneal lymph nodes for example 8 mm  right para-aortic lymph node (series 3, image 139) which   previously  demonstrated FDG activity on recent PET/CT. Unchanged enlargement   of  bilateral inguinal nodes for example 1.4 cm left inguinal node   (series  3, image 718) and enlarged right inguinal node measuring 1.5 cm  (series 3, image 729) there is mild associated stranding and soft  tissue nodularity adjacent to these lymph nodes.      BONES/SOFT TISSUES: No aggressive osseous lesions. Probable   Schmorl  node deformity to the superior endplate of L4. Anterior flowing  osteophytes throughout multiple thoracic vertebrae likely   representing  diffuse idiopathic skeletal hyperostosis. Small amount of   subcutaneous  emphysema about the lower midabdomen, likely related to recent  medication injection.                                                                      IMPRESSION: In this patient with a history of mantle cell   lymphoma  status post chemotherapy and stem cell transplant:    1. No significant interval change in scattered enlarged lymph   nodes in  the chest abdomen pelvis which previously demonstrated FDG   avidity on  recent whole body PET/CT concerning for disease progression.  2. Inflammation/lymphoma about the distal sigmoid colon, this   might  represent a source for infection although not significantly   changed  from PET/CT 3/26/2021.  3. Unchanged perisplenic fluid collection likely representing   chronic  hematoma.  4. Trace bilateral pleural effusions. No focal airspace opacity.   New  small anterior pericardial fluid without surrounding   inflammation.     I have personally reviewed the examination and initial   interpretation  and I agree with the findings.    Nuria Carter MD       Glucose by meter   Result Value Ref Range     Glucose 206 (H) 70 - 99 mg/dL   Blood gas venous   Result Value Ref Range    Ph Venous 7.31 (L) 7.32 - 7.43 pH    PCO2 Venous 24 (L) 40 - 50 mm Hg    PO2 Venous 29 25 - 47 mm Hg    Bicarbonate Venous 12 (L) 21 - 28 mmol/L    Base Deficit Venous 12.8 mmol/L    FIO2 3L    Lactic acid whole blood   Result Value Ref Range    Lactic Acid 5.4 (HH) 0.7 - 2.0 mmol/L   Glucose by meter   Result Value Ref Range    Glucose 188 (H) 70 - 99 mg/dL   Glucose by meter   Result Value Ref Range    Glucose 189 (H) 70 - 99 mg/dL   Osmolality urine   Result Value Ref Range    Urine Osmolality 510 100 - 1,200 mmol/kg   Sodium random urine   Result Value Ref Range    Sodium Urine mmol/L 17 mmol/L   UA reflex to Microscopic   Result Value Ref Range    Color Urine Yellow     Appearance Urine Slightly Cloudy     Glucose Urine >1000 (A) NEG^Negative mg/dL    Bilirubin Urine Negative NEG^Negative    Ketones Urine Trace (A) NEG^Negative mg/dL    Specific Gravity Urine 1.024 1.003 - 1.035    Blood Urine Small (A) NEG^Negative    pH Urine 5.5 5.0 - 7.0 pH    Protein Albumin Urine 50 (A) NEG^Negative mg/dL    Urobilinogen mg/dL Normal 0.0 - 2.0 mg/dL    Nitrite Urine Negative NEG^Negative    Leukocyte Esterase Urine Negative NEG^Negative    Source Catheterized Urine     RBC Urine 1 0 - 2 /HPF    WBC Urine 7 (H) 0 - 5 /HPF    Squamous Epithelial /HPF Urine 0 0 - 1 /HPF    Transitional Epi <1 0 - 1 /HPF    Mucous Urine Present (A) NEG^Negative /LPF    Granular Casts 5 (A) NEG^Negative /LPF   Glucose by meter   Result Value Ref Range    Glucose 190 (H) 70 - 99 mg/dL   Glucose by meter   Result Value Ref Range    Glucose 187 (H) 70 - 99 mg/dL   Comprehensive metabolic panel   Result Value Ref Range    Sodium 126 (L) 133 - 144 mmol/L    Potassium 4.1 3.4 - 5.3 mmol/L    Chloride 100 94 - 109 mmol/L    Carbon Dioxide 14 (L) 20 - 32 mmol/L    Anion Gap 11 3 - 14 mmol/L    Glucose 166 (H) 70 - 99 mg/dL    Urea Nitrogen 67 (H) 7 - 30 mg/dL    Creatinine  2.77 (H) 0.66 - 1.25 mg/dL    GFR Estimate 24 (L) >60 mL/min/[1.73_m2]    GFR Estimate If Black 28 (L) >60 mL/min/[1.73_m2]    Calcium 7.0 (L) 8.5 - 10.1 mg/dL    Bilirubin Total 0.4 0.2 - 1.3 mg/dL    Albumin 1.6 (L) 3.4 - 5.0 g/dL    Protein Total 3.5 (L) 6.8 - 8.8 g/dL    Alkaline Phosphatase 71 40 - 150 U/L    ALT 58 0 - 70 U/L    AST 53 (H) 0 - 45 U/L   Phosphorus   Result Value Ref Range    Phosphorus 3.1 2.5 - 4.5 mg/dL   Glucose by meter   Result Value Ref Range    Glucose 125 (H) 70 - 99 mg/dL   Glucose by meter   Result Value Ref Range    Glucose 152 (H) 70 - 99 mg/dL   Glucose by meter   Result Value Ref Range    Glucose 154 (H) 70 - 99 mg/dL   Glucose by meter   Result Value Ref Range    Glucose 151 (H) 70 - 99 mg/dL   Glucose by meter   Result Value Ref Range    Glucose 130 (H) 70 - 99 mg/dL   Glucose by meter   Result Value Ref Range    Glucose 133 (H) 70 - 99 mg/dL   Magnesium   Result Value Ref Range    Magnesium 2.4 (H) 1.6 - 2.3 mg/dL   Uric acid   Result Value Ref Range    Uric Acid 6.8 3.5 - 7.2 mg/dL   CRP inflammation   Result Value Ref Range    CRP Inflammation 85.0 (H) 0.0 - 8.0 mg/L   CBC with platelets differential   Result Value Ref Range    WBC 9.5 4.0 - 11.0 10e9/L    RBC Count 1.64 (L) 4.4 - 5.9 10e12/L    Hemoglobin 5.3 (LL) 13.3 - 17.7 g/dL    Hematocrit 15.1 (L) 40.0 - 53.0 %    MCV 92 78 - 100 fl    MCH 32.3 26.5 - 33.0 pg    MCHC 35.1 31.5 - 36.5 g/dL    RDW 13.6 10.0 - 15.0 %    Platelet Count 52 (L) 150 - 450 10e9/L    Diff Method Manual Differential     % Neutrophils 94.8 %    % Lymphocytes 4.3 %    % Monocytes 0.9 %    % Eosinophils 0.0 %    % Basophils 0.0 %    Absolute Neutrophil 9.0 (H) 1.6 - 8.3 10e9/L    Absolute Lymphocytes 0.4 (L) 0.8 - 5.3 10e9/L    Absolute Monocytes 0.1 0.0 - 1.3 10e9/L    Absolute Eosinophils 0.0 0.0 - 0.7 10e9/L    Absolute Basophils 0.0 0.0 - 0.2 10e9/L    Poikilocytosis Marked     Ovalocytes Slight     Jose Daniel Cells Marked     Platelet Estimate  Confirming automated cell count    Comprehensive metabolic panel   Result Value Ref Range    Sodium 127 (L) 133 - 144 mmol/L    Potassium 4.3 3.4 - 5.3 mmol/L    Chloride 101 94 - 109 mmol/L    Carbon Dioxide 11 (L) 20 - 32 mmol/L    Anion Gap 15 (H) 3 - 14 mmol/L    Glucose 118 (H) 70 - 99 mg/dL    Urea Nitrogen 73 (H) 7 - 30 mg/dL    Creatinine 3.14 (H) 0.66 - 1.25 mg/dL    GFR Estimate 21 (L) >60 mL/min/[1.73_m2]    GFR Estimate If Black 24 (L) >60 mL/min/[1.73_m2]    Calcium 6.9 (L) 8.5 - 10.1 mg/dL    Bilirubin Total 0.5 0.2 - 1.3 mg/dL    Albumin 1.6 (L) 3.4 - 5.0 g/dL    Protein Total 3.5 (L) 6.8 - 8.8 g/dL    Alkaline Phosphatase 79 40 - 150 U/L    ALT 64 0 - 70 U/L    AST 72 (H) 0 - 45 U/L   Phosphorus   Result Value Ref Range    Phosphorus 4.1 2.5 - 4.5 mg/dL   INR   Result Value Ref Range    INR 1.45 (H) 0.86 - 1.14   Partial thromboplastin time   Result Value Ref Range    PTT 40 (H) 22 - 37 sec   Fibrinogen activity   Result Value Ref Range    Fibrinogen 233 200 - 420 mg/dL   EBV Capsid Antibody IgG   Result Value Ref Range    EBV Capsid Antibody IgG 4.7 (H) 0.0 - 0.8 AI   CMV Antibody IgG   Result Value Ref Range    CMV Antibody IgG 3.9 (H) 0.0 - 0.8 AI   CMV DNA quantification   Result Value Ref Range    CMV DNA Quantitation Specimen EDTA PLASMA     CMV Quant IU/mL <137 (A) CMVND^CMV DNA Not Detected [IU]/mL    Log IU/mL of CMVQNT <2.1 <2.1 [Log_IU]/mL   TSH with free T4 reflex   Result Value Ref Range    TSH 1.11 0.40 - 4.00 mU/L   Lactate Dehydrogenase   Result Value Ref Range    Lactate Dehydrogenase 324 (H) 85 - 227 U/L   Glucose by meter   Result Value Ref Range    Glucose 111 (H) 70 - 99 mg/dL   Hemoglobin   Result Value Ref Range    Hemoglobin 4.7 (LL) 13.3 - 17.7 g/dL   ABO/Rh type and screen   Result Value Ref Range    Units Ordered 3     ABO AB     RH(D) Neg     Antibody Screen Neg     Test Valid Only At          [facility]    Specimen Expires  04/17/2021     Crossmatch Red Blood Cells    Blood component   Result Value Ref Range    Unit Number [id number]     Blood Component Type Red Blood Cells LeukoReduced Irradiated     Division Number 00     Status of Unit Released to care unit 04/14/2021 0737     Blood Product Code X4729H30     Unit Status ISS    Blood component   Result Value Ref Range    Unit Number [id number]     Blood Component Type Red Blood Cells LeukoReduced Irradiated     Division Number 00     Status of Unit Ready for patient 04/14/2021 0723     Blood Product Code [id number]     Unit Status DANIEL    Blood component   Result Value Ref Range    Unit Number [id number]     Blood Component Type Red Blood Cells LeukoReduced Irradiated     Division Number 00     Status of Unit Released to care unit 04/14/2021 0955     Blood Product Code I1263V70     Unit Status ISS    Glucose by meter   Result Value Ref Range    Glucose 111 (H) 70 - 99 mg/dL   CBC with platelets differential   Result Value Ref Range    WBC 10.6 4.0 - 11.0 10e9/L    RBC Count 1.52 (L) 4.4 - 5.9 10e12/L    Hemoglobin 4.9 (LL) 13.3 - 17.7 g/dL    Hematocrit 14.2 (L) 40.0 - 53.0 %    MCV 93 78 - 100 fl    MCH 32.2 26.5 - 33.0 pg    MCHC 34.5 31.5 - 36.5 g/dL    RDW 13.7 10.0 - 15.0 %    Platelet Count 50 (L) 150 - 450 10e9/L    Diff Method Manual Differential     % Neutrophils 97.0 %    % Lymphocytes 1.0 %    % Monocytes 2.0 %    % Eosinophils 0.0 %    % Basophils 0.0 %    Nucleated RBCs 2 (H) 0 /100    Absolute Neutrophil 10.3 (H) 1.6 - 8.3 10e9/L    Absolute Lymphocytes 0.1 (L) 0.8 - 5.3 10e9/L    Absolute Monocytes 0.2 0.0 - 1.3 10e9/L    Absolute Eosinophils 0.0 0.0 - 0.7 10e9/L    Absolute Basophils 0.0 0.0 - 0.2 10e9/L    Absolute Nucleated RBC 0.2     Poikilocytosis Slight     RBC Fragments Slight     Jose Daniel Cells Slight     Platelet Estimate Confirming automated cell count    Reticulocyte count   Result Value Ref Range    % Retic 1.3 0.5 - 2.0 %    Absolute Retic 20.1 (L) 25 -  95 10e9/L   Glucose by meter   Result Value Ref Range    Glucose 133 (H) 70 - 99 mg/dL   Blood gas venous   Result Value Ref Range    Ph Venous 7.33 7.32 - 7.43 pH    PCO2 Venous 22 (L) 40 - 50 mm Hg    PO2 Venous 27 25 - 47 mm Hg    Bicarbonate Venous 12 (L) 21 - 28 mmol/L    Base Deficit Venous 13.0 mmol/L    FIO2 2L    XR Chest Port 1 View    Narrative    Exam: XR CHEST PORT 1 VW, 4/14/2021 8:31 AM    Indication: Dyspnea    Comparison: 4/11/2021    Findings:   Elevated right hemidiaphragm. Subsegmental atelectasis in the right  lower lobe. No significant pleural effusions. Heart within normal  limits. Pulmonary vasculature normal.    Right IJ Port-A-Cath tip in the high superior vena cava.      Impression    Impression: Unchanged elevation of the right hemidiaphragm with  subsegmental atelectasis right lower lobe.    CHINA HUGGINS MD   Glucose by meter   Result Value Ref Range    Glucose 115 (H) 70 - 99 mg/dL   CT Abdomen Pelvis w/o Contrast    Narrative    EXAMINATION: CT ABDOMEN PELVIS W/O CONTRAST, 4/14/2021 9:36 AM    TECHNIQUE:  Helical CT images from the lung bases through the pubic  symphysis were obtained without IV contrast.     COMPARISON: 4/11/2021 CT, 3/26/2021 PET CT    HISTORY: Hx Mantle Cell Lymphoma, new DRESS, Sepsis, new anemia with  unclear source of bleeding    FINDINGS:    Abdomen and pelvis: No focal suspicious hepatic lesion. Contracted  gallbladder. Fatty atrophy of the pancreas. Stable intermediate  attenuation loculated fluid (representing chronic hematoma) along the  lateral aspect of the nonenlarged spleen. Normal adrenal glands.  Stable large left renal cyst with a thin partially calcified wall. No  appreciable suspicious renal lesion on these noncontrast images. No  hydronephrosis, hydroureter, or urinary tract stone. The bladder is  decompressed about a Lawson catheter and contains a small amount of  air. Nonenlarged prostate. Symmetric seminal vesicles. No free fluid  or free air.  New/increased mild retroperitoneal edema. No bowel  obstruction. Liquid stool with air-fluid levels in the moderately  distended colon. Unchanged trace residual nodular soft tissue  thickening about the rectosigmoid colon. Small hiatal hernia. Moderate  aortoiliac atherosclerotic calcification without aneurysmal dilation.  Stable prominent and borderline enlarged retroperitoneal and iliac  chain lymph nodes. Stable enlarged bilateral inguinal lymph nodes.    Lung bases:  Small bilateral pleural effusions with adjacent  compressive atelectasis. Asymmetric elevation of the right  hemidiaphragm with associated right basilar atelectasis, similar to  prior.    Bones and soft tissues: No acute or aggressive osseus abnormality.  Anasarca. No hematoma.      Impression    IMPRESSION:   1. No acute intra-abdominal pathology. No evidence of hemorrhage on  these noncontrast images.  2. Liquid stool throughout the colon, new/increased bilateral pleural  effusions, and new/increased anasarca.   3. Stable (since 4/11/2021 but decreased since 3/26/2021) inguinal  lymphadenopathy and prominent/borderline enlarged retroperitoneal and  iliac chain lymph nodes.  4. Stable trace residual nodular soft tissue thickening about the  rectosigmoid colon.    BOBBY CRUMP, DO   Glucose by meter   Result Value Ref Range    Glucose 131 (H) 70 - 99 mg/dL

## 2021-04-14 NOTE — PLAN OF CARE
"BP 98/63   Pulse 122   Temp 97.8  F (36.6  C) (Oral)   Resp 27   Ht 1.803 m (5' 11\")   Wt 119.2 kg (262 lb 12.8 oz)   SpO2 92%   BMI 36.65 kg/m        Afebrile. Hypotensive. tachycardic and tachyepnic. Imodium given. Insulin drip running cont. No urine output, MD notified. Bladder scanned twice see flow sheet. Neuro change with neuro check and MD notified. Gave 1L bolus. hgb drop over 2 pts. md paged. Recheck hgb 4.7. 3 units of blood ordered. Unable to start since not ready in blood bank. No other replacements needed. Will continue POC.         Problem: Adult Inpatient Plan of Care  Goal: Plan of Care Review  Outcome: No Change  Goal: Patient-Specific Goal (Individualized)  Outcome: No Change  Goal: Optimal Comfort and Wellbeing  Outcome: No Change     Problem: Adjustment to Transplant (Stem Cell/Bone Marrow Transplant)  Goal: Optimal Coping with Transplant  Outcome: No Change     Problem: Bladder Irritation (Stem Cell/Bone Marrow Transplant)  Goal: Symptom-Free Urinary Elimination  Outcome: No Change     Problem: Diarrhea (Stem Cell/Bone Marrow Transplant)  Goal: Diarrhea Symptom Control  Outcome: No Change     Problem: Fatigue (Stem Cell/Bone Marrow Transplant)  Goal: Energy Level Supports Daily Activity  Outcome: No Change     Problem: Hematologic Alteration (Stem Cell/Bone Marrow Transplant)  Goal: Blood Counts Within Acceptable Range  Outcome: No Change     Problem: Hypersensitivity Reaction (Stem Cell/Bone Marrow Transplant)  Goal: Absence of Hypersensitivity Reaction  Outcome: No Change     Problem: Infection Risk (Stem Cell/Bone Marrow Transplant)  Goal: Absence of Infection  Outcome: No Change  Intervention: Prevent and Manage Infection  Recent Flowsheet Documentation  Taken 4/13/2021 2000 by Topher Hernandez, RN  Isolation Precautions: cytotoxic precautions maintained     Problem: Mucositis (Stem Cell/Bone Marrow Transplant)  Goal: Mucous Membrane Health and Integrity  Outcome: No Change   "   Problem: Nausea and Vomiting (Stem Cell/Bone Marrow Transplant)  Goal: Nausea and Vomiting Symptom Relief  Outcome: No Change     Problem: Adult Inpatient Plan of Care  Goal: Absence of Hospital-Acquired Illness or Injury  Outcome: Declining  Intervention: Identify and Manage Fall Risk  Recent Flowsheet Documentation  Taken 4/13/2021 2000 by Topher Hernandez, RN  Safety Promotion/Fall Prevention: activity supervised  Intervention: Prevent Skin Injury  Recent Flowsheet Documentation  Taken 4/13/2021 2000 by Topher Hernandez, RN  Body Position: position changed independently  Intervention: Prevent and Manage VTE (Venous Thromboembolism) Risk  Recent Flowsheet Documentation  Taken 4/13/2021 2000 by Topher Hernandez, RN  VTE Prevention/Management: bleeding precautions maintained  Goal: Readiness for Transition of Care  Outcome: Declining     Problem: Nutrition Intake Altered (Stem Cell/Bone Marrow Transplant)  Goal: Optimal Nutrition Intake  Outcome: Declining

## 2021-04-14 NOTE — PROGRESS NOTES
This morning, new IV access, and started administrated RBC due to Hg 4.5, urinal cath was place, urine output was 200 ml for placement, continue to have diarrhea brown color, poor oral intake, continue to be insulin drip, BS is between 120 to 180, after 3 RBC, HG 7.6, /63 stable, O2 3 L/min, with NC O2 sat is around 92 to 95%, lactic acid 2.8, rapid response was called but due to his condition no new order was placed

## 2021-04-14 NOTE — CONSULTS
Care Management Initial Consult    General Information  Assessment completed with: Care Team MemberMARILEE-chart review    Type of CM/SW Visit: Initial Assessment  Primary Care Provider verified and updated as needed: Yes   Readmission within the last 30 days: no previous admission in last 30 days   Reason for Consult: other - Elevated Readmission Risk Score  Advance Care Planning Reviewed: present on chart          Communication Assessment  Patient's communication style: spoken language (English or Bilingual)    Hearing Difficulty or Deaf: no   Wear Glasses or Blind: yes    Cognitive  Cognitive/Neuro/Behavioral: .WDL except, orientation  Level of Consciousness: alert  Arousal Level: opens eyes spontaneously  Orientation: disoriented to, place, time  Mood/Behavior: calm, cooperative  Best Language: 0 - No aphasia  Speech: hoarse    Living Environment:   People in home: parent(s)     Current living Arrangements: apartment      Able to return to prior arrangements: yes     Family/Social Support:  Care provided by: self, parent(s)  Provides care for: no one  Marital Status:         Description of Support System: Supportive, Involved - Parent(s), Sibling(s)       Current Resources:   Patient receiving home care services: No  Community Resources: OP Infusion  Equipment currently used at home: none  Supplies currently used at home: None    Employment/Financial:  Employment Status: employed full-time     Employment/ Comments: Currently on leave  Financial Concerns: No concerns identified   Referral to Financial Counselor: No       Lifestyle & Psychosocial Needs:        Socioeconomic History     Marital status: Single     Spouse name: Not on file     Number of children: Not on file     Years of education: Not on file     Highest education level: Not on file     Tobacco Use     Smoking status: Never Smoker     Smokeless tobacco: Current User     Types: Chew     Tobacco comment: trying to quit   Substance and  Sexual Activity     Alcohol use: Not Currently     Drug use: Never       Functional Status:  Prior to admission patient needed assistance:   Dependent ADLs:: Independent  Dependent IADLs:: Cleaning, Cooking, Laundry, Meal Preparation, Medication Management, Transportation  Assesssment of Functional Status: Not at baseline with ADL Functioning, Not at baseline with mobility    Mental Health Status:  Mental Health Status: No Current Concerns       Chemical Dependency Status:  Chemical Dependency Status: No Current Concerns       Values/Beliefs:  Spiritual, Cultural Beliefs, [employer] Practices, Values that affect care:  No                Additional Information:    56 year old male with PMHx of T2DM, obesity, tobacco use, HTN, HLD, and stage IV Mantle cell lymphoma (s/p PBSCT 11/2020) with relapsed disease. Most recently received Loncatuximab + Ibrutinib 4/2/21. Presented to [facility] with fevers, hypotension, tachycardia and whole body rash. Developed hypotension refractory to fluid resuscitation and was transferred to MICU for pressors support briefly 4/11-4/12. S/p bx of rash positive for drug rash likely 2/2 allopurinol or Ibrutinib. Started on methylprednisolone 500 mg BID (x4/12) with fever curve now down trending. Complicated by NGOZI on CKD and DKA.    Care Management/Social Work Consult placed due to patient's Elevated Readmission Risk Score and discharge planning needs.     Report received from patient's primary team today that patient's expected discharge date is unknown and may need transfer to ICU.    [facility]/ will continue to follow during patient's hospital stay and assist with discharge planning needs.     TAMMY Ulloa  7D Hematology/Oncology Social Worker  Phone: [phone]  Pager: [phone]  [email or web address]

## 2021-04-14 NOTE — CODE/RAPID RESPONSE
Rapid Response Team Note    Assessment   In assessment a rapid response was called on Theo Roblero due to SIRS/Sepsis trigger. This presentation is likely due to anemia vs DRESS and worsened by Mantle Cell lymphoma.     Plan   -  Agree with Lasix trial per primary service given frequent fluid boluses and blood.  Lactic acids overall trending downward.  Continue broad spectrum antibiotics.  Repeat lactic acid in 2-4hours  -  The Hematology/Oncology primary team was paged and currently awaiting a response.  -  Disposition: The patient will remain on the current unit. We will continue to monitor this patient closely.  -  Reassessment and plan follow-up will be performed by the primary team      Yasmin Hinds PA-C  [facility] RRT [facility] Job Code Contact #[phone]    Hospital Course   Brief Summary of events leading to rapid response:   Patient admitted for rash concerning for DRESS. Hospital course complicated by acute Hgb drop requiring blood transfusions, however without overt signs of bleeding. Lactic acid draw triggered by leukopenia and tachycardia.  Lactate resulted at 2.8    Admission Diagnosis:   Drug rash [L27.0]  Fever and chills [R50.9]  Lymphoma, unspecified body region, unspecified lymphoma type (H) [C85.90]     Physical Exam   Temp: 96.8  F (36  C) Temp  Min: 96.4  F (35.8  C)  Max: 100  F (37.8  C)  Resp: 22 Resp  Min: 22  Max: 29  SpO2: 94 % SpO2  Min: 92 %  Max: 99 %  Pulse: 113 Pulse  Min: 109  Max: 131    No data recorded  BP: 107/63 Systolic (24hrs), Av , Min:78 , Max:107   Diastolic (24hrs), Av, Min:49, Max:63     I/Os: I/O last 3 completed shifts:  In: 3745.34 [P.O.:490; I.V.:2955.34]  Out: 1500 [Urine:1500]     Exam:   GENERAL: Alert and oriented to person.  In NAD.   HEENT: Anicteric sclera. Mucous membranes moist. NC. AT.   CV: Tachycardia. S1, S2. No murmurs appreciated.   RESPIRATORY: Effort normal on 3Ls. Lungs CTAB with no wheezing, rales, rhonchi.   GI: Abdomen soft and non  distended with normoactive bowel sounds present in all quadrants. No tenderness, rebound, guarding. No lesions.   NEUROLOGICAL: No focal deficits. Moves all extremities.  CN 2-12 grossly intact.  EXTREMITIES: No peripheral edema. Intact bilateral pedal pulses.   SKIN: No jaundice. Diffuse maculopapular rash over entire body, improving over face.         Significant Results and Procedures   Lactic Acid:   Recent Labs   Lab Test 04/14/21  1600 04/13/21  1511 04/13/21  0505 04/13/21  0238 04/12/21  1542 04/12/21  1542 04/10/21  2055 04/10/21  2055   LACT  --  5.4* 5.7* 6.7*   < >  --    < >  --    LACTS 2.8*  --   --   --   --  5.2*  --  2.8*    < > = values in this interval not displayed.     CBC:   Recent Labs   Lab Test 04/14/21  1620 04/14/21  1210 04/14/21  0650 04/14/21  0330 04/14/21  0330   WBC  --  10.6 10.6  --  9.5   HGB 7.6* 6.6* 4.9*   < > 5.3*   HCT  --  19.3* 14.2*  --  15.1*   PLT  --  45* 50*  --  52*    < > = values in this interval not displayed.        Sepsis Evaluation   The patient is known to have an infection.  Theo Roblero meets SIRS criteria AND has a lactate >2 or other evidence of acute organ damage.  These vital signs, lab and physical exam findings are consistent with SEVERE SEPSIS.    Sepsis Time-Zero (time severe sepsis diagnosis confirmed): 1600  04/14/21 as this was the time when Lactate resulted, and the level was > 2.0     Anti-infectives (From now, onward)    Start     Dose/Rate Route Frequency Ordered Stop    04/19/21 0800  sulfamethoxazole-trimethoprim (BACTRIM) 400-80 MG per tablet 1 tablet      1 tablet Oral EVERY MONDAY TUESDAY BID 04/14/21 1547      04/15/21 0800  acyclovir (ZOVIRAX) capsule 200 mg      200 mg Oral 2 TIMES DAILY 04/14/21 1547          Current antibiotic coverage is appropriate for source of infection.    3 Hour Severe Sepsis Bundle Completion:  1. Initial Lactic Acid result shown above. Repeat lactic acid ordered for 2 hours from now.   2. Blood Cultures  before Antibiotics: Yes  3. Broad Spectrum Antibiotics Administered: no  4. Fluids: Fluid bolus not indicated due to: CHF & Pulmonary Edema

## 2021-04-14 NOTE — PROGRESS NOTES
IP Diabetes Management  Daily Note           Assessment and Plan:   HPI: Yanick is a 56 year old male with TIIDM, HTN, HLP and mantle cell lymphoma s/p PBSCT 11/2020 who was admitted with fever, hypotension, and rash. Suspect DRESS related to allopurinol. He was started on high dose steroids, which resulted in DKA development on sliding scale insulin only.     Assessment:   1)Type II Diabetes Mellitus, recently improving control  2) Steroid induced hyperglycemia, with DKA    Plan:    -continue IV insulin via DKA protocol- gap has not yet closed.     -add aspart 1:6g CHO coverage with meals and snacks/supplements   -BG monitoring q1-2 hours on IV insulin   -hold Metformin (recent lactic acidosis)   -hypoglycemia protocol   -carb counting protocol   -diabetes education needs moises be reassessed closer to discharge- has been on MDI regimen before.     Outpatient follow up: with [facility]   Plan discussed with patient  and primary team.        Interval History and Assessment: interval glucose trend reviewed:   BG improving on IV insulin, with gradual reduction in needs to 2 units per hour overnight. Anion gap has not closed, likely renal/fluid issue as opposed to persistent DKA. Starting dextrose containing IVF today; ok while on IV insulin, not eating. Nutrition plan, if pt not able to eat, is TBD.    Hypotensive with low UOP, nephrology is following. Anemic. Altered mental status overnight. May need ICU transfer for more aggressive diuresis, pressor support.     Derm consulted, feel rash is 2/2 DRESS, possibly related to allopurinol.   Yanick is somnolent this morning, did awaken to voice but falls asleep during conversation. Occasional gasping breaths on facemask O2. Denies SOB.    Current nutritional intake and type: Orders Placed This Encounter      Moderate Consistent CHO Diet    Steroid planning:   -IV Methylpred 500mg BID    D5W-containing solutions:   Will be starting dextrose containing IVF today    PTA  Diabetes Regimen:   Metformin XR 2000 mg daily  Glimepiride 4 mg daily    -previously was on NPH BID, plus fixed meal insulin + sliding scale regimen.    Discharge Planning: TBD           Diabetes History:   Type of Diabetes: Type 2 Diabetes Mellitus  Lab Results   Component Value Date    A1C 5.9 04/13/2021    A1C 5.9 04/10/2021              Review of Systems:     The Review of Systems is negative other than noted in the Interval History.           Medications:     Current Facility-Administered Medications   Medication     acetaminophen (TYLENOL) tablet 650 mg     acyclovir (ZOVIRAX) tablet 400 mg     calcium carbonate (TUMS) chewable tablet 500 mg     dextrose 10% infusion     glucose gel 15-30 g    Or     dextrose 50 % injection 25-50 mL    Or     glucagon injection 1 mg     diphenhydrAMINE (BENADRYL) capsule 25 mg     diphenhydrAMINE-zinc acetate (BENADRYL) 1-0.1 % cream     hydrOXYzine (ATARAX) tablet 25 mg     insulin 1 unit/1mL in saline (NovoLIN, HumuLIN Regular) drip - DKA algorithm     insulin aspart (NovoLOG) injection (RAPID ACTING)     insulin aspart (NovoLOG) injection (RAPID ACTING)     lidocaine (LMX4) cream     lidocaine 1 % 0.1-1 mL     lidocaine 1% with EPINEPHrine 1:100,000 injection 3 mL     loperamide (IMODIUM) capsule 2 mg     melatonin tablet 1 mg     methylPREDNISolone sodium succinate (solu-MEDROL) injection 118.75 mg     metoprolol (LOPRESSOR) injection 5 mg     [Held by provider] metoprolol succinate ER (TOPROL-XL) 24 hr tablet 25 mg     naloxone (NARCAN) injection 0.2 mg    Or     naloxone (NARCAN) injection 0.4 mg    Or     naloxone (NARCAN) injection 0.2 mg    Or     naloxone (NARCAN) injection 0.4 mg     nystatin (MYCOSTATIN) suspension 500,000 Units     ondansetron (ZOFRAN-ODT) ODT tab 4 mg    Or     ondansetron (ZOFRAN) injection 4 mg     ondansetron (ZOFRAN-ODT) ODT tab 8 mg     oxyCODONE (ROXICODONE) tablet 5 mg     pantoprazole (PROTONIX) EC tablet 40 mg     Patient is already  "receiving anticoagulation with heparin, enoxaparin (LOVENOX), warfarin (COUMADIN)  or other anticoagulant medication     sodium bicarbonate tablet 1,300 mg     sodium chloride (PF) 0.9% PF flush 3 mL     sodium chloride (PF) 0.9% PF flush 3 mL     sodium chloride (PF) 0.9% PF flush 3 mL     sodium chloride (PF) 0.9% PF flush 3 mL     sodium chloride (PF) 0.9% PF flush 3 mL     sodium chloride (PF) 0.9% PF flush 3 mL     [Held by provider] study - ibrutinib (IDS# [id number]) capsule CHEMOTHERAPY 560 mg     sulfamethoxazole-trimethoprim (BACTRIM DS) 800-160 MG per tablet 1 tablet     triamcinolone (KENALOG) 0.1 % cream            Physical Exam:    BP 94/54   Pulse 122   Temp 97.4  F (36.3  C)   Resp 24   Ht 1.803 m (5' 11\")   Wt 119.2 kg (262 lb 12.8 oz)   SpO2 92%   BMI 36.65 kg/m    General: laying flat in bed, sleeping, occasional gasping breaths.  HEENT: normocephalic, atraumatic. Oral mucous membranes moist.  Lungs: mildly labored respiration on facemask O2, no cough  ABD: rounded, soft  Skin: warm, mildly diaphoretic. Diffuse maculopapular rash on chest, extremities  MSK:  moves all extremities  Lymp:  anasarca, 2+  Mental status:  alert, oriented to self  Psych:  calm and appropriate interaction             Data:     Recent Labs   Lab 04/14/21  0853 04/14/21  0701 04/14/21  0452 04/14/21  0340 04/14/21  0330 04/14/21  0228 04/14/21  0121 04/13/21  2056 04/13/21  2056 04/13/21  1317 04/13/21  1317 04/13/21  0521 04/13/21  0521 04/13/21  0338 04/12/21  0415 04/12/21  0415   GLC  --   --   --   --  118*  --   --   --  166*  --  244*  --  384* 370*  --  137*   * 133* 111* 111*  --  133* 130*   < >  --    < >  --    < >  --   --    < >  --     < > = values in this interval not displayed.     Lab Results   Component Value Date    WBC 10.6 04/14/2021    WBC 9.5 04/14/2021    WBC 10.1 04/13/2021    HGB 4.9 (LL) 04/14/2021    HGB 4.7 (LL) 04/14/2021    HGB 5.3 (LL) 04/14/2021    HCT 14.2 (L) 04/14/2021    HCT " 15.1 (L) 04/14/2021    HCT 22.8 (L) 04/13/2021    MCV 93 04/14/2021    MCV 92 04/14/2021    MCV 93 04/13/2021    PLT 50 (L) 04/14/2021    PLT 52 (L) 04/14/2021    PLT 63 (L) 04/13/2021     Lab Results   Component Value Date     (L) 04/14/2021     (L) 04/13/2021     (L) 04/13/2021    POTASSIUM 4.3 04/14/2021    POTASSIUM 4.1 04/13/2021    POTASSIUM 3.8 04/13/2021    CHLORIDE 101 04/14/2021    CHLORIDE 100 04/13/2021    CHLORIDE 100 04/13/2021    CO2 11 (L) 04/14/2021    CO2 14 (L) 04/13/2021    CO2 14 (L) 04/13/2021     (H) 04/14/2021     (H) 04/13/2021     (H) 04/13/2021     Lab Results   Component Value Date    BUN 73 (H) 04/14/2021    BUN 67 (H) 04/13/2021    BUN 56 (H) 04/13/2021     Lab Results   Component Value Date    TSH 1.11 04/14/2021    TSH 2.17 08/26/2020     Lab Results   Component Value Date    AST 72 (H) 04/14/2021    AST 53 (H) 04/13/2021    AST 45 04/13/2021    ALT 64 04/14/2021    ALT 58 04/13/2021    ALT 59 04/13/2021    GGT 19 04/09/2021    GGT 12 04/02/2021    GGT 9 03/24/2021    ALKPHOS 79 04/14/2021    ALKPHOS 71 04/13/2021    ALKPHOS 69 04/13/2021       35 minutes spent on the date of the encounter doing chart review, history and exam, documentation and further activities per the note      Over 50% of my time on the unit was spent counseling the patient and/or coordinating care regarding acute hyperglycemia management.  See note for details.    To contact Endocrine Diabetes service:   From 8AM-4PM: page inpatient diabetes provider that is following the patient  For questions or updates from 4PM-8AM: page the diabetes job code for on call fellow: [phone]    Prisca Corley PA-C  Inpatient Diabetes Management Service  Pager [phone]

## 2021-04-14 NOTE — PROGRESS NOTES
Cross cover note    Patient remained tachycardic throughout the night, was hypotensive with SBP of 84, recheck was 77. No UOP throughout the night, bladder scan 120 ml. 1L of NS bolus given, repeat BP 96/63. AM labs showed Hgb of 5.3, repeat 4.7. No sign of bleeding. T&S ordered, patient consented and 3 U of PRBCs ordered. Hemolysis labs also ordered, Lovenox held. Patient's renal function also worsening, Cr of 3.1 and bicarb of 11 this AM. Oral bicarb started.

## 2021-04-14 NOTE — PROGRESS NOTES
Nephrology Progress Note  04/14/2021         57 yo man S/P PBSCT in Nov 2020 now with relapsed mantle cell lymphoma adm 4/9 with diffuse total body skin rash and temp of 103 (Probably drug-induced and possibly DRESS) occurring in the context of new chemo regimen    Interval History :   Nursing and provider notes from last 24 hours reviewed.  In the last 24 hours Theo Roblero had a hb drop to 4.7 from 7.9.  O2 sats remain 100% but I > O 3 L in past 24 hours.  Cr up to 3 from 2.3.  BP remains low.     ASSESSMENT AND RECOMMENDATIONS:      # NGOZI on CKD:  Baseline Cr was 0.9 in 2020.  Cr 1.1 on 4/2, 1.8 on 4/12 and 2.4 2/13.  UA bland (minimal proteinuria). CT from 4/11 does not show hydronephrosis.  BP was low requiring pressors until 4/11.  FENa 0.4 % on 4/11.  Alb 1.8.  Renal underperfusion (pre-renal azotemia) is possible given low albumin and leaky capillaries.  Possible nephrotoxins include new chemotherapeutic agents and now vancomycin.  Underlying CKD with gradual Cr rise over past 6 months High PVR (700) so now has ocampo cath.   in past 24 hours     - lab stable today with no indication for HD for clearance                - discontinue vanco if possible (nephrotoxic and can cause skin rash)                - diuretic trial now, will consider CRRT if UOP does not improve and pt needs more blood     - reassess in afternoon for possible transfer to ICU for pressors and CRRT    # Volume overload:  Weight  119 kg 4/12 up from 103 kg on 4/9.   Pt received 8L IVF on 4/11 and is now 15 L I>O in 72 hours.  Lungs remain clear and there is no ascites on CT 4/11 but his abdomen is very distended and he has anasarca. BP remains borderline with SBPs in 90s.                - rec stop IVF                - consider resuming pressors if BP remains low                - trial of IV diuretic.  Lasix 100 mg IV X one dose to see response                  #Anemia: Hb down to 4.7 from 11.5.  Possible hematoma seen on CT.   "Multiple potential causes including active bleeding                - work-up per team      - abd CT to r/o bleed     # Acidosis: Anion gap acidosis with high lactate and serum ketones.   Lactate production may be from ongoing hypoperfusion from capillary leak or lactate overproduction by tumor.   Now down to 5 from 7.  DKA seems unlikely since he has had type 2 DM controlled with metformin.  Ketosis may be from poor PO intake.  VBG pH is still7.3 so respiratory compensation is appropriate.                  - does not need DKA IVF protocol                - continue to monitor lactic acid trend     -avoid overcorrection on pH b/o risk of hypocalcemia     #Hyponatremia: Na 127 down from 130 on 4/9. Stable. U osm 519, urine Na 17 are consistent with underperfusion (appropriate ADH) and inability to excrete free water..  This may self-correct as capillary leak improves but would minimize IVF.                   - monitor Na                - keep K greater than 4.0    #Hypocalcemia low calcium, low alb    - check ionized pH        Recommendations were communicated to primary team in person on rounds 9 a.m.    Nuria Carter MD   [phone]    Contact Dr Dubon after 10 a.m.       Review of Systems:   I reviewed the following systems:  GI: poor appetite. no nausea or vomiting.  Diarrhea has resolved  Neuro:  some confusion  Constitutional:  no fever or chills  CV: no dyspnea or edema.  no chest pain.    Physical Exam:   I/O last 3 completed shifts:  In: 4484.84 [P.O.:200; I.V.:3284.84; IV Piggyback:1000]  Out: 875 [Urine:875]   BP 93/49 (BP Location: Left arm)   Pulse 113   Temp 96.4  F (35.8  C) (Axillary)   Resp 23   Ht 1.803 m (5' 11\")   Wt 119.2 kg (262 lb 12.8 oz)   SpO2 99%   BMI 36.65 kg/m       GENERAL APPEARANCE: tired, anasarca, in no distress.  Total body rash is visible but fading  EYES:  no scleral icterus, pupils equal  HENT: mouth without ulcers or lesions  PULM: lungs clear to auscultation " bilaterally.  RR is increased to 32  O2 sats 100%  CV: distant heart sounds     -JVD swollen neck     -edema anasarca   GI: soft, distended nontender,  INTEGUMENT: no cyanosis, maculopapular rash total body  NEURO:  no asterixis,  Able to answer questions  Access none  Lawson in place    Labs:   All labs reviewed by me  Electrolytes/Renal -   Recent Labs   Lab Test 04/14/21 0330 04/13/21 2056 04/13/21  1317 04/12/21  0415 04/12/21  0415 04/11/21  0803 04/11/21  0803   * 126* 125*   < > 129*   < >  --    POTASSIUM 4.3 4.1 3.8   < > 3.9   < > 3.7   CHLORIDE 101 100 100   < > 102   < >  --    CO2 11* 14* 14*   < > 18*   < >  --    BUN 73* 67* 56*   < > 26   < >  --    CR 3.14* 2.77* 2.35*   < > 1.78*   < >  --    * 166* 244*   < > 137*   < >  --    BELA 6.9* 7.0* 6.9*   < > 7.3*   < >  --    MAG 2.4*  --   --   --  2.5*  --  1.5*   PHOS 4.1 3.1 2.8  --   --   --  2.6    < > = values in this interval not displayed.       CBC -   Recent Labs   Lab Test 04/14/21  0650 04/14/21  0445 04/14/21 0330 04/13/21  0521   WBC 10.6  --  9.5 10.1   HGB 4.9* 4.7* 5.3* 7.8*   PLT 50*  --  52* 63*       LFTs -   Recent Labs   Lab Test 04/14/21 0330 04/13/21 2056 04/13/21  1317   ALKPHOS 79 71 69   BILITOTAL 0.5 0.4 0.5   ALT 64 58 59   AST 72* 53* 45   PROTTOTAL 3.5* 3.5* 3.7*   ALBUMIN 1.6* 1.6* 1.6*       Iron Panel - No lab results found.      Imaging:  CT 4/11:  CHEST:  LUNGS: The trachea and central airways are patent. No pneumothorax.  Trace bilateral pleural fluid. Small amount of fluid tracking into the  right major fissure. Incidental azygos fissure, normal variant.  Bandlike subsegmental atelectasis in the right lung base. No focal  airspace opacity. No suspicious pulmonary nodule.     MEDIASTINUM: Heart size is within normal limits. New small loculated  anterior pericardial fluid. The ascending aorta and main pulmonary  artery diameters are within normal limits. Coronary artery stents.  Multiple prominent and  enlarged left greater than right axillary lymph  nodes for example left level 1 axillary node measuring 14 mm (series  3, image 64) these axillary lymph nodes demonstrated FDG activity on  prior PET CT. No suspicious mediastinal or hilar lymph nodes.     ABDOMEN/PELVIS:  Evaluation limited due to patient respiratory motion.     LIVER: No focal hepatic mass.     BILIARY: The gallbladder is decompressed. No intraluminal gallstone.  No intrahepatic or extrahepatic biliary ductal dilatation.     PANCREAS: Fatty infiltration of the pancreatic head, uncinate process  and pancreatic neck.     SPLEEN: Redemonstration of ill-defined hypodense perisplenic fluid  collection that are visualized on the prior PET/CT likely representing  a subacute to chronic hematoma. Small splenule.     ADRENAL GLANDS: Within normal limits.     URINARY TRACT: Large exophytic simple cyst in the inferior pole of the  left kidney measuring up to 10.1 cm. No focal right renal mass.  Unchanged mild nonspecific perinephric stranding. No hydronephrosis or  hydroureter. No obstructing renal stone. Urinary bladder is  unremarkable.     REPRODUCTIVE ORGANS: Within normal limits.     STOMACH: Within normal limits.     BOWEL: Moderate colonic stool burden. No abnormally dilated loops of  large or small bowel. The previously visualized wall thickening in the  anus rectum, and sigmoid colon is less conspicuous on today's exam;  however there is inflammation/lymphoma in the distal sigmoid colon  series 2 image 221. The appendix is unremarkable.     PERITONEUM/FLUID: No free fluid.     VESSELS: Atherosclerotic calcifications of the abdominal aorta and  iliac arteries.     LYMPH NODES: No significant change in size or number of multiple  prominent peritoneal retroperitoneal lymph nodes for example 8 mm  right para-aortic lymph node (series 3, image 139) which previously  demonstrated FDG activity on recent PET/CT. Unchanged enlargement of  bilateral inguinal  nodes for example 1.4 cm left inguinal node (series  3, image 718) and enlarged right inguinal node measuring 1.5 cm  (series 3, image 729) there is mild associated stranding and soft  tissue nodularity adjacent to these lymph nodes.      BONES/SOFT TISSUES: No aggressive osseous lesions. Probable Schmorl  node deformity to the superior endplate of L4. Anterior flowing  osteophytes throughout multiple thoracic vertebrae likely representing  diffuse idiopathic skeletal hyperostosis. Small amount of subcutaneous  emphysema about the lower midabdomen, likely related to recent  medication injection.                                                                      IMPRESSION: In this patient with a history of mantle cell lymphoma  status post chemotherapy and stem cell transplant:    1. No significant interval change in scattered enlarged lymph nodes in  the chest abdomen pelvis which previously demonstrated FDG avidity on  recent whole body PET/CT concerning for disease progression.  2. Inflammation/lymphoma about the distal sigmoid colon, this might  represent a source for infection although not significantly changed  from PET/CT 3/26/2021.  3. Unchanged perisplenic fluid collection likely representing chronic  hematoma.  4. Trace bilateral pleural effusions. No focal airspace opacity. New  small anterior pericardial fluid without surrounding inflammation    Current Medications:    acyclovir  400 mg Oral BID     [Held by provider] enoxaparin ANTICOAGULANT  40 mg Subcutaneous Q24H     furosemide  100 mg Intravenous Once     insulin aspart   Subcutaneous TID AC     lidocaine 1% with EPINEPHrine 1:100,000  3 mL Intradermal Once     methylPREDNISolone  500 mg Intravenous Q12H     [Held by provider] metoprolol succinate ER  25 mg Oral BID     nystatin  500,000 Units Oral 4x Daily     pantoprazole  40 mg Oral BID AC     sodium bicarbonate  1,300 mg Oral TID     [Held by provider] study - ibrutinib (IDS# 5398)  560 mg  Oral Daily     sulfamethoxazole-trimethoprim  1 tablet Oral Q Mon Tues BID     triamcinolone   Topical BID       dextrose       insulin (regular) 2 mL/hr at 04/14/21 0854     - MEDICATION INSTRUCTIONS -       Nuria Carter MD

## 2021-04-14 NOTE — PROVIDER NOTIFICATION
04/13/21 1500   Call Information   Date of Call 04/13/21   Time of Call 1545   Name of person requesting the team Hilary ALTAMIRANO   Title of person requesting team RN   RRT Arrival time 1548   Time RRT ended 1556   Reason for call   Type of RRT Adult   Primary reason for call Sepsis suspected   Sepsis Suspected Elevated Lactate level   Was patient transferred from the ED, ICU, or PACU within last 24 hours prior to RRT call? No   SBAR   Situation LA 5.4   Background Lymphomaa 56 year old male  with high risk (IPI 6.5 and Ki67 of 20%), stage IV Mantle cell lymphoma in 1st relapse s/p 8 cycles of alternating 6 cycles of Maxi-R- CHOP alternating with high-dose LITZY-C and BEAM ASCT consolidation D+134 s/p auto PBSCT.  He was treated for relapse with Loncatuximab and Ibrutinib about 2 weeks ago. Presents with fever, rash, soft blood pressures, tachycardia.   Notable History/Conditions Cardiac;Diabetes   Assessment Pt resting comfortably. VSS   Interventions No interventions   Patient Outcome   Patient Outcome Stabilized on unit   RRT Team   Date Attending Physician notified 04/13/21   Time Attending Physician notified 1545   Physician(s) Myra GACRIA   Lead RN Fiordaliza ALTAMIRANO   Post RRT Intervention Assessment   Post RRT Assessment Stable/Improved   Date Follow Up Done 04/14/21   Time Follow Up Done 180

## 2021-04-14 NOTE — PROGRESS NOTES
CLINICAL NUTRITION SERVICES - ASSESSMENT NOTE     Nutrition Prescription    RECOMMENDATIONS FOR MDs/PROVIDERS TO ORDER:  Recommend kcal counts to meet at least 60% of needs po (~1245 kcal, ~40 g protein) to avoid recommending nutrition support. If nutrition support started will need to coordinate with endocrinology    Recommendations already ordered by Registered Dietitian (RD):  [employer] Therapeutic Nutrition Shake (chocolate) TID    Kcal counts (4/15-4/18)    Future/Additional Recommendations:  If nutrition support is indicated, recommend enteral feeds :  --Goal: Osmolite 1.5 Liavn @ goal of  65ml/hr  (1560ml/day)  will provide: 2340 kcals (28 kcal/kg), 97 g PRO (1.2 g/kg), 1188 ml free H20, 317 g CHO, and 0 g fiber daily.    If renal formula indicated, consider:   -- Goal: Novasource Renal @ 50 ml/hr (1200 ml) provides 2400 kcal (29), 109 g pro (1.3), 220 g CHO, 860 ml free water, and 0 g fiber daily.     If pt starts on CRRT - would require increased protein (at least 1.5 g/kg). If enteral nutrition started would consider:  --Goal: Novasource Renal @ 50 ml/hr (1200 ml) provides 2400 kcal (29), 109 g pro (1.3), 220 g CHO, 860 ml free water, and 0 g fiber daily.   --Additional 2 packets prosource for 120 kcal/22 g protein to increase provisions to: 2520 (30 kcal/kg), 131 g protein (1.6 g/kg)       REASON FOR ASSESSMENT  Theo Roblero is a/an 56 year old male assessed by the dietitian for Provider Order - malnutrition   PMH significant for: Obesity, Stage IV Mantle cell lymphoma with relapse x 2   Auto PBSCT,DM2. Presented with fever, rash, soft blood pressures, tachycardia.     NUTRITION HISTORY  -Stem cell transplant in November. Relapsed disease and received first dose of chemo 4/2.   -Patient reports decreased appetite since cancer started (2/7/2020)  -PTA patient would eat 1-2 meals a day, but also would go days with out eating due to reduced appetite  -Reported that these meals consisted of anything he  "felt like eating    CURRENT NUTRITION ORDERS  Diet: Moderate Consistent Carbohydrate    Intake/Tolerance:   -Poor appetite, not eating well, and weak per chart review  -Symptoms all occurred starting Friday 4/09: + Nausea when sitting up and from chewing tobacco, poor appetite, soft stools but no diarrhea, no abdominal pain or change in weight  -Patient reported eating minimally, few bites of chicken and rice last night  -Reported he drank the Glucerna supplement and agreed to having these scheduled    LABS  Labs reviewed  Na 127 (L), Mag 2.4 (H)  BUN 73 (H), Cr 3.14 (H)- NGOZI on CKD, hemodialysis labs ordered  Albumin 1.6 (L)- likely related to inflammation/illness  AST 72 (H)  -166 last 24 hrs- endo following ; Hgb A1C 5.9% (4/13) ; elevated blood sugars on admission likely 2/2 high dose steroids    MEDICATIONS  Medications reviewed  Acyclovir, lasix, mag sulfate, solu-medrol, protonix, sodium bicarb, aspart 1:6g CHO, insulin drip    ANTHROPOMETRICS  Height: 180.3 cm (5' 11\")  Most Recent Weight: 107.1 kg -  Admit weight  IBW: 75.3 kg- 142% IBW  BMI: Obesity Grade I BMI 30-34.9  Weight History:   -4/13 weight up due to fluid status  -2% weight loss in past 6 months     Wt Readings from Last 10 Encounters:   04/13/21 119.2 kg (262 lb 12.8 oz)   04/09/21 107.1 kg (236 lb 1.6 oz)   04/02/21 108.2 kg (238 lb 8 oz)   03/24/21 106.6 kg (235 lb)   03/10/21 109.3 kg (241 lb)   02/24/21 108.5 kg (239 lb 3.2 oz)   02/17/21 109.9 kg (242 lb 3.2 oz)   12/14/20 106.6 kg (235 lb 1.6 oz)   12/09/20 107.8 kg (237 lb 10.5 oz)   12/09/20 107.8 kg (237 lb 9.6 oz)   11/16/20 112.5 kg   10/27/20 112.5 kg   10/23/20  111.8 kg   10/13/20 109.2 kg     Dosing Weight: 83 kg- adjusted based on admit weight of 107.1 kg and IBW of 75.3 kg    ASSESSED NUTRITION NEEDS  Estimated Energy Needs: 2075- 2490 kcals/day (25 - 30 kcals/kg)  Justification: Increased needs  Estimated Protein Needs: 100-125 grams protein/day (1.2-1.5 +/- grams of " pro/kg)  Justification: NGOZI on CKD -Pending renal status and if dialysis becomes indicated.If no dialysis aim towards low end of range.  Estimated Fluid Needs: 1 mL/kcal   Justification: Per provider pending fluid status    PHYSICAL FINDINGS  See malnutrition section below.    MALNUTRITION  % Intake: </=75% for >/= 1 month (severe)  % Weight Loss: Weight loss does not meet criteria- difficult to assess due to fluid status  Subcutaneous Fat Loss: none noted - difficult to assess with fluid status - Volume overloaded at present  Muscle Loss: Temporal:  Mild- Difficult to assess due to fluid status - volume overloaded at present  Fluid Accumulation/Edema: fluid overload with NGOZI on CKD  Malnutrition Diagnosis: Unable to fully assess with current edema    NUTRITION DIAGNOSIS  Inadequate oral intake related to reduced appetite as evidenced by patient reporting eating 0-2 meals daily PTA and minimal po since admission       INTERVENTIONS  Implementation  Nutrition Education: Provided education on RD role and nutrition POC and encouraged oral intake to maintain lean body mass  Medical food supplement therapy- Three supplements daily as ordered above- patient eating minimally by mouth  Kcal counts ordered  Enteral Nutrition- Recs above    Goals  Patient to consume % of nutritionally adequate meal trays TID, or the equivalent with supplements/snacks.     Monitoring/Evaluation  Progress toward goals will be monitored and evaluated per protocol.    Margi Marroquin  Dietetic Intern    I read and agree with the above assessment and interventions.   Cherelle Son MS, RD, , CNSC, LD.  Pager: [phone]

## 2021-04-15 NOTE — PROGRESS NOTES
"IP Diabetes Management  Daily Note           Assessment and Plan:   HPI: Yanick is a 56 year old male with TIIDM, HTN, HLP and mantle cell lymphoma s/p PBSCT 11/2020 who was admitted with fever, hypotension, and rash. Suspect DRESS related to allopurinol. He was started on high dose steroids, which resulted in DKA development on sliding scale insulin only.     Assessment:   1)Type II Diabetes Mellitus, recently improving control  2) Steroid induced hyperglycemia, with DKA    Plan:    -continue IV insulin - switch from DKA to regular adult IV protocol today. Once prandial needs are known, nutrition plan finalized, and steroid plan finalized, will assess for transition off IV insulin.   -aspart 1:6g CHO coverage with meals and snacks/supplements>> increase to 1:4g beginning with lunch.   -BG monitoring q1-2 hours on IV insulin   -hold Metformin (persistent lactic acidosis)   -hypoglycemia protocol   -carb counting protocol   -diabetes education needs moises be reassessed closer to discharge- has been on MDI regimen before.     Outpatient follow up: with [facility]   Plan discussed with patient  and primary team.        Interval History and Assessment: interval glucose trend reviewed:   BG stable on IV insulin. AG has closed.  Averaging 2-4 units of IV insulin per hour.   Persistent lactic acidosis. Diuresing. Cr to 3.6 today. Nephrology is following. IVF were discontinued yesterday to help with fluid status.      Livan counts starting today; Yanick did not eat anything yesterday.   Methylpred tapered to once daily, 118mg dosing yesterday.     Yanick looks markedly better today, sitting up in chair eating pancakes for breakfast. Breathing is \"a little better\". Not on O2 at time of evaluation.     Current nutritional intake and type: Orders Placed This Encounter      Moderate Consistent CHO Diet    Steroid planning:   -IV Methylpred 500mg BID  -IV Methylpred 118 mg once daily since 4/14    D5W-containing solutions: "   -calcium gluconate PRN    PTA Diabetes Regimen:   Metformin XR 2000 mg daily  Glimepiride 4 mg daily    -previously was on NPH BID, plus fixed meal insulin + sliding scale regimen.    Discharge Planning: TBD           Diabetes History:   Type of Diabetes: Type 2 Diabetes Mellitus  Lab Results   Component Value Date    A1C 5.9 04/13/2021    A1C 5.9 04/10/2021              Review of Systems:     The Review of Systems is negative other than noted in the Interval History.           Medications:     Current Facility-Administered Medications   Medication     acetaminophen (TYLENOL) tablet 650 mg     acyclovir (ZOVIRAX) capsule 200 mg     calcium carbonate (TUMS) chewable tablet 500 mg     dextrose 10% infusion     glucose gel 15-30 g    Or     dextrose 50 % injection 25-50 mL    Or     glucagon injection 1 mg     diphenhydrAMINE (BENADRYL) capsule 25 mg     diphenhydrAMINE-zinc acetate (BENADRYL) 1-0.1 % cream     hydrOXYzine (ATARAX) tablet 25 mg     insulin 1 unit/mL in saline (NovoLIN, HumuLIN Regular) drip - ADULT IV Infusion     insulin aspart (NovoLOG) injection (RAPID ACTING)     insulin aspart (NovoLOG) injection (RAPID ACTING)     lidocaine (LMX4) cream     lidocaine 1 % 0.1-1 mL     melatonin tablet 1 mg     methylPREDNISolone sodium succinate (solu-MEDROL) injection 118.75 mg     metoprolol (LOPRESSOR) injection 5 mg     [Held by provider] metoprolol succinate ER (TOPROL-XL) 24 hr tablet 25 mg     naloxone (NARCAN) injection 0.2 mg    Or     naloxone (NARCAN) injection 0.4 mg    Or     naloxone (NARCAN) injection 0.2 mg    Or     naloxone (NARCAN) injection 0.4 mg     nystatin (MYCOSTATIN) suspension 500,000 Units     ondansetron (ZOFRAN-ODT) ODT tab 4 mg    Or     ondansetron (ZOFRAN) injection 4 mg     ondansetron (ZOFRAN-ODT) ODT tab 8 mg     oxyCODONE (ROXICODONE) tablet 5 mg     pantoprazole (PROTONIX) EC tablet 40 mg     Patient is already receiving anticoagulation with heparin, enoxaparin (LOVENOX),  "warfarin (COUMADIN)  or other anticoagulant medication     sodium bicarbonate tablet 1,300 mg     sodium chloride (PF) 0.9% PF flush 3 mL     sodium chloride (PF) 0.9% PF flush 3 mL     sodium chloride (PF) 0.9% PF flush 3 mL     sodium chloride (PF) 0.9% PF flush 3 mL     sodium chloride (PF) 0.9% PF flush 3 mL     sodium chloride (PF) 0.9% PF flush 3 mL     [Held by provider] study - ibrutinib (IDS# [id number]) capsule CHEMOTHERAPY 560 mg     [START ON 4/19/2021] sulfamethoxazole-trimethoprim (BACTRIM) 400-80 MG per tablet 1 tablet     triamcinolone (KENALOG) 0.1 % cream     vancomycin (VANCOCIN) capsule 125 mg            Physical Exam:    /68 (BP Location: Left arm)   Pulse 112   Temp 96.8  F (36  C) (Axillary)   Resp 23   Ht 1.803 m (5' 11\")   Wt 119.2 kg (262 lb 12.8 oz)   SpO2 94%   BMI 36.65 kg/m    General: sitting up in chair, eating breakfast.   HEENT: normocephalic, atraumatic. Oral mucous membranes moist.  Lungs: unlabored respiration on RA, no cough.   ABD: rounded, soft  Skin: warm/dry Diffuse maculopapular rash on chest, extremities- appears slightly faded today.  MSK:  moves all extremities  Lymp:  anasarca, 2+  Mental status:  alert, oriented to self, place, situation   Psych:  calm and appropriate interaction             Data:     Recent Labs   Lab 04/15/21  0955 04/15/21  0830 04/15/21  0630 04/15/21  0506 04/15/21  0418 04/15/21  0408 04/15/21  0301 04/14/21  2103 04/14/21  2103 04/14/21  1210 04/14/21  1210 04/14/21  0330 04/14/21  0330 04/13/21 2056 04/13/21 2056 04/13/21  1317 04/13/21  1317   GLC  --   --   --   --   --  140*  --   --  132*  --  135*  --  118*  --  166*  --  244*   * 151* 145* 140* 144*  --  145*   < >  --    < >  --    < >  --    < >  --    < >  --     < > = values in this interval not displayed.     Lab Results   Component Value Date    WBC 13.5 (H) 04/15/2021    WBC 13.3 (H) 04/14/2021    WBC 10.6 04/14/2021    HGB 7.3 (L) 04/15/2021    HGB 7.6 (L) " 04/14/2021    HGB 7.6 (L) 04/14/2021    HCT 20.3 (L) 04/15/2021    HCT 21.5 (L) 04/14/2021    HCT 19.3 (L) 04/14/2021    MCV 89 04/15/2021    MCV 88 04/14/2021    MCV 90 04/14/2021    PLT 47 (LL) 04/15/2021    PLT 54 (L) 04/14/2021    PLT 45 (LL) 04/14/2021     Lab Results   Component Value Date     (L) 04/15/2021     (L) 04/14/2021     (L) 04/14/2021    POTASSIUM 4.2 04/15/2021    POTASSIUM 4.4 04/14/2021    POTASSIUM 4.6 04/14/2021    CHLORIDE 102 04/15/2021    CHLORIDE 101 04/14/2021    CHLORIDE 101 04/14/2021    CO2 16 (L) 04/15/2021    CO2 16 (L) 04/14/2021    CO2 14 (L) 04/14/2021     (H) 04/15/2021     (H) 04/14/2021     (H) 04/14/2021     Lab Results   Component Value Date    BUN 88 (H) 04/15/2021    BUN 86 (H) 04/14/2021    BUN 78 (H) 04/14/2021     Lab Results   Component Value Date    TSH 1.11 04/14/2021    TSH 2.17 08/26/2020     Lab Results   Component Value Date    AST 96 (H) 04/15/2021     (H) 04/14/2021     (H) 04/14/2021    ALT 95 (H) 04/15/2021    ALT 97 (H) 04/14/2021    ALT 95 (H) 04/14/2021    GGT 19 04/09/2021    GGT 12 04/02/2021    GGT 9 03/24/2021    ALKPHOS 175 (H) 04/15/2021    ALKPHOS 139 04/14/2021    ALKPHOS 100 04/14/2021       35 minutes spent on the date of the encounter doing chart review, history and exam, documentation and further activities per the note      Over 50% of my time on the unit was spent counseling the patient and/or coordinating care regarding acute hyperglycemia management.  See note for details.    To contact Endocrine Diabetes service:   From 8AM-4PM: page inpatient diabetes provider that is following the patient  For questions or updates from 4PM-8AM: page the diabetes job code for on call fellow: [phone]    Prisca Corley PA-C  Inpatient Diabetes Management Service  Pager [phone]

## 2021-04-15 NOTE — PROVIDER NOTIFICATION
04/14/21 1700   Call Information   Date of Call 04/14/21   Time of Call 1659   Name of person requesting the team Alma Delia   Title of person requesting team RN   RRT Arrival time 1705   Time RRT ended 1715   Reason for call   Type of RRT Adult   Primary reason for call Sepsis suspected   Sepsis Suspected Elevated Lactate level   Was patient transferred from the ED, ICU, or PACU within last 24 hours prior to RRT call? No   SBAR   Situation LA 2.8   Background a 56 year old male  with high risk (IPI 6.5 and Ki67 of 20%), stage IV Mantle cell lymphoma in 1st relapse s/p 8 cycles of alternating 6 cycles of Maxi-R- CHOP alternating with high-dose LITZY-C and BEAM ASCT consolidation D+134 s/p auto PBSCT.  He was treated for relapse with Loncatuximab and Ibrutinib about 2 weeks ago. Presents with fever, rash, soft blood pressures, tachycardia.   Notable History/Conditions Cardiac;Diabetes   Assessment VSS. in no apparent distres. more confused today.    Interventions No interventions   Patient Outcome   Patient Outcome Stabilized on unit   RRT Team   Attending/Primary/Covering Physician BMT   Date Attending Physician notified 04/14/21   Time Attending Physician notified 1717   Physician(s) Yasmin GARCIA   Lead RN Abbi Damian   Post RRT Intervention Assessment   Post RRT Assessment Stable/Improved   Date Follow Up Done 04/14/21   Time Follow Up Done 2140     Repeat lactic acid 2.6.

## 2021-04-15 NOTE — INTERIM SUMMARY
The patient's steroid taper for discharge planning purposes has been discussed with the staff dermatologist, Dr. Brandon Morris.    We are aware that the patient is very sick and will not be discharged in the near future. Regarding the patient's taper of steroids upon his discharge, we recommend that he be discharged on oral prednisone 1 mg/kg/day (if tolerated and with the approval of the primary team) for at least 1-2 weeks, then the patient would be seen in Dermatology Clinic within that time period, when the dermatologist can begin the slow taper of prednisone over several weeks.    Davide Gonzalez MD  Chief Dermatology Resident

## 2021-04-15 NOTE — PROGRESS NOTES
Patient's status improved, mental status improved, patient checked date with phone and called family, VS stable and bp is trending hypertension. Had 2 large incontinent of stool, watery brown stool, worked with PT and OT walked to chair, no pain was expressed, good urine output, already he had more than 1000 ml urine, had increased oral food intake, continue to have insulin drip and carb count insulin, titolated drip per BS, no O2 . 94 % with RA continue to monitor

## 2021-04-15 NOTE — PROGRESS NOTES
04/15/21 1001   Quick Adds   Type of Visit Initial Occupational Therapy Evaluation   Living Environment   People in home alone   Current Living Arrangements apartment   Home Accessibility stairs to enter home   Transportation Anticipated family or friend will provide   Living Environment Comments Pt reports living in an apartment alone at baseline. Pt reports girlfriend lives nearby and stops by as needed.    Self-Care   Usual Activity Tolerance moderate   Current Activity Tolerance fair   Regular Exercise No   Equipment Currently Used at Home grab bar, tub/shower   Activity/Exercise/Self-Care Comment Pt reports no use of gait aids at baseline. Has grab bars in walk in shower. Declines any formal exercise, just daily tasks.   Disability/Function   Hearing Difficulty or Deaf no   Wear Glasses or Blind yes   Vision Management glasses   Concentrating, Remembering or Making Decisions Difficulty yes   Difficulty Communicating no   Difficulty Eating/Swallowing no   Walking or Climbing Stairs Difficulty yes   Dressing/Bathing Difficulty no   Toileting issues no   Doing Errands Independently Difficulty (such as shopping) yes   Errands Management family assist per chart review   Change in Functional Status Since Onset of Current Illness/Injury yes   General Information   Onset of Illness/Injury or Date of Surgery 04/09/21   Referring Physician Ana Beltran, PAStephieC   Patient/Family Therapy Goal Statement (OT) return home as able   Additional Occupational Profile Info/Pertinent History of Current Problem 55 yo man S/P PBSCT in Nov 2020 now with relapsed mantle cell lymphoma adm 4/9 with diffuse total body skin rash and temp of 103 (Probably drug-induced and possibly DRESS) occurring in the context of new chemo regimen   Existing Precautions/Restrictions fall;immunosuppressed   General Observations and Info Pt sitting on commode upon arrival, agreeable to therapy.   Cognitive Status Examination   Orientation Status  orientation to person, place and time   Memory Deficit minimal deficit   Cognitive Status Comments pt endorses some decline with memory following chemo    Visual Perception   Visual Impairment/Limitations corrective lenses full-time   Sensory   Sensory Quick Adds No deficits were identified   Pain Assessment   Patient Currently in Pain No   Range of Motion Comprehensive   Comment, General Range of Motion B shoulder AROM limited to  degrees, pt endorses prev shoulder injuries   Strength Comprehensive (MMT)   Comment, General Manual Muscle Testing (MMT) Assessment generalized weakness   Instrumental Activities of Daily Living (IADL)   Previous Responsibilities meal prep;housekeeping;laundry;medication management;finances;work;driving   IADL Comments Per chart review, family was assisting with errand management. Pt reports managing home needs IND without outside assist. Lives in an apartment.   Clinical Impression   Criteria for Skilled Therapeutic Interventions Met (OT) yes   OT Diagnosis OT order for ADL   OT Problem List-Impairments impacting ADL problems related to;activity tolerance impaired;cognition;strength;range of motion (ROM)   Assessment of Occupational Performance 1-3 Performance Deficits   Identified Performance Deficits home mgmt, bathing, toileting   Planned Therapy Interventions (OT) ADL retraining;IADL retraining;ROM;strengthening;home program guidelines;progressive activity/exercise;risk factor education   Clinical Decision Making Complexity (OT) low complexity   Therapy Frequency (OT) 5x/week   Predicted Duration of Therapy 2 weeks   Anticipated Equipment Needs Upon Discharge (OT)   (TBD with further therapy)   Risk & Benefits of therapy have been explained evaluation/treatment results reviewed;patient   OT Discharge Planning    OT Discharge Recommendation (DC Rec) Transitional Care Facility   OT Rationale for DC Rec Pt below functional baseline at this time, lives alone at baseline, and would  benefit from continued therapy prior to return home.   OT Brief overview of current status  FWW + Min A for in-room ambulation, max A for LB dressing   Total Evaluation Time (Minutes)   Total Evaluation Time (Minutes) 6

## 2021-04-15 NOTE — PROGRESS NOTES
"  Nephrology Progress Note  04/15/2021         57 yo man S/P PBSCT in Nov 2020 now with relapsed mantle cell lymphoma adm 4/9 with diffuse total body skin rash and temp of 103 (Probably drug-induced and possibly DRESS) occurring in the context of new chemo regimen    Interval History :   Nursing and provider notes from last 24 hours reviewed.  Pt doing well this morning - denies fevers, chills, dysuria. More alert per nursing. 4pt ROS negative. BP improved.     ASSESSMENT AND RECOMMENDATIONS:      # NGOZI on CKD:  Baseline Cr was 0.9 in 2020.  Cr 1.1 on 4/2, 1.8 on 4/12 and 2.4 2/13.  UA bland (minimal proteinuria). CT from 4/11 does not show hydronephrosis.  BP was low requiring pressors until 4/11.  FENa 0.4 % on 4/11.  Alb 1.8.  Renal underperfusion (pre-renal azotemia) is possible given low albumin and leaky capillaries.  Possible nephrotoxins include new chemotherapeutic agents and now vancomycin.  Underlying CKD with gradual Cr rise over past 6 months High PVR (700) so now has ocampo cath.  UOP 1175 in past 24 hours     - lab stable today with no indication for HD for clearance                - discontinue vanco if possible (nephrotoxic and can cause skin rash)    # Volume overload:  Weight  119 kg 4/12 up from 103 kg on 4/9. Lungs remain clear and there is no ascites on CT 4/11 but his abdomen is distended and he has anasarca.                - rec stop IVF                - hold on diuretics today given good pulmonary status and low BPs recently     # Acidosis: OK to continue NaHCO3.     - avoid overcorrection on pH b/o risk of hypocalcemia     #Hyponatremia: stable.         Recommendations were communicated to primary team by phone    Clayton Dubon MD  [phone]      Review of Systems:   4pt ROS negative    Physical Exam:   I/O last 3 completed shifts:  In: 1010 [P.O.:410]  Out: 2150 [Urine:2150]   /87   Pulse 100   Temp 97.6  F (36.4  C) (Oral)   Resp 16   Ht 1.803 m (5' 11\")   Wt 108.4 kg (239 lb)   " SpO2 98%   BMI 33.33 kg/m       GENERAL APPEARANCE: tired, anasarca, in no distress.  Total body rash is visible  EYES:  no scleral icterus  HENT: mouth without ulcers or lesions  PULM: lungs clear to auscultation bilaterally.    CV: distant heart sounds    -edema anasarca   GI: soft, distended nontender,  INTEGUMENT: no cyanosis, maculopapular rash total body  NEURO:  no asterixis,  Able to answer questions  Access none  Lawson in place    Labs:   All labs reviewed by me  Electrolytes/Renal -   Recent Labs   Lab Test 04/15/21  1155 04/15/21  0408 04/14/21  2103 04/14/21  0330 04/14/21  0330 04/12/21 0415 04/12/21 0415   * 128* 128*   < > 127*   < > 129*   POTASSIUM 4.2 4.2 4.4   < > 4.3   < > 3.9   CHLORIDE 102 102 101   < > 101   < > 102   CO2 14* 16* 16*   < > 11*   < > 18*   BUN 91* 88* 86*   < > 73*   < > 26   CR 3.68* 3.63* 3.54*   < > 3.14*   < > 1.78*   * 140* 132*   < > 118*   < > 137*   BELA 7.6* 7.6* 7.4*   < > 6.9*   < > 7.3*   MAG  --  2.5*  --   --  2.4*  --  2.5*   PHOS 5.2* 5.1* 4.7*   < > 4.1   < >  --     < > = values in this interval not displayed.       CBC -   Recent Labs   Lab Test 04/15/21  1155 04/15/21  0408 04/14/21  2103   WBC 11.3* 13.5* 13.3*   HGB 7.0* 7.3* 7.6*   PLT 39* 47* 54*       LFTs -   Recent Labs   Lab Test 04/15/21  1155 04/15/21  0408 04/14/21  2103   ALKPHOS 191* 175* 139   BILITOTAL 0.7 0.7 0.7   ALT 91* 95* 97*   AST 91* 96* 126*   PROTTOTAL 3.9* 3.8* 3.8*   ALBUMIN 1.9* 1.8* 1.8*       Iron Panel - No lab results found.      Imaging:  CT 4/11:  CHEST:  LUNGS: The trachea and central airways are patent. No pneumothorax.  Trace bilateral pleural fluid. Small amount of fluid tracking into the  right major fissure. Incidental azygos fissure, normal variant.  Bandlike subsegmental atelectasis in the right lung base. No focal  airspace opacity. No suspicious pulmonary nodule.     MEDIASTINUM: Heart size is within normal limits. New small loculated  anterior  pericardial fluid. The ascending aorta and main pulmonary  artery diameters are within normal limits. Coronary artery stents.  Multiple prominent and enlarged left greater than right axillary lymph  nodes for example left level 1 axillary node measuring 14 mm (series  3, image 64) these axillary lymph nodes demonstrated FDG activity on  prior PET CT. No suspicious mediastinal or hilar lymph nodes.     ABDOMEN/PELVIS:  Evaluation limited due to patient respiratory motion.     LIVER: No focal hepatic mass.     BILIARY: The gallbladder is decompressed. No intraluminal gallstone.  No intrahepatic or extrahepatic biliary ductal dilatation.     PANCREAS: Fatty infiltration of the pancreatic head, uncinate process  and pancreatic neck.     SPLEEN: Redemonstration of ill-defined hypodense perisplenic fluid  collection that are visualized on the prior PET/CT likely representing  a subacute to chronic hematoma. Small splenule.     ADRENAL GLANDS: Within normal limits.     URINARY TRACT: Large exophytic simple cyst in the inferior pole of the  left kidney measuring up to 10.1 cm. No focal right renal mass.  Unchanged mild nonspecific perinephric stranding. No hydronephrosis or  hydroureter. No obstructing renal stone. Urinary bladder is  unremarkable.     REPRODUCTIVE ORGANS: Within normal limits.     STOMACH: Within normal limits.     BOWEL: Moderate colonic stool burden. No abnormally dilated loops of  large or small bowel. The previously visualized wall thickening in the  anus rectum, and sigmoid colon is less conspicuous on today's exam;  however there is inflammation/lymphoma in the distal sigmoid colon  series 2 image 221. The appendix is unremarkable.     PERITONEUM/FLUID: No free fluid.     VESSELS: Atherosclerotic calcifications of the abdominal aorta and  iliac arteries.     LYMPH NODES: No significant change in size or number of multiple  prominent peritoneal retroperitoneal lymph nodes for example 8 mm  right  para-aortic lymph node (series 3, image 139) which previously  demonstrated FDG activity on recent PET/CT. Unchanged enlargement of  bilateral inguinal nodes for example 1.4 cm left inguinal node (series  3, image 718) and enlarged right inguinal node measuring 1.5 cm  (series 3, image 729) there is mild associated stranding and soft  tissue nodularity adjacent to these lymph nodes.      BONES/SOFT TISSUES: No aggressive osseous lesions. Probable Schmorl  node deformity to the superior endplate of L4. Anterior flowing  osteophytes throughout multiple thoracic vertebrae likely representing  diffuse idiopathic skeletal hyperostosis. Small amount of subcutaneous  emphysema about the lower midabdomen, likely related to recent  medication injection.                                                                      IMPRESSION: In this patient with a history of mantle cell lymphoma  status post chemotherapy and stem cell transplant:    1. No significant interval change in scattered enlarged lymph nodes in  the chest abdomen pelvis which previously demonstrated FDG avidity on  recent whole body PET/CT concerning for disease progression.  2. Inflammation/lymphoma about the distal sigmoid colon, this might  represent a source for infection although not significantly changed  from PET/CT 3/26/2021.  3. Unchanged perisplenic fluid collection likely representing chronic  hematoma.  4. Trace bilateral pleural effusions. No focal airspace opacity. New  small anterior pericardial fluid without surrounding inflammation    Current Medications:    acyclovir  200 mg Oral BID     insulin aspart   Subcutaneous TID AC     methylPREDNISolone  1 mg/kg Intravenous Daily     [Held by provider] metoprolol succinate ER  25 mg Oral BID     nystatin  500,000 Units Oral 4x Daily     pantoprazole  40 mg Oral BID AC     sodium bicarbonate  1,300 mg Oral TID     [Held by provider] study - ibrutinib (IDS# 5398)  560 mg Oral Daily     [START ON  4/19/2021] sulfamethoxazole-trimethoprim  1 tablet Oral Q Mon Tues BID     triamcinolone   Topical BID     vancomycin  125 mg Oral 4x Daily       dextrose       insulin (regular) 6 Units/hr (04/15/21 1305)     - MEDICATION INSTRUCTIONS -       Clayton Dubon MD

## 2021-04-15 NOTE — PROGRESS NOTES
[facility] Inpatient Dermatology Progress Note     Assessment and Plan:  1. Suspected DRESS, perhaps due to allopurinol  - Regarding the patient's taper of steroids upon his discharge, we recommend that he be discharged on oral prednisone 1 mg/kg/day (if tolerated and with the approval of the primary team) for at least 1-2 weeks, then the patient would be seen in Dermatology Clinic within that time period, when the dermatologist can begin the slow taper of prednisone over several weeks. Please let us know when the patient will be discharged, so that we can arrange close follow-up for this purpose.  - Otherwise, continue with systemic corticosteroids, triamcinolone 0.1% ointment BID, and supportive cares as per the primary team.  - Suture removal should occur around Sunday, 4/25/21, for biopsy at the right abdomen.  - EBV Ab + with quant pending; CMV+ with quant <137 and to repeat around 4/22/21; HHV6 pending  - To screen for hypothyroidism around 5/12/21 with TSH with reflex T4, which can be ordered by Dermatology at the outpatient follow-up visit within 1 week of discharge     We will continue to follow. Please do not hesitate to contact the dermatology resident/faculty on call for any additional questions or concerns.      Patient discussed with chief dermatology resident, Dr. Davide Gonzalez, and attending physician, Dr. Brandon Morris.    Zacarias Leon, MS3  [facility] [employer]    Plan of care discussed with me.    Brandon Morris MD  Dermatology Attending       Dermatology Problem List:  1. Suspected DRESS due to allopurinol     Date of Admission: Apr 9, 2021   Encounter Date: 04/15/2021        Interval history:  His rash appears to continue to fade, and the patient endorses that his skin is less red than yesterday. His facial edema appears similar to previously. C. diff. toxin B PCR was positive yesterday, and he is now on enteric precautions and being treated for C. diff  colitis.     Medications:      Current Facility-Administered Medications   Medication     acetaminophen (TYLENOL) tablet 650 mg     acyclovir (ZOVIRAX) capsule 200 mg     calcium carbonate (TUMS) chewable tablet 500 mg     dextrose 10% infusion     glucose gel 15-30 g     Or     dextrose 50 % injection 25-50 mL     Or     glucagon injection 1 mg     diphenhydrAMINE (BENADRYL) capsule 25 mg     diphenhydrAMINE-zinc acetate (BENADRYL) 1-0.1 % cream     hydrOXYzine (ATARAX) tablet 25 mg     insulin 1 unit/mL in saline (NovoLIN, HumuLIN Regular) drip - ADULT IV Infusion     insulin aspart (NovoLOG) injection (RAPID ACTING)     insulin aspart (NovoLOG) injection (RAPID ACTING)     lidocaine (LMX4) cream     lidocaine 1 % 0.1-1 mL     melatonin tablet 1 mg     methylPREDNISolone sodium succinate (solu-MEDROL) injection 118.75 mg     metoprolol (LOPRESSOR) injection 5 mg     [Held by provider] metoprolol succinate ER (TOPROL-XL) 24 hr tablet 25 mg     naloxone (NARCAN) injection 0.2 mg     Or     naloxone (NARCAN) injection 0.4 mg     Or     naloxone (NARCAN) injection 0.2 mg     Or     naloxone (NARCAN) injection 0.4 mg     nystatin (MYCOSTATIN) suspension 500,000 Units     ondansetron (ZOFRAN-ODT) ODT tab 4 mg     Or     ondansetron (ZOFRAN) injection 4 mg     ondansetron (ZOFRAN-ODT) ODT tab 8 mg     oxyCODONE (ROXICODONE) tablet 5 mg     pantoprazole (PROTONIX) EC tablet 40 mg     Patient is already receiving anticoagulation with heparin, enoxaparin (LOVENOX), warfarin (COUMADIN)  or other anticoagulant medication     sodium bicarbonate tablet 1,300 mg     sodium chloride (PF) 0.9% PF flush 3 mL     sodium chloride (PF) 0.9% PF flush 3 mL     sodium chloride (PF) 0.9% PF flush 3 mL     sodium chloride (PF) 0.9% PF flush 3 mL     sodium chloride (PF) 0.9% PF flush 3 mL     sodium chloride (PF) 0.9% PF flush 3 mL     [Held by provider] study - ibrutinib (IDS# 5398) capsule CHEMOTHERAPY 560 mg     [START ON 4/19/2021]  "sulfamethoxazole-trimethoprim (BACTRIM) 400-80 MG per tablet 1 tablet     triamcinolone (KENALOG) 0.1 % cream     vancomycin (VANCOCIN) capsule 125 mg         Physical exam:  /66   Pulse 111   Temp 97.5  F (36.4  C) (Axillary)   Resp 18   Ht 5' 11\" (180.3 cm)   Wt 108.4 kg (239 lb)   SpO2 94%   BMI 33.33 kg/m    GEN:This is a well developed, well-nourished male in no acute distress, in a pleasant mood. Examined sitting in chair, tired appearing  SKIN: Total skin excluding the undergarment areas was performed. The exam included the head/face, neck, both arms, chest, back, abdomen, both legs, digits and/or nails.   -Egan skin type: I  - Poorly demarcated, erythematous, minimally thick plaques and confluent papules scattered about the head, neck, trunk, and extremities Slightly less erythematous than previously.  - Facial edema present. There is relative sparing of rash at the periorbital areas.  - Photodocumentation obtained on 4/13 and 4/11.     Laboratory:        Results for orders placed or performed during the hospital encounter of 04/09/21 (from the past 24 hour(s))   Glucose by meter   Result Value Ref Range     Glucose 131 (H) 70 - 99 mg/dL   Comprehensive metabolic panel   Result Value Ref Range     Sodium 129 (L) 133 - 144 mmol/L     Potassium 4.6 3.4 - 5.3 mmol/L     Chloride 101 94 - 109 mmol/L     Carbon Dioxide 14 (L) 20 - 32 mmol/L     Anion Gap 14 3 - 14 mmol/L     Glucose 135 (H) 70 - 99 mg/dL     Urea Nitrogen 78 (H) 7 - 30 mg/dL     Creatinine 3.27 (H) 0.66 - 1.25 mg/dL     GFR Estimate 20 (L) >60 mL/min/[1.73_m2]     GFR Estimate If Black 23 (L) >60 mL/min/[1.73_m2]     Calcium 6.8 (L) 8.5 - 10.1 mg/dL     Bilirubin Total 0.7 0.2 - 1.3 mg/dL     Albumin 1.6 (L) 3.4 - 5.0 g/dL     Protein Total 3.4 (L) 6.8 - 8.8 g/dL     Alkaline Phosphatase 100 40 - 150 U/L     ALT 95 (H) 0 - 70 U/L      (H) 0 - 45 U/L   Phosphorus   Result Value Ref Range     Phosphorus 4.8 (H) 2.5 - 4.5 " mg/dL   Calcium ionized whole blood   Result Value Ref Range     Calcium Ionized Whole Blood 4.1 (L) 4.4 - 5.2 mg/dL   CBC with platelets differential   Result Value Ref Range     WBC 10.6 4.0 - 11.0 10e9/L     RBC Count 2.14 (L) 4.4 - 5.9 10e12/L     Hemoglobin 6.6 (LL) 13.3 - 17.7 g/dL     Hematocrit 19.3 (L) 40.0 - 53.0 %     MCV 90 78 - 100 fl     MCH 30.8 26.5 - 33.0 pg     MCHC 34.2 31.5 - 36.5 g/dL     RDW 13.9 10.0 - 15.0 %     Platelet Count 45 (LL) 150 - 450 10e9/L     Diff Method Manual Differential       % Neutrophils 99.1 %     % Lymphocytes 0.0 %     % Monocytes 0.9 %     % Eosinophils 0.0 %     % Basophils 0.0 %     Nucleated RBCs 2 (H) 0 /100     Absolute Neutrophil 10.5 (H) 1.6 - 8.3 10e9/L     Absolute Lymphocytes 0.0 (L) 0.8 - 5.3 10e9/L     Absolute Monocytes 0.1 0.0 - 1.3 10e9/L     Absolute Eosinophils 0.0 0.0 - 0.7 10e9/L     Absolute Basophils 0.0 0.0 - 0.2 10e9/L     Absolute Nucleated RBC 0.2       Anisocytosis Slight       Poikilocytosis Slight       Polychromasia Slight       RBC Fragments Slight       Jose Daniel Cells Moderate       Microcytes Present       Platelet Estimate Confirming automated cell count     CT Head w/o Contrast     Narrative     CT HEAD W/O CONTRAST 4/14/2021 1:43 PM     Provided History: Brain mass or lesion; Mantle Cell Lymphoma, AMS  ICD-10:     Comparison: PET 3/26/21.     Technique: Using multidetector thin collimation helical acquisition  technique, axial, coronal and sagittal CT images from the skull base  to the vertex were obtained without intravenous contrast.      Findings:       No intracranial hemorrhage. No mass effect. No midline shift. No  extra-axial fluid collection. The gray to white matter differentiation  of the cerebral hemispheres is preserved. Ventricles are proportionate  to the sulci.. No sulcal effacement..  The basal cisterns are patent.  Vascular calcifications of the carotid siphons.     The visualized paranasal sinuses are clear. The mastoid  air cells are  clear. Orbits appear unremarkable. No acute fracture.     Redemonstration of 1.7 x 0.7 cm right suboccipital subcutaneous lymph  node which was FDG avid in the most recent PET/CT from 3/26/2021        Impression     Impression: No acute intracranial pathology. No intracranial, or  calvarial mass lesion. Unchanged right suboccipital lymphadenopathy,  which was FDG avid in the most recent PET/CT.     I have personally reviewed the examination and initial interpretation  and I agree with the findings.     FAVIAN MACE MD   Clostridium difficile toxin B PCR     Specimen: Feces   Result Value Ref Range     Specimen Description Feces       C Diff Toxin B PCR Positive (A) NEG^Negative   Glucose by meter   Result Value Ref Range     Glucose 196 (H) 70 - 99 mg/dL   Lactic acid level STAT   Result Value Ref Range     Lactate for Sepsis Protocol 2.8 (H) 0.7 - 2.0 mmol/L   Hemoglobin   Result Value Ref Range     Hemoglobin 7.6 (L) 13.3 - 17.7 g/dL   Glucose by meter   Result Value Ref Range     Glucose 186 (H) 70 - 99 mg/dL   Glucose by meter   Result Value Ref Range     Glucose 176 (H) 70 - 99 mg/dL   Glucose by meter   Result Value Ref Range     Glucose 167 (H) 70 - 99 mg/dL   Glucose by meter   Result Value Ref Range     Glucose 143 (H) 70 - 99 mg/dL   Comprehensive metabolic panel   Result Value Ref Range     Sodium 128 (L) 133 - 144 mmol/L     Potassium 4.4 3.4 - 5.3 mmol/L     Chloride 101 94 - 109 mmol/L     Carbon Dioxide 16 (L) 20 - 32 mmol/L     Anion Gap 12 3 - 14 mmol/L     Glucose 132 (H) 70 - 99 mg/dL     Urea Nitrogen 86 (H) 7 - 30 mg/dL     Creatinine 3.54 (H) 0.66 - 1.25 mg/dL     GFR Estimate 18 (L) >60 mL/min/[1.73_m2]     GFR Estimate If Black 21 (L) >60 mL/min/[1.73_m2]     Calcium 7.4 (L) 8.5 - 10.1 mg/dL     Bilirubin Total 0.7 0.2 - 1.3 mg/dL     Albumin 1.8 (L) 3.4 - 5.0 g/dL     Protein Total 3.8 (L) 6.8 - 8.8 g/dL     Alkaline Phosphatase 139 40 - 150 U/L     ALT 97 (H) 0 - 70 U/L       (H) 0 - 45 U/L   Phosphorus   Result Value Ref Range     Phosphorus 4.7 (H) 2.5 - 4.5 mg/dL   CBC with platelets differential   Result Value Ref Range     WBC 13.3 (H) 4.0 - 11.0 10e9/L     RBC Count 2.45 (L) 4.4 - 5.9 10e12/L     Hemoglobin 7.6 (L) 13.3 - 17.7 g/dL     Hematocrit 21.5 (L) 40.0 - 53.0 %     MCV 88 78 - 100 fl     MCH 31.0 26.5 - 33.0 pg     MCHC 35.3 31.5 - 36.5 g/dL     RDW 14.1 10.0 - 15.0 %     Platelet Count 54 (L) 150 - 450 10e9/L     Diff Method Automated Method       % Neutrophils 92.1 %     % Lymphocytes 2.7 %     % Monocytes 4.1 %     % Eosinophils 0.2 %     % Basophils 0.1 %     % Immature Granulocytes 0.8 %     Nucleated RBCs 1 (H) 0 /100     Absolute Neutrophil 12.2 (H) 1.6 - 8.3 10e9/L     Absolute Lymphocytes 0.4 (L) 0.8 - 5.3 10e9/L     Absolute Monocytes 0.6 0.0 - 1.3 10e9/L     Absolute Eosinophils 0.0 0.0 - 0.7 10e9/L     Absolute Basophils 0.0 0.0 - 0.2 10e9/L     Abs Immature Granulocytes 0.1 0 - 0.4 10e9/L     Absolute Nucleated RBC 0.1     Lactic acid whole blood   Result Value Ref Range     Lactic Acid 2.6 (H) 0.7 - 2.0 mmol/L   Glucose by meter   Result Value Ref Range     Glucose 128 (H) 70 - 99 mg/dL   Glucose by meter   Result Value Ref Range     Glucose 148 (H) 70 - 99 mg/dL   Glucose by meter   Result Value Ref Range     Glucose 147 (H) 70 - 99 mg/dL   Glucose by meter   Result Value Ref Range     Glucose 152 (H) 70 - 99 mg/dL   Glucose by meter   Result Value Ref Range     Glucose 168 (H) 70 - 99 mg/dL   Glucose by meter   Result Value Ref Range     Glucose 149 (H) 70 - 99 mg/dL   Glucose by meter   Result Value Ref Range     Glucose 145 (H) 70 - 99 mg/dL   Magnesium   Result Value Ref Range     Magnesium 2.5 (H) 1.6 - 2.3 mg/dL   Uric acid   Result Value Ref Range     Uric Acid 8.5 (H) 3.5 - 7.2 mg/dL   CRP inflammation   Result Value Ref Range     CRP Inflammation 58.0 (H) 0.0 - 8.0 mg/L   Comprehensive metabolic panel   Result Value Ref Range     Sodium 128  (L) 133 - 144 mmol/L     Potassium 4.2 3.4 - 5.3 mmol/L     Chloride 102 94 - 109 mmol/L     Carbon Dioxide 16 (L) 20 - 32 mmol/L     Anion Gap 11 3 - 14 mmol/L     Glucose 140 (H) 70 - 99 mg/dL     Urea Nitrogen 88 (H) 7 - 30 mg/dL     Creatinine 3.63 (H) 0.66 - 1.25 mg/dL     GFR Estimate 17 (L) >60 mL/min/[1.73_m2]     GFR Estimate If Black 20 (L) >60 mL/min/[1.73_m2]     Calcium 7.6 (L) 8.5 - 10.1 mg/dL     Bilirubin Total 0.7 0.2 - 1.3 mg/dL     Albumin 1.8 (L) 3.4 - 5.0 g/dL     Protein Total 3.8 (L) 6.8 - 8.8 g/dL     Alkaline Phosphatase 175 (H) 40 - 150 U/L     ALT 95 (H) 0 - 70 U/L     AST 96 (H) 0 - 45 U/L   Phosphorus   Result Value Ref Range     Phosphorus 5.1 (H) 2.5 - 4.5 mg/dL   INR   Result Value Ref Range     INR 1.23 (H) 0.86 - 1.14   Partial thromboplastin time   Result Value Ref Range     PTT 43 (H) 22 - 37 sec   Fibrinogen activity   Result Value Ref Range     Fibrinogen 224 200 - 420 mg/dL   CBC with platelets differential   Result Value Ref Range     WBC 13.5 (H) 4.0 - 11.0 10e9/L     RBC Count 2.29 (L) 4.4 - 5.9 10e12/L     Hemoglobin 7.3 (L) 13.3 - 17.7 g/dL     Hematocrit 20.3 (L) 40.0 - 53.0 %     MCV 89 78 - 100 fl     MCH 31.9 26.5 - 33.0 pg     MCHC 36.0 31.5 - 36.5 g/dL     RDW 14.4 10.0 - 15.0 %     Platelet Count 47 (LL) 150 - 450 10e9/L     Diff Method Manual Differential       % Neutrophils 96.5 %     % Lymphocytes 0.9 %     % Monocytes 2.6 %     % Eosinophils 0.0 %     % Basophils 0.0 %     Absolute Neutrophil 13.0 (H) 1.6 - 8.3 10e9/L     Absolute Lymphocytes 0.1 (L) 0.8 - 5.3 10e9/L     Absolute Monocytes 0.4 0.0 - 1.3 10e9/L     Absolute Eosinophils 0.0 0.0 - 0.7 10e9/L     Absolute Basophils 0.0 0.0 - 0.2 10e9/L     Poikilocytosis Moderate       Humphrey Cells Moderate       Platelet Estimate Confirming automated cell count     Calcium ionized whole blood   Result Value Ref Range     Calcium Ionized Whole Blood 4.3 (L) 4.4 - 5.2 mg/dL   Glucose by meter   Result Value Ref Range      Glucose 144 (H) 70 - 99 mg/dL   Glucose by meter   Result Value Ref Range     Glucose 140 (H) 70 - 99 mg/dL   Glucose by meter   Result Value Ref Range     Glucose 145 (H) 70 - 99 mg/dL   Glucose by meter   Result Value Ref Range     Glucose 151 (H) 70 - 99 mg/dL   Glucose by meter   Result Value Ref Range     Glucose 170 (H) 70 - 99 mg/dL   Glucose by meter   Result Value Ref Range     Glucose 251 (H) 70 - 99 mg/dL         Staff Involved:  Medical Student/Resident/Staff     Zacarias Leon, MS3  [facility] [employer]

## 2021-04-15 NOTE — PROGRESS NOTES
[facility]    Hematology / Oncology Progress Note    Date of Service (when I saw the patient): 04/15/2021     Assessment & Plan   Theo Roblero is a 56 year old male with PMHx of T2DM, obesity, tobacco use, HTN, HLD, and stage IV Mantle cell lymphoma (s/p PBSCT 11/2020) with relapsed disease. Most recently received Loncatuximab + Ibrutinib 4/2/21. Presented to [facility] with fevers, hypotension, tachycardia and whole body rash. Developed hypotension refractory to fluid resuscitation and was transferred to MICU for pressors support briefly 4/11-4/12. S/p bx of rash positive for drug rash likely 2/2 allopurinol or Ibrutinib. Started on methylprednisolone 500 mg BID (x4/12) with fever curve now down trending. Complicated by NGOZI on CKD and DKA.      TODAY:   - Continue Methylpred 1 mg/kg daily   - Nephrology following for NGOZI on CKD. UOP improving.    - Hold off on more Lasix at this time per nephro    - PO Bicarb TID and PhosLo TID    - If Uric acid continues to rise with 2000 labs will give Rasburicase   - Endo following, continue insulin gtt. No mIVF given significant anasarca.   - RD consulted, poor PO intake. Prealbumin pending. Livan counts ordered 4/15-4/18      HEME  #Mantle Cell Lymphoma (Stage IV)   Follows outpatient with Dr Nelson. High risk (IPI 6.5 and Ki67 of 20%), stage IV Mantle cell lymphoma in CR1 s/p 8 cycles of alternating 6 cycles of Maxi-R- CHOP alternating with high-dose LITZY-C with last cycle given 6/11/2020. Therapy was complicated by a kidney stone and sepsis in 5/2020, prior to cycle 5. S/p PBSCT 11/2020 with since relapse. Started on experimental study [id number] (PI Dr Nelson). Most recently received Loncatuximab + Ibrutinib 4/2/21.   - HOLD PTA Ibrutinib 560 mg daily given concern as possible cause of rash   - Next due for cycle 2 day 1 loncatuximab 4/23/21     #Anemia and Thrombocytopenia 2/2 Chemotherapy and possible Bleed    Hgb 4.7 from  11.5 on 4/14. No s/sxs of bleeding on exam. CT A/P negative for bleed, stable known hematoma. . Hapto WNL and Smear showed no schistocytes.   - Transfuse if Hgb <7, plts <10K.     #Hypercoagulopathy   INR elevated 1.23 - 1.45 likely 2/2 component of DIC and poor nutrition. Fibrinogen WNL, elevated PTT.   - Monitor coags daily. If PTT remains elevated will need mixing studies.     DERM  #Suspected DRESS 2/2 allopurinol   Presented with whole body skin rash. Derm consulted, s/p skin bx (4/11/21) positive for drug rash. No eosinophilia on diff but facial swelling w/ periorbital edema, L axillary lymphadenopathy and kidney damage concerning for DRESS per derm. Suspect 2/2 Allopurinol or Ibrutinib (can lead to rash in up to 30% of patients).  - IV methylpred 1 mg/kg -- per derm recommend discharge on pred 1 mg/kg daily for 1-2 weeks then follow up in derm clinic   - Kenalog cream BID (patient prefers over ointment   - monitor CBC and CMP closely, can have evidence of liver damage 2/2 DRESS   - Serologies: EBV Joan +, CMV+ Quant <137. Ordered as viral reactivation can provoke DRESS   - Pending EBV quant, HHV6    - Repeat CMV quant in 1-2 weeks (~4/22)   - will require suture removal 4/25   - repeat TSH/free T4 in 4 weeks (~5/12)    #Transaminitis   LFTs up trending likely 2/2 hypoperfusion and DRESS. T bili 0.7, Alk 100, ALT 95, .  - Monitor CMP TID.    ID  #SIRS    Presented with fevers (Tmax 103), hypotension, tachycardia on 4/9/21. Concerning for infection vs delayed infusion reaction. Patient on Ibrutinib and Ioncatox (C1 = 4/2/21). CT CAP negative for intraabdominal or pulmonary source. CXR negative for pulmonary source. ESR 31, CRP 49 on presentation. Procal 0.51 -> 1.58. BCx NGTD. UA bland. WBC 10.9, plts 63k. ANC 7.8. Ongoing hypotension despite 30mL/kg fluid resuscitation 4/11, brief transfer to ICU for pressor support. Transferred back to floor 4/12.   - Ongoing lactic acidosis, suspect 2/2  malignancy, immune mediated process, possible infection and  DKA.    - mIVF per DKA protocol (Isotonic NS preferred)   - Due to persistent fevers and hypotension, started on HD steroids. IV methylpred 1 mg/kg daily (x4/12)      Antimicrobials:   - Vancomycin 4/11 - 4/12   - Zosyn  4/11 - 4/14  - Micafungin  4/11 -  4/12  - Acyclovir (home med) - HSV ppx  - Bactrim (home med) - PJP ppx      #C diff Colitis   Large volume diarrhea started ~4/13, C diff positive 4/14.  - PO Vanco 125 mg QID (4/14-x)    ENDO  #DKA  #T2DM   PTA on Metformin and Glimepride. Following initiation of HD steroids 4/12, blood sugars spikes to 300-400s with associated hyponatremia (126), bicarb 12, anion gap 16, +kussmals respirations, and ongoing hypotension resistant to LR boluses. Endo consulted and started on insulin gtt per DKA protocol 4/13.   - A1C 5.9, ketone elevated in blood 0.8, DKA protocol ordered 4/13, mIVF later discontinued 4/14 given hypervolemia   - Neuro checks QID   - CMP TID     #Hyponatremia   - Na down trending, 126 today. Suspect hyponatremia driven by DKA and Renal Failure.   - Serum osm 284, Urine osm 510 and urine Na 17, consistent with underperfusion and inability to excrete free water. Per nephro, should correct as capillary leak improves.   - CMP TID, keep K >4.0    RENAL  #NGOZI on CKD  #Anasarca   likely prerenal in the setting of sepsis and DKA. No muddy brown cast on UA. CT with large bladder  - Fena 0.4 suggestive of pre renal etiology. Cr 2.35 (baseline 0.9-1.1).   - Anasarca on exam 2/2 capillary leak and significant fluid resuscitation. Up 34 lbs since admission. Difficult to diurese in the context of DKA with hypotension. No UOP overnight 4/13, trial Lasix 100 mg with ~1.1L output.   - Renal consulted, appreciate assistance. No further Lasix recommended at this time.   - Monitor with daily CMP    #Acidosis   Anion gap acidosis with serum ketones likely 2/2 DKA, hypoperfusion, lymphoma and NGOZI on CKD.   - VBG  (4/14) with pH 7.33, CO2 22, O2 27 and bicarb 12.   - PO Bicarb 1300 mg TID (4/14-x)     #Hypocalcemia   - Calcium down trending, iCal 4.1   - Calcium Gluconate 2g ordered (4/14)     #Hyperuricemia   - Uric acid up trending 2/2 renal dysfunction. Elevated at 8.5 (4/15).   - Uric acid daily, AVOID Allopurinol given allergy. Will consider Rasburicase if remains elevated.     CARDS  #Sinus Tachycardia   - HRs 110-150s this admission in the setting of SIRS.   - Telemetry monitoring, PRN IV Metoprolol 5 mg ordered if sustained HRs >140    #Hypertension   Inpatient ECHO unremarkable and DVT doppler negative.   - HOLD PTA Metoprolol ER 25 mg BID in the setting of acute illness and hypotension     #HLD  - HOLD PTA Lipitor 40 mg daily     PULM  #VILMA  #R Hemidiaphragm Elevation    Overnight this admission occasional desaturation noted overnight per nursing, down to mid 80s requiring 2-3L NC. CXR 4/14 with R hemidiaphragm elevation.   - Suspect VILMA, will need referral for sleep study as outpatient.   - BiPAP as needed     FEN  - MIVF per DKA protocol   - Lyte replacement PRN per protocol      PPx  #DVT: HOLD ppx Lovenox (4/14-x) with concern for possible bleed   #GI: PPI BID (given possible bleed)     Consults: Derm, Endo, PT/OT, Nephro    Code: FULL   Follow up: Follows outpatient with Dr Nelson in BMT clinic.    Dispo: Remain inpatient 5-7 days for management and work up of acute illness.     Discussed patient and plan with attending physician, Dr. Stephen.     ZOË SousaC   Hematology/Oncology   Pager: [phone]    Interval History   Overnight ongoing lactic acidosis but down trending. Tachy, BPs improving. Don was able to sleep better and states that he feels much better today than days prior. Ongoing c diff diarrhea, very thirsty and staying hydrated. Lawson in place with better UOP ~1.1L yesterday and 1L thus far today. No dyspnea. Rash is improving. Denies pain. No nausea or vomiting, appetite is  improving.     Physical Exam   Temp: 97.5  F (36.4  C) Temp src: Axillary BP: 120/66 Pulse: 111   Resp: 18 SpO2: 96 % O2 Device: Nasal cannula Oxygen Delivery: 3 LPM  Vitals:    04/09/21 2335 04/13/21 0821 04/15/21 0938   Weight: 103.4 kg (228 lb) 119.2 kg (262 lb 12.8 oz) 108.4 kg (239 lb)     Vital Signs with Ranges  Temp:  [96.8  F (36  C)-100  F (37.8  C)] 97.5  F (36.4  C)  Pulse:  [109-118] 111  Resp:  [18-24] 18  BP: ()/(52-68) 120/66  SpO2:  [94 %-97 %] 96 %  I/O last 3 completed shifts:  In: 1010 [P.O.:410]  Out: 2150 [Urine:2150]    Constitutional: Pleasant male seen laying in bed. No apparent distress. Anasarca+   Eyes: Lids and lashes normal, sclera clear, conjunctiva normal. Periorbital edema.  ENT: Normocephalic, without obvious abnormality, atraumatic, oral pharynx with moist mucus membranes, tonsils without erythema or exudates, gums normal and good dentition.   Respiratory: Tachypnea, good air exchange, faint crackles at bilateral bases. Room air.   Cardiovascular: Normal apical impulse, tachy rate and rhythm, normal S1 and S2, and no murmur noted.  GI: No masses or scars. +BS. Soft. No tenderness on palpation.   Skin: Diffuse macular rash.   Extremities: Diffuse anasarca. No cyanosis.  Neurologic: Awake, alert, oriented to name, but not place or time.  Vascular access: R port c/d/i     Medications     dextrose       insulin (regular) 1 Units/hr (04/15/21 0837)     - MEDICATION INSTRUCTIONS -         acyclovir  200 mg Oral BID     insulin aspart   Subcutaneous TID AC     methylPREDNISolone  1 mg/kg Intravenous Daily     [Held by provider] metoprolol succinate ER  25 mg Oral BID     nystatin  500,000 Units Oral 4x Daily     pantoprazole  40 mg Oral BID AC     sodium bicarbonate  1,300 mg Oral TID     [Held by provider] study - ibrutinib (IDS# [id number])  560 mg Oral Daily     [START ON 4/19/2021] sulfamethoxazole-trimethoprim  1 tablet Oral Q Mon Tues BID     triamcinolone   Topical BID      vancomycin  125 mg Oral 4x Daily       Data   Results for orders placed or performed during the hospital encounter of 04/09/21 (from the past 24 hour(s))   Glucose by meter   Result Value Ref Range    Glucose 131 (H) 70 - 99 mg/dL   Comprehensive metabolic panel   Result Value Ref Range    Sodium 129 (L) 133 - 144 mmol/L    Potassium 4.6 3.4 - 5.3 mmol/L    Chloride 101 94 - 109 mmol/L    Carbon Dioxide 14 (L) 20 - 32 mmol/L    Anion Gap 14 3 - 14 mmol/L    Glucose 135 (H) 70 - 99 mg/dL    Urea Nitrogen 78 (H) 7 - 30 mg/dL    Creatinine 3.27 (H) 0.66 - 1.25 mg/dL    GFR Estimate 20 (L) >60 mL/min/[1.73_m2]    GFR Estimate If Black 23 (L) >60 mL/min/[1.73_m2]    Calcium 6.8 (L) 8.5 - 10.1 mg/dL    Bilirubin Total 0.7 0.2 - 1.3 mg/dL    Albumin 1.6 (L) 3.4 - 5.0 g/dL    Protein Total 3.4 (L) 6.8 - 8.8 g/dL    Alkaline Phosphatase 100 40 - 150 U/L    ALT 95 (H) 0 - 70 U/L     (H) 0 - 45 U/L   Phosphorus   Result Value Ref Range    Phosphorus 4.8 (H) 2.5 - 4.5 mg/dL   Calcium ionized whole blood   Result Value Ref Range    Calcium Ionized Whole Blood 4.1 (L) 4.4 - 5.2 mg/dL   CBC with platelets differential   Result Value Ref Range    WBC 10.6 4.0 - 11.0 10e9/L    RBC Count 2.14 (L) 4.4 - 5.9 10e12/L    Hemoglobin 6.6 (LL) 13.3 - 17.7 g/dL    Hematocrit 19.3 (L) 40.0 - 53.0 %    MCV 90 78 - 100 fl    MCH 30.8 26.5 - 33.0 pg    MCHC 34.2 31.5 - 36.5 g/dL    RDW 13.9 10.0 - 15.0 %    Platelet Count 45 (LL) 150 - 450 10e9/L    Diff Method Manual Differential     % Neutrophils 99.1 %    % Lymphocytes 0.0 %    % Monocytes 0.9 %    % Eosinophils 0.0 %    % Basophils 0.0 %    Nucleated RBCs 2 (H) 0 /100    Absolute Neutrophil 10.5 (H) 1.6 - 8.3 10e9/L    Absolute Lymphocytes 0.0 (L) 0.8 - 5.3 10e9/L    Absolute Monocytes 0.1 0.0 - 1.3 10e9/L    Absolute Eosinophils 0.0 0.0 - 0.7 10e9/L    Absolute Basophils 0.0 0.0 - 0.2 10e9/L    Absolute Nucleated RBC 0.2     Anisocytosis Slight     Poikilocytosis Slight      Polychromasia Slight     RBC Fragments Slight     [place] Cells Moderate     Microcytes Present     Platelet Estimate Confirming automated cell count    CT Head w/o Contrast    Narrative    CT HEAD W/O CONTRAST 4/14/2021 1:43 PM    Provided History: Brain mass or lesion; Mantle Cell Lymphoma, AMS  ICD-10:    Comparison: PET 3/26/21.    Technique: Using multidetector thin collimation helical acquisition  technique, axial, coronal and sagittal CT images from the skull base  to the vertex were obtained without intravenous contrast.     Findings:      No intracranial hemorrhage. No mass effect. No midline shift. No  extra-axial fluid collection. The gray to white matter differentiation  of the cerebral hemispheres is preserved. Ventricles are proportionate  to the sulci.. No sulcal effacement..  The basal cisterns are patent.  Vascular calcifications of the carotid siphons.    The visualized paranasal sinuses are clear. The mastoid air cells are  clear. Orbits appear unremarkable. No acute fracture.    Redemonstration of 1.7 x 0.7 cm right suboccipital subcutaneous lymph  node which was FDG avid in the most recent PET/CT from 3/26/2021      Impression    Impression: No acute intracranial pathology. No intracranial, or  calvarial mass lesion. Unchanged right suboccipital lymphadenopathy,  which was FDG avid in the most recent PET/CT.    I have personally reviewed the examination and initial interpretation  and I agree with the findings.    FAVIAN MACE MD   Clostridium difficile toxin B PCR    Specimen: Feces   Result Value Ref Range    Specimen Description Feces     C Diff Toxin B PCR Positive (A) NEG^Negative   Glucose by meter   Result Value Ref Range    Glucose 196 (H) 70 - 99 mg/dL   Lactic acid level STAT   Result Value Ref Range    Lactate for Sepsis Protocol 2.8 (H) 0.7 - 2.0 mmol/L   Hemoglobin   Result Value Ref Range    Hemoglobin 7.6 (L) 13.3 - 17.7 g/dL   Glucose by meter   Result Value Ref Range    Glucose  186 (H) 70 - 99 mg/dL   Glucose by meter   Result Value Ref Range    Glucose 176 (H) 70 - 99 mg/dL   Glucose by meter   Result Value Ref Range    Glucose 167 (H) 70 - 99 mg/dL   Glucose by meter   Result Value Ref Range    Glucose 143 (H) 70 - 99 mg/dL   Comprehensive metabolic panel   Result Value Ref Range    Sodium 128 (L) 133 - 144 mmol/L    Potassium 4.4 3.4 - 5.3 mmol/L    Chloride 101 94 - 109 mmol/L    Carbon Dioxide 16 (L) 20 - 32 mmol/L    Anion Gap 12 3 - 14 mmol/L    Glucose 132 (H) 70 - 99 mg/dL    Urea Nitrogen 86 (H) 7 - 30 mg/dL    Creatinine 3.54 (H) 0.66 - 1.25 mg/dL    GFR Estimate 18 (L) >60 mL/min/[1.73_m2]    GFR Estimate If Black 21 (L) >60 mL/min/[1.73_m2]    Calcium 7.4 (L) 8.5 - 10.1 mg/dL    Bilirubin Total 0.7 0.2 - 1.3 mg/dL    Albumin 1.8 (L) 3.4 - 5.0 g/dL    Protein Total 3.8 (L) 6.8 - 8.8 g/dL    Alkaline Phosphatase 139 40 - 150 U/L    ALT 97 (H) 0 - 70 U/L     (H) 0 - 45 U/L   Phosphorus   Result Value Ref Range    Phosphorus 4.7 (H) 2.5 - 4.5 mg/dL   CBC with platelets differential   Result Value Ref Range    WBC 13.3 (H) 4.0 - 11.0 10e9/L    RBC Count 2.45 (L) 4.4 - 5.9 10e12/L    Hemoglobin 7.6 (L) 13.3 - 17.7 g/dL    Hematocrit 21.5 (L) 40.0 - 53.0 %    MCV 88 78 - 100 fl    MCH 31.0 26.5 - 33.0 pg    MCHC 35.3 31.5 - 36.5 g/dL    RDW 14.1 10.0 - 15.0 %    Platelet Count 54 (L) 150 - 450 10e9/L    Diff Method Automated Method     % Neutrophils 92.1 %    % Lymphocytes 2.7 %    % Monocytes 4.1 %    % Eosinophils 0.2 %    % Basophils 0.1 %    % Immature Granulocytes 0.8 %    Nucleated RBCs 1 (H) 0 /100    Absolute Neutrophil 12.2 (H) 1.6 - 8.3 10e9/L    Absolute Lymphocytes 0.4 (L) 0.8 - 5.3 10e9/L    Absolute Monocytes 0.6 0.0 - 1.3 10e9/L    Absolute Eosinophils 0.0 0.0 - 0.7 10e9/L    Absolute Basophils 0.0 0.0 - 0.2 10e9/L    Abs Immature Granulocytes 0.1 0 - 0.4 10e9/L    Absolute Nucleated RBC 0.1    Lactic acid whole blood   Result Value Ref Range    Lactic Acid 2.6  (H) 0.7 - 2.0 mmol/L   Glucose by meter   Result Value Ref Range    Glucose 128 (H) 70 - 99 mg/dL   Glucose by meter   Result Value Ref Range    Glucose 148 (H) 70 - 99 mg/dL   Glucose by meter   Result Value Ref Range    Glucose 147 (H) 70 - 99 mg/dL   Glucose by meter   Result Value Ref Range    Glucose 152 (H) 70 - 99 mg/dL   Glucose by meter   Result Value Ref Range    Glucose 168 (H) 70 - 99 mg/dL   Glucose by meter   Result Value Ref Range    Glucose 149 (H) 70 - 99 mg/dL   Glucose by meter   Result Value Ref Range    Glucose 145 (H) 70 - 99 mg/dL   Magnesium   Result Value Ref Range    Magnesium 2.5 (H) 1.6 - 2.3 mg/dL   Uric acid   Result Value Ref Range    Uric Acid 8.5 (H) 3.5 - 7.2 mg/dL   CRP inflammation   Result Value Ref Range    CRP Inflammation 58.0 (H) 0.0 - 8.0 mg/L   Comprehensive metabolic panel   Result Value Ref Range    Sodium 128 (L) 133 - 144 mmol/L    Potassium 4.2 3.4 - 5.3 mmol/L    Chloride 102 94 - 109 mmol/L    Carbon Dioxide 16 (L) 20 - 32 mmol/L    Anion Gap 11 3 - 14 mmol/L    Glucose 140 (H) 70 - 99 mg/dL    Urea Nitrogen 88 (H) 7 - 30 mg/dL    Creatinine 3.63 (H) 0.66 - 1.25 mg/dL    GFR Estimate 17 (L) >60 mL/min/[1.73_m2]    GFR Estimate If Black 20 (L) >60 mL/min/[1.73_m2]    Calcium 7.6 (L) 8.5 - 10.1 mg/dL    Bilirubin Total 0.7 0.2 - 1.3 mg/dL    Albumin 1.8 (L) 3.4 - 5.0 g/dL    Protein Total 3.8 (L) 6.8 - 8.8 g/dL    Alkaline Phosphatase 175 (H) 40 - 150 U/L    ALT 95 (H) 0 - 70 U/L    AST 96 (H) 0 - 45 U/L   Phosphorus   Result Value Ref Range    Phosphorus 5.1 (H) 2.5 - 4.5 mg/dL   INR   Result Value Ref Range    INR 1.23 (H) 0.86 - 1.14   Partial thromboplastin time   Result Value Ref Range    PTT 43 (H) 22 - 37 sec   Fibrinogen activity   Result Value Ref Range    Fibrinogen 224 200 - 420 mg/dL   CBC with platelets differential   Result Value Ref Range    WBC 13.5 (H) 4.0 - 11.0 10e9/L    RBC Count 2.29 (L) 4.4 - 5.9 10e12/L    Hemoglobin 7.3 (L) 13.3 - 17.7 g/dL     Hematocrit 20.3 (L) 40.0 - 53.0 %    MCV 89 78 - 100 fl    MCH 31.9 26.5 - 33.0 pg    MCHC 36.0 31.5 - 36.5 g/dL    RDW 14.4 10.0 - 15.0 %    Platelet Count 47 (LL) 150 - 450 10e9/L    Diff Method Manual Differential     % Neutrophils 96.5 %    % Lymphocytes 0.9 %    % Monocytes 2.6 %    % Eosinophils 0.0 %    % Basophils 0.0 %    Absolute Neutrophil 13.0 (H) 1.6 - 8.3 10e9/L    Absolute Lymphocytes 0.1 (L) 0.8 - 5.3 10e9/L    Absolute Monocytes 0.4 0.0 - 1.3 10e9/L    Absolute Eosinophils 0.0 0.0 - 0.7 10e9/L    Absolute Basophils 0.0 0.0 - 0.2 10e9/L    Poikilocytosis Moderate     [place] Cells Moderate     Platelet Estimate Confirming automated cell count    Calcium ionized whole blood   Result Value Ref Range    Calcium Ionized Whole Blood 4.3 (L) 4.4 - 5.2 mg/dL   Glucose by meter   Result Value Ref Range    Glucose 144 (H) 70 - 99 mg/dL   Glucose by meter   Result Value Ref Range    Glucose 140 (H) 70 - 99 mg/dL   Glucose by meter   Result Value Ref Range    Glucose 145 (H) 70 - 99 mg/dL   Glucose by meter   Result Value Ref Range    Glucose 151 (H) 70 - 99 mg/dL

## 2021-04-15 NOTE — PLAN OF CARE
Patient had one large, liquid, incontinent stool overnight. Patient is on an insulin drip, currently on 2 units/hr per Algorithm 2. Oxygen saturations have been stable on 3L O2. Patient resting between cares. Mildly tachycardic, 100-115 bpm, BP stable. Patient has been afebrile overnight.  No replacements required overnight, HGB 7.3. Lawson is draining adequate urine output. Patient lactic recheck was at 2.6, down from 2.8. Resting/sleeping between cares.    Problem: Diarrhea (Stem Cell/Bone Marrow Transplant)  Goal: Diarrhea Symptom Control  Outcome: No Change     Problem: Fatigue (Stem Cell/Bone Marrow Transplant)  Goal: Energy Level Supports Daily Activity  Outcome: No Change

## 2021-04-16 NOTE — PROGRESS NOTES
CLINICAL NUTRITION SERVICES - BRIEF NOTE      *Please see full assessment note from 04/14/2021    New Findings:  Visited with patient and his visitor. Per pt's visitor he has only been eating a few bites at each meal. Pt likes the glucerna chocolate nutrition shake and we will continue to send TID. Pt has not been drinking all three but will try to. Visitor asked about nutrition supplements that were more of a juice such as Ensure clear, talked about the high carbohydrate amounts in these and pt on Moderate Consistent CHO diet. Gave the option of Gelatein 20 as a replacement and patient agreed to trying this. Kcal counts continue until 4/17 and will re-evaluate nutrition plan from there.    BG ranging from 122-146 (last 5)- Endo following     Interventions  Medical food supplement therapy-  -Glucerna Therapeutic Nutrition Shake (chocolate) TID  -Gelatein 20 daily with lunch    RD to follow per protocol.    Margi Marroquin  Dietetic Intern    I read and agree with the above assessment and interventions.   Cherelle Son MS, RD, , CNSC, LD.  Pager: [phone]

## 2021-04-16 NOTE — PROGRESS NOTES
"BP (!) 166/95 (BP Location: Left arm)   Pulse 86   Temp 96.4  F (35.8  C) (Axillary)   Resp 19   Ht 1.803 m (5' 11\")   Wt 119.4 kg (263 lb 3.7 oz)   SpO2 97%   BMI 36.71 kg/m    Pt afeb, hypertensive, metoprolol resumed. Pt eating fair, carbs covered. Insulin gtt to stop this timothy BG in target range. Pt with emesis x 1 zofran given premeal. Pt with large incontinent stool. Watery stool continues. Lawson catheter intact with good output. K+ replaced, recheck @ 2000. Pt up in chair most of the day. Rash unchanged, offers no complaints, continue to monitor per poc.  "

## 2021-04-16 NOTE — PROGRESS NOTES
[facility]    Hematology / Oncology Progress Note    Date of Service (when I saw the patient): 04/16/2021     Assessment & Plan   Theo Roblero is a 56 year old male with PMHx of T2DM, obesity, tobacco use, HTN, HLD, and stage IV Mantle cell lymphoma (s/p PBSCT 11/2020) with relapsed disease. Most recently received Loncatuximab + Ibrutinib 4/2/21. Presented to [facility] with fevers, hypotension, tachycardia and whole body rash. Developed hypotension refractory to fluid resuscitation and was transferred to MICU for pressors support briefly 4/11-4/12. S/p bx of rash positive for drug rash likely 2/2 Ibrutinib or Loncatuximab. Started on methylprednisolone 500 mg BID (x4/12) with fever curve now down trending. Complicated by NGOZI on CKD, c diff colitis and DKA.      TODAY:   - Reduce steroids to Pred 1 mg/kg daily tomorrow per derm   - Nephrology following for NGOZI on CKD. UOP improving.    - Hold off on more Lasix at this time per nephro    - PO Bicarb TID and PhosLo TID    - Rasburicase 6 mg once   - Endo following, continue insulin gtt; plan to transition to longer after tomorrow   - RD consulted, poor PO intake. Prealbumin 5 (low). Livan counts ordered 4/15-4/18      HEME  #Mantle Cell Lymphoma (Stage IV)   Follows outpatient with Dr Nelson. High risk (IPI 6.5 and Ki67 of 20%), stage IV Mantle cell lymphoma in CR1 s/p 8 cycles of alternating 6 cycles of Maxi-R- CHOP alternating with high-dose LITZY-C with last cycle given 6/11/2020. Therapy was complicated by a kidney stone and sepsis in 5/2020, prior to cycle 5. S/p PBSCT 11/2020 with since relapse. Started on experimental study [id number] (PI Dr Nelson). Most recently received Loncatuximab + Ibrutinib 4/2/21.   - HOLD PTA Ibrutinib 560 mg daily given concern as possible cause of rash   - Next due for cycle 2 day 1 loncatuximab 4/23/21     #Anemia and Thrombocytopenia 2/2 Chemotherapy and MCL  Hgb 4.7 from 11.5 on  4/14. No s/sxs of bleeding on exam. CT A/P negative for bleed, stable known hematoma. . Hapto WNL and Smear showed no schistocytes.   - Transfuse if Hgb <7, plts <10K. Monitor CBC daily.     #Hypercoagulopathy, improved    INR elevated 1.23 - 1.45 likely 2/2 component of DIC and poor nutrition. Fibrinogen WNL, elevated PTT.   - Monitor coags daily. If PTT remains elevated will need mixing studies.     DERM  #DRESS   Presented with whole body skin rash to BMT clinic 4/9. Derm consulted, s/p skin bx (4/11/21) positive for drug rash. No eosinophilia on diff but facial swelling w/ periorbital edema, L axillary lymphadenopathy and kidney damage concerning for DRESS per derm although unusual as DRESS presents typically 2-4 weeks after drug exposure. Suspect 2/2 study drug or Ibrutinib (can lead to rash in up to 30% of patients). Do NOT suspect rash was 2/2 Allopurinol as patient had not been taking allopurinol since November 2020.   - Methylpred 1 mg/kg (4/11-4/16)  - Reduced to Pred 1 mg/kg (x4/17) per derm recommend discharge on pred 1 mg/kg daily for 1-2 weeks then follow up in derm clinic. Will require prolonged steroid taper.   - Kenalog cream BID (patient prefers over ointment   - monitor CBC and CMP closely, can have evidence of liver damage 2/2 DRESS   - Serologies: EBV Joan +, CMV+ Quant <137. Ordered as viral reactivation can provoke DRESS   - Pending EBV quant, HHV6    - Repeat CMV quant in 1-2 weeks (~4/22)   - will require suture removal 4/25   - repeat TSH/free T4 in 4 weeks (~5/12)    #Transaminitis   LFTs up trending likely 2/2 hypoperfusion and DRESS. T bili 0.7, Alk 100, ALT 95, .  - Monitor CMP TID.    ID  #SIRS    Presented with fevers (Tmax 103), hypotension, tachycardia on 4/9/21. Concerning for infection vs delayed infusion reaction. Patient on Ibrutinib and Ioncatox (C1 = 4/2/21). CT CAP negative for intraabdominal or pulmonary source. CXR negative for pulmonary source. ESR 31, CRP 49  on presentation. Procal 0.51 -> 1.58. BCx NGTD. UA bland. WBC 10.9, plts 63k. ANC 7.8. Ongoing hypotension despite 30mL/kg fluid resuscitation 4/11, brief transfer to ICU for pressor support. Transferred back to floor 4/12.   - Ongoing lactic acidosis, suspect 2/2 malignancy, immune mediated process, possible infection and  DKA.    - mIVF per DKA protocol (Isotonic NS preferred)   - Due to persistent fevers and hypotension, started on HD steroids. IV methylpred 1 mg/kg daily (x4/12)      Antimicrobials:   - Vancomycin 4/11 - 4/12   - Zosyn  4/11 - 4/14  - Micafungin  4/11 -  4/12  - Acyclovir (home med) - HSV ppx  - Bactrim (home med) - PJP ppx      #C diff Colitis   Large volume diarrhea started ~4/13, C diff positive 4/14.  - PO Vanco 125 mg QID (4/14-x)    ENDO  #DKA, resolved   #T2DM   PTA on Metformin and Glimepride. Following initiation of HD steroids 4/12, blood sugars spikes to 300-400s with associated hyponatremia (126), bicarb 12, anion gap 16, +kussmals respirations, and ongoing hypotension resistant to LR boluses. Endo consulted and started on insulin gtt per DKA protocol 4/13.   - A1C 5.9, ketone elevated in blood 0.8, DKA protocol ordered 4/13, mIVF later discontinued 4/14 given hypervolemia.  - CMP TID, anion gap resolved.     #Hyponatremia   - Na down trending, 126 today. Suspect hyponatremia driven by DKA and Renal Failure.   - Serum osm 284, Urine osm 510 and urine Na 17, consistent with underperfusion and inability to excrete free water. Per nephro, should correct as capillary leak improves.   - CMP TID, keep K >4.0    RENAL  #NGOZI on CKD  #Anasarca   likely prerenal in the setting of sepsis and DKA. No muddy brown cast on UA. CT with large bladder  - Fena 0.4 suggestive of pre renal etiology. Cr 2.35 (baseline 0.9-1.1).   - Anasarca on exam 2/2 capillary leak and significant fluid resuscitation. Up 34 lbs since admission. Difficult to diurese in the context of DKA with hypotension. No UOP  overnight 4/13, trial Lasix 100 mg with ~1.1L output.   - Renal consulted, appreciate assistance. No further Lasix recommended at this time.   - Monitor with CMP    #Acidosis   Anion gap acidosis with serum ketones likely 2/2 DKA, hypoperfusion, lymphoma and NGOZI on CKD.   - VBG (4/14) with pH 7.33, CO2 22, O2 27 and bicarb 12.   - PO Bicarb 1300 mg TID (4/14-x)     #Hypocalcemia   - Calcium down trending, iCal 4.1   - Calcium Gluconate 2g ordered (4/14)     #Hyperphosphatemia   - Phos up trending, now 5.7. Likely 2/2 renal dysfunction.   - PhosLo TID    #Hyperuricemia   - Uric acid up trending 2/2 renal dysfunction. Elevated at 8.5 (4/15).   - Uric acid daily, AVOID Allopurinol given allergy. Will consider Rasburicase if remains elevated.     CARDS  #Sinus Tachycardia, improved   - HRs 110-150s this admission in the setting of SIRS.   - PRN IV Metoprolol 5 mg ordered if sustained HRs >140    #Hypertension   Inpatient ECHO unremarkable and DVT doppler negative.   - PTA Metoprolol ER 25 mg BID initially held in the setting of acute illness and hypotension, now hypertensive so resumed 4/16.     #HLD  - HOLD PTA Lipitor 40 mg daily     PULM  #VILMA  #R Hemidiaphragm Elevation    Overnight this admission occasional desaturation noted overnight per nursing, down to mid 80s requiring 2-3L NC. CXR 4/14 with R hemidiaphragm elevation.   - Suspect VILMA, will need referral for sleep study as outpatient.   - BiPAP as needed     FEN  - MIVF per DKA protocol   - Lyte replacement PRN per protocol      PPx  #DVT: HOLD ppx Lovenox (4/14-x) with concern for possible bleed   #GI: PPI BID (given possible bleed)     Consults: Derm, Endo, PT/OT, Nephro    Code: FULL   Follow up: Follows outpatient with Dr Nelson in BMT clinic.    Dispo: Remain inpatient 5-7 days for management and work up of acute illness.     Discussed patient and plan with attending physician, Dr. Stephen.     Ana Beltran, PAStephieC   Hematology/Oncology   Pager:  [phone]    Interval History   Overnight no acute events. BPs improving. Yanick was able to sleep better and states that he feels much better today than days prior. Ongoing c diff diarrhea, very thirsty and staying hydrated. Diarrheal episodes limited his ability to work much with PT yesterday. No dyspnea. Rash is improving. Denies pain. No nausea or vomiting, appetite is improving. Yanick confirmed that he has not been taking Allopurinol since last fall so rash not from that drug.     Physical Exam   Temp: 97.3  F (36.3  C) Temp src: Axillary BP: (!) 158/87 Pulse: 89   Resp: 14 SpO2: 97 % O2 Device: None (Room air)(Nasal cannula out of nose)) Oxygen Delivery: 2 LPM  Vitals:    04/09/21 2335 04/13/21 0821 04/15/21 0938   Weight: 103.4 kg (228 lb) 119.2 kg (262 lb 12.8 oz) 108.4 kg (239 lb)     Vital Signs with Ranges  Temp:  [96.4  F (35.8  C)-99.3  F (37.4  C)] 97.3  F (36.3  C)  Pulse:  [] 89  Resp:  [14-19] 14  BP: (136-182)/() 158/87  SpO2:  [97 %-99 %] 97 %  I/O last 3 completed shifts:  In: 1325 [P.O.:1200; I.V.:125]  Out: 1675 [Urine:1675]    Constitutional: Pleasant male seen laying in bed. No apparent distress. Anasarca+   Eyes: Lids and lashes normal, sclera clear, conjunctiva normal. Periorbital edema improved.   ENT: Normocephalic, without obvious abnormality, atraumatic, oral pharynx with moist mucus membranes, tonsils without erythema or exudates, gums normal and good dentition.   Respiratory: Tachypnea, good air exchange, faint crackles at bilateral bases. Room air.   Cardiovascular: Normal apical impulse, tachy rate and rhythm, normal S1 and S2, and no murmur noted.  GI: No masses or scars. +BS. Soft. No tenderness on palpation.   Skin: Diffuse macular rash, improving   Extremities: Diffuse anasarca. No cyanosis.  Neurologic: Awake, alert, oriented to name, but not place or time.  Vascular access: R port c/d/i     Medications     dextrose       insulin (regular) 3 Units/hr (04/16/21 0942)     -  MEDICATION INSTRUCTIONS -         acyclovir  200 mg Oral BID     calcium acetate  1,334 mg Oral TID w/meals     insulin aspart   Subcutaneous TID AC     metoprolol succinate ER  25 mg Oral BID     nystatin  500,000 Units Oral 4x Daily     pantoprazole  40 mg Oral BID AC     [START ON 4/17/2021] predniSONE  1 mg/kg Oral Daily     sodium bicarbonate  1,300 mg Oral TID     [Held by provider] study - ibrutinib (IDS# 5398)  560 mg Oral Daily     [START ON 4/19/2021] sulfamethoxazole-trimethoprim  1 tablet Oral Q Mon Tues BID     triamcinolone   Topical BID     vancomycin  125 mg Oral 4x Daily       Data   Results for orders placed or performed during the hospital encounter of 04/09/21 (from the past 24 hour(s))   Glucose by meter   Result Value Ref Range    Glucose 213 (H) 70 - 99 mg/dL   Glucose by meter   Result Value Ref Range    Glucose 170 (H) 70 - 99 mg/dL   Glucose by meter   Result Value Ref Range    Glucose 184 (H) 70 - 99 mg/dL   Glucose by meter   Result Value Ref Range    Glucose 185 (H) 70 - 99 mg/dL   Glucose by meter   Result Value Ref Range    Glucose 162 (H) 70 - 99 mg/dL   Glucose by meter   Result Value Ref Range    Glucose 164 (H) 70 - 99 mg/dL   Comprehensive metabolic panel   Result Value Ref Range    Sodium 131 (L) 133 - 144 mmol/L    Potassium 3.5 3.4 - 5.3 mmol/L    Chloride 103 94 - 109 mmol/L    Carbon Dioxide 16 (L) 20 - 32 mmol/L    Anion Gap 12 3 - 14 mmol/L    Glucose 151 (H) 70 - 99 mg/dL    Urea Nitrogen 94 (H) 7 - 30 mg/dL    Creatinine 3.63 (H) 0.66 - 1.25 mg/dL    GFR Estimate 17 (L) >60 mL/min/[1.73_m2]    GFR Estimate If Black 20 (L) >60 mL/min/[1.73_m2]    Calcium 8.0 (L) 8.5 - 10.1 mg/dL    Bilirubin Total 0.6 0.2 - 1.3 mg/dL    Albumin 2.2 (L) 3.4 - 5.0 g/dL    Protein Total 4.4 (L) 6.8 - 8.8 g/dL    Alkaline Phosphatase 217 (H) 40 - 150 U/L    ALT 95 (H) 0 - 70 U/L    AST 84 (H) 0 - 45 U/L   Phosphorus   Result Value Ref Range    Phosphorus 4.8 (H) 2.5 - 4.5 mg/dL   CBC with  platelets differential   Result Value Ref Range    WBC 13.2 (H) 4.0 - 11.0 10e9/L    RBC Count 2.64 (L) 4.4 - 5.9 10e12/L    Hemoglobin 8.3 (L) 13.3 - 17.7 g/dL    Hematocrit 23.0 (L) 40.0 - 53.0 %    MCV 87 78 - 100 fl    MCH 31.4 26.5 - 33.0 pg    MCHC 36.1 31.5 - 36.5 g/dL    RDW 14.5 10.0 - 15.0 %    Platelet Count 37 (LL) 150 - 450 10e9/L    Diff Method Manual Differential     % Neutrophils 96.5 %    % Lymphocytes 0.9 %    % Monocytes 2.6 %    % Eosinophils 0.0 %    % Basophils 0.0 %    Nucleated RBCs 1 (H) 0 /100    Absolute Neutrophil 12.7 (H) 1.6 - 8.3 10e9/L    Absolute Lymphocytes 0.1 (L) 0.8 - 5.3 10e9/L    Absolute Monocytes 0.3 0.0 - 1.3 10e9/L    Absolute Eosinophils 0.0 0.0 - 0.7 10e9/L    Absolute Basophils 0.0 0.0 - 0.2 10e9/L    Absolute Nucleated RBC 0.1     Platelet Estimate Confirming automated cell count    Uric acid   Result Value Ref Range    Uric Acid 9.1 (H) 3.5 - 7.2 mg/dL   Glucose by meter   Result Value Ref Range    Glucose 142 (H) 70 - 99 mg/dL   Glucose by meter   Result Value Ref Range    Glucose 134 (H) 70 - 99 mg/dL   Glucose by meter   Result Value Ref Range    Glucose 130 (H) 70 - 99 mg/dL   Magnesium   Result Value Ref Range    Magnesium 2.8 (H) 1.6 - 2.3 mg/dL   Uric acid   Result Value Ref Range    Uric Acid 7.5 (H) 3.5 - 7.2 mg/dL   CRP inflammation   Result Value Ref Range    CRP Inflammation 38.0 (H) 0.0 - 8.0 mg/L   Comprehensive metabolic panel   Result Value Ref Range    Sodium 131 (L) 133 - 144 mmol/L    Potassium 3.5 3.4 - 5.3 mmol/L    Chloride 102 94 - 109 mmol/L    Carbon Dioxide 19 (L) 20 - 32 mmol/L    Anion Gap 9 3 - 14 mmol/L    Glucose 115 (H) 70 - 99 mg/dL    Urea Nitrogen 92 (H) 7 - 30 mg/dL    Creatinine 3.83 (H) 0.66 - 1.25 mg/dL    GFR Estimate 16 (L) >60 mL/min/[1.73_m2]    GFR Estimate If Black 19 (L) >60 mL/min/[1.73_m2]    Calcium 8.2 (L) 8.5 - 10.1 mg/dL    Bilirubin Total 0.7 0.2 - 1.3 mg/dL    Albumin 2.2 (L) 3.4 - 5.0 g/dL    Protein Total 4.3 (L)  6.8 - 8.8 g/dL    Alkaline Phosphatase 191 (H) 40 - 150 U/L    ALT 88 (H) 0 - 70 U/L    AST 67 (H) 0 - 45 U/L   Phosphorus   Result Value Ref Range    Phosphorus 5.7 (H) 2.5 - 4.5 mg/dL   INR   Result Value Ref Range    INR 1.12 0.86 - 1.14   Partial thromboplastin time   Result Value Ref Range    PTT 31 22 - 37 sec   Fibrinogen activity   Result Value Ref Range    Fibrinogen 228 200 - 420 mg/dL   CBC with platelets differential   Result Value Ref Range    WBC 11.5 (H) 4.0 - 11.0 10e9/L    RBC Count 2.58 (L) 4.4 - 5.9 10e12/L    Hemoglobin 7.9 (L) 13.3 - 17.7 g/dL    Hematocrit 22.2 (L) 40.0 - 53.0 %    MCV 86 78 - 100 fl    MCH 30.6 26.5 - 33.0 pg    MCHC 35.6 31.5 - 36.5 g/dL    RDW 14.5 10.0 - 15.0 %    Platelet Count 29 (LL) 150 - 450 10e9/L    Diff Method Manual Differential     % Neutrophils 95.5 %    % Lymphocytes 1.8 %    % Monocytes 1.8 %    % Eosinophils 0.9 %    % Basophils 0.0 %    Nucleated RBCs 1 (H) 0 /100    Absolute Neutrophil 11.0 (H) 1.6 - 8.3 10e9/L    Absolute Lymphocytes 0.2 (L) 0.8 - 5.3 10e9/L    Absolute Monocytes 0.2 0.0 - 1.3 10e9/L    Absolute Eosinophils 0.1 0.0 - 0.7 10e9/L    Absolute Basophils 0.0 0.0 - 0.2 10e9/L    Absolute Nucleated RBC 0.1     Poikilocytosis Slight     Jose Daniel Cells Slight    EKG 12-lead, complete   Result Value Ref Range    Interpretation ECG Click View Image link to view waveform and result

## 2021-04-16 NOTE — PROGRESS NOTES
"  Nephrology Progress Note  04/16/2021         55 yo man S/P PBSCT in Nov 2020 now with relapsed mantle cell lymphoma adm 4/9 with diffuse total body skin rash and temp of 103 (Probably drug-induced and possibly DRESS) occurring in the context of new chemo regimen    Interval History :   Nursing and provider notes from last 24 hours reviewed.  Patient doing well this afternoon. Denied F/C/N/V/dysuria. 4pt ROS negative. BP elevated.     ASSESSMENT AND RECOMMENDATIONS:      # NGOZI on CKD:  Baseline Cr was 0.9 in 2020.  Cr 1.1 on 4/2, up to 3.8 today (but only up slightly from 3.6) UA bland (minimal proteinuria). CT from 4/11 does not show hydronephrosis. FENa 0.4 % on 4/11.  Alb 1.8.  Renal underperfusion (pre-renal azotemia) is possible given low albumin and leaky capillaries.  Possible nephrotoxins include new chemotherapeutic agents and now vancomycin.  Underlying CKD with gradual Cr rise over past 6 months High PVR (700) so now has ocampo cath.  UOP increased to 2175 in past 24 hours. Still without indication for RRT.     - lab stable today with no indication for HD for clearance    # Volume overload:  Lungs remain clear but he has anasarca.                - may be able to restart diuretics if BPs consistently elevated     # Acidosis: OK to continue NaHCO3.     - avoid overcorrection on pH b/o risk of hypocalcemia     #Hyponatremia: stable.     #Hyperphosphatemia - continue Ca acetate        Recommendations were communicated to primary team by this note.    Clayton Dubon MD  [phone]      Review of Systems:   4pt ROS negative    Physical Exam:   I/O last 3 completed shifts:  In: 365 [P.O.:240; I.V.:125]  Out: 2875 [Urine:2275; Emesis/NG output:200; Stool:400]   BP (!) 163/94 (BP Location: Left arm)   Pulse 87   Temp 97.7  F (36.5  C) (Axillary)   Resp 18   Ht 1.803 m (5' 11\")   Wt 119.4 kg (263 lb 3.7 oz)   SpO2 98%   BMI 36.71 kg/m       GENERAL APPEARANCE: tired, anasarca, in no distress.  Total body rash is " visible  EYES:  no scleral icterus  HENT: mouth without ulcers or lesions  PULM: lungs clear to auscultation bilaterally.    CV: distant heart sounds    -edema 2+ LE  GI: soft, distended nontender,  INTEGUMENT: no cyanosis, maculopapular rash total body  NEURO:  Able to answer questions  Access none  Lawson in place    Labs:   All labs reviewed by me  Electrolytes/Renal -   Recent Labs   Lab Test 04/16/21  1226 04/16/21  0323 04/15/21  1951 04/15/21  0408 04/15/21  0408 04/14/21  0330 04/14/21  0330    131* 131*   < > 128*   < > 127*   POTASSIUM 3.3* 3.5 3.5   < > 4.2   < > 4.3   CHLORIDE 104 102 103   < > 102   < > 101   CO2 18* 19* 16*   < > 16*   < > 11*   BUN 93* 92* 94*   < > 88*   < > 73*   CR 3.58* 3.83* 3.63*   < > 3.63*   < > 3.14*   * 115* 151*   < > 140*   < > 118*   BELA 8.1* 8.2* 8.0*   < > 7.6*   < > 6.9*   MAG  --  2.8*  --   --  2.5*  --  2.4*   PHOS 5.2* 5.7* 4.8*   < > 5.1*   < > 4.1    < > = values in this interval not displayed.       CBC -   Recent Labs   Lab Test 04/16/21  1226 04/16/21  0323 04/15/21  1951   WBC 10.3 11.5* 13.2*   HGB 8.4* 7.9* 8.3*   PLT 21* 29* 37*       LFTs -   Recent Labs   Lab Test 04/16/21 1226 04/16/21  0323 04/15/21  1951   ALKPHOS 195* 191* 217*   BILITOTAL 0.7 0.7 0.6   ALT 83* 88* 95*   AST 58* 67* 84*   PROTTOTAL 4.4* 4.3* 4.4*   ALBUMIN 2.3* 2.2* 2.2*       Iron Panel - No lab results found.      Imaging:  CT 4/11:  CHEST:  LUNGS: The trachea and central airways are patent. No pneumothorax.  Trace bilateral pleural fluid. Small amount of fluid tracking into the  right major fissure. Incidental azygos fissure, normal variant.  Bandlike subsegmental atelectasis in the right lung base. No focal  airspace opacity. No suspicious pulmonary nodule.     MEDIASTINUM: Heart size is within normal limits. New small loculated  anterior pericardial fluid. The ascending aorta and main pulmonary  artery diameters are within normal limits. Coronary artery  stents.  Multiple prominent and enlarged left greater than right axillary lymph  nodes for example left level 1 axillary node measuring 14 mm (series  3, image 64) these axillary lymph nodes demonstrated FDG activity on  prior PET CT. No suspicious mediastinal or hilar lymph nodes.     ABDOMEN/PELVIS:  Evaluation limited due to patient respiratory motion.     LIVER: No focal hepatic mass.     BILIARY: The gallbladder is decompressed. No intraluminal gallstone.  No intrahepatic or extrahepatic biliary ductal dilatation.     PANCREAS: Fatty infiltration of the pancreatic head, uncinate process  and pancreatic neck.     SPLEEN: Redemonstration of ill-defined hypodense perisplenic fluid  collection that are visualized on the prior PET/CT likely representing  a subacute to chronic hematoma. Small splenule.     ADRENAL GLANDS: Within normal limits.     URINARY TRACT: Large exophytic simple cyst in the inferior pole of the  left kidney measuring up to 10.1 cm. No focal right renal mass.  Unchanged mild nonspecific perinephric stranding. No hydronephrosis or  hydroureter. No obstructing renal stone. Urinary bladder is  unremarkable.     REPRODUCTIVE ORGANS: Within normal limits.     STOMACH: Within normal limits.     BOWEL: Moderate colonic stool burden. No abnormally dilated loops of  large or small bowel. The previously visualized wall thickening in the  anus rectum, and sigmoid colon is less conspicuous on today's exam;  however there is inflammation/lymphoma in the distal sigmoid colon  series 2 image 221. The appendix is unremarkable.     PERITONEUM/FLUID: No free fluid.     VESSELS: Atherosclerotic calcifications of the abdominal aorta and  iliac arteries.     LYMPH NODES: No significant change in size or number of multiple  prominent peritoneal retroperitoneal lymph nodes for example 8 mm  right para-aortic lymph node (series 3, image 139) which previously  demonstrated FDG activity on recent PET/CT. Unchanged  enlargement of  bilateral inguinal nodes for example 1.4 cm left inguinal node (series  3, image 718) and enlarged right inguinal node measuring 1.5 cm  (series 3, image 729) there is mild associated stranding and soft  tissue nodularity adjacent to these lymph nodes.      BONES/SOFT TISSUES: No aggressive osseous lesions. Probable Schmorl  node deformity to the superior endplate of L4. Anterior flowing  osteophytes throughout multiple thoracic vertebrae likely representing  diffuse idiopathic skeletal hyperostosis. Small amount of subcutaneous  emphysema about the lower midabdomen, likely related to recent  medication injection.                                                                      IMPRESSION: In this patient with a history of mantle cell lymphoma  status post chemotherapy and stem cell transplant:    1. No significant interval change in scattered enlarged lymph nodes in  the chest abdomen pelvis which previously demonstrated FDG avidity on  recent whole body PET/CT concerning for disease progression.  2. Inflammation/lymphoma about the distal sigmoid colon, this might  represent a source for infection although not significantly changed  from PET/CT 3/26/2021.  3. Unchanged perisplenic fluid collection likely representing chronic  hematoma.  4. Trace bilateral pleural effusions. No focal airspace opacity. New  small anterior pericardial fluid without surrounding inflammation    Current Medications:    acyclovir  200 mg Oral BID     calcium acetate  1,334 mg Oral TID w/meals     [START ON 4/17/2021] insulin aspart  2-16 Units Subcutaneous TID AC     insulin aspart  2-12 Units Subcutaneous BID     insulin aspart   Subcutaneous TID AC     insulin NPH  26 Units Subcutaneous Daily with supper     [START ON 4/17/2021] insulin NPH  28 Units Subcutaneous QAM AC     - MEDICATION INSTRUCTIONS -   Does not apply Once     metoprolol succinate ER  25 mg Oral BID     nystatin  500,000 Units Oral 4x Daily      pantoprazole  40 mg Oral BID AC     [START ON 4/17/2021] predniSONE  1 mg/kg Oral Daily     sodium bicarbonate  1,300 mg Oral TID     [Held by provider] study - ibrutinib (IDS# 5398)  560 mg Oral Daily     [START ON 4/19/2021] sulfamethoxazole-trimethoprim  1 tablet Oral Q Mon Tues BID     triamcinolone   Topical BID     vancomycin  125 mg Oral 4x Daily       - MEDICATION INSTRUCTIONS -       Clayton Dubon MD

## 2021-04-16 NOTE — PROGRESS NOTES
Calorie Count  Intake recorded for: 4/15  Total Kcals: 628 Total Protein: 23g  Kcals from Hospital Food: 628  Protein: 23g  Kcals from Outside Food (average):0 Protein: 0g  # Meals Ordered from Kitchen: 3 meals   # Meals Recorded: 2 meals (First - 100% whole milk, less than 25% hot roast beef & mashed potatoes w/ gravy)      (Second - 100% apple juice, 66% pancake w/ butter & diet syrup)  # Supplements Recorded: 100% 1 Glucerna

## 2021-04-16 NOTE — PLAN OF CARE
Patient cognition improving, oriented x4. Vital signs stable, afebrile, BP hypertensive, 155/97. Patient reports that he has had blurry vision over the past few days, neuro assessment intact.  Continues on insulin drip. Blood sugars have been between 111-151. Currently running at 1.5 units/hr. Per written order, checking blood glucose q 2 hours as patient has been between 100-150 x 4. Labs drawn, no replacements needed. Clustering cares for sleep promotion. Bed alarm on for safety, call light within reach, and rounding completed.     Problem: Fatigue (Stem Cell/Bone Marrow Transplant)  Goal: Energy Level Supports Daily Activity  Outcome: No Change     Problem: Infection Risk (Stem Cell/Bone Marrow Transplant)  Goal: Absence of Infection  Outcome: No Change  Intervention: Prevent and Manage Infection  Recent Flowsheet Documentation  Taken 4/15/2021 2000 by Hayde Ro, RN  Isolation Precautions: enteric precautions maintained     Problem: Diarrhea (Stem Cell/Bone Marrow Transplant)  Goal: Diarrhea Symptom Control  Outcome: Improving

## 2021-04-16 NOTE — PROGRESS NOTES
IP Diabetes Management  Daily Note           Assessment and Plan:   HPI: Yanick is a 56 year old male with TIIDM, HTN, HLP and mantle cell lymphoma s/p PBSCT 11/2020 who was admitted with fever, hypotension, and rash. Suspect DRESS 2/2 study drug or Ibrutinib. He was started on high dose steroids, which resulted in DKA.    Assessment:   1)Type II Diabetes Mellitus, recently improving control  2) Steroid induced hyperglycemia, with DKA- resolved.     Plan:    -PM NPH: 26 units before dinner- transition off IV insulin 1 hour after NPH given, and anticipate reduction tomorrow with lower steroid effect on board.    -AM NPH: 28 units ordered before breakfast tomorrow accounting for lower steroid effect on board   -continue aspart 1:4g CHO coverage with meals and snacks/supplements- may need reduction to 1:5g CHO over weekend.   -once off IV insulin, initiate aspart custom 2/30 sliding scale AC/HS and 0200   -once off IV insulin, BG monitoring AC/HS/0200   -hold Metformin (persistent lactic acidosis)   -hypoglycemia protocol   -carb counting protocol   -diabetes education needs moises be reassessed closer to discharge- has been on MDI regimen before (BID NPH plus fixed meal insulin plus sliding scale)    Outpatient follow up: with [facility]   Plan discussed with patient  and primary team.        Interval History and Assessment: interval glucose trend reviewed:   BG stable on IV insulin; prandial hyperglycemia improved with increase to 1:4g CHO yesterday afternoon.  Persistent lactic acidosis. Diuresing. Cr to 3.6>3.8.  Nephrology is following. Diuresing.      Livan counts continue; 628kcal yesterday. He reports that he threw up this morning, thinks it was related to pills on empty stomach. At time of visit, he was feeling better and was in process of eating Turkmen toast.     Methylpred will change to PO Pred tomorrow AM. Equivalent steroid potency reduction to 74% .     IV insulin averages:   ~3.1 units per hour  daytime: ~36 units NPH x74%= ~27 units NPH (for tomorrow with lower steroid dose).    ~ 2.7 units per hour overnight: ~32 units (80% reduction off IV insulin =26 NPH for tonight, and 32 x74%=~24 units NPH tomorrow with lower steroid dose)    Don is disappointed in the long steroid taper, need for resumption of insulin regimen upon discharge, but understands.     Current nutritional intake and type: Orders Placed This Encounter      Moderate Consistent CHO Diet    Steroid planning:   -IV Methylpred 500mg BID  -IV Methylpred 118 mg once daily since 4/14  -PO Pred 110mg daily AM beginning 4/17, and slow taper.     D5W-containing solutions:   -calcium gluconate PRN    PTA Diabetes Regimen:   Metformin XR 2000 mg daily  Glimepiride 4 mg daily    -previously was on NPH BID, plus fixed meal insulin + sliding scale regimen.    Discharge Planning: possible discharge early next week.            Diabetes History:   Type of Diabetes: Type 2 Diabetes Mellitus  Lab Results   Component Value Date    A1C 5.9 04/13/2021    A1C 5.9 04/10/2021              Review of Systems:     The Review of Systems is negative other than noted in the Interval History.           Medications:     Current Facility-Administered Medications   Medication     acetaminophen (TYLENOL) tablet 650 mg     acyclovir (ZOVIRAX) capsule 200 mg     calcium acetate (PHOSLO) capsule 1,334 mg     calcium carbonate (TUMS) chewable tablet 500 mg     glucose gel 15-30 g    Or     dextrose 50 % injection 25-50 mL    Or     glucagon injection 1 mg     diphenhydrAMINE (BENADRYL) capsule 25 mg     diphenhydrAMINE-zinc acetate (BENADRYL) 1-0.1 % cream     heparin lock flush 10 UNIT/ML injection 3 mL     hydrOXYzine (ATARAX) tablet 25 mg     insulin 1 unit/mL in saline (NovoLIN, HumuLIN Regular) drip - ADULT IV Infusion     [START ON 4/17/2021] insulin aspart (NovoLOG) injection (RAPID ACTING)     insulin aspart (NovoLOG) injection (RAPID ACTING)     insulin aspart (NovoLOG)  "injection (RAPID ACTING)     insulin aspart (NovoLOG) injection (RAPID ACTING)     insulin NPH injection 26 Units     [START ON 4/17/2021] insulin NPH injection 28 Units     lidocaine (LMX4) cream     lidocaine 1 % 0.1-1 mL     Med Instruction - Transition from IV Insulin Infusion to Sub-Q Insulin     melatonin tablet 1 mg     metoprolol (LOPRESSOR) injection 5 mg     metoprolol succinate ER (TOPROL-XL) 24 hr tablet 25 mg     naloxone (NARCAN) injection 0.2 mg    Or     naloxone (NARCAN) injection 0.4 mg    Or     naloxone (NARCAN) injection 0.2 mg    Or     naloxone (NARCAN) injection 0.4 mg     nystatin (MYCOSTATIN) suspension 500,000 Units     ondansetron (ZOFRAN-ODT) ODT tab 4 mg    Or     ondansetron (ZOFRAN) injection 4 mg     ondansetron (ZOFRAN-ODT) ODT tab 8 mg     oxyCODONE (ROXICODONE) tablet 5 mg     pantoprazole (PROTONIX) EC tablet 40 mg     Patient is already receiving anticoagulation with heparin, enoxaparin (LOVENOX), warfarin (COUMADIN)  or other anticoagulant medication     [START ON 4/17/2021] predniSONE (DELTASONE) tablet 110 mg     sodium bicarbonate tablet 1,300 mg     sodium chloride (PF) 0.9% PF flush 3 mL     sodium chloride (PF) 0.9% PF flush 3 mL     sodium chloride (PF) 0.9% PF flush 3 mL     sodium chloride (PF) 0.9% PF flush 3 mL     sodium chloride (PF) 0.9% PF flush 3 mL     sodium chloride (PF) 0.9% PF flush 3 mL     [Held by provider] study - ibrutinib (IDS# [id number]) capsule CHEMOTHERAPY 560 mg     [START ON 4/19/2021] sulfamethoxazole-trimethoprim (BACTRIM) 400-80 MG per tablet 1 tablet     triamcinolone (KENALOG) 0.1 % cream     vancomycin (VANCOCIN) capsule 125 mg            Physical Exam:    BP (!) 166/95 (BP Location: Left arm)   Pulse 86   Temp 96.4  F (35.8  C) (Axillary)   Resp 19   Ht 1.803 m (5' 11\")   Wt 108.4 kg (239 lb)   SpO2 97%   BMI 33.33 kg/m    General: sitting up in chair, eating breakfast.   HEENT: normocephalic, atraumatic. Oral mucous membranes " moist.  Lungs: unlabored respiration on RA, no cough.   ABD: rounded, soft  Skin: warm/dry Diffuse maculopapular rash on chest, extremities- appears slightly faded today.  MSK:  moves all extremities  Lymp:  1-2 + LE edema, facial edema/ anasarca is improved.  Mental status:  alert, oriented to self, place, situation   Psych:  calm and appropriate interaction             Data:     Recent Labs   Lab 04/16/21  1226 04/16/21  1205 04/16/21  0925 04/16/21  0730 04/16/21  0518 04/16/21  0323 04/16/21  0313 04/16/21  0107 04/15/21  1951 04/15/21  1951 04/15/21  1155 04/15/21  1155 04/15/21  0408 04/15/21  0408 04/14/21  2103 04/14/21  2103   *  --   --   --   --  115*  --   --   --  151*  --  228*  --  140*  --  132*   BGM  --  146* 134* 130* 120*  --  111* 113*   < >  --    < >  --    < >  --    < >  --     < > = values in this interval not displayed.     Lab Results   Component Value Date    WBC 10.3 04/16/2021    WBC 11.5 (H) 04/16/2021    WBC 13.2 (H) 04/15/2021    HGB 8.4 (L) 04/16/2021    HGB 7.9 (L) 04/16/2021    HGB 8.3 (L) 04/15/2021    HCT 23.1 (L) 04/16/2021    HCT 22.2 (L) 04/16/2021    HCT 23.0 (L) 04/15/2021    MCV 89 04/16/2021    MCV 86 04/16/2021    MCV 87 04/15/2021    PLT 21 (LL) 04/16/2021    PLT 29 (LL) 04/16/2021    PLT 37 (LL) 04/15/2021     Lab Results   Component Value Date     04/16/2021     (L) 04/16/2021     (L) 04/15/2021    POTASSIUM 3.3 (L) 04/16/2021    POTASSIUM 3.5 04/16/2021    POTASSIUM 3.5 04/15/2021    CHLORIDE 104 04/16/2021    CHLORIDE 102 04/16/2021    CHLORIDE 103 04/15/2021    CO2 18 (L) 04/16/2021    CO2 19 (L) 04/16/2021    CO2 16 (L) 04/15/2021     (H) 04/16/2021     (H) 04/16/2021     (H) 04/15/2021     Lab Results   Component Value Date    BUN 92 (H) 04/16/2021    BUN 94 (H) 04/15/2021    BUN 91 (H) 04/15/2021     Lab Results   Component Value Date    TSH 1.11 04/14/2021    TSH 2.17 08/26/2020     Lab Results   Component Value  Date    AST 67 (H) 04/16/2021    AST 84 (H) 04/15/2021    AST 91 (H) 04/15/2021    ALT 88 (H) 04/16/2021    ALT 95 (H) 04/15/2021    ALT 91 (H) 04/15/2021    GGT 19 04/09/2021    GGT 12 04/02/2021    GGT 9 03/24/2021    ALKPHOS 191 (H) 04/16/2021    ALKPHOS 217 (H) 04/15/2021    ALKPHOS 191 (H) 04/15/2021       35 minutes spent on the date of the encounter doing chart review, history and exam, documentation and further activities per the note    Over 50% of my time on the unit was spent counseling the patient and/or coordinating care regarding acute hyperglycemia management.  See note for details.    To contact Endocrine Diabetes service:   From 8AM-4PM: page inpatient diabetes provider that is following the patient  For questions or updates from 4PM-8AM: page the diabetes job code for on call fellow: [phone]    Prisca Corley PA-C  Inpatient Diabetes Management Service  Pager [phone]

## 2021-04-17 NOTE — PROGRESS NOTES
Inpatient Diabetes consult service ([facility])    Assessment/plan  1. Diabetes mellitus type 2.  Unstable due to changing treatment regimen  Continue present regimen for now.     2.  On corticosteroid with Steroid hyperglycemia     Francia Chong mD       Chart review/prep time 1  0625 -0642  Visit Start time  1200  Visit Stop time 1205  20__ minutes spent on the date of the encounter doing chart review, history and exam, documentation and further activities as noted above.      Chief complaint/ HPI Don is a 56 year old male with TIIDM, HTN, HLP and mantle cell lymphoma s/p PBSCT 11/2020 who was admitted 4/9/201  with fever, hypotension, and rash. Suspect DRESS 2/2 study drug or Ibrutinib.     He got methylprednisolone 118.75 mg/day 4/14-4/16.   He developed high AG and ketosis, high lactate on 4/13, felt to be DKA.  He was on IV Insulin 4/13//2021 1030 to  4/16/2021 1833  Today he starts on high dose prednisone .    Total daily insulin:  4/16/2021 IV plus 73 units  4/15/2021 IV plus 16 units  4/15/2021 IV insulin    Ordered DM relevant regimen  NPH 26 units before BF-lst dose this AM  NPH 26 units with supper-lst dose last night  aspart 2 units/30 mg/dl > 140 tid before meal, HS and 0300  aspart 1 units/4 grms of carb before meals and snacks  Prednisone 110 mg/day    Active Diet Order  Orders Placed This Encounter      Moderate Consistent CHO Diet      Recent Labs   Lab 04/17/21  0348 04/17/21  0148 04/16/21  2209 04/16/21 2014 04/16/21  1625 04/16/21  1402 04/16/21  1226 04/16/21  1205 04/16/21  0925 04/16/21  0323 04/16/21  0323 04/15/21  1951 04/15/21  1951 04/15/21  1155 04/15/21  1155   *  --   --  182*  --   --  141*  --   --   --  115*  --  151*  --  228*   BGM  --  158* 186*  --  142* 122*  --  146* 134*   < >  --    < >  --    < >  --     < > = values in this interval not displayed.     4/11/2021 FENA 0.4  4/12/2021 0044 cortisol 32.8  4/13/2021 HgbA1c 5.9%  4/17/2021 creatinine 3.08, NT BNP 1883,  "CRP 21    ROS:   Has eaten normally so far    Exam  /86 (BP Location: Left arm)   Pulse 77   Temp 97.4  F (36.3  C) (Axillary)   Resp 18   Ht 1.803 m (5' 11\")   Wt 119.4 kg (263 lb 3.7 oz)   SpO2 97%   BMI 36.71 kg/m    GENERAL: sitting up in chair, feet on foot stool , in no distress  SKIN: Visible skin involved with rash on face,  legs   EYES: Eyes grossly normal to inspection.    RESP: No audible wheeze, cough, or visible cyanosis.  No visible retractions or increased work of breathing.    NEURO: Awake, alert, responds appropriately to questions.  Mentation and speech fluent.  PSYCH:affect normal,    DATA     Results for LIBRA MCKNIGHT (MRN [id number]) as of 4/17/2021 06:36   Ref. Range 4/17/2021 03:48   Sodium Latest Ref Range: 133 - 144 mmol/L 136   Potassium Latest Ref Range: 3.4 - 5.3 mmol/L 3.5   Chloride Latest Ref Range: 94 - 109 mmol/L 106   Carbon Dioxide Latest Ref Range: 20 - 32 mmol/L 20   Urea Nitrogen Latest Ref Range: 7 - 30 mg/dL 88 (H)   Creatinine Latest Ref Range: 0.66 - 1.25 mg/dL 3.08 (H)   GFR Estimate Latest Ref Range: >60 mL/min/1.73_m2 21 (L)   GFR Estimate If Black Latest Ref Range: >60 mL/min/1.73_m2 25 (L)   Calcium Latest Ref Range: 8.5 - 10.1 mg/dL 8.3 (L)   Anion Gap Latest Ref Range: 3 - 14 mmol/L 10   Magnesium Latest Ref Range: 1.6 - 2.3 mg/dL 2.8 (H)   Phosphorus Latest Ref Range: 2.5 - 4.5 mg/dL 4.5   Albumin Latest Ref Range: 3.4 - 5.0 g/dL 2.2 (L)   Protein Total Latest Ref Range: 6.8 - 8.8 g/dL 4.3 (L)   Bilirubin Total Latest Ref Range: 0.2 - 1.3 mg/dL 0.8   Alkaline Phosphatase Latest Ref Range: 40 - 150 U/L 162 (H)   ALT Latest Ref Range: 0 - 70 U/L 67   AST Latest Ref Range: 0 - 45 U/L 42   CRP Inflammation Latest Ref Range: 0.0 - 8.0 mg/L 21.0 (H)   N-Terminal Pro BNP Inpatient Latest Ref Range: 0 - 900 pg/mL 1,883 (H)   Uric Acid Latest Ref Range: 3.5 - 7.2 mg/dL 2.2 (L)   Glucose Latest Ref Range: 70 - 99 mg/dL 159 (H)     "

## 2021-04-17 NOTE — PLAN OF CARE
[id number]  Pt is alert and oriented. Afebrile. Hypertensive last night. Saturating mid to upper 90s at room air. Denies pain, SOB, chest pain, nausea and vomiting. With generalized rashes, denies itchiness. Had supplement and brownie last night, insulin carb coverage given. Blood sugars 186, 158. Slept well. Lawson catheter patent. Had 1x watery stool. Perineal care done. Up with 2 assist with gait belt and walker. Potassium replaced. Platelet given last night. Continue calorie counts. Port needle due for change.  Problem: Adult Inpatient Plan of Care  Goal: Plan of Care Review  Outcome: No Change  Goal: Patient-Specific Goal (Individualized)  Outcome: No Change  Goal: Absence of Hospital-Acquired Illness or Injury  Outcome: No Change  Intervention: Identify and Manage Fall Risk  Recent Flowsheet Documentation  Taken 4/16/2021 2000 by Princess Maribeth Borrero RN  Safety Promotion/Fall Prevention:    assistive device/personal items within reach    bed alarm on    clutter free environment maintained    fall prevention program maintained    increased rounding and observation    increase visualization of patient    lighting adjusted    mobility aid in reach    nonskid shoes/slippers when out of bed    patient and family education    room organization consistent    safety round/check completed    room near nurse's station  Intervention: Prevent Skin Injury  Recent Flowsheet Documentation  Taken 4/16/2021 2000 by Princess Maribeth Borrero RN  Body Position: position changed independently  Intervention: Prevent and Manage VTE (Venous Thromboembolism) Risk  Recent Flowsheet Documentation  Taken 4/16/2021 2000 by Princess Maribeth Borrero, SAGAR  VTE Prevention/Management:    bleeding precautions maintained    bleeding risk assessed    fluids promoted  Goal: Optimal Comfort and Wellbeing  Outcome: No Change  Goal: Readiness for Transition of Care  Outcome: No Change     Problem: Adjustment to Transplant (Stem Cell/Bone Marrow  Transplant)  Goal: Optimal Coping with Transplant  Outcome: No Change     Problem: Bladder Irritation (Stem Cell/Bone Marrow Transplant)  Goal: Symptom-Free Urinary Elimination  Outcome: No Change     Problem: Diarrhea (Stem Cell/Bone Marrow Transplant)  Goal: Diarrhea Symptom Control  Outcome: No Change     Problem: Fatigue (Stem Cell/Bone Marrow Transplant)  Goal: Energy Level Supports Daily Activity  Outcome: No Change     Problem: Hematologic Alteration (Stem Cell/Bone Marrow Transplant)  Goal: Blood Counts Within Acceptable Range  Outcome: No Change     Problem: Hypersensitivity Reaction (Stem Cell/Bone Marrow Transplant)  Goal: Absence of Hypersensitivity Reaction  Outcome: No Change     Problem: Infection Risk (Stem Cell/Bone Marrow Transplant)  Goal: Absence of Infection  Outcome: No Change  Intervention: Prevent and Manage Infection  Recent Flowsheet Documentation  Taken 4/16/2021 2000 by Princess Maribeth Borrero RN  Isolation Precautions: enteric precautions maintained     Problem: Mucositis (Stem Cell/Bone Marrow Transplant)  Goal: Mucous Membrane Health and Integrity  Outcome: No Change     Problem: Nausea and Vomiting (Stem Cell/Bone Marrow Transplant)  Goal: Nausea and Vomiting Symptom Relief  Outcome: No Change  Intervention: Prevent and Manage Nausea and Vomiting  Recent Flowsheet Documentation  Taken 4/16/2021 2000 by Princess Maribeth Borrero RN  Nausea/Vomiting Interventions: antiemetic     Problem: Nutrition Intake Altered (Stem Cell/Bone Marrow Transplant)  Goal: Optimal Nutrition Intake  Outcome: No Change     Problem: Discharge Planning  Goal: Discharge Planning (Adult, OB, Behavioral, Peds)  Outcome: No Change

## 2021-04-17 NOTE — PROGRESS NOTES
Calorie Count  Intake recorded for: 4/16  Total Kcals: 400 Total Protein: 12g  Kcals from Hospital Food: 400   Protein: 12g  Kcals from Outside Food (average):0  Protein: 0g  # Meals Ordered from Kitchen: 3 meals  # Meals Recorded: 1 meal (100% brownie)  # Supplements Recorded: 100% 1 Glucerna

## 2021-04-17 NOTE — PLAN OF CARE
"BP (!) 158/91 (BP Location: Left arm)   Pulse 66   Temp 96.4  F (35.8  C) (Temporal)   Resp 18   Ht 1.803 m (5' 11\")   Wt 117.6 kg (259 lb 4.2 oz)   SpO2 99%   BMI 36.16 kg/m    A/O x 4, hypertensive no prn meds per parameter, on room air. Pt up in chair most of the day, eating fair, carbs covered with novolog/sliding scale per orders. Pt with good urine output, ocampo catheter in place catheter wipes done. Pt had bed/bath/shave and using TMC creams. Incontinent of stool x 2, up to commode with walker/assist x 1. Stool soft/loose this am, now watery green/black/brown this timothy x 1. Pt rash unchanged. Shifting weight in chair and q 2 hours when in bed, sacral coccyx blanchable redness. Weekly covid swab sent. Continue to monitor per poc.    Problem: Adult Inpatient Plan of Care  Goal: Plan of Care Review  Outcome: No Change  Flowsheets (Taken 4/17/2021 1838)  Plan of Care Reviewed With: patient  Progress: no change     Problem: Adult Inpatient Plan of Care  Goal: Absence of Hospital-Acquired Illness or Injury  Intervention: Identify and Manage Fall Risk  Recent Flowsheet Documentation  Taken 4/17/2021 0800 by Alexa Duong RN  Safety Promotion/Fall Prevention: activity supervised     Problem: Diarrhea (Stem Cell/Bone Marrow Transplant)  Goal: Diarrhea Symptom Control  Outcome: No Change     Problem: Nausea and Vomiting (Stem Cell/Bone Marrow Transplant)  Goal: Nausea and Vomiting Symptom Relief  Outcome: No Change     Problem: Nutrition Intake Altered (Stem Cell/Bone Marrow Transplant)  Goal: Optimal Nutrition Intake  Outcome: Improving     "

## 2021-04-17 NOTE — PROGRESS NOTES
[facility]    Hematology / Oncology Progress Note    Patient: Theo Roblero  MRN: [id number]  Admission Date: 4/9/2021  Date of Service (when I saw the patient): 04/17/2021  Hospital Day # 7     Assessment & Plan   Theo Roblero is a 56 year old male with PMHx of T2DM, obesity, tobacco use, HTN, HLD, and stage IV Mantle cell lymphoma (s/p PBSCT 11/2020) with relapsed disease. Most recently received Loncatuximab + Ibrutinib 4/2/21. Presented to [facility] with fevers, hypotension, tachycardia and whole body rash. Developed hypotension refractory to fluid resuscitation and was transferred to MICU for pressors support briefly 4/11-4/12. S/p bx of rash positive for drug rash likely 2/2 Ibrutinib or Loncatuximab. Started on methylprednisolone 500 mg BID (x4/12) with fever curve now down trending. Complicated by NGOZI on CKD, c diff colitis and DKA.    TODAY:   - Phos WNL today. Will continue PhosLo one more day.   - De-escalating labs to once daily instead of TID.  - HHV6 negative, EBV quant pending.   - Nephrology following for NGOZI on CKD. UOP improving.     If BPs consistently elevated, could restart Lasix tomorrow.     PO Bicarb TID, avoid overcorrection    No HD yesterday  - Endo following: AM NPH 28 units, PM NPH 26 units, CHO coverage 1:4g, sliding scale  - Derm now following peripherally, set up outpatient follow up upon discharge. Continue PO prednisone 1 mg/kg/day.  - RD consulted, poor PO intake. Prealbumin 5 (low). Livan counts ordered 4/15-4/18.     HEME  #Mantle Cell Lymphoma (Stage IV)   Follows outpatient with Dr Nelson. High risk (IPI 6.5 and Ki67 of 20%), stage IV Mantle cell lymphoma in CR1 s/p 8 cycles of alternating 6 cycles of Maxi-R- CHOP alternating with high-dose LITZY-C with last cycle given 6/11/2020. Therapy was complicated by a kidney stone and sepsis in 5/2020, prior to cycle 5. S/p PBSCT 11/2020 with since relapse. Started on experimental study  [id number] (PI Dr Nelson). Most recently received Loncatuximab + Ibrutinib 4/2/21.   - HOLD PTA Ibrutinib 560 mg daily given concern as possible cause of rash   - Next due for cycle 2 day 1 loncatuximab 4/23/21      #Anemia and Thrombocytopenia 2/2 Chemotherapy and MCL  Hgb 4.7 from 11.5 on 4/14. No s/sxs of bleeding on exam. CT A/P negative for bleed, stable known hematoma. . Hapto WNL and Smear showed no schistocytes.   - Transfuse if Hgb <7, plts <10K. Monitor CBC daily.      #Hypercoagulopathy, improved    INR elevated 1.23 - 1.45 likely 2/2 component of DIC and poor nutrition. Fibrinogen WNL, elevated PTT.   - Monitor coags daily. If PTT remains elevated will need mixing studies.      DERM  #DRESS   Presented with whole body skin rash to BMT clinic 4/9. Derm consulted, s/p skin bx (4/11/21) positive for drug rash. No eosinophilia on diff but facial swelling w/ periorbital edema, L axillary lymphadenopathy and kidney damage concerning for DRESS per derm although unusual as DRESS presents typically 2-4 weeks after drug exposure. Suspect 2/2 study drug or Ibrutinib (can lead to rash in up to 30% of patients). Do NOT suspect rash was 2/2 Allopurinol as patient had not been taking allopurinol since November 2020.   - Methylpred 1 mg/kg (4/11-4/16)  - Reduced to Pred 1 mg/kg (x4/17) per derm recommend discharge on pred 1 mg/kg daily for 1-2 weeks then follow up in derm clinic. Will require prolonged steroid taper.   - Kenalog cream BID (patient prefers over ointment   - Monitor CBC and CMP closely, can have evidence of liver damage 2/2 DRESS   - Serologies: EBV Joan +, CMV+ Quant <137. Ordered as viral reactivation can provoke DRESS               - Pending EBV quant,     - HHV6 negative                - Repeat CMV quant in 1-2 weeks (~4/22)   - will require suture removal 4/25   - repeat TSH/free T4 in 4 weeks (~5/12)     #Transaminitis   LFTs up trending likely 2/2 hypoperfusion and DRESS. T bili 0.7, Alk  100, ALT 95, .  - Monitor CMP TID.     ID  #SIRS    Presented with fevers (Tmax 103), hypotension, tachycardia on 4/9/21. Concerning for infection vs delayed infusion reaction. Patient on Ibrutinib and Ioncatox (C1 = 4/2/21). CT CAP negative for intraabdominal or pulmonary source. CXR negative for pulmonary source. ESR 31, CRP 49 on presentation. Procal 0.51 -> 1.58. BCx NGTD. UA bland. WBC 10.9, plts 63k. ANC 7.8. Ongoing hypotension despite 30mL/kg fluid resuscitation 4/11, brief transfer to ICU for pressor support. Transferred back to floor 4/12.   - Ongoing lactic acidosis, suspect 2/2 malignancy, immune mediated process, possible infection and  DKA.    - mIVF per DKA protocol (Isotonic NS preferred)   - Due to persistent fevers and hypotension, started on HD steroids. IV methylpred 1 mg/kg daily (x4/12)      Antimicrobials:   - Vancomycin 4/11 - 4/12   - Zosyn  4/11 - 4/14  - Micafungin  4/11 -  4/12  - Acyclovir (home med) - HSV ppx  - Bactrim (home med) - PJP ppx       #C diff Colitis   Large volume diarrhea started ~4/13, C diff positive 4/14.  - PO Vanco 125 mg QID (4/14-x)     ENDO  #DKA, resolved   #T2DM   PTA on Metformin and Glimepride. Following initiation of HD steroids 4/12, blood sugars spikes to 300-400s with associated hyponatremia (126), bicarb 12, anion gap 16, +kussmals respirations, and ongoing hypotension resistant to LR boluses. Endo consulted and started on insulin gtt per DKA protocol 4/13-4/16.   - A1C 5.9, ketone elevated in blood 0.8, DKA protocol ordered 4/13, mIVF later discontinued 4/14 given hypervolemia.  - Endo following: AM NPH 28 units, PM NPH 26 units, CHO coverage 1:4g, sliding scale     #Hyponatremia, resolved  - Na down trending, 126. Suspect hyponatremia driven by DKA and Renal Failure.   - Serum osm 284, Urine osm 510 and urine Na 17, consistent with underperfusion and inability to excrete free water. Per nephro, should correct as capillary leak improves.       RENAL  #NGOZI on CKD  #Anasarca   Likely prerenal in the setting of sepsis and DKA. No muddy brown cast on UA. CT with large bladder  - Fena 0.4 suggestive of pre renal etiology. Cr 2.35 (baseline 0.9-1.1).   - Anasarca on exam 2/2 capillary leak and significant fluid resuscitation. Up 34 lbs since admission. Difficult to diurese in the context of DKA with hypotension. No UOP overnight 4/13, trial Lasix 100 mg with ~1.1L output.   - Renal consulted, appreciate assistance. No further Lasix recommended at this time.   - Monitor with CMP     #Acidosis   Anion gap acidosis with serum ketones likely 2/2 DKA, hypoperfusion, lymphoma and NGOZI on CKD.   - VBG (4/14) with pH 7.33, CO2 22, O2 27 and bicarb 12.   - PO Bicarb 1300 mg TID (4/14-x)      #Hypocalcemia   - Calcium down trending, iCal 4.1   - Calcium Gluconate 2g ordered (4/14)      #Hyperphosphatemia, resolved  - Phos up trending, now 5.7. Likely 2/2 renal dysfunction.   - PhosLo TID     #Hyperuricemia   - Uric acid up trending 2/2 renal dysfunction. Elevated at 8.5 (4/15).   - Uric acid daily, AVOID Allopurinol given allergy. Will consider Rasburicase if remains elevated.      CARDS  #Sinus Tachycardia, improved   - HRs 110-150s this admission in the setting of SIRS.   - PRN IV Metoprolol 5 mg ordered if sustained HRs >140     #Hypertension   Inpatient ECHO unremarkable and DVT doppler negative.   - PTA Metoprolol ER 25 mg BID initially held in the setting of acute illness and hypotension, now hypertensive so resumed 4/16.      #HLD  - HOLD PTA Lipitor 40 mg daily      PULM  #VILMA  #R Hemidiaphragm Elevation    Overnight this admission occasional desaturation noted overnight per nursing, down to mid 80s requiring 2-3L NC. CXR 4/14 with R hemidiaphragm elevation.   - Suspect VILMA, will need referral for sleep study as outpatient.   - BiPAP as needed      FEN  - MIVF per DKA protocol   - Lyte replacement PRN per protocol       PPx  #DVT: HOLD ppx Lovenox (4/14-x)  with concern for possible bleed   #GI: PPI BID (given possible bleed)      Consults: Derm, Endo, PT/OT, Nephro    Code: FULL   Follow up: Follows outpatient with Dr Nelson in BMT clinic.    Dispo: Remain inpatient 5-7 days for management and work up of acute illness.     Patient was seen and plan of care was discussed with attending physician Dr. Stephen.    Elise Jimenez PA-C  Pager: [phone]  Desk phone: [phone]    Interval History   No acute events overnight. Patient is feeling and looking better today. Rash is fading, not itching. Patient felt fatigued and run down yesterday, but feels a little better today. Bowel movements starting to become more formed. No headache, chest pain, SOB, nausea, dysuria. CRP continues to downtrend.    Vital Signs with Ranges  Temp:  [97.4  F (36.3  C)-97.7  F (36.5  C)] 97.5  F (36.4  C)  Pulse:  [68-90] 68  Resp:  [18] 18  BP: (131-163)/(79-94) 143/79  SpO2:  [94 %-98 %] 98 %  I/O last 3 completed shifts:  In: 1379 [P.O.:920; I.V.:120; Other:50]  Out: 3100 [Urine:2200; Emesis/NG output:200; Stool:700]    Physical Exam   General: Lying in bed, alert, NAD. Pleasant and conversational. Had trouble sitting up for lung exam.   Skin: Diffuse macular rash, improving.  HEENT: NCAT. Anicteric sclera. MMM.   Respiratory: Non-labored breathing, good air exchange, lungs clear to auscultation bilaterally, mild bibasilar crackles.   Cardiovascular: RRR. No murmur or rub.   Gastrointestinal: Normoactive BS. Abdomen soft, ND, NT. No palpable masses.  Neurologic: A&O x 3, speech normal, no deficits grossly.    Medications     - MEDICATION INSTRUCTIONS -         acyclovir  200 mg Oral BID     calcium acetate  1,334 mg Oral TID w/meals     insulin aspart  2-16 Units Subcutaneous TID AC     insulin aspart  2-12 Units Subcutaneous BID     insulin aspart   Subcutaneous TID AC     insulin NPH  26 Units Subcutaneous Daily with supper     insulin NPH  28 Units Subcutaneous QAM AC      metoprolol succinate ER  25 mg Oral BID     nystatin  500,000 Units Oral 4x Daily     pantoprazole  40 mg Oral BID AC     predniSONE  1 mg/kg Oral Daily     sodium bicarbonate  1,300 mg Oral TID     [Held by provider] study - ibrutinib (IDS# [id number])  560 mg Oral Daily     [START ON 4/19/2021] sulfamethoxazole-trimethoprim  1 tablet Oral Q Mon Tues BID     triamcinolone   Topical BID     vancomycin  125 mg Oral 4x Daily     Data   Results for orders placed or performed during the hospital encounter of 04/09/21 (from the past 24 hour(s))   Comprehensive metabolic panel   Result Value Ref Range    Sodium 133 133 - 144 mmol/L    Potassium 3.4 3.4 - 5.3 mmol/L    Chloride 105 94 - 109 mmol/L    Carbon Dioxide 18 (L) 20 - 32 mmol/L    Anion Gap 9 3 - 14 mmol/L    Glucose 182 (H) 70 - 99 mg/dL    Urea Nitrogen 91 (H) 7 - 30 mg/dL    Creatinine 3.23 (H) 0.66 - 1.25 mg/dL    GFR Estimate 20 (L) >60 mL/min/[1.73_m2]    GFR Estimate If Black 23 (L) >60 mL/min/[1.73_m2]    Calcium 8.5 8.5 - 10.1 mg/dL    Bilirubin Total 0.8 0.2 - 1.3 mg/dL    Albumin 2.3 (L) 3.4 - 5.0 g/dL    Protein Total 4.4 (L) 6.8 - 8.8 g/dL    Alkaline Phosphatase 195 (H) 40 - 150 U/L    ALT 77 (H) 0 - 70 U/L    AST 55 (H) 0 - 45 U/L   Phosphorus   Result Value Ref Range    Phosphorus 4.4 2.5 - 4.5 mg/dL   CBC with platelets differential   Result Value Ref Range    WBC 11.5 (H) 4.0 - 11.0 10e9/L    RBC Count 2.64 (L) 4.4 - 5.9 10e12/L    Hemoglobin 8.4 (L) 13.3 - 17.7 g/dL    Hematocrit 23.6 (L) 40.0 - 53.0 %    MCV 89 78 - 100 fl    MCH 31.8 26.5 - 33.0 pg    MCHC 35.6 31.5 - 36.5 g/dL    RDW 14.8 10.0 - 15.0 %    Platelet Count 20 (LL) 150 - 450 10e9/L    Diff Method Manual Differential     % Neutrophils 92.1 %    % Lymphocytes 2.6 %    % Monocytes 0.9 %    % Eosinophils 3.5 %    % Basophils 0.0 %    % Promyelocytes 0.9 %    Nucleated RBCs 7 (H) 0 /100    Absolute Neutrophil 10.6 (H) 1.6 - 8.3 10e9/L    Absolute Lymphocytes 0.3 (L) 0.8 - 5.3 10e9/L     Absolute Monocytes 0.1 0.0 - 1.3 10e9/L    Absolute Eosinophils 0.4 0.0 - 0.7 10e9/L    Absolute Basophils 0.0 0.0 - 0.2 10e9/L    Absolute Promyeloctyes 0.1 (H) 0 10e9/L    Absolute Nucleated RBC 0.8     Poikilocytosis Slight     Polychromasia Slight     Platelet Estimate Confirming automated cell count    Platelets prepare order unit conditional   Result Value Ref Range    Blood Component Type PLT Pheresis     Units Ordered 1    Blood component   Result Value Ref Range    Unit Number [id number]     Blood Component Type PlateletPheresis LeukoReduced Irradiated     Division Number 00     Status of Unit Released to care unit 04/17/2021 0029     Blood Product Code [id number]     Unit Status ISS    Magnesium   Result Value Ref Range    Magnesium 2.8 (H) 1.6 - 2.3 mg/dL   Uric acid   Result Value Ref Range    Uric Acid 2.2 (L) 3.5 - 7.2 mg/dL   CRP inflammation   Result Value Ref Range    CRP Inflammation 21.0 (H) 0.0 - 8.0 mg/L   Comprehensive metabolic panel   Result Value Ref Range    Sodium 136 133 - 144 mmol/L    Potassium 3.5 3.4 - 5.3 mmol/L    Chloride 106 94 - 109 mmol/L    Carbon Dioxide 20 20 - 32 mmol/L    Anion Gap 10 3 - 14 mmol/L    Glucose 159 (H) 70 - 99 mg/dL    Urea Nitrogen 88 (H) 7 - 30 mg/dL    Creatinine 3.08 (H) 0.66 - 1.25 mg/dL    GFR Estimate 21 (L) >60 mL/min/[1.73_m2]    GFR Estimate If Black 25 (L) >60 mL/min/[1.73_m2]    Calcium 8.3 (L) 8.5 - 10.1 mg/dL    Bilirubin Total 0.8 0.2 - 1.3 mg/dL    Albumin 2.2 (L) 3.4 - 5.0 g/dL    Protein Total 4.3 (L) 6.8 - 8.8 g/dL    Alkaline Phosphatase 162 (H) 40 - 150 U/L    ALT 67 0 - 70 U/L    AST 42 0 - 45 U/L   Phosphorus   Result Value Ref Range    Phosphorus 4.5 2.5 - 4.5 mg/dL   INR   Result Value Ref Range    INR 1.16 (H) 0.86 - 1.14   Partial thromboplastin time   Result Value Ref Range    PTT 32 22 - 37 sec   Fibrinogen activity   Result Value Ref Range    Fibrinogen 198 (L) 200 - 420 mg/dL   CBC with platelets differential   Result Value Ref  Range    WBC 11.0 4.0 - 11.0 10e9/L    RBC Count 2.33 (L) 4.4 - 5.9 10e12/L    Hemoglobin 7.1 (L) 13.3 - 17.7 g/dL    Hematocrit 20.6 (L) 40.0 - 53.0 %    MCV 88 78 - 100 fl    MCH 30.5 26.5 - 33.0 pg    MCHC 34.5 31.5 - 36.5 g/dL    RDW 14.9 10.0 - 15.0 %    Platelet Count 45 (LL) 150 - 450 10e9/L    Diff Method Manual Differential     % Neutrophils 93.1 %    % Lymphocytes 1.7 %    % Monocytes 3.5 %    % Eosinophils 0.0 %    % Basophils 0.0 %    % Myelocytes 1.7 %    Nucleated RBCs 4 (H) 0 /100    Absolute Neutrophil 10.2 (H) 1.6 - 8.3 10e9/L    Absolute Lymphocytes 0.2 (L) 0.8 - 5.3 10e9/L    Absolute Monocytes 0.4 0.0 - 1.3 10e9/L    Absolute Eosinophils 0.0 0.0 - 0.7 10e9/L    Absolute Basophils 0.0 0.0 - 0.2 10e9/L    Absolute Myelocytes 0.2 (H) 0 10e9/L    Absolute Nucleated RBC 0.5     Polychromasia Slight     Platelet Estimate Confirming automated cell count    NT proBNP inpatient and ED   Result Value Ref Range    N-Terminal Pro BNP Inpatient 1,883 (H) 0 - 900 pg/mL

## 2021-04-17 NOTE — PROGRESS NOTES
[facility] Inpatient Dermatology Progress Note    Assessment and Plan:    1. Suspected DRESS, suspected due to ibrutinib:  - Regarding the patient's taper of steroids upon his discharge, we recommend that he be discharged on oral prednisone 1 mg/kg/day for at least 1-2 weeks, then the patient would be seen in Dermatology Clinic within that time period, when the dermatologist can begin the slow taper of prednisone over several weeks. Please let us know when the patient will be discharged, so that we can arrange close follow-up for this purpose. The patient will likely be discharged in the middle of next week, so follow-up would likely occur around 4/28/21 roughly. We will ask our schedulers to reach out to the patient to coordinate an appointment in our outpatient dermatology clinic around that time. Our clinic is located on the third floor of the [facility] ([facility] [place]) at [address] [place] [address].  - After discussion with Ana Beltran PA-C, we now suspect that the patient is experiencing DRESS due to ibrutinib as he has not taken allopurinol for several months.   - Otherwise, continue with systemic corticosteroids, triamcinolone 0.1% ointment BID, and supportive cares as per the primary team.  - Suture removal should occur around Sunday, 4/25/21, for biopsy at the right abdomen. It would be okay if this occurred at his dermatology outpatient follow-up appointment.  - To screen for hypothyroidism around 5/12/21 with TSH with reflex T4, which can be ordered by Dermatology at the outpatient follow-up visit within 1 week of discharge    We will continue to follow with the patient, and we plan on seeing the patient again early next week. If there are any new concerns or questions over this weekend, please contact the on-call dermatology resident..    Patient discussed with attending physician, Dr. Clayton Grande.    Staff Physician Comments:  I discussed and evaluated  the patient with the resident and I agree with the assessment and plan as documented in the resident's note.    Clayton Grande MD  Professor  Head of Dermato-Allergy Division  Department of Dermatology  [facility]        Dermatology Problem List:  1.Suspected DRESS due to ibrutinib for refractory mantle cell lymphoma    Date of Admission: Apr 9, 2021   Encounter Date: 04/16/2021    Interval history:    The patient's rash continues to improve slowly. The patient and his fiance say that it is fading. He will soon come off his insulin drip. No source of bleeding was found, according to th primary team. Furthermore, after discussion with the patient, reportedly the patient had not been on allopurinol since about November 2020 - making DRESS due to allopurinol not likely. There is now suspicion that DRESS may be due to ibrutinib, which is unfortunately a mainstay of his refractory mantle cell lymphoma. The tentative plan is for the patient to likely be discharged in the middle of next week.    Medications:  Current Facility-Administered Medications   Medication     acetaminophen (TYLENOL) tablet 650 mg     acyclovir (ZOVIRAX) capsule 200 mg     calcium acetate (PHOSLO) capsule 1,334 mg     calcium carbonate (TUMS) chewable tablet 500 mg     glucose gel 15-30 g    Or     dextrose 50 % injection 25-50 mL    Or     glucagon injection 1 mg     diphenhydrAMINE (BENADRYL) capsule 25 mg     diphenhydrAMINE-zinc acetate (BENADRYL) 1-0.1 % cream     heparin lock flush 10 UNIT/ML injection 3 mL     hydrOXYzine (ATARAX) tablet 25 mg     [START ON 4/17/2021] insulin aspart (NovoLOG) injection (RAPID ACTING)     insulin aspart (NovoLOG) injection (RAPID ACTING)     insulin aspart (NovoLOG) injection (RAPID ACTING)     insulin aspart (NovoLOG) injection (RAPID ACTING)     insulin NPH injection 26 Units     [START ON 4/17/2021] insulin NPH injection 28 Units     lidocaine (LMX4) cream     lidocaine 1  "% 0.1-1 mL     melatonin tablet 1 mg     metoprolol (LOPRESSOR) injection 5 mg     metoprolol succinate ER (TOPROL-XL) 24 hr tablet 25 mg     naloxone (NARCAN) injection 0.2 mg    Or     naloxone (NARCAN) injection 0.4 mg    Or     naloxone (NARCAN) injection 0.2 mg    Or     naloxone (NARCAN) injection 0.4 mg     nystatin (MYCOSTATIN) suspension 500,000 Units     ondansetron (ZOFRAN-ODT) ODT tab 4 mg    Or     ondansetron (ZOFRAN) injection 4 mg     ondansetron (ZOFRAN-ODT) ODT tab 8 mg     oxyCODONE (ROXICODONE) tablet 5 mg     pantoprazole (PROTONIX) EC tablet 40 mg     Patient is already receiving anticoagulation with heparin, enoxaparin (LOVENOX), warfarin (COUMADIN)  or other anticoagulant medication     [START ON 4/17/2021] predniSONE (DELTASONE) tablet 110 mg     sodium bicarbonate tablet 1,300 mg     sodium chloride (PF) 0.9% PF flush 3 mL     sodium chloride (PF) 0.9% PF flush 3 mL     sodium chloride (PF) 0.9% PF flush 3 mL     sodium chloride (PF) 0.9% PF flush 3 mL     sodium chloride (PF) 0.9% PF flush 3 mL     sodium chloride (PF) 0.9% PF flush 3 mL     [Held by provider] study - ibrutinib (IDS# [id number]) capsule CHEMOTHERAPY 560 mg     [START ON 4/19/2021] sulfamethoxazole-trimethoprim (BACTRIM) 400-80 MG per tablet 1 tablet     triamcinolone (KENALOG) 0.1 % cream     vancomycin (VANCOCIN) capsule 125 mg        Physical exam:  BP (!) 152/85 (BP Location: Left arm)   Pulse 90   Temp 97.5  F (36.4  C) (Axillary)   Resp 18   Ht 1.803 m (5' 11\")   Wt 119.4 kg (263 lb 3.7 oz)   SpO2 98%   BMI 36.71 kg/m    GEN:This is a well developed, well-nourished male in no acute distress, in a pleasant mood. Examined sitting in chair, tired appearing  SKIN: Total skin excluding the undergarment areas was performed. The exam included the head/face, neck, both arms, chest, back, abdomen, both legs, digits and/or nails.   -Egan skin type: I  - Poorly demarcated, erythematous, minimally thick plaques and " confluent papules scattered about the head, neck, trunk, and extremities Slightly less erythematous than previously.  - Facial edema present. There is relative sparing of rash at the periorbital areas.  - Photodocumentation obtained on 4/13 and 4/11.    Laboratory:  Results for orders placed or performed during the hospital encounter of 04/09/21 (from the past 24 hour(s))   Magnesium   Result Value Ref Range    Magnesium 2.8 (H) 1.6 - 2.3 mg/dL   Uric acid   Result Value Ref Range    Uric Acid 7.5 (H) 3.5 - 7.2 mg/dL   CRP inflammation   Result Value Ref Range    CRP Inflammation 38.0 (H) 0.0 - 8.0 mg/L   Comprehensive metabolic panel   Result Value Ref Range    Sodium 131 (L) 133 - 144 mmol/L    Potassium 3.5 3.4 - 5.3 mmol/L    Chloride 102 94 - 109 mmol/L    Carbon Dioxide 19 (L) 20 - 32 mmol/L    Anion Gap 9 3 - 14 mmol/L    Glucose 115 (H) 70 - 99 mg/dL    Urea Nitrogen 92 (H) 7 - 30 mg/dL    Creatinine 3.83 (H) 0.66 - 1.25 mg/dL    GFR Estimate 16 (L) >60 mL/min/[1.73_m2]    GFR Estimate If Black 19 (L) >60 mL/min/[1.73_m2]    Calcium 8.2 (L) 8.5 - 10.1 mg/dL    Bilirubin Total 0.7 0.2 - 1.3 mg/dL    Albumin 2.2 (L) 3.4 - 5.0 g/dL    Protein Total 4.3 (L) 6.8 - 8.8 g/dL    Alkaline Phosphatase 191 (H) 40 - 150 U/L    ALT 88 (H) 0 - 70 U/L    AST 67 (H) 0 - 45 U/L   Phosphorus   Result Value Ref Range    Phosphorus 5.7 (H) 2.5 - 4.5 mg/dL   INR   Result Value Ref Range    INR 1.12 0.86 - 1.14   Partial thromboplastin time   Result Value Ref Range    PTT 31 22 - 37 sec   Fibrinogen activity   Result Value Ref Range    Fibrinogen 228 200 - 420 mg/dL   CBC with platelets differential   Result Value Ref Range    WBC 11.5 (H) 4.0 - 11.0 10e9/L    RBC Count 2.58 (L) 4.4 - 5.9 10e12/L    Hemoglobin 7.9 (L) 13.3 - 17.7 g/dL    Hematocrit 22.2 (L) 40.0 - 53.0 %    MCV 86 78 - 100 fl    MCH 30.6 26.5 - 33.0 pg    MCHC 35.6 31.5 - 36.5 g/dL    RDW 14.5 10.0 - 15.0 %    Platelet Count 29 (LL) 150 - 450 10e9/L    Diff  Method Manual Differential     % Neutrophils 95.5 %    % Lymphocytes 1.8 %    % Monocytes 1.8 %    % Eosinophils 0.9 %    % Basophils 0.0 %    Nucleated RBCs 1 (H) 0 /100    Absolute Neutrophil 11.0 (H) 1.6 - 8.3 10e9/L    Absolute Lymphocytes 0.2 (L) 0.8 - 5.3 10e9/L    Absolute Monocytes 0.2 0.0 - 1.3 10e9/L    Absolute Eosinophils 0.1 0.0 - 0.7 10e9/L    Absolute Basophils 0.0 0.0 - 0.2 10e9/L    Absolute Nucleated RBC 0.1     Poikilocytosis Slight     Jose Daniel Cells Slight    EKG 12-lead, complete   Result Value Ref Range    Interpretation ECG Click View Image link to view waveform and result    Comprehensive metabolic panel   Result Value Ref Range    Sodium 134 133 - 144 mmol/L    Potassium 3.3 (L) 3.4 - 5.3 mmol/L    Chloride 104 94 - 109 mmol/L    Carbon Dioxide 18 (L) 20 - 32 mmol/L    Anion Gap 12 3 - 14 mmol/L    Glucose 141 (H) 70 - 99 mg/dL    Urea Nitrogen 93 (H) 7 - 30 mg/dL    Creatinine 3.58 (H) 0.66 - 1.25 mg/dL    GFR Estimate 18 (L) >60 mL/min/[1.73_m2]    GFR Estimate If Black 21 (L) >60 mL/min/[1.73_m2]    Calcium 8.1 (L) 8.5 - 10.1 mg/dL    Bilirubin Total 0.7 0.2 - 1.3 mg/dL    Albumin 2.3 (L) 3.4 - 5.0 g/dL    Protein Total 4.4 (L) 6.8 - 8.8 g/dL    Alkaline Phosphatase 195 (H) 40 - 150 U/L    ALT 83 (H) 0 - 70 U/L    AST 58 (H) 0 - 45 U/L   Phosphorus   Result Value Ref Range    Phosphorus 5.2 (H) 2.5 - 4.5 mg/dL   CBC with platelets differential   Result Value Ref Range    WBC 10.3 4.0 - 11.0 10e9/L    RBC Count 2.60 (L) 4.4 - 5.9 10e12/L    Hemoglobin 8.4 (L) 13.3 - 17.7 g/dL    Hematocrit 23.1 (L) 40.0 - 53.0 %    MCV 89 78 - 100 fl    MCH 32.3 26.5 - 33.0 pg    MCHC 36.4 31.5 - 36.5 g/dL    RDW 14.8 10.0 - 15.0 %    Platelet Count 21 (LL) 150 - 450 10e9/L    Diff Method Manual Differential     % Neutrophils 97.3 %    % Lymphocytes 2.7 %    % Monocytes 0.0 %    % Eosinophils 0.0 %    % Basophils 0.0 %    Nucleated RBCs 5 (H) 0 /100    Absolute Neutrophil 10.0 (H) 1.6 - 8.3 10e9/L    Absolute  Lymphocytes 0.3 (L) 0.8 - 5.3 10e9/L    Absolute Monocytes 0.0 0.0 - 1.3 10e9/L    Absolute Eosinophils 0.0 0.0 - 0.7 10e9/L    Absolute Basophils 0.0 0.0 - 0.2 10e9/L    Absolute Nucleated RBC 0.6     Anisocytosis Slight     Poikilocytosis Slight     Platelet Estimate Confirming automated cell count    Uric acid   Result Value Ref Range    Uric Acid 4.3 3.5 - 7.2 mg/dL   Comprehensive metabolic panel   Result Value Ref Range    Sodium 133 133 - 144 mmol/L    Potassium 3.4 3.4 - 5.3 mmol/L    Chloride 105 94 - 109 mmol/L    Carbon Dioxide 18 (L) 20 - 32 mmol/L    Anion Gap 9 3 - 14 mmol/L    Glucose 182 (H) 70 - 99 mg/dL    Urea Nitrogen 91 (H) 7 - 30 mg/dL    Creatinine 3.23 (H) 0.66 - 1.25 mg/dL    GFR Estimate 20 (L) >60 mL/min/[1.73_m2]    GFR Estimate If Black 23 (L) >60 mL/min/[1.73_m2]    Calcium 8.5 8.5 - 10.1 mg/dL    Bilirubin Total 0.8 0.2 - 1.3 mg/dL    Albumin 2.3 (L) 3.4 - 5.0 g/dL    Protein Total 4.4 (L) 6.8 - 8.8 g/dL    Alkaline Phosphatase 195 (H) 40 - 150 U/L    ALT 77 (H) 0 - 70 U/L    AST 55 (H) 0 - 45 U/L   Phosphorus   Result Value Ref Range    Phosphorus 4.4 2.5 - 4.5 mg/dL   CBC with platelets differential   Result Value Ref Range    WBC 11.5 (H) 4.0 - 11.0 10e9/L    RBC Count 2.64 (L) 4.4 - 5.9 10e12/L    Hemoglobin 8.4 (L) 13.3 - 17.7 g/dL    Hematocrit 23.6 (L) 40.0 - 53.0 %    MCV 89 78 - 100 fl    MCH 31.8 26.5 - 33.0 pg    MCHC 35.6 31.5 - 36.5 g/dL    RDW 14.8 10.0 - 15.0 %    Platelet Count 20 (LL) 150 - 450 10e9/L    Diff Method Manual Differential     % Neutrophils 92.1 %    % Lymphocytes 2.6 %    % Monocytes 0.9 %    % Eosinophils 3.5 %    % Basophils 0.0 %    % Promyelocytes 0.9 %    Nucleated RBCs 7 (H) 0 /100    Absolute Neutrophil 10.6 (H) 1.6 - 8.3 10e9/L    Absolute Lymphocytes 0.3 (L) 0.8 - 5.3 10e9/L    Absolute Monocytes 0.1 0.0 - 1.3 10e9/L    Absolute Eosinophils 0.4 0.0 - 0.7 10e9/L    Absolute Basophils 0.0 0.0 - 0.2 10e9/L    Absolute Promyeloctyes 0.1 (H) 0 10e9/L     Absolute Nucleated RBC 0.8     Poikilocytosis Slight     Polychromasia Slight     Platelet Estimate Confirming automated cell count        Staff Involved:  Resident/Staff

## 2021-04-18 NOTE — PROGRESS NOTES
Calorie Count  Intake recorded for: 4/17  Total Kcals: 768 Total Protein: 29g  Kcals from Hospital Food: 768   Protein: 29g  Kcals from Outside Food (average):0  Protein: 0g  # Meals Ordered from Kitchen: 2 meals  # Meals Recorded: 1 meal (100% orange ice, 4oz orange juice, 50% pancake w/ syrup, 50% hamburger w/ mendiola, 10% fries)  # Supplements Recorded: 50% of 1 Gelatein 20 SF

## 2021-04-18 NOTE — PLAN OF CARE
Don denies pain or nausea.  Two watery stools.  Rash is improving, TMC cream applied.  Ate breakfast and had a shake, he is waiting for his wife to bring in more food from home.  Blood sugars were 103 and 180 on NPH, sliding scale insulin and carb coverage.  Potassium replaced and recheck was above the replacement parameter.   Spent much of the day in his chair, using a pressure relieving specialty chair pillow.  Calling appropriately for help to the bathroom, uses a walker and gait belt.  Lulu removed at 1700.    Problem: Adult Inpatient Plan of Care  Goal: Plan of Care Review  Outcome: Improving

## 2021-04-18 NOTE — PROGRESS NOTES
[facility]    Hematology / Oncology Progress Note    Patient: Theo Roblero  MRN: [id number]  Admission Date: 4/9/2021  Date of Service (when I saw the patient): 04/18/2021  Hospital Day # 8     Assessment & Plan   Theo Roblero is a 56 year old male with PMHx of T2DM, obesity, tobacco use, HTN, HLD, and stage IV Mantle cell lymphoma (s/p PBSCT 11/2020) with relapsed disease. Most recently received Loncatuximab + Ibrutinib 4/2/21. Presented to [facility] with fevers, hypotension, tachycardia and whole body rash. Developed hypotension refractory to fluid resuscitation and was transferred to MICU for pressors support briefly 4/11-4/12. S/p bx of rash positive for drug rash likely 2/2 Ibrutinib or Loncatuximab. Started on methylprednisolone 500 mg BID (x4/12) with fever curve now down trending. Complicated by NGOZI on CKD, c diff colitis and DKA.     TODAY:   - Patient continues to improve.   - Remove Lawson. TOV today.  - Discontinue PhosLo.  - Discontinue bicarb supplement.   - Nephrology following for NGOZI on CKD. Good UOP.   - Endo following for steroid-induced hyperglycemia. Now on basal insulin with CHO coverage and sliding scale.   - Derm now following peripherally, set up outpatient follow up upon discharge. Continue PO prednisone 1 mg/kg/day.  - RD consulted, poor PO intake. Prealbumin 5 (low). Livan counts ordered 4/15-4/18.     HEME  #Mantle cell lymphoma (stage IV)   Follows outpatient with Dr Nelson. High risk (IPI 6.5 and Ki67 of 20%), stage IV Mantle cell lymphoma in CR1 s/p 8 cycles of alternating 6 cycles of Maxi-R- CHOP alternating with high-dose LITZY-C with last cycle given 6/11/2020. Therapy was complicated by a kidney stone and sepsis in 5/2020, prior to cycle 5. S/p PBSCT 11/2020 with since relapse. Started on experimental study [id number] (PI Dr Nelson). Most recently received Loncatuximab + Ibrutinib 4/2/21.   - HOLD PTA Ibrutinib 560 mg daily  given concern as possible cause of rash   - Next due for cycle 2 day 1 Loncatuximab 4/23/21      #Anemia  #Thrombocytopenia   2/2 Chemotherapy and MCL. Hgb 4.7 from 11.5 on 4/14. No s/sxs of bleeding on exam. CT A/P negative for bleed, stable known hematoma. . Hapto WNL and Smear showed no schistocytes.   - Transfuse if Hgb <7, plts <10K. Monitor CBC daily.      #Hypercoagulopathy, improved    INR elevated 1.23 - 1.45 likely 2/2 component of DIC and poor nutrition. Fibrinogen WNL, elevated PTT.   - Monitor coags daily. If PTT remains elevated will need mixing studies.      DERM  #DRESS   Presented with whole body skin rash to BMT clinic 4/9. Derm consulted, s/p skin bx (4/11/21) positive for drug rash. No eosinophilia on diff but facial swelling w/ periorbital edema, L axillary lymphadenopathy and kidney damage concerning for DRESS per derm although unusual as DRESS presents typically 2-4 weeks after drug exposure. Suspect 2/2 study drug or Ibrutinib (can lead to rash in up to 30% of patients). Do NOT suspect rash was 2/2 allopurinol as patient had not been taking allopurinol since November 2020.   - Methylpred 1 mg/kg (4/11-4/16)  - Reduced to prednisone 1 mg/kg/d (x4/17) per derm recommend discharge on pred 1 mg/kg daily for 1-2 weeks then follow up in derm clinic. Will require prolonged steroid taper.   - Derm now following peripherally, set up outpatient follow up upon discharge.   - Kenalog cream BID (patient prefers over ointment   - Monitor CBC and CMP closely, can have evidence of liver damage 2/2 DRESS   - Serologies: EBV Joan +, CMV+ Quant <137. EBV DNA PCR quant negative. HHV6 negative. Repeat CMV quant in 1-2 weeks (~4/22).  - Will require suture removal 4/25    - Repeat TSH/free T4 in 4 weeks (~5/12)     #Transaminitis   LFTs up trending likely 2/2 hypoperfusion and DRESS. T bili 0.7, Alk 100, ALT 95, .  - Monitor CMP TID.     ID  #SIRS, resolved    Presented with fevers (Tmax 103),  hypotension, tachycardia on 4/9/21. Concerning for infection vs delayed infusion reaction. Patient on Ibrutinib and Ioncatox (C1 = 4/2/21). CT CAP negative for intraabdominal or pulmonary source. CXR negative for pulmonary source. ESR 31, CRP 49 on presentation. Procal 0.51 -> 1.58. BCx NGTD. UA bland. WBC 10.9, plts 63k. ANC 7.8. Ongoing hypotension despite 30mL/kg fluid resuscitation 4/11, brief transfer to ICU for pressor support. Transferred back to floor 4/12.   - Ongoing lactic acidosis, suspect 2/2 malignancy, immune mediated process, possible infection and DKA.    - mIVF per DKA protocol (Isotonic NS preferred)   - Due to persistent fevers and hypotension, started on HD steroids. IV methylpred 1 mg/kg daily (x4/12).     Antimicrobials:   - Vancomycin 4/11-4/12   - Zosyn  4/11-4/14  - Micafungin  4/11-4/12  - Acyclovir (home med) - HSV ppx  - Bactrim (home med) - PJP ppx       #C diff colitis  #Diarrhea, improving  Large volume diarrhea started ~4/13, C diff positive 4/14.  - PO Vanco 125 mg QID (4/14-x)     ENDO  #DKA, resolved   #T2DM   PTA on Metformin and Glimepride. Following initiation of HD steroids 4/12, blood sugars spikes to 300-400s with associated hyponatremia (126), bicarb 12, anion gap 16, +kussmals respirations, and ongoing hypotension resistant to LR boluses. Endo consulted and started on insulin gtt per DKA protocol 4/13-4/16.   - A1C 5.9, ketone elevated in blood 0.8, DKA protocol ordered 4/13, mIVF later discontinued 4/14 given hypervolemia.  - Endo following: AM NPH 28 units, PM NPH 26 units, CHO coverage 1:4g, sliding scale     #Hyponatremia, resolved  - Na down trending, 126. Suspect hyponatremia driven by DKA and Renal Failure.   - Serum osm 284, Urine osm 510 and urine Na 17, consistent with underperfusion and inability to excrete free water. Per nephro, should correct as capillary leak improves.      RENAL  #NGOZI on CKD  #Anasarca   Likely prerenal in the setting of sepsis and DKA. No  muddy brown cast on UA. CT with large bladder  - Fena 0.4 suggestive of pre renal etiology. Cr 2.35 (baseline 0.9-1.1).   - Anasarca on exam 2/2 capillary leak and significant fluid resuscitation. Up 34 lbs since admission. Difficult to diurese in the context of DKA with hypotension. No UOP overnight 4/13, trial Lasix 100 mg with ~1.1L output.   - Renal consulted, appreciate assistance. No further Lasix recommended at this time.   - Monitor with CMP     #Acidosis   Anion gap acidosis with serum ketones likely 2/2 DKA, hypoperfusion, lymphoma and NGOZI on CKD.   - VBG (4/14) with pH 7.33, CO2 22, O2 27 and bicarb 12.   - PO Bicarb 1300 mg TID (4/14-4/18)      #Hypocalcemia, resolved  - Calcium down trending, iCal 4.1   - Calcium Gluconate 2g ordered (4/14)      #Hyperphosphatemia, resolved  - Phos up trending, now 5.7. Likely 2/2 renal dysfunction.   - PhosLo discontinued     #Hyperuricemia, resolved  - Uric acid up trending 2/2 renal dysfunction. Elevated at 8.5 (4/15).   - Uric acid daily, AVOID Allopurinol given allergy. Will consider Rasburicase if remains elevated.      CARDS  #Sinus tachycardia, improved   - HRs 110-150s this admission in the setting of SIRS.   - PRN IV Metoprolol 5 mg ordered if sustained HRs >140     #Hypertension   Inpatient ECHO unremarkable and DVT doppler negative.   - PTA Metoprolol ER 25 mg BID initially held in the setting of acute illness and hypotension, now hypertensive so resumed 4/16.      #HLD  - HOLD PTA Lipitor 40 mg daily      PULM  #VILMA  #R hemidiaphragm elevation    Overnight this admission occasional desaturation noted overnight per nursing, down to mid 80s requiring 2-3L NC. CXR 4/14 with R hemidiaphragm elevation.   - Suspect VILMA, will need referral for sleep study as outpatient.   - BiPAP as needed      FEN  - MIVF per DKA protocol   - Lyte replacement PRN per protocol       PPx  #DVT: HOLD ppx Lovenox (4/14-x) with concern for possible bleed   #GI: PPI BID (given possible  bleed)      Consults: Derm, Endo, PT/OT, Nephro    Code: FULL   Follow up: Follows outpatient with Dr Nelson in BMT clinic.    Dispo: Remain inpatient 5-7 days for management and work up of acute illness.      Patient was seen and plan of care was discussed with attending physician Dr. Stephen.    Elise Jimenez PA-C  Pager: [phone]  Desk phone: [phone]    Interval History   No acute events overnight. Patient is feeling better today. Feels more awake and like he has a little more energy. Sitting in recliner during my visit today. Yesterday patient was unable to lean forward for lung ausculation without assistance, but today he was able to do that himself. His rash is fading. He still has dry skin, using lotion to help. He is still quite a bit hypervolemic but is not uncomfortable from diffuse edema. PO intake is low but he feels is appetite is slowly improving. He slept better last night.     Vital Signs with Ranges  Temp:  [96.4  F (35.8  C)-97.8  F (36.6  C)] 97.8  F (36.6  C)  Pulse:  [57-75] 74  Resp:  [16-20] 16  BP: (127-158)/(69-91) 128/76  SpO2:  [93 %-99 %] 96 %  I/O last 3 completed shifts:  In: 970 [P.O.:970]  Out: 3775 [Urine:3025; Stool:750]    Physical Exam   General: Sitting in recliner, alert, NAD. Pleasant and conversational.  Skin: Diffuse maculopapular rashes with dry skin.   HEENT: NCAT. Anicteric sclera. MMM.  Respiratory: Non-labored breathing, good air exchange, lungs clear to auscultation bilaterally. Faint b/l basilar crack.es   Cardiovascular: RRR. No murmur or rub.   Gastrointestinal: Normoactive BS. Abdomen soft, ND, NT. No palpable masses.  Extremities: Mild but diffuse edema.   Neurologic: A&O x 3, speech normal, no deficits grossly.    Medications     - MEDICATION INSTRUCTIONS -         acyclovir  200 mg Oral BID     insulin aspart  2-16 Units Subcutaneous TID AC     insulin aspart  2-12 Units Subcutaneous BID     insulin aspart   Subcutaneous TID AC     insulin NPH  26  Units Subcutaneous Daily with supper     insulin NPH  30 Units Subcutaneous QAM AC     metoprolol succinate ER  25 mg Oral BID     nystatin  500,000 Units Oral 4x Daily     pantoprazole  40 mg Oral BID AC     predniSONE  1 mg/kg Oral Daily     [Held by provider] study - ibrutinib (IDS# [id number])  560 mg Oral Daily     [START ON 4/19/2021] sulfamethoxazole-trimethoprim  1 tablet Oral Q Mon Tues BID     triamcinolone   Topical BID     vancomycin  125 mg Oral 4x Daily     Data   Results for orders placed or performed during the hospital encounter of 04/09/21 (from the past 24 hour(s))   Asymptomatic SARS-CoV-2 COVID-19 Virus (Coronavirus) by PCR    Specimen: Nasopharyngeal   Result Value Ref Range    SARS-CoV-2 Virus Specimen Source Nasopharyngeal     SARS-CoV-2 PCR Result NEGATIVE     SARS-CoV-2 PCR Comment       Testing was performed using the Xpert Xpress SARS-CoV-2 Assay on the Cepheid Gene-Xpert   Instrument Systems. Additional information about this Emergency Use Authorization (EUA)   assay can be found via the Lab Guide.     Magnesium   Result Value Ref Range    Magnesium 2.5 (H) 1.6 - 2.3 mg/dL   Uric acid   Result Value Ref Range    Uric Acid 2.2 (L) 3.5 - 7.2 mg/dL   CRP inflammation   Result Value Ref Range    CRP Inflammation 14.0 (H) 0.0 - 8.0 mg/L   INR   Result Value Ref Range    INR 1.25 (H) 0.86 - 1.14   Partial thromboplastin time   Result Value Ref Range    PTT 41 (H) 22 - 37 sec   Fibrinogen activity   Result Value Ref Range    Fibrinogen 178 (L) 200 - 420 mg/dL   CBC with platelets differential   Result Value Ref Range    WBC 10.3 4.0 - 11.0 10e9/L    RBC Count 2.34 (L) 4.4 - 5.9 10e12/L    Hemoglobin 7.1 (L) 13.3 - 17.7 g/dL    Hematocrit 21.2 (L) 40.0 - 53.0 %    MCV 91 78 - 100 fl    MCH 30.3 26.5 - 33.0 pg    MCHC 33.5 31.5 - 36.5 g/dL    RDW 15.1 (H) 10.0 - 15.0 %    Platelet Count 36 (LL) 150 - 450 10e9/L    Diff Method Manual Differential     % Neutrophils 91.9 %    % Lymphocytes 6.3 %    %  Monocytes 1.8 %    % Eosinophils 0.0 %    % Basophils 0.0 %    Nucleated RBCs 13 (H) 0 /100    Absolute Neutrophil 9.5 (H) 1.6 - 8.3 10e9/L    Absolute Lymphocytes 0.6 (L) 0.8 - 5.3 10e9/L    Absolute Monocytes 0.2 0.0 - 1.3 10e9/L    Absolute Eosinophils 0.0 0.0 - 0.7 10e9/L    Absolute Basophils 0.0 0.0 - 0.2 10e9/L    Absolute Nucleated RBC 1.4     Anisocytosis Slight     Polychromasia Slight     Platelet Estimate Confirming automated cell count    Comprehensive metabolic panel   Result Value Ref Range    Sodium 139 133 - 144 mmol/L    Potassium 3.4 3.4 - 5.3 mmol/L    Chloride 109 94 - 109 mmol/L    Carbon Dioxide 24 20 - 32 mmol/L    Anion Gap 6 3 - 14 mmol/L    Glucose 124 (H) 70 - 99 mg/dL    Urea Nitrogen 75 (H) 7 - 30 mg/dL    Creatinine 2.24 (H) 0.66 - 1.25 mg/dL    GFR Estimate 31 (L) >60 mL/min/[1.73_m2]    GFR Estimate If Black 36 (L) >60 mL/min/[1.73_m2]    Calcium 8.5 8.5 - 10.1 mg/dL    Bilirubin Total 0.8 0.2 - 1.3 mg/dL    Albumin 2.1 (L) 3.4 - 5.0 g/dL    Protein Total 4.1 (L) 6.8 - 8.8 g/dL    Alkaline Phosphatase 141 40 - 150 U/L    ALT 56 0 - 70 U/L    AST 26 0 - 45 U/L   Phosphorus   Result Value Ref Range    Phosphorus 3.7 2.5 - 4.5 mg/dL

## 2021-04-18 NOTE — PROGRESS NOTES
Inpatient Diabetes consult service (IDS)    Assessment/plan  1. Diabetes mellitus type 2. With steroid hyperglycemia  Increase AM NPH to 30    2.  On corticosteroid with Steroid hyperglycemia     Francia Chong mD       Chart review/prep time 1  0720-0727  Visit Start time  1531  Visit Stop time 1536   15 minutes spent on the date of the encounter doing chart review, history and exam, documentation and further activities as noted above.      Chief complaint/ HPI Don is a 56 year old male with TIIDM, HTN, HLP and mantle cell lymphoma s/p PBSCT 11/2020 who was admitted 4/9/201  with fever, hypotension, and rash. Suspect DRESS 2/2 study drug or Ibrutinib.     He got methylprednisolone 118.75 mg/day 4/14-4/16.   He developed high AG and ketosis, high lactate on 4/13, felt to be DKA.  He was on IV Insulin 4/13//2021 1030 to  4/16/2021 1833  4/17 he started  on high dose prednisone .  The peak glucose was 223 at 1326    Total daily insulin:  4/17/2021 81 units  4/16/2021 IV plus 73 units  4/15/2021 IV plus 16 units  4/15/2021 IV insulin    Ordered DM relevant regimen  NPH 28 units before BF-lst dose 4/17  NPH 26 units with supper-lst dose last night  aspart 2 units/30 mg/dl > 140 tid before meal, HS and 0300  aspart 1 units/4 grms of carb before meals and snacks  Prednisone 110 mg/day    Active Diet Order  Orders Placed This Encounter      Moderate Consistent CHO Diet      Recent Labs   Lab 04/18/21  0300 04/18/21  0256 04/17/21  2259 04/17/21  1630 04/17/21  1326 04/17/21  0749 04/17/21  0348 04/17/21  0148 04/16/21  2014 04/16/21  2014 04/16/21  1226 04/16/21  1226 04/16/21  0323 04/16/21  0323 04/15/21  1951 04/15/21  1951   GLC  --  124*  --   --   --   --  159*  --   --  182*  --  141*  --  115*  --  151*   *  --  174* 210* 223* 158*  --  158*   < >  --    < >  --    < >  --    < >  --     < > = values in this interval not displayed.     4/11/2021 FENA 0.4  4/12/2021 0044 cortisol 32.8  4/13/2021 HgbA1c  "5.9%  4/17/2021 creatinine 3.08, NT BNP 1883, CRP 21    ROS:   A little cold  Eating ok  Sleeping ok    Exam  /69 (BP Location: Left arm)   Pulse 57   Temp 97.8  F (36.6  C) (Axillary)   Resp 16   Ht 1.803 m (5' 11\")   Wt 117.6 kg (259 lb 4.2 oz)   SpO2 93%   BMI 36.16 kg/m    GENERAL: sitting in chair in  no distress; blanket to chine  SKIN: Visible skin rash similar to yesterday  RESP: No audible wheeze, cough, or visible cyanosis.    NEURO:  Awake, alert, responds appropriately to questions.  Mentation and speech fluent.  PSYCH:affect normal      DATA     Results for LIBRA MCKNIGHT (MRN [id number]) as of 4/18/2021 15:36   Ref. Range 4/18/2021 02:56   Sodium Latest Ref Range: 133 - 144 mmol/L 139   Potassium Latest Ref Range: 3.4 - 5.3 mmol/L 3.4   Chloride Latest Ref Range: 94 - 109 mmol/L 109   Carbon Dioxide Latest Ref Range: 20 - 32 mmol/L 24   Urea Nitrogen Latest Ref Range: 7 - 30 mg/dL 75 (H)   Creatinine Latest Ref Range: 0.66 - 1.25 mg/dL 2.24 (H)   GFR Estimate Latest Ref Range: >60 mL/min/1.73_m2 31 (L)   GFR Estimate If Black Latest Ref Range: >60 mL/min/1.73_m2 36 (L)   Calcium Latest Ref Range: 8.5 - 10.1 mg/dL 8.5   Anion Gap Latest Ref Range: 3 - 14 mmol/L 6   Magnesium Latest Ref Range: 1.6 - 2.3 mg/dL 2.5 (H)   Phosphorus Latest Ref Range: 2.5 - 4.5 mg/dL 3.7   Albumin Latest Ref Range: 3.4 - 5.0 g/dL 2.1 (L)   Protein Total Latest Ref Range: 6.8 - 8.8 g/dL 4.1 (L)   Bilirubin Total Latest Ref Range: 0.2 - 1.3 mg/dL 0.8   Alkaline Phosphatase Latest Ref Range: 40 - 150 U/L 141   ALT Latest Ref Range: 0 - 70 U/L 56   AST Latest Ref Range: 0 - 45 U/L 26   CRP Inflammation Latest Ref Range: 0.0 - 8.0 mg/L 14.0 (H)   Uric Acid Latest Ref Range: 3.5 - 7.2 mg/dL 2.2 (L)   Glucose Latest Ref Range: 70 - 99 mg/dL 124 (H)     "

## 2021-04-18 NOTE — PLAN OF CARE
"/69 (BP Location: Left arm)   Pulse 57   Temp 97.8  F (36.6  C) (Axillary)   Resp 16   Ht 1.803 m (5' 11\")   Wt 117.6 kg (259 lb 4.2 oz)   SpO2 93%   BMI 36.16 kg/m      BP intermittently hypertensive but below metoprolol parameters. Other VSS. Denies pain/nausea. Up SBA to BSC with x2 loose, green/dark brown BMs. X1 incontinent BM. Lawson cares done and has good output. Livan counts through today per MD note. Pt refused glucerna for dinner. R port accessed and HL. No sliding scale insulin needed this shift. Pt reports rash is improving, declined use of kenalog before bed. 40 meq KCl PO scheduled w/bfast this am. Will need 4 hr post recheck. Will continue with POC.   "

## 2021-04-19 NOTE — PROGRESS NOTES
[facility]    Hematology / Oncology Progress Note    Patient: Theo Roblero  MRN: [id number]  Admission Date: 4/9/2021  Date of Service (when I saw the patient): 04/19/2021  Hospital Day # 9     Assessment & Plan   Theo Roblero is a 56 year old male with PMHx of T2DM, obesity, tobacco use, HTN, HLD, and stage IV Mantle cell lymphoma (s/p PBSCT 11/2020) with relapsed disease. Most recently received Loncatuximab + Ibrutinib 4/2/21. Presented to [facility] with fevers, hypotension, tachycardia and whole body rash. Developed hypotension refractory to fluid resuscitation and was transferred to MICU for pressors support briefly 4/11-4/12. S/p bx of rash positive for drug rash likely 2/2 Ibrutinib or Loncatuximab. Started on HD steroids (x4/12) with resolution of fevers. Complicated by anasarca with ~30 lb wt gain, NGOZI on CKD, c diff colitis, DKA, and deconditioning.        TODAY:   - Nephrology following for NGOZI on CKD. Cr improving with good UOP.   - Endo following for steroid-induced hyperglycemia. Now on basal insulin with CHO coverage and sliding scale.   - Derm now following peripherally, continue PO prednisone 1 mg/kg/day.   - PT/OT recc TCU. SW working on TCU placement.        HEME  #Mantle Cell Lymphoma (Stage IV)   Follows outpatient with Dr Nelson. High risk (IPI 6.5 and Ki67 of 20%), stage IV Mantle cell lymphoma in CR1 s/p 8 cycles of alternating 6 cycles of Maxi-R- CHOP alternating with high-dose LITZY-C with last cycle given 6/11/2020. Therapy was complicated by a kidney stone and sepsis in 5/2020, prior to cycle 5. S/p PBSCT 11/2020 with since relapse. Started on experimental study [id number] (PI Dr Nelson). Most recently received Loncatuximab + Ibrutinib 4/2/21.   - HOLD PTA Ibrutinib 560 mg daily given concern as possible cause of rash   - Next due for cycle 2 day 1 Loncatuximab 4/23/21, however, will likely no longer be candidate for trial given  severe DRESS 2/2 Ibrutinib or Loncatuximab. Plan discuss next treatment options at Tuesday conference 4/20.   - Continue Pred 1mg/kg daily per derm   - Follow up with Dr Nelson scheduled 5/12.      #Anemia  #Thrombocytopenia   2/2 Chemotherapy and MCL. Hgb 4.7 from 11.5 on 4/14. No s/sxs of bleeding on exam. CT A/P negative for bleed, stable known hematoma. . Hapto WNL and smear showed no schistocytes.   - Transfuse if Hgb <7, plts <10K. Monitor CBC daily.      #Hypercoagulopathy, improved    INR elevated 1.23 - 1.45 likely 2/2 component of DIC and poor nutrition. Fibrinogen WNL, elevated PTT.   - Monitor coags daily. If PTT remains elevated will need mixing studies.      DERM  #DRESS, improving    Presented with whole body skin rash to BMT clinic 4/9. Derm consulted, s/p skin bx (4/11/21) positive for drug rash. No eosinophilia on diff but facial swelling w/ periorbital edema, L axillary lymphadenopathy and kidney damage concerning for DRESS per derm although unusual as DRESS presents typically 2-4 weeks after drug exposure. Suspect 2/2 study drug or Ibrutinib (can lead to rash in up to 30% of patients). Do NOT suspect rash was 2/2 allopurinol as patient had not been taking allopurinol since November 2020.   - EBV Joan +, CMV+ Quant <137. EBV DNA PCR quant negative. HHV6 negative. Repeat CMV quant in 1-2 weeks (~4/22).   - Methylpred 1 mg/kg (4/11-4/16)  - Prednisone 1 mg/kg/d (x4/17) --- per derm continue dose until follow up in clinic scheduled 4/29. Will require prolonged steroid taper.   - Kenalog cream BID (patient prefers over ointment   - Will require suture removal 4/25 and repeat TSH/free T4 with derm follow up.      #Transaminitis   LFTs up trending likely 2/2 hypoperfusion and DRESS. T bili 0.7, Alk 100, ALT 95, .  - Monitor CMP TID.     ID  #SIRS, resolved    Presented with fevers (Tmax 103), hypotension, tachycardia on 4/9/21. Concerning for infection vs delayed infusion reaction.  Patient on Ibrutinib and Ioncatox (C1 = 4/2/21). CT CAP negative for intraabdominal or pulmonary source. CXR negative for pulmonary source. ESR 31, CRP 49 on presentation. Procal 0.51 -> 1.58. BCx NGTD. UA bland. WBC 10.9, plts 63k. ANC 7.8. Ongoing hypotension despite 30mL/kg fluid resuscitation 4/11, brief transfer to ICU for pressor support. Transferred back to floor 4/12.   - Ongoing lactic acidosis, suspect 2/2 malignancy, immune mediated process, possible infection and DKA.    - mIVF per DKA protocol (Isotonic NS preferred)   - Due to persistent fevers and hypotension, started on HD steroids. IV methylpred 1 mg/kg daily (x4/12).     Antimicrobials:   - Vancomycin 4/11-4/12   - Zosyn  4/11-4/14  - Micafungin  4/11-4/12  - Acyclovir (home med) - HSV ppx  - Bactrim (home med) - PJP ppx       #C diff colitis  #Diarrhea, improving  Large volume diarrhea started ~4/13, C diff positive 4/14.  - PO Vanco 125 mg QID (4/14-x). Plan for 10 day treatment course.      ENDO  #DKA, resolved   #T2DM   PTA on Metformin and Glimepride. Following initiation of HD steroids 4/12, blood sugars spikes to 300-400s with associated hyponatremia (126), bicarb 12, anion gap 16, +kussmals respirations, and ongoing hypotension resistant to LR boluses. Endo consulted and started on insulin gtt per DKA protocol 4/13-4/16.   - A1C 5.9, ketone elevated in blood 0.8, DKA protocol ordered 4/13, mIVF later discontinued 4/14 given hypervolemia.  - Endo following: AM NPH 30 units, PM NPH 26 units, CHO coverage 1:4g, sliding scale     #Hyponatremia, resolved  - Na down trending, 126. Suspect hyponatremia driven by DKA and Renal Failure.   - Serum osm 284, Urine osm 510 and urine Na 17, consistent with underperfusion and inability to excrete free water. Per nephro, should correct as capillary leak improves.      RENAL  #NGOZI on CKD, improving   #Anasarca   Likely prerenal in the setting of sepsis and DKA. No muddy brown cast on UA. CT with large  bladder  - Fena 0.4 suggestive of pre renal etiology. Cr 2.35 (baseline 0.9-1.1).   - Anasarca on exam 2/2 capillary leak and significant fluid resuscitation. Up 34 lbs since admission. Difficult to diurese in the context of DKA with hypotension. No UOP overnight 4/13, trial Lasix 100 mg with ~1.1L output.   - Renal consulted, appreciate assistance. No further Lasix recommended at this time. Auto diuresis.   - Monitor with CMP.      #Acidosis, resolved    Anion gap acidosis with serum ketones likely 2/2 DKA, hypoperfusion, lymphoma and NGOZI on CKD.   - VBG (4/14) with pH 7.33, CO2 22, O2 27 and bicarb 12.   - PO Bicarb 1300 mg TID (4/14-4/18)      #Hypocalcemia, resolved  - Calcium down trending, iCal 4.1   - Calcium Gluconate 2g ordered (4/14)      #Hyperphosphatemia, resolved  - Phos up trending, now 5.7. Likely 2/2 renal dysfunction.   - PhosLo discontinued     #Hyperuricemia, resolved  - Uric acid up trending 2/2 renal dysfunction. Elevated at 8.5 (4/15).   - Uric acid daily, AVOID Allopurinol given allergy. Will consider Rasburicase if remains elevated.      CARDS  #Sinus tachycardia, improved   - HRs 110-150s this admission in the setting of SIRS.   - PRN IV Metoprolol 5 mg ordered if sustained HRs >140 or SBP >180      #Hypertension   Inpatient ECHO unremarkable and DVT doppler negative.   - PTA Metoprolol ER 25 mg BID initially held in the setting of acute illness and hypotension, now hypertensive so resumed 4/16.      #HLD  - HOLD PTA Lipitor 40 mg daily      PULM  #VILMA  #R hemidiaphragm elevation    Overnight this admission occasional desaturation noted overnight per nursing, down to mid 80s requiring 2-3L NC. CXR 4/14 with R hemidiaphragm elevation.   - Suspect VILMA, will need referral for sleep study as outpatient.   - BiPAP as needed      FEN  - MIVF per DKA protocol   - Lyte replacement PRN per protocol       PPx  #DVT: HOLD ppx Lovenox (4/14-x) with concern for possible bleed   #GI: PPI BID (given  possible bleed)      Consults: Derm, Endo, PT/OT, Nephro    Code: FULL   Follow up: Follows outpatient with Dr Nelson in BMT clinic. Next BMT follow up scheduled 4/23.   Dispo: Remain inpatient 5-7 days for management and work up of acute illness.      Patient was seen and plan of care was discussed with attending physician Dr. Stephen.    Ana VegaBayhealth Medical CenterPaul, AVEL    Hematology/Oncology   Pager: [phone]     Interval History   No acute events overnight. Patient is feeling better with each day. Feels more awake and like he has a little more energy. Sitting up in recliner and watching TV. Reports fair appetite, his girlfriend brought him some pizza last night. One BM yesterday, starting to become more formed and less frequent. Afebrile and HD stable. Working with PT/OT, motivated to get stronger in order to get more chemotherapy.     Vital Signs with Ranges  Temp:  [96.3  F (35.7  C)-97.9  F (36.6  C)] 97.4  F (36.3  C)  Pulse:  [58-67] 66  Resp:  [16-20] 18  BP: (114-166)/(57-94) 130/75  SpO2:  [97 %-98 %] 98 %  I/O last 3 completed shifts:  In: 1800 [P.O.:1800]  Out: 1210 [Urine:1210]    Physical Exam   General: Sitting in recliner, alert, NAD. Pleasant and conversational.  Skin: Diffuse maculopapular rashes with dry skin.   HEENT: NCAT. Anicteric sclera. MMM.  Respiratory: Non-labored breathing, good air exchange, lungs clear to auscultation bilaterally. Faint b/l basilar crackles   Cardiovascular: RRR. No murmur or rub.   Gastrointestinal: Normoactive BS. Abdomen soft, ND, NT. No palpable masses.  Extremities: Mild but diffuse edema.   Neurologic: A&O x 3, speech normal, no deficits grossly.    Medications     - MEDICATION INSTRUCTIONS -         acyclovir  400 mg Oral BID     insulin aspart  2-16 Units Subcutaneous TID AC     insulin aspart  2-12 Units Subcutaneous BID     insulin aspart   Subcutaneous TID AC     insulin NPH  26 Units Subcutaneous Daily with supper     insulin NPH  30 Units  Subcutaneous QAM AC     metoprolol succinate ER  25 mg Oral BID     nystatin  500,000 Units Oral 4x Daily     pantoprazole  40 mg Oral QAM AC     predniSONE  1 mg/kg Oral Daily     [Held by provider] study - ibrutinib (IDS# [id number])  560 mg Oral Daily     sulfamethoxazole-trimethoprim  2 tablet Oral Q Mon Tues BID     triamcinolone   Topical BID     vancomycin  125 mg Oral 4x Daily     Data   Results for orders placed or performed during the hospital encounter of 04/09/21 (from the past 24 hour(s))   Potassium   Result Value Ref Range    Potassium 3.7 3.4 - 5.3 mmol/L   Glucose by meter   Result Value Ref Range    Glucose 103 (H) 70 - 99 mg/dL   Magnesium   Result Value Ref Range    Magnesium 2.5 (H) 1.6 - 2.3 mg/dL   Uric acid   Result Value Ref Range    Uric Acid 3.1 (L) 3.5 - 7.2 mg/dL   CRP inflammation   Result Value Ref Range    CRP Inflammation 9.3 (H) 0.0 - 8.0 mg/L   INR   Result Value Ref Range    INR 1.24 (H) 0.86 - 1.14   Partial thromboplastin time   Result Value Ref Range    PTT 41 (H) 22 - 37 sec   Fibrinogen activity   Result Value Ref Range    Fibrinogen 171 (L) 200 - 420 mg/dL   CBC with platelets differential   Result Value Ref Range    WBC 10.3 4.0 - 11.0 10e9/L    RBC Count 2.28 (L) 4.4 - 5.9 10e12/L    Hemoglobin 7.3 (L) 13.3 - 17.7 g/dL    Hematocrit 21.5 (L) 40.0 - 53.0 %    MCV 94 78 - 100 fl    MCH 32.0 26.5 - 33.0 pg    MCHC 34.0 31.5 - 36.5 g/dL    RDW 15.2 (H) 10.0 - 15.0 %    Platelet Count 32 (LL) 150 - 450 10e9/L    Diff Method Manual Differential     % Neutrophils 85.4 %    % Lymphocytes 5.1 %    % Monocytes 6.0 %    % Eosinophils 2.6 %    % Basophils 0.0 %    % Metamyelocytes 0.9 %    Nucleated RBCs 9 (H) 0 /100    Absolute Neutrophil 8.8 (H) 1.6 - 8.3 10e9/L    Absolute Lymphocytes 0.5 (L) 0.8 - 5.3 10e9/L    Absolute Monocytes 0.6 0.0 - 1.3 10e9/L    Absolute Eosinophils 0.3 0.0 - 0.7 10e9/L    Absolute Basophils 0.0 0.0 - 0.2 10e9/L    Absolute Metamyelocytes 0.1 (H) 0 10e9/L     Absolute Nucleated RBC 1.0     Anisocytosis Slight     Polychromasia Slight     Platelet Estimate Confirming automated cell count    Comprehensive metabolic panel   Result Value Ref Range    Sodium 140 133 - 144 mmol/L    Potassium 3.7 3.4 - 5.3 mmol/L    Chloride 111 (H) 94 - 109 mmol/L    Carbon Dioxide 24 20 - 32 mmol/L    Anion Gap 5 3 - 14 mmol/L    Glucose 109 (H) 70 - 99 mg/dL    Urea Nitrogen 66 (H) 7 - 30 mg/dL    Creatinine 1.73 (H) 0.66 - 1.25 mg/dL    GFR Estimate 43 (L) >60 mL/min/[1.73_m2]    GFR Estimate If Black 50 (L) >60 mL/min/[1.73_m2]    Calcium 8.4 (L) 8.5 - 10.1 mg/dL    Bilirubin Total 0.7 0.2 - 1.3 mg/dL    Albumin 2.0 (L) 3.4 - 5.0 g/dL    Protein Total 3.9 (L) 6.8 - 8.8 g/dL    Alkaline Phosphatase 121 40 - 150 U/L    ALT 47 0 - 70 U/L    AST 28 0 - 45 U/L   Phosphorus   Result Value Ref Range    Phosphorus 3.3 2.5 - 4.5 mg/dL   Glucose by meter   Result Value Ref Range    Glucose 71 70 - 99 mg/dL

## 2021-04-19 NOTE — PLAN OF CARE
"/57 (BP Location: Left arm)   Pulse 60   Temp 97.9  F (36.6  C) (Oral)   Resp 20   Ht 1.803 m (5' 11\")   Wt 115.3 kg (254 lb 3.1 oz)   SpO2 97%   BMI 35.45 kg/m      AVSS. Denies pain/nausea. Up SBA w/walker to bathroom. No stools overnight. Lawson taken out yesterday. Voiding adequately. R port HL. HS BS covered. Declined dinner glucerna, on novolog coverage for snacks. No replacements. Creat improving. Plan is to start next cycle of study tx on Friday. Will continue with POC.    "

## 2021-04-19 NOTE — INTERIM SUMMARY
The patient can be seen in outpatient dermatology clinic on the third floor of the [facility] ([facility]), which is located at [address] [place] [address].     The appointment would be with Dr. Kelton Grubbs (Yi) on Thursday, 4/29/21 at 8:50 a.m.     At that time, we will discuss the prednisone taper, ensure a follow-up TSH is ordered, and sutures could be removed (if the patient does not have them removed at home). Please kindly include this information in the patient's discharge paperwork. His rash is fortunately improving. Dermatology will likely see the patient one more time to answer any final questions by the patient during this admission, and then we will see him on an outpatient basis. Thank you for allowing us to be involved in the patient's care.    Davide Gonzalez MD  Chief Dermatology Resident

## 2021-04-19 NOTE — PROGRESS NOTES
"                     Diabetes Consult Daily  Progress Note          Assessment/Plan:                          Theo \"Don\" Osbaldo  is a 56 year old male with TIIDM, HTN, HLP and mantle cell lymphoma s/p PBSCT 11/2020 who was admitted with fever, hypotension, and rash. Suspect DRESS 2/2 study drug or Ibrutinib. He was started on high dose steroids, which resulted in DKA.     Assessment:   1)Type II Diabetes Mellitus, recently improving control  2) Steroid induced hyperglycemia, with DKA- resolved.      Plan:    NPH: 30 units in he morning ( 0800) with prednisone    NPH: 26 units with dinner, will  Decrease to 22 units    aspart 1:4g CHO coverage with meals and snacks/supplements  -Novolog custom correction 2 units per 30 > 140 before meals   - Novolog High correction at bedtime  -monitor blood sugars before meals, HS and 0200   -hold Metformin (persistent lactic acidosis)   -hypoglycemia protocol  -carb counting protocol  -diabetes education needs moises be reassessed closer to discharge  - has been on MDI regimen before (BID NPH plus fixed meal insulin plus sliding scale)           Outpatient diabetes follow up: [facility]  Plan discussed with patient and primary team via this note           Interval History:   The last 24 hours progress and nursing notes reviewed.    Blood sugars are moderately controlled on  current insulin plan  Blood sugars trending downward  Appetite is reported as improving, denies nausea and or vomiting              Steroid planning:   -IV Methylpred 500mg BID  -IV Methylpred 118 mg once daily since 4/14  -PO Pred 110mg daily AM beginning 4/17, and slow taper.     Of Note:  He received  methylprednisolone 118.75 mg/day 4/14-4/16.   He developed high AG and ketosis, high lactate on 4/13, felt to be DKA.  He was on IV Insulin 4/13//2021 1030 to  4/16/2021 1833  4/17 he started  on high dose prednisone .        D5W-containing solutions:   -calcium gluconate PRN     PTA Diabetes " "Regimen:   Metformin XR 2000 mg daily  Glimepiride 4 mg daily      Recent Labs   Lab 04/19/21  0357 04/19/21  0345 04/18/21  2225 04/18/21  1221 04/18/21  0804 04/18/21  0300 04/18/21  0256 04/17/21  2259 04/17/21  0348 04/17/21  0348 04/16/21 2014 04/16/21 2014 04/16/21  1226 04/16/21  1226 04/16/21 0323 04/16/21 0323   *  --   --   --   --   --  124*  --   --  159*  --  182*  --  141*  --  115*   BGM  --  103* 208* 180* 103* 131*  --  174*   < >  --    < >  --    < >  --    < >  --     < > = values in this interval not displayed.               Review of Systems:   See interval hx          Medications:     Orders Placed This Encounter      Moderate Consistent CHO Diet       Physical Exam:  Gen: Alert, sitting up in chair, NAD   HEENT:  mucous membranes are moist  Resp: non-labored  Ext: moving all extremities  Neuro:oriented x3, communicating clearly  /75 (BP Location: Left arm)   Pulse 66   Temp 97.4  F (36.3  C) (Oral)   Resp 18   Ht 1.803 m (5' 11\")   Wt 115.2 kg (253 lb 14.4 oz)   SpO2 98%   BMI 35.41 kg/m             Data:     Lab Results   Component Value Date    A1C 5.9 04/13/2021    A1C 5.9 04/10/2021              CBC RESULTS:   Recent Labs   Lab Test 04/19/21 0357   WBC 10.3   RBC 2.28*   HGB 7.3*   HCT 21.5*   MCV 94   MCH 32.0   MCHC 34.0   RDW 15.2*   PLT 32*     Recent Labs   Lab Test 04/19/21  0357 04/18/21  1349 04/18/21  0256     --  139   POTASSIUM 3.7 3.7 3.4   CHLORIDE 111*  --  109   CO2 24  --  24   ANIONGAP 5  --  6   *  --  124*   BUN 66*  --  75*   CR 1.73*  --  2.24*   BELA 8.4*  --  8.5     Liver Function Studies -   Recent Labs   Lab Test 04/19/21  0357   PROTTOTAL 3.9*   ALBUMIN 2.0*   BILITOTAL 0.7   ALKPHOS 121   AST 28   ALT 47     Lab Results   Component Value Date    INR 1.24 04/19/2021    INR 1.25 04/18/2021    INR 1.16 04/17/2021    INR 1.12 04/16/2021    INR 1.23 04/15/2021    INR 1.45 04/14/2021    INR 1.46 04/13/2021    INR 1.08 04/09/2021 "    INR 1.09 03/24/2021    INR 1.04 11/09/2020    INR 1.04 11/04/2020    INR 1.05 10/13/2020         I spent a total of 25 minutes bedside and on the inpatient unit managing the glycemic care of Theo Roblero. Over 50% of my time on the unit was spent counseling the patient  and/or coordinating care regarding .  See note for details.      To contact Endocrine Diabetes service:   From 8AM-4PM: page inpatient diabetes provider that is following the patient  For questions or updates from 4PM-8AM: page the diabetes job code for on call fellow: [phone]        Sara Meléndez [phone]  Diabetes Management job code [phone]

## 2021-04-19 NOTE — PROGRESS NOTES
CLINICAL NUTRITION SERVICES - BRIEF NOTE      Nutrition Prescription     RECOMMENDATIONS FOR MDs/PROVIDERS TO ORDER:  Kcal counts (4/15-4/17) met only 29% estimated energy needs and 21% estimated protein needs. Recommendation is to start nutrition support if patient is not meeting 60% of needs orally. However, there has been large improvements in patient's oral intake over the last couple days, after the kcal counts were completed. Pt already able to take in 1600+ calories and 65+g protein so far today     Recommendations already ordered by Registered Dietitian (RD):  Adjust supplements: Glucerna Therapeutic Nutrition Shake (chocolate) @ 2 pm daily, Gelatein 20 daily with lunch     Future/Additional Recommendations:  nutrition support recs in full assessment note 04/14/2021 if po again declines and supported becomes indiated     New Findings:  -Kcal counts (4/15-4/17)  4/15       Total Kcals: 628       Total Protein: 23g  4/16       Total Kcals: 400       Total Protein: 12g  4/17       Total Kcals: 768       Total Protein: 29g  -3 day average intake of 597 kcals (29% estimated energy needs), 21 g protein (21% estimated protein needs)  -Patient reports intake and appetite improved on 4/18  -Patient reports he has been drinking one glucerna nutrition shake daily  -Ate 100% of breakfast this morning: banana bread, blueberry muffin, and english muffin w/ jelly (565 kcals, 11 grams protein)  -When I visited him he was just about done with his lunch: burger, fries, brownie, and Gelatein 20 (1090 kcals, 55 g protein)  -Patient reports his appetite to be normal   -Overall, large improvements in oral intake over the last few days    Interventions  Medical food supplement therapy- as ordered above  Nutrition education for recommended modifications- encouraged oral intake and discussed kcal counts  Ordered Kcal counts (4/20-4/23)    RD to follow per protocol.    Margi Marroquin  Dietetic Intern    I read and agree with the above  assessment and interventions.   Cherelle Son MS, RD, , CNSC, LD.  Pager: [phone]

## 2021-04-19 NOTE — PROGRESS NOTES
Care Management Follow Up    Length of Stay (days): 9    Expected Discharge Date: 04/21/21     Concerns to be Addressed: discharge planning       Patient plan of care discussed at interdisciplinary rounds: Yes    Anticipated Discharge Disposition: Transitional Care     Anticipated Discharge Services: None    Anticipated Discharge DME: None    Patient/family educated on Medicare website which has current facility and service quality ratings: yes    Education Provided on the Discharge Plan:  Yes     Patient/Family in Agreement with the Plan: yes    Referrals Placed by CM/SW: External Care Coordination, Post Acute Facilities    Private pay costs discussed: transportation costs    Additional Information:     met with patient in their hospital room; introduced self and explained role. Discussed PT/OT recommendations of SNF placement at time of discharge; patient confirmed recommendations.       discussed the SNF referral process. Provided the Medicare Compare list for SNF, with associated star ratings to assist with choice for referrals/discharge planning.  provided education regarding the star ratings and that they are updated/maintained by Medicare and can be reviewed by visiting www.medicare.gov. Patient requested initial referrals be submitted to the following facilities:    1) [facility]  [address] [place] [address]  P: [phone]  F: [phone]    2) [facility]  [address] [place] [address]  P: [phone]  F: [phone]    Referrals submitted. No further questions or concerns reported at this time.  provided contact information and encouraged patient to call if any further needs arise.    TAMMY Ulloa Hematology/Oncology Social Worker  Phone: [phone]  Pager: [phone]  [email or web address]

## 2021-04-19 NOTE — PLAN OF CARE
Patient with good glucose control, pre meal glucoses were 71 and 102, carb coverage only along with NPH insulin. Denies any pain or nausea. Didn't want the TMC cream, but did allow this writer to apply a small layer to his overall body rash.

## 2021-04-20 NOTE — PROGRESS NOTES
"                     Diabetes Consult Daily  Progress Note          Assessment/Plan:                          Theo \"Don\" Osbaldo  is a 56 year old male with TIIDM, HTN, HLP and mantle cell lymphoma s/p PBSCT 11/2020 who was admitted with fever, hypotension, and rash. Suspect DRESS 2/2 study drug or Ibrutinib. He was started on high dose steroids, which resulted in DKA.     Assessment:   1)Type II Diabetes Mellitus, recently improving control  2) Steroid induced hyperglycemia, with DKA- resolved.      Plan:    NPH: 30 units in he morning ( 0800) with prednisone    NPH: 22 units with dinner, discontinued   aspart 1:4g CHO coverage with meals and snacks/supplements, decrease to 1 unit per 5 grams of CHO with meals/snacks/supplements  -Novolog custom correction 2 units per 30 > 140 before meals, discontinued  - changed to high correction with meals   - Novolog High correction at bedtime  -monitor blood sugars before meals, HS and 0200   -hold Metformin (persistent lactic acidosis)   -hypoglycemia protocol  -carb counting protocol  -diabetes education needs moises be reassessed closer to discharge  - has been on MDI regimen before (BID NPH plus fixed meal insulin plus sliding scale)    Tentative Discharge Plan:  Lantus 30 units with Prednisone 110 mg  Novolog fixed meal insulin  Breakfast:  Lunch:  Dinner:  Novolog sliding scale        Outpatient diabetes follow up: [facility]  Plan discussed with patient and primary team via this note           Interval History:   The last 24 hours progress and nursing notes reviewed.    Blood sugars continued to trend downward . Received NPH last evening, glucose to 61--> 51 requiring IV dextrose.Discontinued the NPH for this evening and will reassess in am.  Appetite is reported as good, denies nausea and or vomiting              Steroid planning:   -IV Methylpred 500mg BID  -IV Methylpred 118 mg once daily since 4/14  -PO Pred 110mg daily AM beginning 4/17, until derm " "follow-up on 4/29    Of Note:  He received  methylprednisolone 118.75 mg/day 4/14-4/16.   He developed high AG and ketosis, high lactate on 4/13, felt to be DKA.  He was on IV Insulin 4/13//2021 1030 to  4/16/2021 1833  4/17 he started  on high dose prednisone .        D5W-containing solutions:   -calcium gluconate PRN     PTA Diabetes Regimen:   Metformin XR 2000 mg daily  Glimepiride 4 mg daily      Recent Labs   Lab 04/20/21  0646 04/20/21  0517 04/20/21  0455 04/20/21  0331 04/19/21  2143 04/19/21  1707 04/19/21  1143 04/19/21  0357 04/19/21  0357 04/18/21  0256 04/18/21  0256 04/17/21  0348 04/17/21  0348 04/16/21 2014 04/16/21 2014 04/16/21  1226 04/16/21  1226   GLC  --   --   --  61*  --   --   --   --  109*  --  124*  --  159*  --  182*  --  141*   BGM 79 97 51*  --  144* 98 102*   < >  --    < >  --    < >  --    < >  --    < >  --     < > = values in this interval not displayed.               Review of Systems:   See interval hx          Medications:     Orders Placed This Encounter      Moderate Consistent CHO Diet       Physical Exam:  Gen: Alert, sitting up in chair, NAD   HEENT:  mucous membranes are moist  Resp: non-labored  Ext: moving all extremities  Neuro:oriented x3, communicating clearly  /87 (BP Location: Left arm, Cuff Size: Adult Large)   Pulse 73   Temp 97.8  F (36.6  C) (Oral)   Resp 18   Ht 1.803 m (5' 11\")   Wt 115.2 kg (253 lb 14.4 oz)   SpO2 97%   BMI 35.41 kg/m             Data:     Lab Results   Component Value Date    A1C 5.9 04/13/2021    A1C 5.9 04/10/2021              CBC RESULTS:   Recent Labs   Lab Test 04/20/21  0331   WBC 16.6*   RBC 2.77*   HGB 8.7*   HCT 26.4*   MCV 95   MCH 31.4   MCHC 33.0   RDW 15.8*   PLT 55*     Recent Labs   Lab Test 04/20/21  0331 04/19/21  0357    140   POTASSIUM 3.7 3.7   CHLORIDE 107 111*   CO2 22 24   ANIONGAP 11 5   GLC 61* 109*   BUN 56* 66*   CR 1.40* 1.73*   BELA 8.3* 8.4*     Liver Function Studies -   Recent Labs   Lab " Test 04/20/21  0331   PROTTOTAL 4.6*   ALBUMIN 2.4*   BILITOTAL 0.8   ALKPHOS 136   AST 36   ALT 55         I spent a total of 25 minutes bedside and on the inpatient unit managing the glycemic care of Theo Roblero. Over 50% of my time on the unit was spent counseling the patient  and/or coordinating care regarding .  See note for details.      To contact Endocrine Diabetes service:   From 8AM-4PM: page inpatient diabetes provider that is following the patient  For questions or updates from 4PM-8AM: page the diabetes job code for on call fellow: [phone]        Sara Meléndez -[phone]  Diabetes Management job code [phone]

## 2021-04-20 NOTE — PLAN OF CARE
"/87 (BP Location: Left arm, Cuff Size: Adult Large)   Pulse 73   Temp 97.8  F (36.6  C) (Oral)   Resp 18   Ht 1.803 m (5' 11\")   Wt 115.2 kg (253 lb 14.4 oz)   SpO2 97%   BMI 35.41 kg/m    Pt's AVSS, no c/o pain all shift and up with SBA and voiding good amount. Pt did have x1 loose stool over night. Right chest [id number] hep locked. Low BG later in the night. Lab resuts was 61, recheck reads 51 and total of 50ml of dextrose was given. BG went up to 97 when the first 25ml of dextrose was given. Recheck of BG reads in the upper 70's and OJ was given. Recheck will be later today. Other lab stable with Hgb 8.7, Plt 55, WBC 16.6, Mag 3.7 and Phos 3.5. Keep monitoring pt as ordered and notify MD with any new changes.   Problem: Adult Inpatient Plan of Care  Goal: Plan of Care Review  Outcome: No Change  Goal: Patient-Specific Goal (Individualized)  Outcome: No Change  Goal: Absence of Hospital-Acquired Illness or Injury  Outcome: No Change  Intervention: Identify and Manage Fall Risk  Recent Flowsheet Documentation  Taken 4/20/2021 0049 by Jose Angel Elmore RN  Safety Promotion/Fall Prevention: activity supervised  Goal: Optimal Comfort and Wellbeing  Outcome: No Change  Goal: Readiness for Transition of Care  Outcome: No Change     "

## 2021-04-20 NOTE — PROGRESS NOTES
[facility]    Hematology / Oncology Progress Note    Patient: Theo Roblero  MRN: [id number]  Admission Date: 4/9/2021  Date of Service (when I saw the patient): 04/20/2021  Hospital Day # 10     Assessment & Plan   Theo Roblero is a 56 year old male with PMHx of T2DM, obesity, tobacco use, HTN, HLD, and stage IV Mantle cell lymphoma (s/p PBSCT 11/2020) with relapsed disease. Most recently received Loncatuximab + Ibrutinib 4/2/21. Presented to [facility] with fevers, hypotension, tachycardia and whole body rash. Developed hypotension refractory to fluid resuscitation and was transferred to MICU for pressors support briefly 4/11-4/12. S/p bx of rash positive for drug rash likely 2/2 Ibrutinib or Loncatuximab. Started on HD steroids (x4/12) with resolution of fevers. Complicated by anasarca with ~30 lb wt gain, NGOZI on CKD, c diff colitis, DKA, and deconditioning.        TODAY:   - Cr continues to improve with good UOP. Weight still up ~20lbs. Lasix 40mg ordered   - Endo following for steroid-induced hyperglycemia, appreciate assistance.    - Continue PO prednisone 1 mg/kg/day until derm follow up 4/29   - PT/OT recc TCU. SW working on TCU placement.        HEME  #Mantle Cell Lymphoma (Stage IV)   Follows outpatient with Dr Nelson. High risk (IPI 6.5 and Ki67 of 20%), stage IV Mantle cell lymphoma in CR1 s/p 8 cycles of alternating 6 cycles of Maxi-R- CHOP alternating with high-dose LITZY-C with last cycle given 6/11/2020. Therapy was complicated by a kidney stone and sepsis in 5/2020, prior to cycle 5. S/p PBSCT 11/2020 with since relapse. Started on experimental study [id number] (PI Dr Nelson). Most recently received Loncatuximab + Ibrutinib 4/2/21.   - HOLD PTA Ibrutinib 560 mg daily given concern as possible cause of rash   - Next due for cycle 2 day 1 Loncatuximab 4/23/21, however, will likely no longer be candidate for trial given severe DRESS 2/2 Ibrutinib  or Loncatuximab. Plan discuss next treatment options at Tuesday conference 4/20.   - Continue Pred 1mg/kg daily per derm   - Follow up with Dr Nelson scheduled 5/12.      #Anemia  #Thrombocytopenia   2/2 Chemotherapy and MCL. Hgb 4.7 from 11.5 on 4/14. No s/sxs of bleeding on exam. CT A/P negative for bleed, stable known hematoma. . Hapto WNL and smear showed no schistocytes.   - Transfuse if Hgb <7, plts <10K. Monitor CBC daily.      #Hypercoagulopathy, improved    INR elevated 1.23 - 1.45 likely 2/2 component of DIC and poor nutrition. Fibrinogen WNL, elevated PTT.   - Monitor coags daily. If PTT remains elevated will need mixing studies.      DERM  #DRESS, improving    Presented with whole body skin rash to BMT clinic 4/9. Derm consulted, s/p skin bx (4/11/21) positive for drug rash. No eosinophilia on diff but facial swelling w/ periorbital edema, L axillary lymphadenopathy and kidney damage concerning for DRESS per derm although unusual as DRESS presents typically 2-4 weeks after drug exposure. Suspect 2/2 study drug or Ibrutinib (can lead to rash in up to 30% of patients). Do NOT suspect rash was 2/2 allopurinol as patient had not been taking allopurinol since November 2020.   - EBV Joan +, CMV+ Quant <137. EBV DNA PCR quant negative. HHV6 negative. Repeat CMV quant in 1-2 weeks (~4/22).   - Methylpred 1 mg/kg (4/11-4/16)  - Prednisone 1 mg/kg/d (x4/17) --- per derm continue dose until follow up in clinic scheduled 4/29. Will require prolonged steroid taper.   - Kenalog cream BID (patient prefers over ointment   - Will require suture removal 4/25 and repeat TSH/free T4 with derm follow up.   - ? Referral to allergy clinic for testing with Dr. Grande      #Transaminitis   LFTs up trending likely 2/2 hypoperfusion and DRESS. T bili 0.7, Alk 100, ALT 95, .  - Monitor CMP TID.     ID  #SIRS, resolved    Presented with fevers (Tmax 103), hypotension, tachycardia on 4/9/21. Concerning for  infection vs delayed infusion reaction. Patient on Ibrutinib and Ioncatox (C1 = 4/2/21). CT CAP negative for intraabdominal or pulmonary source. CXR negative for pulmonary source. ESR 31, CRP 49 on presentation. Procal 0.51 -> 1.58. BCx NGTD. UA bland. WBC 10.9, plts 63k. ANC 7.8. Ongoing hypotension despite 30mL/kg fluid resuscitation 4/11, brief transfer to ICU for pressor support. Transferred back to floor 4/12.   - Ongoing lactic acidosis, suspect 2/2 malignancy, immune mediated process, possible infection and DKA.    - mIVF per DKA protocol (Isotonic NS preferred)   - Due to persistent fevers and hypotension, started on HD steroids. IV methylpred 1 mg/kg daily (x4/12).     Antimicrobials:   - Vancomycin 4/11-4/12   - Zosyn  4/11-4/14  - Micafungin  4/11-4/12  - Acyclovir (home med) - HSV ppx  - Bactrim (home med) - PJP ppx       #C diff colitis  #Diarrhea, improving  Large volume diarrhea started ~4/13, C diff positive 4/14.  - PO Vanco 125 mg QID (4/14-x). Plan for 10 day treatment course.      ENDO  #DKA, resolved   #T2DM   PTA on Metformin and Glimepride. Following initiation of HD steroids 4/12, blood sugars spikes to 300-400s with associated hyponatremia (126), bicarb 12, anion gap 16, +kussmals respirations, and ongoing hypotension resistant to LR boluses. Endo consulted and started on insulin gtt per DKA protocol 4/13-4/16.   - A1C 5.9, ketone elevated in blood 0.8, DKA protocol ordered 4/13, mIVF later discontinued 4/14 given hypervolemia.  - Endo following: AM NPH 30 units, PM NPH 26 units, CHO coverage 1:4g, sliding scale     #Hyponatremia, resolved  - Na down trending, 126. Suspect hyponatremia driven by DKA and Renal Failure.   - Serum osm 284, Urine osm 510 and urine Na 17, consistent with underperfusion and inability to excrete free water. Per nephro, should correct as capillary leak improves.      RENAL  #NGOZI on CKD, improving   #Anasarca   Likely prerenal in the setting of sepsis and DKA. No  muddy brown cast on UA. CT with large bladder  - Fena 0.4 suggestive of pre renal etiology. Cr 2.35 (baseline 0.9-1.1).   - Anasarca on exam 2/2 capillary leak and significant fluid resuscitation. Up 34 lbs since admission. Difficult to diurese in the context of DKA with hypotension. No UOP overnight 4/13, trial Lasix 100 mg with ~1.1L output.   - Renal consulted, appreciate assistance. No further Lasix recommended at this time. Auto diuresis.   - Monitor with CMP.      #Acidosis, resolved    Anion gap acidosis with serum ketones likely 2/2 DKA, hypoperfusion, lymphoma and NGOZI on CKD.   - VBG (4/14) with pH 7.33, CO2 22, O2 27 and bicarb 12.   - PO Bicarb 1300 mg TID (4/14-4/18)      #Hypocalcemia, resolved  - Calcium down trending, iCal 4.1   - Calcium Gluconate 2g ordered (4/14)      #Hyperphosphatemia, resolved  - Phos up trending, now 5.7. Likely 2/2 renal dysfunction.   - PhosLo discontinued     #Hyperuricemia, resolved  - Uric acid up trending 2/2 renal dysfunction. Elevated at 8.5 (4/15).   - Uric acid daily, AVOID Allopurinol given allergy. Will consider Rasburicase if remains elevated.      CARDS  #Sinus tachycardia, improved   - HRs 110-150s this admission in the setting of SIRS.   - PRN IV Metoprolol 5 mg ordered if sustained HRs >140 or SBP >180      #Hypertension   Inpatient ECHO unremarkable and DVT doppler negative.   - PTA Metoprolol ER 25 mg BID initially held in the setting of acute illness and hypotension, now hypertensive so resumed 4/16.      #HLD  - HOLD PTA Lipitor 40 mg daily      PULM  #VILMA  #R hemidiaphragm elevation    Overnight this admission occasional desaturation noted overnight per nursing, down to mid 80s requiring 2-3L NC. CXR 4/14 with R hemidiaphragm elevation.   - Suspect VILMA, will need referral for sleep study as outpatient.   - BiPAP as needed      FEN  - MIVF per DKA protocol   - Lyte replacement PRN per protocol       PPx  #DVT: HOLD ppx Lovenox (4/14-x) with concern for  possible bleed   #GI: PPI BID (given possible bleed)      Consults: Derm, Endo, PT/OT, Nephro    Code: FULL   Follow up: Follows outpatient with Dr Nelson in BMT clinic.     BMT follow up 5/23     Derm follow up 4/29    Dr Nelson 5/12   Dispo: Remain inpatient 5-7 days for management and work up of acute illness.      Patient was seen and plan of care was discussed with attending physician Dr. Stephen.    Ana Ignacio ([facility], AVEL    Hematology/Oncology   Pager: [phone]     Interval History   No acute events overnight. Patient is feeling better with each day. Worked with PT yesterday, able to walk with walker down to work out room yesterday. Sitting up in bed and watching TV and eating pancakes for breakfast. Reports fair appetite, his girlfriend has been brining him food. 2 BM yesterday, starting to become more formed and less frequent. Afebrile and HD stable. Hopeful to go home vs TCU in coming days.     Vital Signs with Ranges  Temp:  [97.6  F (36.4  C)-98  F (36.7  C)] 97.7  F (36.5  C)  Pulse:  [59-75] 62  Resp:  [16-18] 16  BP: (125-158)/(72-89) 151/89  SpO2:  [96 %-98 %] 97 %  I/O last 3 completed shifts:  In: 2550 [P.O.:2550]  Out: 2500 [Urine:2500]    Physical Exam   General: Sitting in recliner, alert, NAD. Pleasant and conversational.  Skin: Diffuse maculopapular rashes with dry skin.   HEENT: NCAT. Anicteric sclera. MMM.  Respiratory: Non-labored breathing, good air exchange, lungs clear to auscultation bilaterally. Faint b/l basilar crackles   Cardiovascular: RRR. No murmur or rub.   Gastrointestinal: Normoactive BS. Abdomen soft, ND, NT. No palpable masses.  Extremities: Mild but diffuse edema. 2+ LE edema.  Neurologic: A&O x 3, speech normal, no deficits grossly.    Medications     - MEDICATION INSTRUCTIONS -         acyclovir  800 mg Oral BID     enoxaparin ANTICOAGULANT  40 mg Subcutaneous Q24H     insulin aspart   Subcutaneous TID AC     insulin aspart  1-10 Units Subcutaneous  TID AC     insulin aspart  1-7 Units Subcutaneous At Bedtime     insulin NPH  30 Units Subcutaneous QAM AC     metoprolol succinate ER  25 mg Oral BID     nystatin  500,000 Units Oral 4x Daily     pantoprazole  40 mg Oral QAM AC     predniSONE  1 mg/kg Oral Daily     sulfamethoxazole-trimethoprim  2 tablet Oral Q Mon Tues BID     triamcinolone   Topical BID     vancomycin  125 mg Oral 4x Daily     Data   Results for orders placed or performed during the hospital encounter of 04/09/21 (from the past 24 hour(s))   Glucose by meter   Result Value Ref Range    Glucose 102 (H) 70 - 99 mg/dL   Glucose by meter   Result Value Ref Range    Glucose 98 70 - 99 mg/dL   Glucose by meter   Result Value Ref Range    Glucose 144 (H) 70 - 99 mg/dL   Magnesium   Result Value Ref Range    Magnesium 2.3 1.6 - 2.3 mg/dL   CBC with platelets differential   Result Value Ref Range    WBC 16.6 (H) 4.0 - 11.0 10e9/L    RBC Count 2.77 (L) 4.4 - 5.9 10e12/L    Hemoglobin 8.7 (L) 13.3 - 17.7 g/dL    Hematocrit 26.4 (L) 40.0 - 53.0 %    MCV 95 78 - 100 fl    MCH 31.4 26.5 - 33.0 pg    MCHC 33.0 31.5 - 36.5 g/dL    RDW 15.8 (H) 10.0 - 15.0 %    Platelet Count 55 (L) 150 - 450 10e9/L    Diff Method Manual Differential     % Neutrophils 87.0 %    % Lymphocytes 6.1 %    % Monocytes 5.2 %    % Eosinophils 0.0 %    % Basophils 0.0 %    % Metamyelocytes 1.7 %    Nucleated RBCs 4 (H) 0 /100    Absolute Neutrophil 14.4 (H) 1.6 - 8.3 10e9/L    Absolute Lymphocytes 1.0 0.8 - 5.3 10e9/L    Absolute Monocytes 0.9 0.0 - 1.3 10e9/L    Absolute Eosinophils 0.0 0.0 - 0.7 10e9/L    Absolute Basophils 0.0 0.0 - 0.2 10e9/L    Absolute Metamyelocytes 0.3 (H) 0 10e9/L    Absolute Nucleated RBC 0.6     Anisocytosis Slight     Polychromasia Slight     Macrocytes Present     Platelet Estimate Confirming automated cell count    Comprehensive metabolic panel   Result Value Ref Range    Sodium 140 133 - 144 mmol/L    Potassium 3.7 3.4 - 5.3 mmol/L    Chloride 107 94 - 109  mmol/L    Carbon Dioxide 22 20 - 32 mmol/L    Anion Gap 11 3 - 14 mmol/L    Glucose 61 (L) 70 - 99 mg/dL    Urea Nitrogen 56 (H) 7 - 30 mg/dL    Creatinine 1.40 (H) 0.66 - 1.25 mg/dL    GFR Estimate 55 (L) >60 mL/min/[1.73_m2]    GFR Estimate If Black 64 >60 mL/min/[1.73_m2]    Calcium 8.3 (L) 8.5 - 10.1 mg/dL    Bilirubin Total 0.8 0.2 - 1.3 mg/dL    Albumin 2.4 (L) 3.4 - 5.0 g/dL    Protein Total 4.6 (L) 6.8 - 8.8 g/dL    Alkaline Phosphatase 136 40 - 150 U/L    ALT 55 0 - 70 U/L    AST 36 0 - 45 U/L   Phosphorus   Result Value Ref Range    Phosphorus 3.5 2.5 - 4.5 mg/dL   Glucose by meter   Result Value Ref Range    Glucose 51 (L) 70 - 99 mg/dL   Glucose by meter   Result Value Ref Range    Glucose 97 70 - 99 mg/dL   Glucose by meter   Result Value Ref Range    Glucose 79 70 - 99 mg/dL   Glucose by meter   Result Value Ref Range    Glucose 96 70 - 99 mg/dL

## 2021-04-20 NOTE — INTERIM SUMMARY
Spoke to patient today, he stated his rash continues to improve. He denies any sores/pain in the eyes, mouth, nose, and no dysuria or pain with defecations. Denies facial swelling. Overall, he has no questions or concerns regarding his skin. WBC increased today, no eosinophilia. LFTs wnl. Cr trending down and patient afebrile. Will see patient in dermatology clinic next week to evaluated if prednisone taper is appropriate. Appointment information below.      The appointment would be with Dr. Kelton Grubbs (Yi) on Thursday, 4/29/21 at 8:50 a.m. Dermatology clinic located on the third floor of the [facility] ([facility] which is located at [address] [place] [address].    Per heme/onc's request will also refer to dermato-allergologist Dr. Clayton Grande for further workup for allergy to specific chemotherapy agents, certain agents may be desired in the future.     At that time, we will discuss the prednisone taper, ensure a follow-up TSH is ordered, and sutures could be removed (if the patient does not have them removed at home). Please kindly include this information in the patient's discharge paperwork.     Please contact dermatology with any questions/concerns prior to discharge, otherwise we will continue to follow patient as outpatient.     Ilsa Zimmerman PGY3  Dermatology Resident  pager

## 2021-04-20 NOTE — PLAN OF CARE
Patient denies any pain or nausea. Needing standby assist out of bed and chair. Independent once in the bathroom and back to the chair. Premeal glucoses are less than 140 and carb covered as prescribed. Declined shower with OT. Discharge tomorrow.

## 2021-04-20 NOTE — PLAN OF CARE
Temp: 98  F (36.7  C) Temp src: Oral BP: (!) 158/81 Pulse: 70   Resp: 18 SpO2: 98 % O2 Device: None (Room air)       -denies pain  -tolerating Regular diet with fair appetite and with out nausea  -up with 1 assist  -voiding good amount  -had 2 stools previous shift  -generalized rash especially on bilateral legs; denies itching  -alert and oriented x 4; calls appropriately    Continue with plan of care

## 2021-04-20 NOTE — DISCHARGE SUMMARY
[facility]    Discharge Summary  Hematology / Oncology    Date of Admission:  4/10/2021  Date of Discharge:  4/21/2021  Discharging Provider: Ana Beltran  Date of Service (when I saw the patient): 04/21/21    Discharge Diagnoses   #Relapsed Mantle Cell Lymphoma (Stage IV)   #DRESS   #C diff colitis   #T2DM  #Steroid Induced Hyperglycemia  #NGOZI on CKD  #Hypertension      History of Present Illness   Theo Roblero is a 56 year old male with PMHx of T2DM, obesity, tobacco use, HTN, HLD, and stage IV Mantle cell lymphoma (s/p PBSCT 11/2020) with relapsed disease. Most recently received Loncatuximab + Ibrutinib 4/2/21. Presented to [facility] with fevers, hypotension, tachycardia and whole body rash. Developed hypotension refractory to fluid resuscitation and was transferred to MICU for pressors support briefly 4/11-4/12. S/p bx of rash positive for drug rash likely 2/2 Ibrutinib or Loncatuximab. Started on HD steroids (x4/12) with resolution of fevers and clinical improvement. Hospital stay complicated by anasarca with ~30 lb wt gain, NGOZI on CKD, c diff colitis, DKA, and deconditioning. Endo, derm, renal and PT/OT followed inpatient. Blood sugars well controlled, diarrhea improved with Vanco QID, NGOZI resolved with resolution of hypotension and rash now nearly resolved. Is now off clinical try given side effects and will follow up with oncology as below. Dr Nelson to discuss other treatment options with Don at outpatient appointment scheduled 4/28. Will have home PT/OT arranged.     We reviewed signs & symptoms of concern that should prompt a call to clinic triage or a visit to the ED, and he voices understanding. All questions answered. On the day of discharge, patient was quite well-appearing, hemodynamically stable, and felt safe and comfortable with the plans for discharge and follow-up.     Follow up:   - BMT Clinic 4/23 for labs and hospital follow up   -  Diabetes Follow up requested next week (~4/26)   - Dr Nelson 4/28 with labs prior   - Dermatology clinic 4/29 (repeat TSH and CMV quant)  - Referral to Dr Grande dermato-allergist   - Dr Nelson 5/12     New Medications:   - Protonix 40 mg daily    - Prednisone 1 mg/kg daily   - Kenalog cream BID  - Vancomycin QID (last dose 4/24)  - Calcium Citrate / Vitamin D BID  - NPH 30 units daily (while on Prednisone 110mg)   - Novolog fixed meal insulin (10 units breakfast/lung/dinner) with sliding scale     Hospital Course   Theo Roblero was admitted on 4/9/2021.  The following problems were addressed during his hospitalization:    HEME  #Mantle Cell Lymphoma (Stage IV)   Follows outpatient with Dr Nelson. High risk (IPI 6.5 and Ki67 of 20%), stage IV Mantle cell lymphoma in CR1 s/p 8 cycles of alternating 6 cycles of Maxi-R- CHOP alternating with high-dose LITZY-C with last cycle given 6/11/2020. Therapy was complicated by a kidney stone and sepsis in 5/2020, prior to cycle 5. S/p PBSCT 11/2020 with since relapse. Started on experimental study [id number] (PI Dr Nelson). Most recently received Loncatuximab + Ibrutinib 4/2/21.   - HOLD PTA Ibrutinib 560 mg daily given concern as possible cause of rash -- will NOT continue at discharge.   - Next due for cycle 2 day 1 Loncatuximab 4/23/21, however, will likely no longer be candidate for trial given severe DRESS 2/2 Ibrutinib or Loncatuximab.    - Discussed case at Tuesday (4/21) conference, think DRESS most likely 2/2 Ibrutinib. No longer trial candidate. In the future could consider Lenalidomide vs CAR-T vs Venetoclax/Acalabrutinib   - Continue Pred 1mg/kg daily until derm follow up  - Follow up with Dr Nelson scheduled 5/12.      #Anemia  #Thrombocytopenia   2/2 Chemotherapy and MCL. Hgb 4.7 from 11.5 on 4/14. No s/sxs of bleeding on exam. CT A/P negative for bleed, stable known hematoma. . Hapto WNL and smear showed no schistocytes.   - Transfuse  if Hgb <7, plts <10K. Monitor CBC daily.      DERM  #DRESS, improving    Presented with whole body skin rash to BMT clinic 4/9. Derm consulted, s/p skin bx (4/11/21) positive for drug rash. No eosinophilia on diff but facial swelling w/ periorbital edema, L axillary lymphadenopathy and kidney damage concerning for DRESS per derm although unusual as DRESS presents typically 2-4 weeks after drug exposure. Suspect 2/2 study drug or Ibrutinib (can lead to rash in up to 30% of patients). Do NOT suspect rash was 2/2 allopurinol as patient had not been taking allopurinol since November 2020.   - EBV Joan +, CMV+ Quant <137. EBV DNA PCR quant negative. HHV6 negative.   - Methylpred 1 mg/kg (4/11-4/16)  - Prednisone 1 mg/kg/d (x4/17) --- per derm continue dose until follow up in clinic scheduled 4/29. Will require prolonged steroid taper.   - Kenalog cream BID (patient prefers over ointment   - Referral to allergy clinic for testing with Dr. Grande. Would ideally test for allergy to: Ibrutinib, Acalabrutinib, Loncatuximab, and Allopurinol once off steroids.      #Transaminitis   LFTs up trending likely 2/2 hypoperfusion and DRESS. T bili 0.7, Alk 100, ALT 95, .  - Monitor CMP TID.     ID  #SIRS, resolved    Presented with fevers (Tmax 103), hypotension, tachycardia on 4/9/21. Concerning for infection vs delayed infusion reaction. Patient on Ibrutinib and Ioncatox (C1 = 4/2/21). CT CAP negative for intraabdominal or pulmonary source. CXR negative for pulmonary source. ESR 31, CRP 49 on presentation. Procal 0.51 -> 1.58. BCx NGTD. UA bland. WBC 10.9, plts 63k. ANC 7.8. Ongoing hypotension despite 30mL/kg fluid resuscitation 4/11, brief transfer to ICU for pressor support. Transferred back to floor 4/12.   - Ongoing lactic acidosis, suspect 2/2 malignancy, immune mediated process, possible infection and DKA.    - mIVF per DKA protocol (Isotonic NS preferred)   - Due to persistent fevers and hypotension, started on HD  steroids. IV methylpred 1 mg/kg daily (x4/12).     Antimicrobials:   - Vancomycin 4/11-4/12   - Zosyn  4/11-4/14  - Micafungin  4/11-4/12  - Acyclovir (home med) - HSV ppx  - Bactrim (home med) - PJP ppx       #C diff colitis  #Diarrhea, improving  Large volume diarrhea started ~4/13, C diff positive 4/14.  - PO Vanco 125 mg QID (4/14-x). Plan for 10 day treatment course.      ENDO  #DKA, resolved   #T2DM   PTA on Metformin and Glimepride. Following initiation of HD steroids 4/12, blood sugars spikes to 300-400s with associated hyponatremia (126), bicarb 12, anion gap 16, +kussmals respirations, and ongoing hypotension resistant to LR boluses. Endo consulted and started on insulin gtt per DKA protocol 4/13-4/16.   - A1C 5.9, ketone elevated in blood 0.8, DKA protocol ordered 4/13, mIVF later discontinued 4/14 given hypervolemia.    Endo following; discharge insulin plan:   NPH 30 units with Prednisone 110 mg  Novolog fixed meal insulin ( based on 50 gram of CHO per meal  Breakfast: 10 units  Lunch: 10 units  Dinner: 10 units   Novolog sliding scale     Do Not give Correction Insulin if Pre-Meal BG less than 140. Use this scale with meals   - 164 give 1 unit.    - 189 give 2 units.    - 214 give 3 units.    - 239 give 4 units.    - 264 give 5 units.    - 289 give 6 units.    - 314 give 7 units.    - 339 give 8 units.    - 364 give 9 units.  BG greater than or equal to 365 give 10 units     Do Not give Bedtime Correction Insulin if BG less than 200. Use this scale at bedtime   - 224 give 1 units.    - 249 give 2 units.    - 274 give 3 units.    - 299 give 4 units.    - 324 give 5 units.    - 349 give 6 units.   BG greater than or equal to 350 give 7 units.          RENAL  #NGOZI on CKD, improving   #Anasarca   Likely prerenal in the setting of sepsis and DKA. No muddy brown cast on UA. CT with large bladder  - Fena 0.4 suggestive  of pre renal etiology. Cr 2.35 (baseline 0.9-1.1).   - Anasarca on exam 2/2 capillary leak and significant fluid resuscitation. Up 34 lbs since admission. Difficult to diurese in the context of DKA with hypotension. No UOP overnight 4/13, trial Lasix 100 mg with ~1.1L output.   - Renal consulted, appreciate assistance.   - Lasix 40 mg 4/20 with good UOP.       #Acidosis, resolved    Anion gap acidosis with serum ketones likely 2/2 DKA, hypoperfusion, lymphoma and NGOZI on CKD.   - VBG (4/14) with pH 7.33, CO2 22, O2 27 and bicarb 12.   - PO Bicarb 1300 mg TID (4/14-4/18)      #Hypocalcemia, resolved  - Calcium down trending, iCal 4.1   - Calcium Gluconate 2g ordered (4/14)      #Hyperphosphatemia, resolved  - Phos up trending, now 5.7. Likely 2/2 renal dysfunction.   - PhosLo discontinued     #Hyperuricemia, resolved  - Uric acid up trending 2/2 renal dysfunction. Elevated at 8.5 (4/15).   - Uric acid daily, AVOID Allopurinol given allergy. Will consider Rasburicase if remains elevated.      CARDS  #Sinus tachycardia, resolved   - HRs 110-150s this admission in the setting of SIRS.   - PRN IV Metoprolol 5 mg ordered if sustained HRs >140 or SBP >180      #Hypertension   Inpatient ECHO unremarkable and DVT doppler negative.   - PTA Metoprolol ER 25 mg BID initially held in the setting of acute illness and hypotension, now hypertensive so resumed 4/16.      #HLD  - HOLD PTA Lipitor 40 mg daily      PULM  #VILMA  #R hemidiaphragm elevation    Overnight this admission occasional desaturation noted overnight per nursing, down to mid 80s requiring 2-3L NC. CXR 4/14 with R hemidiaphragm elevation.   - Suspect possible VILMA, monitor.      PPx  #DVT: ppx Lovenox (hold if plts <50k) -- not continued at discharge   #GI: PPI      Consults: Derm, Endo, PT/OT, Nephro    Code: FULL   Dispo: Discharge to home with home PT/OT with close follow up as above.      Patient was seen and plan of care was discussed with attending physician  Dr. Stephen.     Ana Ignacio PA-C ([place])    Hematology/Oncology   Pager: [phone]     Significant Results and Procedures   Results for orders placed or performed during the hospital encounter of 04/09/21   XR Chest Port 1 View    Narrative    EXAM: XR CHEST PORT 1 VW  LOCATION: [facility]  DATE/TIME: 4/10/2021 12:20 AM    INDICATION: Fever. Bone marrow transplant.  COMPARISON: 11/16/2020.    FINDINGS: Right chest wall port. No pneumothorax. The heart size is normal. The lungs are clear. Degenerative disease in the spine.      Impression    IMPRESSION: No acute abnormality.   CT Chest Abdomen Pelvis w/o Contrast    Narrative    EXAMINATION: CT CHEST ABDOMEN PELVIS W/O CONTRAST, 4/11/2021 1:17 PM    TECHNIQUE:  Helical CT images from the thoracic inlet through the  symphysis pubis were obtained without IV contrast.    COMPARISON: Whole-body PET/CT 3/26/2021.    HISTORY: Sepsis    FINDINGS:  TUBES/LINES: Right chest wall Port-A-Cath with tip in the low SVC.    CHEST:  LUNGS: The trachea and central airways are patent. No pneumothorax.  Trace bilateral pleural fluid. Small amount of fluid tracking into the  right major fissure. Incidental azygos fissure, normal variant.  Bandlike subsegmental atelectasis in the right lung base. No focal  airspace opacity. No suspicious pulmonary nodule.    MEDIASTINUM: Heart size is within normal limits. New small loculated  anterior pericardial fluid. The ascending aorta and main pulmonary  artery diameters are within normal limits. Coronary artery stents.  Multiple prominent and enlarged left greater than right axillary lymph  nodes for example left level 1 axillary node measuring 14 mm (series  3, image 64) these axillary lymph nodes demonstrated FDG activity on  prior PET CT. No suspicious mediastinal or hilar lymph nodes.    ABDOMEN/PELVIS:  Evaluation limited due to patient respiratory motion.    LIVER: No focal hepatic mass.    BILIARY: The gallbladder  is decompressed. No intraluminal gallstone.  No intrahepatic or extrahepatic biliary ductal dilatation.    PANCREAS: Fatty infiltration of the pancreatic head, uncinate process  and pancreatic neck.    SPLEEN: Redemonstration of ill-defined hypodense perisplenic fluid  collection that are visualized on the prior PET/CT likely representing  a subacute to chronic hematoma. Small splenule.    ADRENAL GLANDS: Within normal limits.    URINARY TRACT: Large exophytic simple cyst in the inferior pole of the  left kidney measuring up to 10.1 cm. No focal right renal mass.  Unchanged mild nonspecific perinephric stranding. No hydronephrosis or  hydroureter. No obstructing renal stone. Urinary bladder is  unremarkable.    REPRODUCTIVE ORGANS: Within normal limits.    STOMACH: Within normal limits.    BOWEL: Moderate colonic stool burden. No abnormally dilated loops of  large or small bowel. The previously visualized wall thickening in the  anus rectum, and sigmoid colon is less conspicuous on today's exam;  however there is inflammation/lymphoma in the distal sigmoid colon  series 2 image 221. The appendix is unremarkable.    PERITONEUM/FLUID: No free fluid.    VESSELS: Atherosclerotic calcifications of the abdominal aorta and  iliac arteries.    LYMPH NODES: No significant change in size or number of multiple  prominent peritoneal retroperitoneal lymph nodes for example 8 mm  right para-aortic lymph node (series 3, image 139) which previously  demonstrated FDG activity on recent PET/CT. Unchanged enlargement of  bilateral inguinal nodes for example 1.4 cm left inguinal node (series  3, image 718) and enlarged right inguinal node measuring 1.5 cm  (series 3, image 729) there is mild associated stranding and soft  tissue nodularity adjacent to these lymph nodes.     BONES/SOFT TISSUES: No aggressive osseous lesions. Probable Schmorl  node deformity to the superior endplate of L4. Anterior flowing  osteophytes throughout  multiple thoracic vertebrae likely representing  diffuse idiopathic skeletal hyperostosis. Small amount of subcutaneous  emphysema about the lower midabdomen, likely related to recent  medication injection.      Impression    IMPRESSION: In this patient with a history of mantle cell lymphoma  status post chemotherapy and stem cell transplant:    1. No significant interval change in scattered enlarged lymph nodes in  the chest abdomen pelvis which previously demonstrated FDG avidity on  recent whole body PET/CT concerning for disease progression.  2. Inflammation/lymphoma about the distal sigmoid colon, this might  represent a source for infection although not significantly changed  from PET/CT 3/26/2021.  3. Unchanged perisplenic fluid collection likely representing chronic  hematoma.  4. Trace bilateral pleural effusions. No focal airspace opacity. New  small anterior pericardial fluid without surrounding inflammation.    I have personally reviewed the examination and initial interpretation  and I agree with the findings.    MARY JOHNSON MD   US Lower Extremity Venous Duplex Bilateral    Narrative    EXAMINATION: DOPPLER VENOUS ULTRASOUND OF BILATERAL LOWER EXTREMITIES,  4/12/2021 9:00 AM     COMPARISON: None.    HISTORY: Edema    TECHNIQUE:  Gray-scale evaluation with compression, spectral flow and  color Doppler assessment of the deep venous system of both legs from  groin to knee, and then at the ankles.    FINDINGS:  Right: the common femoral, femoral, popliteal and posterior tibial  veins demonstrate normal compressibility and blood flow.    Left: the common femoral, femoral, popliteal and posterior tibial  veins demonstrate normal compressibility and blood flow.      Impression    IMPRESSION:  1.  No evidence of deep venous thrombosis in either lower extremity.    MARIA FERNANDA CHEN MD   XR Chest Port 1 View    Narrative    Exam: XR CHEST PORT 1 VW, 4/14/2021 8:31 AM    Indication: Dyspnea    Comparison:  4/11/2021    Findings:   Elevated right hemidiaphragm. Subsegmental atelectasis in the right  lower lobe. No significant pleural effusions. Heart within normal  limits. Pulmonary vasculature normal.    Right IJ Port-A-Cath tip in the high superior vena cava.      Impression    Impression: Unchanged elevation of the right hemidiaphragm with  subsegmental atelectasis right lower lobe.    CHINA HUGGINS MD   CT Abdomen Pelvis w/o Contrast    Narrative    EXAMINATION: CT ABDOMEN PELVIS W/O CONTRAST, 4/14/2021 9:36 AM    TECHNIQUE:  Helical CT images from the lung bases through the pubic  symphysis were obtained without IV contrast.     COMPARISON: 4/11/2021 CT, 3/26/2021 PET CT    HISTORY: Hx Mantle Cell Lymphoma, new DRESS, Sepsis, new anemia with  unclear source of bleeding    FINDINGS:    Abdomen and pelvis: No focal suspicious hepatic lesion. Contracted  gallbladder. Fatty atrophy of the pancreas. Stable intermediate  attenuation loculated fluid (representing chronic hematoma) along the  lateral aspect of the nonenlarged spleen. Normal adrenal glands.  Stable large left renal cyst with a thin partially calcified wall. No  appreciable suspicious renal lesion on these noncontrast images. No  hydronephrosis, hydroureter, or urinary tract stone. The bladder is  decompressed about a Lawson catheter and contains a small amount of  air. Nonenlarged prostate. Symmetric seminal vesicles. No free fluid  or free air. New/increased mild retroperitoneal edema. No bowel  obstruction. Liquid stool with air-fluid levels in the moderately  distended colon. Unchanged trace residual nodular soft tissue  thickening about the rectosigmoid colon. Small hiatal hernia. Moderate  aortoiliac atherosclerotic calcification without aneurysmal dilation.  Stable prominent and borderline enlarged retroperitoneal and iliac  chain lymph nodes. Stable enlarged bilateral inguinal lymph nodes.    Lung bases:  Small bilateral pleural effusions with  adjacent  compressive atelectasis. Asymmetric elevation of the right  hemidiaphragm with associated right basilar atelectasis, similar to  prior.    Bones and soft tissues: No acute or aggressive osseus abnormality.  Anasarca. No hematoma.      Impression    IMPRESSION:   1. No acute intra-abdominal pathology. No evidence of hemorrhage on  these noncontrast images.  2. Liquid stool throughout the colon, new/increased bilateral pleural  effusions, and new/increased anasarca.   3. Stable (since 4/11/2021 but decreased since 3/26/2021) inguinal  lymphadenopathy and prominent/borderline enlarged retroperitoneal and  iliac chain lymph nodes.  4. Stable trace residual nodular soft tissue thickening about the  rectosigmoid colon.    BOBBY CRUMP, DO   CT Head w/o Contrast    Narrative    CT HEAD W/O CONTRAST 4/14/2021 1:43 PM    Provided History: Brain mass or lesion; Mantle Cell Lymphoma, AMS  ICD-10:    Comparison: PET 3/26/21.    Technique: Using multidetector thin collimation helical acquisition  technique, axial, coronal and sagittal CT images from the skull base  to the vertex were obtained without intravenous contrast.     Findings:      No intracranial hemorrhage. No mass effect. No midline shift. No  extra-axial fluid collection. The gray to white matter differentiation  of the cerebral hemispheres is preserved. Ventricles are proportionate  to the sulci.. No sulcal effacement..  The basal cisterns are patent.  Vascular calcifications of the carotid siphons.    The visualized paranasal sinuses are clear. The mastoid air cells are  clear. Orbits appear unremarkable. No acute fracture.    Redemonstration of 1.7 x 0.7 cm right suboccipital subcutaneous lymph  node which was FDG avid in the most recent PET/CT from 3/26/2021      Impression    Impression: No acute intracranial pathology. No intracranial, or  calvarial mass lesion. Unchanged right suboccipital lymphadenopathy,  which was FDG avid in the most recent  PET/CT.    I have personally reviewed the examination and initial interpretation  and I agree with the findings.    FAVIAN MACE MD   Echo Complete    Narrative    [id number]  [id number]  [id number]  [id number]^DANIELA^CHANTEL^DWIGHT     [facility],[place]  Echocardiography Laboratory  [address] [place] [address]     Name: MR. HUGO MCKNIGHT  MRN: [id number]  : 1965  Study Date: 2021 08:50 AM  Age: 56 yrs  Gender: Male  [place] [facility] [place]  Reason For Study: Fever  Ordering Physician: CHANTEL SUAREZ  Performed By: Ying Butler RDCS     BSA: 2.0 m2  Height: 61 in  Weight: 228 lb  HR: 138  BP: 103/85 mmHg  ______________________________________________________________________________  Procedure  Complete Portable Echo Adult. Contrast Optison. Technically difficult  study.Extremely poor acoustic windows. Limited information was obtained during  study. Optison (NDC #4832-1850-42) given intravenously. Patient was given 5 ml  mixture of 3 ml Optison and 6 ml saline. 4 ml wasted.  ______________________________________________________________________________  Interpretation Summary  Would consider a transesophageal echocardiogram if clinically indicated.  Technically difficult study.Extremely poor acoustic windows.  Limited information was obtained during study.  Heart rate 135 BPM during study.  Global and regional left ventricular function is normal with an EF of 55-60%.  Right ventricular function, chamber size, wall motion, and thickness are  normal.  Small IVC size.  Trivial pericardial effusion is present.  ______________________________________________________________________________  Left Ventricle  Global and regional left ventricular function is normal with an EF of 55-60%.  Left ventricular size is normal. Heart rate 135 BPM during study. Diastolic  function not assessed due to tachycardia.     Right Ventricle  Right ventricular function,  chamber size, wall motion, and thickness are  normal.     Atria  The atria cannot be assessed.     Mitral Valve  The mitral valve cannot be assessed.     Aortic Valve  The aortic valve cannot be assessed.     Tricuspid Valve  The tricuspid valve cannot be assessed. Pulmonary artery systolic pressure  cannot be assessed.     Pulmonic Valve  The pulmonic valve cannot be assessed.     Vessels  The pulmonary artery cannot be assessed. Ascending aorta 4 cm. Small IVC size.     Pericardium  Trivial pericardial effusion is present.     ______________________________________________________________________________  MMode/2D Measurements & Calculations  asc Aorta Diam: 4.0 cm  LVOT diam: 2.6 cm  LVOT area: 5.5 cm2     ______________________________________________________________________________  Report approved by: Darian Lacey 04/12/2021 09:42 AM               Pending Results   These results will be followed up by [facility]   Unresulted Labs Ordered in the Past 30 Days of this Admission     Date and Time Order Name Status Description    4/14/2021 0554 Blood Morphology Pathologist Review In process     4/10/2021 2228 Fungus culture blood Preliminary     4/10/2021 2140 Blood culture yeast Preliminary     3/10/2021 0813 CHROMOSOME MALIGNANT TISSUE With Professional Interpretation In process     3/10/2021 0813 Leukemia Lymphoma Evaluation Non CSF In process           Code Status   Full Code    Primary Care Physician   KENYA DANIELS    Physical Exam   Temp: 97.9  F (36.6  C) Temp src: Oral BP: 124/80 Pulse: 83   Resp: 16 SpO2: 97 % O2 Device: None (Room air)    Vitals:    04/18/21 0800 04/19/21 0733 04/20/21 0746   Weight: 115.3 kg (254 lb 3.1 oz) 115.2 kg (253 lb 14.4 oz) 116 kg (255 lb 11.2 oz)     Vital Signs with Ranges  Temp:  [97.6  F (36.4  C)-98  F (36.7  C)] 97.9  F (36.6  C)  Pulse:  [62-83] 83  Resp:  [16-18] 16  BP: (124-158)/(72-89) 124/80  SpO2:  [96 %-98 %] 97 %  I/O last 3 completed shifts:  In: 2090  [P.O.:2090]  Out: 2400 [Urine:2400]    Constitutional: Pleasant male seen sitting up in chair. No apparent distress, and appears stated age.  Eyes: Lids and lashes normal, sclera clear, conjunctiva normal. Periorbital edema resolved.   ENT: Normocephalic, without obvious abnormality, atraumatic, sinuses nontender on palpation, oral pharynx with moist mucus membranes, tonsils without erythema or exudates, gums normal and poor dentition.   Respiratory: No increased work of breathing, good air exchange, clear to auscultation bilaterally, no crackles or wheezing.  Cardiovascular: Normal apical impulse, regular rate and rhythm, normal S1 and S2, and no murmur noted.  GI: No masses or scars. +BS. Soft. No tenderness on palpation.   Lymph/Hematologic: No cervical lymphadenopathy and no supraclavicular lymphadenopathy.   Skin: Diffuse, improving pink macular rash, xerosis   Extremities: There is no redness, warmth, or swelling of the joints. 2+ lower extremity edema. No cyanosis.  Neurologic: Awake, alert, oriented to name, place and time.    Vascular access: Port c/d/i     Time Spent on this Encounter   IAna PA-C, personally saw the patient today and spent greater than 30 minutes discharging this patient.    Discharge Disposition   Discharged to home  Condition at discharge: Stable    Consultations This Hospital Stay   PHARMACY TO DOSE VANCO  PHARMACY TO DOSE VANCO  PHARMACY TO DOSE VANCO  DERMATOLOGY IP CONSULT  PHYSICAL THERAPY ADULT IP CONSULT  ENDOCRINE DIABETES ADULT IP CONSULT  PHARMACY IP CONSULT  NEPHROLOGY GENERAL ADULT IP CONSULT  NUTRITION SERVICES ADULT IP CONSULT  CARE MANAGEMENT / SOCIAL WORK IP CONSULT  VASCULAR ACCESS CARE ADULT IP CONSULT  INTERVENTIONAL RADIOLOGY ADULT/PEDS IP CONSULT  OCCUPATIONAL THERAPY ADULT IP CONSULT    Discharge Orders      **CBC with platelets differential FUTURE 2mo    Last Lab Result: Hemoglobin (g/dL)       Date                     Value                  04/20/2021               8.7 (L)          ----------     **Comprehensive metabolic panel FUTURE 2mo     Home Care PT Referral for Hospital Discharge      Home Care OT Referral for Hospital Discharge      Follow Up ([facility])    Follow up with Endocrine for diabetes management , at (location with clinic name or city) [facility], within 7 days  to evaluate medication change and for hospital follow- up. The following labs/tests are recommended: home blood sugar checks.    Appointments on [place] and/or [facility] (with [facility] or [facility] provider or service). Call [phone] if you haven't heard regarding these appointments within 7 days of discharge.     Reason for your hospital stay    Mantle Cell Lymphoma   DRESS (AKA drug rash) from clinical trial drugs   C diff Diarrhea  Acute Kidney Injury   Type II Diabetes     Follow Up and recommended labs and tests    Follow up as scheduled virtually with BMT clinic 4/23 with labs prior but NO infusion     Activity    Your activity upon discharge: activity as tolerated with assistance     When to contact your care team    [facility] clinic triage line at [phone] for temp greater than or equal to 100.4, uncontrolled nausea/vomiting/diarrhea/constipation, unrelieved pain, bleeding not relieved with pressure, dizziness, chest pain, shortness of breath, loss of consciousness, and any new or concerning symptoms.     Monitor and record    blood glucose 4 times a day, before meals and at bedtime     IV access    **Ordering Provider MUST call/page Care Coordinator/ to discuss arranging this service**    You are going home with the following vascular access device: Port-a-Cath.     Discharge Instructions    Follow up:   - BMT Clinic 4/23 for labs and hospital follow up (no infusion as now off clinical trial)   - Diabetes Follow up requested within 7 days of discharge (they will call you to scheduled)   - Dermatology clinic 4/29   - Dr Nelson  5/12     Medication Changes:   - Protonix 40 mg daily (protects stomach while on steroids)   - Prednisone 110 mg daily (steroid) take until you see dermatology who will then reduce dose   - Kenalog cream twice daily   - Vancomycin four times daily (antibiotic for diarrhea); last dose 4/24     Full Code     Aki    DME Documentation:   Describe the reason for need to support medical necessity: gait instability.     I, the undersigned, certify that the above prescribed supplies are medically necessary for this patient and is both reasonable and necessary in reference to accepted standards of medical and necessary in reference to accepted standards of medical practice in the treatment of this patient's condition and is not prescribed as a convenience.     Aki    DME Documentation:   Describe the reason for need to support medical necessity: Severe Deconditioning, relapsed lymphoma.     I, the undersigned, certify that the above prescribed supplies are medically necessary for this patient and is both reasonable and necessary in reference to accepted standards of medical and necessary in reference to accepted standards of medical practice in the treatment of this patient's condition and is not prescribed as a convenience.     Diet    Follow this diet upon discharge: Orders Placed This Encounter      Calorie Counts      Snacks/Supplements Pediatric: Other - Please comment; see below; Between Meals      Moderate Consistent CHO Diet     Discharge Medications   Current Discharge Medication List      START taking these medications    Details   acetaminophen (TYLENOL) 325 MG tablet Take 2 tablets (650 mg) by mouth every 4 hours as needed for mild pain    Associated Diagnoses: Malignant lymphoma, centrocytic (H)      !! blood glucose (NO BRAND SPECIFIED) test strip Use to test blood sugar 4 times daily or as directed. To accompany: Blood Glucose Monitor Brands: Accu chek fastclix  Qty: 100 strip, Refills: 6    Associated  Diagnoses: Type 2 diabetes mellitus with other specified complication, without long-term current use of insulin (H)      glucose 40 % (400 mg/mL) gel Take 15-30 g by mouth every 15 minutes as needed for low blood sugar  Qty: 4 mL, Refills: 1    Associated Diagnoses: Malignant lymphoma, centrocytic (H); Type 2 diabetes mellitus with other specified complication, without long-term current use of insulin (H)      pantoprazole (PROTONIX) 40 MG EC tablet Take 1 tablet (40 mg) by mouth every morning (before breakfast)  Qty: 30 tablet, Refills: 1    Associated Diagnoses: Malignant lymphoma, centrocytic (H)      predniSONE (DELTASONE) 20 MG tablet Take 5.5 tablets (110 mg) by mouth daily for 8 days  Qty: 44 tablet, Refills: 0    Associated Diagnoses: Drug rash; Malignant lymphoma, centrocytic (H)      triamcinolone (KENALOG) 0.1 % external cream Apply topically 2 times daily    Comments: Give patient bottle at bedside      vancomycin (VANCOCIN) 125 MG capsule Take 1 capsule (125 mg) by mouth 4 times daily for 3 days  Qty: 12 capsule, Refills: 0    Associated Diagnoses: C. difficile colitis       !! - Potential duplicate medications found. Please discuss with provider.      CONTINUE these medications which have NOT CHANGED    Details   acyclovir (ZOVIRAX) 800 MG tablet Take 1 tablet (800 mg) by mouth 2 times daily  Qty: 60 tablet, Refills: 11    Associated Diagnoses: Mantle cell lymphoma of lymph nodes of multiple sites (H)      atorvastatin (LIPITOR) 40 MG tablet Take 40 mg by mouth      metFORMIN (GLUCOPHAGE-XR) 500 MG 24 hr tablet Take 2 tablets (1,000 mg) by mouth daily (with dinner)  Qty: 120 tablet, Refills: 3    Associated Diagnoses: Type 2 diabetes mellitus with other specified complication, without long-term current use of insulin (H)      metoprolol succinate ER (TOPROL-XL) 25 MG 24 hr tablet Take 1 tablet (25 mg) by mouth 2 times daily  Qty: 60 tablet, Refills: 1    Associated Diagnoses: Mantle cell lymphoma of  lymph nodes of multiple sites (H)      Alcohol Swabs PADS 1 pad 4 times daily (before meals and nightly)  Qty: 120 each, Refills: 3    Associated Diagnoses: Type 2 diabetes mellitus with other specified complication, without long-term current use of insulin (H)      !! blood glucose (NO BRAND SPECIFIED) test strip Use to test blood sugar 4 times daily or as directed.  Qty: 120 strip, Refills: 3    Associated Diagnoses: Type 2 diabetes mellitus with other specified complication, without long-term current use of insulin (H)      blood glucose monitoring (ACCU-CHEK FASTCLIX) lancets Use to test blood sugar 4 times daily or as directed.  Qty: 102 each, Refills: 3    Associated Diagnoses: Type 2 diabetes mellitus with other specified complication, without long-term current use of insulin (H)      ondansetron (ZOFRAN-ODT) 8 MG ODT tab Take 1 tablet (8 mg) by mouth every 8 hours as needed for nausea  Qty: 30 tablet, Refills: 0    Associated Diagnoses: Mantle cell lymphoma of lymph nodes of multiple sites (H)      sulfamethoxazole-trimethoprim (BACTRIM DS) 800-160 MG tablet Take 1 tablet by mouth Every Mon, Tues two times daily Started 12/8/20  Qty: 16 tablet, Refills: 11    Associated Diagnoses: Mantle cell lymphoma of lymph nodes of multiple sites (H)       !! - Potential duplicate medications found. Please discuss with provider.      STOP taking these medications       dexamethasone (DECADRON) 4 MG tablet Comments:   Reason for Stopping:         glimepiride (AMARYL) 4 MG tablet Comments:   Reason for Stopping:         study - ibrutinib, IDS# [id number], 140 mg capsule Comments:   Reason for Stopping:             Allergies   No Active Allergies  Data   Most Recent 3 CBC's:  Recent Labs   Lab Test 04/20/21  0331 04/19/21  0357 04/18/21  0256   WBC 16.6* 10.3 10.3   HGB 8.7* 7.3* 7.1*   MCV 95 94 91   PLT 55* 32* 36*      Most Recent 3 BMP's:  Recent Labs   Lab Test 04/20/21  0331 04/19/21  0357 04/18/21  1349 04/18/21  0256   NA  140 140  --  139   POTASSIUM 3.7 3.7 3.7 3.4   CHLORIDE 107 111*  --  109   CO2 22 24  --  24   BUN 56* 66*  --  75*   CR 1.40* 1.73*  --  2.24*   ANIONGAP 11 5  --  6   BELA 8.3* 8.4*  --  8.5   GLC 61* 109*  --  124*     Most Recent 2 LFT's:  Recent Labs   Lab Test 04/20/21  0331 04/19/21  0357   AST 36 28   ALT 55 47   ALKPHOS 136 121   BILITOTAL 0.8 0.7     Most Recent INR's and Anticoagulation Dosing History:  Anticoagulation Dose History     Recent Dosing and Labs Latest Ref Rng & Units 4/13/2021 4/14/2021 4/15/2021 4/16/2021 4/17/2021 4/18/2021 4/19/2021    INR 0.86 - 1.14 1.46(H) 1.45(H) 1.23(H) 1.12 1.16(H) 1.25(H) 1.24(H)    INR-ISTAT 0.86 - 1.14 - - - - - - -        Most Recent 3 Troponin's:  Recent Labs   Lab Test 04/13/21  0338 04/11/21  1415   TROPI 0.039 0.024     Most Recent Cholesterol Panel:No lab results found.  Most Recent 6 Bacteria Isolates From Any Culture (See EPIC Reports for Culture Details):  Recent Labs   Lab Test 04/11/21  2337 04/11/21  2331 04/11/21  0010 04/10/21  2234 04/10/21  2140 04/10/21  0611   CULT No growth No growth No growth No growth No growth after 10 days  No growth No growth     Most Recent TSH, T4 and A1c Labs:  Recent Labs   Lab Test 04/14/21  0330 04/13/21  0930 08/26/20  0955 08/26/20  0955   TSH 1.11  --   --  2.17   T4  --   --   --  1.12   A1C  --  5.9*   < >  --     < > = values in this interval not displayed.

## 2021-04-21 NOTE — PROGRESS NOTES
"                     Diabetes Consult Daily  Progress Note          Assessment/Plan:                          Theo \"Don\" Osbaldo  is a 56 year old male with TIIDM, HTN, HLP and mantle cell lymphoma s/p PBSCT 11/2020 who was admitted with fever, hypotension, and rash. Suspect DRESS 2/2 study drug or Ibrutinib. He was started on high dose steroids, which resulted in DKA.     Assessment:   1)Type II Diabetes Mellitus, recently improving control  2) Steroid induced hyperglycemia, with DKA- resolved.      Plan:    NPH: 30 units in he morning ( 0800) with prednisone    aspart 1 unit per 5 grams of CHO with meals/snacks/supplements  - changed to high correction with meals   - Novolog High correction at bedtime  -monitor blood sugars before meals, HS and 0200   -hold Metformin (persistent lactic acidosis)   -hypoglycemia protocol  -carb counting protocol  -diabetes education needs moises be reassessed closer to discharge  - has been on MDI regimen before (BID NPH plus fixed meal insulin plus sliding scale)     Discharge Plan:  NPH 30 units with Prednisone 110 mg  Novolog fixed meal insulin ( based on 50 gram of CHO per meal  Breakfast: 10 units  Lunch: 10 units  Dinner: 10 units   Novolog sliding scale   Do Not give Correction Insulin if Pre-Meal BG less than 140. Use this scale with meals     - 164 give 1 unit.     - 189 give 2 units.      - 214 give 3 units.      - 239 give 4 units.      - 264 give 5 units.     - 289 give 6 units.      - 314 give 7 units.      - 339 give 8 units.     - 364 give 9 units.    BG greater than or equal to 365 give 10 units     Do Not give Bedtime Correction Insulin if BG less than 200. Use this scale at bedtime     - 224 give 1 units.     - 249 give 2 units.     - 274 give 3 units.      - 299 give 4 units.     - 324 give 5 units.      - 349 give 6 units.    BG greater than or equal to 350 give 7 units.  "     Test blood sguars before meals and at bedtime       Outpatient diabetes follow up: [facility] in 5-7 days. preferably prior to or on the day of dermatology follow-up appointment, which is 4/29/2021 at 0830  -placed a follow-up order for Outpatient Diabetes     Plan discussed with patient and primary team            Interval History:   The last 24 hours progress and nursing notes reviewed.    Blood sugars are moderately controlled on current insulin plan.  Did not receive NPH overnight, he did receive 1 unit of insulin for HS glucose of 202  Appetite is reported as good, denies nausea and or vomiting  Reviewed insulin plan for discharge, Don acknowledged understanding.            Steroid planning:   -IV Methylpred 500mg BID  -IV Methylpred 118 mg once daily since 4/14  -PO Pred 110mg daily AM beginning 4/17, until derm follow-up on 4/29    Of Note:  He received  methylprednisolone 118.75 mg/day 4/14-4/16.   He developed high AG and ketosis, high lactate on 4/13, felt to be DKA.  He was on IV Insulin 4/13//2021 1030 to  4/16/2021 1833  4/17 he started  on high dose prednisone .        D5W-containing solutions:   -calcium gluconate PRN     PTA Diabetes Regimen:   Metformin XR 2000 mg daily  Glimepiride 4 mg daily      Recent Labs   Lab 04/21/21  0350 04/21/21  0224 04/20/21  2120 04/20/21  1830 04/20/21  1157 04/20/21  0749 04/20/21  0646 04/20/21  0331 04/20/21  0331 04/19/21  0357 04/19/21  0357 04/18/21  0256 04/18/21  0256 04/17/21  0348 04/17/21  0348 04/16/21 2014 04/16/21 2014   GLC 82  --   --   --   --   --   --   --  61*  --  109*  --  124*  --  159*  --  182*   BGM  --  94 202* 187* 117* 96 79   < >  --    < >  --    < >  --    < >  --    < >  --     < > = values in this interval not displayed.               Review of Systems:   See interval hx          Medications:     Orders Placed This Encounter      Moderate Consistent CHO Diet      Diet       Physical Exam:  Gen: Alert, sitting up in  "chair, NAD   HEENT:  mucous membranes are moist  Resp: non-labored  Ext: moving all extremities  Neuro:oriented x3, communicating clearly  BP (!) 151/87 (BP Location: Left arm)   Pulse 71   Temp 99.3  F (37.4  C) (Oral)   Resp 16   Ht 1.803 m (5' 11\")   Wt 116 kg (255 lb 11.2 oz)   SpO2 98%   BMI 35.66 kg/m             Data:     Lab Results   Component Value Date    A1C 5.9 04/13/2021    A1C 5.9 04/10/2021            CBC RESULTS:   Recent Labs   Lab Test 04/21/21  0350   WBC 9.7   RBC 2.32*   HGB 7.4*   HCT 22.5*   MCV 97   MCH 31.9   MCHC 32.9   RDW 17.7*   PLT 45*     Recent Labs   Lab Test 04/21/21  0350 04/20/21  0331    140   POTASSIUM 4.0 3.7   CHLORIDE 107 107   CO2 24 22   ANIONGAP 7 11   GLC 82 61*   BUN 45* 56*   CR 1.38* 1.40*   BELA 8.2* 8.3*     Liver Function Studies -   Recent Labs   Lab Test 04/21/21  0350   PROTTOTAL 4.1*   ALBUMIN 2.1*   BILITOTAL 0.6   ALKPHOS 118   AST 31   ALT 53           I spent a total of 35 minutes bedside and on the inpatient unit managing the glycemic care of Theo Roblero. Over 50% of my time on the unit was spent counseling the patient  and/or coordinating care regarding .  See note for details.      To contact Endocrine Diabetes service:   From 8AM-4PM: page inpatient diabetes provider that is following the patient  For questions or updates from 4PM-8AM: page the diabetes job code for on call fellow: [phone]        Sara Meléndez -[phone]  Diabetes Management job code [phone]            "

## 2021-04-21 NOTE — PROGRESS NOTES
Shift: 0700 - 1230  VS: Temp: 98.7  F (37.1  C) Temp src: Oral BP: 120/83 Pulse: 85   Resp: 16 SpO2: 98 % O2 Device: None (Room air)    Pain: Denies pain.   Neuro: A&Ox4. Calls appropriately. Cooperative with care.   Cardiac:   VSS.   Respiratory: Lung sounds clear on RA.   GI/Diet/Appetite: Moderate consistent CHO diet with fair/good appetite. LBM 4/20.   :  Voiding w/o difficulty.   LDA's: right chest port, heparinized, de-accessed, dressing applied prior to discharge this shift.   Skin: Intact. Warm dry.   Activity: SBA w/walker.   Tests/Procedures:   Pertinent Labs/Lab Collection:      Plan: Discharge orders reviewed with patient and visitor. Diabetic management of insulin at home reviewed with patient, information included in discharge orders. Personal belongings sent with patient. Discharge meds picked up at pharmacy. Pt transported to [facility] via  by this nurse.     Study medication picked up by GISELA from Med room, medication Ibrutinib.

## 2021-04-21 NOTE — PROGRESS NOTES
Care Management Discharge Note    Discharge Date: 04/21/21    Discharge Disposition: Home    Discharge Services: Home Care    Discharge DME: None    Discharge Transportation: Car, family or friend will provide    Private pay costs discussed: Not applicable    PAS Confirmation Code: N/A  Patient/family educated on Medicare website which has current facility and service quality ratings: Yes    Education Provided on the Discharge Plan: Yes   Persons Notified of Discharge Plans: Patient, bedside RN, SW   Patient/Family in Agreement with the Plan: Yes    Handoff Referral Completed: Yes    Additional Information:    Per team, patient is stable to discharge today. Per PT/OT patient has progressed and is safe to discharge home with  PT/OT services.     Patient agreeable to home care referral. Patient did not have a preference on agency. Patient's brother will transport him at discharge.     Per Medicare.gov, there are 8 home care agencies that services patient's area.    1) [facility]: Reviewing referral   2) [facility]: Denied. Only accepting current [facility] patient's.  3) [facility]: Reviewing referral  4) [facility]: Denied. Only taking Medicare patients.  5) [facility]: Denied. No availability until next week.     LITO updated. RNCC [facility] before pursuing other agencies.     AVS updated.     Addendum 1249: Writer spoke with Cyndi at [email or web address]. They are able to accept patient for services. Orders and documentation faxed ([phone]). AVS updated.     Erica Tee, RN, BSN, PHN  Care Coordinator   P: [phone], [facility]

## 2021-04-21 NOTE — PLAN OF CARE
VSS. Patient denies pain or nausea. Eating well. Blood sugars covered with sliding scale insulin and carb coverage. Plan to discharge tomorrow. Continue plan of care.    Problem: Adult Inpatient Plan of Care  Goal: Absence of Hospital-Acquired Illness or Injury  Outcome: No Change     Problem: Adult Inpatient Plan of Care  Goal: Optimal Comfort and Wellbeing  Outcome: No Change     Problem: Adult Inpatient Plan of Care  Goal: Readiness for Transition of Care  Outcome: Improving

## 2021-04-21 NOTE — DISCHARGE INSTRUCTIONS
Discharge Plan:  NPH 30 units with Prednisone 110 mg  Novolog fixed meal insulin ( based on 50 gram of CHO per meal  Breakfast: 10 units  Lunch: 10 units  Dinner: 10 units   Novolog sliding scale   Do Not give Correction Insulin if Pre-Meal BG less than 140. Use this scale with meals     - 164 give 1 unit.     - 189 give 2 units.      - 214 give 3 units.      - 239 give 4 units.      - 264 give 5 units.     - 289 give 6 units.      - 314 give 7 units.      - 339 give 8 units.     - 364 give 9 units.    BG greater than or equal to 365 give 10 units     Do Not give Bedtime Correction Insulin if BG less than 200. Use this scale at bedtime     - 224 give 1 units.     - 249 give 2 units.     - 274 give 3 units.      - 299 give 4 units.     - 324 give 5 units.      - 349 give 6 units.    BG greater than or equal to 350 give 7 units.      Test blood sguars before meals and at bedtime       Outpatient diabetes follow up: [facility] in 5-7 days. preferably prior to or on the day of dermatology follow-up appointment, which is 4/29/2021 at 8: 30am

## 2021-04-21 NOTE — PLAN OF CARE
Alert an d oriented x 4. Denies pain and nausea. Stated that he was tired, and sleeping between cares. Blood glucose was 94. Port hep locked.  Voided. Plan is to discharge today.

## 2021-04-21 NOTE — TELEPHONE ENCOUNTER
[facility] Call Center    Phone Message    May a detailed message be left on voicemail: yes     Reason for Call: Appointment Intake    Referring Provider Name: Dr Ilsa Zimmerman  Diagnosis and/or Symptoms: hx DRESS, heme/onc would like work up of chemotherapy agents as DRESS cause    Action Taken: Message routed to:  [facility] Allergy    Travel Screening: Not Applicable

## 2021-04-21 NOTE — PLAN OF CARE
Occupational Therapy Discharge Summary    Reason for therapy discharge:    Discharged to home with home therapy.    Progress towards therapy goal(s). See goals on Care Plan in [facility] electronic health record for goal details.  Goals partially met.  Barriers to achieving goals:   discharge from facility.    Therapy recommendation(s):    Continued therapy is recommended.  Rationale/Recommendations:  home therapy to address higher level skills. Would benefit from assist or adaptive equipment to increase IND with LB dressing/bathing (sock aid, reacher, and long handled sponge). .

## 2021-04-21 NOTE — NURSING NOTE
[id number]: Medication Count/IDS Note      Theo Roblero is enrolled on the trial number listed above.    IDS number: [id number]  Drug name: Ibrutinib   Number of bottles returned:  1  Lot number:  [id number]  Number of capsules returned:  84    Drug diary returned?  No   Pt received drug in the hospital.      Are there any discrepancies between the amount of medication the patient was instructed to take and the amount recorded as taken in the patient s drug diary?  No    Are there any discrepancies between the amount of medication the patient has recorded as taken in the drug diary and the amount that would be expected to be returned based on the amount recorded as taken?   No      Josselin Merchant RN

## 2021-04-22 NOTE — TELEPHONE ENCOUNTER
"Per Dr Zimmerman's notes copied below, encounter will be routed to clinic coordinators to schedule pt with Dr Grande for consult.    \"Per heme/onc's request will also refer to dermato-allergologist Dr. Clayton Grande for further workup for allergy to specific chemotherapy agents, certain agents may be desired in the future.\"  "

## 2021-04-23 NOTE — NURSING NOTE
"Oncology Rooming Note    April 23, 2021 7:37 AM   Theo Roblero is a 56 year old male who presents for:    Chief Complaint   Patient presents with     Oncology Clinic Visit     P RETURN  - NHL     Initial Vitals: /73 (BP Location: Right arm, Patient Position: Sitting, Cuff Size: Adult Regular)   Resp 16   Wt 116.4 kg (256 lb 11.2 oz)   BMI 35.80 kg/m   Estimated body mass index is 35.8 kg/m  as calculated from the following:    Height as of 4/9/21: 1.803 m (5' 11\").    Weight as of this encounter: 116.4 kg (256 lb 11.2 oz). Body surface area is 2.41 meters squared.  No Pain (0) Comment: Data Unavailable   No LMP for male patient.  Allergies reviewed: Yes  Medications reviewed: Yes    Medications: Medication refills not needed today.  Pharmacy name entered into [employer]:    [id number] - [place] - [address] [facility] [address]  [facility]    Clinical concerns: No new concerns. Clemencia was notified.      Willy Carpenter LPN            "

## 2021-04-23 NOTE — PROGRESS NOTES
Prednisone Taper Calendar    Sunday Monday Tuesday Wednesday Thursday Friday Saturday 18-Apr-2021 19-Apr-2021 20-Apr-2021 21-Apr-2021 22-Apr-2021 23-Apr-2021 24-Apr-2021        110mg 110mg   25-Apr-2021 26-Apr-2021 27-Apr-2021 28-Apr-2021 29-Apr-2021 30-Apr-2021 1-May-2021   110mg 110mg 110mg 110mg 90mg 90mg 90mg   2-May-2021 3-May-2021 4-May-2021 5-May-2021 6-May-2021 7-May-2021 8-May-2021   90mg 90mg 90mg 90mg 70mg 70mg 70mg   9-May-2021 10-May-2021 11-May-2021 12-May-2021 13-May-2021 14-May-2021 15-May-2021   70mg 70mg 70mg 70mg 50mg 50mg 50mg   16-May-2021 17-May-2021 18-May-2021 19-May-2021 20-May-2021 21-May-2021 22-May-2021   50mg 50mg 50mg 50mg 40mg 40mg 40mg   23-May-2021 24-May-2021 25-May-2021 26-May-2021 27-May-2021 28-May-2021 29-May-2021   40mg 40mg 40mg 40mg 30mg 30mg 30mg   30-May-2021 31-May-2021 1-Eben-2021 2-Jun-2021 3-Jun-2021 4-Jun-2021 5-Jun-2021   30mg 30mg 30mg 30mg 20mg 20mg 20mg   6-Jun-2021 7-Jun-2021 8-Eben-2021 9-Eben-2021 10-Eben-2021 11-Eben-2021 12-Jun-2021   20mg 20mg 20mg 20mg 10mg 10mg 10mg   13-Jun-2021 14-Jun-2021 15-Jun-2021 16-Jun-2021 17-Jun-2021 18-Jun-2021 19-Jun-2021   10mg 10mg 10mg 10mg 0 10mg 0   20-Jun-2021 21-Jun-2021 22-Jun-2021 23-Jun-2021 24-Jun-2021 25-Jun-2021 26-Jun-2021   10mg 0 10mg STOP      27-Jun-2021 28-Jun-2021 29-Jun-2021 30-Jun-2021 1-Jul-2021 2-Jul-2021 3-Jul-2021            4-Jul-2021 5-Jul-2021 6-Jul-2021 7-Jul-2021 8-Jul-2021 9-Jul-2021 10-Jul-2021

## 2021-04-23 NOTE — TELEPHONE ENCOUNTER
FUTURE VISIT INFORMATION      FUTURE VISIT INFORMATION:    Date: 6/8/2021    Time: 3:30PM    Location: [facility]  REFERRAL INFORMATION:    Referring provider:  Ilsa Zimmreman MD    Referring providers clinic:  [facility]    Reason for visit/diagnosis  Drug rash   hx DRESS, heme/onc would like work up of chemotherapy agents as DRESS cause    RECORDS REQUESTED FROM:       Clinic name Comments Records Status Imaging Status   [facility] (internal)   Epic Epic/PACS

## 2021-04-23 NOTE — LETTER
4/23/2021         RE: Theo Roblero  [address]  [address]  [place] [address]        Dear Colleague,    Thank you for referring your patient, Theo Roblero, to the [facility] BLOOD AND MARROW TRANSPLANT PROGRAM [place]. Please see a copy of my visit note below.    Infusion Nursing Note:  Theo Roblero presents today for add-on transfusion.    Patient seen by provider today: Yes: Clemencia Galvan   present during visit today: Not Applicable.    Note: Labs were monitored. Consent to Receive a Blood Transfusion was obtained by Provider and signed by Patient.    Intravenous Access:  Implanted Port.    Treatment Conditions:  Patient received an add-on one unit of red blood cells for a Hgb of 7.0.      Post Infusion Assessment:  Patient tolerated transfusion without incident.       Discharge Plan:   Patient discharged using a walker in stable condition accompanied by: self.  Family is meeting Patient in Clinic lobby.  Patient was given a copy of his AVS.    TESSA ANDRADE RN                            Again, thank you for allowing me to participate in the care of your patient.        Sincerely,        [facility]

## 2021-04-23 NOTE — PROGRESS NOTES
BMT Clinic Note    CC: hospital discharge follow up and end of study visit    HPI: Done was admitted 4/10- for Dress and NGOZI. Thought to be secondary from Ibrutinib. He tells me he feels good- rash has not resolved. Tolerating steroids (sleeping okay, BS high but managed with sliding scale insulin). His energy is pretty good. Appetite too good due to steroids. No longer having abdominal cramping but continues to diarrhea 1-2x per day. Still about 20lbs fluid up from hospitalization. Feels like he is urinating more than usual but wt is up from hospital discharge. He plans on returning to work remotely today.     Onc Hx :  Theo Roblero is a 55 year old male with high risk (IPI 6.5 and Ki67 of 20%), stage IV Mantle cell lymphoma in CR1 s/p 8 cycles of alternating 6 cycles of Maxi-R- CHOP alternating with high-dose LITZY-C. Last cycle was given on 2020. His therapy was complicated by a kidney stone and sepsis in 2020, prior to cycle 5.   Achieved CR  2020 BEAM  - Relapse after auto: starting experimental study  IS-157.     Review of Systems   Constitutional, HEENT, cardiovascular, pulmonary, gi and gu systems are negative, except as otherwise noted.      Objective    /73 (BP Location: Right arm, Patient Position: Sitting, Cuff Size: Adult Regular)   Pulse 104   Temp 97.7  F (36.5  C) (Oral)   Resp 16   Wt 116.4 kg (256 lb 11.2 oz)   SpO2 99%   BMI 35.80 kg/m    ECO-- Surprisingly stable despite hospitalization.   General: NAD   Eyes: MARYAN, sclera anicteric   Nose/Mouth/Throat: OP clear, buccal mucosa moist, no ulcerations   Lungs: CTA bilaterally  Cardiovascular: RRR, no M/R/G   Abdominal/Rectal: +BS, soft, NT  Lymphatics: No edema  Skin: No rashes or petechaie-- No focal rash noted on arms, distal lower extremities, chest/back nor abdomen.   Neuro: A&O       Reviewed labs and imaging.     Theo Roblero is a 55 year old male with high risk (IPI 6.5 and Ki67 of 20%), stage IV  Mantle cell lymphoma in 1st relapse s/p 8 cycles of alternating 6 cycles of Maxi-R- CHOP alternating with high-dose LITZY-C and BEAM ASCT consolidation D+164 s/p auto PBSCT           1. ADCT  Trial- Loncatuximab + Ibrutinib (2018IS-157) For relapsed Mantle cell   Loncastuximab day 1, cycle 1 today (4/2), cycle every 3 weeks for 2 cycles then re-evaluate after 2 cycles.   -will take ibrutinib 560 daily continually.   Take 4mg dex PO BID x 3 days (4/1-4/3). Admitted 4/10-4/21 for severe Dress (likely due to ibrutinib).  Given suspected cause of DRESS is Ibrutinib determination was made that patient will go off study.   - Defer to Dr Nelson regarding future treatment and end of study restaging study (4/28).     2. Counts have been stable. Transfuse to keep hgb >7 and plts >10  -hgb 7.0 today- give 1 unit rbcs.       3.ID:   Cdiff positive-- had only been given 10 day course which is due to end in next couple of days- patient continues to have diarrhea- extend po vanco x 7days. If still diarrhea consider adding fiber and stop PO vanco at that time as this is his first occurrence.   - ON prophy acyclovir 800mg PO daily. Bactrim wmon/tues as with 1 yr of auto.     4.Gi: prn antiemetics. Sent script for zofran ODT if needed.   - diarrhea as above.     5.) CV: on atorvastatin 40mg-- discussed with BMT pharmacist just monitor LFTS.     6. ENDO: Type II DM - on metformin and glimepride   - Now steroid exacerbated -300 per patient. Continue endocrine plan of 30 NPH qAM + ssi.       7. FEN: wt up 20 lbs since admission and additional 5lbs since hospital discharge  NGOZI-- improving. Baseline Cr ~0.8- peak 3.8, Cr today 1.3.   Volume overload- start lasix 60mg PO daily today (if no response will have him take 80mg PO daily). + taqueria stocks. Monitor wt and lytes and cr closey with starting duiresis  - Start kdur 10meq BID     8 Dress  Currently on PREd 110mg po daily. Only given week supply of steroid son discharge.   - 4/23  BMt pharm D made 8 week pred taper calendar - continue 110 daily though 4/28, then decrease to 90mg daily. Please see taper calendar.   - 4/29 Dermatology follow up-- they can alter Pred taper calendar at they see appropriate      Blood today.   RTC Monday for labs, provider visit (ensure wt improving, K and Cr stable -- adjust diuresis if needed).   4/28 Dr Nelson regarding next tx also regarding if end of study imaging needed (did not feel comfortable ordering CT with contrast today given recent NGOZI).   - 4/29 Derm f/u for dress.     Determine if weekly vs twice weekly visits for next week appropriate     Clemencia Galvan PA-C  [phone]

## 2021-04-23 NOTE — PROGRESS NOTES
Infusion Nursing Note:  Theo Roblero presents today for add-on transfusion.    Patient seen by provider today: Yes: Clemencia Galvan   present during visit today: Not Applicable.    Note: Labs were monitored. Consent to Receive a Blood Transfusion was obtained by Provider and signed by Patient.    Intravenous Access:  Implanted Port.    Treatment Conditions:  Patient received an add-on one unit of red blood cells for a Hgb of 7.0.      Post Infusion Assessment:  Patient tolerated transfusion without incident.       Discharge Plan:   Patient discharged using a walker in stable condition accompanied by: self.  Family is meeting Patient in Clinic lobby.  Patient was given a copy of his AVS.    TESSA ANDRADE RN

## 2021-04-23 NOTE — LETTER
2021         RE: Theo Roblero  [address]  [address]  [place] [address]        Dear Colleague,    Thank you for referring your patient, Theo Roblero, to the [facility] BLOOD AND MARROW TRANSPLANT PROGRAM [place]. Please see a copy of my visit note below.    BMT Clinic Note    CC: hospital discharge follow up and end of study visit    HPI: Done was admitted 4/10- for Dress and NGOZI. Thought to be secondary from Ibrutinib. He tells me he feels good- rash has not resolved. Tolerating steroids (sleeping okay, BS high but managed with sliding scale insulin). His energy is pretty good. Appetite too good due to steroids. No longer having abdominal cramping but continues to diarrhea 1-2x per day. Still about 20lbs fluid up from hospitalization. Feels like he is urinating more than usual but wt is up from hospital discharge. He plans on returning to work remotely today.     Onc Hx :  Theo Roblero is a 55 year old male with high risk (IPI 6.5 and Ki67 of 20%), stage IV Mantle cell lymphoma in CR1 s/p 8 cycles of alternating 6 cycles of Maxi-R- CHOP alternating with high-dose LITZY-C. Last cycle was given on 2020. His therapy was complicated by a kidney stone and sepsis in 2020, prior to cycle 5.   Achieved CR  2020 BEAM  - Relapse after auto: starting experimental study  2018IS-157.     Review of Systems   Constitutional, HEENT, cardiovascular, pulmonary, gi and gu systems are negative, except as otherwise noted.      Objective    /73 (BP Location: Right arm, Patient Position: Sitting, Cuff Size: Adult Regular)   Pulse 104   Temp 97.7  F (36.5  C) (Oral)   Resp 16   Wt 116.4 kg (256 lb 11.2 oz)   SpO2 99%   BMI 35.80 kg/m    ECO-- Surprisingly stable despite hospitalization.   General: NAD   Eyes: MARYAN, sclera anicteric   Nose/Mouth/Throat: OP clear, buccal mucosa moist, no ulcerations   Lungs: CTA bilaterally  Cardiovascular: RRR, no M/R/G   Abdominal/Rectal:  +BS, soft, NT  Lymphatics: No edema  Skin: No rashes or petechaie-- No focal rash noted on arms, distal lower extremities, chest/back nor abdomen.   Neuro: A&O       Reviewed labs and imaging.     Theo Roblero is a 55 year old male with high risk (IPI 6.5 and Ki67 of 20%), stage IV Mantle cell lymphoma in 1st relapse s/p 8 cycles of alternating 6 cycles of Maxi-R- CHOP alternating with high-dose LITZY-C and BEAM ASCT consolidation D+164 s/p auto PBSCT           1. ADCT  Trial- Loncatuximab + Ibrutinib (2018IS-157) For relapsed Mantle cell   Loncastuximab day 1, cycle 1 today (4/2), cycle every 3 weeks for 2 cycles then re-evaluate after 2 cycles.   -will take ibrutinib 560 daily continually.   Take 4mg dex PO BID x 3 days (4/1-4/3). Admitted 4/10-4/21 for severe Dress (likely due to ibrutinib).  Given suspected cause of DRESS is Ibrutinib determination was made that patient will go off study.   - Defer to Dr Nelson regarding future treatment and end of study restaging study (4/28).     2. Counts have been stable. Transfuse to keep hgb >7 and plts >10  -hgb 7.0 today- give 1 unit rbcs.       3.ID:   Cdiff positive-- had only been given 10 day course which is due to end in next couple of days- patient continues to have diarrhea- extend po vanco x 7days. If still diarrhea consider adding fiber and stop PO vanco at that time as this is his first occurrence.   - ON prophy acyclovir 800mg PO daily. Bactrim wmon/tues as with 1 yr of auto.     4.Gi: prn antiemetics. Sent script for zofran ODT if needed.   - diarrhea as above.     5.) CV: on atorvastatin 40mg-- discussed with BMT pharmacist just monitor LFTS.     6. ENDO: Type II DM - on metformin and glimepride   - Now steroid exacerbated -300 per patient. Continue endocrine plan of 30 NPH qAM + ssi.       7. FEN: wt up 20 lbs since admission and additional 5lbs since hospital discharge  NGOZI-- improving. Baseline Cr ~0.8- peak 3.8, Cr today 1.3.   Volume  overload- start lasix 60mg PO daily today (if no response will have him take 80mg PO daily). + taqueria stocks. Monitor wt and lytes and cr closey with starting duiresis  - Start kdur 10meq BID     8 Dress  Currently on PREd 110mg po daily. Only given week supply of steroid son discharge.   - 4/23 BMt pharm D made 8 week pred taper calendar - continue 110 daily though 4/28, then decrease to 90mg daily. Please see taper calendar.   - 4/29 Dermatology follow up-- they can alter Pred taper calendar at they see appropriate      Blood today.   RTC Monday for labs, provider visit (ensure wt improving, K and Cr stable -- adjust diuresis if needed).   4/28 Dr Nelson regarding next tx also regarding if end of study imaging needed (did not feel comfortable ordering CT with contrast today given recent NGOZI).   - 4/29 Derm f/u for dress.     Determine if weekly vs twice weekly visits for next week appropriate     Clemencia Galvan PA-C  [phone]    Prednisone Taper Calendar    Sunday Monday Tuesday Wednesday Thursday Friday Saturday 18-Apr-2021 19-Apr-2021 20-Apr-2021 21-Apr-2021 22-Apr-2021 23-Apr-2021 24-Apr-2021        110mg 110mg   25-Apr-2021 26-Apr-2021 27-Apr-2021 28-Apr-2021 29-Apr-2021 30-Apr-2021 1-May-2021   110mg 110mg 110mg 110mg 90mg 90mg 90mg   2-May-2021 3-May-2021 4-May-2021 5-May-2021 6-May-2021 7-May-2021 8-May-2021   90mg 90mg 90mg 90mg 70mg 70mg 70mg   9-May-2021 10-May-2021 11-May-2021 12-May-2021 13-May-2021 14-May-2021 15-May-2021   70mg 70mg 70mg 70mg 50mg 50mg 50mg   16-May-2021 17-May-2021 18-May-2021 19-May-2021 20-May-2021 21-May-2021 22-May-2021   50mg 50mg 50mg 50mg 40mg 40mg 40mg   23-May-2021 24-May-2021 25-May-2021 26-May-2021 27-May-2021 28-May-2021 29-May-2021   40mg 40mg 40mg 40mg 30mg 30mg 30mg   30-May-2021 31-May-2021 1-Jun-2021 2-Jun-2021 3-Jun-2021 4-Jun-2021 5-Jun-2021   30mg 30mg 30mg 30mg 20mg 20mg 20mg   6-Jun-2021 7-Jun-2021 8-Jun-2021 9-Jun-2021 10-Jun-2021 11-Jun-2021 12-Jun-2021    20mg 20mg 20mg 20mg 10mg 10mg 10mg   13-Jun-2021 14-Jun-2021 15-Jun-2021 16-Jun-2021 17-Jun-2021 18-Jun-2021 19-Jun-2021   10mg 10mg 10mg 10mg 0 10mg 0   20-Jun-2021 21-Jun-2021 22-Jun-2021 23-Jun-2021 24-Jun-2021 25-Jun-2021 26-Jun-2021   10mg 0 10mg STOP      27-Jun-2021 28-Jun-2021 29-Jun-2021 30-Jun-2021 1-Jul-2021 2-Jul-2021 3-Jul-2021            4-Jul-2021 5-Jul-2021 6-Jul-2021 7-Jul-2021 8-Jul-2021 9-Jul-2021 10-Jul-2021                             Again, thank you for allowing me to participate in the care of your patient.        Sincerely,        BMT Advanced Practice Provider

## 2021-04-23 NOTE — NURSING NOTE
Chief Complaint   Patient presents with     Oncology Clinic Visit     [facility] RETURN  - [facility]     Port Draw     Labs drawn from port by RN in lab. Vitals taken. Checked into next appointment.      Port accessed with 20 gauge gripper needle by RN in lab.  Port flushed with saline and heparin.  Vital signs taken.  Pt checked in to next appointment.    Lilia aPlacios RN

## 2021-04-26 NOTE — NURSING NOTE
"Oncology Rooming Note    April 26, 2021 8:38 AM   Theo Roblero is a 56 year old male who presents for:    Chief Complaint   Patient presents with     Port Draw     Labs drawn via PORT by RN in lab. VS taken.      Initial Vitals: /78 (BP Location: Right arm, Patient Position: Sitting, Cuff Size: Adult Regular)   Pulse 89   Temp 97.5  F (36.4  C) (Oral)   Resp 16   Wt 112.5 kg (248 lb)   SpO2 98%   BMI 34.59 kg/m   Estimated body mass index is 34.59 kg/m  as calculated from the following:    Height as of 4/9/21: 1.803 m (5' 11\").    Weight as of this encounter: 112.5 kg (248 lb). Body surface area is 2.37 meters squared.  No Pain (0) Comment: Data Unavailable   No LMP for male patient.  Allergies reviewed: Yes  Medications reviewed: Yes    Medications: Medication refills not needed today.  Pharmacy name entered into [employer]:    [id number] - [place], [place] - [address] AT [facility] [address]  [facility]    Clinical concerns: Possible infusion today.        Daisy Rodarte RN              "

## 2021-04-26 NOTE — NURSING NOTE
Chief Complaint   Patient presents with     Port Draw     Labs drawn via PORT by RN in lab. VS taken.      Yun Quick RN

## 2021-04-26 NOTE — PROGRESS NOTES
BMT Clinic Note    CC: Routine f/u     HPI: Done was admitted 4/10- for Dress and NGOZI. Thought to be secondary from Ibrutinib. He tells me he feels good- rash has fully resolved.  He notes been taking lasix 60mg PO daily -- urinates a lot after. He feels like edema is improved though still significant-- taqueria hose too tight currently no not wearing them. He is down 5 lbs over weekend with help of duriesis. He ha no other complains. Back Wednesday to discuss next steps regarding DLCBL with Dr Nelson. Diarrhea has no resolved-- 1 loose stool perday but improving.       Onc Hx :  Theo Roblero is a 55 year old male with high risk (IPI 6.5 and Ki67 of 20%), stage IV Mantle cell lymphoma in CR1 s/p 8 cycles of alternating 6 cycles of Maxi-R- CHOP alternating with high-dose LITZY-C. Last cycle was given on 2020. His therapy was complicated by a kidney stone and sepsis in 2020, prior to cycle 5.   Achieved CR  2020 BEAM  - Relapse after auto: starting experimental study  IS-157.     Review of Systems   Constitutional, HEENT, cardiovascular, pulmonary, gi and gu systems are negative, except as otherwise noted.      Objective    /78 (BP Location: Right arm, Patient Position: Sitting, Cuff Size: Adult Regular)   Pulse 89   Temp 97.5  F (36.4  C) (Oral)   Resp 16   Wt 112.5 kg (248 lb)   SpO2 98%   BMI 34.59 kg/m    ECO-- Surprisingly stable despite hospitalization.   General: NAD   Eyes: MARYAN, sclera anicteric   Nose/Mouth/Throat: OP clear, buccal mucosa moist, no ulcerations   Lungs: CTA bilaterally  Cardiovascular: RRR, no M/R/G   Abdominal/Rectal: +BS, soft, NT  Lymphatics: No edema  Skin: No rashes or petechaie-- No focal rash noted on arms, distal lower extremities, chest/back nor abdomen.   Neuro: A&O       Reviewed labs and imaging.     Theo Roblero is a 55 year old male with high risk (IPI 6.5 and Ki67 of 20%), stage IV Mantle cell lymphoma in 1st relapse s/p 8 cycles of  alternating 6 cycles of Maxi-R- CHOP alternating with high-dose LITZY-C and BEAM ASCT consolidation D+167 s/p auto PBSCT       1. ADCT  Trial- Loncatuximab + Ibrutinib (2018IS-157) For relapsed Mantle cell   Loncastuximab day 1, cycle 1 today (4/2), cycle every 3 weeks for 2 cycles then re-evaluate after 2 cycles.   -will take ibrutinib 560 daily continually.   Take 4mg dex PO BID x 3 days (4/1-4/3). Admitted 4/10-4/21 for severe Dress (likely due to ibrutinib).  Given suspected cause of DRESS is Ibrutinib determination was made that patient will go off study.   - Defer to Dr Nelson regarding future treatment and end of study restaging study (4/28).     2. Counts have been stable. Transfuse to keep hgb >7 and plts >10  -4/23 given RBC- hgb up nightly 8.6  Plts trending up 65k.       3.ID:   Cdiff positive-- had only been given 10 day course which is due to end in next couple of days- patient continues to have diarrhea- extend po vanco x 7days-- will complete 4/30.   - ON prophy acyclovir 800mg PO daily. Bactrim wmon/tues as with 1 yr of auto.     4.Gi: prn antiemetics. Sent script for zofran ODT if needed.   - diarrhea as above.     5. CV: on atorvastatin 40mg-- discussed with BMT pharmacist just monitor LFTS.     6. ENDO: Type II DM - on metformin and glimepride   - Now steroid exacerbated -300 per patient. Continue endocrine plan of 30 NPH qAM + ssi.     7. FEN: wt up 20 lbs since admission and additional 5lbs since hospital discharge  - Wt improved today 112k (prior to admission wt 105kg). Continue lasix 60mg PO daily.  NGOZI-- improving. Baseline Cr ~0.8- peak 3.8, Cr today 0.93  - Hypokalemia 2/2 to lasix-- K 3.8- givne continued daily diuresis has been on kdur 20meq daily-- increase to BID.     8 Dress  Currently on PREd 110mg po daily. Only given week supply of steroid son discharge.   - 4/23 BMt pharm D made 8 week pred taper calendar - continue 110 daily though 4/28, then decrease to 90mg daily. Please  see taper calendar.   - 4/29 Dermatology follow up-- they can alter Pred taper calendar at they see appropriate      Blood today.     4/28 Dr Nelson regarding next tx also regarding if end of study imaging needed (did not feel comfortable ordering CT with contrast today given recent NGOZI).   - 4/29 Derm f/u for dress.     Defer to Dr Nelson regarding follow up schedule.     ZOË DuarteC  [phone]

## 2021-04-26 NOTE — LETTER
2021         RE: Theo Roblero  [address]  [address]  [facility] [address]        Dear Colleague,    Thank you for referring your patient, Theo Roblero, to the [facility] BLOOD AND MARROW TRANSPLANT PROGRAM [place]. Please see a copy of my visit note below.    BMT Clinic Note    CC: Routine f/u     HPI: Done was admitted 4/10- for Dress and NGOZI. Thought to be secondary from Ibrutinib. He tells me he feels good- rash has fully resolved.  He notes been taking lasix 60mg PO daily -- urinates a lot after. He feels like edema is improved though still significant-- taqueria hose too tight currently no not wearing them. He is down 5 lbs over weekend with help of duriesis. He ha no other complains. Back Wednesday to discuss next steps regarding DLCBL with Dr Nelson. Diarrhea has no resolved-- 1 loose stool perday but improving.       Onc Hx :  Theo Roblero is a 55 year old male with high risk (IPI 6.5 and Ki67 of 20%), stage IV Mantle cell lymphoma in CR1 s/p 8 cycles of alternating 6 cycles of Maxi-R- CHOP alternating with high-dose LITZY-C. Last cycle was given on 2020. His therapy was complicated by a kidney stone and sepsis in 2020, prior to cycle 5.   Achieved CR  2020 BEAM  - Relapse after auto: starting experimental study  2018IS-157.     Review of Systems   Constitutional, HEENT, cardiovascular, pulmonary, gi and gu systems are negative, except as otherwise noted.      Objective    /78 (BP Location: Right arm, Patient Position: Sitting, Cuff Size: Adult Regular)   Pulse 89   Temp 97.5  F (36.4  C) (Oral)   Resp 16   Wt 112.5 kg (248 lb)   SpO2 98%   BMI 34.59 kg/m    ECO-- Surprisingly stable despite hospitalization.   General: NAD   Eyes: MARYAN, sclera anicteric   Nose/Mouth/Throat: OP clear, buccal mucosa moist, no ulcerations   Lungs: CTA bilaterally  Cardiovascular: RRR, no M/R/G   Abdominal/Rectal: +BS, soft, NT  Lymphatics: No edema  Skin: No rashes or  petechaie-- No focal rash noted on arms, distal lower extremities, chest/back nor abdomen.   Neuro: A&O       Reviewed labs and imaging.     Theo Roblero is a 55 year old male with high risk (IPI 6.5 and Ki67 of 20%), stage IV Mantle cell lymphoma in 1st relapse s/p 8 cycles of alternating 6 cycles of Maxi-R- CHOP alternating with high-dose LITZY-C and BEAM ASCT consolidation D+167 s/p auto PBSCT       1. ADCT  Trial- Loncatuximab + Ibrutinib (2018IS-157) For relapsed Mantle cell   Loncastuximab day 1, cycle 1 today (4/2), cycle every 3 weeks for 2 cycles then re-evaluate after 2 cycles.   -will take ibrutinib 560 daily continually.   Take 4mg dex PO BID x 3 days (4/1-4/3). Admitted 4/10-4/21 for severe Dress (likely due to ibrutinib).  Given suspected cause of DRESS is Ibrutinib determination was made that patient will go off study.   - Defer to Dr Nelson regarding future treatment and end of study restaging study (4/28).     2. Counts have been stable. Transfuse to keep hgb >7 and plts >10  -4/23 given RBC- hgb up nightly 8.6  Plts trending up 65k.       3.ID:   Cdiff positive-- had only been given 10 day course which is due to end in next couple of days- patient continues to have diarrhea- extend po vanco x 7days-- will complete 4/30.   - ON prophy acyclovir 800mg PO daily. Bactrim wmon/tues as with 1 yr of auto.     4.Gi: prn antiemetics. Sent script for zofran ODT if needed.   - diarrhea as above.     5. CV: on atorvastatin 40mg-- discussed with BMT pharmacist just monitor LFTS.     6. ENDO: Type II DM - on metformin and glimepride   - Now steroid exacerbated -300 per patient. Continue endocrine plan of 30 NPH qAM + ssi.     7. FEN: wt up 20 lbs since admission and additional 5lbs since hospital discharge  - Wt improved today 112k (prior to admission wt 105kg). Continue lasix 60mg PO daily.  NGOZI-- improving. Baseline Cr ~0.8- peak 3.8, Cr today 0.93  - Hypokalemia 2/2 to lasix-- K 3.8- givne  continued daily diuresis has been on kdur 20meq daily-- increase to BID.     8 Dress  Currently on PREd 110mg po daily. Only given week supply of steroid son discharge.   - 4/23 BMt pharm D made 8 week pred taper calendar - continue 110 daily though 4/28, then decrease to 90mg daily. Please see taper calendar.   - 4/29 Dermatology follow up-- they can alter Pred taper calendar at they see appropriate      Blood today.     4/28 Dr Nelson regarding next tx also regarding if end of study imaging needed (did not feel comfortable ordering CT with contrast today given recent NGOZI).   - 4/29 Derm f/u for dress.     Defer to Dr Nelson regarding follow up schedule.     ZOË DuarteC  [phone]      Again, thank you for allowing me to participate in the care of your patient.        Sincerely,        BMT Advanced Practice Provider

## 2021-04-28 NOTE — LETTER
2021         RE: Theo Roblero  [address]  [address]  [facility] [address]        Dear Colleague,    Thank you for referring your patient, Theo Roblero, to the [facility] BLOOD AND MARROW TRANSPLANT PROGRAM [place]. Please see a copy of my visit note below.    Hematology/Oncology Follow Up Note      Interval history   Yanick was admitted 4/10- for Dress and NGOZI, thought to be secondary to ibrutinib. He reports today that he feels almost completely recovered. The rash has left some pigmentation changes but otherwise he feels well and denies fevers or chill. Denies nausea,vomiting,abdominal complaints. Appetite is back to normal and energy levels are okay. Eager to discuss the next steps. He understands that he had to be taken off study given this adverse event.     Onc Hx :  Theo Roblero is a 55 year old male with high risk (IPI 6.5 and Ki67 of 20%), stage IV Mantle cell lymphoma in CR1 s/p 8 cycles of alternating 6 cycles of Maxi-R- CHOP alternating with high-dose LITZY-C. Last cycle was given on 2020. His therapy was complicated by a kidney stone and sepsis in 2020, prior to cycle 5.   Achieved CR  2020 BEAM, Autotransplant  - Relapse after auto: starting experimental study  2018IS-157 (Loncatuximab + ibrutinib) in 2021  -DRESS + NGOZI thought secondary to ibrutinib, requiring admission 4/        Review of Systems   Constitutional, HEENT, cardiovascular, pulmonary, gi and gu systems are negative, except as otherwise noted.      Objective    BP (!) 144/81 (BP Location: Right arm, Patient Position: Sitting, Cuff Size: Adult Regular)   Pulse 98   Temp 98.2  F (36.8  C) (Oral)   Resp 16   Wt 109.9 kg (242 lb 4.8 oz)   SpO2 99%   BMI 33.79 kg/m    ECO--   General: NAD   Eyes: MARYAN, sclera anicteric   Nose/Mouth/Throat: OP clear, buccal mucosa moist, no ulcerations   Lungs: CTA bilaterally  Cardiovascular: RRR, no M/R/G   Abdominal/Rectal: +BS, soft,  NT  Lymphatics: No edema  Skin: No rashes or petechaie-- No focal rash noted on arms, distal lower extremities, chest/back nor abdomen. Pigmentation changes on legs.   Neuro: A&O       Reviewed labs and imaging.      Assesment and Plan  Theo Roblero is a 55 year old male with high risk (IPI 6.5 and Ki67 of 20%), stage IV Mantle cell lymphoma in 1st relapse s/p 8 cycles of alternating 6 cycles of Maxi-R- CHOP alternating with high-dose LITZY-C and BEAM ASCT consolidation D+170 s/p auto PBSCT, with relapsed disease in 3/2021 and switchd to the ADCT trial - Loncastuximab with ibrutinib, but unfortanately developed DRESS and NGOZI 1 week into cycle 1, thought to be secondary to ibrutinib, and taken off study. He is recovering from this, and is on a course of steroids and returns today for follow up.    We discussed the options in detail with him today which include utilization of single agent therapies such as bendamustine, revlimid, bortezomib, venetoclax versus trying to pursue CAR-T therapy, which in addition to high OR rates, it carries higher chances of a durable response. Fortunately, his recent scans show stable adenopathy so there isn't an emergency to initiation of therapy. He elected to be considered for CAR-T therapy, and we will plan to discuss his case with dermatology to see if his steroid taper could be adjusted  so that we can get that process started in the coming months.    Patient seen and discussed with Dr. Omar Martinez MD  PGY5 Fellow. Hematology/Oncology/Transplantation    The patient was discussed with the clinical fellow, seen and examined by me. All labs and imaging were reviewed. I  I agree with the fellows note and have been responsible for the care plan and interpretation of progress. Any additional information to the fellows note has been documented below.    Recent episode of Grade 3 rash/ DRESS syndrome likely from ibrutinib , ?loncatuximab    I discussed all the options  with him  Second line regimens  - Bendamustine, acalibrutinib, venetoclax, bortezomib, lenalidomide  CART  Clinical trials - CD19 ANGELINA Herndon has thought about it and he wants to go with CART at this point  I think we should repeat a scan in mid May giving him adequate time to recover from the acute episode  Moreover he is on a long steroid taper and apheresis wouldn't be optimal on steroids  If he has symptoms of progression earlier then we will adjust our plans      Suresh LAURENT MS  Attending Physician  Pager - [phone]  Email - [email or web address]            Again, thank you for allowing me to participate in the care of your patient.        Sincerely,        Suresh Nelson MD

## 2021-04-28 NOTE — NURSING NOTE
"Oncology Rooming Note    April 28, 2021 8:44 AM   Theo Roblero is a 56 year old male who presents for:    Chief Complaint   Patient presents with     Port Draw     Labs drawn via port by rn in lab. VS taken.     Oncology Clinic Visit     Lymphoma, unspecified body region, unspecified lymphoma type (H)     Initial Vitals: BP (!) 144/81 (BP Location: Right arm, Patient Position: Sitting, Cuff Size: Adult Regular)   Pulse 98   Temp 98.2  F (36.8  C) (Oral)   Resp 16   Wt 109.9 kg (242 lb 4.8 oz)   SpO2 99%   BMI 33.79 kg/m   Estimated body mass index is 33.79 kg/m  as calculated from the following:    Height as of 4/9/21: 1.803 m (5' 11\").    Weight as of this encounter: 109.9 kg (242 lb 4.8 oz). Body surface area is 2.35 meters squared.  No Pain (0) Comment: Data Unavailable   No LMP for male patient.  Allergies reviewed: Yes  Medications reviewed: Yes    Medications: Medication refills not needed today.  Pharmacy name entered into [employer]:    [id number] - [place], [place] - [address] AT [facility] [address]  [facility]    Clinical concerns: None.       Cyndi Lynn MA            "

## 2021-04-28 NOTE — NURSING NOTE
Chief Complaint   Patient presents with     Port Draw     Labs drawn via port by rn in lab. VS taken.     Port accessed with 20g 3/4 inch gripper needle by RN, labs collected, line flushed with saline and heparin.  Vitals taken. Pt checked in for appointment(s).    Angel Jackson, RN

## 2021-04-28 NOTE — PROGRESS NOTES
Hematology/Oncology Follow Up Note      Interval history   Yanick was admitted 4/10- for Dress and NGOZI, thought to be secondary to ibrutinib. He reports today that he feels almost completely recovered. The rash has left some pigmentation changes but otherwise he feels well and denies fevers or chill. Denies nausea,vomiting,abdominal complaints. Appetite is back to normal and energy levels are okay. Eager to discuss the next steps. He understands that he had to be taken off study given this adverse event.     Onc Hx :  Theo Roblero is a 55 year old male with high risk (IPI 6.5 and Ki67 of 20%), stage IV Mantle cell lymphoma in CR1 s/p 8 cycles of alternating 6 cycles of Maxi-R- CHOP alternating with high-dose LITZY-C. Last cycle was given on 2020. His therapy was complicated by a kidney stone and sepsis in 2020, prior to cycle 5.   Achieved CR  2020 BEAM, Autotransplant  - Relapse after auto: starting experimental study  2018IS-157 (Loncatuximab + ibrutinib) in 2021  -DRESS + NGOZI thought secondary to ibrutinib, requiring admission 4/        Review of Systems   Constitutional, HEENT, cardiovascular, pulmonary, gi and gu systems are negative, except as otherwise noted.      Objective    BP (!) 144/81 (BP Location: Right arm, Patient Position: Sitting, Cuff Size: Adult Regular)   Pulse 98   Temp 98.2  F (36.8  C) (Oral)   Resp 16   Wt 109.9 kg (242 lb 4.8 oz)   SpO2 99%   BMI 33.79 kg/m    ECO--   General: NAD   Eyes: MARYAN, sclera anicteric   Nose/Mouth/Throat: OP clear, buccal mucosa moist, no ulcerations   Lungs: CTA bilaterally  Cardiovascular: RRR, no M/R/G   Abdominal/Rectal: +BS, soft, NT  Lymphatics: No edema  Skin: No rashes or petechaie-- No focal rash noted on arms, distal lower extremities, chest/back nor abdomen. Pigmentation changes on legs.   Neuro: A&O       Reviewed labs and imaging.      Assesment and Plan  hTeo Roblero is a 55 year old male with high risk  (IPI 6.5 and Ki67 of 20%), stage IV Mantle cell lymphoma in 1st relapse s/p 8 cycles of alternating 6 cycles of Maxi-R- CHOP alternating with high-dose LITZY-C and BEAM ASCT consolidation D+170 s/p auto PBSCT, with relapsed disease in 3/2021 and switchd to the ADCT trial - Loncastuximab with ibrutinib, but unfortanately developed DRESS and NGOZI 1 week into cycle 1, thought to be secondary to ibrutinib, and taken off study. He is recovering from this, and is on a course of steroids and returns today for follow up.    We discussed the options in detail with him today which include utilization of single agent therapies such as bendamustine, revlimid, bortezomib, venetoclax versus trying to pursue CAR-T therapy, which in addition to high OR rates, it carries higher chances of a durable response. Fortunately, his recent scans show stable adenopathy so there isn't an emergency to initiation of therapy. He elected to be considered for CAR-T therapy, and we will plan to discuss his case with dermatology to see if his steroid taper could be adjusted  so that we can get that process started in the coming months.    Patient seen and discussed with Dr. Omar Martinez MD  PGY5 Fellow. Hematology/Oncology/Transplantation    The patient was discussed with the clinical fellow, seen and examined by me. All labs and imaging were reviewed. I  I agree with the fellows note and have been responsible for the care plan and interpretation of progress. Any additional information to the fellows note has been documented below.    Recent episode of Grade 3 rash/ DRESS syndrome likely from ibrutinib , ?loncatuximab    I discussed all the options with him  Second line regimens  - Bendamustine, acalibrutinib, venetoclax, bortezomib, lenalidomide  CART  Clinical trials - CD19 ANGELINA    Yanick has thought about it and he wants to go with CART at this point  I think we should repeat a scan in mid May giving him adequate time to recover from  the acute episode  Moreover he is on a long steroid taper and apheresis wouldn't be optimal on steroids  If he has symptoms of progression earlier then we will adjust our plans      Suresh LAURENT MS  Attending Physician  Pager - [phone]  Email - [email or web address]

## 2021-04-29 NOTE — LETTER
4/29/2021       RE: Theo Roblero  [address]  [address] [place] [id number]     Dear Colleague,    Thank you for referring your patient, Theo Roblero, to the [facility] DERMATOLOGY CLINIC [place] at [facility]. Please see a copy of my visit note below.    [facility] Dermatology Note   Encounter Date: Apr 29, 2021  Office Visit     Dermatology Problem List:  # DRESS/DIHS  - likely 2/2 loncatuximab + ibrutinib (experimental treatment for relapsed mantle-cell lymphoma)  - admitted 4/10/21-4/21/21  - bx 4/11/21: interface dermatitis with eos  # Stage IV relapsed mantle cell lymphoma  - s/p R-CHOP, LITZY-C 6/11/2020  ___________________________________________    Assessment & Plan:    # DRESS/DIHS, improving  * acute, severe, potential severe life-threatening sequelae  * labs reviewed: CBC, LFT, BMP, TSH, dermatopathology  - for now, can hold off on allergy testing as still in acute phase of drug reaction which can be smoldering and insidious for months, will likely need to avoid similar medications to experimental treatment (loncatuximab + ibrutinib) for mantle cell lymphoma  - biopsy suture removed from right abdomen  - prednisone 80 mg daily until follow-up with plan for very slow taper over weeks to months; counseled on all possible side effects  - vitamin D/calcium, Bactrim, PPI  - counseled to monitor for alarming symptoms for flare  - repeat CBC and CMP in 2 weeks  - repeat TSH in 1-2 months  - triamcinolone ointment PRN  - gentle skin care with moisturizer reviewed     Procedures Performed:  None    Follow-up: 2 weeks with labs    Faculty: Dr. Sun    Staff Involved:  Resident/Staff    Maureen Grubbs MD  Dermatology Resident  [facility] [place]    Patient was seen and examined with the dermatology resident. I agree with the history, review of systems, physical examination, assessments and plan.    Kimberlee Sun,  MD  Professor and  Chair  Department of Dermatology  [facility]  ___________________________________________      CC: Derm Problem (zahra is coming in today for a follow up on the rash, states that things are getting better)    HPI:  Mr. Theo Roblero is a(n) 56 year old male who presents to clinic today for DRESS  - doing much better on prednisone  - no flares, face less swollen  - skin is dry and flaky, but not severely itchy  - otherwise feeling well in usual state of health    Labs/Imaging:  * labs reviewed: CBC, LFT, BMP, TSH, dermatopathology    Physical exam:  General: in no acute distress, well-developed, well-nourished  Eyes: conjunctivae clear  Pulmonary: breathing comfortably in no distress  CV: well-perfused, no cyanosis  Extremities: no deformity, mild leg edema    Skin: full skin: head/face, neck, upper/lower extremities, chest, back, abdomen, genitalia and/or groin buttocks, digits, nails  - skin type: fair  - faint lacy reticulated eczematous papules with superficial desquamating scalp: back, chest  - subtle facial erythema without edema                Medications:  Current Outpatient Medications   Medication     acetaminophen (TYLENOL) 325 MG tablet     acyclovir (ZOVIRAX) 800 MG tablet     Alcohol Swabs PADS     atorvastatin (LIPITOR) 40 MG tablet     blood glucose (NO BRAND SPECIFIED) lancets standard     blood glucose (NO BRAND SPECIFIED) test strip     blood glucose (NO BRAND SPECIFIED) test strip     blood glucose monitoring (ACCU-CHEK FASTCLIX) lancets     blood glucose monitoring (NO BRAND SPECIFIED) meter device kit     calcium citrate and vitamin D (CITRACAL) 200-250 MG-UNIT TABS per tablet     furosemide (LASIX) 20 MG tablet     glucose 40 % (400 mg/mL) gel     insulin aspart (NOVOLOG PEN) 100 UNIT/ML pen     insulin aspart (NOVOLOG PEN) 100 UNIT/ML pen     insulin aspart (NOVOLOG PEN) 100 UNIT/ML pen     insulin  UNIT/ML injection     metoprolol succinate ER  (TOPROL-XL) 25 MG 24 hr tablet     ondansetron (ZOFRAN-ODT) 8 MG ODT tab     pantoprazole (PROTONIX) 40 MG EC tablet     potassium chloride ER (KLOR-CON M) 10 MEQ CR tablet     potassium chloride ER (KLOR-CON M) 20 MEQ CR tablet     predniSONE (DELTASONE) 20 MG tablet     predniSONE (DELTASONE) 20 MG tablet     predniSONE (DELTASONE) 50 MG tablet     sulfamethoxazole-trimethoprim (BACTRIM DS) 800-160 MG tablet     triamcinolone (KENALOG) 0.1 % external cream     vancomycin (VANCOCIN) 125 MG capsule     No current facility-administered medications for this visit.       Past Medical History:   Patient Active Problem List   Diagnosis     Mantle cell lymphoma of lymph nodes of multiple sites (H)     Tobacco user     Obesity     Nuclear sclerosis     Impotence of organic origin     HTN (hypertension)     HLD (hyperlipidemia)     Diverticulosis of sigmoid colon     Diabetic macular edema (H)     Type 2 diabetes mellitus, without long-term current use of insulin (H)     NHL (non-Hodgkin's lymphoma) (H)     S/P bone marrow transplant (H)     Neutropenic fever (H)     Drug rash     Fever and chills     Lymphoma, unspecified body region, unspecified lymphoma type (H)     Nephrolithiasis     Past Medical History:   Diagnosis Date     History of peripheral stem cell transplant (H) 01/2021     Kidney stones 06/2020    left     Mantle cell lymphoma of lymph nodes of multiple sites (H) 2020     Nephrolithiasis 05/2020     Sepsis without septic shock (H) 05/12/2020    seconary tp pyelonephritis from obstruction left ureteroliethasis       CC No referring provider defined for this encounter. on close of this encounter.

## 2021-04-29 NOTE — NURSING NOTE
Dermatology Rooming Note    Theo Roblero's goals for this visit include:   Chief Complaint   Patient presents with     Derm Problem     don is coming in today for a follow up on the rash, states that things are getting better     Marilyn Lowe CMA on 4/29/2021 at 8:31 AM

## 2021-04-29 NOTE — PROGRESS NOTES
[facility] Dermatology Note   Encounter Date: Apr 29, 2021  Office Visit     Dermatology Problem List:  # DRESS/DIHS  - likely 2/2 loncatuximab + ibrutinib (experimental treatment for relapsed mantle-cell lymphoma)  - admitted 4/10/21-4/21/21  - bx 4/11/21: interface dermatitis with eos  # Stage IV relapsed mantle cell lymphoma  - s/p R-CHOP, LITZY-C 6/11/2020  ___________________________________________    Assessment & Plan:    # DRESS/DIHS, improving  * acute, severe, potential severe life-threatening sequelae  * labs reviewed: CBC, LFT, BMP, TSH, dermatopathology  - for now, can hold off on allergy testing as still in acute phase of drug reaction which can be smoldering and insidious for months, will likely need to avoid similar medications to experimental treatment (loncatuximab + ibrutinib) for mantle cell lymphoma  - biopsy suture removed from right abdomen  - prednisone 80 mg daily until follow-up with plan for very slow taper over weeks to months; counseled on all possible side effects  - vitamin D/calcium, Bactrim, PPI  - counseled to monitor for alarming symptoms for flare  - repeat CBC and CMP in 2 weeks  - repeat TSH in 1-2 months  - triamcinolone ointment PRN  - gentle skin care with moisturizer reviewed     Procedures Performed:  None    Follow-up: 2 weeks with labs    Faculty: Dr. Sun    Staff Involved:  Resident/Staff    Maureen Grubbs MD  Dermatology Resident  [facility]    Patient was seen and examined with the dermatology resident. I agree with the history, review of systems, physical examination, assessments and plan.    Kimberlee Sun MD  Professor and  Chair  Department of Dermatology  [facility]  ___________________________________________      CC: Derm Problem (don is coming in today for a follow up on the rash, states that things are getting better)    HPI:  Mr. Theo Roblero is a(n) 56 year old male who presents to clinic today for DRESS  -  doing much better on prednisone  - no flares, face less swollen  - skin is dry and flaky, but not severely itchy  - otherwise feeling well in usual state of health    Labs/Imaging:  * labs reviewed: CBC, LFT, BMP, TSH, dermatopathology    Physical exam:  General: in no acute distress, well-developed, well-nourished  Eyes: conjunctivae clear  Pulmonary: breathing comfortably in no distress  CV: well-perfused, no cyanosis  Extremities: no deformity, mild leg edema    Skin: full skin: head/face, neck, upper/lower extremities, chest, back, abdomen, genitalia and/or groin buttocks, digits, nails  - skin type: fair  - faint lacy reticulated eczematous papules with superficial desquamating scalp: back, chest  - subtle facial erythema without edema                Medications:  Current Outpatient Medications   Medication     acetaminophen (TYLENOL) 325 MG tablet     acyclovir (ZOVIRAX) 800 MG tablet     Alcohol Swabs PADS     atorvastatin (LIPITOR) 40 MG tablet     blood glucose (NO BRAND SPECIFIED) lancets standard     blood glucose (NO BRAND SPECIFIED) test strip     blood glucose (NO BRAND SPECIFIED) test strip     blood glucose monitoring (ACCU-CHEK FASTCLIX) lancets     blood glucose monitoring (NO BRAND SPECIFIED) meter device kit     calcium citrate and vitamin D (CITRACAL) 200-250 MG-UNIT TABS per tablet     furosemide (LASIX) 20 MG tablet     glucose 40 % (400 mg/mL) gel     insulin aspart (NOVOLOG PEN) 100 UNIT/ML pen     insulin aspart (NOVOLOG PEN) 100 UNIT/ML pen     insulin aspart (NOVOLOG PEN) 100 UNIT/ML pen     insulin  UNIT/ML injection     metoprolol succinate ER (TOPROL-XL) 25 MG 24 hr tablet     ondansetron (ZOFRAN-ODT) 8 MG ODT tab     pantoprazole (PROTONIX) 40 MG EC tablet     potassium chloride ER (KLOR-CON M) 10 MEQ CR tablet     potassium chloride ER (KLOR-CON M) 20 MEQ CR tablet     predniSONE (DELTASONE) 20 MG tablet     predniSONE (DELTASONE) 20 MG tablet     predniSONE (DELTASONE) 50 MG  tablet     sulfamethoxazole-trimethoprim (BACTRIM DS) 800-160 MG tablet     triamcinolone (KENALOG) 0.1 % external cream     vancomycin (VANCOCIN) 125 MG capsule     No current facility-administered medications for this visit.       Past Medical History:   Patient Active Problem List   Diagnosis     Mantle cell lymphoma of lymph nodes of multiple sites (H)     Tobacco user     Obesity     Nuclear sclerosis     Impotence of organic origin     HTN (hypertension)     HLD (hyperlipidemia)     Diverticulosis of sigmoid colon     Diabetic macular edema (H)     Type 2 diabetes mellitus, without long-term current use of insulin (H)     NHL (non-Hodgkin's lymphoma) (H)     S/P bone marrow transplant (H)     Neutropenic fever (H)     Drug rash     Fever and chills     Lymphoma, unspecified body region, unspecified lymphoma type (H)     Nephrolithiasis     Past Medical History:   Diagnosis Date     History of peripheral stem cell transplant (H) 01/2021     Kidney stones 06/2020    left     Mantle cell lymphoma of lymph nodes of multiple sites (H) 2020     Nephrolithiasis 05/2020     Sepsis without septic shock (H) 05/12/2020    seconary tp pyelonephritis from obstruction left ureteroliethasis       CC No referring provider defined for this encounter. on close of this encounter.

## 2021-06-08 NOTE — LETTER
6/8/2021         RE: Theo Roblero  [address]  [address]  [place] [address]        Dear Colleague,    Thank you for referring your patient, Theo Roblero, to the [facility] [place]. Please see a copy of my visit note below.    [facility] Dermato-allergology Note  Virtual visit: store and forward video  Encounter Date: Jun 8, 2021  ____________________________________________    CC: No chief complaint on file.      HPI:  Mr. Theo Roblero is a(n) 56 year old male who presents today as a new patient for allergy consultation  - Patient still on 20mg Prednisone daily and planned to stop Prednisone on the 23rd of June. Skin only dry, but not itchy anymore.   - rash started late April/Early May.  - patient got enrolled into the therapy trial in March.   - Rash started about 1 week after start trial medication.    Patient was as prophylaxis on Bactrim and Aciclovir and Zofran high dose during treatment and Tylenol (but never took it, last one in November 2020).    ED notes from April 9th 2021  Theo Roblero is a 56 year old male with PMHx of T2DM, obesity, tobacco use, HTN, HLD, and stage IV Mantle cell lymphoma (s/p PBSCT 11/2020) with relapsed disease. Most recently received Loncatuximab + Ibrutinib 4/2/21. Presented to [facility] with fevers, hypotension, tachycardia and whole body rash. Developed hypotension refractory to fluid resuscitation and was transferred to MICU for pressors support briefly 4/11-4/12. S/p bx of rash positive for drug rash likely 2/2 Ibrutinib or Loncatuximab. Started on HD steroids (x4/12) with resolution of fevers and clinical improvement. Hospital stay complicated by anasarca with ~30 lb wt gain, NGOZI on CKD, c diff colitis, DKA, and deconditioning. Endo, derm, renal and PT/OT followed inpatient. Blood sugars well controlled, diarrhea improved with Vanco QID, NGOZI resolved with resolution of hypotension and rash now nearly  resolved. Is now off clinical try given side effects and will follow up with oncology as below. Dr Nelson to discuss other treatment options with Don at outpatient appointment scheduled 4/28. Will have home PT/OT arranged.     - otherwise feeling well in usual state of health    Physical exam:    General: In no acute distress, well-developed, well-nourished  Eyes: no conjunctivitis  ENT: no signs of rhinitis   Pulmonary: no wheezing or coughing  Skin: Focused examination of the skin on test sites was performed = see test results below  {Skin Exam:091923}    Past Medical History:   Patient Active Problem List   Diagnosis     Mantle cell lymphoma of lymph nodes of multiple sites (H)     Tobacco user     Obesity     Nuclear sclerosis     Impotence of organic origin     HTN (hypertension)     HLD (hyperlipidemia)     Diverticulosis of sigmoid colon     Diabetic macular edema (H)     Type 2 diabetes mellitus, without long-term current use of insulin (H)     NHL (non-Hodgkin's lymphoma) (H)     S/P bone marrow transplant (H)     Neutropenic fever (H)     Drug rash     Fever and chills     Lymphoma, unspecified body region, unspecified lymphoma type (H)     Nephrolithiasis     Past Medical History:   Diagnosis Date     History of peripheral stem cell transplant (H) 01/2021     Kidney stones 06/2020    left     Mantle cell lymphoma of lymph nodes of multiple sites (H) 2020     Nephrolithiasis 05/2020     Sepsis without septic shock (H) 05/12/2020    seconary tp pyelonephritis from obstruction left ureteroliethasis       Allergies:  No Known Allergies    Medications:  Current Outpatient Medications   Medication     acetaminophen (TYLENOL) 325 MG tablet     acyclovir (ZOVIRAX) 800 MG tablet     Alcohol Swabs PADS     atorvastatin (LIPITOR) 40 MG tablet     blood glucose (NO BRAND SPECIFIED) lancets standard     blood glucose (NO BRAND SPECIFIED) test strip     blood glucose (NO BRAND SPECIFIED) test strip     blood glucose  monitoring (ACCU-CHEK FASTCLIX) lancets     blood glucose monitoring (NO BRAND SPECIFIED) meter device kit     calcium citrate and vitamin D (CITRACAL) 200-250 MG-UNIT TABS per tablet     furosemide (LASIX) 20 MG tablet     glucose 40 % (400 mg/mL) gel     insulin aspart (NOVOLOG PEN) 100 UNIT/ML pen     insulin aspart (NOVOLOG PEN) 100 UNIT/ML pen     insulin aspart (NOVOLOG PEN) 100 UNIT/ML pen     insulin  UNIT/ML injection     metoprolol succinate ER (TOPROL-XL) 25 MG 24 hr tablet     ondansetron (ZOFRAN-ODT) 8 MG ODT tab     pantoprazole (PROTONIX) 40 MG EC tablet     potassium chloride ER (KLOR-CON M) 10 MEQ CR tablet     potassium chloride ER (KLOR-CON M) 20 MEQ CR tablet     predniSONE (DELTASONE) 20 MG tablet     predniSONE (DELTASONE) 20 MG tablet     predniSONE (DELTASONE) 50 MG tablet     sulfamethoxazole-trimethoprim (BACTRIM DS) 800-160 MG tablet     triamcinolone (KENALOG) 0.1 % external cream     No current facility-administered medications for this visit.        Social History:  The patient {works:146861}. Patient has the following hobbies or non-occupational exposure ***    Family History:  Family History   Problem Relation Age of Onset     Heart Disease Mother      Lymphoma Mother      Heart Disease Father      Heart Disease Sister        Previous Labs, Allergy Tests, Dermatopathology, Imaging:  From Dr. Grubbs referred:   # DRESS/DIHS, improving  * acute, severe, potential severe life-threatening sequelae  * labs reviewed: CBC, LFT, BMP, TSH, dermatopathology  - for now, can hold off on allergy testing as still in acute phase of drug reaction which can be smoldering and insidious for months, will likely need to avoid similar medications to experimental treatment (loncatuximab + ibrutinib) for mantle cell lymphoma  - biopsy suture removed from right abdomen  - prednisone 80 mg daily until follow-up with plan for very slow taper over weeks to months; counseled on all possible side effects  -  vitamin D/calcium, Bactrim, PPI  - counseled to monitor for alarming symptoms for flare  - repeat CBC and CMP in 2 weeks  - repeat TSH in 1-2 months  - triamcinolone ointment PRN  - gentle skin care with moisturizer reviewed    Referred By: Ilsa Zimmerman MD  No address on file     Allergy Tests:    Past Allergy Test    Order for Future Allergy Testing:    [] Outpatient  [] Inpatient: [facility]..../ Bed ....       Skin Atopy (atopic dermatitis) [] Yes   [] No  Contact allergies:   [] Yes   [] No  Urticaria/Angioedema  [] Yes   [] No  Rhinitis/Sinusitis:   [] Yes   [x] No   [] seasonal [] perennial            Allergic Asthma:   [] Yes   [] No  Pets :  [] Yes   [] No  which?......  Food Allergy:   [] Yes   [] No  Drug allergies:   [] Yes   [] No  Leg ulcers:   [] Yes   [] No  Hand eczema:   [] Yes   [] No   Leading hand:   [] R   [] L       [] Ambidextrous                        Order for PATCH TESTS  Reason for tests (suspected allergy): ***  Known previous allergies: ***  Standardized panels  [] Standard panel (40 tests)  [] Preservatives & Antimicrobials (31 tests)  [] Emulsifiers & Additives (25 tests)   [] Perfumes/Flavours & Plants (25 tests)  [] Hairdresser panel (12 tests)  [] Rubber Chemicals (22 tests)  [] Plastics (26 tests)  [] Colorants/Dyes/Food additives (20 tests)  [] Metals (implants/dental) (24 tests)  [] Local anaesthetics/NSAIDs (13 tests)  [] Antibiotics & Antimycotics (14 tests)   [] Corticosteroids (15 tests)   [] Photopatch test (62 tests)   [] others: ...      [] Patient's own products: ...    DO NOT test if chemical or biological identity is unknown!     always ask from patient the product information and safety sheets (MSDS)       Order for PRICK TESTS    Reason for tests (suspected allergy): ***  Known previous allergies: ***    Standardized prick panels  [] Atopic panel (20 tests)  [] Pediatric Panel (12 tests)  [] Milk, Meat, Eggs, Grains (20 tests)   [] Dust, Epithelia, Feathers (10  tests)  [] Fish, Seafood, Shellfish (17 tests)  [] Nuts, Beans (14 tests)  [] Spice, Vegetable, Fruit (17 tests)  [] Others: ...      [] Patient's own products: ...    DO NOT test if chemical or biological identity is unknown!     always ask from patient the product information and safety sheets (MSDS)     Standardized intradermal tests  [] Penicillium notatum [] Aspergillus fumigatus [] House dust mites  [] Bee venom   [] Wasp venom !!Specific protocol with dilutions!!  [] Others: ...      Order for Drug allergy tests (prick & Intradermal & *** patch tests)  [] Penicillin G  [] Ampicillin [] Cefazolin  [] Ceftriaxone  [] Ceftazidime  [] Others: ...  Order for *** as test date    [] Patient needs consultation with Allergy team (changes of tests may apply)  [] Tests discussed with Allergy team (can have direct appointment for allergy tests)     ________________________________    Assessment & Plan:    ==> Final Diagnosis:     # DRESS/DHiS during Chemotherapy with experimental drug (close to loncatuximab + ibrutinib) for Mantle Cell Lymphoma  * acute, active with systemic symptoms    ==> Treatment Plan:  >> plan middle/end of July on Tuesday prick/intradermal and patch tests and on Friday delayed readings      >> Sent following Email to Oncologist Dr. Nelson  Dermatology referred Don for evaluation of his DRESS by skin testing. I have to wait for the testing until patient is off Prednisone, earliest I can do the tests about 2-3 weeks after stopping oral prednisone. According to the history I see several possible culprits for the drug allergy:  1) Bactrim  2) Zofran  3) Aciclovir  4) trial Chemotherapy (Loncatuximab + Ibrutinib)  If you have any other medication that the patient got in the week before the 9th of April, please let me know  I think we should test at least the Bactrim, Zofran and Aciclovir. I guess the Loncatuximab and Ibrutinib are too expensive to test and if he does not get them for future treatment  anyway, then it does not make sense to test it.  Please let me know when you plan the next Chemotherapy cycle, which Chemotherapy you are using and what concomitant treatments are necessary and then I can tailor the drug allergy tests.    Staff:  Provider    Follow-up in Derm-Allergy clinic for drug allergy testing on a Tuesday and delayed readings on a Friday around mid/end July    Start time: 3:30 PM  End time: 4:15pm    I spent a total of 45 minutes with Theo Roblero. This time was spent counseling the patient and/or coordinating care, explaining the allergy tests       Again, thank you for allowing me to participate in the care of your patient.        Sincerely,        Clayton Grande MD

## 2021-06-08 NOTE — PROGRESS NOTES
[facility] Dermato-allergology Note  Virtual visit: store and forward video  Encounter Date: Jun 8, 2021  ____________________________________________    CC: No chief complaint on file.      HPI:  Mr. Theo Roblero is a(n) 56 year old male who presents today as a new patient for allergy consultation  - Patient still on 20mg Prednisone daily and planned to stop Prednisone on the 23rd of June. Skin only dry, but not itchy anymore.   - rash started late April/Early May.  - patient got enrolled into the therapy trial in March.   - Rash started about 1 week after start trial medication.    Patient was as prophylaxis on Bactrim and Aciclovir and Zofran high dose during treatment and Tylenol (but never took it, last one in November 2020).    ED notes from April 9th 2021  Theo Roblero is a 56 year old male with PMHx of T2DM, obesity, tobacco use, HTN, HLD, and stage IV Mantle cell lymphoma (s/p PBSCT 11/2020) with relapsed disease. Most recently received Loncatuximab + Ibrutinib 4/2/21. Presented to [facility] with fevers, hypotension, tachycardia and whole body rash. Developed hypotension refractory to fluid resuscitation and was transferred to MICU for pressors support briefly 4/11-4/12. S/p bx of rash positive for drug rash likely 2/2 Ibrutinib or Loncatuximab. Started on HD steroids (x4/12) with resolution of fevers and clinical improvement. Hospital stay complicated by anasarca with ~30 lb wt gain, NGOZI on CKD, c diff colitis, DKA, and deconditioning. Endo, derm, renal and PT/OT followed inpatient. Blood sugars well controlled, diarrhea improved with Vanco QID, NGOZI resolved with resolution of hypotension and rash now nearly resolved. Is now off clinical try given side effects and will follow up with oncology as below. Dr Nelson to discuss other treatment options with Yanick at outpatient appointment scheduled 4/28. Will have home PT/OT arranged.     - otherwise feeling well in usual state of  health    Physical exam:    General: In no acute distress, well-developed, well-nourished  Eyes: no conjunctivitis  ENT: no signs of rhinitis   Pulmonary: no wheezing or coughing  Skin: in the moment no skin lesions anymore, just dry skin    Past Medical History:   Patient Active Problem List   Diagnosis     Mantle cell lymphoma of lymph nodes of multiple sites (H)     Tobacco user     Obesity     Nuclear sclerosis     Impotence of organic origin     HTN (hypertension)     HLD (hyperlipidemia)     Diverticulosis of sigmoid colon     Diabetic macular edema (H)     Type 2 diabetes mellitus, without long-term current use of insulin (H)     NHL (non-Hodgkin's lymphoma) (H)     S/P bone marrow transplant (H)     Neutropenic fever (H)     Drug rash     Fever and chills     Lymphoma, unspecified body region, unspecified lymphoma type (H)     Nephrolithiasis     Past Medical History:   Diagnosis Date     History of peripheral stem cell transplant (H) 01/2021     Kidney stones 06/2020    left     Mantle cell lymphoma of lymph nodes of multiple sites (H) 2020     Nephrolithiasis 05/2020     Sepsis without septic shock (H) 05/12/2020    seconary tp pyelonephritis from obstruction left ureteroliethasis       Allergies:  No Known Allergies    Medications:  Current Outpatient Medications   Medication     acetaminophen (TYLENOL) 325 MG tablet     acyclovir (ZOVIRAX) 800 MG tablet     Alcohol Swabs PADS     atorvastatin (LIPITOR) 40 MG tablet     blood glucose (NO BRAND SPECIFIED) lancets standard     blood glucose (NO BRAND SPECIFIED) test strip     blood glucose (NO BRAND SPECIFIED) test strip     blood glucose monitoring (ACCU-CHEK FASTCLIX) lancets     blood glucose monitoring (NO BRAND SPECIFIED) meter device kit     calcium citrate and vitamin D (CITRACAL) 200-250 MG-UNIT TABS per tablet     furosemide (LASIX) 20 MG tablet     glucose 40 % (400 mg/mL) gel     insulin aspart (NOVOLOG PEN) 100 UNIT/ML pen     insulin aspart  (NOVOLOG PEN) 100 UNIT/ML pen     insulin aspart (NOVOLOG PEN) 100 UNIT/ML pen     insulin  UNIT/ML injection     metoprolol succinate ER (TOPROL-XL) 25 MG 24 hr tablet     ondansetron (ZOFRAN-ODT) 8 MG ODT tab     pantoprazole (PROTONIX) 40 MG EC tablet     potassium chloride ER (KLOR-CON M) 10 MEQ CR tablet     potassium chloride ER (KLOR-CON M) 20 MEQ CR tablet     predniSONE (DELTASONE) 20 MG tablet     predniSONE (DELTASONE) 20 MG tablet     predniSONE (DELTASONE) 50 MG tablet     sulfamethoxazole-trimethoprim (BACTRIM DS) 800-160 MG tablet     triamcinolone (KENALOG) 0.1 % external cream     No current facility-administered medications for this visit.        Social History:    Family History:  Family History   Problem Relation Age of Onset     Heart Disease Mother      Lymphoma Mother      Heart Disease Father      Heart Disease Sister        Previous Labs, Allergy Tests, Dermatopathology, Imaging:  From Dr. Grubbs referred:   # DRESS/DIHS, improving  * acute, severe, potential severe life-threatening sequelae  * labs reviewed: CBC, LFT, BMP, TSH, dermatopathology  - for now, can hold off on allergy testing as still in acute phase of drug reaction which can be smoldering and insidious for months, will likely need to avoid similar medications to experimental treatment (loncatuximab + ibrutinib) for mantle cell lymphoma  - biopsy suture removed from right abdomen  - prednisone 80 mg daily until follow-up with plan for very slow taper over weeks to months; counseled on all possible side effects  - vitamin D/calcium, Bactrim, PPI  - counseled to monitor for alarming symptoms for flare  - repeat CBC and CMP in 2 weeks  - repeat TSH in 1-2 months  - triamcinolone ointment PRN  - gentle skin care with moisturizer reviewed    Referred By: Ilsa Zimmerman MD  No address on file     Allergy Tests:    Past Allergy Test    Order for Future Allergy Testing:    [] Outpatient  [] Inpatient: Osorio..../ Bed  ....       Skin Atopy (atopic dermatitis) [] Yes   [x] No  Contact allergies:   [] Yes   [x] No  Urticaria/Angioedema  [] Yes   [x] No  Rhinitis/Sinusitis:   [] Yes   [x] No   [] seasonal [] perennial            Allergic Asthma:   [] Yes   [x] No  Pets :  [] Yes   [x] No  which?......  Food Allergy:   [] Yes   [x] No  Drug allergies:   [x] Yes   [] No  Leg ulcers:   [] Yes   [x] No  Hand eczema:   [] Yes   [x] No   Leading hand:   [] R   [] L       [] Ambidextrous                        Order for Drug allergy tests (prick & Intradermal &  patch tests)  [] Penicillin G  [] Ampicillin [] Cefazolin  [] Ceftriaxone  [] Ceftazidime  [x] Others: .Zofran, Bactrim, Aciclovir .. ?  Order for ... as test date    [] Patient needs consultation with Allergy team (changes of tests may apply)  [x] Tests discussed with Allergy team (can have direct appointment for allergy tests)     ________________________________    Assessment & Plan:    ==> Final Diagnosis:     # DRESS/DHiS during Chemotherapy with experimental drug (close to loncatuximab + ibrutinib) for Mantle Cell Lymphoma  * acute, active with systemic symptoms    ==> Treatment Plan:  >> plan middle/end of July on Tuesday prick/intradermal and patch tests and on Friday delayed readings      >> Sent following Email to Oncologist Dr. Nelson  Dermatology referred Don for evaluation of his DRESS by skin testing. I have to wait for the testing until patient is off Prednisone, earliest I can do the tests about 2-3 weeks after stopping oral prednisone. According to the history I see several possible culprits for the drug allergy:  1) Bactrim  2) Zofran  3) Aciclovir  4) trial Chemotherapy (Loncatuximab + Ibrutinib)  If you have any other medication that the patient got in the week before the 9th of April, please let me know  I think we should test at least the Bactrim, Zofran and Aciclovir. I guess the Loncatuximab and Ibrutinib are too expensive to test and if he does not get  them for future treatment anyway, then it does not make sense to test it.  Please let me know when you plan the next Chemotherapy cycle, which Chemotherapy you are using and what concomitant treatments are necessary and then I can tailor the drug allergy tests.    Answer:  Suresh Nelson MD sent to Clayton Grande MD             Hi Dr. Grande     I think the most important drug which he could be treated in the future is ibrutinib. I really think it might be the Loncatuximab which might be the cause or ibrutinib. Out of these two he is likely to get ibrutinib or its 2nd/3rd generation which carries the same cross toxicity in the future.   I think we can start with bactrim, zofran and acyclovir     Suresh          Staff:  Provider    Follow-up in Derm-Allergy clinic for drug allergy testing on a Tuesday and delayed readings on a Friday around mid/end July    Start time: 3:30 PM  End time: 4:15pm    I spent a total of 45 minutes with Theo Roblero. This time was spent counseling the patient and/or coordinating care, explaining the allergy tests

## 2021-06-22 NOTE — PROGRESS NOTES
I spoke to Don to apologize for his message that was not responded to, and in the conversation he let me know that he has had diarrhea for 1.5 weeks and has had blood in his stool for 3 days. Sometimes it is dark, sometimes light, and it is always in the actual stool and toilet bowl as well as on the toilet paper. He is going to proceed to Arjun carrion.    Monique Ennis RN  Clinical   [facility]

## 2021-06-22 NOTE — TELEPHONE ENCOUNTER
I called Don to discuss this message and apologize that it had been completed without being looked at. I left him a message with a request to call me back.    Monique Ennis RN  Clinical   [facility]

## 2021-06-23 NOTE — PROGRESS NOTES
I reviewed yesterdays events.  Understandably Yanick and his wife are frustrated about the cancellation of clinic appointment last week, review of PET scan results.  I apologised for the miscommunication and will work on preventing this in the future.  I explained to Yanick that it was never my intention not to follow up after the clinical trial. I did discuss him at our cell therapy meeting and the decision was to monitor him.   Since he has had diarrhea for 10 days along with hematochezia which is new he went to the ER.  I reviewed his scan result which shows thickening around the rectum. This was also seen on the prior PET but compared from before this was better  Hence I think we need to biopsy this lesion and I will arrange for a colonoscopy with biopsy.  Once the biopsy is resulted will discuss with Yanick  I did offer Yanick that I could see him in clinic today but Don didn't feel the need for it which is fair as we have a plan.  I will arrange infusion appointments for PRBC if needed and IV fluids.  I will also ask Ms. Natasha Fall to call him later.    Plan  Colonoscopy and biopsy  Weekly infusion appointment for PRBC / IVF  F/v with LITO every 3 weeks  Once colonoscopy and biopsy reults resulted I will call him    Suresh LAURENT MS  Attending Physician

## 2021-06-24 NOTE — TELEPHONE ENCOUNTER
Writer placed outgoing fax to [facility] for lab orders per the request of [facility].     Screen shot of fax confirmation below.Orders faxed, per request below.    Tamiko Kwok CMA ([facility])

## 2021-06-24 NOTE — PROGRESS NOTES
Writer called Yanick to go over orders. He already has a colonoscopy scheduled for next Tuesday at [facility] in [place]. He says he feels good. Questions if he needs to come in for labs. He says the diarrhea continues but less blood.     Will get labs locally at his PCP office, tomorrow am if possible and then we will plan from there. He is not going OOT. Was gone for a wedding thus why his Doctor Zavala appointment had to be changed.     Will continue to follow. Order for CBC faxed to Walter ricks, Doctor Dee office.     Doctor Nelson updated and ok with the above plan. Will wait to schedule any further provider appointments at [facility] until after Yanick sees Doctor Zavala on July 5th.    Scheduling updated.

## 2021-06-30 NOTE — TELEPHONE ENCOUNTER
I talked with Don today and explained that Dr. Eisenberg called me that he had relapsed and refractory mantle cell lymphoma  He wanted chemo but I told him the best option would be FDA approved CART which can deliver long term durable responses  I said we need to obtain insurance approval and Dr. Zavala will discuss this further when he meets him on Monday  I have asked the BMT office to obtian approval    Suresh Nelson MD

## 2021-06-30 NOTE — PROGRESS NOTES
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 He relapsed in 4/2021, and went on to receive 2018IS-157 (Loncatuximab + ibrutinib). This was complicated by DRES and NGOZI, felt to be secondary to ibrutinib (4/10-21/2021).    More recently he developed hematochezia in late 6/2021. Infectious work up was negative. A flex sig was performed on 6/26/2021. The path is consistent with MCL.    Afebrile. Reports intermittent diarrhea since admission for the endoscopy over a week ago. No bloody stools.    He presents today to discuss CD19 CAR T therapy with [employer].     PMHx:  1. MCL  2. DM  3. HTN  4. Kidney stone     SHx: Nonsmoker.  Occasional ETOH.  No recreational drug use.     FMHx:  Mother, lymphoma and non-melanoma skin cancer.    Review of Systems: 10 point ROS negative except as noted above.    Scheduled Medications    ondansetron  4 mg Intravenous Once     sulfamethoxazole-trimethoprim  80 mg Intravenous Once     Current Outpatient Medications   Medication     acetaminophen (TYLENOL) 325 MG tablet     acyclovir (ZOVIRAX) 50 MG/ML injection     acyclovir (ZOVIRAX) 800 MG tablet     Alcohol Swabs PADS     atorvastatin (LIPITOR) 40 MG tablet     blood glucose (NO BRAND SPECIFIED) lancets standard     blood glucose (NO BRAND SPECIFIED) test strip     blood glucose (NO BRAND SPECIFIED) test strip     blood glucose monitoring (ACCU-CHEK FASTCLIX) lancets     blood glucose monitoring (NO BRAND SPECIFIED) meter device kit     furosemide (LASIX) 20 MG tablet     glucose 40 % (400 mg/mL) gel     insulin aspart (NOVOLOG PEN) 100 UNIT/ML pen     insulin aspart (NOVOLOG PEN) 100 UNIT/ML pen     insulin aspart (NOVOLOG PEN) 100 UNIT/ML pen     insulin  UNIT/ML injection     metoprolol succinate ER (TOPROL-XL) 25 MG 24 hr tablet     ondansetron (ZOFRAN-ODT) 8 MG ODT tab     pantoprazole  (PROTONIX) 40 MG EC tablet     potassium chloride ER (KLOR-CON M) 10 MEQ CR tablet     potassium chloride ER (KLOR-CON M) 20 MEQ CR tablet     predniSONE (DELTASONE) 20 MG tablet     predniSONE (DELTASONE) 20 MG tablet     predniSONE (DELTASONE) 50 MG tablet     sulfamethoxazole-trimethoprim (BACTRIM DS) 800-160 MG tablet     triamcinolone (KENALOG) 0.1 % external cream     Current Facility-Administered Medications   Medication     ondansetron (ZOFRAN) injection 4 mg     sulfamethoxazole-trimethoprim (BACTRIM) injection 80 mg       PHYSICAL EXAM     Weight In/Out     Wt Readings from Last 3 Encounters:   07/05/21 110.4 kg (243 lb 4.8 oz)   04/28/21 109.9 kg (242 lb 4.8 oz)   04/26/21 112.5 kg (248 lb)      [unfilled]       ECOG 1    /73 (BP Location: Right arm, Patient Position: Sitting, Cuff Size: Adult Regular)   Pulse 98   Temp 98.3  F (36.8  C) (Oral)   Resp 16   Wt 110.4 kg (243 lb 4.8 oz)   SpO2 99%   BMI 33.93 kg/m         General: NAD   Eyes: : MARYAN, sclera anicteric   Nose/Mouth/Throat: OP clear, buccal mucosa moist, no ulcerations   Lungs: CTA bilaterally  Cardiovascular: RRR, no M/R/G   Abdominal/Rectal: +BS, soft, NT, ND, No HSM   Lymphatics: no edema  Skin: no rashes or petechaie  Neuro: A&O     LABS AND IMAGING - PAST 24 HOURS     Results for orders placed or performed in visit on 07/05/21 (from the past 24 hour(s))   *CBC with platelets differential   Result Value Ref Range    WBC 8.1 4.0 - 11.0 10e9/L    RBC Count 2.90 (L) 4.4 - 5.9 10e12/L    Hemoglobin 9.1 (L) 13.3 - 17.7 g/dL    Hematocrit 28.2 (L) 40.0 - 53.0 %    MCV 97 78 - 100 fl    MCH 31.4 26.5 - 33.0 pg    MCHC 32.3 31.5 - 36.5 g/dL    RDW 14.7 10.0 - 15.0 %    Platelet Count 174 150 - 450 10e9/L    Diff Method Automated Method     % Neutrophils 67.1 %    % Lymphocytes 20.0 %    % Monocytes 8.9 %    % Eosinophils 0.6 %    % Basophils 0.4 %    % Immature Granulocytes 3.0 %    Nucleated RBCs 0 0 /100    Absolute Neutrophil  5.4 1.6 - 8.3 10e9/L    Absolute Lymphocytes 1.6 0.8 - 5.3 10e9/L    Absolute Monocytes 0.7 0.0 - 1.3 10e9/L    Absolute Eosinophils 0.1 0.0 - 0.7 10e9/L    Absolute Basophils 0.0 0.0 - 0.2 10e9/L    Abs Immature Granulocytes 0.2 0 - 0.4 10e9/L    Absolute Nucleated RBC 0.0    Comprehensive metabolic panel   Result Value Ref Range    Sodium 132 (L) 133 - 144 mmol/L    Potassium 4.1 3.4 - 5.3 mmol/L    Chloride 95 94 - 109 mmol/L    Carbon Dioxide 27 20 - 32 mmol/L    Anion Gap 11 3 - 14 mmol/L    Glucose 192 (H) 70 - 99 mg/dL    Urea Nitrogen 12 7 - 30 mg/dL    Creatinine 1.23 0.66 - 1.25 mg/dL    GFR Estimate 65 >60 mL/min/[1.73_m2]    GFR Estimate If Black 75 >60 mL/min/[1.73_m2]    Calcium 8.3 (L) 8.5 - 10.1 mg/dL    Bilirubin Total 0.4 0.2 - 1.3 mg/dL    Albumin 2.1 (L) 3.4 - 5.0 g/dL    Protein Total 5.3 (L) 6.8 - 8.8 g/dL    Alkaline Phosphatase 66 40 - 150 U/L    ALT 17 0 - 70 U/L    AST 22 0 - 45 U/L         ASSESSMENT BY SYSTEMS     Theo Robertsseema is a 56 year old male, currently day +237 of his autologous HCT for high risk, 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 Currently with relapsed MCL. We discussed the risks and benefits of [facility]. We briefly discussed alloHCT after Tec[facility]us, however he is not interested in considering alloHCT at this time. Overall, he has low burden disease and a low comorbidity index to support outpatient CAR T. He understands prolonged hospitalizations could occur if he develops prolonged neutropenia after CAR T therapy. He is interested in proceeding with T[employer]us.  1. MCL: Flex sig positive for MCL. Will pursue Tecartus.   2. DRES: Improved with steroids and discontinuing ibrutinib. Off steroids for 3 weeks. No flare.    We spent 90 minutes in consultation reviewing the prognosis of chemotherapy refractory DLBCL and. treatment options.    The therapeutic options are limited and given the failure of prior therapy, CAR-T is indicated; I recommended therapy  using commercial CAR-T  product [employer]. We will pursue insurance approval now.     I reviewed the protocol and T[employer]us efficacy and safety data (including but not limited to CRS, neurotox, neutropenia) with the patient.     The process consists of non-mobilized apheresis followed by 3 weeks period of waiting, autologous CAR19-T cells are reprogrammed and manufactured ex vivo.  The treatment phase includes lympho-depleting chemotherapy with fludarabine and Cytoxan x3 days.  The patient will receive this outpatient.    CAR-T therapy will be administered in outpatient setting.  I recommend the outpatient Tecartus administration and confirm that the following requirements are all met:                         ECOG 0-1     No recent coronary artery disease event, uncontrolled atrial fibrillation, CHF or pulmonary edema (past 3 months)     No evidence of lymphoma involving liver, spleen or bone marrow     No bulky disease (>5 cm) at any site AND low to moderate tumor burden         I recommend the following bridging therapy after T-cell apheresis to control the disease and debulk lymphoma: None planned, but could be considered per Dr. Nelson.     We reviewed the side effects associated with LD chemotherapy and CAR-T infusion such as low blood counts, risk of infection and bleeding. We focused on expected side effects of cytokine release syndrome and the neurotoxicity. Symptoms may vary from mild to severe, and potentially life-threatening and can include the need for ICU care and intubation.  Some patients can get very ill, but the usual course is transient and reversible.  The patient will have to stay within 30 minutes from clinic for 28 days and are advised to participate in 15 years follow-up.      CAR-T cell products available commercially are standard of care therapy and our center is certified to administer CAR-Ts, the research aspects are limited to collection of data to research database. Patient will be co-enrolled to immune  reconstitiution protocol [id number].    Patient will have to wear the CAR-T therapy wallet card which needs to presented to ER at the time of visit.     Patient and the family asked many questions and had a good understanding of CAR-T treatment and its potential complications. Patient verbalized the wish to proceed with CAR-T therapy as recommended. I discussed these recommendations with referring physician Dr. Nelson and request the BMT office to pursuit the following plan:    1. pursue insurance approval for CAR-T therapy with [employer].   2. schedule a work-up for apheresis   3. arrange virology testing prior to apheresis  4. schedule bridging chemotherapy with [facility] as indiciated.    I spent 45 minutes in the care of this patient today, which included time necessary for preparation for the visit, obtaining history, ordering medications/tests/procedures as medically indicated, review of pertinent medical literature, counseling of the patient, communication of recommendations to the care team, and documentation time.    Sam Zavala

## 2021-07-05 NOTE — LETTER
7/5/2021         RE: Theo Roblero  [address]  [address]  [facility] [place] [address]        Dear Colleague,    Thank you for referring your patient, Theo Roblero, to the [facility] BLOOD AND MARROW TRANSPLANT PROGRAM [place]. Please see a copy of my visit note below.    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 He relapsed in 4/2021, and went on to receive 2018IS-157 (Loncatuximab + ibrutinib). This was complicated by DRES and NGOZI, felt to be secondary to ibrutinib (4/10-21/2021).    More recently he developed hematochezia in late 6/2021. Infectious work up was negative. A flex sig was performed on 6/26/2021. The path is consistent with MCL.    Afebrile. Reports intermittent diarrhea since admission for the endoscopy over a week ago. No bloody stools.    He presents today to discuss CD19 CAR T therapy with [employer].     PMHx:  1. MCL  2. DM  3. HTN  4. Kidney stone     SHx: Nonsmoker.  Occasional ETOH.  No recreational drug use.     FMHx:  Mother, lymphoma and non-melanoma skin cancer.    Review of Systems: 10 point ROS negative except as noted above.    Scheduled Medications    ondansetron  4 mg Intravenous Once     sulfamethoxazole-trimethoprim  80 mg Intravenous Once     Current Outpatient Medications   Medication     acetaminophen (TYLENOL) 325 MG tablet     acyclovir (ZOVIRAX) 50 MG/ML injection     acyclovir (ZOVIRAX) 800 MG tablet     Alcohol Swabs PADS     atorvastatin (LIPITOR) 40 MG tablet     blood glucose (NO BRAND SPECIFIED) lancets standard     blood glucose (NO BRAND SPECIFIED) test strip     blood glucose (NO BRAND SPECIFIED) test strip     blood glucose monitoring (ACCU-CHEK FASTCLIX) lancets     blood glucose monitoring (NO BRAND SPECIFIED) meter device kit     furosemide (LASIX) 20 MG tablet     glucose 40 % (400 mg/mL) gel     insulin  aspart (NOVOLOG PEN) 100 UNIT/ML pen     insulin aspart (NOVOLOG PEN) 100 UNIT/ML pen     insulin aspart (NOVOLOG PEN) 100 UNIT/ML pen     insulin  UNIT/ML injection     metoprolol succinate ER (TOPROL-XL) 25 MG 24 hr tablet     ondansetron (ZOFRAN-ODT) 8 MG ODT tab     pantoprazole (PROTONIX) 40 MG EC tablet     potassium chloride ER (KLOR-CON M) 10 MEQ CR tablet     potassium chloride ER (KLOR-CON M) 20 MEQ CR tablet     predniSONE (DELTASONE) 20 MG tablet     predniSONE (DELTASONE) 20 MG tablet     predniSONE (DELTASONE) 50 MG tablet     sulfamethoxazole-trimethoprim (BACTRIM DS) 800-160 MG tablet     triamcinolone (KENALOG) 0.1 % external cream     Current Facility-Administered Medications   Medication     ondansetron (ZOFRAN) injection 4 mg     sulfamethoxazole-trimethoprim (BACTRIM) injection 80 mg       PHYSICAL EXAM     Weight In/Out     Wt Readings from Last 3 Encounters:   07/05/21 110.4 kg (243 lb 4.8 oz)   04/28/21 109.9 kg (242 lb 4.8 oz)   04/26/21 112.5 kg (248 lb)      [unfilled]       ECOG 1    /73 (BP Location: Right arm, Patient Position: Sitting, Cuff Size: Adult Regular)   Pulse 98   Temp 98.3  F (36.8  C) (Oral)   Resp 16   Wt 110.4 kg (243 lb 4.8 oz)   SpO2 99%   BMI 33.93 kg/m         General: NAD   Eyes: : MARYAN, sclera anicteric   Nose/Mouth/Throat: OP clear, buccal mucosa moist, no ulcerations   Lungs: CTA bilaterally  Cardiovascular: RRR, no M/R/G   Abdominal/Rectal: +BS, soft, NT, ND, No HSM   Lymphatics: no edema  Skin: no rashes or petechaie  Neuro: A&O     LABS AND IMAGING - PAST 24 HOURS     Results for orders placed or performed in visit on 07/05/21 (from the past 24 hour(s))   *CBC with platelets differential   Result Value Ref Range    WBC 8.1 4.0 - 11.0 10e9/L    RBC Count 2.90 (L) 4.4 - 5.9 10e12/L    Hemoglobin 9.1 (L) 13.3 - 17.7 g/dL    Hematocrit 28.2 (L) 40.0 - 53.0 %    MCV 97 78 - 100 fl    MCH 31.4 26.5 - 33.0 pg    MCHC 32.3 31.5 - 36.5 g/dL     RDW 14.7 10.0 - 15.0 %    Platelet Count 174 150 - 450 10e9/L    Diff Method Automated Method     % Neutrophils 67.1 %    % Lymphocytes 20.0 %    % Monocytes 8.9 %    % Eosinophils 0.6 %    % Basophils 0.4 %    % Immature Granulocytes 3.0 %    Nucleated RBCs 0 0 /100    Absolute Neutrophil 5.4 1.6 - 8.3 10e9/L    Absolute Lymphocytes 1.6 0.8 - 5.3 10e9/L    Absolute Monocytes 0.7 0.0 - 1.3 10e9/L    Absolute Eosinophils 0.1 0.0 - 0.7 10e9/L    Absolute Basophils 0.0 0.0 - 0.2 10e9/L    Abs Immature Granulocytes 0.2 0 - 0.4 10e9/L    Absolute Nucleated RBC 0.0    Comprehensive metabolic panel   Result Value Ref Range    Sodium 132 (L) 133 - 144 mmol/L    Potassium 4.1 3.4 - 5.3 mmol/L    Chloride 95 94 - 109 mmol/L    Carbon Dioxide 27 20 - 32 mmol/L    Anion Gap 11 3 - 14 mmol/L    Glucose 192 (H) 70 - 99 mg/dL    Urea Nitrogen 12 7 - 30 mg/dL    Creatinine 1.23 0.66 - 1.25 mg/dL    GFR Estimate 65 >60 mL/min/[1.73_m2]    GFR Estimate If Black 75 >60 mL/min/[1.73_m2]    Calcium 8.3 (L) 8.5 - 10.1 mg/dL    Bilirubin Total 0.4 0.2 - 1.3 mg/dL    Albumin 2.1 (L) 3.4 - 5.0 g/dL    Protein Total 5.3 (L) 6.8 - 8.8 g/dL    Alkaline Phosphatase 66 40 - 150 U/L    ALT 17 0 - 70 U/L    AST 22 0 - 45 U/L         ASSESSMENT BY SYSTEMS     Theo Robertsseema is a 56 year old male, currently day +237 of his autologous HCT for high risk, 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 Currently with relapsed MCL. We discussed the risks and benefits of Tecartus. We briefly discussed alloHCT after Tecartus, however he is not interested in considering alloHCT at this time. Overall, he has low burden disease and a low comorbidity index to support outpatient CAR T. He understands prolonged hospitalizations could occur if he develops prolonged neutropenia after CAR T therapy. He is interested in proceeding with Tecartus.  1. MCL: Flex sig positive for MCL. Will pursue Tecartus.   2. DRES: Improved with steroids and discontinuing ibrutinib. Off  steroids for 3 weeks. No flare.    We spent 90 minutes in consultation reviewing the prognosis of chemotherapy refractory DLBCL and. treatment options.    The therapeutic options are limited and given the failure of prior therapy, CAR-T is indicated; I recommended therapy  using commercial CAR-T product Tecartus. We will pursue insurance approval now.     I reviewed the protocol and Tecartus efficacy and safety data (including but not limited to CRS, neurotox, neutropenia) with the patient.     The process consists of non-mobilized apheresis followed by 3 weeks period of waiting, autologous CAR19-T cells are reprogrammed and manufactured ex vivo.  The treatment phase includes lympho-depleting chemotherapy with fludarabine and Cytoxan x3 days.  The patient will receive this outpatient.    CAR-T therapy will be administered in outpatient setting.  I recommend the outpatient Tecartus administration and confirm that the following requirements are all met:                         ECOG 0-1     No recent coronary artery disease event, uncontrolled atrial fibrillation, CHF or pulmonary edema (past 3 months)     No evidence of lymphoma involving liver, spleen or bone marrow     No bulky disease (>5 cm) at any site AND low to moderate tumor burden         I recommend the following bridging therapy after T-cell apheresis to control the disease and debulk lymphoma: None planned, but could be considered per Dr. Nelson.     We reviewed the side effects associated with LD chemotherapy and CAR-T infusion such as low blood counts, risk of infection and bleeding. We focused on expected side effects of cytokine release syndrome and the neurotoxicity. Symptoms may vary from mild to severe, and potentially life-threatening and can include the need for ICU care and intubation.  Some patients can get very ill, but the usual course is transient and reversible.  The patient will have to stay within 30 minutes from clinic for 28 days and  are advised to participate in 15 years follow-up.      CAR-T cell products available commercially are standard of care therapy and our center is certified to administer CAR-Ts, the research aspects are limited to collection of data to research database. Patient will be co-enrolled to immune reconstitiution protocol NK3908-10.    Patient will have to wear the CAR-T therapy wallet card which needs to presented to ER at the time of visit.     Patient and the family asked many questions and had a good understanding of CAR-T treatment and its potential complications. Patient verbalized the wish to proceed with CAR-T therapy as recommended. I discussed these recommendations with referring physician Dr. Nelson and request the BMT office to pursuit the following plan:    1. pursue insurance approval for CAR-T therapy with [facility].   2. schedule a work-up for apheresis   3. arrange virology testing prior to apheresis  4. schedule bridging chemotherapy with [facility] as indiciated.    I spent 45 minutes in the care of this patient today, which included time necessary for preparation for the visit, obtaining history, ordering medications/tests/procedures as medically indicated, review of pertinent medical literature, counseling of the patient, communication of recommendations to the care team, and documentation time.    Sam Zavala        Again, thank you for allowing me to participate in the care of your patient.        Sincerely,        Sam Zavala MD

## 2021-07-05 NOTE — NURSING NOTE
"Oncology Rooming Note    July 5, 2021 9:31 AM   Theo Roblero is a 56 year old male who presents for:    Chief Complaint   Patient presents with     Port Draw     Labs drawn via port by rn in lab. VS taken.     Oncology Clinic Visit     NHL     Initial Vitals: /73 (BP Location: Right arm, Patient Position: Sitting, Cuff Size: Adult Regular)   Pulse 98   Temp 98.3  F (36.8  C) (Oral)   Resp 16   Wt 110.4 kg (243 lb 4.8 oz)   SpO2 99%   BMI 33.93 kg/m   Estimated body mass index is 33.93 kg/m  as calculated from the following:    Height as of 4/9/21: 1.803 m (5' 11\").    Weight as of this encounter: 110.4 kg (243 lb 4.8 oz). Body surface area is 2.35 meters squared.  Mild Pain (2) Comment: Data Unavailable   No LMP for male patient.  Allergies reviewed: Yes  Medications reviewed: Yes    Medications: Medication refills not needed today.  Pharmacy name entered into IMGuest:    [id number] - [place], [place] - [address] AT [facility] [address]  [facility]    Clinical concerns: New concerns: Treatment plan, decreased appetite.        Frannie Mccall LPN July 5, 2021 9:32 AM                "

## 2021-07-06 NOTE — PROGRESS NOTES
CLINICAL NUTRITION SERVICES     Reason for Contact: Patient was contacted by phone due to requested to speak with a Dietitian on the Oncology Distress Screening tool.     Action: RD called patient and was sent to a VM. Left a VM with a return call back number.     Follow up: Wait for a return phone call.    Cherelle Son MS, RD, , CNSC, LD.  5C/BMT Pager:[phone]

## 2021-07-14 NOTE — RESULT ENCOUNTER NOTE
Informed patient of lab results via MyC, from dermatologic perspective, liver enzymes, blood counts, kidney function reassuring, no need to continue prednisone unless skin eruption recurs. Follow-up with Dr. Grande for allergy testing as planned.     Maureen Grubbs MD  Dermatology Resident  [facility]

## 2021-07-14 NOTE — TELEPHONE ENCOUNTER
"BMT CSW Telephone Encounter  Clinical Social Work  [facility]      Data: Pt is a 55 year old male with a hx of Mantle cell lymphoma s/p Day +246 of his HCT BMT per medical record; Pt scheduled to begin CAR-T apheresis work up 7/19/21 per RNCC.     Pt expressed concerns for the CAR-T caregiver requirement and 60-day driving restrictions. CSW requested to follow-up w/ patient to offer caregiver support/education.    Intervention: CSW spoke w/ Pt on 7/14/21 re: CAR-T caregiving requirements. Pt states he was not initially aware of the 30-day caregiver requirement and 60-day driving restriction. CSW brainstormed possible caregivers by assessing his support system. Pt states his brother is unable to take time off work to be his caregiver as he previously did for the autologous transplant. CSW educated Pt on BMT grants ([facility]) that may off-set unpaid time off for caregivers to assist; Pt declined stating he does not wish to ask his brother to take off work. Pt states his mother is 74 years old and he's concerned about her ability to drive/assist. He states his significant other Hayde does not live with him and is unable to take time off as they work at the same company and the company would not allow her to do that. Pt states his daughter is 17 years old and returning to school and denies any other family members or friends that would be able or willing to assist. Pt processed \"I don't even want to ask anyone to put their life on hold.\" He states \"I want to get back to work and some sense of normalcy.\" Pt reflected on his quality of life over the last year and indicates that he felt like the autologous transplant process was his \"best option\" and states \"it still didn't work.\" Pt indicates he'd prefer to pursue chemotherapy options, return to work if able, and avoid caregiver & driving restrictions. Pt is open to discussing his decision to decline CAR-T at this time with the BMT team.     Follow-up:None indicated at this " time.       DIMITRI Linn, [facility]  Adult Blood & Marrow Transplant   Phone: [phone]  Pager: [phone]

## 2021-07-15 NOTE — PROGRESS NOTES
Called Dr Mae office at [facility] [phone] to notify of patient's decision to forgo CAR-T treatment at this time, instead opting to re-establish care with Dr. Mae.     RN and MD not available,  took message with direct call back number to discuss.

## 2021-07-15 NOTE — TELEPHONE ENCOUNTER
Contacted Don to discuss concern about CART he had expressed to BMT SW over the phone yesterday. He is no longer interested in pursuing CART treatment and would like to return to his local oncologist for follow up oncologic care. Dr Zavala and Dr Nelson notified. NATI Marroquin to notify RMD of transfer of care. Pt verbalized understanding of plan.

## 2021-07-15 NOTE — PROGRESS NOTES
Spoke with RN at [facility]. She will discuss with Dr. Mae and have patient scheduled with appointment to see him in order to initiate treatment.

## 2021-10-18 ENCOUNTER — TELEPHONE (OUTPATIENT)
Dept: ONCOLOGY | Facility: CLINIC | Age: 56
End: 2021-10-18

## 2021-10-18 NOTE — TELEPHONE ENCOUNTER
called subject to complete [id number]. Found out from subjects significant other that they passed away on 10/17/2021

## 2022-05-18 NOTE — PROGRESS NOTES
BMT ONC Adult Bone Marrow Biopsy Procedure Note  October 13, 2020  BP (!) 144/81   Pulse 95   Temp 98.3  F (36.8  C)   Resp 16   Wt 109.2 kg (240 lb 11.2 oz)   SpO2 100%   BMI 33.59 kg/m       Learning needs assessment complete within 12 months? YES    DIAGNOSIS: MCL     PROCEDURE: Unilateral Bone Marrow Biopsy and Unilateral Aspirate    LOCATION: [facility] [place] 2nd [facility]    Patient s identification was positively verified by verbal identification and invasive procedure safety checklist was completed. Informed consent was obtained. Following the administration of no premedication--no . as pre-medication, patient was placed in the prone position and prepped and draped in a sterile manner. Approximately 20 cc of 1% Lidocaine was used over the left posterior iliac spine. Following this a 3 mm incision was made. Trephine bone marrow core(s) was (were) obtained from the [facility]. Bone marrow aspirates were obtained from the [facility]. Aspirates were sent for morphology, immunophenotyping, cytogenetics and molecular diagnostics gene rearrangement. A total of approximately 20 ml of marrow was aspirated. Following this procedure a sterile dressing was applied to the bone marrow biopsy site(s). The patient was placed in the supine position to maintain pressure on the biopsy site. Post-procedure wound care instructions were given.     Complications: NO    Pre-procedural pain: 0 out of 10 on the numeric pain rating scale.     Procedural pain: 4 out of 10 on the numeric pain rating scale.     Post-procedural pain assessment: 0 out of 10 on the numeric pain rating scale.     Interventions: NO    Length of procedure:20 minutes or less      Procedure performed by: Diane Morgan PA-C #[id number]     no

## (undated) DEVICE — LINEN GOWN XLG 5407

## (undated) DEVICE — COVER ULTRASOUND PROBE W/GEL FLEXI-FEEL 6"X58" LF  25-FF658

## (undated) DEVICE — PACK CENTRAL LINE INSERTION SAN32CLFCG

## (undated) DEVICE — CAP LUER LOCK MALE/FEMALE DUAL 2C6250

## (undated) DEVICE — DECANTER BAG 2002S

## (undated) DEVICE — COVER EASY EQUIP BAG W/BAND LATEX FREE EZ-28

## (undated) DEVICE — ADH SKIN CLOSURE PREMIERPRO EXOFIN MICOR HV 0.5ML 3471

## (undated) DEVICE — LINEN TOWEL PACK X5 5464

## (undated) DEVICE — SU ETHILON 2-0 FS 18" 664H

## (undated) DEVICE — KIT INTRODUCER FLUENT MICRO 5FRX10CM ECHO TIP KIT-038-04

## (undated) DEVICE — GLOVE PROTEXIS POWDER FREE SMT 7.5  2D72PT75X

## (undated) DEVICE — GOWN XLG DISP 9545

## (undated) RX ORDER — CALCIUM GLUCONATE 94 MG/ML
INJECTION, SOLUTION INTRAVENOUS
Status: DISPENSED
Start: 2020-01-01

## (undated) RX ORDER — SODIUM CHLORIDE 9 MG/ML
INJECTION, SOLUTION INTRAVENOUS
Status: DISPENSED
Start: 2021-01-01

## (undated) RX ORDER — HEPARIN SODIUM (PORCINE) LOCK FLUSH IV SOLN 100 UNIT/ML 100 UNIT/ML
SOLUTION INTRAVENOUS
Status: DISPENSED
Start: 2020-01-01

## (undated) RX ORDER — POTASSIUM CHLORIDE 1500 MG/1
TABLET, EXTENDED RELEASE ORAL
Status: DISPENSED
Start: 2020-01-01

## (undated) RX ORDER — FENTANYL CITRATE 50 UG/ML
INJECTION, SOLUTION INTRAMUSCULAR; INTRAVENOUS
Status: DISPENSED
Start: 2021-01-01

## (undated) RX ORDER — HEPARIN SODIUM (PORCINE) LOCK FLUSH IV SOLN 100 UNIT/ML 100 UNIT/ML
SOLUTION INTRAVENOUS
Status: DISPENSED
Start: 2021-01-01

## (undated) RX ORDER — HEPARIN SODIUM (PORCINE) LOCK FLUSH IV SOLN 100 UNIT/ML 100 UNIT/ML
SOLUTION INTRAVENOUS
Status: DISPENSED
Start: 2020-10-13

## (undated) RX ORDER — LIDOCAINE HYDROCHLORIDE 10 MG/ML
INJECTION, SOLUTION EPIDURAL; INFILTRATION; INTRACAUDAL; PERINEURAL
Status: DISPENSED
Start: 2021-01-01

## (undated) RX ORDER — CEFAZOLIN SODIUM 1 G/3ML
INJECTION, POWDER, FOR SOLUTION INTRAMUSCULAR; INTRAVENOUS
Status: DISPENSED
Start: 2020-01-01

## (undated) RX ORDER — LIDOCAINE HYDROCHLORIDE 10 MG/ML
INJECTION, SOLUTION EPIDURAL; INFILTRATION; INTRACAUDAL; PERINEURAL
Status: DISPENSED
Start: 2020-01-01